# Patient Record
Sex: MALE | Race: ASIAN | NOT HISPANIC OR LATINO | URBAN - METROPOLITAN AREA
[De-identification: names, ages, dates, MRNs, and addresses within clinical notes are randomized per-mention and may not be internally consistent; named-entity substitution may affect disease eponyms.]

---

## 2017-01-10 PROBLEM — Z00.00 ENCOUNTER FOR PREVENTIVE HEALTH EXAMINATION: Status: ACTIVE | Noted: 2017-01-10

## 2017-01-30 ENCOUNTER — OUTPATIENT (OUTPATIENT)
Dept: OUTPATIENT SERVICES | Facility: HOSPITAL | Age: 57
LOS: 1 days | End: 2017-01-30
Payer: MEDICARE

## 2017-01-30 ENCOUNTER — APPOINTMENT (OUTPATIENT)
Dept: CV DIAGNOSITCS | Facility: HOSPITAL | Age: 57
End: 2017-01-30

## 2017-01-30 DIAGNOSIS — I25.10 ATHEROSCLEROTIC HEART DISEASE OF NATIVE CORONARY ARTERY WITHOUT ANGINA PECTORIS: ICD-10-CM

## 2017-01-30 PROCEDURE — 93306 TTE W/DOPPLER COMPLETE: CPT

## 2017-01-30 PROCEDURE — 93312 ECHO TRANSESOPHAGEAL: CPT

## 2017-01-30 PROCEDURE — 93306 TTE W/DOPPLER COMPLETE: CPT | Mod: 26

## 2017-01-30 PROCEDURE — 93312 ECHO TRANSESOPHAGEAL: CPT | Mod: 26

## 2019-12-22 ENCOUNTER — EMERGENCY (EMERGENCY)
Facility: HOSPITAL | Age: 59
LOS: 1 days | Discharge: ANOTHER HOSPITAL | End: 2019-12-22
Attending: EMERGENCY MEDICINE | Admitting: EMERGENCY MEDICINE
Payer: MEDICARE

## 2019-12-22 VITALS
OXYGEN SATURATION: 98 % | HEART RATE: 89 BPM | WEIGHT: 229.94 LBS | RESPIRATION RATE: 16 BRPM | HEIGHT: 73 IN | DIASTOLIC BLOOD PRESSURE: 106 MMHG | SYSTOLIC BLOOD PRESSURE: 171 MMHG | TEMPERATURE: 99 F

## 2019-12-22 LAB
ALBUMIN SERPL ELPH-MCNC: 3.6 G/DL — SIGNIFICANT CHANGE UP (ref 3.3–5)
ALP SERPL-CCNC: 78 U/L — SIGNIFICANT CHANGE UP (ref 40–120)
ALT FLD-CCNC: 23 U/L — SIGNIFICANT CHANGE UP (ref 12–78)
ANION GAP SERPL CALC-SCNC: 6 MMOL/L — SIGNIFICANT CHANGE UP (ref 5–17)
AST SERPL-CCNC: 19 U/L — SIGNIFICANT CHANGE UP (ref 15–37)
BASOPHILS # BLD AUTO: 0.07 K/UL — SIGNIFICANT CHANGE UP (ref 0–0.2)
BASOPHILS NFR BLD AUTO: 1.2 % — SIGNIFICANT CHANGE UP (ref 0–2)
BILIRUB SERPL-MCNC: 0.5 MG/DL — SIGNIFICANT CHANGE UP (ref 0.2–1.2)
BUN SERPL-MCNC: 11 MG/DL — SIGNIFICANT CHANGE UP (ref 7–23)
CALCIUM SERPL-MCNC: 8.7 MG/DL — SIGNIFICANT CHANGE UP (ref 8.5–10.1)
CHLORIDE SERPL-SCNC: 110 MMOL/L — HIGH (ref 96–108)
CO2 SERPL-SCNC: 27 MMOL/L — SIGNIFICANT CHANGE UP (ref 22–31)
CREAT SERPL-MCNC: 0.97 MG/DL — SIGNIFICANT CHANGE UP (ref 0.5–1.3)
EOSINOPHIL # BLD AUTO: 0.37 K/UL — SIGNIFICANT CHANGE UP (ref 0–0.5)
EOSINOPHIL NFR BLD AUTO: 6.6 % — HIGH (ref 0–6)
GLUCOSE SERPL-MCNC: 109 MG/DL — HIGH (ref 70–99)
HCT VFR BLD CALC: 43.3 % — SIGNIFICANT CHANGE UP (ref 39–50)
HGB BLD-MCNC: 14.5 G/DL — SIGNIFICANT CHANGE UP (ref 13–17)
IMM GRANULOCYTES NFR BLD AUTO: 0.2 % — SIGNIFICANT CHANGE UP (ref 0–1.5)
LYMPHOCYTES # BLD AUTO: 0.97 K/UL — LOW (ref 1–3.3)
LYMPHOCYTES # BLD AUTO: 17.3 % — SIGNIFICANT CHANGE UP (ref 13–44)
MCHC RBC-ENTMCNC: 29.1 PG — SIGNIFICANT CHANGE UP (ref 27–34)
MCHC RBC-ENTMCNC: 33.5 GM/DL — SIGNIFICANT CHANGE UP (ref 32–36)
MCV RBC AUTO: 86.8 FL — SIGNIFICANT CHANGE UP (ref 80–100)
MONOCYTES # BLD AUTO: 0.58 K/UL — SIGNIFICANT CHANGE UP (ref 0–0.9)
MONOCYTES NFR BLD AUTO: 10.3 % — SIGNIFICANT CHANGE UP (ref 2–14)
NEUTROPHILS # BLD AUTO: 3.62 K/UL — SIGNIFICANT CHANGE UP (ref 1.8–7.4)
NEUTROPHILS NFR BLD AUTO: 64.4 % — SIGNIFICANT CHANGE UP (ref 43–77)
NRBC # BLD: 0 /100 WBCS — SIGNIFICANT CHANGE UP (ref 0–0)
PLATELET # BLD AUTO: 203 K/UL — SIGNIFICANT CHANGE UP (ref 150–400)
POTASSIUM SERPL-MCNC: 3.7 MMOL/L — SIGNIFICANT CHANGE UP (ref 3.5–5.3)
POTASSIUM SERPL-SCNC: 3.7 MMOL/L — SIGNIFICANT CHANGE UP (ref 3.5–5.3)
PROT SERPL-MCNC: 6.7 G/DL — SIGNIFICANT CHANGE UP (ref 6–8.3)
RBC # BLD: 4.99 M/UL — SIGNIFICANT CHANGE UP (ref 4.2–5.8)
RBC # FLD: 12.4 % — SIGNIFICANT CHANGE UP (ref 10.3–14.5)
SODIUM SERPL-SCNC: 143 MMOL/L — SIGNIFICANT CHANGE UP (ref 135–145)
WBC # BLD: 5.62 K/UL — SIGNIFICANT CHANGE UP (ref 3.8–10.5)
WBC # FLD AUTO: 5.62 K/UL — SIGNIFICANT CHANGE UP (ref 3.8–10.5)

## 2019-12-22 PROCEDURE — 99285 EMERGENCY DEPT VISIT HI MDM: CPT

## 2019-12-22 PROCEDURE — 70450 CT HEAD/BRAIN W/O DYE: CPT | Mod: 26

## 2019-12-22 PROCEDURE — 93010 ELECTROCARDIOGRAM REPORT: CPT

## 2019-12-22 RX ORDER — ACETAMINOPHEN 500 MG
650 TABLET ORAL ONCE
Refills: 0 | Status: COMPLETED | OUTPATIENT
Start: 2019-12-22 | End: 2019-12-22

## 2019-12-22 RX ORDER — LABETALOL HCL 100 MG
10 TABLET ORAL ONCE
Refills: 0 | Status: COMPLETED | OUTPATIENT
Start: 2019-12-22 | End: 2019-12-22

## 2019-12-22 RX ADMIN — Medication 650 MILLIGRAM(S): at 20:33

## 2019-12-22 RX ADMIN — Medication 650 MILLIGRAM(S): at 22:20

## 2019-12-22 RX ADMIN — Medication 10 MILLIGRAM(S): at 22:21

## 2019-12-22 NOTE — ED PROVIDER NOTE - CLINICAL SUMMARY MEDICAL DECISION MAKING FREE TEXT BOX
pt with htn with headache. pt refuses bp control, advised he will only take tylenol. will do labs, ekg, ct head to r/o ich, monitor.

## 2019-12-22 NOTE — ED ADULT TRIAGE NOTE - TEMPERATURE IN CELSIUS (DEGREES C)
Notes small amount of urinary leakage throughout the day. Very small amt, wears pantyliner. Not associated with valsalva etc, and not consistent with urge. Some urethral hypermobility, but possibly has ISD. None demonstrated on exam today. Discussed urogynecology referral as not straightforward and could require further workup. Not interested at the moment, will consider in future.    37

## 2019-12-22 NOTE — ED ADULT NURSE NOTE - OBJECTIVE STATEMENT
Pt comes from triage. Pt reports right sided headache intermittent since Thanksgiving. Pt also reports fluid coming out of his left ear, sees ENT regularly. Pt reports he gets dizzy when he takes his blood pressure medication so he does not take it all the time. Pt reports he has been seeing a dentist every week since Thanksgiving for root canals and tooth extractions. Pt breathing easy, unlabored, no signs of distress.

## 2019-12-22 NOTE — ED PROVIDER NOTE - NSRISKOFTRANSFER_ED_A_ED
Death or Disability/Increased Pain/Deterioration of Condition En Route/Transportation Risk (There is always a risk of traffic delays resulting in deterioration of condition.)

## 2019-12-22 NOTE — ED PROVIDER NOTE - PHYSICAL EXAMINATION
Neuro- A&Ox3. Speech clear, without articulation or word-finding difficulties. Eyes- PERRL bilaterally. EOMs in tact. No nystagmus. CN II-XII in tact. No facial droop. No sensory or motor deficits throughout extremities bilaterally. Strength 5/5 throughout upper and lower extremities. No pronator drift.

## 2019-12-22 NOTE — ED PROVIDER NOTE - OBJECTIVE STATEMENT
pt is a 60yo male with pmhx of htn, bipolar d/o presents with headache  month. pt reports right sided sharp intermittent headache untreated. pt reports his bp has been high recently, follows up with dr avendano who put pt on norvasc and another unknown medication that causes pt to be dizzy so he does not take it. pt is a 60yo male with pmhx of htn, bipolar d/o presents with headache  month. pt reports right sided sharp intermittent headache untreated. pt reports his bp has been high recently, follows up with dr avendano who put pt on norvasc and another unknown medication that causes pt to be dizzy so he does not take it. pt reports he had episode of right sided body numbness days ago that since resolved. pt reports he also had difficulty finding words today while at [employer]. pt denies fever, vision changes, cp, sob, n/v.

## 2019-12-22 NOTE — ED PROVIDER NOTE - CHPI ED SYMPTOMS NEG
no vomiting/no loss of consciousness/no nausea/no confusion/no weakness/no blurred vision/no dizziness/no fever/no change in level of consciousness

## 2019-12-22 NOTE — ED PROVIDER NOTE - PROGRESS NOTE DETAILS
case signed out to dr monzon I had extensive discussin with pt regarding risks of stroke his dad  from stroke he agrees to the iv meds and will take his po meds

## 2019-12-23 ENCOUNTER — INPATIENT (INPATIENT)
Facility: HOSPITAL | Age: 59
LOS: 2 days | Discharge: OTHER INSTITUTION | DRG: 65 | End: 2019-12-26
Attending: NEUROLOGICAL SURGERY | Admitting: SURGERY
Payer: MEDICARE

## 2019-12-23 VITALS
OXYGEN SATURATION: 97 % | DIASTOLIC BLOOD PRESSURE: 93 MMHG | SYSTOLIC BLOOD PRESSURE: 150 MMHG | RESPIRATION RATE: 16 BRPM | TEMPERATURE: 98 F | HEART RATE: 76 BPM

## 2019-12-23 VITALS
WEIGHT: 229.94 LBS | HEART RATE: 80 BPM | RESPIRATION RATE: 18 BRPM | TEMPERATURE: 98 F | DIASTOLIC BLOOD PRESSURE: 89 MMHG | SYSTOLIC BLOOD PRESSURE: 151 MMHG | HEIGHT: 72 IN

## 2019-12-23 DIAGNOSIS — I61.9 NONTRAUMATIC INTRACEREBRAL HEMORRHAGE, UNSPECIFIED: ICD-10-CM

## 2019-12-23 LAB
ALBUMIN SERPL ELPH-MCNC: 4.4 G/DL — SIGNIFICANT CHANGE UP (ref 3.3–5.2)
ALP SERPL-CCNC: 79 U/L — SIGNIFICANT CHANGE UP (ref 40–120)
ALT FLD-CCNC: 17 U/L — SIGNIFICANT CHANGE UP
ANION GAP SERPL CALC-SCNC: 12 MMOL/L — SIGNIFICANT CHANGE UP (ref 5–17)
APTT BLD: 30.7 SEC — SIGNIFICANT CHANGE UP (ref 27.5–36.3)
AST SERPL-CCNC: 18 U/L — SIGNIFICANT CHANGE UP
BILIRUB SERPL-MCNC: 0.4 MG/DL — SIGNIFICANT CHANGE UP (ref 0.4–2)
BLD GP AB SCN SERPL QL: SIGNIFICANT CHANGE UP
BUN SERPL-MCNC: 10 MG/DL — SIGNIFICANT CHANGE UP (ref 8–20)
CALCIUM SERPL-MCNC: 9 MG/DL — SIGNIFICANT CHANGE UP (ref 8.6–10.2)
CHLORIDE SERPL-SCNC: 103 MMOL/L — SIGNIFICANT CHANGE UP (ref 98–107)
CO2 SERPL-SCNC: 24 MMOL/L — SIGNIFICANT CHANGE UP (ref 22–29)
CREAT SERPL-MCNC: 0.71 MG/DL — SIGNIFICANT CHANGE UP (ref 0.5–1.3)
GLUCOSE BLDC GLUCOMTR-MCNC: 113 MG/DL — HIGH (ref 70–99)
GLUCOSE SERPL-MCNC: 161 MG/DL — HIGH (ref 70–115)
HBA1C BLD-MCNC: 5.6 % — SIGNIFICANT CHANGE UP (ref 4–5.6)
HCT VFR BLD CALC: 45.9 % — SIGNIFICANT CHANGE UP (ref 39–50)
HGB BLD-MCNC: 15 G/DL — SIGNIFICANT CHANGE UP (ref 13–17)
INR BLD: 1.07 RATIO — SIGNIFICANT CHANGE UP (ref 0.88–1.16)
MAGNESIUM SERPL-MCNC: 2.3 MG/DL — SIGNIFICANT CHANGE UP (ref 1.6–2.6)
MCHC RBC-ENTMCNC: 29 PG — SIGNIFICANT CHANGE UP (ref 27–34)
MCHC RBC-ENTMCNC: 32.7 GM/DL — SIGNIFICANT CHANGE UP (ref 32–36)
MCV RBC AUTO: 88.6 FL — SIGNIFICANT CHANGE UP (ref 80–100)
PLATELET # BLD AUTO: 225 K/UL — SIGNIFICANT CHANGE UP (ref 150–400)
POTASSIUM SERPL-MCNC: 3.8 MMOL/L — SIGNIFICANT CHANGE UP (ref 3.5–5.3)
POTASSIUM SERPL-SCNC: 3.8 MMOL/L — SIGNIFICANT CHANGE UP (ref 3.5–5.3)
PROT SERPL-MCNC: 7.1 G/DL — SIGNIFICANT CHANGE UP (ref 6.6–8.7)
PROTHROM AB SERPL-ACNC: 12.3 SEC — SIGNIFICANT CHANGE UP (ref 10–12.9)
RBC # BLD: 5.18 M/UL — SIGNIFICANT CHANGE UP (ref 4.2–5.8)
RBC # FLD: 12.1 % — SIGNIFICANT CHANGE UP (ref 10.3–14.5)
SODIUM SERPL-SCNC: 139 MMOL/L — SIGNIFICANT CHANGE UP (ref 135–145)
TROPONIN T SERPL-MCNC: <0.01 NG/ML — SIGNIFICANT CHANGE UP (ref 0–0.06)
WBC # BLD: 10.06 K/UL — SIGNIFICANT CHANGE UP (ref 3.8–10.5)
WBC # FLD AUTO: 10.06 K/UL — SIGNIFICANT CHANGE UP (ref 3.8–10.5)

## 2019-12-23 PROCEDURE — 99285 EMERGENCY DEPT VISIT HI MDM: CPT | Mod: 25

## 2019-12-23 PROCEDURE — 80053 COMPREHEN METABOLIC PANEL: CPT

## 2019-12-23 PROCEDURE — 96375 TX/PRO/DX INJ NEW DRUG ADDON: CPT | Mod: XU

## 2019-12-23 PROCEDURE — 36415 COLL VENOUS BLD VENIPUNCTURE: CPT

## 2019-12-23 PROCEDURE — 99221 1ST HOSP IP/OBS SF/LOW 40: CPT

## 2019-12-23 PROCEDURE — 99291 CRITICAL CARE FIRST HOUR: CPT

## 2019-12-23 PROCEDURE — 96374 THER/PROPH/DIAG INJ IV PUSH: CPT | Mod: XU

## 2019-12-23 PROCEDURE — 70544 MR ANGIOGRAPHY HEAD W/O DYE: CPT | Mod: 26,59

## 2019-12-23 PROCEDURE — 70496 CT ANGIOGRAPHY HEAD: CPT

## 2019-12-23 PROCEDURE — 71260 CT THORAX DX C+: CPT | Mod: 26

## 2019-12-23 PROCEDURE — 70496 CT ANGIOGRAPHY HEAD: CPT | Mod: 26

## 2019-12-23 PROCEDURE — 70450 CT HEAD/BRAIN W/O DYE: CPT

## 2019-12-23 PROCEDURE — 74177 CT ABD & PELVIS W/CONTRAST: CPT | Mod: 26

## 2019-12-23 PROCEDURE — 82962 GLUCOSE BLOOD TEST: CPT

## 2019-12-23 PROCEDURE — 70553 MRI BRAIN STEM W/O & W/DYE: CPT | Mod: 26

## 2019-12-23 PROCEDURE — 93005 ELECTROCARDIOGRAM TRACING: CPT

## 2019-12-23 PROCEDURE — 85027 COMPLETE CBC AUTOMATED: CPT

## 2019-12-23 PROCEDURE — 99222 1ST HOSP IP/OBS MODERATE 55: CPT

## 2019-12-23 RX ORDER — MORPHINE SULFATE 50 MG/1
2 CAPSULE, EXTENDED RELEASE ORAL ONCE
Refills: 0 | Status: DISCONTINUED | OUTPATIENT
Start: 2019-12-23 | End: 2019-12-23

## 2019-12-23 RX ORDER — SODIUM CHLORIDE 9 MG/ML
1000 INJECTION INTRAMUSCULAR; INTRAVENOUS; SUBCUTANEOUS
Refills: 0 | Status: DISCONTINUED | OUTPATIENT
Start: 2019-12-23 | End: 2019-12-26

## 2019-12-23 RX ORDER — NICARDIPINE HYDROCHLORIDE 30 MG/1
5 CAPSULE, EXTENDED RELEASE ORAL
Qty: 40 | Refills: 0 | Status: DISCONTINUED | OUTPATIENT
Start: 2019-12-23 | End: 2019-12-23

## 2019-12-23 RX ORDER — ATENOLOL 25 MG/1
50 TABLET ORAL DAILY
Refills: 0 | Status: DISCONTINUED | OUTPATIENT
Start: 2019-12-23 | End: 2019-12-23

## 2019-12-23 RX ORDER — CHLORHEXIDINE GLUCONATE 213 G/1000ML
1 SOLUTION TOPICAL DAILY
Refills: 0 | Status: DISCONTINUED | OUTPATIENT
Start: 2019-12-23 | End: 2019-12-25

## 2019-12-23 RX ORDER — DEXAMETHASONE 0.5 MG/5ML
4 ELIXIR ORAL EVERY 6 HOURS
Refills: 0 | Status: DISCONTINUED | OUTPATIENT
Start: 2019-12-23 | End: 2019-12-26

## 2019-12-23 RX ORDER — HYDRALAZINE HCL 50 MG
10 TABLET ORAL ONCE
Refills: 0 | Status: COMPLETED | OUTPATIENT
Start: 2019-12-23 | End: 2019-12-23

## 2019-12-23 RX ORDER — LEVETIRACETAM 250 MG/1
500 TABLET, FILM COATED ORAL EVERY 12 HOURS
Refills: 0 | Status: DISCONTINUED | OUTPATIENT
Start: 2019-12-23 | End: 2019-12-26

## 2019-12-23 RX ORDER — ONDANSETRON 8 MG/1
4 TABLET, FILM COATED ORAL ONCE
Refills: 0 | Status: COMPLETED | OUTPATIENT
Start: 2019-12-23 | End: 2019-12-23

## 2019-12-23 RX ORDER — AMLODIPINE BESYLATE 2.5 MG/1
10 TABLET ORAL DAILY
Refills: 0 | Status: DISCONTINUED | OUTPATIENT
Start: 2019-12-23 | End: 2019-12-23

## 2019-12-23 RX ORDER — LEVETIRACETAM 250 MG/1
500 TABLET, FILM COATED ORAL
Refills: 0 | Status: DISCONTINUED | OUTPATIENT
Start: 2019-12-23 | End: 2019-12-23

## 2019-12-23 RX ORDER — ACETAMINOPHEN 500 MG
650 TABLET ORAL ONCE
Refills: 0 | Status: COMPLETED | OUTPATIENT
Start: 2019-12-23 | End: 2019-12-23

## 2019-12-23 RX ORDER — METOPROLOL TARTRATE 50 MG
5 TABLET ORAL EVERY 6 HOURS
Refills: 0 | Status: DISCONTINUED | OUTPATIENT
Start: 2019-12-23 | End: 2019-12-26

## 2019-12-23 RX ORDER — LABETALOL HCL 100 MG
10 TABLET ORAL ONCE
Refills: 0 | Status: COMPLETED | OUTPATIENT
Start: 2019-12-23 | End: 2019-12-23

## 2019-12-23 RX ORDER — INFLUENZA VIRUS VACCINE 15; 15; 15; 15 UG/.5ML; UG/.5ML; UG/.5ML; UG/.5ML
0.5 SUSPENSION INTRAMUSCULAR ONCE
Refills: 0 | Status: DISCONTINUED | OUTPATIENT
Start: 2019-12-23 | End: 2019-12-26

## 2019-12-23 RX ADMIN — Medication 5 MILLIGRAM(S): at 18:03

## 2019-12-23 RX ADMIN — Medication 10 MILLIGRAM(S): at 00:50

## 2019-12-23 RX ADMIN — SODIUM CHLORIDE 75 MILLILITER(S): 9 INJECTION INTRAMUSCULAR; INTRAVENOUS; SUBCUTANEOUS at 06:52

## 2019-12-23 RX ADMIN — NICARDIPINE HYDROCHLORIDE 25 MG/HR: 30 CAPSULE, EXTENDED RELEASE ORAL at 19:30

## 2019-12-23 RX ADMIN — Medication 5 MILLIGRAM(S): at 11:40

## 2019-12-23 RX ADMIN — LEVETIRACETAM 500 MILLIGRAM(S): 250 TABLET, FILM COATED ORAL at 07:07

## 2019-12-23 RX ADMIN — CHLORHEXIDINE GLUCONATE 1 APPLICATION(S): 213 SOLUTION TOPICAL at 09:47

## 2019-12-23 RX ADMIN — LEVETIRACETAM 420 MILLIGRAM(S): 250 TABLET, FILM COATED ORAL at 18:03

## 2019-12-23 RX ADMIN — NICARDIPINE HYDROCHLORIDE 25 MG/HR: 30 CAPSULE, EXTENDED RELEASE ORAL at 14:00

## 2019-12-23 RX ADMIN — ONDANSETRON 4 MILLIGRAM(S): 8 TABLET, FILM COATED ORAL at 02:15

## 2019-12-23 RX ADMIN — Medication 650 MILLIGRAM(S): at 04:30

## 2019-12-23 RX ADMIN — MORPHINE SULFATE 2 MILLIGRAM(S): 50 CAPSULE, EXTENDED RELEASE ORAL at 00:51

## 2019-12-23 RX ADMIN — Medication 4 MILLIGRAM(S): at 18:03

## 2019-12-23 RX ADMIN — MORPHINE SULFATE 2 MILLIGRAM(S): 50 CAPSULE, EXTENDED RELEASE ORAL at 01:24

## 2019-12-23 RX ADMIN — ONDANSETRON 4 MILLIGRAM(S): 8 TABLET, FILM COATED ORAL at 03:37

## 2019-12-23 NOTE — H&P ADULT - NSHPLABSRESULTS_GEN_ALL_CORE
LABS:                        15.0   10.06 )-----------( 225      ( 23 Dec 2019 03:33 )             45.9     12-23    139  |  103  |  10.0  ----------------------------<  161<H>  3.8   |  24.0  |  0.71    Ca    9.0      23 Dec 2019 03:33  Mg     2.3     12-23    TPro  7.1  /  Alb  4.4  /  TBili  0.4  /  DBili  x   /  AST  18  /  ALT  17  /  AlkPhos  79  12-23    PT/INR - ( 23 Dec 2019 03:33 )   PT: 12.3 sec;   INR: 1.07 ratio         PTT - ( 23 Dec 2019 03:33 )  PTT:30.7 sec    RADIOLOGY & ADDITIONAL STUDIES:

## 2019-12-23 NOTE — H&P ADULT - NSICDXPASTMEDICALHX_GEN_ALL_CORE_FT
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

## 2019-12-23 NOTE — H&P ADULT - HISTORY OF PRESENT ILLNESS
59M with PMH HTN, borderline DM, bipolar, transferred from [place] secondary to SAH. Patient has been having HA associated with nausea x1mon, has been followed by opthalmologist and w/u for glaucoma, he had an MRI/A of the brain as outpatient & has been told that has 2 small aneurysms & needed to see a neurosurgeon. He has seen Dr Greg Dykes in October & was told to follow up with him in 1yr and did not require immediate intervention. He was not offered cerebral angio at that time.   Today he started to c/o word finding difficulty in addition to HA

## 2019-12-23 NOTE — ED ADULT TRIAGE NOTE - CHIEF COMPLAINT QUOTE
patient transfer from [place] for hypertensive bleed - patient awake and alert. states that he had right side head pain for about one month - states that he also had dental work on that side, also complains of dizziness. patient with saline lock to left ac #20. patient on cardiac monitor, and oxygen 2 lpm.

## 2019-12-23 NOTE — CHART NOTE - NSCHARTNOTEFT_GEN_A_CORE
SICU TRANSFER NOTE  -----------------------------  ICU Admission Date: 12/23/19  Transfer Date: 12-23-19 @ 15:31    Admission Diagnosis: SAH    Active Problems/injuries: ?GBM, SDH    Procedures: MRI head    Consultants:  [ ] Cardiology  [ ] Endocrine  [ ] Infectious Disease  [ ] Medicine  [x] Neurosurgery  [ ] Ortho       [ ] Weight Bearing Status:  [ ] Palliative       [ ] Advanced Directives:    [ ] Physical Medicine and Rehab       [ ] Disposition :   [ ] Plastics  [ ] Pulmonary    Medications  chlorhexidine 2% Cloths 1 Application(s) Topical daily  dexAMETHasone  Injectable 4 milliGRAM(s) IV Push every 6 hours  levETIRAcetam  IVPB 500 milliGRAM(s) IV Intermittent every 12 hours  metoprolol tartrate Injectable 5 milliGRAM(s) IV Push every 6 hours  niCARdipine Infusion 5 mG/Hr IV Continuous <Continuous>  sodium chloride 0.9%. 1000 milliLiter(s) IV Continuous <Continuous>      [x] I attest I have reviewed and reconciled all medications prior to transfer    IV Fluids  sodium chloride 0.9%.: Solution, 1000 milliLiter(s) infuse at 75 mL/Hr  Provider's Contact #: [phone]    Indication: currently NPO    Antibiotics: None    The following items are to be followed up:  -starting patient on a diet  -SBP goal  -pre-op for OR friday SICU TRANSFER NOTE  -----------------------------  ICU Admission Date: 12/23/19  Transfer Date: 12-23-19 @ 15:31    Admission Diagnosis: SAH    Active Problems/injuries: ?GBM, SDH    Procedures: MRI head    Consultants:  [ ] Cardiology  [ ] Endocrine  [ ] Infectious Disease  [ ] Medicine  [x] Neurosurgery  [ ] Ortho       [ ] Weight Bearing Status:  [ ] Palliative       [ ] Advanced Directives:    [ ] Physical Medicine and Rehab       [ ] Disposition :   [ ] Plastics  [ ] Pulmonary    Medications  chlorhexidine 2% Cloths 1 Application(s) Topical daily  dexAMETHasone  Injectable 4 milliGRAM(s) IV Push every 6 hours  levETIRAcetam  IVPB 500 milliGRAM(s) IV Intermittent every 12 hours  metoprolol tartrate Injectable 5 milliGRAM(s) IV Push every 6 hours  niCARdipine Infusion 5 mG/Hr IV Continuous <Continuous>  sodium chloride 0.9%. 1000 milliLiter(s) IV Continuous <Continuous>      [x] I attest I have reviewed and reconciled all medications prior to transfer    IV Fluids  sodium chloride 0.9%.: Solution, 1000 milliLiter(s) infuse at 75 mL/Hr  Provider's Contact #: [phone]    Indication: currently NPO    Antibiotics: None    The following items are to be followed up:  -starting patient on a diet  -f/u metastatic workup  -SBP goal  -pre-op for OR friday

## 2019-12-23 NOTE — ED PROVIDER NOTE - OBJECTIVE STATEMENT
58 y/o M pt transferred from [place] secondary to acute subarachnoid hemorrhage due to AVM rupture. Pt states that he had a persistent HA after an argument with his sister. Pt went to [facility] and found hypertensive, with BP to 200s, prompting presentation. Pt states he currently feels groggy with significant HA. However, pt just wants tylenols, saying that Morphine made him nauseous, and is concerned about taking it again. After initial evaluation, neuro surgery PA contacted for consultation.

## 2019-12-23 NOTE — H&P ADULT - ASSESSMENT
58yo M with Left temporal hemorrhage, ? AVM/aneurysm    Admit to SICU  neuro checks q1hr  keppra 500bid  MRI/MRA brain   cerebral angio after MRI

## 2019-12-23 NOTE — PROGRESS NOTE ADULT - SUBJECTIVE AND OBJECTIVE BOX
HPI:    59M with PMH HTN, borderline DM, bipolar, transferred from [place] secondary to SAH. Patient has been having HA associated with nausea [id number], has been followed by ophthalmologist and w/u for glaucoma, he had an MRI/A of the brain as outpatient & has been told that he has 2 small aneurysms & needed to see a neurosurgeon. He has seen Dr Greg Dykes in October & was told to follow up with him in 1yr and did not require immediate intervention. He was not offered cerebral angio at that time. Today he started to c/o word finding difficulty in addition to HA (23 Dec 2019 03:57)      INTERVAL HPI/OVERNIGHT EVENTS:    59y Male s/p seen lying comfortably in bed. Pt was transferred from [place] after he was found to have a left temporal SAH/AVM. Tolerating diet. Passing gas/BM. Voiding. [place] in with __ clear urine. Denies headache, weakness, numbness, n/v/d, fevers, chills, chest pain, SOB.     Vital Signs Last 24 Hrs  T(C): 36.6 (23 Dec 2019 07:31), Max: 36.7 (23 Dec 2019 02:58)  T(F): 97.8 (23 Dec 2019 07:31), Max: 98 (23 Dec 2019 02:58)  HR: 84 (23 Dec 2019 10:00) (72 - 86)  BP: 149/76 (23 Dec 2019 10:00) (132/82 - 165/100)  BP(mean): 90 (23 Dec 2019 10:00) (90 - 113)  RR: 17 (23 Dec 2019 10:00) (14 - 20)  SpO2: 97% (23 Dec 2019 10:00) (94% - 98%)    PHYSICAL EXAM:  GENERAL: NAD, well-groomed, well-developed  HEAD:  Atraumatic, normocephalic  DRAINS:   WOUND: Dressing clean dry intact  JAM COMA SCORE: E- V- M- =       E: 4= opens eyes spontaneously 3= to voice 2= to noxious 1= no opening       V: 5= oriented 4= confused 3= inappropriate words 2= incomprehensible sounds 1= nonverbal 1T= intubated       M: 6= follows commands 5= localizes 4= withdraws 3= flexor posturing 2= extensor posturing 1= no movement  MENTAL STATUS: AAO x3; Awake/Comatose; Opens eyes spontaneously/to voice/to light touch/to noxious stimuli; Appropriately conversant without aphasia/Nonverbal; following simple commands/mimicking/not following commands  CRANIAL NERVES: Visual acuity normal for age, visual fields full to confrontation, PERRL. EOMI without nystagmus. Facial sensation intact V1-3 distribution b/l. Face symmetric w/ normal eye closure and smile, tongue midline. Hearing grossly intact. Speech clear. Head turning and shoulder shrug intact.   REFLEXES: Doll's eyes positive. Blinks to threat. Gag reflex intact. No pronator drift; DTRs 2+ intact and symmetric; negative Hernadez's b/l; negative clonus b/l  MOTOR: strength 5/5 b/l upper and lower extremities  Uppers     Delt     Bicep     Tricep     HG  R                5/5       5/5         5/5         5/5  L                5/5       5/5         5/5         5/5  Lowers      HF     KF      KE     PF     DF     EHL  R             5/5     5/5     5/5    5/5   5/5    5/5  L              5/5    5/5      5/5    5/5   5/5    5/5  SENSATION: grossly intact to light touch all extremities  COORDINATION: Gait intact; rapid alternating movements intact; heel to shin intact; no upper extremity dysmetria  CHEST/LUNG: Clear to auscultation bilaterally; no rales, rhonchi, wheezing, or rubs  HEART: +S1/+S2; Regular rate and rhythm; no murmurs, rubs, or gallops  ABDOMEN: Soft, nontender, nondistended; bowel sounds present all four quadrants  EXTREMITIES:  2+ peripheral pulses, no clubbing, cyanosis, or edema  SKIN: Warm, dry; no rashes or lesions    LABS:                        15.0   10.06 )-----------( 225      ( 23 Dec 2019 03:33 )             45.9     12-23    139  |  103  |  10.0  ----------------------------<  161<H>  3.8   |  24.0  |  0.71    Ca    9.0      23 Dec 2019 03:33  Mg     2.3     12-23    TPro  7.1  /  Alb  4.4  /  TBili  0.4  /  DBili  x   /  AST  18  /  ALT  17  /  AlkPhos  79  12-23    PT/INR - ( 23 Dec 2019 03:33 )   PT: 12.3 sec;   INR: 1.07 ratio         PTT - ( 23 Dec 2019 03:33 )  PTT:30.7 sec      12-22 @ 07:01  -  12-23 @ 07:00  --------------------------------------------------------  IN: 150 mL / OUT: 1500 mL / NET: -1350 mL    12-23 @ 07:01  -  12-23 @ 10:42  --------------------------------------------------------  IN: 225 mL / OUT: 0 mL / NET: 225 mL        RADIOLOGY & ADDITIONAL TESTS: HPI:    59M with PMH HTN, borderline DM, bipolar, transferred from [place] secondary to SAH. Patient has been having HA associated with nausea [id number], has been followed by ophthalmologist and w/u for glaucoma, he had an MRI/A of the brain as outpatient & has been told that he has 2 small aneurysms & needed to see a neurosurgeon. He has seen Dr Greg Dykes in October & was told to follow up with him in 1yr and did not require immediate intervention. He was not offered cerebral angio at that time. Today he started to c/o word finding difficulty in addition to HA (23 Dec 2019 03:57)      INTERVAL HPI/OVERNIGHT EVENTS:    59y Male s/p seen lying comfortably in bed. Pt was transferred from [place] after he was found to have a left temporal SAH. CTH suspicious for AVM. Pt examined at bedside today. MRI brain +/- contrast ordered, pending read. Will need cerebral angiogram.     Vital Signs Last 24 Hrs  T(C): 36.6 (23 Dec 2019 07:31), Max: 36.7 (23 Dec 2019 02:58)  T(F): 97.8 (23 Dec 2019 07:31), Max: 98 (23 Dec 2019 02:58)  HR: 84 (23 Dec 2019 10:00) (72 - 86)  BP: 149/76 (23 Dec 2019 10:00) (132/82 - 165/100)  BP(mean): 90 (23 Dec 2019 10:00) (90 - 113)  RR: 17 (23 Dec 2019 10:00) (14 - 20)  SpO2: 97% (23 Dec 2019 10:00) (94% - 98%)    PHYSICAL EXAM:   GENERAL: NAD, well-groomed, well-developed  HEAD:  Atraumatic, normocephalic   JAM COMA SCORE GCS 15   MENTAL STATUS: AAO x3, Awake, appropriately conversant without aphasia, following commands.   CRANIAL NERVES: PERRL. EOMI. Vision ____  Facial sensation intact V1-3 distribution b/l. Face symmetric w/ normal eye closure and smile, tongue midline. Hearing grossly intact. Speech clear. Head turning and shoulder shrug intact.   MOTOR: strength 5/5 b/l upper and lower extremities  COORDINATION: Gait intact; rapid alternating movements intact; heel to shin intact; no upper extremity dysmetria  EXTREMITIES:  2+ peripheral pulses, no clubbing, cyanosis, or edema  SKIN: Warm, dry; no rashes or lesions    LABS:                        15.0   10.06 )-----------( 225      ( 23 Dec 2019 03:33 )             45.9     12-23    139  |  103  |  10.0  ----------------------------<  161<H>  3.8   |  24.0  |  0.71    Ca    9.0      23 Dec 2019 03:33  Mg     2.3     12-23    TPro  7.1  /  Alb  4.4  /  TBili  0.4  /  DBili  x   /  AST  18  /  ALT  17  /  AlkPhos  79  12-23    PT/INR - ( 23 Dec 2019 03:33 )   PT: 12.3 sec;   INR: 1.07 ratio         PTT - ( 23 Dec 2019 03:33 )  PTT:30.7 sec      12-22 @ 07:01 - 12-23 @ 07:00  --------------------------------------------------------  IN: 150 mL / OUT: 1500 mL / NET: -1350 mL    12-23 @ 07:01  -  12-23 @ 10:42  --------------------------------------------------------  IN: 225 mL / OUT: 0 mL / NET: 225 mL        RADIOLOGY & ADDITIONAL TESTS: HPI:    59M with PMH HTN, borderline DM, bipolar, transferred from [place] secondary to SAH. Patient has been having HA associated with nausea [id number], has been followed by ophthalmologist and w/u for glaucoma, he had an MRI/A of the brain as outpatient & has been told that he has 2 small aneurysms & needed to see a neurosurgeon. He has seen Dr Greg Dykes in October & was told to follow up with him in 1yr and did not require immediate intervention. He was not offered cerebral angio at that time. Today he started to c/o word finding difficulty in addition to HA (23 Dec 2019 03:57)      INTERVAL HPI/OVERNIGHT EVENTS:    59y Male s/p seen lying comfortably in bed. Pt was transferred from [place] after he was found to have a left temporal SAH. CTH suspicious for AVM.  MRI brain +/- contrast preformed today which showed a large hemorrhagic left temporal lobe mass.     Vital Signs Last 24 Hrs  T(C): 36.6 (23 Dec 2019 07:31), Max: 36.7 (23 Dec 2019 02:58)  T(F): 97.8 (23 Dec 2019 07:31), Max: 98 (23 Dec 2019 02:58)  HR: 84 (23 Dec 2019 10:00) (72 - 86)  BP: 149/76 (23 Dec 2019 10:00) (132/82 - 165/100)  BP(mean): 90 (23 Dec 2019 10:00) (90 - 113)  RR: 17 (23 Dec 2019 10:00) (14 - 20)  SpO2: 97% (23 Dec 2019 10:00) (94% - 98%)    PHYSICAL EXAM:   GENERAL: NAD, well-groomed, well-developed  HEAD:  Atraumatic, normocephalic   JAM COMA SCORE GCS 15   MENTAL STATUS: AAO x3, Awake, appropriately conversant, following commands.   CRANIAL NERVES: PERRL. EOMI. Vision - right lower quadrant field loss,  Facial sensation intact V1-3 distribution b/l. Face symmetric w/ normal eye closure and smile, tongue midline. Hearing grossly intact. Speech clear. Head turning and shoulder shrug intact.   MOTOR: strength 5/5 b/l upper and lower extremities  SKIN: Warm, dry; no rashes or lesions    LABS:                        15.0   10.06 )-----------( 225      ( 23 Dec 2019 03:33 )             45.9     12-23    139  |  103  |  10.0  ----------------------------<  161<H>  3.8   |  24.0  |  0.71    Ca    9.0      23 Dec 2019 03:33  Mg     2.3     12-23    TPro  7.1  /  Alb  4.4  /  TBili  0.4  /  DBili  x   /  AST  18  /  ALT  17  /  AlkPhos  79  12-23    PT/INR - ( 23 Dec 2019 03:33 )   PT: 12.3 sec;   INR: 1.07 ratio         PTT - ( 23 Dec 2019 03:33 )  PTT:30.7 sec      12-22 @ 07:01 - 12-23 @ 07:00  --------------------------------------------------------  IN: 150 mL / OUT: 1500 mL / NET: -1350 mL    12-23 @ 07:01 - 12-23 @ 10:42  --------------------------------------------------------  IN: 225 mL / OUT: 0 mL / NET: 225 mL        RADIOLOGY & ADDITIONAL TESTS: HPI:    59M with PMH HTN, borderline DM, bipolar, transferred from [place] secondary to SAH. Patient has been having HA associated with nausea [id number], has been followed by ophthalmologist and w/u for glaucoma, he had an MRI/A of the brain as outpatient & has been told that he has 2 small aneurysms & needed to see a neurosurgeon. He has seen Dr Greg Dykes in October & was told to follow up with him in 1yr and did not require immediate intervention. He was not offered cerebral angio at that time. Today he started to c/o word finding difficulty in addition to HA (23 Dec 2019 03:57)      INTERVAL HPI/OVERNIGHT EVENTS:    59y Male s/p seen lying comfortably in bed. Pt was transferred from [place] after he was found to have a left temporal SAH. CTH suspicious for AVM.  MRI brain +/- contrast preformed today which showed a large hemorrhagic left temporal lobe mass.     Vital Signs Last 24 Hrs  T(C): 36.6 (23 Dec 2019 07:31), Max: 36.7 (23 Dec 2019 02:58)  T(F): 97.8 (23 Dec 2019 07:31), Max: 98 (23 Dec 2019 02:58)  HR: 84 (23 Dec 2019 10:00) (72 - 86)  BP: 149/76 (23 Dec 2019 10:00) (132/82 - 165/100)  BP(mean): 90 (23 Dec 2019 10:00) (90 - 113)  RR: 17 (23 Dec 2019 10:00) (14 - 20)  SpO2: 97% (23 Dec 2019 10:00) (94% - 98%)    PHYSICAL EXAM:   GENERAL: NAD, well-groomed, well-developed  HEAD:  Atraumatic, normocephalic   JAM COMA SCORE GCS 15   MENTAL STATUS: AAO x3, Awake, appropriately conversant, following commands.   CRANIAL NERVES: PERRL. EOMI. Vision - right lower quadrant field loss,  Facial sensation intact V1-3 distribution b/l. Face symmetric w/ normal eye closure and smile, tongue midline. Hearing grossly intact. Speech clear. Head turning and shoulder shrug intact. No pronator drift.   MOTOR: strength 5/5 b/l upper and lower extremities  Balance: No dysmetria   SKIN: Warm, dry; no rashes or lesions    LABS:                        15.0   10.06 )-----------( 225      ( 23 Dec 2019 03:33 )             45.9     12-23    139  |  103  |  10.0  ----------------------------<  161<H>  3.8   |  24.0  |  0.71    Ca    9.0      23 Dec 2019 03:33  Mg     2.3     12-23    TPro  7.1  /  Alb  4.4  /  TBili  0.4  /  DBili  x   /  AST  18  /  ALT  17  /  AlkPhos  79  12-23    PT/INR - ( 23 Dec 2019 03:33 )   PT: 12.3 sec;   INR: 1.07 ratio         PTT - ( 23 Dec 2019 03:33 )  PTT:30.7 sec      12-22 @ 07:01  -  12-23 @ 07:00  --------------------------------------------------------  IN: 150 mL / OUT: 1500 mL / NET: -1350 mL    12-23 @ 07:01  -  12-23 @ 10:42  --------------------------------------------------------  IN: 225 mL / OUT: 0 mL / NET: 225 mL        RADIOLOGY & ADDITIONAL TESTS:     MR Head w/wo IV Cont (12.23.19 @ 11:14) >    Brain MRI:   1.  Large heterogeneously enhancing, hemorrhagic left parietal temporal mass. Extensive abnormal enhancement in the left temporal white matter. These findings suggest malignancy. GBM is a diagnostic consideration. Differential diagnosis includes other primary and metastatic malignancies.  2.  Left cerebral convexity subdural hematoma measuring 3 mm.   3.  Left-to-right midline shift measuring 6 mm.     Brain MRA: No large vessel occlusion or aneurysm. No evidence of arteriovenous malformation

## 2019-12-23 NOTE — ED ADULT NURSE NOTE - OBJECTIVE STATEMENT
pt presents to the ed a&ox3 transferred from [facility] for possible head bleed. pt c/o right sided headache and dizziness, hx of brain aneurysm and bipolar disorder. placed on cardiac monitor, safety maintained. pt presents to the ed a&ox3 transferred from [facility] for possible head bleed. pt c/o right sided headache, dizziness, and nausea. hx of brain aneurysm and bipolar disorder. placed on cardiac monitor, safety maintained.

## 2019-12-23 NOTE — PROGRESS NOTE ADULT - SUBJECTIVE AND OBJECTIVE BOX
NSGY Attg:    Patient re-seen and clinical course re-discussed with the patient and his family (mother and 3 sisters, 2 by telephone).  Retrospectively, the patient endorses several years of difficulty with balance and word-finding difficulty for the past month.    I discussed the recent MRI (w and wo contrast) findings.    I have explained the plan for a metastatic work-up as well as administering Decadron for edema and seizure prophylaxis.  I discussed the likely diagnosis of a malignant primary brain tumor and have explained the risks, benefits, and alternatives of surgical intervention as below:    benefit: hopeful decompression, hopeful tissue diagnosis, hopeful improvement in symptoms, hopeful prevention of progression of deficit  alternative: no surgical intervention; continued surveillance  risks: bleeding, infection, CSF leak, failure of procedure, need for re-operation, seizure, stroke, coma, death, DVT, PE, MI, PNA, UTI, difficulty/failure to intubate or extubate, new or worsening numbness, tingling, weakness, paralysis, sensory changes, difficulty/inability to ambulate, difficulty with expressive or receptive speech, altered personality or judgment, sexual dysfunction, incontinence  The patient and his family verbalize their understanding of the above.  I have answered all their questions.  The wish to consider the aforementioned options and will await the results of additional diagnostic testing.

## 2019-12-23 NOTE — H&P ADULT - NSHPREVIEWOFSYSTEMS_GEN_ALL_CORE
REVIEW OF SYSTEMS  General:	WN/WD  Skin/Breast: male  Ophthalmologic: old right field cut  ENMT:	intact  Respiratory and Thorax: wnl  Cardiovascular: HTN,   Gastrointestinal:	WNL  Genitourinary:	male  Musculoskeletal:	WNL  Neurological: HA	  Psychiatric:	bipolar  Hematology/Lymphatics:	wnl  Endocrine: DM	  Allergic/Immunologic: wnl

## 2019-12-23 NOTE — H&P ADULT - NSHPPHYSICALEXAM_GEN_ALL_CORE
PHYSICAL EXAM:  Constitutional: WN/WD in NAD  Eyes: PERRL/EOMI, VF - Right quadrantonopia (baseline)  ENMT:   Neck: supple  Back: no tenderness to palpation  Respiratory: CTA  Cardiovascular: S1, S2, regular, no murmur  Gastrointestinal: Obese, +BS, soft, NT/ND  Genitourinary: male  Rectal:  Vascular: pulses +2  Neurological: A&O x3, speech is clear, face symmetric, PERRL/EOMI, MILLER 5/5, no drift/dysmetria  sensory grossly intact  Skin: intact

## 2019-12-23 NOTE — ED ADULT NURSE REASSESSMENT NOTE - NS ED NURSE REASSESS COMMENT FT1
Report given to transfer center and SELAM Arredondo from [place]. Ambulance arrives for pt, report given to EMS. All paperwork and belongings with EMS for transfer. Pt transferred to EMS stretcher without incident.

## 2019-12-23 NOTE — PROGRESS NOTE ADULT - ASSESSMENT
NOTE IS INCOMPLETE!!! A/P: 60 y/o M, presents as a transfer from [place] for hemorrhage possible AVM. Pt neurologically intact, except for right sided field cut which is his baseline. MRI/MRA pending read.     - continue q1h neuro checks.   - neuro checks q1hr  - continue  keppra 500 bid  - possible cerebral angiogram     NOTE IS INCOMPLETE!!! A/P: 58 y/o M, presents as a transfer from [place] for hemorrhage possible AVM. MRI/MRA shows hemorrhagic left parietal temporal mass. Pt neuro exam stable.  Discussed with Dr. Antoine  - Start dexadron 4q6   - continue  keppra 500 bid  - CT C/A/P +/- mets w/u   - Pt may be downgraded to stepdown  - q2h neuro checks   - Plan for OR on friday for biopsy 12/27

## 2019-12-23 NOTE — ED PROVIDER NOTE - CARE PLAN
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

## 2019-12-23 NOTE — ED ADULT NURSE NOTE - NSIMPLEMENTINTERV_GEN_ALL_ED
Implemented All Universal Safety Interventions:  [place] to call system. Call bell, personal items and telephone within reach. Instruct patient to call for assistance. Room bathroom lighting operational. Non-slip footwear when patient is off stretcher. Physically safe environment: no spills, clutter or unnecessary equipment. Stretcher in lowest position, wheels locked, appropriate side rails in place.

## 2019-12-23 NOTE — CONSULT NOTE ADULT - ATTENDING COMMENTS
Seen and examined.    Mild HA    NAD  AAOx3, non focal  RRR  non labored resp    Reviewed CT head.      L parietal ICH.  ? mass.  Neurosurgery following.  Hx of HTN.  Given risk for possible vascular malformation strict BP control, -140.  NPO for MRI then possible angio. Cardene infusion if needed for BP control.  Close neuro monitoring. Will start metoprolol IV to prevent rebound tachycardia off B blocker.

## 2019-12-23 NOTE — CONSULT NOTE ADULT - SUBJECTIVE AND OBJECTIVE BOX
HPI:  59M with PMH HTN, borderline DM, bipolar, transferred from [place] secondary to SAH. Patient has been having HA associated with nausea x1mon, has been followed by opthalmologist and w/u for glaucoma, he had an MRI/A of the brain as outpatient & has been told that has 2 small aneurysms & needed to see a neurosurgeon. He has seen Dr Greg Dykes in October & was told to follow up with him in 1yr and did not require immediate intervention. He was not offered cerebral angio at that time. He presented to [facility] with headache and partial expressive aphasia.      PAST MEDICAL & SURGICAL HISTORY:  Non-traumatic intracerebral hemorrhage due to AVM  Bipolar affective  Diabetes mellitus  Hypertension      Home Medications:  atenolol 50 mg oral tablet: 1 tab(s) orally once a day (23 Dec 2019 04:22)  Norvasc 10 mg oral tablet: 1 tab(s) orally once a day (23 Dec 2019 04:22)      Review of Systems: All negative except where noted in HPI    MEDICATIONS  (STANDING):  chlorhexidine 2% Cloths 1 Application(s) Topical daily  levETIRAcetam  IVPB 500 milliGRAM(s) IV Intermittent every 12 hours  metoprolol tartrate Injectable 5 milliGRAM(s) IV Push every 6 hours  niCARdipine Infusion 5 mG/Hr (25 mL/Hr) IV Continuous <Continuous>  sodium chloride 0.9%. 1000 milliLiter(s) (75 mL/Hr) IV Continuous <Continuous>    MEDICATIONS  (PRN):      SOCIAL HISTORY:      Vital Signs Last 24 Hrs  T(C): 36.6 (23 Dec 2019 07:31), Max: 36.7 (23 Dec 2019 02:58)  T(F): 97.8 (23 Dec 2019 07:31), Max: 98 (23 Dec 2019 02:58)  HR: 67 (23 Dec 2019 12:00) (67 - 86)  BP: 137/73 (23 Dec 2019 12:00) (132/82 - 165/100)  BP(mean): 99 (23 Dec 2019 12:00) (90 - 113)  RR: 15 (23 Dec 2019 12:00) (8 - 23)  SpO2: 94% (23 Dec 2019 12:00) (94% - 99%)    Physical Exam:    General: NAD  HEENT: PERRL, EOMI  Neck: No JVD, FROM without pain  Pulm: Respirations non labored, no accessory muscle use  CVS: S1S2  Abdomen: Soft, NT/ND, without rebound or guarding  Neuro: Alert and oriented x3, slow verbal responses      LABS:                        15.0   10.06 )-----------( 225      ( 23 Dec 2019 03:33 )             45.9     12-23    139  |  103  |  10.0  ----------------------------<  161<H>  3.8   |  24.0  |  0.71    Ca    9.0      23 Dec 2019 03:33  Mg     2.3     12-23    TPro  7.1  /  Alb  4.4  /  TBili  0.4  /  DBili  x   /  AST  18  /  ALT  17  /  AlkPhos  79  12-23    PT/INR - ( 23 Dec 2019 03:33 )   PT: 12.3 sec;   INR: 1.07 ratio         PTT - ( 23 Dec 2019 03:33 )  PTT:30.7 sec        Impression and Plan:  65M with SAH and h/o AVM, slow verbal responses otherwise neurologically intact  -MRI completed but awaiting interpretation  -Will follow up with interventionalist regarding possible angio vs operative intervention  -NPO for possible procedure  -Goal -140, will start oral regiment once intervention has been decided upon, cardene for now  -q1h neuro checks  -PT/OT/PM&R    Plan discussed with Dr. Flowers

## 2019-12-23 NOTE — ED ADULT NURSE REASSESSMENT NOTE - NS ED NURSE REASSESS COMMENT FT1
Pt came back from CT scan diaphoretic, MD Gutierres aware. Pt moved closer to nursing station, placed on monitor, fingerstick completed.

## 2019-12-23 NOTE — PROGRESS NOTE ADULT - SUBJECTIVE AND OBJECTIVE BOX
Chief complaint:   Patient is a 59y old  Male who presents with a chief complaint of AVM (23 Dec 2019 03:57)    HPI:  59M with PMH HTN, borderline DM, bipolar, transferred from [place] secondary to SAH. Patient has been having HA associated with nausea x1mon, has been followed by opthalmologist and w/u for glaucoma, he had an MRI/A of the brain as outpatient & has been told that has 2 small aneurysms & needed to see a neurosurgeon. He has seen Dr Greg Dykes in October & was told to follow up with him in 1yr and did not require immediate intervention. He was not offered cerebral angio at that time.   Today he started to c/o word finding difficulty in addition to HA (23 Dec 2019 03:57)      Stay Summary:      OVERNIGHT EVENTS:      ROS: [ ]  Unable to assess due to mental status   All other systems negative    -----------------------------------------------------------------------------------------------------------------------------------------------------------------------------------  ICU Vital Signs Last 24 Hrs  T(C): 36.6 (23 Dec 2019 07:31), Max: 36.7 (23 Dec 2019 02:58)  T(F): 97.8 (23 Dec 2019 07:31), Max: 98 (23 Dec 2019 02:58)  HR: 86 (23 Dec 2019 07:00) (72 - 86)  BP: 154/86 (23 Dec 2019 07:00) (134/84 - 165/100)  BP(mean): 110 (23 Dec 2019 07:00) (110 - 113)  ABP: --  ABP(mean): --  RR: 14 (23 Dec 2019 07:00) (14 - 20)  SpO2: 97% (23 Dec 2019 07:00) (94% - 98%)      I&O's Summary    22 Dec 2019 07:01  -  23 Dec 2019 07:00  --------------------------------------------------------  IN: 150 mL / OUT: 1500 mL / NET: -1350 mL        MEDICATIONS  (STANDING):  amLODIPine   Tablet 10 milliGRAM(s) Oral daily  ATENolol  Tablet 50 milliGRAM(s) Oral daily  chlorhexidine 2% Cloths 1 Application(s) Topical daily  levETIRAcetam 500 milliGRAM(s) Oral two times a day  sodium chloride 0.9%. 1000 milliLiter(s) (75 mL/Hr) IV Continuous <Continuous>      RESPIRATORY:        NEUROIMAGING:   Recent imaging studies were reviewed.    LAB RESULTS:                          15.0   10.06 )-----------( 225      ( 23 Dec 2019 03:33 )             45.9       PT/INR - ( 23 Dec 2019 03:33 )   PT: 12.3 sec;   INR: 1.07 ratio         PTT - ( 23 Dec 2019 03:33 )  PTT:30.7 sec    12-23    139  |  103  |  10.0  ----------------------------<  161<H>  3.8   |  24.0  |  0.71    Ca    9.0      23 Dec 2019 03:33  Mg     2.3     12-23    TPro  7.1  /  Alb  4.4  /  TBili  0.4  /  DBili  x   /  AST  18  /  ALT  17  /  AlkPhos  79  12-23        -----------------------------------------------------------------------------------------------------------------------------------------------------------------------------------  PHYSICAL EXAM:  General: Calm, laying in bed  HEENT: MMM  Neuro:  -Mental status- No acute distress, AOx3, conversational, following commands  -CN- PERRL 3mm, EOMI, tongue midline, face symmetric  -Motor- full strength in all ext  -Sensation- intact to LT   -Coordination- no dysmetria noted    CV: RRR  Pulm: Clear to auscultation  Abd: Soft, nontender, nondistended  Ext: No edema  Skin: warm, dry Chief complaint:   Patient is a 59y old  Male who presents with a chief complaint of AVM (23 Dec 2019 03:57)    HPI:  59M with PMH HTN, borderline DM, bipolar, transferred from [place] secondary to SAH. Patient has been having HA associated with nausea x1mon, has been followed by opthalmologist and w/u for glaucoma, he had an MRI/A of the brain as outpatient & has been told that has 2 small aneurysms & needed to see a neurosurgeon. He has seen Dr Greg Dykes in October & was told to follow up with him in 1yr and did not require immediate intervention. He was not offered cerebral angio at that time.   Today he started to c/o word finding difficulty in addition to HA (23 Dec 2019 03:57)    ROS: no complaints  All other systems negative    -----------------------------------------------------------------------------------------------------------------------------------------------------------------------------------  ICU Vital Signs Last 24 Hrs  T(C): 36.6 (23 Dec 2019 07:31), Max: 36.7 (23 Dec 2019 02:58)  T(F): 97.8 (23 Dec 2019 07:31), Max: 98 (23 Dec 2019 02:58)  HR: 86 (23 Dec 2019 07:00) (72 - 86)  BP: 154/86 (23 Dec 2019 07:00) (134/84 - 165/100)  BP(mean): 110 (23 Dec 2019 07:00) (110 - 113)  ABP: --  ABP(mean): --  RR: 14 (23 Dec 2019 07:00) (14 - 20)  SpO2: 97% (23 Dec 2019 07:00) (94% - 98%)      I&O's Summary    22 Dec 2019 07:01  -  23 Dec 2019 07:00  --------------------------------------------------------  IN: 150 mL / OUT: 1500 mL / NET: -1350 mL        MEDICATIONS  (STANDING):  amLODIPine   Tablet 10 milliGRAM(s) Oral daily  ATENolol  Tablet 50 milliGRAM(s) Oral daily  chlorhexidine 2% Cloths 1 Application(s) Topical daily  levETIRAcetam 500 milliGRAM(s) Oral two times a day  sodium chloride 0.9%. 1000 milliLiter(s) (75 mL/Hr) IV Continuous <Continuous>      NEUROIMAGING:   Recent imaging studies were reviewed.    LAB RESULTS:                          15.0   10.06 )-----------( 225      ( 23 Dec 2019 03:33 )             45.9       PT/INR - ( 23 Dec 2019 03:33 )   PT: 12.3 sec;   INR: 1.07 ratio         PTT - ( 23 Dec 2019 03:33 )  PTT:30.7 sec    12-23    139  |  103  |  10.0  ----------------------------<  161<H>  3.8   |  24.0  |  0.71    Ca    9.0      23 Dec 2019 03:33  Mg     2.3     12-23    TPro  7.1  /  Alb  4.4  /  TBili  0.4  /  DBili  x   /  AST  18  /  ALT  17  /  AlkPhos  79  12-23        -----------------------------------------------------------------------------------------------------------------------------------------------------------------------------------  PHYSICAL EXAM:  General: Calm, laying in bed  HEENT: MMM  Neuro:  -Mental status- No acute distress, AOx3, conversational, following commands  -CN- PERRL 3mm, EOMI, tongue midline, face symmetric  -Motor- full strength in all ext  -Sensation- intact to LT   -Coordination- no dysmetria noted    CV: RRR  Pulm: Clear to auscultation  Abd: Soft, nontender, nondistended  Ext: No edema  Skin: warm, dry

## 2019-12-23 NOTE — PROGRESS NOTE ADULT - ASSESSMENT
ASSESSMENT/PLAN: 59yoM h/o DM, HTN, and bipolar disorder presented with L temporal hemorrhage concerning for AVM vs anr    NEURO:   SBP<160  repeat CTH for stability  MRI/MRA brain pending  q1hr neurochecks  keppra 500mg bid 7 days  pain mgt: tylenol and oxy 5 prn  Activity: [x] mobilize as tolerated [] Bedrest [x] PT [x] OT [] PMNR    PULM: room air   SpO2>92%  incentive spirometry   OOB    CV:  SBP goal <160    RENAL: monitor I/O  replete lytes prn  voiding  Fluids: IVF while NPO    GI:  Diet: advance diet as tolerated  GI prophylaxis [x] not indicated   zofran prn for nausea  Bowel regimen [x] senna [] other:    ENDO:   Goal euglycemia (-180)    HEME/ONC:  VTE prophylaxis: [] SCDs [x] chemoprophylaxis held 24hrs post-bleed     ID: afebrile   no leukocytosis    MISC:    [] Patient is at high risk of neurologic deterioration/death due to: ICH    Time seen:  Time spent: __35_ [] critical care minutes ASSESSMENT/PLAN: 59yoM h/o DM, HTN, and bipolar disorder presented with L temporal hemorrhage concerning for AVM vs anr. Of note, pt has been following with [facility] and annual MRA since 2016 to monitor aneurysm    NEURO:   SBP<160  MRI/MRA brain pending, possible angio  q1hr neurochecks  keppra 500mg bid 7 days  pain mgt: tylenol and oxy 5 prn  Activity: [x] mobilize as tolerated [] Bedrest [x] PT [x] OT [] PMNR    PULM: room air   SpO2>92%  incentive spirometry   OOB    CV:  SBP goal <160  cont norvasc and atenolol    RENAL: monitor I/O  replete lytes prn  voiding  Fluids: IVF while NPO    GI:  Diet: NPO for possible angio  GI prophylaxis [x] not indicated   zofran prn for nausea  Bowel regimen [x] senna [] other:    ENDO:   Goal euglycemia (-180)    HEME/ONC:  VTE prophylaxis: [] SCDs [x] chemoprophylaxis held 24hrs post-bleed     ID: afebrile   no leukocytosis    MISC:    [] Patient is at high risk of neurologic deterioration/death due to: ICH    Time seen:  Time spent: __35_ [] critical care minutes

## 2019-12-23 NOTE — H&P ADULT - NSHPSOCIALHISTORY_GEN_ALL_CORE
Denies alcohol use  Denies any drug use  Denies smoking    Family history  Dad- Diabetes  Mom - healthy

## 2019-12-23 NOTE — ED ADULT NURSE NOTE - CHIEF COMPLAINT QUOTE
patient transfer from [place] for hypertensive bleed - patient awake and alert. states that he had right side head pain for about one month - states that he also had dental work on that side, also complains of dizziness. patient with saline lock to left ac #20. patient on cardiac monitor, and oxygen 2 lpm.

## 2019-12-24 LAB
ABO RH CONFIRMATION: SIGNIFICANT CHANGE UP
ALBUMIN SERPL ELPH-MCNC: 4 G/DL — SIGNIFICANT CHANGE UP (ref 3.3–5.2)
ALP SERPL-CCNC: 70 U/L — SIGNIFICANT CHANGE UP (ref 40–120)
ALT FLD-CCNC: 11 U/L — SIGNIFICANT CHANGE UP
ANION GAP SERPL CALC-SCNC: 12 MMOL/L — SIGNIFICANT CHANGE UP (ref 5–17)
AST SERPL-CCNC: 12 U/L — SIGNIFICANT CHANGE UP
BASOPHILS # BLD AUTO: 0 K/UL — SIGNIFICANT CHANGE UP (ref 0–0.2)
BASOPHILS NFR BLD AUTO: 0 % — SIGNIFICANT CHANGE UP (ref 0–2)
BILIRUB SERPL-MCNC: 0.7 MG/DL — SIGNIFICANT CHANGE UP (ref 0.4–2)
BUN SERPL-MCNC: 11 MG/DL — SIGNIFICANT CHANGE UP (ref 8–20)
CALCIUM SERPL-MCNC: 9.3 MG/DL — SIGNIFICANT CHANGE UP (ref 8.6–10.2)
CHLORIDE SERPL-SCNC: 107 MMOL/L — SIGNIFICANT CHANGE UP (ref 98–107)
CO2 SERPL-SCNC: 22 MMOL/L — SIGNIFICANT CHANGE UP (ref 22–29)
CREAT SERPL-MCNC: 0.61 MG/DL — SIGNIFICANT CHANGE UP (ref 0.5–1.3)
EOSINOPHIL # BLD AUTO: 0 K/UL — SIGNIFICANT CHANGE UP (ref 0–0.5)
EOSINOPHIL NFR BLD AUTO: 0 % — SIGNIFICANT CHANGE UP (ref 0–6)
GLUCOSE BLDC GLUCOMTR-MCNC: 113 MG/DL — HIGH (ref 70–99)
GLUCOSE BLDC GLUCOMTR-MCNC: 116 MG/DL — HIGH (ref 70–99)
GLUCOSE BLDC GLUCOMTR-MCNC: 124 MG/DL — HIGH (ref 70–99)
GLUCOSE BLDC GLUCOMTR-MCNC: 154 MG/DL — HIGH (ref 70–99)
GLUCOSE SERPL-MCNC: 139 MG/DL — HIGH (ref 70–115)
HBA1C BLD-MCNC: 5.8 % — HIGH (ref 4–5.6)
HCT VFR BLD CALC: 46.7 % — SIGNIFICANT CHANGE UP (ref 39–50)
HCV AB S/CO SERPL IA: 0.11 S/CO — SIGNIFICANT CHANGE UP (ref 0–0.99)
HCV AB SERPL-IMP: SIGNIFICANT CHANGE UP
HGB BLD-MCNC: 15.1 G/DL — SIGNIFICANT CHANGE UP (ref 13–17)
LYMPHOCYTES # BLD AUTO: 0.35 K/UL — LOW (ref 1–3.3)
LYMPHOCYTES # BLD AUTO: 4.4 % — LOW (ref 13–44)
MAGNESIUM SERPL-MCNC: 2.3 MG/DL — SIGNIFICANT CHANGE UP (ref 1.8–2.6)
MANUAL SMEAR VERIFICATION: SIGNIFICANT CHANGE UP
MCHC RBC-ENTMCNC: 28.7 PG — SIGNIFICANT CHANGE UP (ref 27–34)
MCHC RBC-ENTMCNC: 32.3 GM/DL — SIGNIFICANT CHANGE UP (ref 32–36)
MCV RBC AUTO: 88.6 FL — SIGNIFICANT CHANGE UP (ref 80–100)
MONOCYTES # BLD AUTO: 0.14 K/UL — SIGNIFICANT CHANGE UP (ref 0–0.9)
MONOCYTES NFR BLD AUTO: 1.7 % — LOW (ref 2–14)
NEUTROPHILS # BLD AUTO: 7.56 K/UL — HIGH (ref 1.8–7.4)
NEUTROPHILS NFR BLD AUTO: 85.1 % — HIGH (ref 43–77)
NEUTS BAND # BLD: 8.8 % — HIGH (ref 0–8)
PHOSPHATE SERPL-MCNC: 3.1 MG/DL — SIGNIFICANT CHANGE UP (ref 2.4–4.7)
PLAT MORPH BLD: NORMAL — SIGNIFICANT CHANGE UP
PLATELET # BLD AUTO: 218 K/UL — SIGNIFICANT CHANGE UP (ref 150–400)
POTASSIUM SERPL-MCNC: 4.1 MMOL/L — SIGNIFICANT CHANGE UP (ref 3.5–5.3)
POTASSIUM SERPL-SCNC: 4.1 MMOL/L — SIGNIFICANT CHANGE UP (ref 3.5–5.3)
PROT SERPL-MCNC: 6.6 G/DL — SIGNIFICANT CHANGE UP (ref 6.6–8.7)
RBC # BLD: 5.27 M/UL — SIGNIFICANT CHANGE UP (ref 4.2–5.8)
RBC # FLD: 12.1 % — SIGNIFICANT CHANGE UP (ref 10.3–14.5)
RBC BLD AUTO: NORMAL — SIGNIFICANT CHANGE UP
SODIUM SERPL-SCNC: 141 MMOL/L — SIGNIFICANT CHANGE UP (ref 135–145)
WBC # BLD: 8.05 K/UL — SIGNIFICANT CHANGE UP (ref 3.8–10.5)
WBC # FLD AUTO: 8.05 K/UL — SIGNIFICANT CHANGE UP (ref 3.8–10.5)

## 2019-12-24 PROCEDURE — 99232 SBSQ HOSP IP/OBS MODERATE 35: CPT

## 2019-12-24 RX ORDER — ACETAMINOPHEN 500 MG
650 TABLET ORAL ONCE
Refills: 0 | Status: COMPLETED | OUTPATIENT
Start: 2019-12-24 | End: 2019-12-24

## 2019-12-24 RX ADMIN — LEVETIRACETAM 420 MILLIGRAM(S): 250 TABLET, FILM COATED ORAL at 05:16

## 2019-12-24 RX ADMIN — Medication 4 MILLIGRAM(S): at 05:16

## 2019-12-24 RX ADMIN — Medication 5 MILLIGRAM(S): at 23:28

## 2019-12-24 RX ADMIN — LEVETIRACETAM 420 MILLIGRAM(S): 250 TABLET, FILM COATED ORAL at 18:09

## 2019-12-24 RX ADMIN — Medication 5 MILLIGRAM(S): at 00:18

## 2019-12-24 RX ADMIN — Medication 650 MILLIGRAM(S): at 18:13

## 2019-12-24 RX ADMIN — Medication 4 MILLIGRAM(S): at 18:09

## 2019-12-24 RX ADMIN — Medication 5 MILLIGRAM(S): at 05:16

## 2019-12-24 RX ADMIN — Medication 4 MILLIGRAM(S): at 00:18

## 2019-12-24 RX ADMIN — Medication 4 MILLIGRAM(S): at 23:27

## 2019-12-24 RX ADMIN — Medication 5 MILLIGRAM(S): at 18:08

## 2019-12-24 RX ADMIN — Medication 5 MILLIGRAM(S): at 11:21

## 2019-12-24 RX ADMIN — Medication 4 MILLIGRAM(S): at 11:20

## 2019-12-24 NOTE — PROGRESS NOTE ADULT - ASSESSMENT
ASSESSMENT AND PLAN: 59 year old male with PMHx of HTN, borderline DM, bipolar p/w headaches associated with nausea x1mon, and recent word finding difficulty. Pt has been followed by opthalmologist and w/u for glaucoma, MRI/A of the brain as outpatient & has been told that has 2 small aneurysms & needed to see a neurosurgeon. He has seen Dr Greg Dykes in October & was told to follow up with him in 1yr and did not require immediate intervention. He was not offered cerebral angio at that time. CT Head showed Lt parietal hemorrhagic mass with MLS.  Family at bedside and spoken to extensively by neurosurgery team about OR plans.     NEURO: Seizure ppx continue keppra     PULM: Encouraged incentive spirometer     CV: Continue metoprolol     ENDO: Continue decadron 4Q6    HEME/ONC:                      DVT ppx: Monitor off     RENAL: IVL     ID: Afebrile     DISCHARGE PLANNING:   D/w Neurosurgeon

## 2019-12-24 NOTE — PROGRESS NOTE ADULT - SUBJECTIVE AND OBJECTIVE BOX
SUBJECTIVE: Pt seen and examined. Pt denies complains of word finding difficulty.     Vital Signs Last 24 Hrs  T(C): 36.6 (24 Dec 2019 07:37), Max: 37 (23 Dec 2019 19:30)  T(F): 97.9 (24 Dec 2019 07:37), Max: 98.6 (23 Dec 2019 19:30)  HR: 94 (24 Dec 2019 11:28) (64 - 94)  BP: 133/79 (24 Dec 2019 11:28) (114/66 - 154/95)  BP(mean): 103 (24 Dec 2019 04:00) (85 - 114)  RR: 20 (24 Dec 2019 11:28) (11 - 24)  SpO2: 95% (24 Dec 2019 11:28) (91% - 98%)                        15.1   8.05  )-----------( 218      ( 24 Dec 2019 05:04 )             46.7    12-24    141  |  107  |  11.0  ----------------------------<  139<H>  4.1   |  22.0  |  0.61    Ca    9.3      24 Dec 2019 05:04  Phos  3.1     12-24  Mg     2.3     12-24  TPro  6.6  /  Alb  4.0  /  TBili  0.7  /  DBili  x   /  AST  12  /  ALT  11  /  AlkPhos  70  12-24  PT/INR - ( 23 Dec 2019 03:33 )   PT: 12.3 sec;   INR: 1.07 ratio    PTT - ( 23 Dec 2019 03:33 )  PTT:30.7 sec   Stroke Core Measures   Hemoglobin A1C, Whole Blood: 5.8 % (12-24 @ 05:04)  Hemoglobin A1C, Whole Blood: 5.6 % (12-23 @ 03:33)    NEUROIMAGING: < from: CT Head No Cont (12.22.19 @ 22:02) >  IMPRESSION:  Suspect AVM centered in the left parietal periatrial white matter given serpiginous high attenuation with surrounding gliosis and volume loss in the adjacent left parietal and temporal lobes. Cannot exclude superimposed acute hemorrhage as above. Possibility of an underlying hemorrhagic mass is also considered. Additional left temporal lobe findings suspicious for either hemorrhage and edema or hemorrhagic conversion of infarct. Findings would be best evaluated with a contrast-enhanced brain MRI as well as brain MRA. A CTA would only be beneficial for evaluating for AVM.    PHYSICAL EXAM:    General: No Acute Distress     Neurological: Awake, alert oriented to person, place and time, some word finding difficulty and paraphrasic errors, Following Commands, Face Symmetrical, Moving all extremities, Muscle Strength normal in all four extremities. Rt field cut at baseline.     MEDICATIONS:   Antibiotics:    Neuro:  levETIRAcetam  IVPB 500 milliGRAM(s) IV Intermittent every 12 hours    Anticoagulation:    Cardiology:  metoprolol tartrate Injectable 5 milliGRAM(s) IV Push every 6 hours    Endo:   dexAMETHasone  Injectable 4 milliGRAM(s) IV Push every 6 hours    Pulm:    GI/:    Other:  chlorhexidine 2% Cloths 1 Application(s) Topical daily  influenza   Vaccine 0.5 milliLiter(s) IntraMuscular once  sodium chloride 0.9%. 1000 milliLiter(s) IV Continuous <Continuous>

## 2019-12-25 LAB — GLUCOSE BLDC GLUCOMTR-MCNC: 128 MG/DL — HIGH (ref 70–99)

## 2019-12-25 RX ORDER — ACETAMINOPHEN 500 MG
650 TABLET ORAL ONCE
Refills: 0 | Status: COMPLETED | OUTPATIENT
Start: 2019-12-25 | End: 2019-12-25

## 2019-12-25 RX ORDER — ACETAMINOPHEN 500 MG
650 TABLET ORAL EVERY 6 HOURS
Refills: 0 | Status: DISCONTINUED | OUTPATIENT
Start: 2019-12-25 | End: 2019-12-26

## 2019-12-25 RX ADMIN — Medication 650 MILLIGRAM(S): at 01:40

## 2019-12-25 RX ADMIN — Medication 4 MILLIGRAM(S): at 17:36

## 2019-12-25 RX ADMIN — LEVETIRACETAM 420 MILLIGRAM(S): 250 TABLET, FILM COATED ORAL at 06:56

## 2019-12-25 RX ADMIN — Medication 5 MILLIGRAM(S): at 11:43

## 2019-12-25 RX ADMIN — Medication 5 MILLIGRAM(S): at 17:36

## 2019-12-25 RX ADMIN — Medication 4 MILLIGRAM(S): at 23:17

## 2019-12-25 RX ADMIN — Medication 650 MILLIGRAM(S): at 23:17

## 2019-12-25 RX ADMIN — Medication 4 MILLIGRAM(S): at 06:49

## 2019-12-25 RX ADMIN — Medication 650 MILLIGRAM(S): at 01:45

## 2019-12-25 RX ADMIN — SODIUM CHLORIDE 75 MILLILITER(S): 9 INJECTION INTRAMUSCULAR; INTRAVENOUS; SUBCUTANEOUS at 23:18

## 2019-12-25 RX ADMIN — Medication 5 MILLIGRAM(S): at 06:50

## 2019-12-25 RX ADMIN — SODIUM CHLORIDE 75 MILLILITER(S): 9 INJECTION INTRAMUSCULAR; INTRAVENOUS; SUBCUTANEOUS at 11:45

## 2019-12-25 RX ADMIN — LEVETIRACETAM 420 MILLIGRAM(S): 250 TABLET, FILM COATED ORAL at 17:38

## 2019-12-25 RX ADMIN — Medication 5 MILLIGRAM(S): at 23:17

## 2019-12-25 RX ADMIN — Medication 4 MILLIGRAM(S): at 11:43

## 2019-12-25 NOTE — PROGRESS NOTE ADULT - SUBJECTIVE AND OBJECTIVE BOX
SUBJECTIVE: Pt seen and examined. Pt denies word finding difficulty. Discussed with family and decided to move forward with surgery for Friday.    Vital Signs Last 24 Hrs  T(C): 36.4 (25 Dec 2019 16:01), Max: 36.7 (25 Dec 2019 02:00)  T(F): 97.6 (25 Dec 2019 16:01), Max: 98.1 (25 Dec 2019 02:00)  HR: 76 (25 Dec 2019 16:00) (67 - 77)  BP: 138/75 (25 Dec 2019 16:00) (114/88 - 177/94)  BP(mean): 94 (25 Dec 2019 00:00) (94 - 114)  RR: 15 (25 Dec 2019 16:00) (13 - 18)  SpO2: 95% (25 Dec 2019 16:00) (92% - 99%)  PHYSICAL EXAM:  General: No Acute Distress   Neurological: Awake, alert oriented to person, place and time, some word finding difficulty and paraphrasic errors, Following Commands, Face Symmetrical, Moving all extremities, Muscle Strength normal in all four extremities. Rt field cut at baseline.     MEDICATIONS:   levETIRAcetam  IVPB 500 milliGRAM(s) IV Intermittent every 12 hours  metoprolol tartrate Injectable 5 milliGRAM(s) IV Push every 6 hours  dexAMETHasone  Injectable 4 milliGRAM(s) IV Push every 6 hours    RADIOLOGY:  MR Angio Head No Cont (12.23.19 @ 11:14)  IMPRESSION:   Brain MRI:   1.  Large heterogeneously enhancing, hemorrhagic left parietal temporal mass. Extensive abnormal enhancement in the left temporal white matter. These findings suggest malignancy. GBM is a diagnostic consideration. Differential diagnosis includes other primary and metastatic malignancies.  2.  Left cerebral convexity subdural hematoma measuring 3 mm.   3.  Left-to-right midline shift measuring 6 mm.     Brain MRA: No large vessel occlusion or aneurysm. No evidence of arteriovenous malformation.     CT Chest w/ IV Cont (12.23.19 @ 16:11)  IMPRESSION:   No scan evidence of primary or secondary neoplasm in the chest abdomen and pelvis.  Additional findings as discussed

## 2019-12-25 NOTE — PROGRESS NOTE ADULT - ASSESSMENT
59 year old male with PMHx of HTN, borderline DM, bipolar p/w headaches associated with nausea x1mon, and recent word finding difficulty. Pt has been followed by opthalmologist and w/u for glaucoma, MRI/A of the brain as outpatient & has been told he has 2 small aneurysms. Seen by Dr Greg Dykes in October & was told to follow up with him in 1yr and did not require immediate intervention.   - CT Head showed L parietal hemorrhagic mass with MLS  - MRI brain with large enhancing hemorrhagic mass concerning for malignancy with GBM in the differential  - CTCAP unremarkable for primary source of malignancy  - discussed with family and they have agreed to neurosurgical biopsy    PLAN:  - OR 12/27 for biopsy, possible resection  - T&S ordered for 12/26  - will need stereotactic MRI prior to OR  - continue Keppra 500mg BID  - hold any APT/ACT  - SCDs for DVT ppx  - dexamethasone 4mg q6h

## 2019-12-26 VITALS
OXYGEN SATURATION: 99 % | RESPIRATION RATE: 18 BRPM | DIASTOLIC BLOOD PRESSURE: 95 MMHG | HEART RATE: 79 BPM | SYSTOLIC BLOOD PRESSURE: 154 MMHG

## 2019-12-26 LAB
BLD GP AB SCN SERPL QL: SIGNIFICANT CHANGE UP
GLUCOSE BLDC GLUCOMTR-MCNC: 110 MG/DL — HIGH (ref 70–99)
GLUCOSE BLDC GLUCOMTR-MCNC: 121 MG/DL — HIGH (ref 70–99)

## 2019-12-26 PROCEDURE — 86850 RBC ANTIBODY SCREEN: CPT

## 2019-12-26 PROCEDURE — 71260 CT THORAX DX C+: CPT

## 2019-12-26 PROCEDURE — 86803 HEPATITIS C AB TEST: CPT

## 2019-12-26 PROCEDURE — 70553 MRI BRAIN STEM W/O & W/DYE: CPT

## 2019-12-26 PROCEDURE — 83735 ASSAY OF MAGNESIUM: CPT

## 2019-12-26 PROCEDURE — 74177 CT ABD & PELVIS W/CONTRAST: CPT

## 2019-12-26 PROCEDURE — 84100 ASSAY OF PHOSPHORUS: CPT

## 2019-12-26 PROCEDURE — 86900 BLOOD TYPING SEROLOGIC ABO: CPT

## 2019-12-26 PROCEDURE — 96374 THER/PROPH/DIAG INJ IV PUSH: CPT

## 2019-12-26 PROCEDURE — 70544 MR ANGIOGRAPHY HEAD W/O DYE: CPT

## 2019-12-26 PROCEDURE — 99285 EMERGENCY DEPT VISIT HI MDM: CPT | Mod: 25

## 2019-12-26 PROCEDURE — 83036 HEMOGLOBIN GLYCOSYLATED A1C: CPT

## 2019-12-26 PROCEDURE — 84484 ASSAY OF TROPONIN QUANT: CPT

## 2019-12-26 PROCEDURE — 36415 COLL VENOUS BLD VENIPUNCTURE: CPT

## 2019-12-26 PROCEDURE — 82962 GLUCOSE BLOOD TEST: CPT

## 2019-12-26 PROCEDURE — 85027 COMPLETE CBC AUTOMATED: CPT

## 2019-12-26 PROCEDURE — 86901 BLOOD TYPING SEROLOGIC RH(D): CPT

## 2019-12-26 PROCEDURE — 85730 THROMBOPLASTIN TIME PARTIAL: CPT

## 2019-12-26 PROCEDURE — 85610 PROTHROMBIN TIME: CPT

## 2019-12-26 PROCEDURE — 80053 COMPREHEN METABOLIC PANEL: CPT

## 2019-12-26 RX ORDER — PANTOPRAZOLE SODIUM 20 MG/1
40 TABLET, DELAYED RELEASE ORAL
Refills: 0 | Status: DISCONTINUED | OUTPATIENT
Start: 2019-12-26 | End: 2019-12-26

## 2019-12-26 RX ORDER — DEXTROSE 50 % IN WATER 50 %
25 SYRINGE (ML) INTRAVENOUS ONCE
Refills: 0 | Status: DISCONTINUED | OUTPATIENT
Start: 2019-12-26 | End: 2019-12-26

## 2019-12-26 RX ORDER — ACETAMINOPHEN 500 MG
1000 TABLET ORAL EVERY 6 HOURS
Refills: 0 | Status: COMPLETED | OUTPATIENT
Start: 2019-12-26 | End: 2019-12-26

## 2019-12-26 RX ORDER — DEXTROSE 50 % IN WATER 50 %
15 SYRINGE (ML) INTRAVENOUS ONCE
Refills: 0 | Status: DISCONTINUED | OUTPATIENT
Start: 2019-12-26 | End: 2019-12-26

## 2019-12-26 RX ORDER — INSULIN LISPRO 100/ML
VIAL (ML) SUBCUTANEOUS
Refills: 0 | Status: DISCONTINUED | OUTPATIENT
Start: 2019-12-26 | End: 2019-12-26

## 2019-12-26 RX ORDER — SODIUM CHLORIDE 9 MG/ML
1000 INJECTION, SOLUTION INTRAVENOUS
Refills: 0 | Status: DISCONTINUED | OUTPATIENT
Start: 2019-12-26 | End: 2019-12-26

## 2019-12-26 RX ORDER — DEXTROSE 50 % IN WATER 50 %
12.5 SYRINGE (ML) INTRAVENOUS ONCE
Refills: 0 | Status: DISCONTINUED | OUTPATIENT
Start: 2019-12-26 | End: 2019-12-26

## 2019-12-26 RX ORDER — GLUCAGON INJECTION, SOLUTION 0.5 MG/.1ML
1 INJECTION, SOLUTION SUBCUTANEOUS ONCE
Refills: 0 | Status: DISCONTINUED | OUTPATIENT
Start: 2019-12-26 | End: 2019-12-26

## 2019-12-26 RX ADMIN — Medication 5 MILLIGRAM(S): at 11:58

## 2019-12-26 RX ADMIN — Medication 650 MILLIGRAM(S): at 06:00

## 2019-12-26 RX ADMIN — Medication 1000 MILLIGRAM(S): at 10:00

## 2019-12-26 RX ADMIN — Medication 400 MILLIGRAM(S): at 10:05

## 2019-12-26 RX ADMIN — Medication 4 MILLIGRAM(S): at 11:55

## 2019-12-26 RX ADMIN — LEVETIRACETAM 420 MILLIGRAM(S): 250 TABLET, FILM COATED ORAL at 05:12

## 2019-12-26 RX ADMIN — Medication 4 MILLIGRAM(S): at 05:12

## 2019-12-26 RX ADMIN — Medication 650 MILLIGRAM(S): at 05:12

## 2019-12-26 RX ADMIN — Medication 650 MILLIGRAM(S): at 00:00

## 2019-12-26 RX ADMIN — Medication 5 MILLIGRAM(S): at 05:12

## 2019-12-27 ENCOUNTER — APPOINTMENT (OUTPATIENT)
Dept: NEUROSURGERY | Facility: HOSPITAL | Age: 59
End: 2019-12-27

## 2020-12-16 ENCOUNTER — INPATIENT (INPATIENT)
Facility: HOSPITAL | Age: 60
LOS: 19 days | Discharge: NURSING, REHAB OR LONG-TERM CARE | End: 2021-01-05
Attending: INTERNAL MEDICINE | Admitting: INTERNAL MEDICINE
Payer: MEDICARE

## 2020-12-16 VITALS
HEART RATE: 150 BPM | DIASTOLIC BLOOD PRESSURE: 139 MMHG | OXYGEN SATURATION: 100 % | TEMPERATURE: 99 F | HEIGHT: 72 IN | RESPIRATION RATE: 30 BRPM | SYSTOLIC BLOOD PRESSURE: 158 MMHG

## 2020-12-16 DIAGNOSIS — A41.9 SEPSIS, UNSPECIFIED ORGANISM: ICD-10-CM

## 2020-12-16 DIAGNOSIS — T81.32XA DISRUPTION OF INTERNAL OPERATION (SURGICAL) WOUND, NOT ELSEWHERE CLASSIFIED, INITIAL ENCOUNTER: Chronic | ICD-10-CM

## 2020-12-16 PROBLEM — E11.9 TYPE 2 DIABETES MELLITUS WITHOUT COMPLICATIONS: Chronic | Status: ACTIVE | Noted: 2019-12-23

## 2020-12-16 PROBLEM — I10 ESSENTIAL (PRIMARY) HYPERTENSION: Chronic | Status: ACTIVE | Noted: 2019-12-23

## 2020-12-16 LAB
ALBUMIN SERPL ELPH-MCNC: 3.2 G/DL — LOW (ref 3.3–5)
ALP SERPL-CCNC: 47 U/L — SIGNIFICANT CHANGE UP (ref 40–120)
ALT FLD-CCNC: 18 U/L — SIGNIFICANT CHANGE UP (ref 4–41)
ANION GAP SERPL CALC-SCNC: 18 MMOL/L — HIGH (ref 7–14)
APPEARANCE UR: ABNORMAL
APTT BLD: 30.5 SEC — SIGNIFICANT CHANGE UP (ref 27–36.3)
AST SERPL-CCNC: 15 U/L — SIGNIFICANT CHANGE UP (ref 4–40)
BASOPHILS # BLD AUTO: 0 K/UL — SIGNIFICANT CHANGE UP (ref 0–0.2)
BASOPHILS NFR BLD AUTO: 0 % — SIGNIFICANT CHANGE UP (ref 0–2)
BILIRUB SERPL-MCNC: 0.4 MG/DL — SIGNIFICANT CHANGE UP (ref 0.2–1.2)
BILIRUB UR-MCNC: NEGATIVE — SIGNIFICANT CHANGE UP
BLOOD GAS ARTERIAL COMPREHENSIVE RESULT: SIGNIFICANT CHANGE UP
BLOOD GAS VENOUS COMPREHENSIVE RESULT: SIGNIFICANT CHANGE UP
BUN SERPL-MCNC: 14 MG/DL — SIGNIFICANT CHANGE UP (ref 7–23)
CALCIUM SERPL-MCNC: 9 MG/DL — SIGNIFICANT CHANGE UP (ref 8.4–10.5)
CHLORIDE SERPL-SCNC: 108 MMOL/L — HIGH (ref 98–107)
CO2 SERPL-SCNC: 17 MMOL/L — LOW (ref 22–31)
COLOR SPEC: YELLOW — SIGNIFICANT CHANGE UP
CREAT SERPL-MCNC: 1.45 MG/DL — HIGH (ref 0.5–1.3)
DIFF PNL FLD: NEGATIVE — SIGNIFICANT CHANGE UP
EOSINOPHIL # BLD AUTO: 0.21 K/UL — SIGNIFICANT CHANGE UP (ref 0–0.5)
EOSINOPHIL NFR BLD AUTO: 1.8 % — SIGNIFICANT CHANGE UP (ref 0–6)
GLUCOSE SERPL-MCNC: 86 MG/DL — SIGNIFICANT CHANGE UP (ref 70–99)
GLUCOSE UR QL: NEGATIVE — SIGNIFICANT CHANGE UP
HCT VFR BLD CALC: 41.2 % — SIGNIFICANT CHANGE UP (ref 39–50)
HGB BLD-MCNC: 13.3 G/DL — SIGNIFICANT CHANGE UP (ref 13–17)
IANC: 10.27 K/UL — HIGH (ref 1.5–8.5)
INR BLD: 1.17 RATIO — SIGNIFICANT CHANGE UP (ref 0.88–1.17)
KETONES UR-MCNC: NEGATIVE — SIGNIFICANT CHANGE UP
LEUKOCYTE ESTERASE UR-ACNC: NEGATIVE — SIGNIFICANT CHANGE UP
LYMPHOCYTES # BLD AUTO: 0.31 K/UL — LOW (ref 1–3.3)
LYMPHOCYTES # BLD AUTO: 2.7 % — LOW (ref 13–44)
MCHC RBC-ENTMCNC: 32.3 GM/DL — SIGNIFICANT CHANGE UP (ref 32–36)
MCHC RBC-ENTMCNC: 32.9 PG — SIGNIFICANT CHANGE UP (ref 27–34)
MCV RBC AUTO: 102 FL — HIGH (ref 80–100)
MONOCYTES # BLD AUTO: 0.42 K/UL — SIGNIFICANT CHANGE UP (ref 0–0.9)
MONOCYTES NFR BLD AUTO: 3.6 % — SIGNIFICANT CHANGE UP (ref 2–14)
NEUTROPHILS # BLD AUTO: 9.65 K/UL — HIGH (ref 1.8–7.4)
NEUTROPHILS NFR BLD AUTO: 55.5 % — SIGNIFICANT CHANGE UP (ref 43–77)
NITRITE UR-MCNC: NEGATIVE — SIGNIFICANT CHANGE UP
PH UR: 6 — SIGNIFICANT CHANGE UP (ref 5–8)
PLATELET # BLD AUTO: 262 K/UL — SIGNIFICANT CHANGE UP (ref 150–400)
POTASSIUM SERPL-MCNC: 3.2 MMOL/L — LOW (ref 3.5–5.3)
POTASSIUM SERPL-SCNC: 3.2 MMOL/L — LOW (ref 3.5–5.3)
PROT SERPL-MCNC: 5 G/DL — LOW (ref 6–8.3)
PROT UR-MCNC: ABNORMAL
PROTHROM AB SERPL-ACNC: 13.2 SEC — HIGH (ref 9.8–13.1)
RBC # BLD: 4.04 M/UL — LOW (ref 4.2–5.8)
RBC # FLD: 13.2 % — SIGNIFICANT CHANGE UP (ref 10.3–14.5)
SARS-COV-2 RNA SPEC QL NAA+PROBE: SIGNIFICANT CHANGE UP
SODIUM SERPL-SCNC: 143 MMOL/L — SIGNIFICANT CHANGE UP (ref 135–145)
SP GR SPEC: 1.02 — SIGNIFICANT CHANGE UP (ref 1.01–1.02)
TROPONIN T, HIGH SENSITIVITY RESULT: 58 NG/L — CRITICAL HIGH
UROBILINOGEN FLD QL: SIGNIFICANT CHANGE UP
WBC # BLD: 11.65 K/UL — HIGH (ref 3.8–10.5)
WBC # FLD AUTO: 11.65 K/UL — HIGH (ref 3.8–10.5)

## 2020-12-16 PROCEDURE — 99233 SBSQ HOSP IP/OBS HIGH 50: CPT

## 2020-12-16 PROCEDURE — 99291 CRITICAL CARE FIRST HOUR: CPT | Mod: 25

## 2020-12-16 PROCEDURE — 99291 CRITICAL CARE FIRST HOUR: CPT

## 2020-12-16 PROCEDURE — 71275 CT ANGIOGRAPHY CHEST: CPT | Mod: 26

## 2020-12-16 PROCEDURE — 31500 INSERT EMERGENCY AIRWAY: CPT

## 2020-12-16 PROCEDURE — 43753 TX GASTRO INTUB W/ASP: CPT | Mod: 59

## 2020-12-16 PROCEDURE — 71045 X-RAY EXAM CHEST 1 VIEW: CPT | Mod: 26

## 2020-12-16 PROCEDURE — 70450 CT HEAD/BRAIN W/O DYE: CPT | Mod: 26

## 2020-12-16 PROCEDURE — 36556 INSERT NON-TUNNEL CV CATH: CPT

## 2020-12-16 RX ORDER — DEXAMETHASONE 0.5 MG/5ML
1 ELIXIR ORAL DAILY
Refills: 0 | Status: DISCONTINUED | OUTPATIENT
Start: 2020-12-16 | End: 2020-12-17

## 2020-12-16 RX ORDER — ATENOLOL 25 MG/1
1 TABLET ORAL
Qty: 0 | Refills: 0 | DISCHARGE

## 2020-12-16 RX ORDER — SENNA PLUS 8.6 MG/1
2 TABLET ORAL AT BEDTIME
Refills: 0 | Status: DISCONTINUED | OUTPATIENT
Start: 2020-12-16 | End: 2021-01-05

## 2020-12-16 RX ORDER — INFLUENZA VIRUS VACCINE 15; 15; 15; 15 UG/.5ML; UG/.5ML; UG/.5ML; UG/.5ML
0.5 SUSPENSION INTRAMUSCULAR ONCE
Refills: 0 | Status: DISCONTINUED | OUTPATIENT
Start: 2020-12-16 | End: 2021-01-05

## 2020-12-16 RX ORDER — SODIUM CHLORIDE 9 MG/ML
500 INJECTION, SOLUTION INTRAVENOUS ONCE
Refills: 0 | Status: COMPLETED | OUTPATIENT
Start: 2020-12-16 | End: 2020-12-16

## 2020-12-16 RX ORDER — AMLODIPINE BESYLATE 2.5 MG/1
1 TABLET ORAL
Qty: 0 | Refills: 0 | DISCHARGE

## 2020-12-16 RX ORDER — SODIUM CHLORIDE 9 MG/ML
2200 INJECTION INTRAMUSCULAR; INTRAVENOUS; SUBCUTANEOUS ONCE
Refills: 0 | Status: COMPLETED | OUTPATIENT
Start: 2020-12-16 | End: 2020-12-16

## 2020-12-16 RX ORDER — POTASSIUM CHLORIDE 20 MEQ
20 PACKET (EA) ORAL
Refills: 0 | Status: DISCONTINUED | OUTPATIENT
Start: 2020-12-16 | End: 2020-12-16

## 2020-12-16 RX ORDER — PANTOPRAZOLE SODIUM 20 MG/1
40 TABLET, DELAYED RELEASE ORAL DAILY
Refills: 0 | Status: DISCONTINUED | OUTPATIENT
Start: 2020-12-16 | End: 2020-12-27

## 2020-12-16 RX ORDER — NOREPINEPHRINE BITARTRATE/D5W 8 MG/250ML
0.05 PLASTIC BAG, INJECTION (ML) INTRAVENOUS
Qty: 8 | Refills: 0 | Status: DISCONTINUED | OUTPATIENT
Start: 2020-12-16 | End: 2020-12-16

## 2020-12-16 RX ORDER — PHENYLEPHRINE HYDROCHLORIDE 10 MG/ML
100 INJECTION INTRAVENOUS ONCE
Refills: 0 | Status: COMPLETED | OUTPATIENT
Start: 2020-12-16 | End: 2020-12-16

## 2020-12-16 RX ORDER — HEPARIN SODIUM 5000 [USP'U]/ML
5000 INJECTION INTRAVENOUS; SUBCUTANEOUS EVERY 8 HOURS
Refills: 0 | Status: DISCONTINUED | OUTPATIENT
Start: 2020-12-16 | End: 2020-12-25

## 2020-12-16 RX ORDER — VANCOMYCIN HCL 1 G
1000 VIAL (EA) INTRAVENOUS ONCE
Refills: 0 | Status: COMPLETED | OUTPATIENT
Start: 2020-12-16 | End: 2020-12-16

## 2020-12-16 RX ORDER — POTASSIUM CHLORIDE 20 MEQ
10 PACKET (EA) ORAL
Refills: 0 | Status: COMPLETED | OUTPATIENT
Start: 2020-12-16 | End: 2020-12-16

## 2020-12-16 RX ORDER — NOREPINEPHRINE BITARTRATE/D5W 8 MG/250ML
0.05 PLASTIC BAG, INJECTION (ML) INTRAVENOUS
Qty: 16 | Refills: 0 | Status: DISCONTINUED | OUTPATIENT
Start: 2020-12-16 | End: 2020-12-19

## 2020-12-16 RX ORDER — DEXTROSE 50 % IN WATER 50 %
50 SYRINGE (ML) INTRAVENOUS ONCE
Refills: 0 | Status: COMPLETED | OUTPATIENT
Start: 2020-12-16 | End: 2020-12-16

## 2020-12-16 RX ORDER — CHLORHEXIDINE GLUCONATE 213 G/1000ML
1 SOLUTION TOPICAL
Refills: 0 | Status: DISCONTINUED | OUTPATIENT
Start: 2020-12-16 | End: 2020-12-23

## 2020-12-16 RX ORDER — VANCOMYCIN HCL 1 G
1000 VIAL (EA) INTRAVENOUS EVERY 12 HOURS
Refills: 0 | Status: DISCONTINUED | OUTPATIENT
Start: 2020-12-17 | End: 2020-12-21

## 2020-12-16 RX ORDER — CHLORHEXIDINE GLUCONATE 213 G/1000ML
15 SOLUTION TOPICAL EVERY 12 HOURS
Refills: 0 | Status: DISCONTINUED | OUTPATIENT
Start: 2020-12-16 | End: 2020-12-21

## 2020-12-16 RX ORDER — FENTANYL CITRATE 50 UG/ML
50 INJECTION INTRAVENOUS ONCE
Refills: 0 | Status: DISCONTINUED | OUTPATIENT
Start: 2020-12-16 | End: 2020-12-16

## 2020-12-16 RX ORDER — CEFEPIME 1 G/1
2000 INJECTION, POWDER, FOR SOLUTION INTRAMUSCULAR; INTRAVENOUS ONCE
Refills: 0 | Status: COMPLETED | OUTPATIENT
Start: 2020-12-16 | End: 2020-12-16

## 2020-12-16 RX ORDER — MEROPENEM 1 G/30ML
2000 INJECTION INTRAVENOUS EVERY 8 HOURS
Refills: 0 | Status: COMPLETED | OUTPATIENT
Start: 2020-12-16 | End: 2020-12-23

## 2020-12-16 RX ORDER — SUCCINYLCHOLINE CHLORIDE 100 MG/5ML
100 SYRINGE (ML) INTRAVENOUS ONCE
Refills: 0 | Status: COMPLETED | OUTPATIENT
Start: 2020-12-16 | End: 2020-12-16

## 2020-12-16 RX ORDER — ETOMIDATE 2 MG/ML
20 INJECTION INTRAVENOUS ONCE
Refills: 0 | Status: COMPLETED | OUTPATIENT
Start: 2020-12-16 | End: 2020-12-16

## 2020-12-16 RX ORDER — FENTANYL CITRATE 50 UG/ML
0.5 INJECTION INTRAVENOUS
Qty: 2500 | Refills: 0 | Status: DISCONTINUED | OUTPATIENT
Start: 2020-12-16 | End: 2020-12-22

## 2020-12-16 RX ORDER — PROPOFOL 10 MG/ML
30 INJECTION, EMULSION INTRAVENOUS
Qty: 1000 | Refills: 0 | Status: DISCONTINUED | OUTPATIENT
Start: 2020-12-16 | End: 2020-12-22

## 2020-12-16 RX ADMIN — Medication 100 MILLIEQUIVALENT(S): at 19:28

## 2020-12-16 RX ADMIN — SODIUM CHLORIDE 1000 MILLILITER(S): 9 INJECTION, SOLUTION INTRAVENOUS at 23:16

## 2020-12-16 RX ADMIN — PHENYLEPHRINE HYDROCHLORIDE 100 MICROGRAM(S): 10 INJECTION INTRAVENOUS at 14:45

## 2020-12-16 RX ADMIN — CEFEPIME 100 MILLIGRAM(S): 1 INJECTION, POWDER, FOR SOLUTION INTRAMUSCULAR; INTRAVENOUS at 16:17

## 2020-12-16 RX ADMIN — ETOMIDATE 20 MILLIGRAM(S): 2 INJECTION INTRAVENOUS at 15:19

## 2020-12-16 RX ADMIN — MEROPENEM 200 MILLIGRAM(S): 1 INJECTION INTRAVENOUS at 22:28

## 2020-12-16 RX ADMIN — Medication 7.03 MICROGRAM(S)/KG/MIN: at 16:17

## 2020-12-16 RX ADMIN — PHENYLEPHRINE HYDROCHLORIDE 100 MICROGRAM(S): 10 INJECTION INTRAVENOUS at 14:40

## 2020-12-16 RX ADMIN — Medication 1 MILLIGRAM(S): at 22:29

## 2020-12-16 RX ADMIN — FENTANYL CITRATE 3.75 MICROGRAM(S)/KG/HR: 50 INJECTION INTRAVENOUS at 17:45

## 2020-12-16 RX ADMIN — Medication 100 MILLIEQUIVALENT(S): at 19:50

## 2020-12-16 RX ADMIN — Medication 100 MILLIEQUIVALENT(S): at 17:21

## 2020-12-16 RX ADMIN — FENTANYL CITRATE 50 MICROGRAM(S): 50 INJECTION INTRAVENOUS at 17:21

## 2020-12-16 RX ADMIN — HEPARIN SODIUM 5000 UNIT(S): 5000 INJECTION INTRAVENOUS; SUBCUTANEOUS at 22:29

## 2020-12-16 RX ADMIN — SODIUM CHLORIDE 2200 MILLILITER(S): 9 INJECTION INTRAMUSCULAR; INTRAVENOUS; SUBCUTANEOUS at 14:50

## 2020-12-16 RX ADMIN — PHENYLEPHRINE HYDROCHLORIDE 100 MICROGRAM(S): 10 INJECTION INTRAVENOUS at 14:50

## 2020-12-16 RX ADMIN — Medication 50 MILLILITER(S): at 23:18

## 2020-12-16 RX ADMIN — Medication 100 MILLIGRAM(S): at 15:19

## 2020-12-16 RX ADMIN — PROPOFOL 13.5 MICROGRAM(S)/KG/MIN: 10 INJECTION, EMULSION INTRAVENOUS at 16:17

## 2020-12-16 RX ADMIN — Medication 250 MILLIGRAM(S): at 16:38

## 2020-12-16 NOTE — ED PROVIDER NOTE - CRITICAL CARE PROVIDED
I have personally provided the amount of critical care time documented below, excluding time spent on separate procedures. I read the EMS record or spoke with EMS. Full code./direct patient care (not related to procedure)/additional history taking/interpretation of diagnostic studies/documentation/consultation with other physicians/conducted a detailed discussion of DNR status

## 2020-12-16 NOTE — H&P ADULT - NSICDXFAMILYHX_GEN_ALL_CORE_FT
FAMILY HISTORY:  Father  Still living? No  Cerebrovascular accident, Age at diagnosis: Age Unknown

## 2020-12-16 NOTE — H&P ADULT - NSHPLABSRESULTS_GEN_ALL_CORE
143  |  108<H>  |  14  ----------------------------<  86  3.2<L>   |  17<L>  |  1.45<H>    Ca    9.0      16 Dec 2020 16:26    TPro  5.0<L>  /  Alb  3.2<L>  /  TBili  0.4  /  DBili  x   /  AST  15  /  ALT  18  /  AlkPhos  47      PT/INR - ( 16 Dec 2020 16:26 )   PT: 13.2 sec;   INR: 1.17 ratio      PTT - ( 16 Dec 2020 16:26 )  PTT:30.5 sec              Urinalysis Basic - ( 16 Dec 2020 16: )    Color: Yellow / Appearance: Slightly Turbid / S.021 / pH: x  Gluc: x / Ketone: Negative  / Bili: Negative / Urobili: <2 mg/dL   Blood: x / Protein: 100 mg/dL / Nitrite: Negative   Leuk Esterase: Negative / RBC: 3 /HPF / WBC 4 /HPF   Sq Epi: x / Non Sq Epi: 1 /HPF / Bacteria: Negative                      13.3   11.65 )-----------( 262      ( 16 Dec 2020 16:26 )             41.2     < from: Xray Chest 1 View- PORTABLE-Urgent (Xray Chest 1 View- PORTABLE-Urgent .) (20 @ 15:28) >  IMPRESSION:  Intubated with ETT tip below clavicular level at satisfactory distance from armando.  Enteric tube present extending below diaphragm into left upper quadrant with tip beyond imaged field-of-view and side-port at GE junction level.  External pacer pads overlie bilateral lower hemithoraces.   shunt tubing seen coursing along right neck and torso.  Peripheral lower right hemithorax excluded from view. Medial left base/retrocardiac region obscured by the superimposed external pacer pad.Grossly clear remaining visualized lungs. Sharp appearing left CP angle. No pneumothorax.  Heart size and mediastinal width inaccurately assessed on the projection. Scant aortic arch calcifications.  Unremarkable osseous structures.     143  |  108<H>  |  14  ----------------------------<  86  3.2<L>   |  17<L>  |  1.45<H>    Ca    9.0      16 Dec 2020 16:26    TPro  5.0<L>  /  Alb  3.2<L>  /  TBili  0.4  /  DBili  x   /  AST  15  /  ALT  18  /  AlkPhos  47      PT/INR - ( 16 Dec 2020 16:26 )   PT: 13.2 sec;   INR: 1.17 ratio      PTT - ( 16 Dec 2020 16:26 )  PTT:30.5 sec              Urinalysis Basic - ( 16 Dec 2020 16: )    Color: Yellow / Appearance: Slightly Turbid / S.021 / pH: x  Gluc: x / Ketone: Negative  / Bili: Negative / Urobili: <2 mg/dL   Blood: x / Protein: 100 mg/dL / Nitrite: Negative   Leuk Esterase: Negative / RBC: 3 /HPF / WBC 4 /HPF   Sq Epi: x / Non Sq Epi: 1 /HPF / Bacteria: Negative                      13.3   11.65 )-----------( 262      ( 16 Dec 2020 16:26 )             41.2     < from: Xray Chest 1 View- PORTABLE-Urgent (Xray Chest 1 View- PORTABLE-Urgent .) (20 @ 15:28) >  IMPRESSION:  Intubated with ETT tip below clavicular level at satisfactory distance from armando.  Enteric tube present extending below diaphragm into left upper quadrant with tip beyond imaged field-of-view and side-port at GE junction level.  External pacer pads overlie bilateral lower hemithoraces.   shunt tubing seen coursing along right neck and torso.  Peripheral lower right hemithorax excluded from view. Medial left base/retrocardiac region obscured by the superimposed external pacer pad.Grossly clear remaining visualized lungs. Sharp appearing left CP angle. No pneumothorax.  Heart size and mediastinal width inaccurately assessed on the projection. Scant aortic arch calcifications.  Unremarkable osseous structures.    < from: CT Head No Cont (20 @ 18:55) >    IMPRESSION:    Status post left parietal craniotomy with subjacent left parietal temporal encephalomalacia and gliosis compatible with post surgical changes.    Right frontal approach ventriculostomy with increased ventricular size as compared to prior study 2019.    No acute intracranial hemorrhage, mass effect or midline shift.    < end of copied text >    < from: CT Angio Chest w/ IV Cont (20 @ 18:51) >      IMPRESSION:    No pulmonary embolus.    Bibasilar atelectasis.    < end of copied text >

## 2020-12-16 NOTE — ED PROVIDER NOTE - CLINICAL SUMMARY MEDICAL DECISION MAKING FREE TEXT BOX
Halima: GBM, s/p craniotomy c/b pseudomonas infection. BIB EMS from [facility] w/ low BP and AMS. SBP ~50. During 20 minutes of preparation, he was only once able to wake up and tell us his name (when asked), then he returned to obtunded state. Intubated b/c of AMS. Given IVF, pressors, sedation (post-intubation). Giving ABx. Check CXR, labs. Admit.

## 2020-12-16 NOTE — ED PROVIDER NOTE - ATTENDING CONTRIBUTION TO CARE
I performed a face-to-face evaluation of the patient and performed a history and physical examination. I agree with the history and physical examination.    GBM, s/p craniotomy c/b pseudomonas infection. BIB EMS from [facility] w/ low BP and AMS. SBP ~50. During 20 minutes of preparation, he was only once able to wake up and tell us his name (when asked), then he returned to obtunded state. Intubated b/c of AMS. Given IVF, pressors, sedation (post-intubation). Giving ABx. Check CXR, labs. Admit.

## 2020-12-16 NOTE — ED PROCEDURE NOTE - CPROC ED INDICATIONS1
airway protection/mental status change
Emergency Access
emergency venous access/volume resuscitation

## 2020-12-16 NOTE — ED PROVIDER NOTE - PROGRESS NOTE DETAILS
Discussed case with ICU, conveyed current lab results and vitals, levophed requirements and vascular access, as well as pending results. Agreed to see patient. Approximately 2 hours prior, discussed course and plan with family members (and proxy) x2. Agreed and understood. Discussed case with ICU, requested NSG evaluation of patient and possible tap of  shunt for r/o sepsis. Discussed case with NSG, including complicated history of EBSL subsequent to craniotomy. Agreed to see patient and give further recommendations.

## 2020-12-16 NOTE — ED ADULT NURSE REASSESSMENT NOTE - NS ED NURSE REASSESS COMMENT FT1
Left femoral central line placed by Dr. ENZO Hilton as emergent procedure, labs drawn and sent, meds administered as ordered, will continue to monitor.

## 2020-12-16 NOTE — H&P ADULT - NSICDXPASTMEDICALHX_GEN_ALL_CORE_FT
PAST MEDICAL HISTORY:  Bipolar affective     Bipolar disorder     Borderline diabetes     Diabetes mellitus     Essential hypertension     Hypertension     Non-traumatic intracerebral hemorrhage due to AVM      PAST MEDICAL HISTORY:  Borderline diabetes     Diabetes mellitus     Essential hypertension     Hypertension

## 2020-12-16 NOTE — ED ADULT NURSE NOTE - CHIEF COMPLAINT QUOTE
Pt brought in from [facility] as a notification. Pt brought back directly to [facility].

## 2020-12-16 NOTE — H&P ADULT - HISTORY OF PRESENT ILLNESS
60y M PMHx HTN, bipolar/OCD, AVF s/p embolism, GBM s/p craniotomy w/resection and subsequent pseudomonal infection for which repeat craniotomy was performed (2/2020),  shunt, COVID 3/2020, p/w hypoxia, AMS. BIB EMS from [facility] w/ low BP (SBP 50s) and AMS, subsequently intubated. Given 2.2 L NS, 200 mcg phenylephrine, started on levophed gtt, sedated (post-intubation). Giving vanc (12/16- ), cefepime (12/16- ). Of note, pt has a chronic LLE wound for which he is on Bactrim.     Labs notable for WBC 11.65, VBG 7.18/45/56/15/77.7. lactate 10.8  CXR showed grossly clear lungs, view partly blocked by pacer pads.    60y M PMHx HTN, Anxiety (on Prozac), b/l DVT while on Coumadin (s/p IVF filter, now off coumadin), GBM s/p craniotomy w/ resection c/b ESBL meningitis for which repeat craniotomy (2/2020) performed and pt was given 8 weeks Meropenem, pt subsequently had repeat ESBL meningitis s/p craniectomy w/ washout w/ intrathecal Jin and 10 week course IV Jin, COVID infection in 3/2020 who was BIBEMS from [facility] for AMS, hypoxia and hypotension.      In the ED pt was intubated for airway protection.  He was given 2.2 L NS, 200 mcg phenylephrine, started on levophed gtt, started on vanc and cefepime.  Labs notable for WBC 11.65, VBG 7.18/45/56/15/77.7. lactate 10.8  CXR showed grossly clear lungs, view partly blocked by pacer pads, U/A negative, blood and urine cultures pending    Per sister (OB physician), pt was supposed to be on lifelong Bactrim for brain infection however Bactrim was discontinued at [facility].  Pt received neurosurgical care at [facility] w/ Dr. Patrice Cantu ([phone])

## 2020-12-16 NOTE — H&P ADULT - ATTENDING COMMENTS
61 yo man with h/o glioblastoma s/p resection followed by ESBL E coli meningitis s/p washout and intrathecal nancy, per his sister was to be on long term Bactrim which was d/c'd at [facility].  Baseline he is conversant with mild expressive aphasia.  Presents with AMS, hypoxia and hypotension; was intubated in ED for hypoxic resp failure and airway protection, now on vasopressors.   shunt; IVC filter; h/o bl DVT while on coumadin   All NS done at [facility]  POCUS suboptimal windows  Lungs A line predom anteriorly bilat; bilat posterior base atalectasis; no pleff, no consolidation.  Cor appears to have hyperdynamic LV, unable to visualize RV, no pericard eff, IVC appears small  Labs of note  lactate 10 -> 5  WBC 11  27% bands  CXR clear  CT head poss increased ventricular size compared with 12/2019 CT  CT chest: no PE, bilat atalectasis (RLL, LLL, lingula)  Acute hypoxic respiratory failure; bilat lobar atalectasis; no evidence of PNA  Septic shock unclear source; given history must consider CNS infection  POCUS suggest fluid responsiveness  - Abx: meropenem  - NS note appreciated; MRI ordered  - stress dose steroid  - IVF bolus  - continue lung protective vent settings  critically ill  d/w pt's sister at bedside  cc time 45 min excluding time spent on POCUS and procedures

## 2020-12-16 NOTE — ED PROVIDER NOTE - PMH
Bipolar affective    Bipolar disorder    Borderline diabetes    Diabetes mellitus    Essential hypertension    Hypertension    Non-traumatic intracerebral hemorrhage due to AVM

## 2020-12-16 NOTE — ED PROVIDER NOTE - OBJECTIVE STATEMENT
59yo M p/w hypoxia and altered mental status per EMS, who was called to [facility] for same just prior to arrival. Unknown baseline. Per records, patient has a h/o HTN, bipolar/OCD, AVF s/p embolism, GBM s/p craniotomy w/resection and subsequent pseudomonal infection for which repeat craniotomy was performed in February 2020. Unable to obtain further history from patient 2/2 mental status.

## 2020-12-16 NOTE — H&P ADULT - NSHPPHYSICALEXAM_GEN_ALL_CORE
CONSTITUTIONAL: Intubated, sedated, NAD.  HEAD: No evidence of trauma. Structures WNL.  EYES: +PERRL. +EOMI. No scleral icterus. No conjunctival injection.  ENT: Moist oral mucosa. No erythema. No pharyngeal exudates.   NECK: Unable to assess as intubated, sedated  RESPIRATORY: Intubated. Vented breath sounds b/l. CTAB. No wheezes, rales, or rhonchi. No accessory muscle use. No apparent respiratory distress.  CARDIOVASCULAR: +S1/S2. No audible S3/S4. Regular rate and rhythm. No murmurs, rubs, or gallops. 2+ radial pulses x b/l UE; 2+ DP pulses x b/l LE.   GASTROINTESTINAL: Soft, nontender, nondistended. +BS. No rebound or guarding.   BACK: Unable to assess as intubated, sedated  EXTREMITY: No LE swelling or edema. EXTs warm to touch.  MUSCULOSKELETAL: intubated, sedated  DERMATOLOGICAL: No abnormal rashes or lesions.  NEUROLOGICAL: A&O x0. intubated, sedated  PSYCHIATRIC: Unable to assess as intubated, sedated

## 2020-12-16 NOTE — ED ADULT TRIAGE NOTE - CHIEF COMPLAINT QUOTE
Pt brought in from [facility] as a notification. Pt brought back directly to [facility].

## 2020-12-16 NOTE — ED ADULT NURSE NOTE - OBJECTIVE STATEMENT
Pt arriving to Trauma B as notification from [facility]. PMHx Cancer- unknown type, receiving chemo. As per EMS, Pt was satting in the 80s. On arrival to ED, Pt appears obtunded, satting in the 80s on 15L nonrebreather. HR in the 150s. Extremities are cold, mottled. Pt nonresponsive to verbal and physical stimuli. IV established with 20G in RAC. NS bolus running at this time. Pt arriving to Trauma B as notification from [facility]. PMHx Cancer- unknown type, receiving chemo. As per EMS, Pt was satting in the 80s. On arrival to ED, Pt appears obtunded, satting in the 80s on 15L nonrebreather. HR in the 150s, BP 50/30. Extremities are cold, mottled. Pt nonresponsive to verbal and physical stimuli. IV established with 20G in RAC. NS bolus running at this time. MD at bedside, will continue to monitor. Pt arriving to Trauma B as notification from [facility]. PMHx Cancer- unknown type, receiving chemo. As per EMS, oxygen saturation in the 80s. On arrival to ED, Pt appears obtunded, oxygen satuation in the 80s on 15L nonrebreather. HR in the 150s, BP 50/30. Extremities are cold, mottled. Pt nonresponsive to verbal and painful stimuli. IV established with 20G in right wrist. NS bolus running at this time. MD at bedside, will continue to monitor.

## 2020-12-16 NOTE — ED PROCEDURE NOTE - CPROC ED INFUS LINE DETAIL1
The location was identified, and the area was draped and prepped./The catheter was placed using sterile technique./Ultrasound guidance was used during placement./The guidewire was recovered./All lumen(s) aspirated and flushed without difficulty.

## 2020-12-16 NOTE — ED PROCEDURE NOTE - PROCEDURE ADDITIONAL DETAILS
L probe
Central line was attemped in the R IJ 3 times but was unable to obtain adequate flow. Procedure in neck was aborted and then 1 successful attempt in L femoral vein

## 2020-12-16 NOTE — H&P ADULT - ASSESSMENT
60y M PMHx HTN, bipolar/OCD, AVF s/p embolism, GBM s/p craniotomy w/resection and subsequent pseudomonal infection for which repeat craniotomy was performed (2/2020) now with  shunt, COVID 3/2020, p/w hypoxia, AMS, now intubated.     #Neuro:  1. AMS: likely 2/2 sepsis (chronic LLE wound) given elevated WBC, lactate  - Intubated, c/w vent  - c/w propofol, fentanyl, wean as tolerated   - f/u CT head    2. GBM s/p craniotomy w/resection and subsequent pseudomonal infection for which repeat craniotomy was performed (2/2020) now with  shunt    #Cardiovascular:  - f/u CTA  - c/w levophed gtt, wean as tolerated    #Respiratory:   1. Hypoxia likely 2/2 sepsis vs progression of brain disease vs PE  - CXR shows grossly clear lungs, some obstruction from pacer pads  - now intubated, c/w vent  - f/u CTA    #GI:   - s/p PEG tube  - no active issues    #:  1. YOLIS: likely 2/2 shock (given low BUN/Cr) vs poor PO intake   - s/p 2.2 L NS in ED  - Continue to monitor SCr, UOP (goal 0.5 cc/kg/hr)    #Dermatologic:   1. LLE wound  - On Bactrim chronically  - possible source of infection   - f/u wound cx   - consult wound care    #ID:   1. Septic shock   - s/p 2.2 L NS  - c/w vanc (12/16- ), cefepime (12/16- )   - f/u wound cx, blood cx, urine cx  - f/u wound care recs    #Ethics:  FULL CODE  Pt has many physicians in the family who are actively following and will update their GOC as they go       60y M PMHx HTN, bipolar/OCD, AVF s/p embolism, GBM s/p craniotomy w/resection and subsequent pseudomonal infection for which repeat craniotomy was performed (2/2020) now with  shunt, COVID 3/2020, p/w hypoxia, AMS, now intubated.     #Neuro:  1. AMS: likely 2/2 sepsis given elevated WBC, lactate; infectious source may be brain vs chronic LLE wound.  Pt is on Keppra however this is for seizure prophylaxis; per sister pt has had EEGs in the past w/out seizure activity.    - Intubated, c/w vent  - c/w propofol, fentanyl, wean as tolerated   - CTH 12/16 w/out acute pathology    2. GBM s/p craniotomy w/resection and subsequent pseudomonal infection for which repeat craniotomy was performed (2/2020) now with  shunt  - c/w Bactrim for infection prophylaxis  - c/w 500 BID Keppra for seizure ppx    #Cardiovascular:  1. Vasoplegic shock 2/2 sepsis  - c/w levophed gtt and titrate to MAP > 65, wean as tolerated    #Respiratory:   1. Hypoxia likely 2/2 sepsis vs progression of brain disease vs PE  - CXR shows grossly clear lungs, some obstruction from pacer pads  - now intubated, c/w vent  - CTA 12/16 wout evidence of PNA or PE     #GI:   - s/p PEG tube  - no active issues    #:  1. YOLIS: likely 2/2 shock (given low BUN/Cr) vs poor PO intake   - s/p 2.2 L NS in ED  - Continue to monitor SCr, UOP (goal 0.5 cc/kg/hr)    #Dermatologic:   1. LLE wound  - possible source of infection   - f/u wound cx   - consult wound care    #ID:   1. Septic shock   - s/p 2.2 L NS  - trend troponin; elevated in the setting of hypotension and tachycardia as well as YOLIS  - trend lactate   - c/w vanc (12/16- ), cefepime (12/16- )   - f/u wound cx, blood cx, urine cx  - f/u wound care recs    #Ethics:  FULL CODE  Pt has many physicians in the family who are actively following and will update their GOC as they go       60y M PMHx HTN, bipolar/OCD, AVF s/p embolism, GBM s/p craniotomy w/resection and subsequent pseudomonal infection for which repeat craniotomy was performed (2/2020) now with  shunt, COVID 3/2020, p/w hypoxia, AMS, now intubated.     #Neuro:  1. AMS: likely 2/2 sepsis given elevated WBC, lactate; infectious source may be meningitis vs chronic LLE wound.  Pt is on Keppra however this is for seizure prophylaxis; per sister pt has had EEGs in the past w/out seizure activity.    - Intubated, c/w vent  - c/w propofol, fentanyl, wean as tolerated   - CTH 12/16 w/out acute pathology    2. GBM s/p craniotomy w/resection and subsequent pseudomonal infection for which repeat craniotomy was performed (2/2020) now with  shunt  - c/w Bactrim for infection prophylaxis  - c/w 500 BID Keppra for seizure ppx    #Cardiovascular:  1. Vasoplegic shock 2/2 sepsis  - c/w levophed gtt and titrate to MAP > 65, wean as tolerated    #Respiratory:   1. Hypoxia likely 2/2 sepsis vs progression of brain disease vs PE  - CXR shows grossly clear lungs, some obstruction from pacer pads  - now intubated, c/w vent  - CTA 12/16 wout evidence of PNA or PE     #GI:   - s/p PEG tube  - no active issues    #:  1. YOLIS: likely 2/2 shock (given low BUN/Cr) vs poor PO intake   - s/p 2.2 L NS in ED  - Continue to monitor SCr, UOP (goal 0.5 cc/kg/hr)    #Dermatologic:   1. LLE wound  - possible source of infection   - f/u wound cx   - consult wound care    #ID:   1. Septic shock   - s/p 2.2 L NS  - trend troponin; elevated in the setting of hypotension and tachycardia as well as YOLIS  - trend lactate   - c/w vanc (12/16- ), cefepime (12/16- )   - f/u wound cx, blood cx, urine cx  - f/u wound care recs    #Ethics:  FULL CODE  Pt has many physicians in the family who are actively following and will update their GOC as they go       60y M PMHx HTN, Anxiety (on Prozac), b/l DVT while on Coumadin (s/p IVF filter, now off coumadin), GBM s/p craniotomy w/ resection c/b ESBL meningitis for which repeat craniotomy (2/2020) performed and pt was given 8 weeks Meropenem, pt subsequently had repeat ESBL meningitis s/p craniectomy w/ washout w/ intrathecal Jin and 10 week course IV Jin, COVID infection in 3/2020 who was BIBEMS from [facility] for AMS, hypoxia and hypotension admitted for sepsis likely 2/2 intracranial infection in the setting of negative CTA and U/A.      #Neuro:  1. AMS: likely 2/2 sepsis given elevated WBC, lactate; infectious source may be meningitis vs chronic LLE wound.  Pt is on Keppra however this is for seizure prophylaxis; per sister pt has had EEGs in the past w/out seizure activity.    - Intubated, c/w vent  - c/w propofol, fentanyl, wean as tolerated   - CTH 12/16 w/out acute pathology    2. GBM s/p craniotomy w/resection and subsequent pseudomonal infection for which repeat craniotomy was performed (2/2020) now with  shunt  - c/w Bactrim for infection prophylaxis  - c/w 500 BID Keppra for seizure ppx    #Cardiovascular:  1. Vasoplegic shock 2/2 sepsis  - c/w levophed gtt and titrate to MAP > 65, wean as tolerated  - pt chronically on decadron; assess pressor requirements to see if patient will benefit from stress dose steroids    #Respiratory:   1. Hypoxia likely 2/2 sepsis vs progression of brain disease vs PE  - CXR shows grossly clear lungs, some obstruction from pacer pads  - now intubated, c/w vent  - CTA 12/16 wout evidence of PNA or PE     #GI:   - s/p PEG tube  - no active issues    #:  1. YOLIS: likely 2/2 shock (given low BUN/Cr) vs poor PO intake   - s/p 2.2 L NS in ED  - Continue to monitor SCr, UOP (goal 0.5 cc/kg/hr)    #Dermatologic:   1. LLE wound  - possible source of infection   - f/u wound cx   - consult wound care    #ID:   1. Septic shock   - s/p 2.2 L NS  - trend troponin; elevated in the setting of hypotension and tachycardia as well as YOLIS  - trend lactate   - c/w vanc (12/16- ), cefepime (12/16- )   - f/u wound cx, blood cx, urine cx  - f/u wound care recs    #Ethics:  FULL CODE  Pt has many physicians in the family who are actively following and will update their GOC as they go       60y M PMHx HTN, Anxiety (on Prozac), b/l DVT while on Coumadin (s/p IVF filter, now off coumadin), GBM s/p craniotomy w/ resection c/b ESBL meningitis for which repeat craniotomy (2/2020) performed and pt was given 8 weeks Meropenem, pt subsequently had repeat ESBL meningitis s/p craniectomy w/ washout w/ intrathecal Jin and 10 week course IV Jin, COVID infection in 3/2020 who was BIBEMS from [facility] for AMS, hypoxia and hypotension admitted for sepsis likely 2/2 intracranial infection in the setting of negative CTA and U/A.      #Neuro:  1. AMS: likely 2/2 sepsis given elevated WBC, lactate; infectious source may be meningitis vs chronic LLE wound.  Pt is on Keppra however this is for seizure prophylaxis; per sister pt has had EEGs in the past w/out seizure activity.    - Intubated, c/w vent  - c/w propofol, fentanyl, wean as tolerated   - CTH 12/16 w/out acute pathology    2. GBM s/p craniotomy w/resection and subsequent ESBL infection for which repeat craniotomy was performed (2/2020) now with  shunt  - c/w Bactrim for infection prophylaxis  - c/w 500 BID Keppra for seizure ppx    #Cardiovascular:  1. Vasoplegic shock 2/2 sepsis  - c/w levophed gtt and titrate to MAP > 65, wean as tolerated  - pt chronically on decadron; assess pressor requirements to see if patient will benefit from stress dose steroids    #Respiratory:   1. Hypoxia likely 2/2 sepsis vs progression of brain disease vs PE  - CXR shows grossly clear lungs, some obstruction from pacer pads  - now intubated, c/w vent  - CTA 12/16 wout evidence of PNA or PE     #GI:   - s/p PEG tube  - no active issues    #:  1. YOLIS: likely 2/2 shock (given low BUN/Cr) vs poor PO intake   - s/p 2.2 L NS in ED  - Continue to monitor SCr, UOP (goal 0.5 cc/kg/hr)    #Dermatologic:   1. LLE wound  - possible source of infection   - f/u wound cx   - consult wound care    #ID:   1. Septic shock   - s/p 2.2 L NS  - trend troponin; elevated in the setting of hypotension and tachycardia as well as YOLIS  - trend lactate   - c/w vanc (12/16- ), cefepime (12/16- )   - f/u wound cx, blood cx, urine cx  - f/u wound care recs    #Ethics:  FULL CODE  Pt has many physicians in the family who are actively following and will update their GOC as they go

## 2020-12-16 NOTE — ED ADULT NURSE REASSESSMENT NOTE - NS ED NURSE REASSESS COMMENT FT1
Pt intubated with 7.5 ETT. 20mg Etomidate administered at 14:41, 100mg succinylcholine administered at 2:43. 23 @ teeth. +end tidal CO2. Pt placed on ventilator at this time, satting 100%. Will continue to monitor. Pt intubated with 7.5 ETT. 20mg Etomidate administered at 14:41, 100mg succinylcholine administered at 2:43. 23 @ teeth. +end tidal CO2. Pt placed on ventilator at this time, satting 100%. Pt started on levophed drip as per MD orders. Facilitator RN at bedside. Will continue to monitor. Pt intubated by MD [place] with 7.5 ETT. 20mg Etomidate administered at 14:41, 100mg succinylcholine administered at 2:43. 23cm @ teeth. +end tidal CO2. Pt placed on ventilator at this time, oxygen [id number] Pt started on levophed drip as per MD orders. Facilitator RN at bedside. Will continue to monitor.

## 2020-12-17 LAB
ADD ON TEST-SPECIMEN IN LAB: SIGNIFICANT CHANGE UP
ALBUMIN SERPL ELPH-MCNC: 2.7 G/DL — LOW (ref 3.3–5)
ALP SERPL-CCNC: 54 U/L — SIGNIFICANT CHANGE UP (ref 40–120)
ALT FLD-CCNC: 17 U/L — SIGNIFICANT CHANGE UP (ref 4–41)
ANION GAP SERPL CALC-SCNC: 13 MMOL/L — SIGNIFICANT CHANGE UP (ref 7–14)
APTT BLD: 40.2 SEC — HIGH (ref 27–36.3)
AST SERPL-CCNC: 22 U/L — SIGNIFICANT CHANGE UP (ref 4–40)
BASOPHILS # BLD AUTO: 0.08 K/UL — SIGNIFICANT CHANGE UP (ref 0–0.2)
BASOPHILS NFR BLD AUTO: 0.4 % — SIGNIFICANT CHANGE UP (ref 0–2)
BILIRUB SERPL-MCNC: <0.2 MG/DL — SIGNIFICANT CHANGE UP (ref 0.2–1.2)
BLOOD GAS ARTERIAL COMPREHENSIVE RESULT: SIGNIFICANT CHANGE UP
BUN SERPL-MCNC: 15 MG/DL — SIGNIFICANT CHANGE UP (ref 7–23)
CALCIUM SERPL-MCNC: 8.3 MG/DL — LOW (ref 8.4–10.5)
CHLORIDE SERPL-SCNC: 107 MMOL/L — SIGNIFICANT CHANGE UP (ref 98–107)
CO2 SERPL-SCNC: 17 MMOL/L — LOW (ref 22–31)
CREAT SERPL-MCNC: 1 MG/DL — SIGNIFICANT CHANGE UP (ref 0.5–1.3)
CULTURE RESULTS: NO GROWTH — SIGNIFICANT CHANGE UP
EOSINOPHIL # BLD AUTO: 0.03 K/UL — SIGNIFICANT CHANGE UP (ref 0–0.5)
EOSINOPHIL NFR BLD AUTO: 0.1 % — SIGNIFICANT CHANGE UP (ref 0–6)
GLUCOSE SERPL-MCNC: 147 MG/DL — HIGH (ref 70–99)
HCT VFR BLD CALC: 42.1 % — SIGNIFICANT CHANGE UP (ref 39–50)
HGB BLD-MCNC: 13.5 G/DL — SIGNIFICANT CHANGE UP (ref 13–17)
IANC: 18.31 K/UL — HIGH (ref 1.5–8.5)
IMM GRANULOCYTES NFR BLD AUTO: 2.7 % — HIGH (ref 0–1.5)
INR BLD: 1.33 RATIO — HIGH (ref 0.88–1.17)
LACTATE SERPL-SCNC: 5.2 MMOL/L — CRITICAL HIGH (ref 0.5–2)
LYMPHOCYTES # BLD AUTO: 0.41 K/UL — LOW (ref 1–3.3)
LYMPHOCYTES # BLD AUTO: 2 % — LOW (ref 13–44)
MAGNESIUM SERPL-MCNC: 1.3 MG/DL — LOW (ref 1.6–2.6)
MAGNESIUM SERPL-MCNC: 1.9 MG/DL — SIGNIFICANT CHANGE UP (ref 1.6–2.6)
MCHC RBC-ENTMCNC: 32.1 GM/DL — SIGNIFICANT CHANGE UP (ref 32–36)
MCHC RBC-ENTMCNC: 32.5 PG — SIGNIFICANT CHANGE UP (ref 27–34)
MCV RBC AUTO: 101.2 FL — HIGH (ref 80–100)
MONOCYTES # BLD AUTO: 1.15 K/UL — HIGH (ref 0–0.9)
MONOCYTES NFR BLD AUTO: 5.6 % — SIGNIFICANT CHANGE UP (ref 2–14)
NEUTROPHILS # BLD AUTO: 18.31 K/UL — HIGH (ref 1.8–7.4)
NEUTROPHILS NFR BLD AUTO: 89.2 % — HIGH (ref 43–77)
NRBC # BLD: 0 /100 WBCS — SIGNIFICANT CHANGE UP
NRBC # FLD: 0 K/UL — SIGNIFICANT CHANGE UP
PHOSPHATE SERPL-MCNC: 3.7 MG/DL — SIGNIFICANT CHANGE UP (ref 2.5–4.5)
PHOSPHATE SERPL-MCNC: 3.7 MG/DL — SIGNIFICANT CHANGE UP (ref 2.5–4.5)
PLATELET # BLD AUTO: 240 K/UL — SIGNIFICANT CHANGE UP (ref 150–400)
POTASSIUM SERPL-MCNC: 4.2 MMOL/L — SIGNIFICANT CHANGE UP (ref 3.5–5.3)
POTASSIUM SERPL-SCNC: 4.2 MMOL/L — SIGNIFICANT CHANGE UP (ref 3.5–5.3)
PROT SERPL-MCNC: 4.4 G/DL — LOW (ref 6–8.3)
PROTHROM AB SERPL-ACNC: 15 SEC — HIGH (ref 9.8–13.1)
RBC # BLD: 4.16 M/UL — LOW (ref 4.2–5.8)
RBC # FLD: 13.6 % — SIGNIFICANT CHANGE UP (ref 10.3–14.5)
SODIUM SERPL-SCNC: 137 MMOL/L — SIGNIFICANT CHANGE UP (ref 135–145)
SPECIMEN SOURCE: SIGNIFICANT CHANGE UP
TROPONIN T, HIGH SENSITIVITY RESULT: 32 NG/L — SIGNIFICANT CHANGE UP
WBC # BLD: 20.54 K/UL — HIGH (ref 3.8–10.5)
WBC # FLD AUTO: 20.54 K/UL — HIGH (ref 3.8–10.5)

## 2020-12-17 PROCEDURE — 76937 US GUIDE VASCULAR ACCESS: CPT | Mod: 26

## 2020-12-17 PROCEDURE — 36620 INSERTION CATHETER ARTERY: CPT

## 2020-12-17 PROCEDURE — 71045 X-RAY EXAM CHEST 1 VIEW: CPT | Mod: 26

## 2020-12-17 PROCEDURE — 36680 INSERT NEEDLE BONE CAVITY: CPT

## 2020-12-17 PROCEDURE — 36010 PLACE CATHETER IN VEIN: CPT

## 2020-12-17 RX ORDER — SODIUM CHLORIDE 9 MG/ML
10 INJECTION INTRAMUSCULAR; INTRAVENOUS; SUBCUTANEOUS
Refills: 0 | Status: DISCONTINUED | OUTPATIENT
Start: 2020-12-17 | End: 2021-01-05

## 2020-12-17 RX ORDER — LEVETIRACETAM 250 MG/1
500 TABLET, FILM COATED ORAL EVERY 12 HOURS
Refills: 0 | Status: DISCONTINUED | OUTPATIENT
Start: 2020-12-17 | End: 2020-12-24

## 2020-12-17 RX ORDER — SODIUM CHLORIDE 9 MG/ML
500 INJECTION, SOLUTION INTRAVENOUS ONCE
Refills: 0 | Status: COMPLETED | OUTPATIENT
Start: 2020-12-17 | End: 2020-12-17

## 2020-12-17 RX ORDER — MAGNESIUM SULFATE 500 MG/ML
2 VIAL (ML) INJECTION ONCE
Refills: 0 | Status: COMPLETED | OUTPATIENT
Start: 2020-12-17 | End: 2020-12-17

## 2020-12-17 RX ORDER — LEVETIRACETAM 250 MG/1
500 TABLET, FILM COATED ORAL
Refills: 0 | Status: DISCONTINUED | OUTPATIENT
Start: 2020-12-17 | End: 2020-12-17

## 2020-12-17 RX ORDER — SODIUM CHLORIDE 9 MG/ML
1000 INJECTION, SOLUTION INTRAVENOUS
Refills: 0 | Status: DISCONTINUED | OUTPATIENT
Start: 2020-12-17 | End: 2020-12-23

## 2020-12-17 RX ORDER — HYDROCORTISONE 20 MG
100 TABLET ORAL EVERY 8 HOURS
Refills: 0 | Status: DISCONTINUED | OUTPATIENT
Start: 2020-12-17 | End: 2020-12-19

## 2020-12-17 RX ORDER — VASOPRESSIN 20 [USP'U]/ML
0.04 INJECTION INTRAVENOUS
Qty: 50 | Refills: 0 | Status: DISCONTINUED | OUTPATIENT
Start: 2020-12-17 | End: 2020-12-19

## 2020-12-17 RX ADMIN — PROPOFOL 13.5 MICROGRAM(S)/KG/MIN: 10 INJECTION, EMULSION INTRAVENOUS at 19:25

## 2020-12-17 RX ADMIN — Medication 50 GRAM(S): at 04:52

## 2020-12-17 RX ADMIN — SENNA PLUS 2 TABLET(S): 8.6 TABLET ORAL at 00:19

## 2020-12-17 RX ADMIN — SODIUM CHLORIDE 1000 MILLILITER(S): 9 INJECTION, SOLUTION INTRAVENOUS at 03:33

## 2020-12-17 RX ADMIN — LEVETIRACETAM 400 MILLIGRAM(S): 250 TABLET, FILM COATED ORAL at 06:54

## 2020-12-17 RX ADMIN — Medication 250 MILLIGRAM(S): at 17:33

## 2020-12-17 RX ADMIN — SENNA PLUS 2 TABLET(S): 8.6 TABLET ORAL at 22:21

## 2020-12-17 RX ADMIN — LEVETIRACETAM 400 MILLIGRAM(S): 250 TABLET, FILM COATED ORAL at 17:33

## 2020-12-17 RX ADMIN — SODIUM CHLORIDE 50 MILLILITER(S): 9 INJECTION, SOLUTION INTRAVENOUS at 19:26

## 2020-12-17 RX ADMIN — CHLORHEXIDINE GLUCONATE 15 MILLILITER(S): 213 SOLUTION TOPICAL at 05:10

## 2020-12-17 RX ADMIN — PANTOPRAZOLE SODIUM 40 MILLIGRAM(S): 20 TABLET, DELAYED RELEASE ORAL at 12:18

## 2020-12-17 RX ADMIN — CHLORHEXIDINE GLUCONATE 1 APPLICATION(S): 213 SOLUTION TOPICAL at 08:02

## 2020-12-17 RX ADMIN — Medication 100 MILLIGRAM(S): at 02:46

## 2020-12-17 RX ADMIN — Medication 100 MILLIGRAM(S): at 12:18

## 2020-12-17 RX ADMIN — SODIUM CHLORIDE 50 MILLILITER(S): 9 INJECTION, SOLUTION INTRAVENOUS at 02:46

## 2020-12-17 RX ADMIN — Medication 250 MILLIGRAM(S): at 05:53

## 2020-12-17 RX ADMIN — PROPOFOL 13.5 MICROGRAM(S)/KG/MIN: 10 INJECTION, EMULSION INTRAVENOUS at 08:02

## 2020-12-17 RX ADMIN — VASOPRESSIN 2.4 UNIT(S)/MIN: 20 INJECTION INTRAVENOUS at 19:25

## 2020-12-17 RX ADMIN — HEPARIN SODIUM 5000 UNIT(S): 5000 INJECTION INTRAVENOUS; SUBCUTANEOUS at 05:10

## 2020-12-17 RX ADMIN — Medication 50 GRAM(S): at 08:34

## 2020-12-17 RX ADMIN — CHLORHEXIDINE GLUCONATE 15 MILLILITER(S): 213 SOLUTION TOPICAL at 17:33

## 2020-12-17 RX ADMIN — MEROPENEM 200 MILLIGRAM(S): 1 INJECTION INTRAVENOUS at 14:54

## 2020-12-17 RX ADMIN — Medication 3.63 MICROGRAM(S)/KG/MIN: at 19:25

## 2020-12-17 RX ADMIN — PANTOPRAZOLE SODIUM 40 MILLIGRAM(S): 20 TABLET, DELAYED RELEASE ORAL at 00:19

## 2020-12-17 RX ADMIN — PROPOFOL 13.5 MICROGRAM(S)/KG/MIN: 10 INJECTION, EMULSION INTRAVENOUS at 17:33

## 2020-12-17 RX ADMIN — HEPARIN SODIUM 5000 UNIT(S): 5000 INJECTION INTRAVENOUS; SUBCUTANEOUS at 14:54

## 2020-12-17 RX ADMIN — MEROPENEM 200 MILLIGRAM(S): 1 INJECTION INTRAVENOUS at 22:32

## 2020-12-17 RX ADMIN — HEPARIN SODIUM 5000 UNIT(S): 5000 INJECTION INTRAVENOUS; SUBCUTANEOUS at 22:21

## 2020-12-17 RX ADMIN — MEROPENEM 200 MILLIGRAM(S): 1 INJECTION INTRAVENOUS at 06:54

## 2020-12-17 RX ADMIN — Medication 3.63 MICROGRAM(S)/KG/MIN: at 00:51

## 2020-12-17 RX ADMIN — Medication 100 MILLIGRAM(S): at 17:34

## 2020-12-17 RX ADMIN — FENTANYL CITRATE 3.75 MICROGRAM(S)/KG/HR: 50 INJECTION INTRAVENOUS at 19:25

## 2020-12-17 NOTE — PROCEDURE NOTE - NSPROCDETAILS_GEN_ALL_CORE
location identified, draped/prepped, sterile technique used
guidewire recovered/lumen(s) aspirated and flushed/sterile dressing applied/sterile technique, catheter placed/ultrasound guidance with use of sterile gel and probe cove
location identified, draped/prepped, sterile technique used, needle inserted/introduced/positive blood return obtained via catheter/connected to a pressurized flush line/sutured in place/hemostasis with direct pressure, dressing applied/Seldinger technique/all materials/supplies accounted for at end of procedure

## 2020-12-17 NOTE — PROGRESS NOTE ADULT - SUBJECTIVE AND OBJECTIVE BOX
INTERVAL HPI/OVERNIGHT EVENTS:    SUBJECTIVE: O/n R radial arterial brooke and RIJ central line inserted for pressors. Patient seen and examined at bedside.     CONSTITUTIONAL: Intubated, sedated  EYES/ENT: Intubated, sedated  NECK: Intubated, sedated  RESPIRATORY: Intubated, sedated  CARDIOVASCULAR: Intubated, sedated  GASTROINTESTINAL: Intubated, sedated. No nausea, vomiting, or hematemesis; No diarrhea or constipation. No melena or hematochezia.  GENITOURINARY: No hematuria  NEUROLOGICAL: Intubated, sedated  SKIN: No itching, rashes    OBJECTIVE:    VITAL SIGNS:  ICU Vital Signs Last 24 Hrs  T(C): 35.8 (17 Dec 2020 08:00), Max: 37.4 (16 Dec 2020 16:30)  T(F): 96.5 (17 Dec 2020 08:00), Max: 99.4 (16 Dec 2020 16:30)  HR: 99 (17 Dec 2020 10:00) (89 - 150)  BP: 127/82 (17 Dec 2020 10:00) (43/20 - 158/139)  BP(mean): 95 (17 Dec 2020 10:00) (43 - 110)  ABP: 106/77 (17 Dec 2020 10:00) (48/41 - 145/97)  ABP(mean): 86 (17 Dec 2020 10:00) (43 - 110)  RR: 22 (17 Dec 2020 10:00) (17 - 30)  SpO2: 97% (17 Dec 2020 10:00) (90% - 100%)    Mode: AC/ CMV (Assist Control/ Continuous Mandatory Ventilation), RR (machine): 22, TV (machine): 450, FiO2: 50, PEEP: 5, MAP: 9, PIP: 20     @ 07:  -   @ 07:00  --------------------------------------------------------  IN: 1709.6 mL / OUT: 760 mL / NET: 949.6 mL     @ 07: @ 11:49  --------------------------------------------------------  IN: 444.8 mL / OUT: 110 mL / NET: 334.8 mL    CAPILLARY BLOOD GLUCOSE  POCT Blood Glucose.: 85 mg/dL (17 Dec 2020 04:58)    PHYSICAL EXAM:  GENERAL: Intubated, sedated. NAD  HEAD: Large L craniectomy site, no bleeding/discharge/edema   EYES: EOMI, PERRLA, conjunctiva and sclera clear  NECK: Supple, No JVD  CHEST/LUNG: Vented breath sounds. Clear to auscultation bilaterally; No wheeze  HEART: Tachycardic; No murmurs, rubs, or gallops  ABDOMEN: Soft, Nontender, Nondistended; Bowel sounds present  EXTREMITIES: 2+ Peripheral Pulses, No clubbing, cyanosis, or edema  PSYCH: AAOx0  NEUROLOGY: Intubated, sedated  SKIN: No rashes or lesions    MEDICATIONS:  MEDICATIONS  (STANDING):  chlorhexidine 0.12% Liquid 15 milliLiter(s) Oral Mucosa every 12 hours  chlorhexidine 4% Liquid 1 Application(s) Topical <User Schedule>  dextrose 5% + lactated ringers. 1000 milliLiter(s) (50 mL/Hr) IV Continuous <Continuous>  fentaNYL   Infusion. 0.5 MICROgram(s)/kG/Hr (3.75 mL/Hr) IV Continuous <Continuous>  heparin   Injectable 5000 Unit(s) SubCutaneous every 8 hours  hydrocortisone sodium succinate Injectable 100 milliGRAM(s) IV Push every 8 hours  influenza   Vaccine 0.5 milliLiter(s) IntraMuscular once  levETIRAcetam  IVPB 500 milliGRAM(s) IV Intermittent every 12 hours  meropenem  IVPB 2000 milliGRAM(s) IV Intermittent every 8 hours  norepinephrine Infusion 0.05 MICROgram(s)/kG/Min (3.63 mL/Hr) IV Continuous <Continuous>  pantoprazole   Suspension 40 milliGRAM(s) Oral daily  propofol Infusion 30 MICROgram(s)/kG/Min (13.5 mL/Hr) IV Continuous <Continuous>  senna 2 Tablet(s) Oral at bedtime  vancomycin  IVPB 1000 milliGRAM(s) IV Intermittent every 12 hours  vasopressin Infusion 0.04 Unit(s)/Min (2.4 mL/Hr) IV Continuous <Continuous>    MEDICATIONS  (PRN):  sodium chloride 0.9% lock flush 10 milliLiter(s) IV Push every 1 hour PRN Pre/post blood products, medications, blood draw, and to maintain line patency    ALLERGIES:  Allergies    aspirin (Unknown)  shellfish (Hives)  shellfish (Unknown)  Tegretol (Unknown)    Intolerances    ACE inhibitors (Other)  angiotensin II inhibitors (Other)    LABS:                        13.5   20.54 )-----------( 240      ( 17 Dec 2020 03:36 )             42.1         137  |  107  |  15  ----------------------------<  147<H>  4.2   |  17<L>  |  1.00    Ca    8.3<L>      17 Dec 2020 08:33  Phos  3.7     12  Mg     1.9         TPro  4.4<L>  /  Alb  2.7<L>  /  TBili  <0.2  /  DBili  x   /  AST  22  /  ALT  17  /  AlkPhos  54  1217    PT/INR - ( 17 Dec 2020 03:36 )   PT: 15.0 sec;   INR: 1.33 ratio      PTT - ( 17 Dec 2020 03:36 )  PTT:40.2 sec  Urinalysis Basic - ( 16 Dec 2020 16:26 )    Color: Yellow / Appearance: Slightly Turbid / S.021 / pH: x  Gluc: x / Ketone: Negative  / Bili: Negative / Urobili: <2 mg/dL   Blood: x / Protein: 100 mg/dL / Nitrite: Negative   Leuk Esterase: Negative / RBC: 3 /HPF / WBC 4 /HPF   Sq Epi: x / Non Sq Epi: 1 /HPF / Bacteria: Negative    RADIOLOGY & ADDITIONAL TESTS: Reviewed.

## 2020-12-17 NOTE — PROGRESS NOTE ADULT - ATTENDING COMMENTS
Pt is a 59 yo M with h/o HTN, b/l DVT while on Coumadin (s/p IVF filter, off Coumadin), GBM s/p craniotomy with resection complicated by ESBL meningitis for which repeat craniotomy (2/2020) performed and pt was given 8 weeks Meropenem. Pt subsequently had repeat ESBL meningitis s/p craniectomy with washout + intrathecal Meropenem and 10 week course IV Meropenem, COVID infection in 3/2020 and anxiety d/o was [facility] to AMS, hypoxia and hypotension. ICU admitting dx: 1) Septic shock 2 to likely intracranial infection 2) Resp failure requiring intubation 3) s/p YOLIS 2 to ATN    Resp: May do SBT but based on MS no extubation  ID: Bld+Urine Cx: NTD/ Cont Meropenem + Vanco  CVS: Pressors to maintain MAP > 65  FEN: Start enteral feeds/ May cont IVF for now but avoid hyponatremia  Endo: Cont Hydrocortisone/ Follow Glu  Neuro: MRI head today/ Cont Keppra/ Decrease sedation to assess MS

## 2020-12-17 NOTE — PROGRESS NOTE ADULT - ASSESSMENT
60y M PMHx HTN, Anxiety (on Prozac), b/l DVT while on Coumadin (s/p IVF filter, now off coumadin), GBM s/p craniotomy w/ resection c/b ESBL meningitis for which repeat craniotomy (2/2020) performed and pt was given 8 weeks Meropenem, pt subsequently had repeat ESBL meningitis s/p craniectomy w/ washout w/ intrathecal Jin and 10 week course IV Jin, COVID infection in 3/2020 who was BIBEMS from [facility] for AMS, hypoxia and hypotension admitted for sepsis likely 2/2 intracranial infection in the setting of negative CTA and U/A.      #Neuro:  1. AMS: likely 2/2 sepsis given elevated WBC, lactate; infectious source may be meningitis.  Pt is on Keppra however this is for seizure prophylaxis; per sister pt has had EEGs in the past w/out seizure activity.    - Intubated, c/w vent  - c/w propofol, fentanyl, wean as tolerated   - CTH 12/16 w/out acute pathology    2. GBM s/p craniotomy w/resection and subsequent ESBL infection for which repeat craniotomy was performed (2/2020) now with  shunt  - c/w Bactrim for infection prophylaxis  - c/w 500 BID Keppra for seizure ppx    #Cardiovascular:  1. Vasoplegic shock 2/2 sepsis  - c/w levophed, vaso and titrate to MAP > 65, wean as tolerated  - pt chronically on decadron; assess pressor requirements to see if patient will benefit from stress dose steroids    #Respiratory:   1. Hypoxia likely 2/2 sepsis vs progression of brain disease vs PE  - CXR shows grossly clear lungs, some obstruction from pacer pads  - now intubated, c/w vent  - CTA 12/16 wout evidence of PNA or PE     #GI:   - s/p PEG tube  - no active issues    #:  1. YOLIS: likely 2/2 shock (given low BUN/Cr) vs poor PO intake   - s/p 2.2 L NS in ED  - Continue to monitor SCr, UOP (goal 0.5 cc/kg/hr)    #Dermatologic:   1. LLE wound  - possible source of infection   - f/u wound cx   - consult wound care    #ID:   1. Septic shock   - s/p 2.2 L NS  - trend troponin; elevated in the setting of hypotension and tachycardia as well as YOLIS  - trend lactate   - c/w vanc (12/16- ), cefepime (12/16- )   - f/u wound cx, blood cx, urine cx  - f/u wound care recs    #Ethics:  FULL CODE  Pt has many physicians in the family who are actively following and will update their GOC as they go       60y M PMHx HTN, Anxiety (on Prozac), b/l DVT while on Coumadin (s/p IVF filter, now off coumadin), GBM s/p craniotomy w/ resection c/b ESBL meningitis for which repeat craniotomy (2/2020) performed and pt was given 8 weeks Meropenem, pt subsequently had repeat ESBL meningitis s/p craniectomy w/ washout w/ intrathecal Jin and 10 week course IV Jin, COVID infection in 3/2020 who was BIBEMS from [facility] for AMS, hypoxia and hypotension admitted for sepsis likely 2/2 intracranial infection in the setting of negative CTA and U/A.      #Neuro:  1. AMS: likely 2/2 sepsis given elevated WBC, lactate; infectious source may be meningitis.  Pt is on Keppra however this is for seizure prophylaxis; per sister pt has had EEGs in the past w/out seizure activity.    - Intubated, c/w vent  - c/w propofol, fentanyl, wean as tolerated   - CTH 12/16 w/out acute pathology    2. GBM s/p craniotomy w/resection and subsequent ESBL infection for which repeat craniotomy was performed (2/2020) now with  shunt  - c/w 500 BID Keppra for seizure ppx    #Cardiovascular:  1. Vasoplegic shock 2/2 sepsis  - c/w levophed, vaso and titrate to MAP > 65, wean as tolerated  - c/w hydrocortisone 100 mg q8h  - pt chronically on decadron    #Respiratory:   1. Hypoxia likely 2/2 sepsis vs progression of brain disease vs PE  - CXR shows grossly clear lungs, some obstruction from pacer pads  - now intubated, c/w vent  - CTA 12/16 wout evidence of PNA or PE     #GI:   - s/p PEG tube  - no active issues    #:  1. YOLIS: likely 2/2 shock (given low BUN/Cr) vs poor PO intake   - s/p 2.2 L NS in ED  - Continue to monitor SCr, UOP (goal 0.5 cc/kg/hr)    #Dermatologic:   - no acute issues    #ID:   1. Septic shock   - s/p 2.2 L NS  - trend troponin; elevated in the setting of hypotension and tachycardia as well as YOLIS  - trend lactate   - c/w vanc (12/16- ), cefepime (12/16- )   - f/u blood cx, urine cx    #Ethics:  FULL CODE  Pt has many physicians in the family who are actively following and will update their GOC as they go       60y M PMHx HTN, Anxiety (on Prozac), b/l DVT while on Coumadin (s/p IVF filter, now off coumadin), GBM s/p craniotomy w/ resection c/b ESBL meningitis for which repeat craniotomy (2/2020) performed and pt was given 8 weeks Meropenem, pt subsequently had repeat ESBL meningitis s/p craniectomy w/ washout w/ intrathecal Jin and 10 week course IV Jin, COVID infection in 3/2020 who was BIBEMS from [facility] for AMS, hypoxia and hypotension admitted for sepsis likely 2/2 intracranial infection in the setting of negative CTA and U/A.      #Neuro:  1. AMS: likely 2/2 sepsis given elevated WBC, lactate; infectious source may be meningitis.  Pt is on Keppra however this is for seizure prophylaxis; per sister pt has had EEGs in the past w/out seizure activity.    - Intubated, c/w vent  - c/w propofol, fentanyl, wean as tolerated   - CTH 12/16 w/out acute pathology    2. GBM s/p craniotomy w/resection and subsequent ESBL infection for which repeat craniotomy was performed (2/2020) now with  shunt  - c/w 500 BID Keppra for seizure ppx    #Cardiovascular:  1. Vasoplegic shock 2/2 sepsis  - c/w levophed, vaso and titrate to MAP > 65, wean as tolerated  - c/w hydrocortisone 100 mg q8h  - pt chronically on decadron    #Respiratory:   1. Hypoxia likely 2/2 sepsis vs progression of brain disease vs PE  - CXR shows grossly clear lungs, some obstruction from pacer pads  - now intubated, c/w vent  - CTA 12/16 wout evidence of PNA or PE     #GI:   - s/p OG tube  - no active issues    #:  1. YOLIS: likely 2/2 shock (given low BUN/Cr) vs poor PO intake   - s/p 2.2 L NS in ED  - Continue to monitor SCr, UOP (goal 0.5 cc/kg/hr)    #Dermatologic:   - no acute issues    #ID:   1. Septic shock   - s/p 2.2 L NS  - trend troponin; elevated in the setting of hypotension and tachycardia as well as YOLIS  - trend lactate   - c/w vanc (12/16- ), cefepime (12/16- )   - f/u blood cx, urine cx    #Ethics:  FULL CODE  Pt has many physicians in the family who are actively following and will update their GOC as they go

## 2020-12-17 NOTE — PROCEDURE NOTE - ADDITIONAL PROCEDURE DETAILS
Femoral triple lumen no longer had blood return- BP dropped to systolic 40s with good Fernanda waveform.  Emergently placed L tibia IO in no complications

## 2020-12-18 LAB
ALBUMIN SERPL ELPH-MCNC: 2.3 G/DL — LOW (ref 3.3–5)
ALP SERPL-CCNC: 75 U/L — SIGNIFICANT CHANGE UP (ref 40–120)
ALT FLD-CCNC: 17 U/L — SIGNIFICANT CHANGE UP (ref 4–41)
ANION GAP SERPL CALC-SCNC: 12 MMOL/L — SIGNIFICANT CHANGE UP (ref 7–14)
APTT BLD: 51.1 SEC — HIGH (ref 27–36.3)
AST SERPL-CCNC: 14 U/L — SIGNIFICANT CHANGE UP (ref 4–40)
BASOPHILS # BLD AUTO: 0.04 K/UL — SIGNIFICANT CHANGE UP (ref 0–0.2)
BASOPHILS NFR BLD AUTO: 0.2 % — SIGNIFICANT CHANGE UP (ref 0–2)
BILIRUB SERPL-MCNC: 0.3 MG/DL — SIGNIFICANT CHANGE UP (ref 0.2–1.2)
BUN SERPL-MCNC: 15 MG/DL — SIGNIFICANT CHANGE UP (ref 7–23)
CALCIUM SERPL-MCNC: 8.4 MG/DL — SIGNIFICANT CHANGE UP (ref 8.4–10.5)
CHLORIDE SERPL-SCNC: 105 MMOL/L — SIGNIFICANT CHANGE UP (ref 98–107)
CO2 SERPL-SCNC: 19 MMOL/L — LOW (ref 22–31)
CREAT SERPL-MCNC: 0.68 MG/DL — SIGNIFICANT CHANGE UP (ref 0.5–1.3)
EOSINOPHIL # BLD AUTO: 0.01 K/UL — SIGNIFICANT CHANGE UP (ref 0–0.5)
EOSINOPHIL NFR BLD AUTO: 0.1 % — SIGNIFICANT CHANGE UP (ref 0–6)
GLUCOSE SERPL-MCNC: 146 MG/DL — HIGH (ref 70–99)
HCT VFR BLD CALC: 37.1 % — LOW (ref 39–50)
HGB BLD-MCNC: 11.7 G/DL — LOW (ref 13–17)
IANC: 16.26 K/UL — HIGH (ref 1.5–8.5)
IMM GRANULOCYTES NFR BLD AUTO: 5.2 % — HIGH (ref 0–1.5)
INR BLD: 1.26 RATIO — HIGH (ref 0.88–1.17)
LACTATE SERPL-SCNC: 2.7 MMOL/L — HIGH (ref 0.5–2)
LYMPHOCYTES # BLD AUTO: 0.13 K/UL — LOW (ref 1–3.3)
LYMPHOCYTES # BLD AUTO: 0.7 % — LOW (ref 13–44)
MAGNESIUM SERPL-MCNC: 2.2 MG/DL — SIGNIFICANT CHANGE UP (ref 1.6–2.6)
MCHC RBC-ENTMCNC: 31.5 GM/DL — LOW (ref 32–36)
MCHC RBC-ENTMCNC: 32.1 PG — SIGNIFICANT CHANGE UP (ref 27–34)
MCV RBC AUTO: 101.9 FL — HIGH (ref 80–100)
MONOCYTES # BLD AUTO: 0.92 K/UL — HIGH (ref 0–0.9)
MONOCYTES NFR BLD AUTO: 5 % — SIGNIFICANT CHANGE UP (ref 2–14)
NEUTROPHILS # BLD AUTO: 16.26 K/UL — HIGH (ref 1.8–7.4)
NEUTROPHILS NFR BLD AUTO: 88.8 % — HIGH (ref 43–77)
NRBC # BLD: 0 /100 WBCS — SIGNIFICANT CHANGE UP
NRBC # FLD: 0 K/UL — SIGNIFICANT CHANGE UP
PHOSPHATE SERPL-MCNC: 3.3 MG/DL — SIGNIFICANT CHANGE UP (ref 2.5–4.5)
PLATELET # BLD AUTO: 185 K/UL — SIGNIFICANT CHANGE UP (ref 150–400)
POTASSIUM SERPL-MCNC: 4 MMOL/L — SIGNIFICANT CHANGE UP (ref 3.5–5.3)
POTASSIUM SERPL-SCNC: 4 MMOL/L — SIGNIFICANT CHANGE UP (ref 3.5–5.3)
PROT SERPL-MCNC: 4.5 G/DL — LOW (ref 6–8.3)
PROTHROM AB SERPL-ACNC: 14.3 SEC — HIGH (ref 9.8–13.1)
RBC # BLD: 3.64 M/UL — LOW (ref 4.2–5.8)
RBC # FLD: 13.6 % — SIGNIFICANT CHANGE UP (ref 10.3–14.5)
SODIUM SERPL-SCNC: 136 MMOL/L — SIGNIFICANT CHANGE UP (ref 135–145)
TROPONIN T, HIGH SENSITIVITY RESULT: 16 NG/L — SIGNIFICANT CHANGE UP
WBC # BLD: 18.31 K/UL — HIGH (ref 3.8–10.5)
WBC # FLD AUTO: 18.31 K/UL — HIGH (ref 3.8–10.5)

## 2020-12-18 PROCEDURE — 99291 CRITICAL CARE FIRST HOUR: CPT

## 2020-12-18 PROCEDURE — 70553 MRI BRAIN STEM W/O & W/DYE: CPT | Mod: 26

## 2020-12-18 RX ADMIN — CHLORHEXIDINE GLUCONATE 15 MILLILITER(S): 213 SOLUTION TOPICAL at 18:08

## 2020-12-18 RX ADMIN — PROPOFOL 13.5 MICROGRAM(S)/KG/MIN: 10 INJECTION, EMULSION INTRAVENOUS at 06:07

## 2020-12-18 RX ADMIN — HEPARIN SODIUM 5000 UNIT(S): 5000 INJECTION INTRAVENOUS; SUBCUTANEOUS at 22:42

## 2020-12-18 RX ADMIN — Medication 100 MILLIGRAM(S): at 11:57

## 2020-12-18 RX ADMIN — FENTANYL CITRATE 3.75 MICROGRAM(S)/KG/HR: 50 INJECTION INTRAVENOUS at 06:08

## 2020-12-18 RX ADMIN — Medication 250 MILLIGRAM(S): at 05:04

## 2020-12-18 RX ADMIN — HEPARIN SODIUM 5000 UNIT(S): 5000 INJECTION INTRAVENOUS; SUBCUTANEOUS at 06:05

## 2020-12-18 RX ADMIN — FENTANYL CITRATE 3.75 MICROGRAM(S)/KG/HR: 50 INJECTION INTRAVENOUS at 20:23

## 2020-12-18 RX ADMIN — Medication 250 MILLIGRAM(S): at 18:09

## 2020-12-18 RX ADMIN — Medication 3.63 MICROGRAM(S)/KG/MIN: at 06:06

## 2020-12-18 RX ADMIN — MEROPENEM 200 MILLIGRAM(S): 1 INJECTION INTRAVENOUS at 14:08

## 2020-12-18 RX ADMIN — SODIUM CHLORIDE 50 MILLILITER(S): 9 INJECTION, SOLUTION INTRAVENOUS at 20:23

## 2020-12-18 RX ADMIN — CHLORHEXIDINE GLUCONATE 15 MILLILITER(S): 213 SOLUTION TOPICAL at 06:10

## 2020-12-18 RX ADMIN — LEVETIRACETAM 400 MILLIGRAM(S): 250 TABLET, FILM COATED ORAL at 06:06

## 2020-12-18 RX ADMIN — Medication 100 MILLIGRAM(S): at 18:09

## 2020-12-18 RX ADMIN — SENNA PLUS 2 TABLET(S): 8.6 TABLET ORAL at 22:42

## 2020-12-18 RX ADMIN — HEPARIN SODIUM 5000 UNIT(S): 5000 INJECTION INTRAVENOUS; SUBCUTANEOUS at 14:08

## 2020-12-18 RX ADMIN — SODIUM CHLORIDE 50 MILLILITER(S): 9 INJECTION, SOLUTION INTRAVENOUS at 06:06

## 2020-12-18 RX ADMIN — VASOPRESSIN 2.4 UNIT(S)/MIN: 20 INJECTION INTRAVENOUS at 06:06

## 2020-12-18 RX ADMIN — VASOPRESSIN 2.4 UNIT(S)/MIN: 20 INJECTION INTRAVENOUS at 20:23

## 2020-12-18 RX ADMIN — Medication 100 MILLIGRAM(S): at 03:00

## 2020-12-18 RX ADMIN — PROPOFOL 13.5 MICROGRAM(S)/KG/MIN: 10 INJECTION, EMULSION INTRAVENOUS at 20:23

## 2020-12-18 RX ADMIN — MEROPENEM 200 MILLIGRAM(S): 1 INJECTION INTRAVENOUS at 06:07

## 2020-12-18 RX ADMIN — PANTOPRAZOLE SODIUM 40 MILLIGRAM(S): 20 TABLET, DELAYED RELEASE ORAL at 11:57

## 2020-12-18 RX ADMIN — MEROPENEM 200 MILLIGRAM(S): 1 INJECTION INTRAVENOUS at 22:42

## 2020-12-18 RX ADMIN — CHLORHEXIDINE GLUCONATE 1 APPLICATION(S): 213 SOLUTION TOPICAL at 06:51

## 2020-12-18 RX ADMIN — LEVETIRACETAM 400 MILLIGRAM(S): 250 TABLET, FILM COATED ORAL at 18:09

## 2020-12-18 NOTE — PROGRESS NOTE ADULT - ASSESSMENT
60y M PMHx HTN, Anxiety (on Prozac), b/l DVT while on Coumadin (s/p IVF filter, now off coumadin), GBM s/p craniotomy w/ resection c/b ESBL meningitis for which repeat craniotomy (2/2020) performed and pt was given 8 weeks Meropenem, pt subsequently had repeat ESBL meningitis s/p craniectomy w/ washout w/ intrathecal Jin and 10 week course IV Jin, COVID infection in 3/2020 who was BIBEMS from [facility] for AMS, hypoxia and hypotension admitted for sepsis likely 2/2 intracranial infection in the setting of negative CTA and U/A.      #Neuro:  1. AMS: likely 2/2 sepsis given elevated WBC, lactate; infectious source may be meningitis. Pt is on Keppra however this is for seizure prophylaxis; per sister pt has had EEGs in the past w/out seizure activity.    - Intubated, c/w vent  - c/w propofol, fentanyl, wean as tolerated   - CTH 12/16 w/out acute pathology    2. GBM s/p craniotomy w/resection and subsequent ESBL infection for which repeat craniotomy was performed (2/2020) now with  shunt  - c/w 500 BID Keppra for seizure ppx    #Cardiovascular:  1. Vasoplegic shock 2/2 sepsis  - c/w levophed, vaso and titrate to MAP > 65, wean as tolerated  - c/w hydrocortisone 100 mg q8h  - pt chronically on decadron    #Respiratory:   1. Hypoxia likely 2/2 sepsis vs progression of brain disease vs PE  - CXR shows grossly clear lungs, some obstruction from pacer pads  - now intubated, c/w vent  - CTA 12/16 w/out evidence of PNA or PE     #GI:   - s/p OG tube  - no active issues    #:  1. YOLIS: likely 2/2 shock (given low BUN/Cr) vs poor PO intake   - s/p 2.2 L NS in ED  - Continue to monitor SCr, UOP (goal 0.5 cc/kg/hr)    #Dermatologic:   - no acute issues    #ID:   1. Septic shock   - s/p 2.2 L NS  - troponins downtrending; elevated in the setting of hypotension and tachycardia as well as YOLIS  - trend lactate   - c/w vanc (12/16- ), meropenem (12/16- )   - f/u blood cx, urine cx    #Ethics:  FULL CODE  Pt has many physicians in the family who are actively following and will update their GOC as they go

## 2020-12-18 NOTE — PROGRESS NOTE ADULT - SUBJECTIVE AND OBJECTIVE BOX
INTERVAL HPI/OVERNIGHT EVENTS:    SUBJECTIVE: NAEON. Patient seen and examined at bedside.     CONSTITUTIONAL: Unable to assess as intubated, sedated  EYES/ENT: No visual changes;  No vertigo or throat pain   NECK: No pain or stiffness  RESPIRATORY: Intubated sedated. No cough, wheezing, hemoptysis; No shortness of breath  CARDIOVASCULAR: Unable to assess as intubated, sedated. No chest pain or palpitations  GASTROINTESTINAL: No nausea, vomiting, or hematemesis; No diarrhea or constipation. No melena or hematochezia.  GENITOURINARY: No hematuria  NEUROLOGICAL: Unable to assess as intubated, sedated  SKIN: No itching, rashes    OBJECTIVE:    VITAL SIGNS:  ICU Vital Signs Last 24 Hrs  T(C): 36.5 (18 Dec 2020 08:00), Max: 36.7 (18 Dec 2020 04:00)  T(F): 97.7 (18 Dec 2020 08:00), Max: 98.1 (18 Dec 2020 04:00)  HR: 87 (18 Dec 2020 10:29) (55 - 117)  BP: 116/91 (18 Dec 2020 05:00) (116/91 - 145/78)  BP(mean): 97 (18 Dec 2020 05:00) (87 - 105)  ABP: 102/73 (18 Dec 2020 10:15) (96/67 - 146/86)  ABP(mean): 85 (18 Dec 2020 10:15) (76 - 109)  RR: 22 (18 Dec 2020 10:15) (19 - 22)  SpO2: 100% (18 Dec 2020 10:29) (92% - 100%)    Mode: AC/ CMV (Assist Control/ Continuous Mandatory Ventilation), RR (machine): 22, TV (machine): 450, FiO2: 40, PEEP: 5, ITime: 0.72, MAP: 11, PIP: 24     @ 07:  -   @ 07:00  --------------------------------------------------------  IN: 2608.4 mL / OUT: 1205 mL / NET: 1403.4 mL     @ 07:01  -   @ 10:41  --------------------------------------------------------  IN: 282 mL / OUT: 120 mL / NET: 162 mL    CAPILLARY BLOOD GLUCOSE  POCT Blood Glucose.: 144 mg/dL (18 Dec 2020 05:57)    PHYSICAL EXAM:  GENERAL: NAD, intubated, sedated  HEAD: Atraumatic, Normocephalic  EYES: EOMI, PERRLA, conjunctiva and sclera clear  NECK: Supple, No JVD  CHEST/LUNG: intubated, sedated. Vented breath sounds. Clear to auscultation bilaterally; No wheeze  HEART: Regular rate and rhythm; No murmurs, rubs, or gallops  ABDOMEN: Soft, Nontender, Nondistended; Bowel sounds present  EXTREMITIES: 2+ Peripheral Pulses, No clubbing, cyanosis, or edema  PSYCH: AAOx0.  intubated, sedated  NEUROLOGY: intubated, sedated  SKIN: No rashes or lesions    MEDICATIONS:  MEDICATIONS  (STANDING):  chlorhexidine 0.12% Liquid 15 milliLiter(s) Oral Mucosa every 12 hours  chlorhexidine 4% Liquid 1 Application(s) Topical <User Schedule>  dextrose 5% + lactated ringers. 1000 milliLiter(s) (50 mL/Hr) IV Continuous <Continuous>  fentaNYL   Infusion. 0.5 MICROgram(s)/kG/Hr (3.75 mL/Hr) IV Continuous <Continuous>  heparin   Injectable 5000 Unit(s) SubCutaneous every 8 hours  hydrocortisone sodium succinate Injectable 100 milliGRAM(s) IV Push every 8 hours  influenza   Vaccine 0.5 milliLiter(s) IntraMuscular once  levETIRAcetam  IVPB 500 milliGRAM(s) IV Intermittent every 12 hours  meropenem  IVPB 2000 milliGRAM(s) IV Intermittent every 8 hours  norepinephrine Infusion 0.05 MICROgram(s)/kG/Min (3.63 mL/Hr) IV Continuous <Continuous>  pantoprazole   Suspension 40 milliGRAM(s) Oral daily  propofol Infusion 30 MICROgram(s)/kG/Min (13.5 mL/Hr) IV Continuous <Continuous>  senna 2 Tablet(s) Oral at bedtime  vancomycin  IVPB 1000 milliGRAM(s) IV Intermittent every 12 hours  vasopressin Infusion 0.04 Unit(s)/Min (2.4 mL/Hr) IV Continuous <Continuous>    MEDICATIONS  (PRN):  sodium chloride 0.9% lock flush 10 milliLiter(s) IV Push every 1 hour PRN Pre/post blood products, medications, blood draw, and to maintain line patency    ALLERGIES:  Allergies    aspirin (Unknown)  shellfish (Hives)  shellfish (Unknown)  Tegretol (Unknown)    Intolerances    ACE inhibitors (Other)  angiotensin II inhibitors (Other)    LABS:                        11.7   18.31 )-----------( 185      ( 18 Dec 2020 03:58 )             37.1     12-18    136  |  105  |  15  ----------------------------<  146<H>  4.0   |  19<L>  |  0.68    Ca    8.4      18 Dec 2020 03:58  Phos  3.3     12-18  Mg     2.2     12-18    TPro  4.5<L>  /  Alb  2.3<L>  /  TBili  0.3  /  DBili  x   /  AST  14  /  ALT  17  /  AlkPhos  75  12-18    PT/INR - ( 18 Dec 2020 03:58 )   PT: 14.3 sec;   INR: 1.26 ratio       PTT - ( 18 Dec 2020 03:58 )  PTT:51.1 sec  Urinalysis Basic - ( 16 Dec 2020 16:26 )    Color: Yellow / Appearance: Slightly Turbid / S.021 / pH: x  Gluc: x / Ketone: Negative  / Bili: Negative / Urobili: <2 mg/dL   Blood: x / Protein: 100 mg/dL / Nitrite: Negative   Leuk Esterase: Negative / RBC: 3 /HPF / WBC 4 /HPF   Sq Epi: x / Non Sq Epi: 1 /HPF / Bacteria: Negative    RADIOLOGY & ADDITIONAL TESTS: Reviewed.

## 2020-12-19 ENCOUNTER — TRANSCRIPTION ENCOUNTER (OUTPATIENT)
Age: 60
End: 2020-12-19

## 2020-12-19 LAB
ALBUMIN SERPL ELPH-MCNC: 2.2 G/DL — LOW (ref 3.3–5)
ALBUMIN SERPL ELPH-MCNC: 2.3 G/DL — LOW (ref 3.3–5)
ALP SERPL-CCNC: 62 U/L — SIGNIFICANT CHANGE UP (ref 40–120)
ALP SERPL-CCNC: 62 U/L — SIGNIFICANT CHANGE UP (ref 40–120)
ALT FLD-CCNC: 14 U/L — SIGNIFICANT CHANGE UP (ref 4–41)
ALT FLD-CCNC: 29 U/L — SIGNIFICANT CHANGE UP (ref 4–41)
ANION GAP SERPL CALC-SCNC: 10 MMOL/L — SIGNIFICANT CHANGE UP (ref 7–14)
ANION GAP SERPL CALC-SCNC: 9 MMOL/L — SIGNIFICANT CHANGE UP (ref 7–14)
APPEARANCE CSF: CLEAR — SIGNIFICANT CHANGE UP
APTT BLD: 39.9 SEC — HIGH (ref 27–36.3)
AST SERPL-CCNC: 10 U/L — SIGNIFICANT CHANGE UP (ref 4–40)
AST SERPL-CCNC: 28 U/L — SIGNIFICANT CHANGE UP (ref 4–40)
BASOPHILS # BLD AUTO: 0 K/UL — SIGNIFICANT CHANGE UP (ref 0–0.2)
BASOPHILS NFR BLD AUTO: 0 % — SIGNIFICANT CHANGE UP (ref 0–2)
BILIRUB SERPL-MCNC: 0.3 MG/DL — SIGNIFICANT CHANGE UP (ref 0.2–1.2)
BILIRUB SERPL-MCNC: 0.3 MG/DL — SIGNIFICANT CHANGE UP (ref 0.2–1.2)
BUN SERPL-MCNC: 13 MG/DL — SIGNIFICANT CHANGE UP (ref 7–23)
BUN SERPL-MCNC: 15 MG/DL — SIGNIFICANT CHANGE UP (ref 7–23)
CALCIUM SERPL-MCNC: 8.3 MG/DL — LOW (ref 8.4–10.5)
CALCIUM SERPL-MCNC: 8.6 MG/DL — SIGNIFICANT CHANGE UP (ref 8.4–10.5)
CHLORIDE SERPL-SCNC: 113 MMOL/L — HIGH (ref 98–107)
CHLORIDE SERPL-SCNC: 116 MMOL/L — HIGH (ref 98–107)
CO2 SERPL-SCNC: 20 MMOL/L — LOW (ref 22–31)
CO2 SERPL-SCNC: 21 MMOL/L — LOW (ref 22–31)
COLOR CSF: COLORLESS — SIGNIFICANT CHANGE UP
CREAT SERPL-MCNC: 0.7 MG/DL — SIGNIFICANT CHANGE UP (ref 0.5–1.3)
CREAT SERPL-MCNC: 0.71 MG/DL — SIGNIFICANT CHANGE UP (ref 0.5–1.3)
EOSINOPHIL # BLD AUTO: 0 K/UL — SIGNIFICANT CHANGE UP (ref 0–0.5)
EOSINOPHIL NFR BLD AUTO: 0 % — SIGNIFICANT CHANGE UP (ref 0–6)
GLUCOSE CSF-MCNC: 48 MG/DL — SIGNIFICANT CHANGE UP (ref 40–70)
GLUCOSE SERPL-MCNC: 63 MG/DL — LOW (ref 70–99)
GLUCOSE SERPL-MCNC: 90 MG/DL — SIGNIFICANT CHANGE UP (ref 70–99)
GRAM STN FLD: SIGNIFICANT CHANGE UP
HCT VFR BLD CALC: 29.5 % — LOW (ref 39–50)
HGB BLD-MCNC: 10 G/DL — LOW (ref 13–17)
IANC: 9.63 K/UL — HIGH (ref 1.5–8.5)
INR BLD: 1.15 RATIO — SIGNIFICANT CHANGE UP (ref 0.88–1.17)
LACTATE SERPL-SCNC: 2.9 MMOL/L — HIGH (ref 0.5–2)
LYMPHOCYTES # BLD AUTO: 0 % — LOW (ref 13–44)
LYMPHOCYTES # BLD AUTO: 0 K/UL — LOW (ref 1–3.3)
LYMPHOCYTES # CSF: 40 % — SIGNIFICANT CHANGE UP
MCHC RBC-ENTMCNC: 32.9 PG — SIGNIFICANT CHANGE UP (ref 27–34)
MCHC RBC-ENTMCNC: 33.9 GM/DL — SIGNIFICANT CHANGE UP (ref 32–36)
MCV RBC AUTO: 97 FL — SIGNIFICANT CHANGE UP (ref 80–100)
MONOCYTES # BLD AUTO: 0.09 K/UL — SIGNIFICANT CHANGE UP (ref 0–0.9)
MONOCYTES NFR BLD AUTO: 0.9 % — LOW (ref 2–14)
MONOS+MACROS NFR CSF: 60 % — SIGNIFICANT CHANGE UP
NEUTROPHILS # BLD AUTO: 10.23 K/UL — HIGH (ref 1.8–7.4)
NEUTROPHILS # CSF: 0 % — SIGNIFICANT CHANGE UP
NEUTROPHILS NFR BLD AUTO: 95.5 % — HIGH (ref 43–77)
NRBC NFR CSF: 1 CELLS/UL — SIGNIFICANT CHANGE UP (ref 0–5)
PHOSPHATE SERPL-MCNC: 1.3 MG/DL — LOW (ref 2.5–4.5)
PLATELET # BLD AUTO: 119 K/UL — LOW (ref 150–400)
POTASSIUM SERPL-MCNC: 3.1 MMOL/L — LOW (ref 3.5–5.3)
POTASSIUM SERPL-MCNC: 3.3 MMOL/L — LOW (ref 3.5–5.3)
POTASSIUM SERPL-SCNC: 3.1 MMOL/L — LOW (ref 3.5–5.3)
POTASSIUM SERPL-SCNC: 3.3 MMOL/L — LOW (ref 3.5–5.3)
PROT CSF-MCNC: 40 MG/DL — SIGNIFICANT CHANGE UP (ref 15–45)
PROT SERPL-MCNC: 3.9 G/DL — LOW (ref 6–8.3)
PROT SERPL-MCNC: 4 G/DL — LOW (ref 6–8.3)
PROTHROM AB SERPL-ACNC: 13 SEC — SIGNIFICANT CHANGE UP (ref 9.8–13.1)
RBC # BLD: 3.04 M/UL — LOW (ref 4.2–5.8)
RBC # CSF: 2 CELLS/UL — HIGH (ref 0–0)
RBC # FLD: 13.7 % — SIGNIFICANT CHANGE UP (ref 10.3–14.5)
SODIUM SERPL-SCNC: 143 MMOL/L — SIGNIFICANT CHANGE UP (ref 135–145)
SODIUM SERPL-SCNC: 146 MMOL/L — HIGH (ref 135–145)
SPECIMEN SOURCE: SIGNIFICANT CHANGE UP
TOTAL CELLS COUNTED, SPINAL FLUID: 10 CELLS — SIGNIFICANT CHANGE UP
TROPONIN T, HIGH SENSITIVITY RESULT: 17 NG/L — SIGNIFICANT CHANGE UP
TUBE TYPE: SIGNIFICANT CHANGE UP
VANCOMYCIN TROUGH SERPL-MCNC: 19.8 UG/ML — SIGNIFICANT CHANGE UP (ref 10–20)
WBC # BLD: 10.32 K/UL — SIGNIFICANT CHANGE UP (ref 3.8–10.5)
WBC # FLD AUTO: 10.32 K/UL — SIGNIFICANT CHANGE UP (ref 3.8–10.5)

## 2020-12-19 PROCEDURE — 99291 CRITICAL CARE FIRST HOUR: CPT

## 2020-12-19 PROCEDURE — 61070 BRAIN CANAL SHUNT PROCEDURE: CPT

## 2020-12-19 RX ORDER — DEXTROSE 50 % IN WATER 50 %
25 SYRINGE (ML) INTRAVENOUS ONCE
Refills: 0 | Status: COMPLETED | OUTPATIENT
Start: 2020-12-19 | End: 2020-12-19

## 2020-12-19 RX ORDER — SODIUM,POTASSIUM PHOSPHATES 278-250MG
1 POWDER IN PACKET (EA) ORAL
Qty: 0 | Refills: 0 | DISCHARGE
Start: 2020-12-19

## 2020-12-19 RX ORDER — POTASSIUM PHOSPHATE, MONOBASIC POTASSIUM PHOSPHATE, DIBASIC 236; 224 MG/ML; MG/ML
30 INJECTION, SOLUTION INTRAVENOUS ONCE
Refills: 0 | Status: COMPLETED | OUTPATIENT
Start: 2020-12-19 | End: 2020-12-19

## 2020-12-19 RX ORDER — POTASSIUM CHLORIDE 20 MEQ
10 PACKET (EA) ORAL ONCE
Refills: 0 | Status: COMPLETED | OUTPATIENT
Start: 2020-12-19 | End: 2020-12-19

## 2020-12-19 RX ORDER — HYDROCORTISONE 20 MG
100 TABLET ORAL
Qty: 0 | Refills: 0 | DISCHARGE
Start: 2020-12-19

## 2020-12-19 RX ORDER — SODIUM,POTASSIUM PHOSPHATES 278-250MG
1 POWDER IN PACKET (EA) ORAL
Refills: 0 | Status: COMPLETED | OUTPATIENT
Start: 2020-12-19 | End: 2020-12-21

## 2020-12-19 RX ORDER — NOREPINEPHRINE BITARTRATE/D5W 8 MG/250ML
0.05 PLASTIC BAG, INJECTION (ML) INTRAVENOUS
Qty: 16 | Refills: 0 | Status: DISCONTINUED | OUTPATIENT
Start: 2020-12-19 | End: 2020-12-20

## 2020-12-19 RX ORDER — HEPARIN SODIUM 5000 [USP'U]/ML
0 INJECTION INTRAVENOUS; SUBCUTANEOUS
Qty: 0 | Refills: 0 | DISCHARGE
Start: 2020-12-19

## 2020-12-19 RX ORDER — DEXMEDETOMIDINE HYDROCHLORIDE IN 0.9% SODIUM CHLORIDE 4 UG/ML
0.4 INJECTION INTRAVENOUS
Qty: 400 | Refills: 0 | Status: DISCONTINUED | OUTPATIENT
Start: 2020-12-19 | End: 2020-12-20

## 2020-12-19 RX ORDER — POTASSIUM CHLORIDE 20 MEQ
20 PACKET (EA) ORAL
Refills: 0 | Status: COMPLETED | OUTPATIENT
Start: 2020-12-19 | End: 2020-12-19

## 2020-12-19 RX ORDER — HYDROCORTISONE 20 MG
100 TABLET ORAL EVERY 12 HOURS
Refills: 0 | Status: DISCONTINUED | OUTPATIENT
Start: 2020-12-19 | End: 2020-12-20

## 2020-12-19 RX ORDER — POTASSIUM CHLORIDE 20 MEQ
20 PACKET (EA) ORAL ONCE
Refills: 0 | Status: DISCONTINUED | OUTPATIENT
Start: 2020-12-19 | End: 2020-12-19

## 2020-12-19 RX ORDER — POLYETHYLENE GLYCOL 3350 17 G/17G
17 POWDER, FOR SOLUTION ORAL ONCE
Refills: 0 | Status: COMPLETED | OUTPATIENT
Start: 2020-12-19 | End: 2020-12-19

## 2020-12-19 RX ORDER — MEROPENEM 1 G/30ML
2000 INJECTION INTRAVENOUS
Qty: 0 | Refills: 0 | DISCHARGE
Start: 2020-12-19

## 2020-12-19 RX ORDER — POTASSIUM CHLORIDE 20 MEQ
40 PACKET (EA) ORAL ONCE
Refills: 0 | Status: COMPLETED | OUTPATIENT
Start: 2020-12-19 | End: 2020-12-19

## 2020-12-19 RX ORDER — POTASSIUM CHLORIDE 20 MEQ
10 PACKET (EA) ORAL
Refills: 0 | Status: COMPLETED | OUTPATIENT
Start: 2020-12-19 | End: 2020-12-19

## 2020-12-19 RX ORDER — VANCOMYCIN HCL 1 G
1 VIAL (EA) INTRAVENOUS
Qty: 0 | Refills: 0 | DISCHARGE
Start: 2020-12-19

## 2020-12-19 RX ADMIN — HEPARIN SODIUM 5000 UNIT(S): 5000 INJECTION INTRAVENOUS; SUBCUTANEOUS at 06:08

## 2020-12-19 RX ADMIN — LEVETIRACETAM 400 MILLIGRAM(S): 250 TABLET, FILM COATED ORAL at 06:22

## 2020-12-19 RX ADMIN — Medication 100 MILLIGRAM(S): at 17:32

## 2020-12-19 RX ADMIN — Medication 1 PACKET(S): at 08:19

## 2020-12-19 RX ADMIN — MEROPENEM 200 MILLIGRAM(S): 1 INJECTION INTRAVENOUS at 22:30

## 2020-12-19 RX ADMIN — Medication 40 MILLIEQUIVALENT(S): at 18:57

## 2020-12-19 RX ADMIN — POTASSIUM PHOSPHATE, MONOBASIC POTASSIUM PHOSPHATE, DIBASIC 83.33 MILLIMOLE(S): 236; 224 INJECTION, SOLUTION INTRAVENOUS at 06:30

## 2020-12-19 RX ADMIN — Medication 100 MILLIEQUIVALENT(S): at 06:23

## 2020-12-19 RX ADMIN — Medication 100 MILLIGRAM(S): at 02:21

## 2020-12-19 RX ADMIN — Medication 250 MILLIGRAM(S): at 21:17

## 2020-12-19 RX ADMIN — CHLORHEXIDINE GLUCONATE 15 MILLILITER(S): 213 SOLUTION TOPICAL at 17:32

## 2020-12-19 RX ADMIN — Medication 100 MILLIEQUIVALENT(S): at 19:29

## 2020-12-19 RX ADMIN — Medication 1 PACKET(S): at 17:32

## 2020-12-19 RX ADMIN — FENTANYL CITRATE 3.75 MICROGRAM(S)/KG/HR: 50 INJECTION INTRAVENOUS at 08:20

## 2020-12-19 RX ADMIN — POLYETHYLENE GLYCOL 3350 17 GRAM(S): 17 POWDER, FOR SOLUTION ORAL at 14:08

## 2020-12-19 RX ADMIN — Medication 250 MILLIGRAM(S): at 09:24

## 2020-12-19 RX ADMIN — Medication 25 GRAM(S): at 17:56

## 2020-12-19 RX ADMIN — PANTOPRAZOLE SODIUM 40 MILLIGRAM(S): 20 TABLET, DELAYED RELEASE ORAL at 11:45

## 2020-12-19 RX ADMIN — MEROPENEM 200 MILLIGRAM(S): 1 INJECTION INTRAVENOUS at 15:44

## 2020-12-19 RX ADMIN — PROPOFOL 13.5 MICROGRAM(S)/KG/MIN: 10 INJECTION, EMULSION INTRAVENOUS at 08:21

## 2020-12-19 RX ADMIN — MEROPENEM 200 MILLIGRAM(S): 1 INJECTION INTRAVENOUS at 06:08

## 2020-12-19 RX ADMIN — DEXMEDETOMIDINE HYDROCHLORIDE IN 0.9% SODIUM CHLORIDE 7.74 MICROGRAM(S)/KG/HR: 4 INJECTION INTRAVENOUS at 11:45

## 2020-12-19 RX ADMIN — Medication 100 MILLIEQUIVALENT(S): at 18:42

## 2020-12-19 RX ADMIN — SENNA PLUS 2 TABLET(S): 8.6 TABLET ORAL at 22:30

## 2020-12-19 RX ADMIN — CHLORHEXIDINE GLUCONATE 1 APPLICATION(S): 213 SOLUTION TOPICAL at 08:19

## 2020-12-19 RX ADMIN — Medication 100 MILLIEQUIVALENT(S): at 05:23

## 2020-12-19 RX ADMIN — HEPARIN SODIUM 5000 UNIT(S): 5000 INJECTION INTRAVENOUS; SUBCUTANEOUS at 22:30

## 2020-12-19 RX ADMIN — CHLORHEXIDINE GLUCONATE 15 MILLILITER(S): 213 SOLUTION TOPICAL at 06:08

## 2020-12-19 RX ADMIN — LEVETIRACETAM 400 MILLIGRAM(S): 250 TABLET, FILM COATED ORAL at 17:32

## 2020-12-19 NOTE — DISCHARGE NOTE PROVIDER - NSDCCPCAREPLAN_GEN_ALL_CORE_FT
PRINCIPAL DISCHARGE DIAGNOSIS  Diagnosis: Septic shock  Assessment and Plan of Treatment: You were admitted with septic shock, where an infection was causing your blood pressure to drop. The course of the infection was not clearly identified but you were given medications through an IV to raise your blood pressure. Your pressures improved and these medications were stopped. The neurosurgery team tapped your  shunt to determine if this could be a source of infection. The results showed______.       PRINCIPAL DISCHARGE DIAGNOSIS  Diagnosis: Septic shock  Assessment and Plan of Treatment: You were admitted with septic shock, where an infection was causing your blood pressure to drop. The course of the infection was not clearly identified but you were given medications through an IV to raise your blood pressure. Your pressures improved and these medications were stopped. The neurosurgery team tapped your  shunt to determine if this could be a source of infection. The results showed were not revealing for acute infection.      SECONDARY DISCHARGE DIAGNOSES  Diagnosis: Hypoglycemia  Assessment and Plan of Treatment: You were placed on a dextrose drip due to lowe blood glucose levels. We recommended increased oral intake.     PRINCIPAL DISCHARGE DIAGNOSIS  Diagnosis: Septic shock  Assessment and Plan of Treatment: You were admitted with septic shock, where an infection was causing your blood pressure to drop. The course of the infection was not clearly identified but you were given medications through an IV to raise your blood pressure. Your pressures improved and these medications were stopped. The neurosurgery team tapped your  shunt to determine if this could be a source of infection. The results showed were not revealing for acute infection. You have remained without fevers and your blood pressures are back to baseline. Please return to the ED if your symptoms recur.      SECONDARY DISCHARGE DIAGNOSES  Diagnosis: Hypoglycemia  Assessment and Plan of Treatment: You were placed on a dextrose drip due to lowe blood glucose levels. We recommended increased oral intake. Your steroid dose was increased as well, which might help increase your low glucose levels. If you experience symptoms of dizzyness, lightheadedness or weakness please return to the ED.    Diagnosis: GBM (glioblastoma multiforme)  Assessment and Plan of Treatment: Your home medications for GBM were not given during hospitalization. Please follow up with your PCP and/or oncologist regarding restarting these medications.     PRINCIPAL DISCHARGE DIAGNOSIS  Diagnosis: Septic shock  Assessment and Plan of Treatment: You were admitted with septic shock, where an infection was causing your blood pressure to drop. The course of the infection was not clearly identified but you were given medications through an IV to raise your blood pressure. Your pressures improved and these medications were stopped. The neurosurgery team tapped your  shunt to determine if this could be a source of infection. The results showed were not revealing for acute infection. You have remained without fevers and your blood pressures are back to baseline. Please return to the ED if your symptoms recur.      SECONDARY DISCHARGE DIAGNOSES  Diagnosis: Hypoglycemia  Assessment and Plan of Treatment: You were placed on a dextrose drip due to lowe blood glucose levels. We recommended increased oral intake. Your steroid dose was increased as well, which might help increase your low glucose levels. If you experience symptoms of dizzyness, lightheadedness or weakness please return to the ED.

## 2020-12-19 NOTE — PROGRESS NOTE ADULT - SUBJECTIVE AND OBJECTIVE BOX
INTERVAL HPI/OVERNIGHT EVENTS:    SUBJECTIVE: NAEON. Patient seen and examined at bedside.     CONSTITUTIONAL: Unable to assess as intubated, sedated  EYES/ENT: No visual changes;  No vertigo or throat pain   NECK: No pain or stiffness  RESPIRATORY: Intubated sedated. No cough, wheezing, hemoptysis; No shortness of breath  CARDIOVASCULAR: Unable to assess as intubated, sedated. No chest pain or palpitations  GASTROINTESTINAL: No nausea, vomiting, or hematemesis; No diarrhea or constipation. No melena or hematochezia.  GENITOURINARY: No hematuria  NEUROLOGICAL: Unable to assess as intubated, sedated  SKIN: No itching, rashes    OBJECTIVE:    VITAL SIGNS:  ICU Vital Signs Last 24 Hrs  T(C): 36.3 (19 Dec 2020 08:00), Max: 37.3 (18 Dec 2020 20:00)  T(F): 97.3 (19 Dec 2020 08:00), Max: 99.1 (18 Dec 2020 20:00)  HR: 65 (19 Dec 2020 10:00) (53 - 92)  BP: 90/57 (18 Dec 2020 22:00) (86/56 - 90/57)  BP(mean): 74 (18 Dec 2020 22:00) (67 - 74)  ABP: 100/62 (19 Dec 2020 10:00) (90/59 - 126/85)  ABP(mean): 75 (19 Dec 2020 10:00) (68 - 98)  RR: 22 (19 Dec 2020 10:00) (22 - 22)  SpO2: 100% (19 Dec 2020 10:00) (96% - 100%)    Mode: AC/ CMV (Assist Control/ Continuous Mandatory Ventilation), RR (machine): 22, TV (machine): 450, FiO2: 40, PEEP: 5, ITime: 0.72, MAP: 9, PIP: 18    12-18 @ 07:01  -  12-19 @ 07:00  --------------------------------------------------------  IN: 2328.5 mL / OUT: 1654 mL / NET: 674.5 mL    12-19 @ 07:01  -  12-19 @ 10:07  --------------------------------------------------------  IN: 63 mL / OUT: 275 mL / NET: -212 mL    CAPILLARY BLOOD GLUCOSE  POCT Blood Glucose.: 72 mg/dL (19 Dec 2020 07:03)    PHYSICAL EXAM:  GENERAL: NAD, intubated, sedated  HEAD: Atraumatic, Normocephalic  EYES: EOMI, PERRLA, conjunctiva and sclera clear  NECK: Supple, No JVD  CHEST/LUNG: intubated, sedated. Vented breath sounds. Clear to auscultation bilaterally; No wheeze  HEART: Regular rate and rhythm; No murmurs, rubs, or gallops  ABDOMEN: Soft, Nontender, Nondistended; Bowel sounds present  EXTREMITIES: 2+ Peripheral Pulses, No clubbing, cyanosis, or edema  PSYCH: AAOx0.  intubated, sedated  NEUROLOGY: intubated, sedated  SKIN: No rashes or lesions    MEDICATIONS:  MEDICATIONS  (STANDING):  chlorhexidine 0.12% Liquid 15 milliLiter(s) Oral Mucosa every 12 hours  chlorhexidine 4% Liquid 1 Application(s) Topical <User Schedule>  dexMEDEtomidine Infusion 0.4 MICROgram(s)/kG/Hr (7.74 mL/Hr) IV Continuous <Continuous>  dextrose 5% + lactated ringers. 1000 milliLiter(s) (50 mL/Hr) IV Continuous <Continuous>  fentaNYL   Infusion. 0.5 MICROgram(s)/kG/Hr (3.75 mL/Hr) IV Continuous <Continuous>  heparin   Injectable 5000 Unit(s) SubCutaneous every 8 hours  hydrocortisone sodium succinate Injectable 100 milliGRAM(s) IV Push every 12 hours  influenza   Vaccine 0.5 milliLiter(s) IntraMuscular once  levETIRAcetam  IVPB 500 milliGRAM(s) IV Intermittent every 12 hours  meropenem  IVPB 2000 milliGRAM(s) IV Intermittent every 8 hours  norepinephrine Infusion 0.05 MICROgram(s)/kG/Min (3.63 mL/Hr) IV Continuous <Continuous>  pantoprazole   Suspension 40 milliGRAM(s) Oral daily  potassium phosphate / sodium phosphate Powder (PHOS-NaK) 1 Packet(s) Oral two times a day  propofol Infusion 30 MICROgram(s)/kG/Min (13.5 mL/Hr) IV Continuous <Continuous>  senna 2 Tablet(s) Oral at bedtime  vancomycin  IVPB 1000 milliGRAM(s) IV Intermittent every 12 hours  vasopressin Infusion 0.04 Unit(s)/Min (2.4 mL/Hr) IV Continuous <Continuous>    MEDICATIONS  (PRN):  sodium chloride 0.9% lock flush 10 milliLiter(s) IV Push every 1 hour PRN Pre/post blood products, medications, blood draw, and to maintain line patency    ALLERGIES:  Allergies    aspirin (Unknown)  shellfish (Hives)  shellfish (Unknown)  Tegretol (Unknown)    Intolerances    ACE inhibitors (Other)  angiotensin II inhibitors (Other)    LABS:                        10.0   10.32 )-----------( 119      ( 19 Dec 2020 03:07 )             29.5     12-19    143  |  113<H>  |  15  ----------------------------<  90  3.3<L>   |  20<L>  |  0.71    Ca    8.6      19 Dec 2020 03:07  Phos  1.3     12-19  Mg     2.2     12-19    TPro  4.0<L>  /  Alb  2.2<L>  /  TBili  0.3  /  DBili  x   /  AST  10  /  ALT  14  /  AlkPhos  62  12-19    PT/INR - ( 19 Dec 2020 03:07 )   PT: 13.0 sec;   INR: 1.15 ratio      PTT - ( 19 Dec 2020 03:07 )  PTT:39.9 sec    RADIOLOGY & ADDITIONAL TESTS: Reviewed.

## 2020-12-19 NOTE — DISCHARGE NOTE PROVIDER - NSDCMRMEDTOKEN_GEN_ALL_CORE_FT
Bactrim  mg-160 mg oral tablet: 1 tab(s) orally every 12 hours  Decadron: 1 milligram(s) orally once a day  Keppra 500 mg oral tablet: 1 tab(s) orally 2 times a day  Lovenox 40 mg/0.4 mL injectable solution: 0.4 milliliter(s) injectable once a day  methylphenidate 5 mg oral tablet: 1 tab(s) orally once a day  MiraLax oral powder for reconstitution: 1 cap(s) orally once a day  nystatin 100,000 units/mL oral suspension: 10 milliliter(s) orally 4 times a day  Protonix 40 mg oral delayed release tablet: 1 tab(s) orally once a day  Senna 8.6 mg oral tablet: 1 tab(s) orally once a day (at bedtime)  Temodar 100 mg oral capsule:   Zofran 4 mg oral tablet: 1 tab(s) orally every 8 hours, As Needed   Bactrim  mg-160 mg oral tablet: 1 tab(s) orally every 12 hours  Decadron: 1 milligram(s) orally once a day  heparin:   hydrocortisone: 100 milligram(s) intravenous 2 times a day  Keppra 500 mg oral tablet: 1 tab(s) orally 2 times a day  Lovenox 40 mg/0.4 mL injectable solution: 0.4 milliliter(s) injectable once a day  meropenem 1000 mg intravenous injection: 2000 milligram(s) intravenous every 8 hours  methylphenidate 5 mg oral tablet: 1 tab(s) orally once a day  MiraLax oral powder for reconstitution: 1 cap(s) orally once a day  nystatin 100,000 units/mL oral suspension: 10 milliliter(s) orally 4 times a day  potassium phosphate-sodium phosphate 250 mg-280 mg-160 mg oral powder for reconstitution: 1 packet(s) orally 2 times a day  Protonix 40 mg oral delayed release tablet: 1 tab(s) orally once a day  Senna 8.6 mg oral tablet: 1 tab(s) orally once a day (at bedtime)  Temodar 100 mg oral capsule:   vancomycin 1 g intravenous injection: 1 gram(s) intravenous every 12 hours  Zofran 4 mg oral tablet: 1 tab(s) orally every 8 hours, As Needed   Acidophilus oral tablet: 1 tab(s) orally once a day  Bactrim  mg-160 mg oral tablet: 1 tab(s) orally every 12 hours  Decadron: 1 milligram(s) orally once a day  hydrALAZINE 25 mg oral tablet: 1 tab(s) orally 3 times a day  Keppra 500 mg oral tablet: 1 tab(s) orally 2 times a day  Lovenox 40 mg/0.4 mL injectable solution: 0.4 milliliter(s) injectable once a day  methylphenidate 5 mg oral tablet: 1 tab(s) orally once a day  Metoprolol Tartrate 50 mg oral tablet: 1 tab(s) orally 2 times a day  MiraLax oral powder for reconstitution: 1 cap(s) orally once a day  nystatin 100,000 units/mL oral suspension: 10 milliliter(s) orally 4 times a day  Protonix 40 mg oral delayed release tablet: 1 tab(s) orally once a day  PROzac 20 mg oral capsule: 1 cap(s) orally once a day  Senna 8.6 mg oral tablet: 1 tab(s) orally once a day (at bedtime)  Temodar 100 mg oral capsule:   Zofran 4 mg oral tablet: 1 tab(s) orally every 8 hours, As Needed   Bactrim  mg-160 mg oral tablet: 1 tab(s) orally every 12 hours  dexamethasone 2 mg oral tablet: 1 tab(s) orally once a day  FLUoxetine 20 mg oral capsule: 1 cap(s) orally once a day  hydrALAZINE 25 mg oral tablet: 1 tab(s) orally 3 times a day  Keppra 500 mg oral tablet: 1 tab(s) orally 2 times a day  Lovenox 40 mg/0.4 mL injectable solution: 0.4 milliliter(s) injectable once a day  MiraLax oral powder for reconstitution: 1 cap(s) orally once a day  Protonix 40 mg oral delayed release tablet: 1 tab(s) orally once a day  Senna 8.6 mg oral tablet: 1 tab(s) orally once a day (at bedtime)  Temodar 100 mg oral capsule:    Bactrim  mg-160 mg oral tablet: 1 tab(s) orally every 12 hours  dexamethasone 2 mg oral tablet: 1 tab(s) orally once a day  FLUoxetine 20 mg oral capsule: 1 cap(s) orally once a day  Keppra 500 mg oral tablet: 1 tab(s) orally 2 times a day  MiraLax oral powder for reconstitution: 1 cap(s) orally once a day  Protonix 40 mg oral delayed release tablet: 1 tab(s) orally once a day  Senna 8.6 mg oral tablet: 1 tab(s) orally once a day (at bedtime)   Bactrim  mg-160 mg oral tablet: 1 tab(s) orally every 12 hours  dexamethasone 2 mg oral tablet: 1 tab(s) orally once a day  FLUoxetine 20 mg oral capsule: 1 cap(s) orally once a day  hydrALAZINE 25 mg oral tablet: 1 tab(s) orally 3 times a day  Keppra 500 mg oral tablet: 1 tab(s) orally 2 times a day  MiraLax oral powder for reconstitution: 1 cap(s) orally once a day  Multiple Vitamins oral tablet: 1 tab(s) orally once a day  Protonix 40 mg oral delayed release tablet: 1 tab(s) orally once a day  Senna 8.6 mg oral tablet: 1 tab(s) orally once a day (at bedtime)

## 2020-12-19 NOTE — PROGRESS NOTE ADULT - ATTENDING COMMENTS
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 Presumed to be septic from meninges (blood, urine cultures negative). Given empiric antibiotics with good response. MRI Brain without significant findings. Attempted to wean off sedation today but precedex caused bradycardia and subsequent hypotension. Will cont to titrate down on sedation. Neurosurgery to sample CSF via  shunt today, and will touch base with [facility] in regards to transfer to their facility.

## 2020-12-19 NOTE — DISCHARGE NOTE PROVIDER - HOSPITAL COURSE
60y M PMHx HTN, Anxiety (on Prozac), b/l DVT while on Coumadin (s/p IVF filter, now off coumadin), GBM s/p craniotomy w/ resection c/b ESBL meningitis for which repeat craniotomy (2/2020) performed and pt was given 8 weeks Meropenem, pt subsequently had repeat ESBL meningitis s/p craniectomy w/ washout w/ intrathecal Jin and 10 week course IV Jin, COVID infection in 3/2020 who was BIBEMS from [facility] for AMS, hypoxia and hypotension.      In the ED pt was intubated for airway protection.  He was given 2.2 L NS, 200 mcg phenylephrine, started on levophed gtt, started on vanc and cefepime.  Labs notable for WBC 11.65, VBG 7.18/45/56/15/77.7. 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  Pt received neurosurgical care at [facility] w/ Dr. Patrice Cantu ([phone])   60y M PMHx HTN, Anxiety (on Prozac), b/l DVT while on Coumadin (s/p IVF filter, now off coumadin), GBM s/p craniotomy w/ resection c/b ESBL meningitis for which repeat craniotomy (2/2020) performed and pt was given 8 weeks Meropenem, pt subsequently had repeat ESBL meningitis s/p craniectomy w/ washout w/ intrathecal Jin and 10 week course IV Jin, COVID infection in 3/2020 who was BIBEMS from [facility] for AMS, hypoxia and hypotension. Pt started on levophed 12/16.    In the ED pt was intubated for airway protection (12/16- ). He was given 2.2 L NS, 200 mcg phenylephrine, started on levophed gtt, started on vanc and cefepime.  Labs notable for WBC 11.65, VBG 7.18/45/56/15/77.7. 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 Pt received neurosurgical care at [facility] w/ Dr. Patrice Cantu ([phone]).     In the MICU, pt started on vanc (12/16- ), meropenem (12/16- ). Pt's UCx (12/16) -negative, blood cx (12/16)- no growth to date, COVID PCR negative, CXR- clear lungs. CTA Chest 12/16- negative for PE, PNA. CT Head 12/16- post-surgical changes with no acute mass, hemorrhage, or midline shift. House neurosurgery evaluated the pt and recommended an MRI brain w/wo contrast. This was performed 12/18 and showed post surgical changes, no evidence of residual mass or nodular enhancement in the surgical cavity. Also showed dural thickening/enhancement- likely reactive. Pt was weaned off of levophed 12/18. Neurosurgery tapped the  shunt 12/19- glucose 48, protein 40, ______    Pt to be transferred to [facility] for further workup, management, and treatment given his history of multiple ESBL cranial infections. Pt to be transferred to care of Dr. Patrice Dean.      HPI:  60y M PMHx HTN, Anxiety (on Prozac), b/l DVT while on Coumadin (s/p IVF filter, now off coumadin), GBM s/p craniotomy w/ resection c/b ESBL meningitis for which repeat craniotomy (2/2020) performed and pt was given 8 weeks Meropenem, pt subsequently had repeat ESBL meningitis s/p craniectomy w/ washout w/ intrathecal Jin and 10 week course IV Jin, COVID infection in 3/2020 who was BIBEMS from [facility] for AMS, hypoxia and hypotension. 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 Pt received neurosurgical care at [facility] w/ Dr. Patrice Cantu ([phone]).     MICU and Hospital Course:  In the ED pt was intubated for airway protection (12/16- ). He was given 2.2 L NS, 200 mcg phenylephrine, started on levophed gtt, started on vanc and cefepime.  Labs notable for WBC 11.65, VBG 7.18/45/56/15/77.7. lactate 10.8. CXR showed grossly clear lungs, view partly blocked by pacer pads, U/A negative, blood cs no growth.    Patient admitted to MICU and  started on levophed 12/16. In the MICU, pt started on vanc (12/16- ), meropenem (12/16- ). Pt's UCx (12/16) -negative, blood cx (12/16)- no growth to date, COVID PCR negative, CXR- clear lungs. CTA Chest 12/16- negative for PE, PNA. CT Head 12/16- post-surgical changes with no acute mass, hemorrhage, or midline shift. House neurosurgery evaluated the pt and recommended an MRI brain w/wo contrast. This was performed 12/18 and showed post surgical changes, no evidence of residual mass or nodular enhancement in the surgical cavity. Also showed dural thickening/enhancement- likely reactive. Pt was weaned off of levophed 12/18. Neurosurgery tapped the  shunt 12/19- glucose 48, protein 40. Not revealing for viral or bacterial infection. Patient was originally planned to be transferred to [facility] for further workup, management, and treatment given his history of multiple ESBL cranial infections, under care of patient neurosurgeon, Dr. Dean. However, this was deferred after no clear source of sepsis was identified. Patient was transferred to medical floors on 12/24. patient was found to be hypoglycemic and placed on dextrose drip....     HPI:  60y M PMHx HTN, Anxiety (on Prozac), b/l DVT while on Coumadin (s/p IVF filter, now off coumadin), GBM s/p craniotomy w/ resection c/b ESBL meningitis for which repeat craniotomy (2/2020) performed and pt was given 8 weeks Meropenem, pt subsequently had repeat ESBL meningitis s/p craniectomy w/ washout w/ intrathecal Jin and 10 week course IV Jin, COVID infection in 3/2020 who was BIBEMS from [facility] for AMS, hypoxia and hypotension. 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 Pt received neurosurgical care at [facility] w/ Dr. Patrice Cantu ([phone]).     MICU and Hospital Course:  In the ED pt was intubated for airway protection (12/16- ). He was given 2.2 L NS, 200 mcg phenylephrine, started on levophed gtt, started on vanc and cefepime.  Labs notable for WBC 11.65, VBG 7.18/45/56/15/77.7. lactate 10.8. CXR showed grossly clear lungs, view partly blocked by pacer pads, U/A negative, blood cs no growth.    Patient admitted to MICU and started on levophed 12/16. In the MICU, pt started on vanc (12/16- ), meropenem (12/16- ). Pt's UCx (12/16) -negative, blood cx (12/16)- no growth to date, COVID PCR negative, CXR- clear lungs. CTA Chest 12/16- negative for PE, PNA. CT Head 12/16- post-surgical changes with no acute mass, hemorrhage, or midline shift. House neurosurgery evaluated the pt and recommended an MRI brain w/wo contrast. This was performed 12/18 and showed post surgical changes, no evidence of residual mass or nodular enhancement in the surgical cavity. Also showed dural thickening/enhancement- likely reactive. Pt was weaned off of levophed 12/18. Neurosurgery tapped the  shunt 12/19- glucose 48, protein 40. Not revealing for viral or bacterial infection. Patient was originally planned to be transferred to [facility] for further workup, management, and treatment given his history of multiple ESBL cranial infections, under care of patient neurosurgeon, Dr. Dean. However, this was deferred after no clear source of sepsis was identified. Patient was transferred to medical floors on 12/24. patient was found to be hypoglycemic and placed on dextrose drip. Endocrine consulted on 12/30 and recommended workup of hypoglycemia if symptomatic and FA<55. Hypoglycemia likely from very minimal PO intake, which patient endorsed and nursing confirmed. Dextrose drip discontinued on 12/19. Patient had hypoglycemic  FS but remained asymptomatic. AAOx2-3. FS responded to fruit juice. On 12/30 decadron increased from 1mg to 2mg daily for hypoglycemia.     At the time of discharge, the patient was hemodynamically stable, was tolerating PO diet, was voiding urine and passing stool, was ambulating, and was comfortable with adequate pain control. The patient and family felt comfortable with discharge. The patient was discharged to rehab.      HPI:  60y M PMHx HTN, Anxiety (on Prozac), b/l DVT while on Coumadin (s/p IVF filter, now off coumadin), GBM s/p craniotomy w/ resection c/b ESBL meningitis for which repeat craniotomy (2/2020) performed and pt was given 8 weeks Meropenem, pt subsequently had repeat ESBL meningitis s/p craniectomy w/ washout w/ intrathecal Jin and 10 week course IV Jin, COVID infection in 3/2020 who was BIBEMS from [facility] for AMS, hypoxia and hypotension. 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 Pt received neurosurgical care at [facility] w/ Dr. Patrice Cantu ([phone]).     MICU and Hospital Course:  In the ED pt was intubated for airway protection (12/16- ). He was given 2.2 L NS, 200 mcg phenylephrine, started on levophed gtt, started on vanc and cefepime.  Labs notable for WBC 11.65, VBG 7.18/45/56/15/77.7. lactate 10.8. CXR showed grossly clear lungs, view partly blocked by pacer pads, U/A negative, blood cs no growth.    Patient admitted to MICU and started on levophed 12/16. In the MICU, pt started on vanc (12/16- ), meropenem (12/16- ). Pt's UCx (12/16) -negative, blood cx (12/16)- no growth to date, COVID PCR negative, CXR- clear lungs. CTA Chest 12/16- negative for PE, PNA. CT Head 12/16- post-surgical changes with no acute mass, hemorrhage, or midline shift. House neurosurgery evaluated the pt and recommended an MRI brain w/wo contrast. This was performed 12/18 and showed post surgical changes, no evidence of residual mass or nodular enhancement in the surgical cavity. Also showed dural thickening/enhancement- likely reactive. Pt was weaned off of levophed 12/18. Neurosurgery tapped the  shunt 12/19- glucose 48, protein 40. Not revealing for viral or bacterial infection. Patient was originally planned to be transferred to [facility] for further workup, management, and treatment given his history of multiple ESBL cranial infections, under care of patient neurosurgeon, Dr. Dean. However, this was deferred after no clear source of sepsis was identified. Patient was transferred to medical floors on 12/24. patient was found to be hypoglycemic and placed on dextrose drip. Endocrine consulted on 12/30 and recommended workup of hypoglycemia if symptomatic and FA<55. Hypoglycemia likely from very minimal PO intake, which patient endorsed and nursing confirmed. Dextrose drip discontinued on 12/29. Patient had hypoglycemic  FS but remained asymptomatic. AAOx2-3. FS responded to fruit juice. On 12/30 decadron increased from 1mg to 2mg daily for hypoglycemia.     At the time of discharge, the patient was hemodynamically stable, was tolerating PO diet, was voiding urine and passing stool and was comfortable with adequate pain control. The patient and family felt comfortable with discharge. The patient was discharged to rehab with plans for hospice evaluation.     HPI:  59yo M PMHx HTN, Anxiety (on Prozac), b/l DVT while on Coumadin (s/p IVF filter, now off coumadin), GBM s/p craniotomy w/ resection c/b ESBL meningitis for which repeat craniotomy (2/2020) performed and pt was given 8 weeks Meropenem, pt subsequently had repeat ESBL meningitis s/p craniectomy w/ washout w/ intrathecal Jin and 10 week course IV Jin, COVID infection in 3/2020 who was BIBEMS from [facility] for AMS, hypoxia and hypotension. 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 Pt received neurosurgical care at [facility] w/ Dr. Patrice Cantu ([phone]).     MICU and Hospital Course:  In the ED pt was intubated for airway protection (12/16- ). He was given 2.2 L NS, 200 mcg phenylephrine, started on levophed gtt, started on vanc and cefepime.  Labs notable for WBC 11.65, VBG 7.18/45/56/15/77.7. lactate 10.8. CXR showed grossly clear lungs, view partly blocked by pacer pads, U/A negative, blood cs no growth.    Patient admitted to MICU and started on levophed 12/16. In the MICU, pt started on vanc (12/16- ), meropenem (12/16- ). Pt's UCx (12/16) -negative, blood cx (12/16)- no growth to date, COVID PCR negative, CXR- clear lungs. CTA Chest 12/16- negative for PE, PNA. CT Head 12/16- post-surgical changes with no acute mass, hemorrhage, or midline shift. House neurosurgery evaluated the pt and recommended an MRI brain w/wo contrast. This was performed 12/18 and showed post surgical changes, no evidence of residual mass or nodular enhancement in the surgical cavity. Also showed dural thickening/enhancement- likely reactive. Pt was weaned off of levophed 12/18. Neurosurgery tapped the  shunt 12/19- glucose 48, protein 40. Not revealing for viral or bacterial infection. Patient was originally planned to be transferred to [facility] for further workup, management, and treatment given his history of multiple ESBL cranial infections, under care of patient neurosurgeon, Dr. Dean. However, this was deferred after no clear source of sepsis was identified. Patient was transferred to medical floors on 12/24. patient was found to be hypoglycemic and placed on dextrose drip. Endocrine consulted on 12/30 and recommended workup of hypoglycemia if symptomatic and FA<55. Hypoglycemia likely from very minimal PO intake, which patient endorsed and nursing confirmed. Dextrose drip discontinued on 12/29. Patient had hypoglycemic  FS but remained asymptomatic. AAOx2-3. FS responded to fruit juice. On 12/30 decadron increased from 1mg to 2mg daily for hypoglycemia.     At the time of discharge, the patient was hemodynamically stable, was tolerating PO diet, was voiding urine and passing stool and was comfortable with adequate pain control. The patient and family felt comfortable with discharge. The patient was discharged to rehab with plans for hospice evaluation.

## 2020-12-19 NOTE — PROGRESS NOTE ADULT - ASSESSMENT
60y M PMHx HTN, Anxiety (on Prozac), b/l DVT while on Coumadin (s/p IVF filter, now off coumadin), GBM s/p craniotomy w/ resection c/b ESBL meningitis for which repeat craniotomy (2/2020) performed and pt was given 8 weeks Meropenem, pt subsequently had repeat ESBL meningitis s/p craniectomy w/ washout w/ intrathecal Jin and 10 week course IV Jin, COVID infection in 3/2020 who was BIBEMS from [facility] for AMS, hypoxia and hypotension admitted for sepsis likely 2/2 intracranial infection in the setting of negative CTA and U/A.      #Neuro:  1. AMS: likely 2/2 sepsis given elevated WBC, lactate; infectious source may be meningitis. Pt is on Keppra however this is for seizure prophylaxis; per sister pt has had EEGs in the past w/out seizure activity. DDx also includes progression of GBM   - Intubated, c/w vent  - c/w propofol, fentanyl, wean as tolerated   - CTH 12/16 w/out acute pathology  - MRI 12/18 shows post-surgical changes, no residual mass/nodular enhancement  - f/u Neurosurgery recs for LP vs tap  shunt vs transfer to [facility] neurosurgeon Dr. Patrice Dean ([id number])    2. GBM s/p craniotomy w/resection and subsequent ESBL infection for which repeat craniotomy was performed (2/2020) now with  shunt  - c/w 500 BID Keppra for seizure ppx    #Cardiovascular:  1. Vasoplegic shock 2/2 sepsis  - s/p levophed (12/16-12/18)  - wean hydrocortisone 100 mg q8h to q12h  - pt chronically on decadron 1mg qd, will start after hydrocortisone has been weaned off    #Respiratory:   1. Hypoxia likely 2/2 sepsis vs progression of brain disease   - CXR shows grossly clear lungs, some obstruction from pacer pads  - now intubated, c/w vent  - CTA 12/16 w/out evidence of PNA or PE     #GI:   - no active issues  - s/p OG tube  - start feeds with OGT  - will d/c D5 LR when BG normalizes on tube feeds    #:  - s/p 2.2 L NS in ED  - Continue to monitor SCr, UOP (goal 0.5 cc/kg/hr)    #Dermatologic:   - no acute issues    #ID:   1. Septic shock   - s/p 2.2 L NS  - troponins downtrending; elevated in the setting of hypotension and tachycardia as well as YOLIS  - trend lactate   - c/w vanc (12/16- ), meropenem (12/16- )   - blood cx 12/16- ngtd   - UCx 12/16- negative    #Ethics:  FULL CODE  Pt has many physicians in the family who are actively following and will update their GOC as they go

## 2020-12-19 NOTE — DISCHARGE NOTE PROVIDER - PROVIDER TOKENS
PROVIDER:[TOKEN:[[id number]]],FREE:[LAST:[Jermaine],FIRST:[Patrice],PHONE:[phone]],FAX:[(   )    -],ADDRESS:[[address] [place] [address], [place] [address]]] PROVIDER:[TOKEN:[[id number]]],FREE:[LAST:[Jermaine],FIRST:[Patrice],PHONE:[phone]],FAX:[(   )    -],ADDRESS:[[address] [place] [address]]],FREE:[LAST:[Ronni],FIRST:[Ella],PHONE:[phone]],FAX:[(   )    -],ADDRESS:[[address], [place] [address]],FOLLOWUP:[2 weeks]]

## 2020-12-19 NOTE — DISCHARGE NOTE PROVIDER - CARE PROVIDER_API CALL
RAFIQ ROQUE  Internal Medicine  [address] [address] [place] [address]  Phone: [phone]  Fax: [phone]  Follow Up Time:     Patrice Dean  [address] [place] [address], [address] [address]  Phone: [phone]  Fax: (   )    -  Follow Up Time:    RAFIQ ROQUE  Internal Medicine  [address] [place] [address]  Phone: [phone]  Fax: [phone]  Follow Up Time:     Patrice Dean  [address], [address] [address]  Phone: [phone]  Fax: (   )    -  Follow Up Time:     Ella Rudolph  [address] [address]  Phone: [phone]  Fax: (   )    -  Follow Up Time: 2 weeks

## 2020-12-20 LAB
ALBUMIN SERPL ELPH-MCNC: 2.1 G/DL — LOW (ref 3.3–5)
ALP SERPL-CCNC: 63 U/L — SIGNIFICANT CHANGE UP (ref 40–120)
ALT FLD-CCNC: 29 U/L — SIGNIFICANT CHANGE UP (ref 4–41)
ANION GAP SERPL CALC-SCNC: 7 MMOL/L — SIGNIFICANT CHANGE UP (ref 7–14)
APTT BLD: 40.7 SEC — HIGH (ref 27–36.3)
AST SERPL-CCNC: 22 U/L — SIGNIFICANT CHANGE UP (ref 4–40)
BASOPHILS # BLD AUTO: 0 K/UL — SIGNIFICANT CHANGE UP (ref 0–0.2)
BASOPHILS NFR BLD AUTO: 0 % — SIGNIFICANT CHANGE UP (ref 0–2)
BILIRUB SERPL-MCNC: 0.2 MG/DL — SIGNIFICANT CHANGE UP (ref 0.2–1.2)
BUN SERPL-MCNC: 13 MG/DL — SIGNIFICANT CHANGE UP (ref 7–23)
CALCIUM SERPL-MCNC: 8.1 MG/DL — LOW (ref 8.4–10.5)
CHLORIDE SERPL-SCNC: 118 MMOL/L — HIGH (ref 98–107)
CO2 SERPL-SCNC: 20 MMOL/L — LOW (ref 22–31)
CREAT SERPL-MCNC: 0.6 MG/DL — SIGNIFICANT CHANGE UP (ref 0.5–1.3)
EOSINOPHIL # BLD AUTO: 0.01 K/UL — SIGNIFICANT CHANGE UP (ref 0–0.5)
EOSINOPHIL NFR BLD AUTO: 0.2 % — SIGNIFICANT CHANGE UP (ref 0–6)
GLUCOSE SERPL-MCNC: 118 MG/DL — HIGH (ref 70–99)
HCT VFR BLD CALC: 30.1 % — LOW (ref 39–50)
HGB BLD-MCNC: 9.6 G/DL — LOW (ref 13–17)
IANC: 4.92 K/UL — SIGNIFICANT CHANGE UP (ref 1.5–8.5)
IMM GRANULOCYTES NFR BLD AUTO: 0.7 % — SIGNIFICANT CHANGE UP (ref 0–1.5)
INR BLD: 1.02 RATIO — SIGNIFICANT CHANGE UP (ref 0.88–1.17)
LACTATE SERPL-SCNC: 1.9 MMOL/L — SIGNIFICANT CHANGE UP (ref 0.5–2)
LYMPHOCYTES # BLD AUTO: 0.15 K/UL — LOW (ref 1–3.3)
LYMPHOCYTES # BLD AUTO: 2.7 % — LOW (ref 13–44)
MAGNESIUM SERPL-MCNC: 1.9 MG/DL — SIGNIFICANT CHANGE UP (ref 1.6–2.6)
MCHC RBC-ENTMCNC: 31.7 PG — SIGNIFICANT CHANGE UP (ref 27–34)
MCHC RBC-ENTMCNC: 31.9 GM/DL — LOW (ref 32–36)
MCV RBC AUTO: 99.3 FL — SIGNIFICANT CHANGE UP (ref 80–100)
MONOCYTES # BLD AUTO: 0.36 K/UL — SIGNIFICANT CHANGE UP (ref 0–0.9)
MONOCYTES NFR BLD AUTO: 6.6 % — SIGNIFICANT CHANGE UP (ref 2–14)
NEUTROPHILS # BLD AUTO: 4.92 K/UL — SIGNIFICANT CHANGE UP (ref 1.8–7.4)
NEUTROPHILS NFR BLD AUTO: 89.8 % — HIGH (ref 43–77)
NRBC # BLD: 0 /100 WBCS — SIGNIFICANT CHANGE UP
NRBC # FLD: 0 K/UL — SIGNIFICANT CHANGE UP
PHOSPHATE SERPL-MCNC: 2.1 MG/DL — LOW (ref 2.5–4.5)
PLATELET # BLD AUTO: 107 K/UL — LOW (ref 150–400)
POTASSIUM SERPL-MCNC: 4.1 MMOL/L — SIGNIFICANT CHANGE UP (ref 3.5–5.3)
POTASSIUM SERPL-SCNC: 4.1 MMOL/L — SIGNIFICANT CHANGE UP (ref 3.5–5.3)
PROT SERPL-MCNC: 3.9 G/DL — LOW (ref 6–8.3)
PROTHROM AB SERPL-ACNC: 11.7 SEC — SIGNIFICANT CHANGE UP (ref 9.8–13.1)
RBC # BLD: 3.03 M/UL — LOW (ref 4.2–5.8)
RBC # FLD: 13.9 % — SIGNIFICANT CHANGE UP (ref 10.3–14.5)
SODIUM SERPL-SCNC: 145 MMOL/L — SIGNIFICANT CHANGE UP (ref 135–145)
WBC # BLD: 5.48 K/UL — SIGNIFICANT CHANGE UP (ref 3.8–10.5)
WBC # FLD AUTO: 5.48 K/UL — SIGNIFICANT CHANGE UP (ref 3.8–10.5)

## 2020-12-20 PROCEDURE — 99291 CRITICAL CARE FIRST HOUR: CPT

## 2020-12-20 RX ORDER — HYDROCORTISONE 20 MG
100 TABLET ORAL EVERY 12 HOURS
Refills: 0 | Status: COMPLETED | OUTPATIENT
Start: 2020-12-20 | End: 2020-12-20

## 2020-12-20 RX ORDER — HYDROCORTISONE 20 MG
50 TABLET ORAL EVERY 12 HOURS
Refills: 0 | Status: DISCONTINUED | OUTPATIENT
Start: 2020-12-21 | End: 2020-12-21

## 2020-12-20 RX ADMIN — SENNA PLUS 2 TABLET(S): 8.6 TABLET ORAL at 22:08

## 2020-12-20 RX ADMIN — SODIUM CHLORIDE 50 MILLILITER(S): 9 INJECTION, SOLUTION INTRAVENOUS at 07:52

## 2020-12-20 RX ADMIN — PROPOFOL 13.5 MICROGRAM(S)/KG/MIN: 10 INJECTION, EMULSION INTRAVENOUS at 20:00

## 2020-12-20 RX ADMIN — Medication 100 MILLIGRAM(S): at 17:37

## 2020-12-20 RX ADMIN — PROPOFOL 13.5 MICROGRAM(S)/KG/MIN: 10 INJECTION, EMULSION INTRAVENOUS at 07:53

## 2020-12-20 RX ADMIN — FENTANYL CITRATE 3.75 MICROGRAM(S)/KG/HR: 50 INJECTION INTRAVENOUS at 20:00

## 2020-12-20 RX ADMIN — CHLORHEXIDINE GLUCONATE 15 MILLILITER(S): 213 SOLUTION TOPICAL at 05:03

## 2020-12-20 RX ADMIN — Medication 250 MILLIGRAM(S): at 10:40

## 2020-12-20 RX ADMIN — MEROPENEM 200 MILLIGRAM(S): 1 INJECTION INTRAVENOUS at 22:08

## 2020-12-20 RX ADMIN — MEROPENEM 200 MILLIGRAM(S): 1 INJECTION INTRAVENOUS at 13:52

## 2020-12-20 RX ADMIN — MEROPENEM 200 MILLIGRAM(S): 1 INJECTION INTRAVENOUS at 05:26

## 2020-12-20 RX ADMIN — Medication 100 MILLIGRAM(S): at 05:04

## 2020-12-20 RX ADMIN — LEVETIRACETAM 400 MILLIGRAM(S): 250 TABLET, FILM COATED ORAL at 05:03

## 2020-12-20 RX ADMIN — Medication 1 PACKET(S): at 17:36

## 2020-12-20 RX ADMIN — CHLORHEXIDINE GLUCONATE 15 MILLILITER(S): 213 SOLUTION TOPICAL at 17:36

## 2020-12-20 RX ADMIN — CHLORHEXIDINE GLUCONATE 1 APPLICATION(S): 213 SOLUTION TOPICAL at 07:51

## 2020-12-20 RX ADMIN — HEPARIN SODIUM 5000 UNIT(S): 5000 INJECTION INTRAVENOUS; SUBCUTANEOUS at 05:04

## 2020-12-20 RX ADMIN — HEPARIN SODIUM 5000 UNIT(S): 5000 INJECTION INTRAVENOUS; SUBCUTANEOUS at 13:52

## 2020-12-20 RX ADMIN — SODIUM CHLORIDE 50 MILLILITER(S): 9 INJECTION, SOLUTION INTRAVENOUS at 20:00

## 2020-12-20 RX ADMIN — Medication 63.75 MILLIMOLE(S): at 06:01

## 2020-12-20 RX ADMIN — PANTOPRAZOLE SODIUM 40 MILLIGRAM(S): 20 TABLET, DELAYED RELEASE ORAL at 13:52

## 2020-12-20 RX ADMIN — Medication 1 PACKET(S): at 05:04

## 2020-12-20 RX ADMIN — LEVETIRACETAM 400 MILLIGRAM(S): 250 TABLET, FILM COATED ORAL at 17:36

## 2020-12-20 RX ADMIN — HEPARIN SODIUM 5000 UNIT(S): 5000 INJECTION INTRAVENOUS; SUBCUTANEOUS at 22:08

## 2020-12-20 RX ADMIN — FENTANYL CITRATE 3.75 MICROGRAM(S)/KG/HR: 50 INJECTION INTRAVENOUS at 07:52

## 2020-12-20 NOTE — PROGRESS NOTE ADULT - SUBJECTIVE AND OBJECTIVE BOX
INTERVAL HPI/OVERNIGHT EVENTS:    SUBJECTIVE: Patient seen and examined at bedside. Intubated, sedated, ROS cannot be obtained.       VITAL SIGNS:  ICU Vital Signs Last 24 Hrs  T(C): 36.2 (20 Dec 2020 04:00), Max: 36.9 (20 Dec 2020 00:00)  T(F): 97.1 (20 Dec 2020 04:00), Max: 98.5 (20 Dec 2020 00:00)  HR: 70 (20 Dec 2020 06:28) (51 - 86)  BP: --  BP(mean): --  ABP: 122/78 (20 Dec 2020 06:00) (71/46 - 146/94)  ABP(mean): 94 (20 Dec 2020 06:00) (54 - 112)  RR: 22 (20 Dec 2020 06:00) (22 - 22)  SpO2: 98% (20 Dec 2020 06:28) (96% - 100%)    Mode: AC/ CMV (Assist Control/ Continuous Mandatory Ventilation), RR (machine): 22, TV (machine): 450, FiO2: 21, PEEP: 5, ITime: 0.72, MAP: 9, PIP: 17  Plateau pressure:   P/F ratio:     12-18 @ 07:01  -  12-19 @ 07:00  --------------------------------------------------------  IN: 2328.5 mL / OUT: 1654 mL / NET: 674.5 mL    12-19 @ 07:01  -  12-20 @ 06:40  --------------------------------------------------------  IN: 2917.5 mL / OUT: 1600 mL / NET: 1317.5 mL      CAPILLARY BLOOD GLUCOSE      POCT Blood Glucose.: 98 mg/dL (20 Dec 2020 05:13)    ECG:    PHYSICAL EXAM:    General:   HEENT:   Neck:   Respiratory:   Cardiovascular:   Abdomen:   Extremities:  Neurological:    MEDICATIONS:  MEDICATIONS  (STANDING):  chlorhexidine 0.12% Liquid 15 milliLiter(s) Oral Mucosa every 12 hours  chlorhexidine 4% Liquid 1 Application(s) Topical <User Schedule>  dextrose 5% + lactated ringers. 1000 milliLiter(s) (50 mL/Hr) IV Continuous <Continuous>  fentaNYL   Infusion. 0.5 MICROgram(s)/kG/Hr (3.75 mL/Hr) IV Continuous <Continuous>  heparin   Injectable 5000 Unit(s) SubCutaneous every 8 hours  hydrocortisone sodium succinate Injectable 100 milliGRAM(s) IV Push every 12 hours  influenza   Vaccine 0.5 milliLiter(s) IntraMuscular once  levETIRAcetam  IVPB 500 milliGRAM(s) IV Intermittent every 12 hours  meropenem  IVPB 2000 milliGRAM(s) IV Intermittent every 8 hours  pantoprazole   Suspension 40 milliGRAM(s) Oral daily  potassium phosphate / sodium phosphate Powder (PHOS-NaK) 1 Packet(s) Oral two times a day  propofol Infusion 30 MICROgram(s)/kG/Min (13.5 mL/Hr) IV Continuous <Continuous>  senna 2 Tablet(s) Oral at bedtime  vancomycin  IVPB 1000 milliGRAM(s) IV Intermittent every 12 hours    MEDICATIONS  (PRN):  sodium chloride 0.9% lock flush 10 milliLiter(s) IV Push every 1 hour PRN Pre/post blood products, medications, blood draw, and to maintain line patency      ALLERGIES:  Allergies    aspirin (Unknown)  shellfish (Hives)  shellfish (Unknown)  Tegretol (Unknown)    Intolerances    ACE inhibitors (Other)  angiotensin II inhibitors (Other)      LABS:                        9.6    5.48  )-----------( 107      ( 20 Dec 2020 03:09 )             30.1     12-20    145  |  118<H>  |  13  ----------------------------<  118<H>  4.1   |  20<L>  |  0.60    Ca    8.1<L>      20 Dec 2020 03:09  Phos  2.1     12-20  Mg     1.9     12-20    TPro  3.9<L>  /  Alb  2.1<L>  /  TBili  0.2  /  DBili  x   /  AST  22  /  ALT  29  /  AlkPhos  63  12-20    PT/INR - ( 20 Dec 2020 03:09 )   PT: 11.7 sec;   INR: 1.02 ratio         PTT - ( 20 Dec 2020 03:09 )  PTT:40.7 sec      RADIOLOGY & ADDITIONAL TESTS: Reviewed.   INTERVAL HPI/OVERNIGHT EVENTS:    SUBJECTIVE: Patient seen and examined at bedside. Intubated, sedated, ROS cannot be obtained. Per nursing no acute events overnight. Patient afebrile, hemodynamically stable with no need for pressor support.       VITAL SIGNS:  ICU Vital Signs Last 24 Hrs  T(C): 36.2 (20 Dec 2020 04:00), Max: 36.9 (20 Dec 2020 00:00)  T(F): 97.1 (20 Dec 2020 04:00), Max: 98.5 (20 Dec 2020 00:00)  HR: 70 (20 Dec 2020 06:28) (51 - 86)  BP: --  BP(mean): --  ABP: 122/78 (20 Dec 2020 06:00) (71/46 - 146/94)  ABP(mean): 94 (20 Dec 2020 06:00) (54 - 112)  RR: 22 (20 Dec 2020 06:00) (22 - 22)  SpO2: 98% (20 Dec 2020 06:28) (96% - 100%)    Mode: AC/ CMV (Assist Control/ Continuous Mandatory Ventilation), RR (machine): 22, TV (machine): 450, FiO2: 21, PEEP: 5, ITime: 0.72, MAP: 9, PIP: 17  Plateau pressure:   P/F ratio:     12-18 @ 07:01  -  12-19 @ 07:00  --------------------------------------------------------  IN: 2328.5 mL / OUT: 1654 mL / NET: 674.5 mL    12-19 @ 07:01  -  12-20 @ 06:40  --------------------------------------------------------  IN: 2917.5 mL / OUT: 1600 mL / NET: 1317.5 mL      CAPILLARY BLOOD GLUCOSE      POCT Blood Glucose.: 98 mg/dL (20 Dec 2020 05:13)    ECG:    PHYSICAL EXAM:    GENERAL: NAD, intubated, sedated  HEAD: Atraumatic, Normocephalic  EYES: EOMI, PERRLA, conjunctiva and sclera clear  NECK: Supple, No JVD  CHEST/LUNG: intubated, sedated. Vented breath sounds. Clear to auscultation bilaterally; No wheeze  HEART: Regular rate and rhythm; No murmurs, rubs, or gallops  ABDOMEN: Soft, Nontender, Nondistended; Bowel sounds present  EXTREMITIES: 2+ Peripheral Pulses, No clubbing, cyanosis, or edema  PSYCH: AAOx0.  intubated, sedated  NEUROLOGY: intubated, sedated  SKIN: No rashes or lesions    MEDICATIONS:  MEDICATIONS  (STANDING):  chlorhexidine 0.12% Liquid 15 milliLiter(s) Oral Mucosa every 12 hours  chlorhexidine 4% Liquid 1 Application(s) Topical <User Schedule>  dextrose 5% + lactated ringers. 1000 milliLiter(s) (50 mL/Hr) IV Continuous <Continuous>  fentaNYL   Infusion. 0.5 MICROgram(s)/kG/Hr (3.75 mL/Hr) IV Continuous <Continuous>  heparin   Injectable 5000 Unit(s) SubCutaneous every 8 hours  hydrocortisone sodium succinate Injectable 100 milliGRAM(s) IV Push every 12 hours  influenza   Vaccine 0.5 milliLiter(s) IntraMuscular once  levETIRAcetam  IVPB 500 milliGRAM(s) IV Intermittent every 12 hours  meropenem  IVPB 2000 milliGRAM(s) IV Intermittent every 8 hours  pantoprazole   Suspension 40 milliGRAM(s) Oral daily  potassium phosphate / sodium phosphate Powder (PHOS-NaK) 1 Packet(s) Oral two times a day  propofol Infusion 30 MICROgram(s)/kG/Min (13.5 mL/Hr) IV Continuous <Continuous>  senna 2 Tablet(s) Oral at bedtime  vancomycin  IVPB 1000 milliGRAM(s) IV Intermittent every 12 hours    MEDICATIONS  (PRN):  sodium chloride 0.9% lock flush 10 milliLiter(s) IV Push every 1 hour PRN Pre/post blood products, medications, blood draw, and to maintain line patency      ALLERGIES:  Allergies    aspirin (Unknown)  shellfish (Hives)  shellfish (Unknown)  Tegretol (Unknown)    Intolerances    ACE inhibitors (Other)  angiotensin II inhibitors (Other)      LABS:                        9.6    5.48  )-----------( 107      ( 20 Dec 2020 03:09 )             30.1     12-20    145  |  118<H>  |  13  ----------------------------<  118<H>  4.1   |  20<L>  |  0.60    Ca    8.1<L>      20 Dec 2020 03:09  Phos  2.1     12-20  Mg     1.9     12-20    TPro  3.9<L>  /  Alb  2.1<L>  /  TBili  0.2  /  DBili  x   /  AST  22  /  ALT  29  /  AlkPhos  63  12-20    PT/INR - ( 20 Dec 2020 03:09 )   PT: 11.7 sec;   INR: 1.02 ratio         PTT - ( 20 Dec 2020 03:09 )  PTT:40.7 sec      RADIOLOGY & ADDITIONAL TESTS: Reviewed.

## 2020-12-20 NOTE — PROGRESS NOTE ADULT - ATTENDING COMMENTS
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 Presumed to be septic from meninges (blood, urine cultures negative). Given empiric antibiotics with good response. MRI Brain without significant findings. Did not respond well to precedex, which caused bradycardia and subsequent hypotension.  shunt tapped yesterday - no growth, 1 nucleated cell. Neurosurg still to touch base with [facility] in regards to transfer to their facility. In the meantime, will titrate down sedation. Cont vanc for 5 days for empiric coverage. Meropenem for possible meningitis, will require longer treatment duration. PROVIDER:[TOKEN:[[id number]]]

## 2020-12-20 NOTE — PROGRESS NOTE ADULT - ASSESSMENT
60y M PMHx HTN, Anxiety (on Prozac), b/l DVT while on Coumadin (s/p IVF filter, now off coumadin), GBM s/p craniotomy w/ resection c/b ESBL meningitis for which repeat craniotomy (2/2020) performed and pt was given 8 weeks Meropenem, pt subsequently had repeat ESBL meningitis s/p craniectomy w/ washout w/ intrathecal Jin and 10 week course IV Jin, COVID infection in 3/2020 who was BIBEMS from [facility] for AMS, hypoxia and hypotension admitted for sepsis likely 2/2 intracranial infection in the setting of negative CTA and U/A.      #Neuro:  1. AMS: likely 2/2 sepsis given elevated WBC, lactate; infectious source may be meningitis. Pt is on Keppra however this is for seizure prophylaxis; per sister pt has had EEGs in the past w/out seizure activity. DDx also includes progression of GBM   - Intubated, c/w vent  - c/w propofol, fentanyl, wean as tolerated   - CTH 12/16 w/out acute pathology  - MRI 12/18 shows post-surgical changes, no residual mass/nodular enhancement  - f/u Neurosurgery recs for LP vs tap  shunt vs transfer to [facility] neurosurgeon Dr. Patrice Dean ([id number])    2. GBM s/p craniotomy w/resection and subsequent ESBL infection for which repeat craniotomy was performed (2/2020) now with  shunt  - c/w 500 BID Keppra for seizure ppx    #Cardiovascular:  1. Vasoplegic shock 2/2 sepsis  - s/p levophed (12/16-12/18)  - wean hydrocortisone 100 mg q8h to q12h  - pt chronically on decadron 1mg qd, will start after hydrocortisone has been weaned off    #Respiratory:   1. Hypoxia likely 2/2 sepsis vs progression of brain disease   - CXR shows grossly clear lungs, some obstruction from pacer pads  - now intubated, c/w vent  - CTA 12/16 w/out evidence of PNA or PE     #GI:   - no active issues  - s/p OG tube  - start feeds with OGT  - will d/c D5 LR when BG normalizes on tube feeds    #:  - s/p 2.2 L NS in ED  - Continue to monitor SCr, UOP (goal 0.5 cc/kg/hr)    #Dermatologic:   - no acute issues    #ID:   1. Septic shock   - s/p 2.2 L NS  - troponins downtrending; elevated in the setting of hypotension and tachycardia as well as YOLIS  - trend lactate   - c/w vanc (12/16- ), meropenem (12/16- )   - blood cx 12/16- ngtd   - UCx 12/16- negative    #Ethics:  FULL CODE  Pt has many physicians in the family who are actively following and will update their GOC as they go       60y M PMHx HTN, Anxiety (on Prozac), b/l DVT while on Coumadin (s/p IVF filter, now off coumadin), GBM s/p craniotomy w/ resection c/b ESBL meningitis for which repeat craniotomy (2/2020) performed and pt was given 8 weeks Meropenem, pt subsequently had repeat ESBL meningitis s/p craniectomy w/ washout w/ intrathecal Jin and 10 week course IV Jin, COVID infection in 3/2020 who was BIBEMS from [facility] for AMS, hypoxia and hypotension admitted for sepsis likely 2/2 intracranial infection in the setting of negative CTA and U/A.      #Neuro:  1. AMS: likely 2/2 sepsis given elevated WBC, lactate; infectious source may be meningitis. Pt is on Keppra however this is for seizure prophylaxis; per sister pt has had EEGs in the past w/out seizure activity. DDx also includes progression of GBM   - Intubated, c/w vent  - c/w propofol, fentanyl, wean as tolerated   - CTH 12/16 w/out acute pathology  - MRI 12/18 shows post-surgical changes, no residual mass/nodular enhancement  - f/u Neurosurgery recs for LP vs tap  shunt vs transfer to [facility] neurosurgeon Dr. Patrice Dean ([id number])    2. GBM s/p craniotomy w/resection and subsequent ESBL infection for which repeat craniotomy was performed (2/2020) now with  shunt  - c/w 500 BID Keppra for seizure ppx    #Cardiovascular:  1. Vasoplegic shock 2/2 sepsis  - s/p levophed (12/16-12/18)  - wean hydrocortisone 100 mg q12h to 50 q12.   - pt chronically on decadron 1mg qd, will start after hydrocortisone has been weaned off    #Respiratory:   1. Hypoxia likely 2/2 sepsis vs progression of brain disease   - CXR shows grossly clear lungs, some obstruction from pacer pads  - now intubated, c/w vent  - CTA 12/16 w/out evidence of PNA or PE     #GI:   - no active issues  - s/p OG tube  - c/w jevity feeds with OGT  - will c/w D5 LR and trend BGs.     #:  - s/p 2.2 L NS in ED  - Continue to monitor SCr, UOP (goal 0.5 cc/kg/hr)    #Dermatologic:   - no acute issues    #ID:   1. Septic shock   - s/p 2.2 L NS  - troponins downtrending; elevated in the setting of hypotension and tachycardia as well as YOLIS  - trend lactate   - c/w vanc (12/16- ), meropenem (12/16- ). Should d/c the vancomycin tmrw 12/21 after five days of vanco and continue with the meropenem. Consider ID consult for jin managment on 12/21.   - blood cx 12/16- ngtd   - UCx 12/16- negative    #Ethics:  FULL CODE  Pt has many physicians in the family who are actively following and will update their GOC as they go

## 2020-12-21 LAB
ALBUMIN SERPL ELPH-MCNC: 2 G/DL — LOW (ref 3.3–5)
ALP SERPL-CCNC: 60 U/L — SIGNIFICANT CHANGE UP (ref 40–120)
ALT FLD-CCNC: 22 U/L — SIGNIFICANT CHANGE UP (ref 4–41)
ANION GAP SERPL CALC-SCNC: 8 MMOL/L — SIGNIFICANT CHANGE UP (ref 7–14)
APTT BLD: 39.1 SEC — HIGH (ref 27–36.3)
AST SERPL-CCNC: 10 U/L — SIGNIFICANT CHANGE UP (ref 4–40)
BASOPHILS # BLD AUTO: 0.01 K/UL — SIGNIFICANT CHANGE UP (ref 0–0.2)
BASOPHILS NFR BLD AUTO: 0.3 % — SIGNIFICANT CHANGE UP (ref 0–2)
BILIRUB SERPL-MCNC: 0.2 MG/DL — SIGNIFICANT CHANGE UP (ref 0.2–1.2)
BUN SERPL-MCNC: 11 MG/DL — SIGNIFICANT CHANGE UP (ref 7–23)
CALCIUM SERPL-MCNC: 7.9 MG/DL — LOW (ref 8.4–10.5)
CHLORIDE SERPL-SCNC: 116 MMOL/L — HIGH (ref 98–107)
CO2 SERPL-SCNC: 19 MMOL/L — LOW (ref 22–31)
CREAT SERPL-MCNC: 0.54 MG/DL — SIGNIFICANT CHANGE UP (ref 0.5–1.3)
EOSINOPHIL # BLD AUTO: 0.01 K/UL — SIGNIFICANT CHANGE UP (ref 0–0.5)
EOSINOPHIL NFR BLD AUTO: 0.3 % — SIGNIFICANT CHANGE UP (ref 0–6)
GLUCOSE SERPL-MCNC: 129 MG/DL — HIGH (ref 70–99)
HCT VFR BLD CALC: 28 % — LOW (ref 39–50)
HGB BLD-MCNC: 8.9 G/DL — LOW (ref 13–17)
IANC: 2.62 K/UL — SIGNIFICANT CHANGE UP (ref 1.5–8.5)
IMM GRANULOCYTES NFR BLD AUTO: 6.2 % — HIGH (ref 0–1.5)
INR BLD: 1.02 RATIO — SIGNIFICANT CHANGE UP (ref 0.88–1.17)
LACTATE SERPL-SCNC: 2.1 MMOL/L — HIGH (ref 0.5–2)
LYMPHOCYTES # BLD AUTO: 0.18 K/UL — LOW (ref 1–3.3)
LYMPHOCYTES # BLD AUTO: 5.3 % — LOW (ref 13–44)
MAGNESIUM SERPL-MCNC: 1.8 MG/DL — SIGNIFICANT CHANGE UP (ref 1.6–2.6)
MCHC RBC-ENTMCNC: 31.8 GM/DL — LOW (ref 32–36)
MCHC RBC-ENTMCNC: 32 PG — SIGNIFICANT CHANGE UP (ref 27–34)
MCV RBC AUTO: 100.7 FL — HIGH (ref 80–100)
MONOCYTES # BLD AUTO: 0.37 K/UL — SIGNIFICANT CHANGE UP (ref 0–0.9)
MONOCYTES NFR BLD AUTO: 10.9 % — SIGNIFICANT CHANGE UP (ref 2–14)
NEUTROPHILS # BLD AUTO: 2.62 K/UL — SIGNIFICANT CHANGE UP (ref 1.8–7.4)
NEUTROPHILS NFR BLD AUTO: 77 % — SIGNIFICANT CHANGE UP (ref 43–77)
NRBC # BLD: 0 /100 WBCS — SIGNIFICANT CHANGE UP
NRBC # FLD: 0.03 K/UL — HIGH
PHOSPHATE SERPL-MCNC: 2.1 MG/DL — LOW (ref 2.5–4.5)
PLATELET # BLD AUTO: 76 K/UL — LOW (ref 150–400)
POTASSIUM SERPL-MCNC: 3.4 MMOL/L — LOW (ref 3.5–5.3)
POTASSIUM SERPL-SCNC: 3.4 MMOL/L — LOW (ref 3.5–5.3)
PROT SERPL-MCNC: 3.7 G/DL — LOW (ref 6–8.3)
PROTHROM AB SERPL-ACNC: 11.7 SEC — SIGNIFICANT CHANGE UP (ref 9.8–13.1)
RBC # BLD: 2.78 M/UL — LOW (ref 4.2–5.8)
RBC # FLD: 13.4 % — SIGNIFICANT CHANGE UP (ref 10.3–14.5)
SODIUM SERPL-SCNC: 143 MMOL/L — SIGNIFICANT CHANGE UP (ref 135–145)
WBC # BLD: 3.4 K/UL — LOW (ref 3.8–10.5)
WBC # FLD AUTO: 3.4 K/UL — LOW (ref 3.8–10.5)

## 2020-12-21 PROCEDURE — 99291 CRITICAL CARE FIRST HOUR: CPT | Mod: 25

## 2020-12-21 PROCEDURE — 99223 1ST HOSP IP/OBS HIGH 75: CPT

## 2020-12-21 RX ORDER — DEXMEDETOMIDINE HYDROCHLORIDE IN 0.9% SODIUM CHLORIDE 4 UG/ML
0.05 INJECTION INTRAVENOUS
Qty: 400 | Refills: 0 | Status: DISCONTINUED | OUTPATIENT
Start: 2020-12-21 | End: 2020-12-22

## 2020-12-21 RX ORDER — CHLORHEXIDINE GLUCONATE 213 G/1000ML
15 SOLUTION TOPICAL EVERY 12 HOURS
Refills: 0 | Status: DISCONTINUED | OUTPATIENT
Start: 2020-12-21 | End: 2020-12-22

## 2020-12-21 RX ORDER — HYDROCORTISONE 20 MG
25 TABLET ORAL EVERY 12 HOURS
Refills: 0 | Status: COMPLETED | OUTPATIENT
Start: 2020-12-22 | End: 2020-12-22

## 2020-12-21 RX ORDER — HYDROCORTISONE 20 MG
50 TABLET ORAL ONCE
Refills: 0 | Status: COMPLETED | OUTPATIENT
Start: 2020-12-21 | End: 2020-12-21

## 2020-12-21 RX ORDER — POTASSIUM PHOSPHATE, MONOBASIC POTASSIUM PHOSPHATE, DIBASIC 236; 224 MG/ML; MG/ML
30 INJECTION, SOLUTION INTRAVENOUS ONCE
Refills: 0 | Status: COMPLETED | OUTPATIENT
Start: 2020-12-21 | End: 2020-12-21

## 2020-12-21 RX ORDER — POTASSIUM CHLORIDE 20 MEQ
20 PACKET (EA) ORAL ONCE
Refills: 0 | Status: COMPLETED | OUTPATIENT
Start: 2020-12-21 | End: 2020-12-21

## 2020-12-21 RX ADMIN — MEROPENEM 200 MILLIGRAM(S): 1 INJECTION INTRAVENOUS at 05:00

## 2020-12-21 RX ADMIN — HEPARIN SODIUM 5000 UNIT(S): 5000 INJECTION INTRAVENOUS; SUBCUTANEOUS at 05:18

## 2020-12-21 RX ADMIN — CHLORHEXIDINE GLUCONATE 15 MILLILITER(S): 213 SOLUTION TOPICAL at 17:05

## 2020-12-21 RX ADMIN — LEVETIRACETAM 400 MILLIGRAM(S): 250 TABLET, FILM COATED ORAL at 17:05

## 2020-12-21 RX ADMIN — Medication 50 MILLIGRAM(S): at 17:05

## 2020-12-21 RX ADMIN — CHLORHEXIDINE GLUCONATE 15 MILLILITER(S): 213 SOLUTION TOPICAL at 05:18

## 2020-12-21 RX ADMIN — MEROPENEM 200 MILLIGRAM(S): 1 INJECTION INTRAVENOUS at 13:00

## 2020-12-21 RX ADMIN — FENTANYL CITRATE 3.75 MICROGRAM(S)/KG/HR: 50 INJECTION INTRAVENOUS at 07:12

## 2020-12-21 RX ADMIN — SODIUM CHLORIDE 50 MILLILITER(S): 9 INJECTION, SOLUTION INTRAVENOUS at 22:35

## 2020-12-21 RX ADMIN — PROPOFOL 13.5 MICROGRAM(S)/KG/MIN: 10 INJECTION, EMULSION INTRAVENOUS at 07:13

## 2020-12-21 RX ADMIN — MEROPENEM 200 MILLIGRAM(S): 1 INJECTION INTRAVENOUS at 22:34

## 2020-12-21 RX ADMIN — Medication 50 MILLIGRAM(S): at 05:19

## 2020-12-21 RX ADMIN — HEPARIN SODIUM 5000 UNIT(S): 5000 INJECTION INTRAVENOUS; SUBCUTANEOUS at 22:34

## 2020-12-21 RX ADMIN — HEPARIN SODIUM 5000 UNIT(S): 5000 INJECTION INTRAVENOUS; SUBCUTANEOUS at 13:00

## 2020-12-21 RX ADMIN — POTASSIUM PHOSPHATE, MONOBASIC POTASSIUM PHOSPHATE, DIBASIC 83.33 MILLIMOLE(S): 236; 224 INJECTION, SOLUTION INTRAVENOUS at 06:13

## 2020-12-21 RX ADMIN — SENNA PLUS 2 TABLET(S): 8.6 TABLET ORAL at 22:34

## 2020-12-21 RX ADMIN — Medication 1 PACKET(S): at 05:19

## 2020-12-21 RX ADMIN — DEXMEDETOMIDINE HYDROCHLORIDE IN 0.9% SODIUM CHLORIDE 0.97 MICROGRAM(S)/KG/HR: 4 INJECTION INTRAVENOUS at 12:26

## 2020-12-21 RX ADMIN — SODIUM CHLORIDE 50 MILLILITER(S): 9 INJECTION, SOLUTION INTRAVENOUS at 07:12

## 2020-12-21 RX ADMIN — Medication 100 MILLIEQUIVALENT(S): at 04:00

## 2020-12-21 RX ADMIN — LEVETIRACETAM 400 MILLIGRAM(S): 250 TABLET, FILM COATED ORAL at 05:19

## 2020-12-21 RX ADMIN — CHLORHEXIDINE GLUCONATE 1 APPLICATION(S): 213 SOLUTION TOPICAL at 06:13

## 2020-12-21 RX ADMIN — Medication 250 MILLIGRAM(S): at 01:44

## 2020-12-21 RX ADMIN — DEXMEDETOMIDINE HYDROCHLORIDE IN 0.9% SODIUM CHLORIDE 0.97 MICROGRAM(S)/KG/HR: 4 INJECTION INTRAVENOUS at 22:34

## 2020-12-21 NOTE — PROGRESS NOTE ADULT - ATTENDING COMMENTS
60 M with GBM s/p resection c/b ESBL meningitis and  shunt here with AMS, acute hypoxemic respiratory failure due to presumed meningitis versus other source of sepsis.  c/w abx, f/u cultures.  Wean sedation as tolerated.

## 2020-12-21 NOTE — PROGRESS NOTE ADULT - ASSESSMENT
60y M PMHx HTN, Anxiety (on Prozac), b/l DVT while on Coumadin (s/p IVF filter, now off coumadin), GBM s/p craniotomy w/ resection c/b ESBL meningitis for which repeat craniotomy (2/2020) performed and pt was given 8 weeks Meropenem, pt subsequently had repeat ESBL meningitis s/p craniectomy w/ washout w/ intrathecal Jin and 10 week course IV Jin, COVID infection in 3/2020 who was BIBEMS from [facility] for AMS, hypoxia and hypotension admitted for sepsis likely 2/2 intracranial infection in the setting of negative CTA and U/A.      #Neuro:  1. AMS: likely 2/2 sepsis given elevated WBC, lactate; infectious source may be meningitis. Pt is on Keppra however this is for seizure prophylaxis; per sister pt has had EEGs in the past w/out seizure activity. DDx also includes progression of GBM   - Intubated, c/w vent  - c/w propofol, fentanyl, wean as tolerated   - CTH 12/16 w/out acute pathology  - MRI 12/18 shows post-surgical changes, no residual mass/nodular enhancement  - f/u Neurosurgery recs/status of transfer to [facility] neurosurgeon Dr. Patrice Dean ([id number])    2. GBM s/p craniotomy w/resection and subsequent ESBL infection for which repeat craniotomy was performed (2/2020) now with  shunt  - c/w 500 BID Keppra for seizure ppx    #Cardiovascular:  1. Vasoplegic shock 2/2 sepsis  - s/p levophed (12/16-12/18)  - wean hydrocortisone 100 mg q12h to 50 q12 12/21, 25 q12 12/22, 25 qd 12/23, etc  - pt chronically on decadron 1mg qd, will start after hydrocortisone has been weaned off    #Respiratory:   1. Hypoxia likely 2/2 sepsis vs progression of brain disease   - CXR shows grossly clear lungs, some obstruction from pacer pads  - now intubated, c/w vent  - CTA 12/16 w/out evidence of PNA or PE     #GI:   - no active issues  - s/p OG tube  - c/w jevity feeds with OGT- hold before extubation  - will c/w D5 LR and trend BGs.     #:  - s/p 2.2 L NS in ED  - Continue to monitor SCr, UOP (goal 0.5 cc/kg/hr)    #Dermatologic:   - no acute issues    #ID:   1. Septic shock   - s/p 2.2 L NS  - troponins downtrending; elevated in the setting of hypotension and tachycardia as well as YOLIS  - trend lactate   - s/p vanc (12/16-12/21)  - c/w meropenem (12/16- )  - Consult ID for jin managment  - blood cx 12/16- ngtd   - UCx 12/16- negative    #Ethics:  FULL CODE  Pt has many physicians in the family who are actively following and will update their GOC as they go

## 2020-12-21 NOTE — CONSULT NOTE ADULT - SUBJECTIVE AND OBJECTIVE BOX
HPI:  60y M PMHx   ·	HTN,  ·	Anxiety (on Prozac)  ·	b/l DVT while on Coumadin (s/p IVF filter, now off coumadin)  ·	GBM s/p craniotomy w/ resection   ·	c/b ESBL meningitis for which repeat craniotomy (2/2020) -8 weeks Meropenem,  ·	Repeat ESBL meningitis s/p craniectomy w/ washout w/ intrathecal Jin and 10 week course IV Jin   ·	 Shunt in place  ·	COVID infection in 3/2020       Patient presented from skilled nursing facility with AMS, hypoxia and hypotension.      In the ED pt was intubated for airway protection.  Elevated lactate to 10.8 noted.     CXR showed grossly clear lungs, view partly blocked by pacer pads.    On meropenem since 12/16.     Afebrile since presentation.     WBC peaked at 20k, now at 3.5    UA not suggestive of infection.   Blood culture without growth.   CT angiogram without evidence of pneumonia.     Off pressors.           PAST MEDICAL & SURGICAL HISTORY:  Diabetes mellitus    Hypertension    Borderline diabetes    Essential hypertension    Disruption of closure of skull or craniotomy        Allergies    aspirin (Unknown)  shellfish (Hives)  shellfish (Unknown)  Tegretol (Unknown)    Intolerances    ACE inhibitors (Other)  angiotensin II inhibitors (Other)      ANTIMICROBIALS:  meropenem  IVPB 2000 every 8 hours      OTHER MEDS:  chlorhexidine 0.12% Liquid 15 milliLiter(s) Oral Mucosa every 12 hours  chlorhexidine 4% Liquid 1 Application(s) Topical <User Schedule>  dexMEDEtomidine Infusion 0.05 MICROgram(s)/kG/Hr IV Continuous <Continuous>  dextrose 5% + lactated ringers. 1000 milliLiter(s) IV Continuous <Continuous>  fentaNYL   Infusion. 0.5 MICROgram(s)/kG/Hr IV Continuous <Continuous>  heparin   Injectable 5000 Unit(s) SubCutaneous every 8 hours  hydrocortisone sodium succinate Injectable 50 milliGRAM(s) IV Push once  influenza   Vaccine 0.5 milliLiter(s) IntraMuscular once  levETIRAcetam  IVPB 500 milliGRAM(s) IV Intermittent every 12 hours  pantoprazole   Suspension 40 milliGRAM(s) Oral daily  propofol Infusion 30 MICROgram(s)/kG/Min IV Continuous <Continuous>  senna 2 Tablet(s) Oral at bedtime  sodium chloride 0.9% lock flush 10 milliLiter(s) IV Push every 1 hour PRN      SOCIAL HISTORY:  tx from [facility]  no recent travel    FAMILY HISTORY:  Cerebrovascular accident (Father)        REVIEW OF SYSTEMS  [ x ] ROS unobtainable because:  pt intubated  [  ] All other systems negative except as noted below:	    Constitutional:  [ ] fever [ ] chills  [ ] weight loss  [ ] weakness  Skin:  [ ] rash [ ] phlebitis	  Eyes: [ ] icterus [ ] pain  [ ] discharge	  ENMT: [ ] sore throat  [ ] thrush [ ] ulcers [ ] exudates  Respiratory: [ ] dyspnea [ ] hemoptysis [ ] cough [ ] sputum	  Cardiovascular:  [ ] chest pain [ ] palpitations [ ] edema	  Gastrointestinal:  [ ] nausea [ ] vomiting [ ] diarrhea [ ] constipation [ ] pain	  Genitourinary:  [ ] dysuria [ ] frequency [ ] hematuria [ ] discharge [ ] flank pain  [ ] incontinence  Musculoskeletal:  [ ] myalgias [ ] arthralgias [ ] arthritis  [ ] back pain  Neurological:  [ ] headache [ ] seizures  [ ] confusion/altered mental status  Psychiatric:  [ ] anxiety [ ] depression	  Hematology/Lymphatics:  [ ] lymphadenopathy  Endocrine:  [ ] adrenal [ ] thyroid  Allergic/Immunologic:	 [ ] transplant [ ] seasonal    PHYSICAL EXAM:  General: [x ] intubated  HEAD/EYES: [ ] PERRL [ ] white sclera [ ] icterus  ENT:  [ ] normal [ ] supple [ ] thrush [ ] pharyngeal exudate  Cardiovascular:   [ ] murmur [ ] normal [ ] PPM/AICD  Respiratory:  [ ] clear to ausculation bilaterally  GI:  [ ] soft, non-tender, normal bowel sounds  :  [ ] francois [ ] no CVA tenderness   Musculoskeletal:  [ ] no synovitis  Neurologic:  [ ] non-focal exam   Skin:  [ ] no rash  Lymph: [ ] no lymphadenopathy  Psychiatric:  [ ] appropriate affect [ ] alert & oriented  Lines:  [ ] no phlebitis [ ] central line          Drug Dosing Weight  Height (cm): 188 (16 Dec 2020 20:00)  Weight (kg): 77.4 (16 Dec 2020 20:00)  BMI (kg/m2): 21.9 (16 Dec 2020 20:00)  BSA (m2): 2.03 (16 Dec 2020 20:00)    Vital Signs Last 24 Hrs  T(F): 97.1 (12-21-20 @ 12:00), Max: 99.4 (12-16-20 @ 16:30)    Vital Signs Last 24 Hrs  HR: 52 (12-21-20 @ 16:00) (48 - 81)  BP: --  RR: 22 (12-21-20 @ 16:00)  SpO2: 100% (12-21-20 @ 16:00) (90% - 100%)  Wt(kg): --                          8.9    3.40  )-----------( 76       ( 21 Dec 2020 02:06 )             28.0       12-21    143  |  116<H>  |  11  ----------------------------<  129<H>  3.4<L>   |  19<L>  |  0.54    Ca    7.9<L>      21 Dec 2020 02:06  Phos  2.1     12-21  Mg     1.8     12-21    TPro  3.7<L>  /  Alb  2.0<L>  /  TBili  0.2  /  DBili  x   /  AST  10  /  ALT  22  /  AlkPhos  60  12-21          MICROBIOLOGY:    RADIOLOGY:      < from: CT Angio Chest w/ IV Cont (12.16.20 @ 18:51) >  EXAM:  CT ANGIO CHEST (W)AW         PROCEDURE DATE:  Dec 16 2020         INTERPRETATION:  INDICATION, CLINICAL INFORMATION: Hypoxia, intubated    TECHNIQUE: CT pulmonary angiogram of the chest was performed. Coronal and sagittal images were reconstructed. Maximum intensity projection images were generated. Images were obtained after the uneventful administration of 90 cc nonionic intravenous Omnipaque 350. 10 cc of Omnipaque 350 was discarded.      COMPARISON: CT chest 12 10/3/2019.    FINDINGS:    PULMONARY VESSELS: No pulmonary embolus. Main pulmonary artery normal in diameter.    HEART AND VASCULATURE: Cardiomegaly. Dilated right ventricle. Extreme levocardia. Pericardial effusion. No aortic aneurysm or dissection.    LUNGS, AIRWAYS, PLEURA: Endotracheal tube tip above the armando. Patent central airways. Near complete atelectasis of left lower lobe. Atelectasis of the lingula. Atelectasis of basilar right lower lobe. No pleural effusions or pneumothorax.    MEDIASTINUM: No mass or lymphadenopathy.    UPPER ABDOMEN: Enteric tube within the stomach.    BONES AND SOFT TISSUES: No aggressive osseous lesion.    LOWER NECK: Within normal limits.    IMPRESSION:    No pulmonary embolus.    Bibasilar atelectasis.    < end of copied text >    < from: MR Head w/wo IV Cont (12.18.20 @ 17:36) >  EXAM:  MR BRAIN WAW IC        PROCEDURE DATE:  Dec 18 2020         INTERPRETATION:  CLINICAL INDICATION: History of glioblastoma status post resection complicated by meningitis, possible CSF infection.    TECHNIQUE: Multi-planar multi-sequential MRimaging of the brain was performed before and after the intravenous administration of 7.5 ml of Gadavist.    COMPARISON: CT head 12/16/2020. MRI brain 12/23/2019.    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 There is no evidence of residual mass or nodular enhancement in the surgical cavity.    There is a right frontal charlene hole with frontal approach ventricular drainage catheter and distal tip terminating in the right lateral ventricle abutting the septum pellucidum. There is susceptibility blooming artifact related to shunt reservoir limiting detailed assessment in this region. There is no obstructive hydrocephalus.    There is thin diffuse dural thickening/enhancement, likely reactive in nature.    There are scattered T2/FLAIR hyperintense foci in the subcortical and periventricular white matter, nonspecific finding, likely reflecting mild chronic microvascular ischemic disease and/or treatment related changes. There is cortical sulcal prominence related to brain parenchymal volume loss.    No acute infarction or acute intracranial hemorrhage. There are no extra-axial fluid collections. The skull base flow voids are patent.    The visualized intraorbital contents are normal. There are scattered mucosal inflammatory changes of the paranasal sinuses and dependently layering fluid in the nasopharynx. There are mastoid air cell effusions bilaterally. The visualized soft tissues and osseous structures appear otherwise unremarkable.    IMPRESSION:    Status post left frontoparietal craniectomy for tumor resection with cystic encephalomalacia and gliosis and chronic blood products in the subjacent left parietal left temporal lobes, as above. No evidence of residual mass or nodular enhancement in the surgical cavity.    Right frontal charlene hole with ventricular drainage catheter terminating in the right lateral ventricle.    Thin diffuse dural thickening/enhancement, likely reactive in nature.    < end of copied text >       HPI:  60y M PMHx   ·	HTN,  ·	Anxiety (on Prozac)  ·	b/l DVT while on Coumadin (s/p IVF filter, now off coumadin)  ·	GBM s/p craniotomy w/ resection   ·	c/b ESBL meningitis for which repeat craniotomy (2/2020) -8 weeks Meropenem,  ·	Repeat ESBL meningitis s/p craniectomy w/ washout w/ intrathecal Jin and 10 week course IV Jin   ·	 Shunt in place  ·	COVID infection in 3/2020       Patient presented from skilled nursing facility with AMS, hypoxia and hypotension.      In the ED pt was intubated for airway protection.  Elevated lactate to 10.8 noted.     CXR showed grossly clear lungs, view partly blocked by pacer pads.    On meropenem since 12/16.     Afebrile since presentation.     WBC peaked at 20k, now at 3.5    UA not suggestive of infection.   Blood culture without growth.   CT angiogram without evidence of pneumonia.     Off pressors.     Awake . Follows simple commands          PAST MEDICAL & SURGICAL HISTORY:  Diabetes mellitus    Hypertension    Borderline diabetes    Essential hypertension    Disruption of closure of skull or craniotomy        Allergies    aspirin (Unknown)  shellfish (Hives)  shellfish (Unknown)  Tegretol (Unknown)    Intolerances    ACE inhibitors (Other)  angiotensin II inhibitors (Other)      ANTIMICROBIALS:  meropenem  IVPB 2000 every 8 hours      OTHER MEDS:  chlorhexidine 0.12% Liquid 15 milliLiter(s) Oral Mucosa every 12 hours  chlorhexidine 4% Liquid 1 Application(s) Topical <User Schedule>  dexMEDEtomidine Infusion 0.05 MICROgram(s)/kG/Hr IV Continuous <Continuous>  dextrose 5% + lactated ringers. 1000 milliLiter(s) IV Continuous <Continuous>  fentaNYL   Infusion. 0.5 MICROgram(s)/kG/Hr IV Continuous <Continuous>  heparin   Injectable 5000 Unit(s) SubCutaneous every 8 hours  hydrocortisone sodium succinate Injectable 50 milliGRAM(s) IV Push once  influenza   Vaccine 0.5 milliLiter(s) IntraMuscular once  levETIRAcetam  IVPB 500 milliGRAM(s) IV Intermittent every 12 hours  pantoprazole   Suspension 40 milliGRAM(s) Oral daily  propofol Infusion 30 MICROgram(s)/kG/Min IV Continuous <Continuous>  senna 2 Tablet(s) Oral at bedtime  sodium chloride 0.9% lock flush 10 milliLiter(s) IV Push every 1 hour PRN      SOCIAL HISTORY:  tx from [facility]  no recent travel    FAMILY HISTORY:  Cerebrovascular accident (Father)        REVIEW OF SYSTEMS  [ x ] ROS unobtainable because:  pt intubated  [  ] All other systems negative except as noted below:	    Constitutional:  [ ] fever [ ] chills  [ ] weight loss  [ ] weakness  Skin:  [ ] rash [ ] phlebitis	  Eyes: [ ] icterus [ ] pain  [ ] discharge	  ENMT: [ ] sore throat  [ ] thrush [ ] ulcers [ ] exudates  Respiratory: [ ] dyspnea [ ] hemoptysis [ ] cough [ ] sputum	  Cardiovascular:  [ ] chest pain [ ] palpitations [ ] edema	  Gastrointestinal:  [ ] nausea [ ] vomiting [ ] diarrhea [ ] constipation [ ] pain	  Genitourinary:  [ ] dysuria [ ] frequency [ ] hematuria [ ] discharge [ ] flank pain  [ ] incontinence  Musculoskeletal:  [ ] myalgias [ ] arthralgias [ ] arthritis  [ ] back pain  Neurological:  [ ] headache [ ] seizures  [ ] confusion/altered mental status  Psychiatric:  [ ] anxiety [ ] depression	  Hematology/Lymphatics:  [ ] lymphadenopathy  Endocrine:  [ ] adrenal [ ] thyroid  Allergic/Immunologic:	 [ ] transplant [ ] seasonal    PHYSICAL EXAM:  General: [x ] intubated  HEAD/EYES: [ ] PERRL [x ] white sclera [ ] icterus  ENT:  [ ] normal [ ] supple [ ] thrush [ ] pharyngeal exudate  Cardiovascular:   [ ] murmur [ x] normal [ ] PPM/AICD  Respiratory:  [x ] clear to ausculation bilaterally  GI:  [x ] soft, non-tender, normal bowel sounds  :  [ x] francois [ x] no CVA tenderness   Musculoskeletal:  [x ] no synovitis  Neurologic:  [ ] non-focal exam   Skin:  [ ] no rash  Lymph: [ x] no lymphadenopathy  Psychiatric:  [ ] appropriate affect [ ] alert & oriented  Lines:  [ x] no phlebitis [ ] central line          Drug Dosing Weight  Height (cm): 188 (16 Dec 2020 20:00)  Weight (kg): 77.4 (16 Dec 2020 20:00)  BMI (kg/m2): 21.9 (16 Dec 2020 20:00)  BSA (m2): 2.03 (16 Dec 2020 20:00)    Vital Signs Last 24 Hrs  T(F): 97.1 (12-21-20 @ 12:00), Max: 99.4 (12-16-20 @ 16:30)    Vital Signs Last 24 Hrs  HR: 52 (12-21-20 @ 16:00) (48 - 81)  BP: --  RR: 22 (12-21-20 @ 16:00)  SpO2: 100% (12-21-20 @ 16:00) (90% - 100%)  Wt(kg): --                          8.9    3.40  )-----------( 76       ( 21 Dec 2020 02:06 )             28.0       12-21    143  |  116<H>  |  11  ----------------------------<  129<H>  3.4<L>   |  19<L>  |  0.54    Ca    7.9<L>      21 Dec 2020 02:06  Phos  2.1     12-21  Mg     1.8     12-21    TPro  3.7<L>  /  Alb  2.0<L>  /  TBili  0.2  /  DBili  x   /  AST  10  /  ALT  22  /  AlkPhos  60  12-21          MICROBIOLOGY:    RADIOLOGY:      < from: CT Angio Chest w/ IV Cont (12.16.20 @ 18:51) >  EXAM:  CT ANGIO CHEST (W)AW IC        PROCEDURE DATE:  Dec 16 2020         INTERPRETATION:  INDICATION, CLINICAL INFORMATION: Hypoxia, intubated    TECHNIQUE: CT pulmonary angiogram of the chest was performed. Coronal and sagittal images were reconstructed. Maximum intensity projection images were generated. Images were obtained after the uneventful administration of 90 cc nonionic intravenous Omnipaque 350. 10 cc of Omnipaque 350 was discarded.      COMPARISON: CT chest 12 10/3/2019.    FINDINGS:    PULMONARY VESSELS: No pulmonary embolus. Main pulmonary artery normal in diameter.    HEART AND VASCULATURE: Cardiomegaly. Dilated right ventricle. Extreme levocardia. Pericardial effusion. No aortic aneurysm or dissection.    LUNGS, AIRWAYS, PLEURA: Endotracheal tube tip above the armando. Patent central airways. Near complete atelectasis of left lower lobe. Atelectasis of the lingula. Atelectasis of basilar right lower lobe. No pleural effusions or pneumothorax.    MEDIASTINUM: No mass or lymphadenopathy.    UPPER ABDOMEN: Enteric tube within the stomach.    BONES AND SOFT TISSUES: No aggressive osseous lesion.    LOWER NECK: Within normal limits.    IMPRESSION:    No pulmonary embolus.    Bibasilar atelectasis.    < end of copied text >    < from: MR Head w/wo IV Cont (12.18.20 @ 17:36) >  EXAM:  MR BRAIN WAW IC        PROCEDURE DATE:  Dec 18 2020         INTERPRETATION:  CLINICAL INDICATION: History of glioblastoma status post resection complicated by meningitis, possible CSF infection.    TECHNIQUE: Multi-planar multi-sequential MRimaging of the brain was performed before and after the intravenous administration of 7.5 ml of Gadavist.    COMPARISON: CT head 12/16/2020. MRI brain 12/23/2019.    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 There is no evidence of residual mass or nodular enhancement in the surgical cavity.    There is a right frontal charlene hole with frontal approach ventricular drainage catheter and distal tip terminating in the right lateral ventricle abutting the septum pellucidum. There is susceptibility blooming artifact related to shunt reservoir limiting detailed assessment in this region. There is no obstructive hydrocephalus.    There is thin diffuse dural thickening/enhancement, likely reactive in nature.    There are scattered T2/FLAIR hyperintense foci in the subcortical and periventricular white matter, nonspecific finding, likely reflecting mild chronic microvascular ischemic disease and/or treatment related changes. There is cortical sulcal prominence related to brain parenchymal volume loss.    No acute infarction or acute intracranial hemorrhage. There are no extra-axial fluid collections. The skull base flow voids are patent.    The visualized intraorbital contents are normal. There are scattered mucosal inflammatory changes of the paranasal sinuses and dependently layering fluid in the nasopharynx. There are mastoid air cell effusions bilaterally. The visualized soft tissues and osseous structures appear otherwise unremarkable.    IMPRESSION:    Status post left frontoparietal craniectomy for tumor resection with cystic encephalomalacia and gliosis and chronic blood products in the subjacent left parietal left temporal lobes, as above. No evidence of residual mass or nodular enhancement in the surgical cavity.    Right frontal charlene hole with ventricular drainage catheter terminating in the right lateral ventricle.    Thin diffuse dural thickening/enhancement, likely reactive in nature.    < end of copied text >

## 2020-12-21 NOTE — CONSULT NOTE ADULT - ASSESSMENT
1) H/o complicated CNS infection   Prior ESBL meningitis/ CNS infection  s/p surgery  Three courses of antibiotics  Was advised to take Bactrim for prophylaxis    ? try to get cultures from prior episodes    I would resume Bactrim pending more data    S/p LP.  The LP was done three days after starting antimicrobials.  But only one WBC. I doubt CNS infection at this time.     2) Change MS    CNS imaging without clear evidence of recurrence  LP is not suggestive of infection    3) Elevated Lactate/ Hypotension  No clear focus of infection on current imaging or culture studies.     Consider CT a/p                  1) H/o complicated CNS infection   Prior ESBL meningitis/ CNS infection  s/p surgery  Three courses of antibiotics  Was advised to take Bactrim for prophylaxis    ? try to get cultures from prior episodes    I would resume Bactrim pending more data    S/p LP.  The LP was done three days after starting antimicrobials.  But only one WBC. I doubt CNS infection at this time.     2) Change MS    CNS imaging without clear evidence of recurrence  LP is not suggestive of infection    3) Elevated Lactate/ Hypotension  No clear focus of infection on current imaging or culture studies.     Consider CT a/p ( shunt drains into abd/ rule out collection    If no focus in abdomen, consider stopping meropenem at day 7    If MS worsens, consider repeat sampling from CSF off antibiotics (? sample from shunt)

## 2020-12-21 NOTE — PROGRESS NOTE ADULT - SUBJECTIVE AND OBJECTIVE BOX
INTERVAL HPI/OVERNIGHT EVENTS:    SUBJECTIVE: NAEON. Patient seen and examined at bedside.     CONSTITUTIONAL: Unable to assess as Intubated, sedated  EYES/ENT: Unable to assess as Intubated, sedated  NECK: Unable to assess as Intubated, sedated  RESPIRATORY: No cough, wheezing, hemoptysis; No shortness of breath  CARDIOVASCULAR: Unable to assess as Intubated, sedated  GASTROINTESTINAL: Unable to assess as Intubated, sedated. No nausea, vomiting, or hematemesis; No diarrhea or constipation. No melena or hematochezia.  GENITOURINARY: No dysuria, frequency or hematuria  NEUROLOGICAL: Unable to assess as Intubated, sedated  SKIN: No itching, rashes    OBJECTIVE:    VITAL SIGNS:  ICU Vital Signs Last 24 Hrs  T(C): 36.2 (21 Dec 2020 12:00), Max: 36.6 (21 Dec 2020 00:00)  T(F): 97.1 (21 Dec 2020 12:00), Max: 97.9 (21 Dec 2020 00:00)  HR: 60 (21 Dec 2020 12:00) (53 - 81)  BP: --  BP(mean): --  ABP: 145/87 (21 Dec 2020 12:00) (105/67 - 158/95)  ABP(mean): 108 (21 Dec 2020 12:00) (77 - 116)  RR: 22 (21 Dec 2020 12:00) (22 - 22)  SpO2: 100% (21 Dec 2020 12:00) (98% - 100%)    Mode: AC/ CMV (Assist Control/ Continuous Mandatory Ventilation), RR (machine): 22, TV (machine): 450, FiO2: 21, PEEP: 5, ITime: 0.65, MAP: 11, PIP: 20    12-20 @ 07:01  -  12-21 @ 07:00  --------------------------------------------------------  IN: 2563.5 mL / OUT: 1235 mL / NET: 1328.5 mL    12-21 @ 07:01 - 12-21 @ 12:37  --------------------------------------------------------  IN: 319.3 mL / OUT: 225 mL / NET: 94.3 mL    CAPILLARY BLOOD GLUCOSE  POCT Blood Glucose.: 133 mg/dL (21 Dec 2020 11:30)    PHYSICAL EXAM:  GENERAL: NAD, Intubated, sedated but more awake than before  HEAD: Atraumatic, Normocephalic  EYES: EOMI, PERRLA, conjunctiva and sclera clear  NECK: Supple, No JVD  CHEST/LUNG: Vented breath sounds. Clear to auscultation bilaterally; No wheeze  HEART: Regular rate and rhythm; Unable to auscultate given vented breath sounds  ABDOMEN: Soft, Nontender, Nondistended; Unable to auscultate given vented breath sounds  EXTREMITIES: 2+ Peripheral Pulses, No clubbing, cyanosis, or edema  PSYCH: AAOx0, Intubated, sedated  NEUROLOGY: Intubated, sedated. More awake than before- opens eyes, tracks  SKIN: No rashes or lesions    MEDICATIONS:  MEDICATIONS  (STANDING):  chlorhexidine 0.12% Liquid 15 milliLiter(s) Oral Mucosa every 12 hours  chlorhexidine 4% Liquid 1 Application(s) Topical <User Schedule>  dexMEDEtomidine Infusion 0.05 MICROgram(s)/kG/Hr (0.97 mL/Hr) IV Continuous <Continuous>  dextrose 5% + lactated ringers. 1000 milliLiter(s) (50 mL/Hr) IV Continuous <Continuous>  fentaNYL   Infusion. 0.5 MICROgram(s)/kG/Hr (3.75 mL/Hr) IV Continuous <Continuous>  heparin   Injectable 5000 Unit(s) SubCutaneous every 8 hours  hydrocortisone sodium succinate Injectable 50 milliGRAM(s) IV Push once  influenza   Vaccine 0.5 milliLiter(s) IntraMuscular once  levETIRAcetam  IVPB 500 milliGRAM(s) IV Intermittent every 12 hours  meropenem  IVPB 2000 milliGRAM(s) IV Intermittent every 8 hours  pantoprazole   Suspension 40 milliGRAM(s) Oral daily  propofol Infusion 30 MICROgram(s)/kG/Min (13.5 mL/Hr) IV Continuous <Continuous>  senna 2 Tablet(s) Oral at bedtime    MEDICATIONS  (PRN):  sodium chloride 0.9% lock flush 10 milliLiter(s) IV Push every 1 hour PRN Pre/post blood products, medications, blood draw, and to maintain line patency    ALLERGIES:  Allergies    aspirin (Unknown)  shellfish (Hives)  shellfish (Unknown)  Tegretol (Unknown)    Intolerances    ACE inhibitors (Other)  angiotensin II inhibitors (Other)    LABS:                        8.9    3.40  )-----------( 76       ( 21 Dec 2020 02:06 )             28.0     12-21    143  |  116<H>  |  11  ----------------------------<  129<H>  3.4<L>   |  19<L>  |  0.54    Ca    7.9<L>      21 Dec 2020 02:06  Phos  2.1     12-21  Mg     1.8     12-21    TPro  3.7<L>  /  Alb  2.0<L>  /  TBili  0.2  /  DBili  x   /  AST  10  /  ALT  22  /  AlkPhos  60  12-21    PT/INR - ( 21 Dec 2020 02:06 )   PT: 11.7 sec;   INR: 1.02 ratio      PTT - ( 21 Dec 2020 02:06 )  PTT:39.1 sec    RADIOLOGY & ADDITIONAL TESTS: Reviewed.

## 2020-12-22 LAB
ALBUMIN SERPL ELPH-MCNC: 2.2 G/DL — LOW (ref 3.3–5)
ALP SERPL-CCNC: 65 U/L — SIGNIFICANT CHANGE UP (ref 40–120)
ALT FLD-CCNC: 18 U/L — SIGNIFICANT CHANGE UP (ref 4–41)
ANION GAP SERPL CALC-SCNC: 9 MMOL/L — SIGNIFICANT CHANGE UP (ref 7–14)
AST SERPL-CCNC: 8 U/L — SIGNIFICANT CHANGE UP (ref 4–40)
BASOPHILS # BLD AUTO: 0.01 K/UL — SIGNIFICANT CHANGE UP (ref 0–0.2)
BASOPHILS NFR BLD AUTO: 0.3 % — SIGNIFICANT CHANGE UP (ref 0–2)
BILIRUB SERPL-MCNC: 0.2 MG/DL — SIGNIFICANT CHANGE UP (ref 0.2–1.2)
BUN SERPL-MCNC: 11 MG/DL — SIGNIFICANT CHANGE UP (ref 7–23)
CALCIUM SERPL-MCNC: 8.3 MG/DL — LOW (ref 8.4–10.5)
CHLORIDE SERPL-SCNC: 118 MMOL/L — HIGH (ref 98–107)
CO2 SERPL-SCNC: 19 MMOL/L — LOW (ref 22–31)
CREAT SERPL-MCNC: 0.51 MG/DL — SIGNIFICANT CHANGE UP (ref 0.5–1.3)
CULTURE RESULTS: NO GROWTH — SIGNIFICANT CHANGE UP
CULTURE RESULTS: SIGNIFICANT CHANGE UP
EOSINOPHIL # BLD AUTO: 0.05 K/UL — SIGNIFICANT CHANGE UP (ref 0–0.5)
EOSINOPHIL NFR BLD AUTO: 1.6 % — SIGNIFICANT CHANGE UP (ref 0–6)
GLUCOSE SERPL-MCNC: 109 MG/DL — HIGH (ref 70–99)
HCT VFR BLD CALC: 30.7 % — LOW (ref 39–50)
HGB BLD-MCNC: 10 G/DL — LOW (ref 13–17)
IANC: 1.74 K/UL — SIGNIFICANT CHANGE UP (ref 1.5–8.5)
IMM GRANULOCYTES NFR BLD AUTO: 12.9 % — HIGH (ref 0–1.5)
LACTATE SERPL-SCNC: 1.7 MMOL/L — SIGNIFICANT CHANGE UP (ref 0.5–2)
LYMPHOCYTES # BLD AUTO: 0.44 K/UL — LOW (ref 1–3.3)
LYMPHOCYTES # BLD AUTO: 14.1 % — SIGNIFICANT CHANGE UP (ref 13–44)
MAGNESIUM SERPL-MCNC: 1.8 MG/DL — SIGNIFICANT CHANGE UP (ref 1.6–2.6)
MCHC RBC-ENTMCNC: 32.4 PG — SIGNIFICANT CHANGE UP (ref 27–34)
MCHC RBC-ENTMCNC: 32.6 GM/DL — SIGNIFICANT CHANGE UP (ref 32–36)
MCV RBC AUTO: 99.4 FL — SIGNIFICANT CHANGE UP (ref 80–100)
MONOCYTES # BLD AUTO: 0.47 K/UL — SIGNIFICANT CHANGE UP (ref 0–0.9)
MONOCYTES NFR BLD AUTO: 15.1 % — HIGH (ref 2–14)
NEUTROPHILS # BLD AUTO: 1.74 K/UL — LOW (ref 1.8–7.4)
NEUTROPHILS NFR BLD AUTO: 56 % — SIGNIFICANT CHANGE UP (ref 43–77)
NRBC # BLD: 2 /100 WBCS — SIGNIFICANT CHANGE UP
NRBC # FLD: 0.06 K/UL — HIGH
PHOSPHATE SERPL-MCNC: 3.7 MG/DL — SIGNIFICANT CHANGE UP (ref 2.5–4.5)
PLATELET # BLD AUTO: 78 K/UL — LOW (ref 150–400)
POTASSIUM SERPL-MCNC: 3.3 MMOL/L — LOW (ref 3.5–5.3)
POTASSIUM SERPL-SCNC: 3.3 MMOL/L — LOW (ref 3.5–5.3)
PROT SERPL-MCNC: 3.9 G/DL — LOW (ref 6–8.3)
RBC # BLD: 3.09 M/UL — LOW (ref 4.2–5.8)
RBC # FLD: 13.3 % — SIGNIFICANT CHANGE UP (ref 10.3–14.5)
SODIUM SERPL-SCNC: 146 MMOL/L — HIGH (ref 135–145)
SPECIMEN SOURCE: SIGNIFICANT CHANGE UP
SPECIMEN SOURCE: SIGNIFICANT CHANGE UP
WBC # BLD: 3.11 K/UL — LOW (ref 3.8–10.5)
WBC # FLD AUTO: 3.11 K/UL — LOW (ref 3.8–10.5)

## 2020-12-22 PROCEDURE — 99233 SBSQ HOSP IP/OBS HIGH 50: CPT

## 2020-12-22 PROCEDURE — 99291 CRITICAL CARE FIRST HOUR: CPT | Mod: 25

## 2020-12-22 PROCEDURE — 74177 CT ABD & PELVIS W/CONTRAST: CPT | Mod: 26

## 2020-12-22 RX ORDER — POTASSIUM CHLORIDE 20 MEQ
40 PACKET (EA) ORAL ONCE
Refills: 0 | Status: DISCONTINUED | OUTPATIENT
Start: 2020-12-22 | End: 2020-12-22

## 2020-12-22 RX ORDER — DEXTROSE 50 % IN WATER 50 %
12.5 SYRINGE (ML) INTRAVENOUS ONCE
Refills: 0 | Status: COMPLETED | OUTPATIENT
Start: 2020-12-22 | End: 2020-12-22

## 2020-12-22 RX ORDER — POTASSIUM CHLORIDE 20 MEQ
40 PACKET (EA) ORAL ONCE
Refills: 0 | Status: COMPLETED | OUTPATIENT
Start: 2020-12-22 | End: 2020-12-22

## 2020-12-22 RX ADMIN — LEVETIRACETAM 400 MILLIGRAM(S): 250 TABLET, FILM COATED ORAL at 05:25

## 2020-12-22 RX ADMIN — Medication 25 MILLIGRAM(S): at 05:25

## 2020-12-22 RX ADMIN — CHLORHEXIDINE GLUCONATE 15 MILLILITER(S): 213 SOLUTION TOPICAL at 05:25

## 2020-12-22 RX ADMIN — HEPARIN SODIUM 5000 UNIT(S): 5000 INJECTION INTRAVENOUS; SUBCUTANEOUS at 05:25

## 2020-12-22 RX ADMIN — LEVETIRACETAM 400 MILLIGRAM(S): 250 TABLET, FILM COATED ORAL at 18:50

## 2020-12-22 RX ADMIN — MEROPENEM 200 MILLIGRAM(S): 1 INJECTION INTRAVENOUS at 05:49

## 2020-12-22 RX ADMIN — HEPARIN SODIUM 5000 UNIT(S): 5000 INJECTION INTRAVENOUS; SUBCUTANEOUS at 13:21

## 2020-12-22 RX ADMIN — Medication 25 MILLIGRAM(S): at 18:52

## 2020-12-22 RX ADMIN — DEXMEDETOMIDINE HYDROCHLORIDE IN 0.9% SODIUM CHLORIDE 0.97 MICROGRAM(S)/KG/HR: 4 INJECTION INTRAVENOUS at 09:26

## 2020-12-22 RX ADMIN — CHLORHEXIDINE GLUCONATE 1 APPLICATION(S): 213 SOLUTION TOPICAL at 09:26

## 2020-12-22 RX ADMIN — MEROPENEM 200 MILLIGRAM(S): 1 INJECTION INTRAVENOUS at 22:10

## 2020-12-22 RX ADMIN — Medication 40 MILLIEQUIVALENT(S): at 05:49

## 2020-12-22 RX ADMIN — Medication 12.5 MILLILITER(S): at 19:20

## 2020-12-22 RX ADMIN — Medication 1 TABLET(S): at 18:52

## 2020-12-22 RX ADMIN — MEROPENEM 200 MILLIGRAM(S): 1 INJECTION INTRAVENOUS at 13:21

## 2020-12-22 RX ADMIN — SODIUM CHLORIDE 50 MILLILITER(S): 9 INJECTION, SOLUTION INTRAVENOUS at 09:27

## 2020-12-22 RX ADMIN — HEPARIN SODIUM 5000 UNIT(S): 5000 INJECTION INTRAVENOUS; SUBCUTANEOUS at 22:10

## 2020-12-22 NOTE — PROGRESS NOTE ADULT - ASSESSMENT
60 year old with GBM with prior craniotomy complicated by ESBL CNS infection/ meningitis.  Has  sdhunt in place.     Presented with change in MS/ sepsis.         1) H/o complicated CNS infection   Prior ESBL meningitis/ CNS infection  s/p surgery  Three courses of antibiotics  Was advised to take Bactrim for prophylaxis  I would resume Bactrim pending more data    S/p LP.  The LP was done three days after starting antimicrobials.  But only one WBC. I doubt CNS infection at this time.     2) Change MS    CNS imaging without clear evidence of recurrence  LP is not suggestive of infection    3) Elevated Lactate/ Hypotension  No clear focus of infection on current imaging or culture studies.     Consider CT a/p ( shunt drains into abd/ rule out collection    If no focus in abdomen, consider stopping meropenem at day 7    If MS worsens, consider repeat sampling from CSF off antibiotics (? sample from shunt)

## 2020-12-22 NOTE — PROGRESS NOTE ADULT - ASSESSMENT
60y M PMHx HTN, Anxiety (on Prozac), b/l DVT while on Coumadin (s/p IVF filter, now off coumadin), GBM s/p craniotomy w/ resection c/b ESBL meningitis for which repeat craniotomy (2/2020) performed and pt was given 8 weeks Meropenem, pt subsequently had repeat ESBL meningitis s/p craniectomy w/ washout w/ intrathecal Jin and 10 week course IV Jin, COVID infection in 3/2020 who was BIBEMS from [facility] for AMS, hypoxia and hypotension admitted for sepsis likely 2/2 intracranial infection in the setting of negative CTA and U/A.      #Neuro:  1. AMS: likely 2/2 sepsis given elevated WBC, lactate; infectious source may be meningitis. Pt is on Keppra however this is for seizure prophylaxis; per sister pt has had EEGs in the past w/out seizure activity. DDx also includes progression of GBM   - Intubated, c/w vent  - c/w propofol, fentanyl, wean as tolerated   - CTH 12/16 w/out acute pathology  - MRI 12/18 shows post-surgical changes, no residual mass/nodular enhancement  - f/u Neurosurgery recs/status of transfer to [facility] neurosurgeon Dr. Patrice Dean ([phone])    2. GBM s/p craniotomy w/resection and subsequent ESBL infection for which repeat craniotomy was performed (2/2020) now with  shunt  - c/w 500 BID Keppra for seizure ppx    #Cardiovascular:  1. Vasoplegic shock 2/2 sepsis  - s/p levophed (12/16-12/18)  - wean hydrocortisone 100 mg q12h to 50 q12 12/21, 25 q12 12/22, 25 qd 12/23, etc  - pt chronically on decadron 1mg qd, will start after hydrocortisone has been weaned off    #Respiratory:   1. Hypoxia likely 2/2 sepsis vs progression of brain disease   - CXR shows grossly clear lungs, some obstruction from pacer pads  - now intubated, c/w vent  - CTA 12/16 w/out evidence of PNA or PE     #GI:   - no active issues  - s/p OG tube  - c/w jevity feeds with OGT- hold before extubation  - will c/w D5 LR and trend BGs.     #:  - s/p 2.2 L NS in ED  - Continue to monitor SCr, UOP (goal 0.5 cc/kg/hr)    #Dermatologic:   - no acute issues    #ID:   1. Septic shock   - s/p 2.2 L NS  - troponins downtrending; elevated in the setting of hypotension and tachycardia as well as YOLIS  - trend lactate   - s/p vanc (12/16-12/21)  - c/w meropenem (12/16- )  - Consult ID for jin managment  - blood cx 12/16- ngtd   - UCx 12/16- negative    #Ethics:  FULL CODE  Pt has many physicians in the family who are actively following and will update their GOC as they go       60y M PMHx HTN, Anxiety (on Prozac), b/l DVT while on Coumadin (s/p IVF filter, now off coumadin), GBM s/p craniotomy w/ resection c/b ESBL meningitis for which repeat craniotomy (2/2020) performed and pt was given 8 weeks Meropenem, pt subsequently had repeat ESBL meningitis s/p craniectomy w/ washout w/ intrathecal Jin and 10 week course IV Jin, COVID infection in 3/2020 who was BIBEMS from [facility] for AMS, hypoxia and hypotension admitted for sepsis likely 2/2 intracranial infection in the setting of negative CTA and U/A.      #Neuro:  1. AMS: likely 2/2 sepsis given elevated WBC, lactate; infectious source may be meningitis. Pt is on Keppra however this is for seizure prophylaxis; per sister pt has had EEGs in the past w/out seizure activity. DDx also includes progression of GBM   - Extubated 12/22. Intubated 12/16 for AMS, hypoxia  - CTH 12/16 w/out acute pathology  - MRI 12/18 shows post-surgical changes, no residual mass/nodular enhancement  - Will call [facility] transfer center to transfer to [facility] neurosurgeon Dr. Patrice Dean ([phone])    2. GBM s/p craniotomy w/resection and subsequent ESBL infection for which repeat craniotomy was performed (2/2020) now with  shunt  - c/w 500 BID Keppra for seizure ppx    #Cardiovascular:  1. Vasoplegic shock 2/2 sepsis  - s/p levophed (12/16-12/18)  - wean hydrocortisone 100 mg q12h to 50 q12 12/21, 25 q12 12/22, 25 qd 12/23, etc  - pt chronically on decadron 1mg qd, will start after hydrocortisone has been weaned off    #Respiratory:   1. Hypoxia likely 2/2 sepsis vs progression of brain disease   - CXR shows grossly clear lungs, some obstruction from pacer pads  - Extubated 12/22. Intubated 12/16 for AMS, hypoxia  - CTA 12/16 w/out evidence of PNA or PE     #GI:   - no active issues  - will c/w D5 LR and trend BGs.     #:  - s/p 2.2 L NS in ED  - Continue to monitor SCr, UOP (goal 0.5 cc/kg/hr)    #Dermatologic:   - no acute issues    #ID:   1. Septic shock   - s/p 2.2 L NS  - troponins downtrending; elevated in the setting of hypotension and tachycardia as well as YOLIS  - trend lactate   - s/p vanc (12/16-12/21)  - c/w meropenem (12/16- )  - f/u CTAP to determine if there is a collection around  shunt in abdomen. if not, d/c jin 7d per ID  - blood cx 12/16- ngtd   - UCx 12/16- negative    #Ethics:  FULL CODE  Pt has many physicians in the family who are actively following and will update their GOC as they go

## 2020-12-22 NOTE — PROGRESS NOTE ADULT - SUBJECTIVE AND OBJECTIVE BOX
Follow Up:      Inverval History/ROS:Patient is a 60y old  Male who presents with a chief complaint of hypoxic, minimally responsive requiring intubation (21 Dec 2020 16:29)    Intubated  Weaning trial      Allergies    aspirin (Unknown)  shellfish (Hives)  shellfish (Unknown)  Tegretol (Unknown)    Intolerances    ACE inhibitors (Other)  angiotensin II inhibitors (Other)      ANTIMICROBIALS:  meropenem  IVPB 2000 every 8 hours      OTHER MEDS:  chlorhexidine 0.12% Liquid 15 milliLiter(s) Oral Mucosa every 12 hours  chlorhexidine 4% Liquid 1 Application(s) Topical <User Schedule>  dexMEDEtomidine Infusion 0.05 MICROgram(s)/kG/Hr IV Continuous <Continuous>  dextrose 5% + lactated ringers. 1000 milliLiter(s) IV Continuous <Continuous>  fentaNYL   Infusion. 0.5 MICROgram(s)/kG/Hr IV Continuous <Continuous>  heparin   Injectable 5000 Unit(s) SubCutaneous every 8 hours  hydrocortisone sodium succinate Injectable 25 milliGRAM(s) IV Push every 12 hours  influenza   Vaccine 0.5 milliLiter(s) IntraMuscular once  levETIRAcetam  IVPB 500 milliGRAM(s) IV Intermittent every 12 hours  pantoprazole   Suspension 40 milliGRAM(s) Oral daily  propofol Infusion 30 MICROgram(s)/kG/Min IV Continuous <Continuous>  senna 2 Tablet(s) Oral at bedtime  sodium chloride 0.9% lock flush 10 milliLiter(s) IV Push every 1 hour PRN      Vital Signs Last 24 Hrs  T(C): 35.8 (22 Dec 2020 08:00), Max: 36.5 (21 Dec 2020 16:00)  T(F): 96.4 (22 Dec 2020 08:00), Max: 97.7 (21 Dec 2020 16:00)  HR: 46 (22 Dec 2020 11:00) (44 - 61)  BP: --  BP(mean): --  RR: 14 (22 Dec 2020 11:00) (0 - 22)  SpO2: 98% (22 Dec 2020 11:00) (90% - 100%)    PHYSICAL EXAM:  General: [x ] intubated  HEAD/EYES: [ ] PERRL [x ] white sclera [ ] icterus  ENT:  [ ] normal [x ] supple [ ] thrush [ ] pharyngeal exudate  Cardiovascular:   [ ] murmur [x ] normal [ ] PPM/AICD  Respiratory:  [ x] clear to ausculation bilaterally  GI:  [ x] soft, non-tender, normal bowel sounds  :  [ ] francois [x ] no CVA tenderness   Musculoskeletal:  [ ] no synovitis  Neurologic:  [ ] non-focal exam   Skin:  [x ] no rash  Lymph: [x ] no lymphadenopathy  Psychiatric:  [ ] appropriate affect [ ] alert & oriented  Lines:  [x ] no phlebitis [ ] central line                                10.0   3.11  )-----------( 78       ( 22 Dec 2020 04:08 )             30.7       12-22    146<H>  |  118<H>  |  11  ----------------------------<  109<H>  3.3<L>   |  19<L>  |  0.51    Ca    8.3<L>      22 Dec 2020 04:08  Phos  3.7     12-22  Mg     1.8     12-22    TPro  3.9<L>  /  Alb  2.2<L>  /  TBili  0.2  /  DBili  x   /  AST  8   /  ALT  18  /  AlkPhos  65  12-22          MICROBIOLOGY:Culture Results:   No growth (12-19-20 @ 15:11)  Culture Results:   No growth (12-16-20 @ 16:00)  Culture Results:   No Growth Final (12-16-20 @ 16:00)      RADIOLOGY:

## 2020-12-22 NOTE — PROGRESS NOTE ADULT - SUBJECTIVE AND OBJECTIVE BOX
INTERVAL HPI/OVERNIGHT EVENTS:    SUBJECTIVE: NAEON. Patient seen and examined at bedside.     CONSTITUTIONAL: No weakness, fevers or chills  EYES/ENT: No visual changes;  No vertigo or throat pain   NECK: No pain or stiffness  RESPIRATORY: No cough, wheezing, hemoptysis; No shortness of breath  CARDIOVASCULAR: No chest pain or palpitations  GASTROINTESTINAL: No abdominal or epigastric pain. No nausea, vomiting, or hematemesis; No diarrhea or constipation. No melena or hematochezia.  GENITOURINARY: No dysuria, frequency or hematuria  NEUROLOGICAL: No numbness or weakness  SKIN: No itching, rashes    OBJECTIVE:    VITAL SIGNS:  ICU Vital Signs Last 24 Hrs  T(C): 35.8 (22 Dec 2020 08:00), Max: 36.5 (21 Dec 2020 16:00)  T(F): 96.4 (22 Dec 2020 08:00), Max: 97.7 (21 Dec 2020 16:00)  HR: 46 (22 Dec 2020 11:00) (44 - 61)  BP: --  BP(mean): --  ABP: 153/86 (22 Dec 2020 11:00) (128/76 - 163/93)  ABP(mean): 110 (22 Dec 2020 11:00) (94 - 118)  RR: 14 (22 Dec 2020 11:00) (0 - 22)  SpO2: 98% (22 Dec 2020 11:00) (90% - 100%)    Mode: CPAP with PS, FiO2: 21, PEEP: 5, PS: 10, MAP: 7    12-21 @ 07:01  -  12-22 @ 07:00  --------------------------------------------------------  IN: 1651.1 mL / OUT: 935 mL / NET: 716.1 mL    12-22 @ 07:01  -  12-22 @ 12:26  --------------------------------------------------------  IN: 259.5 mL / OUT: 240 mL / NET: 19.5 mL    CAPILLARY BLOOD GLUCOSE  POCT Blood Glucose.: 95 mg/dL (22 Dec 2020 05:46)    PHYSICAL EXAM:  GENERAL: NAD  HEAD:  Atraumatic, Normocephalic  EYES: EOMI, PERRLA, conjunctiva and sclera clear  NECK: Supple, No JVD  CHEST/LUNG: Extubated. Clear to auscultation bilaterally; No wheeze  HEART: Regular rate and rhythm; No murmurs, rubs, or gallops  ABDOMEN: Soft, Nontender, Nondistended; Bowel sounds present  EXTREMITIES:  2+ Peripheral Pulses, No clubbing, cyanosis, or edema  PSYCH: AAOx3  NEUROLOGY: R sided weakness at b/l  SKIN: No rashes or lesions    MEDICATIONS:  MEDICATIONS  (STANDING):  chlorhexidine 0.12% Liquid 15 milliLiter(s) Oral Mucosa every 12 hours  chlorhexidine 4% Liquid 1 Application(s) Topical <User Schedule>  dexMEDEtomidine Infusion 0.05 MICROgram(s)/kG/Hr (0.97 mL/Hr) IV Continuous <Continuous>  dextrose 5% + lactated ringers. 1000 milliLiter(s) (50 mL/Hr) IV Continuous <Continuous>  fentaNYL   Infusion. 0.5 MICROgram(s)/kG/Hr (3.75 mL/Hr) IV Continuous <Continuous>  heparin   Injectable 5000 Unit(s) SubCutaneous every 8 hours  hydrocortisone sodium succinate Injectable 25 milliGRAM(s) IV Push every 12 hours  influenza   Vaccine 0.5 milliLiter(s) IntraMuscular once  levETIRAcetam  IVPB 500 milliGRAM(s) IV Intermittent every 12 hours  meropenem  IVPB 2000 milliGRAM(s) IV Intermittent every 8 hours  pantoprazole   Suspension 40 milliGRAM(s) Oral daily  propofol Infusion 30 MICROgram(s)/kG/Min (13.5 mL/Hr) IV Continuous <Continuous>  senna 2 Tablet(s) Oral at bedtime  trimethoprim  160 mG/sulfamethoxazole 800 mG 1 Tablet(s) Oral two times a day    MEDICATIONS  (PRN):  sodium chloride 0.9% lock flush 10 milliLiter(s) IV Push every 1 hour PRN Pre/post blood products, medications, blood draw, and to maintain line patency    ALLERGIES:  Allergies    aspirin (Unknown)  shellfish (Hives)  shellfish (Unknown)  Tegretol (Unknown)    Intolerances    ACE inhibitors (Other)  angiotensin II inhibitors (Other)    LABS:                        10.0   3.11  )-----------( 78       ( 22 Dec 2020 04:08 )             30.7     12-22    146<H>  |  118<H>  |  11  ----------------------------<  109<H>  3.3<L>   |  19<L>  |  0.51    Ca    8.3<L>      22 Dec 2020 04:08  Phos  3.7     12-22  Mg     1.8     12-22    TPro  3.9<L>  /  Alb  2.2<L>  /  TBili  0.2  /  DBili  x   /  AST  8   /  ALT  18  /  AlkPhos  65  12-22    PT/INR - ( 21 Dec 2020 02:06 )   PT: 11.7 sec;   INR: 1.02 ratio      PTT - ( 21 Dec 2020 02:06 )  PTT:39.1 sec    RADIOLOGY & ADDITIONAL TESTS: Reviewed.

## 2020-12-22 NOTE — PROGRESS NOTE ADULT - ATTENDING COMMENTS
60 M with GBM s/p resection c/b ESBL meningitis and  shunt here with AMS, acute hypoxemic respiratory failure due to presumed meningitis versus other source of sepsis.  Case d/w patient's [facility] neurologist and ID here; also do not suspect meningitis, but he is clinically improving.  Able to extubate him to face tent today.  c/w abx for now, likely shorter course.  Unclear why he was on bactrim at high frequency, will c/w ppx bactrim for now (for PCP ppx).    d/w neurosurgery at [facility], will hold off on transfer for now per them

## 2020-12-23 LAB
ALBUMIN SERPL ELPH-MCNC: 2.5 G/DL — LOW (ref 3.3–5)
ALP SERPL-CCNC: 77 U/L — SIGNIFICANT CHANGE UP (ref 40–120)
ALT FLD-CCNC: 21 U/L — SIGNIFICANT CHANGE UP (ref 4–41)
ANION GAP SERPL CALC-SCNC: 8 MMOL/L — SIGNIFICANT CHANGE UP (ref 7–14)
APTT BLD: 37.7 SEC — HIGH (ref 27–36.3)
AST SERPL-CCNC: 16 U/L — SIGNIFICANT CHANGE UP (ref 4–40)
BASOPHILS # BLD AUTO: 0.04 K/UL — SIGNIFICANT CHANGE UP (ref 0–0.2)
BASOPHILS NFR BLD AUTO: 0.9 % — SIGNIFICANT CHANGE UP (ref 0–2)
BILIRUB SERPL-MCNC: 0.3 MG/DL — SIGNIFICANT CHANGE UP (ref 0.2–1.2)
BUN SERPL-MCNC: 6 MG/DL — LOW (ref 7–23)
CALCIUM SERPL-MCNC: 8.4 MG/DL — SIGNIFICANT CHANGE UP (ref 8.4–10.5)
CHLORIDE SERPL-SCNC: 113 MMOL/L — HIGH (ref 98–107)
CO2 SERPL-SCNC: 22 MMOL/L — SIGNIFICANT CHANGE UP (ref 22–31)
CREAT SERPL-MCNC: 0.54 MG/DL — SIGNIFICANT CHANGE UP (ref 0.5–1.3)
EOSINOPHIL # BLD AUTO: 0.04 K/UL — SIGNIFICANT CHANGE UP (ref 0–0.5)
EOSINOPHIL NFR BLD AUTO: 0.9 % — SIGNIFICANT CHANGE UP (ref 0–6)
GLUCOSE SERPL-MCNC: 68 MG/DL — LOW (ref 70–99)
HCT VFR BLD CALC: 35.8 % — LOW (ref 39–50)
HGB BLD-MCNC: 11.3 G/DL — LOW (ref 13–17)
IANC: 2.75 K/UL — SIGNIFICANT CHANGE UP (ref 1.5–8.5)
INR BLD: 1.08 RATIO — SIGNIFICANT CHANGE UP (ref 0.88–1.17)
LYMPHOCYTES # BLD AUTO: 0.15 K/UL — LOW (ref 1–3.3)
LYMPHOCYTES # BLD AUTO: 3.6 % — LOW (ref 13–44)
MCHC RBC-ENTMCNC: 31.6 GM/DL — LOW (ref 32–36)
MCHC RBC-ENTMCNC: 32.2 PG — SIGNIFICANT CHANGE UP (ref 27–34)
MCV RBC AUTO: 102 FL — HIGH (ref 80–100)
MONOCYTES # BLD AUTO: 0.36 K/UL — SIGNIFICANT CHANGE UP (ref 0–0.9)
MONOCYTES NFR BLD AUTO: 8.9 % — SIGNIFICANT CHANGE UP (ref 2–14)
NEUTROPHILS # BLD AUTO: 3.07 K/UL — SIGNIFICANT CHANGE UP (ref 1.8–7.4)
NEUTROPHILS NFR BLD AUTO: 70.5 % — SIGNIFICANT CHANGE UP (ref 43–77)
PLATELET # BLD AUTO: 114 K/UL — LOW (ref 150–400)
POTASSIUM SERPL-MCNC: 3.1 MMOL/L — LOW (ref 3.5–5.3)
POTASSIUM SERPL-SCNC: 3.1 MMOL/L — LOW (ref 3.5–5.3)
PROT SERPL-MCNC: 4.1 G/DL — LOW (ref 6–8.3)
PROTHROM AB SERPL-ACNC: 12.4 SEC — SIGNIFICANT CHANGE UP (ref 9.8–13.1)
RBC # BLD: 3.51 M/UL — LOW (ref 4.2–5.8)
RBC # FLD: 13.4 % — SIGNIFICANT CHANGE UP (ref 10.3–14.5)
SODIUM SERPL-SCNC: 143 MMOL/L — SIGNIFICANT CHANGE UP (ref 135–145)
WBC # BLD: 4.05 K/UL — SIGNIFICANT CHANGE UP (ref 3.8–10.5)
WBC # FLD AUTO: 4.05 K/UL — SIGNIFICANT CHANGE UP (ref 3.8–10.5)

## 2020-12-23 PROCEDURE — 99232 SBSQ HOSP IP/OBS MODERATE 35: CPT

## 2020-12-23 PROCEDURE — 99291 CRITICAL CARE FIRST HOUR: CPT | Mod: 25

## 2020-12-23 RX ORDER — HYDRALAZINE HCL 50 MG
10 TABLET ORAL ONCE
Refills: 0 | Status: COMPLETED | OUTPATIENT
Start: 2020-12-23 | End: 2020-12-23

## 2020-12-23 RX ORDER — DEXAMETHASONE 0.5 MG/5ML
1 ELIXIR ORAL DAILY
Refills: 0 | Status: DISCONTINUED | OUTPATIENT
Start: 2020-12-23 | End: 2020-12-30

## 2020-12-23 RX ORDER — POTASSIUM CHLORIDE 20 MEQ
40 PACKET (EA) ORAL EVERY 4 HOURS
Refills: 0 | Status: DISCONTINUED | OUTPATIENT
Start: 2020-12-23 | End: 2020-12-23

## 2020-12-23 RX ORDER — POTASSIUM CHLORIDE 20 MEQ
20 PACKET (EA) ORAL
Refills: 0 | Status: COMPLETED | OUTPATIENT
Start: 2020-12-23 | End: 2020-12-23

## 2020-12-23 RX ORDER — DEXTROSE 50 % IN WATER 50 %
25 SYRINGE (ML) INTRAVENOUS ONCE
Refills: 0 | Status: COMPLETED | OUTPATIENT
Start: 2020-12-23 | End: 2020-12-23

## 2020-12-23 RX ORDER — SODIUM CHLORIDE 9 MG/ML
1000 INJECTION, SOLUTION INTRAVENOUS
Refills: 0 | Status: DISCONTINUED | OUTPATIENT
Start: 2020-12-23 | End: 2020-12-23

## 2020-12-23 RX ADMIN — Medication 10 MILLIGRAM(S): at 06:54

## 2020-12-23 RX ADMIN — MEROPENEM 200 MILLIGRAM(S): 1 INJECTION INTRAVENOUS at 06:54

## 2020-12-23 RX ADMIN — MEROPENEM 200 MILLIGRAM(S): 1 INJECTION INTRAVENOUS at 14:27

## 2020-12-23 RX ADMIN — HEPARIN SODIUM 5000 UNIT(S): 5000 INJECTION INTRAVENOUS; SUBCUTANEOUS at 23:08

## 2020-12-23 RX ADMIN — MEROPENEM 200 MILLIGRAM(S): 1 INJECTION INTRAVENOUS at 23:08

## 2020-12-23 RX ADMIN — CHLORHEXIDINE GLUCONATE 1 APPLICATION(S): 213 SOLUTION TOPICAL at 06:20

## 2020-12-23 RX ADMIN — Medication 20 MILLIEQUIVALENT(S): at 10:47

## 2020-12-23 RX ADMIN — HEPARIN SODIUM 5000 UNIT(S): 5000 INJECTION INTRAVENOUS; SUBCUTANEOUS at 14:27

## 2020-12-23 RX ADMIN — SENNA PLUS 2 TABLET(S): 8.6 TABLET ORAL at 23:08

## 2020-12-23 RX ADMIN — Medication 25 MILLILITER(S): at 06:39

## 2020-12-23 RX ADMIN — Medication 1 MILLIGRAM(S): at 16:50

## 2020-12-23 RX ADMIN — HEPARIN SODIUM 5000 UNIT(S): 5000 INJECTION INTRAVENOUS; SUBCUTANEOUS at 06:19

## 2020-12-23 RX ADMIN — LEVETIRACETAM 400 MILLIGRAM(S): 250 TABLET, FILM COATED ORAL at 06:17

## 2020-12-23 RX ADMIN — Medication 20 MILLIEQUIVALENT(S): at 08:31

## 2020-12-23 RX ADMIN — LEVETIRACETAM 400 MILLIGRAM(S): 250 TABLET, FILM COATED ORAL at 17:12

## 2020-12-23 RX ADMIN — Medication 20 MILLIEQUIVALENT(S): at 11:57

## 2020-12-23 RX ADMIN — Medication 1 TABLET(S): at 06:19

## 2020-12-23 NOTE — PROGRESS NOTE ADULT - ASSESSMENT
60 year old with GBM with prior craniotomy complicated by ESBL CNS infection/ meningitis.  Has  sdhunt in place.     Presented with change in MS/ sepsis.   Now extubated.     1) H/o complicated CNS infection   Prior ESBL meningitis/ CNS infection  s/p surgery  Three courses of antibiotics  Was advised to take Bactrim for prophylaxis  I would resume Bactrim pending more data    S/p LP.  The LP was done three days after starting antimicrobials.  But only one WBC. I doubt CNS infection at this time.     2) Change MS    CNS imaging without clear evidence of recurrence  LP is not suggestive of infection    3) Elevated Lactate/ Hypotension  No clear focus of infection on current imaging or culture studies.     CT a/p without focus  Consider stopping meropenem at day 7    Call ID service with questions

## 2020-12-23 NOTE — PROGRESS NOTE ADULT - ATTENDING COMMENTS
60 M with GBM s/p resection c/b ESBL meningitis and  shunt here with AMS, acute hypoxemic respiratory failure due to presumed meningitis versus other source of sepsis. Hypoglycemic today, encourage po intake, may need to increase dextrose drip. c/w abx and ppx as per ID. 60 M with GBM s/p resection c/b ESBL meningitis and  shunt here with AMS, acute hypoxemic respiratory failure due to presumed meningitis versus other source of sepsis and septic shock. Hypoglycemic today, encourage po intake, may need to increase dextrose drip. c/w abx and ppx as per ID.  Tolerating being off ventilator.

## 2020-12-23 NOTE — PROGRESS NOTE ADULT - SUBJECTIVE AND OBJECTIVE BOX
Follow Up:      Inverval History/ROS:Patient is a 60y old  Male who presents with a chief complaint of hypoxic, minimally responsive requiring intubation (23 Dec 2020 11:44)    No fever.   Extubated  Alert- feels okay.     Allergies    aspirin (Unknown)  shellfish (Hives)  shellfish (Unknown)  Tegretol (Unknown)    Intolerances    ACE inhibitors (Other)  angiotensin II inhibitors (Other)      ANTIMICROBIALS:  meropenem  IVPB 2000 every 8 hours  trimethoprim  160 mG/sulfamethoxazole 800 mG 1 daily      OTHER MEDS:  chlorhexidine 4% Liquid 1 Application(s) Topical <User Schedule>  dexAMETHasone     Tablet 1 milliGRAM(s) Oral daily  dextrose 5% + lactated ringers. 1000 milliLiter(s) IV Continuous <Continuous>  heparin   Injectable 5000 Unit(s) SubCutaneous every 8 hours  influenza   Vaccine 0.5 milliLiter(s) IntraMuscular once  levETIRAcetam  IVPB 500 milliGRAM(s) IV Intermittent every 12 hours  pantoprazole   Suspension 40 milliGRAM(s) Oral daily  senna 2 Tablet(s) Oral at bedtime  sodium chloride 0.9% lock flush 10 milliLiter(s) IV Push every 1 hour PRN      Vital Signs Last 24 Hrs  T(C): 36.6 (23 Dec 2020 12:00), Max: 37.2 (23 Dec 2020 00:00)  T(F): 97.8 (23 Dec 2020 12:00), Max: 99 (23 Dec 2020 00:00)  HR: 96 (23 Dec 2020 12:00) (52 - 101)  BP: 121/94 (23 Dec 2020 12:00) (120/85 - 136/91)  BP(mean): 100 (23 Dec 2020 12:00) (93 - 101)  RR: 24 (23 Dec 2020 12:00) (18 - 24)  SpO2: 100% (23 Dec 2020 12:00) (96% - 100%)    PHYSICAL EXAM:  General: [ x] non-toxic  HEAD/EYES: [ ] PERRL x[ ] white sclera [ ] icterus  ENT:  [ ] normal x[ ] supple [ ] thrush [ ] pharyngeal exudate  Cardiovascular:   [ ] murmur [ ]x normal [ ] PPM/AICD  Respiratory:  [ x] clear to ausculation bilaterally  GI:  [x ] soft, non-tender, normal bowel sounds  :  [ ] francois [ x] no CVA tenderness   Musculoskeletal:  [ ] no synovitis  Neurologic:  [ ] non-focal exam   Skin:  [ x] no rash  Lymph: [ ] no lymphadenopathy  Psychiatric:  [ ] appropriate affect [x ] alert & oriented  Lines:  [x ] no phlebitis [ ] central line                                11.3   4.05  )-----------( 114      ( 23 Dec 2020 06:19 )             35.8       12-23    143  |  113<H>  |  6<L>  ----------------------------<  68<L>  3.1<L>   |  22  |  0.54    Ca    8.4      23 Dec 2020 06:19  Phos  3.7     12-22  Mg     1.8     12-22    TPro  4.1<L>  /  Alb  2.5<L>  /  TBili  0.3  /  DBili  x   /  AST  16  /  ALT  21  /  AlkPhos  77  12-23          MICROBIOLOGY:Culture Results:   No growth (12-19-20 @ 15:11)  Culture Results:   No growth (12-16-20 @ 16:00)  Culture Results:   No Growth Final (12-16-20 @ 16:00)      RADIOLOGY:

## 2020-12-23 NOTE — CHART NOTE - NSCHARTNOTEFT_GEN_A_CORE
MICU Transfer Note    Transfer from: MICU    Transfer to: (  ) Medicine    (  ) Telemetry     (   ) RCU        (    ) Palliative         (   ) Stroke Unit          (   ) __________________    Accepting Physician:  Signout given to:     MICU COURSE:    60y M PMHx HTN, Anxiety (on Prozac), b/l DVT while on Coumadin (s/p IVF filter, now off coumadin), GBM s/p craniotomy w/ resection c/b ESBL meningitis for which repeat craniotomy (2/2020) performed and pt was given 8 weeks Meropenem, pt subsequently had repeat ESBL meningitis s/p craniectomy w/ washout w/ intrathecal Jin and 10 week course IV Jin, COVID infection in 3/2020 who was BIBEMS from [facility] for AMS, hypoxia and hypotension. Pt started on levophed 12/16.    In the ED pt was intubated for airway protection (12/16- ). He was given 2.2 L NS, 200 mcg phenylephrine, started on levophed gtt, started on vanc and cefepime.  Labs notable for WBC 11.65, VBG 7.18/45/56/15/77.7. 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 Pt received neurosurgical care at [facility] w/ Dr. Patrice Cantu ([phone]).     In the MICU, pt started on vanc (12/16- ), meropenem (12/16- ). Pt's UCx (12/16) -negative, blood cx (12/16)- no growth to date, COVID PCR negative, CXR- clear lungs. CTA Chest 12/16- negative for PE, PNA. CT Head 12/16- post-surgical changes with no acute mass, hemorrhage, or midline shift. [facility] neurosurgery evaluated the pt and recommended an MRI brain w/wo contrast. This was performed 12/18 and showed post surgical changes, no evidence of residual mass or nodular enhancement in the surgical cavity. Also showed dural thickening/enhancement- likely reactive. Pt was weaned off of levophed 12/18. Neurosurgery tapped the  shunt 12/19- clear, colorless, glucose 48, protein 40, total nucleated cell 1. Pt continued to improve, weaned off sedation, more awake and alert- successfully extubated to face tent 12/22. Hydrocortisone tapered off and home decadron 1 mg qd restarted 12/23.     [facility] neurosurgeon Dr. Patrice Dean called 12/22- noted no need for transfer to [facility] given no longer febrile/septic, extubated, and improved mental status.     ASSESSMENT & PLAN:     60y M PMHx HTN, Anxiety (on Prozac), b/l DVT while on Coumadin (s/p IVF filter, now off coumadin), GBM s/p craniotomy w/ resection c/b ESBL meningitis for which repeat craniotomy (2/2020) performed and pt was given 8 weeks Meropenem, pt subsequently had repeat ESBL meningitis s/p craniectomy w/ washout w/ intrathecal Jin and 10 week course IV Jin, COVID infection in 3/2020 who was BIBEMS from [facility] for AMS, hypoxia and hypotension admitted for sepsis likely 2/2 intracranial infection in the setting of negative CTA and U/A.      #Neuro:  1. AMS: likely 2/2 sepsis given elevated WBC, lactate; infectious source may be meningitis. Pt is on Keppra however this is for seizure prophylaxis; per sister pt has had EEGs in the past w/out seizure activity. DDx also includes progression of GBM   - Extubated 12/22. Intubated 12/16 for AMS, hypoxia  - CTH 12/16 w/out acute pathology  - MRI 12/18 shows post-surgical changes, no residual mass/nodular enhancement  - Per Dr. De Anda's talk with [facility] neurosurgeon Dr. Patrice Dean ([phone]), likely no transfer given pt is stabilized, extubated, no longer in septic shock, and  shunt tap did not show and significant findings.  - c/w Bactrim DS qd. (Per pt's sister Maribel pt is to be on BID but provider called [facility] Dr. Dominguez (infectious disease) office- they had no records of Dr. Dominguez prescribing Bactrim for this pt. However they saw a Dr. Simeon (rad onc) who did but were not able to find any reasoning in notes as to why BID. Per house ID, no reason to have BID dosing)    2. GBM s/p craniotomy w/resection and subsequent ESBL infection for which repeat craniotomy was performed (2/2020) now with  shunt  - c/w 500 BID Keppra for seizure ppx    #Cardiovascular:  1. Vasoplegic shock 2/2 sepsis  - s/p levophed (12/16-12/18)  - start home decadron 1 mg PO qd as hydrocortisone has been weaned    #Respiratory:   1. Hypoxia likely 2/2 sepsis vs progression of brain disease   - CXR 12/16 shows grossly clear lungs, some obstruction from pacer pads  - Extubated 12/2 (Intubated 12/16 for AMS, hypoxia)  - CTA 12/16 w/out evidence of PNA or PE     #GI:   - no active issues  - pt tolerating PO diet  - will c/w D5 LR and trend BGs.     #:  - s/p 2.2 L NS in ED  - Continue to monitor SCr, UOP (goal 0.5 cc/kg/hr)    #Dermatologic:   - no acute issues    #ID:   1. Septic shock- resolved  - s/p 2.2 L NS  - troponins downtrending; elevated in the setting of hypotension and tachycardia as well as YOLIS  - trend lactate   - s/p vanc (12/16-12/21)  - CTAP 12/22 shows no infectious source in abd/pelvis  - d/c meropenem (12/16-12/23) per ID given CTAP findings  - blood cx 12/16- ngtd   - UCx 12/16- negative    #Ethics:  FULL CODE  Pt has many physicians in the family who are actively following and will update their GOC as they go    FOR FOLLOW UP:    - f/u BG- if pt tolerating PO diet and BG normalized, can wean off D5 LR (BG low 100s on D5 LR and tube feeds earlier in hospital course) MICU Transfer Note    Transfer from: MICU    Transfer to: ( X ) Medicine    (  ) Telemetry     (   ) RCU        (    ) Palliative         (   ) Stroke Unit          (   ) __________________    Accepting Physician: Dr. Fang  Signout given to:     MICU COURSE:    60y M PMHx HTN, Anxiety (on Prozac), b/l DVT while on Coumadin (s/p IVF filter, now off coumadin), GBM s/p craniotomy w/ resection c/b ESBL meningitis for which repeat craniotomy (2/2020) performed and pt was given 8 weeks Meropenem, pt subsequently had repeat ESBL meningitis s/p craniectomy w/ washout w/ intrathecal Jin and 10 week course IV Jin, COVID infection in 3/2020 who was BIBEMS from [facility] for AMS, hypoxia and hypotension. Pt started on levophed 12/16.    In the ED pt was intubated for airway protection (12/16- ). He was given 2.2 L NS, 200 mcg phenylephrine, started on levophed gtt, started on vanc and cefepime.  Labs notable for WBC 11.65, VBG 7.18/45/56/15/77.7. 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 Pt received neurosurgical care at [facility] w/ Dr. Patrice Cantu ([phone]).     In the MICU, pt started on vanc (12/16- ), meropenem (12/16- ). Pt's UCx (12/16) -negative, blood cx (12/16)- no growth to date, COVID PCR negative, CXR- clear lungs. CTA Chest 12/16- negative for PE, PNA. CT Head 12/16- post-surgical changes with no acute mass, hemorrhage, or midline shift. [facility] neurosurgery evaluated the pt and recommended an MRI brain w/wo contrast. This was performed 12/18 and showed post surgical changes, no evidence of residual mass or nodular enhancement in the surgical cavity. Also showed dural thickening/enhancement- likely reactive. Pt was weaned off of levophed 12/18. Neurosurgery tapped the  shunt 12/19- clear, colorless, glucose 48, protein 40, total nucleated cell 1. Pt continued to improve, weaned off sedation, more awake and alert- successfully extubated to face tent 12/22. Hydrocortisone tapered off and home decadron 1 mg qd restarted 12/23.     [facility] neurosurgeon Dr. Patrice Dean called 12/22- noted no need for transfer to [facility] given no longer febrile/septic, extubated, and improved mental status.     ASSESSMENT & PLAN:     60y M PMHx HTN, Anxiety (on Prozac), b/l DVT while on Coumadin (s/p IVF filter, now off coumadin), GBM s/p craniotomy w/ resection c/b ESBL meningitis for which repeat craniotomy (2/2020) performed and pt was given 8 weeks Meropenem, pt subsequently had repeat ESBL meningitis s/p craniectomy w/ washout w/ intrathecal Jin and 10 week course IV Jin, COVID infection in 3/2020 who was BIBEMS from [facility] for AMS, hypoxia and hypotension admitted for sepsis likely 2/2 intracranial infection in the setting of negative CTA and U/A.      #Neuro:  1. AMS: likely 2/2 sepsis given elevated WBC, lactate; infectious source may be meningitis. Pt is on Keppra however this is for seizure prophylaxis; per sister pt has had EEGs in the past w/out seizure activity. DDx also includes progression of GBM   - Extubated 12/22. Intubated 12/16 for AMS, hypoxia  - CTH 12/16 w/out acute pathology  - MRI 12/18 shows post-surgical changes, no residual mass/nodular enhancement  - Per Dr. De Anda's talk with [facility] neurosurgeon Dr. Patrice Dean ([phone]), likely no transfer given pt is stabilized, extubated, no longer in septic shock, and  shunt tap did not show and significant findings.  - c/w Bactrim DS qd. (Per pt's sister Maribel pt is to be on BID but provider called [facility] Dr. Dominguez (infectious disease) office- they had no records of Dr. Dominguez prescribing Bactrim for this pt. However they saw a Dr. Simeon (rad onc) who did but were not able to find any reasoning in notes as to why BID. Per house ID, no reason to have BID dosing)    2. GBM s/p craniotomy w/resection and subsequent ESBL infection for which repeat craniotomy was performed (2/2020) now with  shunt  - c/w 500 BID Keppra for seizure ppx    #Cardiovascular:  1. Vasoplegic shock 2/2 sepsis  - s/p levophed (12/16-12/18)  - start home decadron 1 mg PO qd as hydrocortisone has been weaned    #Respiratory:   1. Hypoxia likely 2/2 sepsis vs progression of brain disease   - CXR 12/16 shows grossly clear lungs, some obstruction from pacer pads  - Extubated 12/2 (Intubated 12/16 for AMS, hypoxia)  - CTA 12/16 w/out evidence of PNA or PE     #GI:   - no active issues  - pt tolerating PO diet  - will c/w D5 LR and trend BGs.     #:  - s/p 2.2 L NS in ED  - Continue to monitor SCr, UOP (goal 0.5 cc/kg/hr)    #Dermatologic:   - no acute issues    #ID:   1. Septic shock- resolved  - s/p 2.2 L NS  - troponins downtrending; elevated in the setting of hypotension and tachycardia as well as YOLIS  - trend lactate   - s/p vanc (12/16-12/21)  - CTAP 12/22 shows no infectious source in abd/pelvis  - d/c meropenem (12/16-12/23) per ID given CTAP findings  - blood cx 12/16- ngtd   - UCx 12/16- negative    #Ethics:  FULL CODE  Pt has many physicians in the family who are actively following and will update their GOC as they go    FOR FOLLOW UP:    - f/u BG- if pt tolerating PO diet and BG normalized, can wean off D5 LR (BG low 100s on D5 LR and tube feeds earlier in hospital course)

## 2020-12-23 NOTE — CHART NOTE - NSCHARTNOTEFT_GEN_A_CORE
MICU Transfer Note    Transfer from: MICU    Transfer to: ( X ) Medicine    (  ) Telemetry     (   ) RCU        (    ) Palliative         (   ) Stroke Unit          (   ) __________________    Accepting Physician: Dr. Fang  Signout given to:     MICU COURSE:    60y M PMHx HTN, Anxiety (on Prozac), b/l DVT while on Coumadin (s/p IVF filter, now off coumadin), GBM s/p craniotomy w/ resection c/b ESBL meningitis for which repeat craniotomy (2/2020) performed and pt was given 8 weeks Meropenem, pt subsequently had repeat ESBL meningitis s/p craniectomy w/ washout w/ intrathecal Jin and 10 week course IV Jin, COVID infection in 3/2020 who was BIBEMS from [facility] for AMS, hypoxia and hypotension. Pt started on levophed 12/16.    In the ED pt was intubated for airway protection (12/16- ). He was given 2.2 L NS, 200 mcg phenylephrine, started on levophed gtt, started on vanc and cefepime.  Labs notable for WBC 11.65, VBG 7.18/45/56/15/77.7. 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 Pt received neurosurgical care at [facility] w/ Dr. Patrice Cantu ([phone]).     In the MICU, pt started on vanc (12/16- ), meropenem (12/16- ). Pt's UCx (12/16) -negative, blood cx (12/16)- no growth to date, COVID PCR negative, CXR- clear lungs. CTA Chest 12/16- negative for PE, PNA. CT Head 12/16- post-surgical changes with no acute mass, hemorrhage, or midline shift. [facility] neurosurgery evaluated the pt and recommended an MRI brain w/wo contrast. This was performed 12/18 and showed post surgical changes, no evidence of residual mass or nodular enhancement in the surgical cavity. Also showed dural thickening/enhancement- likely reactive. Pt was weaned off of levophed 12/18. Neurosurgery tapped the  shunt 12/19- clear, colorless, glucose 48, protein 40, total nucleated cell 1. Pt continued to improve, weaned off sedation, more awake and alert- successfully extubated to face tent 12/22. Hydrocortisone tapered off and home decadron 1 mg qd restarted 12/23.     [facility] neurosurgeon Dr. Patrice Dean called 12/22- noted no need for transfer to [facility] given no longer febrile/septic, extubated, and improved mental status.     ASSESSMENT & PLAN:     60y M PMHx HTN, Anxiety (on Prozac), b/l DVT while on Coumadin (s/p IVF filter, now off coumadin), GBM s/p craniotomy w/ resection c/b ESBL meningitis for which repeat craniotomy (2/2020) performed and pt was given 8 weeks Meropenem, pt subsequently had repeat ESBL meningitis s/p craniectomy w/ washout w/ intrathecal Jin and 10 week course IV Jin, COVID infection in 3/2020 who was BIBEMS from [facility] for AMS, hypoxia and hypotension admitted for sepsis likely 2/2 intracranial infection in the setting of negative CTA and U/A.      #Neuro:  1. AMS: likely 2/2 sepsis given elevated WBC, lactate; infectious source may be meningitis. Pt is on Keppra however this is for seizure prophylaxis; per sister pt has had EEGs in the past w/out seizure activity. DDx also includes progression of GBM   - Extubated 12/22. Intubated 12/16 for AMS, hypoxia  - CTH 12/16 w/out acute pathology  - MRI 12/18 shows post-surgical changes, no residual mass/nodular enhancement  - Per Dr. De Anda's talk with [facility] neurosurgeon Dr. Patrice Dean ([phone]), likely no transfer given pt is stabilized, extubated, no longer in septic shock, and  shunt tap did not show and significant findings.  - c/w Bactrim DS qd. (Per pt's sister Maribel pt is to be on BID but provider called [facility] Dr. Dominguez (infectious disease) office- they had no records of Dr. Dominguez prescribing Bactrim for this pt. However they saw a Dr. Simeon (rad onc) who did but were not able to find any reasoning in notes as to why BID. Per house ID, no reason to have BID dosing)    2. GBM s/p craniotomy w/resection and subsequent ESBL infection for which repeat craniotomy was performed (2/2020) now with  shunt  - c/w 500 BID Keppra for seizure ppx    #Cardiovascular:  1. Vasoplegic shock 2/2 sepsis  - s/p levophed (12/16-12/18)  - start home decadron 1 mg PO qd as hydrocortisone has been weaned    #Respiratory:   1. Hypoxia likely 2/2 sepsis vs progression of brain disease   - CXR 12/16 shows grossly clear lungs, some obstruction from pacer pads  - Extubated 12/2 (Intubated 12/16 for AMS, hypoxia)  - CTA 12/16 w/out evidence of PNA or PE     #GI:   - no active issues  - pt tolerating PO diet  - will c/w D5 LR and trend BGs.     #:  - s/p 2.2 L NS in ED  - Continue to monitor SCr, UOP (goal 0.5 cc/kg/hr)    #Dermatologic:   - no acute issues    #ID:   1. Septic shock- resolved  - s/p 2.2 L NS  - troponins downtrending; elevated in the setting of hypotension and tachycardia as well as YOLIS  - trend lactate   - s/p vanc (12/16-12/21)  - CTAP 12/22 shows no infectious source in abd/pelvis  - d/c meropenem (12/16-12/23) per ID given CTAP findings  - blood cx 12/16- ngtd   - UCx 12/16- negative    #Ethics:  FULL CODE  Pt has many physicians in the family who are actively following and will update their GOC as they go    FOR FOLLOW UP:    - f/u BG- if pt tolerating PO diet and BG normalized, can wean off D5 LR (BG low 100s on D5 LR and tube feeds earlier in hospital course).

## 2020-12-23 NOTE — PROGRESS NOTE ADULT - ASSESSMENT
60y M PMHx HTN, Anxiety (on Prozac), b/l DVT while on Coumadin (s/p IVF filter, now off coumadin), GBM s/p craniotomy w/ resection c/b ESBL meningitis for which repeat craniotomy (2/2020) performed and pt was given 8 weeks Meropenem, pt subsequently had repeat ESBL meningitis s/p craniectomy w/ washout w/ intrathecal Jin and 10 week course IV Jin, COVID infection in 3/2020 who was BIBEMS from [facility] for AMS, hypoxia and hypotension admitted for sepsis likely 2/2 intracranial infection in the setting of negative CTA and U/A.      #Neuro:  1. AMS: likely 2/2 sepsis given elevated WBC, lactate; infectious source may be meningitis. Pt is on Keppra however this is for seizure prophylaxis; per sister pt has had EEGs in the past w/out seizure activity. DDx also includes progression of GBM   - Extubated 12/22. Intubated 12/16 for AMS, hypoxia  - CTH 12/16 w/out acute pathology  - MRI 12/18 shows post-surgical changes, no residual mass/nodular enhancement  - Per Dr. De Anda's talk with [facility] neurosurgeon Dr. Patrice Dean ([phone]), likely no transfer given pt is stabilized, extubated, no longer in septic shock, and  shunt tap did not show and significant findings.  - c/w Bactrim DS qd. (Per pt's     2. GBM s/p craniotomy w/resection and subsequent ESBL infection for which repeat craniotomy was performed (2/2020) now with  shunt  - c/w 500 BID Keppra for seizure ppx    #Cardiovascular:  1. Vasoplegic shock 2/2 sepsis  - s/p levophed (12/16-12/18)  - start home decadron 1 mg PO qd as hydrocortisone has been weaned    #Respiratory:   1. Hypoxia likely 2/2 sepsis vs progression of brain disease   - CXR 12/16 shows grossly clear lungs, some obstruction from pacer pads  - Extubated 12/2 (Intubated 12/16 for AMS, hypoxia)  - CTA 12/16 w/out evidence of PNA or PE     #GI:   - no active issues  - pt tolerating PO diet  - will c/w D5 LR and trend BGs.     #:  - s/p 2.2 L NS in ED  - Continue to monitor SCr, UOP (goal 0.5 cc/kg/hr)    #Dermatologic:   - no acute issues    #ID:   1. Septic shock- resolved  - s/p 2.2 L NS  - troponins downtrending; elevated in the setting of hypotension and tachycardia as well as YOLIS  - trend lactate   - s/p vanc (12/16-12/21)  - CTAP 12/22 shows no infectious source in abd/pelvis  - d/c meropenem (12/16-12/23) per ID given CTAP findings  - blood cx 12/16- ngtd   - UCx 12/16- negative    #Ethics:  FULL CODE  Pt has many physicians in the family who are actively following and will update their GOC as they go 60y M PMHx HTN, Anxiety (on Prozac), b/l DVT while on Coumadin (s/p IVF filter, now off coumadin), GBM s/p craniotomy w/ resection c/b ESBL meningitis for which repeat craniotomy (2/2020) performed and pt was given 8 weeks Meropenem, pt subsequently had repeat ESBL meningitis s/p craniectomy w/ washout w/ intrathecal Jin and 10 week course IV Jin, COVID infection in 3/2020 who was BIBEMS from [facility] for AMS, hypoxia and hypotension admitted for sepsis likely 2/2 intracranial infection in the setting of negative CTA and U/A.      #Neuro:  1. AMS: likely 2/2 sepsis given elevated WBC, lactate; infectious source may be meningitis. Pt is on Keppra however this is for seizure prophylaxis; per sister pt has had EEGs in the past w/out seizure activity. DDx also includes progression of GBM   - Extubated 12/22. Intubated 12/16 for AMS, hypoxia  - CTH 12/16 w/out acute pathology  - MRI 12/18 shows post-surgical changes, no residual mass/nodular enhancement  - Per Dr. De Anda's talk with [facility] neurosurgeon Dr. Patrice Dean ([phone]), likely no transfer given pt is stabilized, extubated, no longer in septic shock, and  shunt tap did not show and significant findings.  - c/w Bactrim DS qd. (Per pt's sister Maribel pt is to be on BID but provider called [facility] Dr. Dominguez (infectious disease) office- they had no records of Dr. Dominguez prescribing Bactrim for this pt. However they saw a Dr. Simeon (rad onc) who did but were not able to find any reasoning in notes as to why BID. Per house ID, no reason to have BID dosing)    2. GBM s/p craniotomy w/resection and subsequent ESBL infection for which repeat craniotomy was performed (2/2020) now with  shunt  - c/w 500 BID Keppra for seizure ppx    #Cardiovascular:  1. Vasoplegic shock 2/2 sepsis  - s/p levophed (12/16-12/18)  - start home decadron 1 mg PO qd as hydrocortisone has been weaned    #Respiratory:   1. Hypoxia likely 2/2 sepsis vs progression of brain disease   - CXR 12/16 shows grossly clear lungs, some obstruction from pacer pads  - Extubated 12/2 (Intubated 12/16 for AMS, hypoxia)  - CTA 12/16 w/out evidence of PNA or PE     #GI:   - no active issues  - pt tolerating PO diet  - will c/w D5 LR and trend BGs.     #:  - s/p 2.2 L NS in ED  - Continue to monitor SCr, UOP (goal 0.5 cc/kg/hr)    #Dermatologic:   - no acute issues    #ID:   1. Septic shock- resolved  - s/p 2.2 L NS  - troponins downtrending; elevated in the setting of hypotension and tachycardia as well as YOLIS  - trend lactate   - s/p vanc (12/16-12/21)  - CTAP 12/22 shows no infectious source in abd/pelvis  - d/c meropenem (12/16-12/23) per ID given CTAP findings  - blood cx 12/16- ngtd   - UCx 12/16- negative    #Ethics:  FULL CODE  Pt has many physicians in the family who are actively following and will update their GOC as they go

## 2020-12-23 NOTE — PROGRESS NOTE ADULT - SUBJECTIVE AND OBJECTIVE BOX
INTERVAL HPI/OVERNIGHT EVENTS:    SUBJECTIVE: NAEON. Patient seen and examined at bedside.     CONSTITUTIONAL: No fevers or chills  EYES/ENT: No visual changes;  No vertigo or throat pain   NECK: No pain or stiffness  RESPIRATORY: No cough, wheezing, hemoptysis; No shortness of breath  CARDIOVASCULAR: No chest pain or palpitations  GASTROINTESTINAL: No abdominal or epigastric pain. No nausea, vomiting, or hematemesis; No diarrhea or constipation. No melena or hematochezia.  GENITOURINARY: No dysuria, frequency or hematuria  NEUROLOGICAL: No numbness or weakness  SKIN: No itching, rashes    OBJECTIVE:    VITAL SIGNS:  ICU Vital Signs Last 24 Hrs  T(C): 37.1 (23 Dec 2020 08:00), Max: 37.2 (23 Dec 2020 00:00)  T(F): 98.8 (23 Dec 2020 08:00), Max: 99 (23 Dec 2020 00:00)  HR: 97 (23 Dec 2020 10:00) (52 - 101)  BP: 120/85 (23 Dec 2020 10:00) (120/85 - 120/85)  BP(mean): 93 (23 Dec 2020 10:00) (93 - 93)  ABP: 125/87 (23 Dec 2020 09:00) (125/87 - 168/91)  ABP(mean): 95 (23 Dec 2020 09:00) (95 - 146)  RR: 19 (23 Dec 2020 10:00) (16 - 22)  SpO2: 98% (23 Dec 2020 10:00) (96% - 100%)    12-22 @ 07:01  -  12-23 @ 07:00  --------------------------------------------------------  IN: 1759.5 mL / OUT: 3595 mL / NET: -1835.5 mL    12-23 @ 07:01  -  12-23 @ 11:44  --------------------------------------------------------  IN: 300 mL / OUT: 700 mL / NET: -400 mL    CAPILLARY BLOOD GLUCOSE  POCT Blood Glucose.: 72 mg/dL (23 Dec 2020 11:15)    PHYSICAL EXAM:  GENERAL: NAD  HEAD: +large L temporal surgical changes, no edema, discharge noted. NonTTP  EYES: EOMI, PERRLA, conjunctiva and sclera clear  NECK: Supple, No JVD  CHEST/LUNG: Clear to auscultation bilaterally; No wheeze  HEART: Tachycardic; No murmurs, rubs, or gallops  ABDOMEN: Soft, Nontender, Nondistended; Bowel sounds present  EXTREMITIES: 2+ Peripheral Pulses, No clubbing, cyanosis, or edema  PSYCH: AAOx2  NEUROLOGY: Conversational, coherent speech, able to lift head off bed and able to move all extremities  SKIN: No rashes or lesions    MEDICATIONS:  MEDICATIONS  (STANDING):  chlorhexidine 4% Liquid 1 Application(s) Topical <User Schedule>  dexAMETHasone     Tablet 1 milliGRAM(s) Oral daily  dextrose 5% + lactated ringers. 1000 milliLiter(s) (75 mL/Hr) IV Continuous <Continuous>  heparin   Injectable 5000 Unit(s) SubCutaneous every 8 hours  influenza   Vaccine 0.5 milliLiter(s) IntraMuscular once  levETIRAcetam  IVPB 500 milliGRAM(s) IV Intermittent every 12 hours  meropenem  IVPB 2000 milliGRAM(s) IV Intermittent every 8 hours  pantoprazole   Suspension 40 milliGRAM(s) Oral daily  potassium chloride    Tablet ER 20 milliEquivalent(s) Oral every 2 hours  senna 2 Tablet(s) Oral at bedtime  trimethoprim  160 mG/sulfamethoxazole 800 mG 1 Tablet(s) Oral daily    MEDICATIONS  (PRN):  sodium chloride 0.9% lock flush 10 milliLiter(s) IV Push every 1 hour PRN Pre/post blood products, medications, blood draw, and to maintain line patency    ALLERGIES:  Allergies    aspirin (Unknown)  shellfish (Hives)  shellfish (Unknown)  Tegretol (Unknown)    Intolerances    ACE inhibitors (Other)  angiotensin II inhibitors (Other)    LABS:                        11.3   4.05  )-----------( 114      ( 23 Dec 2020 06:19 )             35.8     12-23    143  |  113<H>  |  6<L>  ----------------------------<  68<L>  3.1<L>   |  22  |  0.54    Ca    8.4      23 Dec 2020 06:19  Phos  3.7     12-22  Mg     1.8     12-22    TPro  4.1<L>  /  Alb  2.5<L>  /  TBili  0.3  /  DBili  x   /  AST  16  /  ALT  21  /  AlkPhos  77  12-23    PT/INR - ( 23 Dec 2020 06:19 )   PT: 12.4 sec;   INR: 1.08 ratio      PTT - ( 23 Dec 2020 06:19 )  PTT:37.7 sec    RADIOLOGY & ADDITIONAL TESTS: Reviewed.

## 2020-12-24 DIAGNOSIS — E16.2 HYPOGLYCEMIA, UNSPECIFIED: ICD-10-CM

## 2020-12-24 DIAGNOSIS — Z02.9 ENCOUNTER FOR ADMINISTRATIVE EXAMINATIONS, UNSPECIFIED: ICD-10-CM

## 2020-12-24 DIAGNOSIS — Z29.9 ENCOUNTER FOR PROPHYLACTIC MEASURES, UNSPECIFIED: ICD-10-CM

## 2020-12-24 DIAGNOSIS — C71.9 MALIGNANT NEOPLASM OF BRAIN, UNSPECIFIED: ICD-10-CM

## 2020-12-24 DIAGNOSIS — R41.82 ALTERED MENTAL STATUS, UNSPECIFIED: ICD-10-CM

## 2020-12-24 LAB
ALBUMIN SERPL ELPH-MCNC: 2.5 G/DL — LOW (ref 3.3–5)
ALP SERPL-CCNC: 90 U/L — SIGNIFICANT CHANGE UP (ref 40–120)
ALT FLD-CCNC: 30 U/L — SIGNIFICANT CHANGE UP (ref 4–41)
ANION GAP SERPL CALC-SCNC: 11 MMOL/L — SIGNIFICANT CHANGE UP (ref 7–14)
AST SERPL-CCNC: 23 U/L — SIGNIFICANT CHANGE UP (ref 4–40)
BASOPHILS # BLD AUTO: 0.02 K/UL — SIGNIFICANT CHANGE UP (ref 0–0.2)
BASOPHILS NFR BLD AUTO: 0.3 % — SIGNIFICANT CHANGE UP (ref 0–2)
BILIRUB SERPL-MCNC: 0.4 MG/DL — SIGNIFICANT CHANGE UP (ref 0.2–1.2)
BUN SERPL-MCNC: 4 MG/DL — LOW (ref 7–23)
CALCIUM SERPL-MCNC: 8.8 MG/DL — SIGNIFICANT CHANGE UP (ref 8.4–10.5)
CHLORIDE SERPL-SCNC: 110 MMOL/L — HIGH (ref 98–107)
CO2 SERPL-SCNC: 23 MMOL/L — SIGNIFICANT CHANGE UP (ref 22–31)
CREAT SERPL-MCNC: 0.51 MG/DL — SIGNIFICANT CHANGE UP (ref 0.5–1.3)
EOSINOPHIL # BLD AUTO: 0.16 K/UL — SIGNIFICANT CHANGE UP (ref 0–0.5)
EOSINOPHIL NFR BLD AUTO: 2.5 % — SIGNIFICANT CHANGE UP (ref 0–6)
GLUCOSE SERPL-MCNC: 54 MG/DL — CRITICAL LOW (ref 70–99)
HCT VFR BLD CALC: 39.9 % — SIGNIFICANT CHANGE UP (ref 39–50)
HGB BLD-MCNC: 12.6 G/DL — LOW (ref 13–17)
IANC: 4.52 K/UL — SIGNIFICANT CHANGE UP (ref 1.5–8.5)
IMM GRANULOCYTES NFR BLD AUTO: 3.8 % — HIGH (ref 0–1.5)
LYMPHOCYTES # BLD AUTO: 0.61 K/UL — LOW (ref 1–3.3)
LYMPHOCYTES # BLD AUTO: 9.6 % — LOW (ref 13–44)
MAGNESIUM SERPL-MCNC: 2 MG/DL — SIGNIFICANT CHANGE UP (ref 1.6–2.6)
MCHC RBC-ENTMCNC: 31.6 GM/DL — LOW (ref 32–36)
MCHC RBC-ENTMCNC: 31.7 PG — SIGNIFICANT CHANGE UP (ref 27–34)
MCV RBC AUTO: 100.5 FL — HIGH (ref 80–100)
MONOCYTES # BLD AUTO: 0.79 K/UL — SIGNIFICANT CHANGE UP (ref 0–0.9)
MONOCYTES NFR BLD AUTO: 12.5 % — SIGNIFICANT CHANGE UP (ref 2–14)
NEUTROPHILS # BLD AUTO: 4.52 K/UL — SIGNIFICANT CHANGE UP (ref 1.8–7.4)
NEUTROPHILS NFR BLD AUTO: 71.3 % — SIGNIFICANT CHANGE UP (ref 43–77)
NRBC # BLD: 0 /100 WBCS — SIGNIFICANT CHANGE UP
NRBC # FLD: 0.03 K/UL — HIGH
PHOSPHATE SERPL-MCNC: 2.2 MG/DL — LOW (ref 2.5–4.5)
PLATELET # BLD AUTO: 131 K/UL — LOW (ref 150–400)
POTASSIUM SERPL-MCNC: 3.1 MMOL/L — LOW (ref 3.5–5.3)
POTASSIUM SERPL-SCNC: 3.1 MMOL/L — LOW (ref 3.5–5.3)
PROT SERPL-MCNC: 4.7 G/DL — LOW (ref 6–8.3)
RBC # BLD: 3.97 M/UL — LOW (ref 4.2–5.8)
RBC # FLD: 13.7 % — SIGNIFICANT CHANGE UP (ref 10.3–14.5)
SODIUM SERPL-SCNC: 144 MMOL/L — SIGNIFICANT CHANGE UP (ref 135–145)
WBC # BLD: 6.34 K/UL — SIGNIFICANT CHANGE UP (ref 3.8–10.5)
WBC # FLD AUTO: 6.34 K/UL — SIGNIFICANT CHANGE UP (ref 3.8–10.5)

## 2020-12-24 PROCEDURE — 99232 SBSQ HOSP IP/OBS MODERATE 35: CPT

## 2020-12-24 PROCEDURE — 99233 SBSQ HOSP IP/OBS HIGH 50: CPT | Mod: GC

## 2020-12-24 RX ORDER — DEXTROSE 50 % IN WATER 50 %
25 SYRINGE (ML) INTRAVENOUS ONCE
Refills: 0 | Status: DISCONTINUED | OUTPATIENT
Start: 2020-12-24 | End: 2021-01-05

## 2020-12-24 RX ORDER — SODIUM CHLORIDE 9 MG/ML
1000 INJECTION, SOLUTION INTRAVENOUS
Refills: 0 | Status: DISCONTINUED | OUTPATIENT
Start: 2020-12-24 | End: 2021-01-05

## 2020-12-24 RX ORDER — LEVETIRACETAM 250 MG/1
500 TABLET, FILM COATED ORAL
Refills: 0 | Status: DISCONTINUED | OUTPATIENT
Start: 2020-12-24 | End: 2021-01-05

## 2020-12-24 RX ORDER — SODIUM,POTASSIUM PHOSPHATES 278-250MG
1 POWDER IN PACKET (EA) ORAL ONCE
Refills: 0 | Status: COMPLETED | OUTPATIENT
Start: 2020-12-24 | End: 2020-12-24

## 2020-12-24 RX ORDER — DEXTROSE 50 % IN WATER 50 %
12.5 SYRINGE (ML) INTRAVENOUS ONCE
Refills: 0 | Status: DISCONTINUED | OUTPATIENT
Start: 2020-12-24 | End: 2021-01-05

## 2020-12-24 RX ORDER — SODIUM CHLORIDE 9 MG/ML
1000 INJECTION, SOLUTION INTRAVENOUS
Refills: 0 | Status: DISCONTINUED | OUTPATIENT
Start: 2020-12-24 | End: 2020-12-25

## 2020-12-24 RX ORDER — GLUCAGON INJECTION, SOLUTION 0.5 MG/.1ML
1 INJECTION, SOLUTION SUBCUTANEOUS ONCE
Refills: 0 | Status: DISCONTINUED | OUTPATIENT
Start: 2020-12-24 | End: 2021-01-05

## 2020-12-24 RX ORDER — POTASSIUM CHLORIDE 20 MEQ
40 PACKET (EA) ORAL ONCE
Refills: 0 | Status: COMPLETED | OUTPATIENT
Start: 2020-12-24 | End: 2020-12-24

## 2020-12-24 RX ORDER — DEXTROSE 50 % IN WATER 50 %
12.5 SYRINGE (ML) INTRAVENOUS ONCE
Refills: 0 | Status: COMPLETED | OUTPATIENT
Start: 2020-12-24 | End: 2020-12-24

## 2020-12-24 RX ORDER — FLUOXETINE HCL 10 MG
20 CAPSULE ORAL DAILY
Refills: 0 | Status: DISCONTINUED | OUTPATIENT
Start: 2020-12-24 | End: 2021-01-05

## 2020-12-24 RX ORDER — DEXTROSE 50 % IN WATER 50 %
15 SYRINGE (ML) INTRAVENOUS ONCE
Refills: 0 | Status: DISCONTINUED | OUTPATIENT
Start: 2020-12-24 | End: 2021-01-05

## 2020-12-24 RX ORDER — SODIUM CHLORIDE 9 MG/ML
1000 INJECTION, SOLUTION INTRAVENOUS
Refills: 0 | Status: DISCONTINUED | OUTPATIENT
Start: 2020-12-24 | End: 2020-12-24

## 2020-12-24 RX ORDER — DEXTROSE 50 % IN WATER 50 %
15 SYRINGE (ML) INTRAVENOUS ONCE
Refills: 0 | Status: COMPLETED | OUTPATIENT
Start: 2020-12-24 | End: 2020-12-24

## 2020-12-24 RX ADMIN — Medication 12.5 GRAM(S): at 18:59

## 2020-12-24 RX ADMIN — Medication 1 TABLET(S): at 12:17

## 2020-12-24 RX ADMIN — SENNA PLUS 2 TABLET(S): 8.6 TABLET ORAL at 21:22

## 2020-12-24 RX ADMIN — Medication 1 PACKET(S): at 12:17

## 2020-12-24 RX ADMIN — LEVETIRACETAM 500 MILLIGRAM(S): 250 TABLET, FILM COATED ORAL at 17:22

## 2020-12-24 RX ADMIN — Medication 40 MILLIEQUIVALENT(S): at 12:18

## 2020-12-24 RX ADMIN — Medication 15 GRAM(S): at 18:38

## 2020-12-24 RX ADMIN — LEVETIRACETAM 400 MILLIGRAM(S): 250 TABLET, FILM COATED ORAL at 05:56

## 2020-12-24 RX ADMIN — SODIUM CHLORIDE 100 MILLILITER(S): 9 INJECTION, SOLUTION INTRAVENOUS at 17:22

## 2020-12-24 RX ADMIN — Medication 1 MILLIGRAM(S): at 05:58

## 2020-12-24 RX ADMIN — Medication 12.5 GRAM(S): at 05:56

## 2020-12-24 RX ADMIN — HEPARIN SODIUM 5000 UNIT(S): 5000 INJECTION INTRAVENOUS; SUBCUTANEOUS at 12:18

## 2020-12-24 RX ADMIN — HEPARIN SODIUM 5000 UNIT(S): 5000 INJECTION INTRAVENOUS; SUBCUTANEOUS at 05:56

## 2020-12-24 RX ADMIN — SODIUM CHLORIDE 100 MILLILITER(S): 9 INJECTION, SOLUTION INTRAVENOUS at 08:31

## 2020-12-24 RX ADMIN — HEPARIN SODIUM 5000 UNIT(S): 5000 INJECTION INTRAVENOUS; SUBCUTANEOUS at 21:22

## 2020-12-24 RX ADMIN — PANTOPRAZOLE SODIUM 40 MILLIGRAM(S): 20 TABLET, DELAYED RELEASE ORAL at 12:17

## 2020-12-24 NOTE — PROGRESS NOTE ADULT - SUBJECTIVE AND OBJECTIVE BOX
Follow Up:      Inverval History/ROS:Patient is a 60y old  Male who presents with a chief complaint of hypoxic, minimally responsive requiring intubation (24 Dec 2020 07:39)    Tx from ICU to the floor.    Alert . No events    Allergies    aspirin (Unknown)  shellfish (Hives)  shellfish (Unknown)  Tegretol (Unknown)    Intolerances    ACE inhibitors (Other)  angiotensin II inhibitors (Other)      ANTIMICROBIALS:  trimethoprim  160 mG/sulfamethoxazole 800 mG 1 daily      OTHER MEDS:  dexAMETHasone     Tablet 1 milliGRAM(s) Oral daily  dextrose 40% Gel 15 Gram(s) Oral once  dextrose 5% + lactated ringers. 1000 milliLiter(s) IV Continuous <Continuous>  dextrose 5%. 1000 milliLiter(s) IV Continuous <Continuous>  dextrose 5%. 1000 milliLiter(s) IV Continuous <Continuous>  dextrose 50% Injectable 25 Gram(s) IV Push once  dextrose 50% Injectable 12.5 Gram(s) IV Push once  dextrose 50% Injectable 25 Gram(s) IV Push once  glucagon  Injectable 1 milliGRAM(s) IntraMuscular once  heparin   Injectable 5000 Unit(s) SubCutaneous every 8 hours  influenza   Vaccine 0.5 milliLiter(s) IntraMuscular once  levETIRAcetam  IVPB 500 milliGRAM(s) IV Intermittent every 12 hours  pantoprazole   Suspension 40 milliGRAM(s) Oral daily  senna 2 Tablet(s) Oral at bedtime  sodium chloride 0.9% lock flush 10 milliLiter(s) IV Push every 1 hour PRN      Vital Signs Last 24 Hrs  T(C): 37.4 (24 Dec 2020 00:00), Max: 37.4 (24 Dec 2020 00:00)  T(F): 99.3 (24 Dec 2020 00:00), Max: 99.3 (24 Dec 2020 00:00)  HR: 94 (24 Dec 2020 05:44) (81 - 110)  BP: 123/87 (24 Dec 2020 05:44) (114/82 - 148/99)  BP(mean): 93 (24 Dec 2020 03:00) (78 - 110)  RR: 20 (24 Dec 2020 05:44) (14 - 24)  SpO2: 95% (24 Dec 2020 05:44) (95% - 100%)    PHYSICAL EXAM:  General: [x ] non-toxic  HEAD/EYES: [ ] PERRL [x ] white sclera [ ] icterus  ENT:  [ ] normal [x ] supple [ ] thrush [ ] pharyngeal exudate  Cardiovascular:   [ ] murmur [x ] normal [ ] PPM/AICD  Respiratory:  x[ ] clear to ausculation bilaterally  GI:  [x ] soft, non-tender, normal bowel sounds  :  [ ] francois [ x] no CVA tenderness   Musculoskeletal:  [ ] no synovitis  Neurologic:  [ ] non-focal exam   Skin:  [x ] no rash  Lymph: [ ] no lymphadenopathy  Psychiatric:  [x ] appropriate affect [x] alert & oriented  Lines:  [x ] no phlebitis [ ] central line                                12.6   6.34  )-----------( 131      ( 24 Dec 2020 03:59 )             39.9       12-24    144  |  110<H>  |  4<L>  ----------------------------<  54<LL>  3.1<L>   |  23  |  0.51    Ca    8.8      24 Dec 2020 03:59  Phos  2.2     12-24  Mg     2.0     12-24    TPro  4.7<L>  /  Alb  2.5<L>  /  TBili  0.4  /  DBili  x   /  AST  23  /  ALT  30  /  AlkPhos  90  12-24          MICROBIOLOGY:Culture Results:   No growth (12-19-20 @ 15:11)      RADIOLOGY:

## 2020-12-24 NOTE — PROVIDER CONTACT NOTE (CRITICAL VALUE NOTIFICATION) - ACTION/TREATMENT ORDERED:
Will check fingerstick for patient. MD aware and notified. Will continue to monitor patient.
1 amp D50

## 2020-12-24 NOTE — PROGRESS NOTE ADULT - ASSESSMENT
60y M PMHx HTN, Anxiety (on Prozac), b/l DVT while on Coumadin (s/p IVF filter, now off coumadin), GBM s/p craniotomy w/ resection c/b ESBL meningitis for which repeat craniotomy (2/2020) performed and pt was given 8 weeks Meropenem, pt subsequently had repeat ESBL meningitis s/p craniectomy w/ washout w/ intrathecal Jin and 10 week course IV Jin, COVID infection in 3/2020 who was BIBEMS from [facility] for AMS, hypoxia and hypotension. s/p MICU stay for sepsis, w/o clear source identified. Now with hypogylcemia on D5LR.

## 2020-12-24 NOTE — PROGRESS NOTE ADULT - PROBLEM SELECTOR PLAN 4
Resolved. s/p pressors in the MICU. CTAP w/o intrabdominal pathology. Low cocnern for CNS infection per LP.   - s/p extubation in MICU on 12/2 (originally intubated fro airway protection)  - s/p meropenem course for 7 days

## 2020-12-24 NOTE — PROGRESS NOTE ADULT - ASSESSMENT
60 year old with GBM with prior craniotomy complicated by ESBL CNS infection/ meningitis.  Has  shunt in place.     Presented with change in MS/ sepsis.   Required intubation/ Now extubated.     1) H/o complicated CNS infection   Prior ESBL meningitis/ CNS infection  s/p surgery    Bactrim prophylaxis as per prior providers    2) Change MS    CNS imaging without clear evidence of recurrence  LP is not suggestive of infection  Improved    3) Elevated Lactate/ Hypotension  No clear focus of infection on current imaging or culture studies.     CT a/p without focus  S/p empiric meropenem course for 7 days .    Call ID service with questions

## 2020-12-24 NOTE — PROGRESS NOTE ADULT - ATTENDING COMMENTS
Patient seen and examined on the am of 12/24/2020    -Agree w/ D5 LR for now. Encourage PO. Monitor FS   -Transition IV keppra and PPI to PO  -continue to monitor MS    Dc planning back to rehab

## 2020-12-24 NOTE — PROGRESS NOTE ADULT - PROBLEM SELECTOR PLAN 1
s/p craniotomy w/resection and subsequent ESBL infection for which repeat craniotomy was performed (2/2020) now with  shunt  - c/w 500 BID Keppra for seizure ppx  -c/w Bactrim DS qd. (Per pt's sister Maribel pt is to be on BID but provider called [facility] Dr. Dominguez (infectious disease) office- they had no records of Dr. Dominguez prescribing Bactrim for this pt. However they saw a Dr. Simeon (rad onc) who did but were not able to find any reasoning in notes as to why BID. Per house ID, no reason to have BID dosing) s/p craniotomy w/resection and subsequent ESBL infection for which repeat craniotomy was performed (2/2020) now with  shunt  - c/w 500 BID Keppra for seizure ppx  -c/w Bactrim DS qd. (Per pt's sister Maribel pt is to be on BID but provider called [facility] Dr. Dominguez (infectious disease) office- they had no records of Dr. Dominugez prescribing Bactrim for this pt. However they saw a Dr. Simeon (rad onc) who did but were not able to find any reasoning in notes as to why BID. Per house ID, no reason to have BID dosing)  - s/p LP with low c/f CNS infection s/p craniotomy w/resection and subsequent ESBL infection for which repeat craniotomy was performed (2/2020) now with  shunt.  - c/w 500 BID Keppra PO for seizure ppx (hx of EEGs in the past without seizure activity)  - c/w home decadron 1mg PO qd. Weaned off hydrocortisone in MICU.   -c/w Bactrim DS qd. (Per pt's sister Maribel pt is to be on BID but provider called [facility] Dr. Dominguez (infectious disease) office- they had no records of Dr. Dominguez prescribing Bactrim for this pt. However they saw a Dr. Simeon (rad onc) who did but were not able to find any reasoning in notes as to why BID. Per house ID, no reason to have BID dosing)  - s/p LP with low c/f CNS infection  - Appreciate ID recs

## 2020-12-24 NOTE — PROGRESS NOTE ADULT - SUBJECTIVE AND OBJECTIVE BOX
PROGRESS NOTE:   Authored by Carla Zhou MD  Pager: [facility] [phone]; [facility] [phone]    Patient is a 60y old  Male who presents with a chief complaint of hypoxic, minimally responsive requiring intubation (23 Dec 2020 12:51)      SUBJECTIVE / OVERNIGHT EVENTS:  This AM,     MEDICATIONS  (STANDING):  dexAMETHasone     Tablet 1 milliGRAM(s) Oral daily  dextrose 40% Gel 15 Gram(s) Oral once  dextrose 5% + lactated ringers. 1000 milliLiter(s) (100 mL/Hr) IV Continuous <Continuous>  dextrose 5%. 1000 milliLiter(s) (50 mL/Hr) IV Continuous <Continuous>  dextrose 5%. 1000 milliLiter(s) (100 mL/Hr) IV Continuous <Continuous>  dextrose 50% Injectable 25 Gram(s) IV Push once  dextrose 50% Injectable 12.5 Gram(s) IV Push once  dextrose 50% Injectable 25 Gram(s) IV Push once  glucagon  Injectable 1 milliGRAM(s) IntraMuscular once  heparin   Injectable 5000 Unit(s) SubCutaneous every 8 hours  influenza   Vaccine 0.5 milliLiter(s) IntraMuscular once  levETIRAcetam  IVPB 500 milliGRAM(s) IV Intermittent every 12 hours  pantoprazole   Suspension 40 milliGRAM(s) Oral daily  senna 2 Tablet(s) Oral at bedtime  trimethoprim  160 mG/sulfamethoxazole 800 mG 1 Tablet(s) Oral daily    MEDICATIONS  (PRN):  sodium chloride 0.9% lock flush 10 milliLiter(s) IV Push every 1 hour PRN Pre/post blood products, medications, blood draw, and to maintain line patency      CAPILLARY BLOOD GLUCOSE      POCT Blood Glucose.: 89 mg/dL (24 Dec 2020 06:32)  POCT Blood Glucose.: 94 mg/dL (24 Dec 2020 06:31)  POCT Blood Glucose.: 123 mg/dL (24 Dec 2020 06:14)  POCT Blood Glucose.: 53 mg/dL (24 Dec 2020 05:40)  POCT Blood Glucose.: 68 mg/dL (23 Dec 2020 17:03)  POCT Blood Glucose.: 59 mg/dL (23 Dec 2020 17:01)  POCT Blood Glucose.: 72 mg/dL (23 Dec 2020 11:15)    I&O's Summary    23 Dec 2020 07:01  -  24 Dec 2020 07:00  --------------------------------------------------------  IN: 1050 mL / OUT: 2480 mL / NET: -1430 mL        PHYSICAL EXAM:  Vital Signs Last 24 Hrs  T(C): 37.4 (24 Dec 2020 00:00), Max: 37.4 (24 Dec 2020 00:00)  T(F): 99.3 (24 Dec 2020 00:00), Max: 99.3 (24 Dec 2020 00:00)  HR: 94 (24 Dec 2020 05:44) (81 - 110)  BP: 123/87 (24 Dec 2020 05:44) (114/82 - 148/99)  BP(mean): 93 (24 Dec 2020 03:00) (78 - 110)  RR: 20 (24 Dec 2020 05:44) (14 - 24)  SpO2: 95% (24 Dec 2020 05:44) (95% - 100%)    GENERAL: No acute distress, well-developed  HEAD:  Atraumatic, Normocephalic  EYES: EOMI, PERRLA, conjunctiva and sclera clear  NECK: Supple, no lymphadenopathy, no JVD  CHEST/LUNG: CTAB; No wheezes, rales, or rhonchi  HEART: Regular rate and rhythm; No murmurs, rubs, or gallops  ABDOMEN: Soft, non-tender, non-distended; normal bowel sounds, no organomegaly  EXTREMITIES:  2+ peripheral pulses b/l, No clubbing, cyanosis, or edema  NEUROLOGY: A&O x 3, no focal deficits  SKIN: No rashes or lesions    LABS:                        12.6   6.34  )-----------( 131      ( 24 Dec 2020 03:59 )             39.9     12-24    144  |  110<H>  |  4<L>  ----------------------------<  54<LL>  3.1<L>   |  23  |  0.51    Ca    8.8      24 Dec 2020 03:59  Phos  2.2     12-24  Mg     2.0     12-24    TPro  4.7<L>  /  Alb  2.5<L>  /  TBili  0.4  /  DBili  x   /  AST  23  /  ALT  30  /  AlkPhos  90  12-24    PT/INR - ( 23 Dec 2020 06:19 )   PT: 12.4 sec;   INR: 1.08 ratio         PTT - ( 23 Dec 2020 06:19 )  PTT:37.7 sec            RADIOLOGY & ADDITIONAL TESTS:  Results Reviewed:   Imaging Personally Reviewed:  Electrocardiogram Personally Reviewed:    COORDINATION OF CARE:  Care Discussed with Consultants/Other Providers [Y/N]:  Prior or Outpatient Records Reviewed [Y/N]:   PROGRESS NOTE:   Authored by Carla Zhou MD  Pager: [facility] [phone]; [facility] [phone]    Patient is a 60y old  Male who presents with a chief complaint of hypoxic, minimally responsive requiring intubation (23 Dec 2020 12:51)      SUBJECTIVE / OVERNIGHT EVENTS:  This AM, when asked how he is doing patient states he feels "strange". Patient unable to clarify what is making him feel strange. He is oriented to self and to time. When asked where he is, he gives me his address. Patients HCP is sisterMaribel, [phone]    MEDICATIONS  (STANDING):  dexAMETHasone     Tablet 1 milliGRAM(s) Oral daily  dextrose 40% Gel 15 Gram(s) Oral once  dextrose 5% + lactated ringers. 1000 milliLiter(s) (100 mL/Hr) IV Continuous <Continuous>  dextrose 5%. 1000 milliLiter(s) (50 mL/Hr) IV Continuous <Continuous>  dextrose 5%. 1000 milliLiter(s) (100 mL/Hr) IV Continuous <Continuous>  dextrose 50% Injectable 25 Gram(s) IV Push once  dextrose 50% Injectable 12.5 Gram(s) IV Push once  dextrose 50% Injectable 25 Gram(s) IV Push once  glucagon  Injectable 1 milliGRAM(s) IntraMuscular once  heparin   Injectable 5000 Unit(s) SubCutaneous every 8 hours  influenza   Vaccine 0.5 milliLiter(s) IntraMuscular once  levETIRAcetam  IVPB 500 milliGRAM(s) IV Intermittent every 12 hours  pantoprazole   Suspension 40 milliGRAM(s) Oral daily  senna 2 Tablet(s) Oral at bedtime  trimethoprim  160 mG/sulfamethoxazole 800 mG 1 Tablet(s) Oral daily    MEDICATIONS  (PRN):  sodium chloride 0.9% lock flush 10 milliLiter(s) IV Push every 1 hour PRN Pre/post blood products, medications, blood draw, and to maintain line patency      CAPILLARY BLOOD GLUCOSE      POCT Blood Glucose.: 89 mg/dL (24 Dec 2020 06:32)  POCT Blood Glucose.: 94 mg/dL (24 Dec 2020 06:31)  POCT Blood Glucose.: 123 mg/dL (24 Dec 2020 06:14)  POCT Blood Glucose.: 53 mg/dL (24 Dec 2020 05:40)  POCT Blood Glucose.: 68 mg/dL (23 Dec 2020 17:03)  POCT Blood Glucose.: 59 mg/dL (23 Dec 2020 17:01)  POCT Blood Glucose.: 72 mg/dL (23 Dec 2020 11:15)    I&O's Summary    23 Dec 2020 07:01  -  24 Dec 2020 07:00  --------------------------------------------------------  IN: 1050 mL / OUT: 2480 mL / NET: -1430 mL        PHYSICAL EXAM:  Vital Signs Last 24 Hrs  T(C): 37.4 (24 Dec 2020 00:00), Max: 37.4 (24 Dec 2020 00:00)  T(F): 99.3 (24 Dec 2020 00:00), Max: 99.3 (24 Dec 2020 00:00)  HR: 94 (24 Dec 2020 05:44) (81 - 110)  BP: 123/87 (24 Dec 2020 05:44) (114/82 - 148/99)  BP(mean): 93 (24 Dec 2020 03:00) (78 - 110)  RR: 20 (24 Dec 2020 05:44) (14 - 24)  SpO2: 95% (24 Dec 2020 05:44) (95% - 100%)    GENERAL: No acute distress, well-developed  HEAD:surgical changes  EYES:  PERRLA, conjunctiva and sclera clear  NECK: Supple, no lymphadenopathy, no JVD  CHEST/LUNG: CTAB; No wheezes, rales, or rhonchi  HEART: Regular rate and rhythm; No murmurs, rubs, or gallops  ABDOMEN: Soft, non-tender, obesem normal bowel sounds, no organomegaly  EXTREMITIES:  2+ peripheral pulses b/l, No clubbing, cyanosis, or edema  NEUROLOGY: A&O x 2, no focal deficits      LABS:                        12.6   6.34  )-----------( 131      ( 24 Dec 2020 03:59 )             39.9     12-24    144  |  110<H>  |  4<L>  ----------------------------<  54<LL>  3.1<L>   |  23  |  0.51    Ca    8.8      24 Dec 2020 03:59  Phos  2.2     12-24  Mg     2.0     12-24    TPro  4.7<L>  /  Alb  2.5<L>  /  TBili  0.4  /  DBili  x   /  AST  23  /  ALT  30  /  AlkPhos  90  12-24    PT/INR - ( 23 Dec 2020 06:19 )   PT: 12.4 sec;   INR: 1.08 ratio         PTT - ( 23 Dec 2020 06:19 )  PTT:37.7 sec            RADIOLOGY & ADDITIONAL TESTS:  Results Reviewed:   Imaging Personally Reviewed:  Electrocardiogram Personally Reviewed:    COORDINATION OF CARE:  Care Discussed with Consultants/Other Providers [Y/N]:  Prior or Outpatient Records Reviewed [Y/N]:

## 2020-12-25 LAB
A1C WITH ESTIMATED AVERAGE GLUCOSE RESULT: 4.8 % — SIGNIFICANT CHANGE UP (ref 4–5.6)
ALBUMIN SERPL ELPH-MCNC: 2.2 G/DL — LOW (ref 3.3–5)
ALP SERPL-CCNC: 79 U/L — SIGNIFICANT CHANGE UP (ref 40–120)
ALT FLD-CCNC: 28 U/L — SIGNIFICANT CHANGE UP (ref 4–41)
ANION GAP SERPL CALC-SCNC: 9 MMOL/L — SIGNIFICANT CHANGE UP (ref 7–14)
AST SERPL-CCNC: 20 U/L — SIGNIFICANT CHANGE UP (ref 4–40)
BILIRUB SERPL-MCNC: 0.3 MG/DL — SIGNIFICANT CHANGE UP (ref 0.2–1.2)
BUN SERPL-MCNC: <2 MG/DL — LOW (ref 7–23)
CALCIUM SERPL-MCNC: 8.2 MG/DL — LOW (ref 8.4–10.5)
CHLORIDE SERPL-SCNC: 108 MMOL/L — HIGH (ref 98–107)
CO2 SERPL-SCNC: 25 MMOL/L — SIGNIFICANT CHANGE UP (ref 22–31)
CREAT SERPL-MCNC: 0.5 MG/DL — SIGNIFICANT CHANGE UP (ref 0.5–1.3)
ESTIMATED AVERAGE GLUCOSE: 91 MG/DL — SIGNIFICANT CHANGE UP (ref 68–114)
GLUCOSE SERPL-MCNC: 75 MG/DL — SIGNIFICANT CHANGE UP (ref 70–99)
HCT VFR BLD CALC: 32.4 % — LOW (ref 39–50)
HGB BLD-MCNC: 10.7 G/DL — LOW (ref 13–17)
MAGNESIUM SERPL-MCNC: 2 MG/DL — SIGNIFICANT CHANGE UP (ref 1.6–2.6)
MCHC RBC-ENTMCNC: 32.2 PG — SIGNIFICANT CHANGE UP (ref 27–34)
MCHC RBC-ENTMCNC: 33 GM/DL — SIGNIFICANT CHANGE UP (ref 32–36)
MCV RBC AUTO: 97.6 FL — SIGNIFICANT CHANGE UP (ref 80–100)
NRBC # BLD: 0 /100 WBCS — SIGNIFICANT CHANGE UP
NRBC # FLD: 0 K/UL — SIGNIFICANT CHANGE UP
PHOSPHATE SERPL-MCNC: 2 MG/DL — LOW (ref 2.5–4.5)
PLATELET # BLD AUTO: 127 K/UL — LOW (ref 150–400)
POTASSIUM SERPL-MCNC: 3.2 MMOL/L — LOW (ref 3.5–5.3)
POTASSIUM SERPL-SCNC: 3.2 MMOL/L — LOW (ref 3.5–5.3)
PROT SERPL-MCNC: 4 G/DL — LOW (ref 6–8.3)
RBC # BLD: 3.32 M/UL — LOW (ref 4.2–5.8)
RBC # FLD: 13.5 % — SIGNIFICANT CHANGE UP (ref 10.3–14.5)
SODIUM SERPL-SCNC: 142 MMOL/L — SIGNIFICANT CHANGE UP (ref 135–145)
WBC # BLD: 6.4 K/UL — SIGNIFICANT CHANGE UP (ref 3.8–10.5)
WBC # FLD AUTO: 6.4 K/UL — SIGNIFICANT CHANGE UP (ref 3.8–10.5)

## 2020-12-25 PROCEDURE — 99233 SBSQ HOSP IP/OBS HIGH 50: CPT | Mod: GC

## 2020-12-25 RX ORDER — ACETAMINOPHEN 500 MG
1000 TABLET ORAL ONCE
Refills: 0 | Status: COMPLETED | OUTPATIENT
Start: 2020-12-25 | End: 2020-12-25

## 2020-12-25 RX ORDER — ENOXAPARIN SODIUM 100 MG/ML
40 INJECTION SUBCUTANEOUS DAILY
Refills: 0 | Status: DISCONTINUED | OUTPATIENT
Start: 2020-12-25 | End: 2021-01-05

## 2020-12-25 RX ORDER — POTASSIUM CHLORIDE 20 MEQ
10 PACKET (EA) ORAL
Refills: 0 | Status: COMPLETED | OUTPATIENT
Start: 2020-12-25 | End: 2020-12-25

## 2020-12-25 RX ORDER — SODIUM CHLORIDE 9 MG/ML
1000 INJECTION, SOLUTION INTRAVENOUS
Refills: 0 | Status: DISCONTINUED | OUTPATIENT
Start: 2020-12-25 | End: 2020-12-29

## 2020-12-25 RX ORDER — SODIUM,POTASSIUM PHOSPHATES 278-250MG
1 POWDER IN PACKET (EA) ORAL ONCE
Refills: 0 | Status: COMPLETED | OUTPATIENT
Start: 2020-12-25 | End: 2020-12-25

## 2020-12-25 RX ADMIN — Medication 1 MILLIGRAM(S): at 05:14

## 2020-12-25 RX ADMIN — Medication 100 MILLIEQUIVALENT(S): at 10:57

## 2020-12-25 RX ADMIN — Medication 1 PACKET(S): at 10:57

## 2020-12-25 RX ADMIN — LEVETIRACETAM 500 MILLIGRAM(S): 250 TABLET, FILM COATED ORAL at 17:00

## 2020-12-25 RX ADMIN — Medication 100 MILLIEQUIVALENT(S): at 12:10

## 2020-12-25 RX ADMIN — LEVETIRACETAM 500 MILLIGRAM(S): 250 TABLET, FILM COATED ORAL at 05:14

## 2020-12-25 RX ADMIN — SENNA PLUS 2 TABLET(S): 8.6 TABLET ORAL at 21:23

## 2020-12-25 RX ADMIN — Medication 20 MILLIGRAM(S): at 12:10

## 2020-12-25 RX ADMIN — SODIUM CHLORIDE 75 MILLILITER(S): 9 INJECTION, SOLUTION INTRAVENOUS at 14:31

## 2020-12-25 RX ADMIN — ENOXAPARIN SODIUM 40 MILLIGRAM(S): 100 INJECTION SUBCUTANEOUS at 12:10

## 2020-12-25 RX ADMIN — HEPARIN SODIUM 5000 UNIT(S): 5000 INJECTION INTRAVENOUS; SUBCUTANEOUS at 05:14

## 2020-12-25 RX ADMIN — Medication 1 TABLET(S): at 13:03

## 2020-12-25 RX ADMIN — PANTOPRAZOLE SODIUM 40 MILLIGRAM(S): 20 TABLET, DELAYED RELEASE ORAL at 12:10

## 2020-12-25 RX ADMIN — Medication 100 MILLIEQUIVALENT(S): at 13:03

## 2020-12-25 NOTE — PROGRESS NOTE ADULT - SUBJECTIVE AND OBJECTIVE BOX
PROGRESS NOTE:   Authored by Carla Zhou MD  Pager: [facility] [phone]; [facility] [phone]    Patient is a 60y old  Male who presents with a chief complaint of hypoxic, minimally responsive requiring intubation (24 Dec 2020 08:41)      SUBJECTIVE / OVERNIGHT EVENTS:  NAEON. Glucose    MEDICATIONS  (STANDING):  dexAMETHasone     Tablet 1 milliGRAM(s) Oral daily  dextrose 40% Gel 15 Gram(s) Oral once  dextrose 5% + lactated ringers. 1000 milliLiter(s) (100 mL/Hr) IV Continuous <Continuous>  dextrose 5%. 1000 milliLiter(s) (100 mL/Hr) IV Continuous <Continuous>  dextrose 5%. 1000 milliLiter(s) (50 mL/Hr) IV Continuous <Continuous>  dextrose 50% Injectable 25 Gram(s) IV Push once  dextrose 50% Injectable 12.5 Gram(s) IV Push once  dextrose 50% Injectable 25 Gram(s) IV Push once  FLUoxetine 20 milliGRAM(s) Oral daily  glucagon  Injectable 1 milliGRAM(s) IntraMuscular once  heparin   Injectable 5000 Unit(s) SubCutaneous every 8 hours  influenza   Vaccine 0.5 milliLiter(s) IntraMuscular once  levETIRAcetam 500 milliGRAM(s) Oral two times a day  pantoprazole   Suspension 40 milliGRAM(s) Oral daily  senna 2 Tablet(s) Oral at bedtime  trimethoprim  160 mG/sulfamethoxazole 800 mG 1 Tablet(s) Oral daily    MEDICATIONS  (PRN):  sodium chloride 0.9% lock flush 10 milliLiter(s) IV Push every 1 hour PRN Pre/post blood products, medications, blood draw, and to maintain line patency      CAPILLARY BLOOD GLUCOSE      POCT Blood Glucose.: 93 mg/dL (24 Dec 2020 21:33)  POCT Blood Glucose.: 88 mg/dL (24 Dec 2020 19:26)  POCT Blood Glucose.: 97 mg/dL (24 Dec 2020 19:24)  POCT Blood Glucose.: 72 mg/dL (24 Dec 2020 18:48)  POCT Blood Glucose.: 61 mg/dL (24 Dec 2020 18:13)  POCT Blood Glucose.: 68 mg/dL (24 Dec 2020 17:52)  POCT Blood Glucose.: 69 mg/dL (24 Dec 2020 17:50)  POCT Blood Glucose.: 94 mg/dL (24 Dec 2020 12:01)  POCT Blood Glucose.: 106 mg/dL (24 Dec 2020 10:09)  POCT Blood Glucose.: 103 mg/dL (24 Dec 2020 10:08)  POCT Blood Glucose.: 90 mg/dL (24 Dec 2020 09:42)  POCT Blood Glucose.: 101 mg/dL (24 Dec 2020 09:24)  POCT Blood Glucose.: 89 mg/dL (24 Dec 2020 09:03)  POCT Blood Glucose.: 84 mg/dL (24 Dec 2020 08:47)  POCT Blood Glucose.: 56 mg/dL (24 Dec 2020 08:27)  POCT Blood Glucose.: 59 mg/dL (24 Dec 2020 08:26)    I&O's Summary    23 Dec 2020 07:01  -  24 Dec 2020 07:00  --------------------------------------------------------  IN: 1050 mL / OUT: 2480 mL / NET: -1430 mL    24 Dec 2020 07:01  -  25 Dec 2020 06:45  --------------------------------------------------------  IN: 0 mL / OUT: 800 mL / NET: -800 mL        PHYSICAL EXAM:  Vital Signs Last 24 Hrs  T(C): 37.2 (25 Dec 2020 05:24), Max: 37.2 (25 Dec 2020 05:24)  T(F): 98.9 (25 Dec 2020 05:24), Max: 98.9 (25 Dec 2020 05:24)  HR: 89 (25 Dec 2020 05:24) (84 - 89)  BP: 142/95 (25 Dec 2020 05:24) (115/74 - 142/95)  BP(mean): --  RR: 18 (25 Dec 2020 05:24) (17 - 18)  SpO2: 98% (25 Dec 2020 05:24) (98% - 99%)    GENERAL: No acute distress, well-developed  HEAD:surgical changes  EYES:  PERRLA, conjunctiva and sclera clear  NECK: Supple, no lymphadenopathy, no JVD  CHEST/LUNG: CTAB; No wheezes, rales, or rhonchi  HEART: Regular rate and rhythm; No murmurs, rubs, or gallops  ABDOMEN: Soft, non-tender, obese, normal bowel sounds, no organomegaly  EXTREMITIES:  2+ peripheral pulses b/l, No clubbing, cyanosis, or edema  NEUROLOGY: A&O x 2, no focal deficits    LABS:                        12.6   6.34  )-----------( 131      ( 24 Dec 2020 03:59 )             39.9     12-24    144  |  110<H>  |  4<L>  ----------------------------<  54<LL>  3.1<L>   |  23  |  0.51    Ca    8.8      24 Dec 2020 03:59  Phos  2.2     12-24  Mg     2.0     12-24    TPro  4.7<L>  /  Alb  2.5<L>  /  TBili  0.4  /  DBili  x   /  AST  23  /  ALT  30  /  AlkPhos  90  12-24                RADIOLOGY & ADDITIONAL TESTS:  Results Reviewed:   Imaging Personally Reviewed:  Electrocardiogram Personally Reviewed:    COORDINATION OF CARE:  Care Discussed with Consultants/Other Providers [Y/N]:  Prior or Outpatient Records Reviewed [Y/N]:   PROGRESS NOTE:   Authored by Carla Zhou MD  Pager: [facility] [phone]; [facility] [phone]    Patient is a 60y old  Male who presents with a chief complaint of hypoxic, minimally responsive requiring intubation (24 Dec 2020 08:41)      SUBJECTIVE / OVERNIGHT EVENTS:  NAEON. This AM, alert and oriented to person and time. Denies CP, SOB, subjective f/c, n/v.     MEDICATIONS  (STANDING):  dexAMETHasone     Tablet 1 milliGRAM(s) Oral daily  dextrose 40% Gel 15 Gram(s) Oral once  dextrose 5% + lactated ringers. 1000 milliLiter(s) (100 mL/Hr) IV Continuous <Continuous>  dextrose 5%. 1000 milliLiter(s) (100 mL/Hr) IV Continuous <Continuous>  dextrose 5%. 1000 milliLiter(s) (50 mL/Hr) IV Continuous <Continuous>  dextrose 50% Injectable 25 Gram(s) IV Push once  dextrose 50% Injectable 12.5 Gram(s) IV Push once  dextrose 50% Injectable 25 Gram(s) IV Push once  FLUoxetine 20 milliGRAM(s) Oral daily  glucagon  Injectable 1 milliGRAM(s) IntraMuscular once  heparin   Injectable 5000 Unit(s) SubCutaneous every 8 hours  influenza   Vaccine 0.5 milliLiter(s) IntraMuscular once  levETIRAcetam 500 milliGRAM(s) Oral two times a day  pantoprazole   Suspension 40 milliGRAM(s) Oral daily  senna 2 Tablet(s) Oral at bedtime  trimethoprim  160 mG/sulfamethoxazole 800 mG 1 Tablet(s) Oral daily    MEDICATIONS  (PRN):  sodium chloride 0.9% lock flush 10 milliLiter(s) IV Push every 1 hour PRN Pre/post blood products, medications, blood draw, and to maintain line patency      CAPILLARY BLOOD GLUCOSE      POCT Blood Glucose.: 93 mg/dL (24 Dec 2020 21:33)  POCT Blood Glucose.: 88 mg/dL (24 Dec 2020 19:26)  POCT Blood Glucose.: 97 mg/dL (24 Dec 2020 19:24)  POCT Blood Glucose.: 72 mg/dL (24 Dec 2020 18:48)  POCT Blood Glucose.: 61 mg/dL (24 Dec 2020 18:13)  POCT Blood Glucose.: 68 mg/dL (24 Dec 2020 17:52)  POCT Blood Glucose.: 69 mg/dL (24 Dec 2020 17:50)  POCT Blood Glucose.: 94 mg/dL (24 Dec 2020 12:01)  POCT Blood Glucose.: 106 mg/dL (24 Dec 2020 10:09)  POCT Blood Glucose.: 103 mg/dL (24 Dec 2020 10:08)  POCT Blood Glucose.: 90 mg/dL (24 Dec 2020 09:42)  POCT Blood Glucose.: 101 mg/dL (24 Dec 2020 09:24)  POCT Blood Glucose.: 89 mg/dL (24 Dec 2020 09:03)  POCT Blood Glucose.: 84 mg/dL (24 Dec 2020 08:47)  POCT Blood Glucose.: 56 mg/dL (24 Dec 2020 08:27)  POCT Blood Glucose.: 59 mg/dL (24 Dec 2020 08:26)    I&O's Summary    23 Dec 2020 07:01  -  24 Dec 2020 07:00  --------------------------------------------------------  IN: 1050 mL / OUT: 2480 mL / NET: -1430 mL    24 Dec 2020 07:01  -  25 Dec 2020 06:45  --------------------------------------------------------  IN: 0 mL / OUT: 800 mL / NET: -800 mL        PHYSICAL EXAM:  Vital Signs Last 24 Hrs  T(C): 37.2 (25 Dec 2020 05:24), Max: 37.2 (25 Dec 2020 05:24)  T(F): 98.9 (25 Dec 2020 05:24), Max: 98.9 (25 Dec 2020 05:24)  HR: 89 (25 Dec 2020 05:24) (84 - 89)  BP: 142/95 (25 Dec 2020 05:24) (115/74 - 142/95)  BP(mean): --  RR: 18 (25 Dec 2020 05:24) (17 - 18)  SpO2: 98% (25 Dec 2020 05:24) (98% - 99%)    GENERAL: No acute distress, well-developed  HEAD:surgical changes  EYES:  PERRLA, conjunctiva and sclera clear  NECK: Supple, no lymphadenopathy, no JVD  CHEST/LUNG: CTAB; No wheezes, rales, or rhonchi  HEART: Regular rate and rhythm; No murmurs, rubs, or gallops  ABDOMEN: Soft, non-tender, obese, normal bowel sounds, no organomegaly  EXTREMITIES:  2+ peripheral pulses b/l, No clubbing, cyanosis, or edema  NEUROLOGY: A&O x 2, no focal deficits    LABS:                        12.6   6.34  )-----------( 131      ( 24 Dec 2020 03:59 )             39.9     12-24    144  |  110<H>  |  4<L>  ----------------------------<  54<LL>  3.1<L>   |  23  |  0.51    Ca    8.8      24 Dec 2020 03:59  Phos  2.2     12-24  Mg     2.0     12-24    TPro  4.7<L>  /  Alb  2.5<L>  /  TBili  0.4  /  DBili  x   /  AST  23  /  ALT  30  /  AlkPhos  90  12-24                RADIOLOGY & ADDITIONAL TESTS:  Results Reviewed:   Imaging Personally Reviewed:  Electrocardiogram Personally Reviewed:    COORDINATION OF CARE:  Care Discussed with Consultants/Other Providers [Y/N]:  Prior or Outpatient Records Reviewed [Y/N]:

## 2020-12-25 NOTE — PROGRESS NOTE ADULT - PROBLEM SELECTOR PLAN 1
s/p craniotomy w/resection and subsequent ESBL infection for which repeat craniotomy was performed (2/2020) now with  shunt.  - c/w 500 BID Keppra PO for seizure ppx (hx of EEGs in the past without seizure activity)  - c/w home decadron 1mg PO qd. Weaned off hydrocortisone in MICU.   -c/w Bactrim DS qd. (Per pt's sister Maribel pt is to be on BID but provider called [facility] Dr. Dominguez (infectious disease) office- they had no records of Dr. Dominguez prescribing Bactrim for this pt. However they saw a Dr. Simeon (rad onc) who did but were not able to find any reasoning in notes as to why BID. Per house ID, no reason to have BID dosing)  - s/p LP with low c/f CNS infection  - Appreciate ID recs

## 2020-12-25 NOTE — PROGRESS NOTE ADULT - PROBLEM SELECTOR PLAN 2
- Patient AxO to self and time. Per patient's sister and HCP, Maribel, patient is AxO x3, but can be aphasic, with blunted responses at time at baseline.

## 2020-12-25 NOTE — PROGRESS NOTE ADULT - PROBLEM SELECTOR PLAN 4
Resolved. s/p pressors in the MICU. CTAP w/o intrabdominal pathology. Low cocnern for CNS infection per LP.   - s/p extubation in MICU on 12/2 (originally intubated for airway protection)  - s/p meropenem course for 7 days in MICU

## 2020-12-26 LAB
ANION GAP SERPL CALC-SCNC: 8 MMOL/L — SIGNIFICANT CHANGE UP (ref 7–14)
BUN SERPL-MCNC: <2 MG/DL — LOW (ref 7–23)
CALCIUM SERPL-MCNC: 8.4 MG/DL — SIGNIFICANT CHANGE UP (ref 8.4–10.5)
CHLORIDE SERPL-SCNC: 110 MMOL/L — HIGH (ref 98–107)
CO2 SERPL-SCNC: 24 MMOL/L — SIGNIFICANT CHANGE UP (ref 22–31)
CREAT SERPL-MCNC: 0.54 MG/DL — SIGNIFICANT CHANGE UP (ref 0.5–1.3)
GLUCOSE SERPL-MCNC: 83 MG/DL — SIGNIFICANT CHANGE UP (ref 70–99)
HCT VFR BLD CALC: 34.5 % — LOW (ref 39–50)
HGB BLD-MCNC: 11.2 G/DL — LOW (ref 13–17)
MAGNESIUM SERPL-MCNC: 1.9 MG/DL — SIGNIFICANT CHANGE UP (ref 1.6–2.6)
MCHC RBC-ENTMCNC: 32.5 GM/DL — SIGNIFICANT CHANGE UP (ref 32–36)
MCHC RBC-ENTMCNC: 32.6 PG — SIGNIFICANT CHANGE UP (ref 27–34)
MCV RBC AUTO: 100.3 FL — HIGH (ref 80–100)
NRBC # BLD: 0 /100 WBCS — SIGNIFICANT CHANGE UP
NRBC # FLD: 0 K/UL — SIGNIFICANT CHANGE UP
PHOSPHATE SERPL-MCNC: 2.7 MG/DL — SIGNIFICANT CHANGE UP (ref 2.5–4.5)
PLATELET # BLD AUTO: 132 K/UL — LOW (ref 150–400)
POTASSIUM SERPL-MCNC: 3.3 MMOL/L — LOW (ref 3.5–5.3)
POTASSIUM SERPL-SCNC: 3.3 MMOL/L — LOW (ref 3.5–5.3)
RBC # BLD: 3.44 M/UL — LOW (ref 4.2–5.8)
RBC # FLD: 13.5 % — SIGNIFICANT CHANGE UP (ref 10.3–14.5)
SODIUM SERPL-SCNC: 142 MMOL/L — SIGNIFICANT CHANGE UP (ref 135–145)
WBC # BLD: 5.75 K/UL — SIGNIFICANT CHANGE UP (ref 3.8–10.5)
WBC # FLD AUTO: 5.75 K/UL — SIGNIFICANT CHANGE UP (ref 3.8–10.5)

## 2020-12-26 PROCEDURE — 99233 SBSQ HOSP IP/OBS HIGH 50: CPT

## 2020-12-26 RX ORDER — POTASSIUM CHLORIDE 20 MEQ
40 PACKET (EA) ORAL ONCE
Refills: 0 | Status: COMPLETED | OUTPATIENT
Start: 2020-12-26 | End: 2020-12-26

## 2020-12-26 RX ADMIN — Medication 40 MILLIEQUIVALENT(S): at 11:51

## 2020-12-26 RX ADMIN — LEVETIRACETAM 500 MILLIGRAM(S): 250 TABLET, FILM COATED ORAL at 17:41

## 2020-12-26 RX ADMIN — PANTOPRAZOLE SODIUM 40 MILLIGRAM(S): 20 TABLET, DELAYED RELEASE ORAL at 11:51

## 2020-12-26 RX ADMIN — ENOXAPARIN SODIUM 40 MILLIGRAM(S): 100 INJECTION SUBCUTANEOUS at 11:51

## 2020-12-26 RX ADMIN — Medication 20 MILLIGRAM(S): at 11:51

## 2020-12-26 RX ADMIN — LEVETIRACETAM 500 MILLIGRAM(S): 250 TABLET, FILM COATED ORAL at 05:03

## 2020-12-26 RX ADMIN — SODIUM CHLORIDE 75 MILLILITER(S): 9 INJECTION, SOLUTION INTRAVENOUS at 11:51

## 2020-12-26 RX ADMIN — Medication 1 MILLIGRAM(S): at 05:03

## 2020-12-26 RX ADMIN — SENNA PLUS 2 TABLET(S): 8.6 TABLET ORAL at 21:30

## 2020-12-26 RX ADMIN — Medication 1 TABLET(S): at 11:51

## 2020-12-26 NOTE — PROGRESS NOTE ADULT - SUBJECTIVE AND OBJECTIVE BOX
PROGRESS NOTE:   Authored by Carla Zhou MD  Pager: [facility] [phone]; [facility] [phone]    Patient is a 60y old  Male who presents with a chief complaint of hypoxic, minimally responsive requiring intubation (25 Dec 2020 06:44)      SUBJECTIVE / OVERNIGHT EVENTS:    ADDITIONAL REVIEW OF SYSTEMS:    MEDICATIONS  (STANDING):  dexAMETHasone     Tablet 1 milliGRAM(s) Oral daily  dextrose 10% + sodium chloride 0.45%. 1000 milliLiter(s) (75 mL/Hr) IV Continuous <Continuous>  dextrose 40% Gel 15 Gram(s) Oral once  dextrose 5%. 1000 milliLiter(s) (100 mL/Hr) IV Continuous <Continuous>  dextrose 5%. 1000 milliLiter(s) (50 mL/Hr) IV Continuous <Continuous>  dextrose 50% Injectable 25 Gram(s) IV Push once  dextrose 50% Injectable 12.5 Gram(s) IV Push once  dextrose 50% Injectable 25 Gram(s) IV Push once  enoxaparin Injectable 40 milliGRAM(s) SubCutaneous daily  FLUoxetine 20 milliGRAM(s) Oral daily  glucagon  Injectable 1 milliGRAM(s) IntraMuscular once  influenza   Vaccine 0.5 milliLiter(s) IntraMuscular once  levETIRAcetam 500 milliGRAM(s) Oral two times a day  pantoprazole   Suspension 40 milliGRAM(s) Oral daily  senna 2 Tablet(s) Oral at bedtime  trimethoprim  160 mG/sulfamethoxazole 800 mG 1 Tablet(s) Oral daily    MEDICATIONS  (PRN):  sodium chloride 0.9% lock flush 10 milliLiter(s) IV Push every 1 hour PRN Pre/post blood products, medications, blood draw, and to maintain line patency      CAPILLARY BLOOD GLUCOSE      POCT Blood Glucose.: 74 mg/dL (25 Dec 2020 21:32)  POCT Blood Glucose.: 84 mg/dL (25 Dec 2020 16:54)  POCT Blood Glucose.: 76 mg/dL (25 Dec 2020 11:57)  POCT Blood Glucose.: 76 mg/dL (25 Dec 2020 08:17)    I&O's Summary    24 Dec 2020 07:01  -  25 Dec 2020 07:00  --------------------------------------------------------  IN: 0 mL / OUT: 800 mL / NET: -800 mL    25 Dec 2020 07:01  -  26 Dec 2020 06:55  --------------------------------------------------------  IN: 0 mL / OUT: 4200 mL / NET: -4200 mL        PHYSICAL EXAM:  Vital Signs Last 24 Hrs  T(C): 36.6 (26 Dec 2020 05:22), Max: 37.2 (25 Dec 2020 14:16)  T(F): 97.9 (26 Dec 2020 05:22), Max: 98.9 (25 Dec 2020 14:16)  HR: 70 (26 Dec 2020 05:22) (70 - 84)  BP: 142/94 (26 Dec 2020 05:22) (137/97 - 142/94)  BP(mean): --  RR: 18 (26 Dec 2020 05:22) (17 - 18)  SpO2: 98% (26 Dec 2020 05:22) (98% - 99%)    GENERAL: No acute distress, well-developed  HEAD:  Atraumatic, Normocephalic  EYES: EOMI, PERRLA, conjunctiva and sclera clear  NECK: Supple, no lymphadenopathy, no JVD  CHEST/LUNG: CTAB; No wheezes, rales, or rhonchi  HEART: Regular rate and rhythm; No murmurs, rubs, or gallops  ABDOMEN: Soft, non-tender, non-distended; normal bowel sounds, no organomegaly  EXTREMITIES:  2+ peripheral pulses b/l, No clubbing, cyanosis, or edema  NEUROLOGY: A&O x 3, no focal deficits  SKIN: No rashes or lesions    LABS:                        10.7   6.40  )-----------( 127      ( 25 Dec 2020 07:48 )             32.4     12-25    142  |  108<H>  |  <2<L>  ----------------------------<  75  3.2<L>   |  25  |  0.50    Ca    8.2<L>      25 Dec 2020 07:48  Phos  2.0     12-25  Mg     2.0     12-25    TPro  4.0<L>  /  Alb  2.2<L>  /  TBili  0.3  /  DBili  x   /  AST  20  /  ALT  28  /  AlkPhos  79  12-25                RADIOLOGY & ADDITIONAL TESTS:  Results Reviewed:   Imaging Personally Reviewed:  Electrocardiogram Personally Reviewed:    COORDINATION OF CARE:  Care Discussed with Consultants/Other Providers [Y/N]:  Prior or Outpatient Records Reviewed [Y/N]:   PROGRESS NOTE:   Authored by Carla Zhou MD  Pager: [facility] [phone]; [facility] [phone]    Patient is a 60y old  Male who presents with a chief complaint of hypoxic, minimally responsive requiring intubation (25 Dec 2020 06:44)      SUBJECTIVE / OVERNIGHT EVENTS:  NAEON.  Alerted and oriented to person and time. States he does not have an appetite. Denies CP, SOB, subjective f/c, n/v.     MEDICATIONS  (STANDING):  dexAMETHasone     Tablet 1 milliGRAM(s) Oral daily  dextrose 10% + sodium chloride 0.45%. 1000 milliLiter(s) (75 mL/Hr) IV Continuous <Continuous>  dextrose 40% Gel 15 Gram(s) Oral once  dextrose 5%. 1000 milliLiter(s) (100 mL/Hr) IV Continuous <Continuous>  dextrose 5%. 1000 milliLiter(s) (50 mL/Hr) IV Continuous <Continuous>  dextrose 50% Injectable 25 Gram(s) IV Push once  dextrose 50% Injectable 12.5 Gram(s) IV Push once  dextrose 50% Injectable 25 Gram(s) IV Push once  enoxaparin Injectable 40 milliGRAM(s) SubCutaneous daily  FLUoxetine 20 milliGRAM(s) Oral daily  glucagon  Injectable 1 milliGRAM(s) IntraMuscular once  influenza   Vaccine 0.5 milliLiter(s) IntraMuscular once  levETIRAcetam 500 milliGRAM(s) Oral two times a day  pantoprazole   Suspension 40 milliGRAM(s) Oral daily  senna 2 Tablet(s) Oral at bedtime  trimethoprim  160 mG/sulfamethoxazole 800 mG 1 Tablet(s) Oral daily    MEDICATIONS  (PRN):  sodium chloride 0.9% lock flush 10 milliLiter(s) IV Push every 1 hour PRN Pre/post blood products, medications, blood draw, and to maintain line patency      CAPILLARY BLOOD GLUCOSE      POCT Blood Glucose.: 74 mg/dL (25 Dec 2020 21:32)  POCT Blood Glucose.: 84 mg/dL (25 Dec 2020 16:54)  POCT Blood Glucose.: 76 mg/dL (25 Dec 2020 11:57)  POCT Blood Glucose.: 76 mg/dL (25 Dec 2020 08:17)    I&O's Summary    24 Dec 2020 07:01  -  25 Dec 2020 07:00  --------------------------------------------------------  IN: 0 mL / OUT: 800 mL / NET: -800 mL    25 Dec 2020 07:01  -  26 Dec 2020 06:55  --------------------------------------------------------  IN: 0 mL / OUT: 4200 mL / NET: -4200 mL        PHYSICAL EXAM:  Vital Signs Last 24 Hrs  T(C): 36.6 (26 Dec 2020 05:22), Max: 37.2 (25 Dec 2020 14:16)  T(F): 97.9 (26 Dec 2020 05:22), Max: 98.9 (25 Dec 2020 14:16)  HR: 70 (26 Dec 2020 05:22) (70 - 84)  BP: 142/94 (26 Dec 2020 05:22) (137/97 - 142/94)  BP(mean): --  RR: 18 (26 Dec 2020 05:22) (17 - 18)  SpO2: 98% (26 Dec 2020 05:22) (98% - 99%)    GENERAL: No acute distress, well-developed  HEAD:  Atraumatic, Normocephalic  EYES: EOMI, PERRLA, conjunctiva and sclera clear  NECK: Supple, no lymphadenopathy, no JVD  CHEST/LUNG: CTAB; No wheezes, rales, or rhonchi  HEART: Regular rate and rhythm; No murmurs, rubs, or gallops  ABDOMEN: Soft, non-tender, non-distended; normal bowel sounds, no organomegaly  EXTREMITIES:  2+ peripheral pulses b/l, No clubbing, cyanosis, or edema  NEUROLOGY: A&O x 3, no focal deficits  SKIN: No rashes or lesions    LABS:                        10.7   6.40  )-----------( 127      ( 25 Dec 2020 07:48 )             32.4     12-25    142  |  108<H>  |  <2<L>  ----------------------------<  75  3.2<L>   |  25  |  0.50    Ca    8.2<L>      25 Dec 2020 07:48  Phos  2.0     12-25  Mg     2.0     12-25    TPro  4.0<L>  /  Alb  2.2<L>  /  TBili  0.3  /  DBili  x   /  AST  20  /  ALT  28  /  AlkPhos  79  12-25                RADIOLOGY & ADDITIONAL TESTS:  Results Reviewed:   Imaging Personally Reviewed:  Electrocardiogram Personally Reviewed:    COORDINATION OF CARE:  Care Discussed with Consultants/Other Providers [Y/N]:  Prior or Outpatient Records Reviewed [Y/N]:

## 2020-12-26 NOTE — PROGRESS NOTE ADULT - ASSESSMENT
60y M PMHx HTN, Anxiety (on Prozac), b/l DVT while on Coumadin (s/p IVF filter, now off coumadin), GBM s/p craniotomy w/ resection c/b ESBL meningitis for which repeat craniotomy (2/2020) performed and pt was given 8 weeks Meropenem, pt subsequently had repeat ESBL meningitis s/p craniectomy w/ washout w/ intrathecal Jin and 10 week course IV Jin, COVID infection in 3/2020 who was BIBEMS from [facility] for AMS, hypoxia and hypotension. s/p MICU stay for sepsis, w/o clear source identified. Now with hypogylcemia on Dextrose drip.

## 2020-12-26 NOTE — PROGRESS NOTE ADULT - PROBLEM SELECTOR PLAN 6
Transition of Care Status:  1. Name of PCP:  2. PCP contacted on admission: [  ] Y     [  ] N  3. PCP contacted at discharge: [  ] Y     [  ] N  4. Post-discharge appointment date and location:  5. Summary of handoff given to PCP:

## 2020-12-26 NOTE — PROGRESS NOTE ADULT - PROBLEM SELECTOR PLAN 2
s/p craniotomy w/resection and subsequent ESBL infection for which repeat craniotomy was performed (2/2020) now with  shunt.  - c/w 500 BID Keppra PO for seizure ppx (hx of EEGs in the past without seizure activity)  - c/w home decadron 1mg PO qd. Weaned off hydrocortisone in MICU.   -c/w Bactrim DS qd. (Per pt's sister Maribel pt is to be on BID but provider called [facility] Dr. Dominguez (infectious disease) office- they had no records of Dr. Dominguez prescribing Bactrim for this pt. However they saw a Dr. Simeon (rad onc) who did but were not able to find any reasoning in notes as to why BID. Per house ID, no reason to have BID dosing)  - s/p LP with low c/f CNS infection  - Appreciate ID recs

## 2020-12-26 NOTE — PROGRESS NOTE ADULT - PROBLEM SELECTOR PLAN 1
- No history of diabetes. AM 12/23 BG to 50s, while on D5LR, increase to 96 with hypoglycemia protocol. Rule out adrenal insufficiency; patient with recent sepsis and history of steroid use.   - Encourage PO intake.   - D5/LR switched to D10/0.45NS.   - f/u AM cortisol

## 2020-12-27 LAB
ANION GAP SERPL CALC-SCNC: 8 MMOL/L — SIGNIFICANT CHANGE UP (ref 7–14)
BUN SERPL-MCNC: <2 MG/DL — LOW (ref 7–23)
CALCIUM SERPL-MCNC: 8.6 MG/DL — SIGNIFICANT CHANGE UP (ref 8.4–10.5)
CHLORIDE SERPL-SCNC: 109 MMOL/L — HIGH (ref 98–107)
CO2 SERPL-SCNC: 26 MMOL/L — SIGNIFICANT CHANGE UP (ref 22–31)
CREAT SERPL-MCNC: 0.6 MG/DL — SIGNIFICANT CHANGE UP (ref 0.5–1.3)
GLUCOSE SERPL-MCNC: 65 MG/DL — LOW (ref 70–99)
HCT VFR BLD CALC: 35.1 % — LOW (ref 39–50)
HGB BLD-MCNC: 11.4 G/DL — LOW (ref 13–17)
MAGNESIUM SERPL-MCNC: 1.9 MG/DL — SIGNIFICANT CHANGE UP (ref 1.6–2.6)
MCHC RBC-ENTMCNC: 32.2 PG — SIGNIFICANT CHANGE UP (ref 27–34)
MCHC RBC-ENTMCNC: 32.5 GM/DL — SIGNIFICANT CHANGE UP (ref 32–36)
MCV RBC AUTO: 99.2 FL — SIGNIFICANT CHANGE UP (ref 80–100)
NRBC # BLD: 0 /100 WBCS — SIGNIFICANT CHANGE UP
NRBC # FLD: 0 K/UL — SIGNIFICANT CHANGE UP
PHOSPHATE SERPL-MCNC: 2.7 MG/DL — SIGNIFICANT CHANGE UP (ref 2.5–4.5)
PLATELET # BLD AUTO: 146 K/UL — LOW (ref 150–400)
POTASSIUM SERPL-MCNC: 3.4 MMOL/L — LOW (ref 3.5–5.3)
POTASSIUM SERPL-SCNC: 3.4 MMOL/L — LOW (ref 3.5–5.3)
RBC # BLD: 3.54 M/UL — LOW (ref 4.2–5.8)
RBC # FLD: 13.5 % — SIGNIFICANT CHANGE UP (ref 10.3–14.5)
SODIUM SERPL-SCNC: 143 MMOL/L — SIGNIFICANT CHANGE UP (ref 135–145)
WBC # BLD: 4.69 K/UL — SIGNIFICANT CHANGE UP (ref 3.8–10.5)
WBC # FLD AUTO: 4.69 K/UL — SIGNIFICANT CHANGE UP (ref 3.8–10.5)

## 2020-12-27 PROCEDURE — 99233 SBSQ HOSP IP/OBS HIGH 50: CPT

## 2020-12-27 RX ORDER — PANTOPRAZOLE SODIUM 20 MG/1
40 TABLET, DELAYED RELEASE ORAL DAILY
Refills: 0 | Status: DISCONTINUED | OUTPATIENT
Start: 2020-12-27 | End: 2021-01-05

## 2020-12-27 RX ADMIN — Medication 20 MILLIGRAM(S): at 11:55

## 2020-12-27 RX ADMIN — SENNA PLUS 2 TABLET(S): 8.6 TABLET ORAL at 22:13

## 2020-12-27 RX ADMIN — LEVETIRACETAM 500 MILLIGRAM(S): 250 TABLET, FILM COATED ORAL at 07:02

## 2020-12-27 RX ADMIN — Medication 1 TABLET(S): at 11:55

## 2020-12-27 RX ADMIN — LEVETIRACETAM 500 MILLIGRAM(S): 250 TABLET, FILM COATED ORAL at 17:17

## 2020-12-27 RX ADMIN — ENOXAPARIN SODIUM 40 MILLIGRAM(S): 100 INJECTION SUBCUTANEOUS at 11:55

## 2020-12-27 RX ADMIN — SODIUM CHLORIDE 75 MILLILITER(S): 9 INJECTION, SOLUTION INTRAVENOUS at 07:01

## 2020-12-27 RX ADMIN — Medication 1 MILLIGRAM(S): at 07:02

## 2020-12-27 RX ADMIN — SODIUM CHLORIDE 75 MILLILITER(S): 9 INJECTION, SOLUTION INTRAVENOUS at 20:15

## 2020-12-27 RX ADMIN — PANTOPRAZOLE SODIUM 40 MILLIGRAM(S): 20 TABLET, DELAYED RELEASE ORAL at 11:55

## 2020-12-27 NOTE — PROGRESS NOTE ADULT - ASSESSMENT
· Assessment	  60y M PMHx HTN, Anxiety (on Prozac), b/l DVT while on Coumadin (s/p IVF filter, now off coumadin), GBM s/p craniotomy w/ resection c/b ESBL meningitis for which repeat craniotomy (2/2020) performed and pt was given 8 weeks Meropenem, pt subsequently had repeat ESBL meningitis s/p craniectomy w/ washout w/ intrathecal Jin and 10 week course IV Jin, COVID infection in 3/2020 who was BIBEMS from [facility] for AMS, hypoxia and hypotension. s/p MICU stay for sepsis, w/o clear source identified. Now with hypogylcemia on Dextrose drip.        Problem/Plan - 1:  ·  Problem: Hypoglycemia.  Plan: - No history of diabetes. AM 12/23 BG to 50s, while on D5LR, increase to 96 with hypoglycemia protocol. Rule out adrenal insufficiency; patient with recent sepsis and history of steroid use.   - Encourage PO intake.   - D5/LR switched to D10/0.45NS.   - f/u AM cortisol.   -endo consult on 12/28     Problem/Plan - 2:  ·  Problem: Glioblastoma multiforme.  Plan: s/p craniotomy w/resection and subsequent ESBL infection for which repeat craniotomy was performed (2/2020) now with  shunt.  - c/w 500 BID Keppra PO for seizure ppx (hx of EEGs in the past without seizure activity)  - c/w home decadron 1mg PO qd. Weaned off hydrocortisone in MICU.   -c/w Bactrim DS qd. (Per pt's sister Maribel pt is to be on BID but provider called [facility] Dr. Dominguez (infectious disease) office- they had no records of Dr. Dominguez prescribing Bactrim for this pt. However they saw a Dr. Simeon (rad onc) who did but were not able to find any reasoning in notes as to why BID. Per house ID, no reason to have BID dosing)  - s/p LP with low c/f CNS infection  - Appreciate ID recs.      Problem/Plan - 3:  ·  Problem: Altered mental status.  Plan: - Patient AxO to self and time. Per patient's sister and HCP, Maribel, patient is AxO x3, but can be aphasic, with blunted responses at time at baseline.      Problem/Plan - 4:  ·  Problem: Septic shock.  Plan: Resolved. s/p pressors in the MICU. CTAP w/o intrabdominal pathology. Low cocnern for CNS infection per LP.   - s/p extubation in MICU on 12/2 (originally intubated for airway protection)  - s/p meropenem course for 7 days in MICU.      Problem/Plan - 5:  ·  Problem: Prophylactic measure.  Plan: DVT ppx: lovenox  Diet: Softs.      Problem/Plan - 6:  Problem: Discharge planning issues. Plan: Transition of Care Status:  1. Name of PCP:  2. PCP contacted on admission: [  ] Y     [  ] N  3. PCP contacted at discharge: [  ] Y     [  ] N  4. Post-discharge appointment date and location:  5. Summary of handoff given to PCP:

## 2020-12-27 NOTE — PROGRESS NOTE ADULT - ATTENDING COMMENTS
FS in the  range despite d10 - consider endo eval in am.  family interested in speaking to palliative.

## 2020-12-27 NOTE — PROGRESS NOTE ADULT - SUBJECTIVE AND OBJECTIVE BOX
PROGRESS NOTE:   Authored by Lynsey Reed MD     Patient is a 60y old  Male who presents with a chief complaint of hypoxic, minimally responsive requiring intubation (26 Dec 2020 06:55)      SUBJECTIVE / OVERNIGHT EVENTS:    ADDITIONAL REVIEW OF SYSTEMS:    MEDICATIONS  (STANDING):  dexAMETHasone     Tablet 1 milliGRAM(s) Oral daily  dextrose 10% + sodium chloride 0.45%. 1000 milliLiter(s) (75 mL/Hr) IV Continuous <Continuous>  dextrose 40% Gel 15 Gram(s) Oral once  dextrose 5%. 1000 milliLiter(s) (50 mL/Hr) IV Continuous <Continuous>  dextrose 5%. 1000 milliLiter(s) (100 mL/Hr) IV Continuous <Continuous>  dextrose 50% Injectable 25 Gram(s) IV Push once  dextrose 50% Injectable 12.5 Gram(s) IV Push once  dextrose 50% Injectable 25 Gram(s) IV Push once  enoxaparin Injectable 40 milliGRAM(s) SubCutaneous daily  FLUoxetine 20 milliGRAM(s) Oral daily  glucagon  Injectable 1 milliGRAM(s) IntraMuscular once  influenza   Vaccine 0.5 milliLiter(s) IntraMuscular once  levETIRAcetam 500 milliGRAM(s) Oral two times a day  pantoprazole   Suspension 40 milliGRAM(s) Oral daily  senna 2 Tablet(s) Oral at bedtime  trimethoprim  160 mG/sulfamethoxazole 800 mG 1 Tablet(s) Oral daily    MEDICATIONS  (PRN):  sodium chloride 0.9% lock flush 10 milliLiter(s) IV Push every 1 hour PRN Pre/post blood products, medications, blood draw, and to maintain line patency      CAPILLARY BLOOD GLUCOSE      POCT Blood Glucose.: 87 mg/dL (27 Dec 2020 08:50)  POCT Blood Glucose.: 83 mg/dL (26 Dec 2020 21:40)  POCT Blood Glucose.: 109 mg/dL (26 Dec 2020 17:26)  POCT Blood Glucose.: 104 mg/dL (26 Dec 2020 12:49)    I&O's Summary    26 Dec 2020 07:01  -  27 Dec 2020 07:00  --------------------------------------------------------  IN: 3020 mL / OUT: 500 mL / NET: 2520 mL    27 Dec 2020 07:01  -  27 Dec 2020 11:03  --------------------------------------------------------  IN: 240 mL / OUT: 1000 mL / NET: -760 mL        PHYSICAL EXAM:  Vital Signs Last 24 Hrs  T(C): 36.7 (26 Dec 2020 21:46), Max: 36.7 (26 Dec 2020 21:46)  T(F): 98 (26 Dec 2020 21:46), Max: 98 (26 Dec 2020 21:46)  HR: 73 (26 Dec 2020 21:46) (73 - 74)  BP: 139/99 (26 Dec 2020 21:46) (139/99 - 146/95)  BP(mean): --  RR: 19 (26 Dec 2020 21:46) (18 - 19)  SpO2: 97% (26 Dec 2020 21:46) (97% - 100%)    GENERAL: No acute distress, well-developed  HEAD:  Atraumatic, Normocephalic  EYES: EOMI, PERRLA, conjunctiva and sclera clear  NECK: Supple, no lymphadenopathy, no JVD  CHEST/LUNG: CTAB; No wheezes, rales, or rhonchi  HEART: Regular rate and rhythm; No murmurs, rubs, or gallops  ABDOMEN: Soft, non-tender, non-distended; normal bowel sounds, no organomegaly  EXTREMITIES:  2+ peripheral pulses b/l, No clubbing, cyanosis, or edema  NEUROLOGY: A&O x 3, no focal deficits  SKIN: No rashes or lesions    LABS:                        11.4   4.69  )-----------( 146      ( 27 Dec 2020 08:27 )             35.1     12-27    143  |  109<H>  |  <2<L>  ----------------------------<  65<L>  3.4<L>   |  26  |  0.60    Ca    8.6      27 Dec 2020 08:27  Phos  2.7     12-27  Mg     1.9     12-27                  RADIOLOGY & ADDITIONAL TESTS:  Results Reviewed:   Imaging Personally Reviewed:  Electrocardiogram Personally Reviewed:    COORDINATION OF CARE:  Care Discussed with Consultants/Other Providers [Y/N]:  Prior or Outpatient Records Reviewed [Y/N]:   PROGRESS NOTE:   Authored by Lynsey Reed MD     Patient is a 60y old  Male who presents with a chief complaint of hypoxic, minimally responsive requiring intubation (26 Dec 2020 06:55)      SUBJECTIVE / OVERNIGHT EVENTS:  No acute event overnight. Patient did not have any complaints.     ADDITIONAL REVIEW OF SYSTEMS:    MEDICATIONS  (STANDING):  dexAMETHasone     Tablet 1 milliGRAM(s) Oral daily  dextrose 10% + sodium chloride 0.45%. 1000 milliLiter(s) (75 mL/Hr) IV Continuous <Continuous>  dextrose 40% Gel 15 Gram(s) Oral once  dextrose 5%. 1000 milliLiter(s) (50 mL/Hr) IV Continuous <Continuous>  dextrose 5%. 1000 milliLiter(s) (100 mL/Hr) IV Continuous <Continuous>  dextrose 50% Injectable 25 Gram(s) IV Push once  dextrose 50% Injectable 12.5 Gram(s) IV Push once  dextrose 50% Injectable 25 Gram(s) IV Push once  enoxaparin Injectable 40 milliGRAM(s) SubCutaneous daily  FLUoxetine 20 milliGRAM(s) Oral daily  glucagon  Injectable 1 milliGRAM(s) IntraMuscular once  influenza   Vaccine 0.5 milliLiter(s) IntraMuscular once  levETIRAcetam 500 milliGRAM(s) Oral two times a day  pantoprazole   Suspension 40 milliGRAM(s) Oral daily  senna 2 Tablet(s) Oral at bedtime  trimethoprim  160 mG/sulfamethoxazole 800 mG 1 Tablet(s) Oral daily    MEDICATIONS  (PRN):  sodium chloride 0.9% lock flush 10 milliLiter(s) IV Push every 1 hour PRN Pre/post blood products, medications, blood draw, and to maintain line patency      CAPILLARY BLOOD GLUCOSE      POCT Blood Glucose.: 87 mg/dL (27 Dec 2020 08:50)  POCT Blood Glucose.: 83 mg/dL (26 Dec 2020 21:40)  POCT Blood Glucose.: 109 mg/dL (26 Dec 2020 17:26)  POCT Blood Glucose.: 104 mg/dL (26 Dec 2020 12:49)    I&O's Summary    26 Dec 2020 07:01  -  27 Dec 2020 07:00  --------------------------------------------------------  IN: 3020 mL / OUT: 500 mL / NET: 2520 mL    27 Dec 2020 07:01  -  27 Dec 2020 11:03  --------------------------------------------------------  IN: 240 mL / OUT: 1000 mL / NET: -760 mL        PHYSICAL EXAM:  Vital Signs Last 24 Hrs  T(C): 36.7 (26 Dec 2020 21:46), Max: 36.7 (26 Dec 2020 21:46)  T(F): 98 (26 Dec 2020 21:46), Max: 98 (26 Dec 2020 21:46)  HR: 73 (26 Dec 2020 21:46) (73 - 74)  BP: 139/99 (26 Dec 2020 21:46) (139/99 - 146/95)  BP(mean): --  RR: 19 (26 Dec 2020 21:46) (18 - 19)  SpO2: 97% (26 Dec 2020 21:46) (97% - 100%)    GENERAL: No acute distress, well-developed  HEAD:  Atraumatic, Normocephalic  EYES: EOMI, PERRLA, conjunctiva and sclera clear  NECK: Supple, no lymphadenopathy, no JVD  CHEST/LUNG: CTAB; No wheezes, rales, or rhonchi  HEART: Regular rate and rhythm; No murmurs, rubs, or gallops  ABDOMEN: Soft, non-tender, non-distended; normal bowel sounds, no organomegaly  EXTREMITIES:  2+ peripheral pulses b/l, No clubbing, cyanosis, or edema  NEUROLOGY: A&O x 3, no focal deficits  SKIN: No rashes or lesions    LABS:                        11.4   4.69  )-----------( 146      ( 27 Dec 2020 08:27 )             35.1     12-27    143  |  109<H>  |  <2<L>  ----------------------------<  65<L>  3.4<L>   |  26  |  0.60    Ca    8.6      27 Dec 2020 08:27  Phos  2.7     12-27  Mg     1.9     12-27                  RADIOLOGY & ADDITIONAL TESTS:  Results Reviewed:   Imaging Personally Reviewed:  Electrocardiogram Personally Reviewed:    COORDINATION OF CARE:  Care Discussed with Consultants/Other Providers [Y/N]:  Prior or Outpatient Records Reviewed [Y/N]:

## 2020-12-28 DIAGNOSIS — R63.0 ANOREXIA: ICD-10-CM

## 2020-12-28 DIAGNOSIS — Z51.5 ENCOUNTER FOR PALLIATIVE CARE: ICD-10-CM

## 2020-12-28 DIAGNOSIS — F41.9 ANXIETY DISORDER, UNSPECIFIED: ICD-10-CM

## 2020-12-28 DIAGNOSIS — Z71.89 OTHER SPECIFIED COUNSELING: ICD-10-CM

## 2020-12-28 DIAGNOSIS — C71.9 MALIGNANT NEOPLASM OF BRAIN, UNSPECIFIED: ICD-10-CM

## 2020-12-28 LAB
ANION GAP SERPL CALC-SCNC: 8 MMOL/L — SIGNIFICANT CHANGE UP (ref 7–14)
ANION GAP SERPL CALC-SCNC: 8 MMOL/L — SIGNIFICANT CHANGE UP (ref 7–14)
BUN SERPL-MCNC: <2 MG/DL — LOW (ref 7–23)
BUN SERPL-MCNC: <2 MG/DL — LOW (ref 7–23)
CALCIUM SERPL-MCNC: 8.7 MG/DL — SIGNIFICANT CHANGE UP (ref 8.4–10.5)
CALCIUM SERPL-MCNC: 8.8 MG/DL — SIGNIFICANT CHANGE UP (ref 8.4–10.5)
CHLORIDE SERPL-SCNC: 106 MMOL/L — SIGNIFICANT CHANGE UP (ref 98–107)
CHLORIDE SERPL-SCNC: 109 MMOL/L — HIGH (ref 98–107)
CO2 SERPL-SCNC: 26 MMOL/L — SIGNIFICANT CHANGE UP (ref 22–31)
CO2 SERPL-SCNC: 28 MMOL/L — SIGNIFICANT CHANGE UP (ref 22–31)
CREAT SERPL-MCNC: 0.56 MG/DL — SIGNIFICANT CHANGE UP (ref 0.5–1.3)
CREAT SERPL-MCNC: 0.63 MG/DL — SIGNIFICANT CHANGE UP (ref 0.5–1.3)
GLUCOSE BLDC GLUCOMTR-MCNC: 104 MG/DL — HIGH (ref 70–99)
GLUCOSE BLDC GLUCOMTR-MCNC: 109 MG/DL — HIGH (ref 70–99)
GLUCOSE BLDC GLUCOMTR-MCNC: 92 MG/DL — SIGNIFICANT CHANGE UP (ref 70–99)
GLUCOSE SERPL-MCNC: 67 MG/DL — LOW (ref 70–99)
GLUCOSE SERPL-MCNC: 80 MG/DL — SIGNIFICANT CHANGE UP (ref 70–99)
HCT VFR BLD CALC: 39.3 % — SIGNIFICANT CHANGE UP (ref 39–50)
HGB BLD-MCNC: 12.8 G/DL — LOW (ref 13–17)
MAGNESIUM SERPL-MCNC: 2 MG/DL — SIGNIFICANT CHANGE UP (ref 1.6–2.6)
MAGNESIUM SERPL-MCNC: 2 MG/DL — SIGNIFICANT CHANGE UP (ref 1.6–2.6)
MCHC RBC-ENTMCNC: 32.4 PG — SIGNIFICANT CHANGE UP (ref 27–34)
MCHC RBC-ENTMCNC: 32.6 GM/DL — SIGNIFICANT CHANGE UP (ref 32–36)
MCV RBC AUTO: 99.5 FL — SIGNIFICANT CHANGE UP (ref 80–100)
NRBC # BLD: 0 /100 WBCS — SIGNIFICANT CHANGE UP
NRBC # FLD: 0 K/UL — SIGNIFICANT CHANGE UP
PHOSPHATE SERPL-MCNC: 3.2 MG/DL — SIGNIFICANT CHANGE UP (ref 2.5–4.5)
PHOSPHATE SERPL-MCNC: 3.7 MG/DL — SIGNIFICANT CHANGE UP (ref 2.5–4.5)
PLATELET # BLD AUTO: 168 K/UL — SIGNIFICANT CHANGE UP (ref 150–400)
POTASSIUM SERPL-MCNC: 3 MMOL/L — LOW (ref 3.5–5.3)
POTASSIUM SERPL-MCNC: 3.9 MMOL/L — SIGNIFICANT CHANGE UP (ref 3.5–5.3)
POTASSIUM SERPL-SCNC: 3 MMOL/L — LOW (ref 3.5–5.3)
POTASSIUM SERPL-SCNC: 3.9 MMOL/L — SIGNIFICANT CHANGE UP (ref 3.5–5.3)
RBC # BLD: 3.95 M/UL — LOW (ref 4.2–5.8)
RBC # FLD: 13.3 % — SIGNIFICANT CHANGE UP (ref 10.3–14.5)
SODIUM SERPL-SCNC: 142 MMOL/L — SIGNIFICANT CHANGE UP (ref 135–145)
SODIUM SERPL-SCNC: 143 MMOL/L — SIGNIFICANT CHANGE UP (ref 135–145)
WBC # BLD: 5.45 K/UL — SIGNIFICANT CHANGE UP (ref 3.8–10.5)
WBC # FLD AUTO: 5.45 K/UL — SIGNIFICANT CHANGE UP (ref 3.8–10.5)

## 2020-12-28 PROCEDURE — 99233 SBSQ HOSP IP/OBS HIGH 50: CPT | Mod: GC

## 2020-12-28 PROCEDURE — 99223 1ST HOSP IP/OBS HIGH 75: CPT

## 2020-12-28 PROCEDURE — 99222 1ST HOSP IP/OBS MODERATE 55: CPT

## 2020-12-28 RX ORDER — POTASSIUM CHLORIDE 20 MEQ
40 PACKET (EA) ORAL EVERY 4 HOURS
Refills: 0 | Status: COMPLETED | OUTPATIENT
Start: 2020-12-28 | End: 2020-12-28

## 2020-12-28 RX ORDER — POTASSIUM CHLORIDE 20 MEQ
10 PACKET (EA) ORAL
Refills: 0 | Status: DISCONTINUED | OUTPATIENT
Start: 2020-12-28 | End: 2020-12-28

## 2020-12-28 RX ADMIN — ENOXAPARIN SODIUM 40 MILLIGRAM(S): 100 INJECTION SUBCUTANEOUS at 17:14

## 2020-12-28 RX ADMIN — SODIUM CHLORIDE 75 MILLILITER(S): 9 INJECTION, SOLUTION INTRAVENOUS at 19:00

## 2020-12-28 RX ADMIN — SENNA PLUS 2 TABLET(S): 8.6 TABLET ORAL at 21:39

## 2020-12-28 RX ADMIN — Medication 40 MILLIEQUIVALENT(S): at 17:14

## 2020-12-28 RX ADMIN — PANTOPRAZOLE SODIUM 40 MILLIGRAM(S): 20 TABLET, DELAYED RELEASE ORAL at 17:14

## 2020-12-28 RX ADMIN — Medication 20 MILLIGRAM(S): at 17:15

## 2020-12-28 RX ADMIN — LEVETIRACETAM 500 MILLIGRAM(S): 250 TABLET, FILM COATED ORAL at 17:14

## 2020-12-28 RX ADMIN — LEVETIRACETAM 500 MILLIGRAM(S): 250 TABLET, FILM COATED ORAL at 06:16

## 2020-12-28 RX ADMIN — Medication 40 MILLIEQUIVALENT(S): at 12:23

## 2020-12-28 RX ADMIN — Medication 1 TABLET(S): at 17:14

## 2020-12-28 RX ADMIN — Medication 1 MILLIGRAM(S): at 06:16

## 2020-12-28 NOTE — CONSULT NOTE ADULT - SUBJECTIVE AND OBJECTIVE BOX
HPI:  60y M PMHx HTN, Anxiety (on Prozac), b/l DVT while on Coumadin (s/p IVF filter, now off coumadin), GBM s/p craniotomy w/ resection c/b ESBL meningitis for which repeat craniotomy (2/2020) performed and pt was given 8 weeks Meropenem, pt subsequently had repeat ESBL meningitis s/p craniectomy w/ washout w/ intrathecal Jin and 10 week course IV Jin, COVID infection in 3/2020 who was BIBEMS from [facility] for AMS, hypoxia and hypotension.      In the ED pt was intubated for airway protection.  He was given 2.2 L NS, 200 mcg phenylephrine, started on levophed gtt, started on vanc and cefepime.  Labs notable for WBC 11.65, VBG 7.18/45/56/15/77.7. lactate 10.8  CXR showed grossly clear lungs, view partly blocked by pacer pads, U/A negative, blood and urine cultures pending    Per sister (OB physician), pt was supposed to be on lifelong Bactrim for brain infection however Bactrim was discontinued at [place].  Pt received neurosurgical care at [facility] w/ Dr. Patrice Cantu ([phone])   (16 Dec 2020 18:16)      PAST MEDICAL & SURGICAL HISTORY:  Diabetes mellitus    Hypertension    Borderline diabetes    Essential hypertension    Disruption of closure of skull or craniotomy        FAMILY HISTORY:  Cerebrovascular accident (Father)        Social History:    Outpatient Medications:      MEDICATIONS  (STANDING):  dexAMETHasone     Tablet 1 milliGRAM(s) Oral daily  dextrose 10% + sodium chloride 0.45%. 1000 milliLiter(s) (75 mL/Hr) IV Continuous <Continuous>  dextrose 40% Gel 15 Gram(s) Oral once  dextrose 5%. 1000 milliLiter(s) (100 mL/Hr) IV Continuous <Continuous>  dextrose 5%. 1000 milliLiter(s) (50 mL/Hr) IV Continuous <Continuous>  dextrose 50% Injectable 25 Gram(s) IV Push once  dextrose 50% Injectable 12.5 Gram(s) IV Push once  dextrose 50% Injectable 25 Gram(s) IV Push once  enoxaparin Injectable 40 milliGRAM(s) SubCutaneous daily  FLUoxetine 20 milliGRAM(s) Oral daily  glucagon  Injectable 1 milliGRAM(s) IntraMuscular once  influenza   Vaccine 0.5 milliLiter(s) IntraMuscular once  levETIRAcetam 500 milliGRAM(s) Oral two times a day  pantoprazole   Suspension 40 milliGRAM(s) Oral daily  potassium chloride    Tablet ER 40 milliEquivalent(s) Oral every 4 hours  senna 2 Tablet(s) Oral at bedtime  trimethoprim  160 mG/sulfamethoxazole 800 mG 1 Tablet(s) Oral daily    MEDICATIONS  (PRN):  sodium chloride 0.9% lock flush 10 milliLiter(s) IV Push every 1 hour PRN Pre/post blood products, medications, blood draw, and to maintain line patency      Allergies    aspirin (Unknown)  shellfish (Hives)  shellfish (Unknown)  Tegretol (Unknown)    Intolerances    ACE inhibitors (Other)  angiotensin II inhibitors (Other)    Review of Systems:  Constitutional: No fever  Eyes: No blurry vision  Neuro: No tremors  HEENT: No pain  Cardiovascular: No chest pain, palpitations  Respiratory: No SOB, no cough  GI: No nausea, vomiting, abdominal pain  : No dysuria  Skin: no rash  Psych: no depression  Endocrine: no polyuria, polydipsia  Hem/lymph: no swelling  Osteoporosis: no fractures    ALL OTHER SYSTEMS REVIEWED AND NEGATIVE    UNABLE TO OBTAIN    PHYSICAL EXAM:  -----------------------------  VITALS: T(C): 36.9 (12-28-20 @ 06:00)  T(F): 98.5 (12-28-20 @ 06:00), Max: 98.5 (12-28-20 @ 06:00)  HR: 89 (12-28-20 @ 06:00) (87 - 89)  BP: 145/91 (12-28-20 @ 06:00) (135/92 - 145/91)  RR:  (17 - 18)  SpO2:  (99% - 100%)  Wt(kg): --  GENERAL: NAD, well-groomed, well-developed  EYES: No proptosis, no lid lag, anicteric  HEENT:  Atraumatic, Normocephalic, moist mucous membranes  THYROID: Normal size, no palpable nodules  RESPIRATORY: Clear to auscultation bilaterally; No rales, rhonchi, wheezing, or rubs  CARDIOVASCULAR: Regular rate and rhythm; No murmurs; no peripheral edema  GI: Soft, nontender, non distended, normal bowel sounds  SKIN: Dry, intact, No rashes or lesions  MUSCULOSKELETAL: Full range of motion, normal strength  NEURO: sensation intact, extraocular movements intact, no tremor, normal reflexes  PSYCH: Alert and oriented x 3, normal affect, normal mood  CUSHING'S SIGNS: no striae    POCT Blood Glucose.: 109 mg/dL (12-28-20 @ 12:01)  POCT Blood Glucose.: 80 mg/dL (12-28-20 @ 08:16)  POCT Blood Glucose.: 88 mg/dL (12-27-20 @ 22:33)  POCT Blood Glucose.: 84 mg/dL (12-27-20 @ 17:06)  POCT Blood Glucose.: 100 mg/dL (12-27-20 @ 11:54)  POCT Blood Glucose.: 87 mg/dL (12-27-20 @ 08:50)  POCT Blood Glucose.: 83 mg/dL (12-26-20 @ 21:40)  POCT Blood Glucose.: 109 mg/dL (12-26-20 @ 17:26)  POCT Blood Glucose.: 104 mg/dL (12-26-20 @ 12:49)  POCT Blood Glucose.: 94 mg/dL (12-26-20 @ 08:44)  POCT Blood Glucose.: 74 mg/dL (12-25-20 @ 21:32)  POCT Blood Glucose.: 84 mg/dL (12-25-20 @ 16:54)                            12.8   5.45  )-----------( 168      ( 28 Dec 2020 08:39 )             39.3       12-28    142  |  106  |  <2<L>  ----------------------------<  67<L>  3.0<L>   |  28  |  0.63    EGFR if : 123  EGFR if non : 106    Ca    8.8      12-28  Mg     2.0     12-28  Phos  3.2     12-28        Thyroid Function Tests:      A1C with Estimated Average Glucose Result: 4.8 % (12-25-20 @ 07:48)          Radiology:   ------------------------    < from: CT Abdomen and Pelvis w/ IV Cont (12.22.20 @ 17:02) >    EXAM:  CT ABDOMEN AND PELVIS IC        PROCEDURE DATE:  Dec 22 2020         INTERPRETATION:  CLINICAL INFORMATION: Sepsis, uncertain source, evaluate ventriculoperitoneal shunt catheter    COMPARISON: CT chest, abdomen and pelvis 12/23/2019    PROCEDURE:  CT of the Abdomen and Pelvis was performed with intravenous contrast.  Intravenous contrast: 90 ml Omnipaque 350. 10 ml discarded.  Oral contrast: None.  Sagittal and coronal reformats were performed.    FINDINGS:  LOWER CHEST: Trace pleural effusions with adjacent atelectasis.    LIVER: Within normal limits.  BILE DUCTS: Normal caliber.  GALLBLADDER: Within normal limits.  SPLEEN: Within normal limits.  PANCREAS: Within normal limits.  ADRENALS: Within normal limits.  KIDNEYS/URETERS: Right upper pole subcentimeter hypodense focus, too small to characterize.    BLADDER: Minimally distended with Sandoval catheter in place. Small amount of air related to instrumentation.  REPRODUCTIVE ORGANS: Prostate within normal limits.    BOWEL: No bowel obstruction. Appendix is normal.  PERITONEUM: Small ascites. Unremarkable ventriculoperitoneal shunt catheter terminating in the right mid abdomen. No drainable fluid collection.  VESSELS: IVC filter. Atherosclerotic changes.  RETROPERITONEUM/LYMPHNODES: No lymphadenopathy.  ABDOMINAL WALL: Anasarca.  BONES: Muscular atrophy and demineralization, new compared to prior exam.    IMPRESSION:  No infectious source identified in the abdomen or pelvis.    < end of copied text >  ***  < from: MR Head w/wo IV Cont (12.18.20 @ 17:36) >  EXAM:  MR BRAIN WAW IC        PROCEDURE DATE:  Dec 18 2020         INTERPRETATION:  CLINICAL INDICATION: History of glioblastoma status post resection complicated by meningitis, possible CSF infection.    TECHNIQUE: Multi-planar multi-sequential MRimaging of the brain was performed before and after the intravenous administration of 7.5 ml of Gadavist.    COMPARISON: CT head 12/16/2020. MRI brain 12/23/2019.    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 There is no evidence of residual mass or nodular enhancement in the surgical cavity.    There is a right frontal charlene hole with frontal approach ventricular drainage catheter and distal tip terminating in the right lateral ventricle abutting the septum pellucidum. There is susceptibility blooming artifact related to shunt reservoir limiting detailed assessment in this region. There is no obstructive hydrocephalus.    There is thin diffuse dural thickening/enhancement, likely reactive in nature.    There are scattered T2/FLAIR hyperintense foci in the subcortical and periventricular white matter, nonspecific finding, likely reflecting mild chronic microvascular ischemic disease and/or treatment related changes. There is cortical sulcal prominence related to brain parenchymal volume loss.    No acute infarction or acute intracranial hemorrhage. There are no extra-axial fluid collections. The skull base flow voids are patent.    The visualized intraorbital contents are normal. There are scattered mucosal inflammatory changes of the paranasal sinuses and dependently layering fluid in the nasopharynx. There are mastoid air cell effusions bilaterally. The visualized soft tissues and osseous structures appear otherwise unremarkable.    IMPRESSION:    Status post left frontoparietal craniectomy for tumor resection with cystic encephalomalacia and gliosis and chronic blood products in the subjacent left parietal left temporal lobes, as above. No evidence of residual mass or nodular enhancement in the surgical cavity.    Right frontal charlene hole with ventricular drainage catheter terminating in the right lateral ventricle.    Thin diffuse dural thickening/enhancement, likely reactive in nature.    < end of copied text >           HPI:  60y M PMHx HTN, Anxiety (on Prozac), b/l DVT while on Coumadin (s/p IVF filter, now off coumadin), GBM s/p craniotomy w/ resection c/b ESBL meningitis for which repeat craniotomy (2/2020) performed and pt was given 8 weeks Meropenem, pt subsequently had repeat ESBL meningitis s/p craniectomy w/ washout w/ intrathecal Jin and 10 week course IV Jin, COVID infection in 3/2020 who was BIBEMS from [facility] for AMS, hypoxia and hypotension.      In the ED pt was intubated for airway protection.  He was given 2.2 L NS, 200 mcg phenylephrine, started on levophed gtt, started on vanc and cefepime.  Labs notable for WBC 11.65, VBG 7.18/45/56/15/77.7. lactate 10.8  CXR showed grossly clear lungs, view partly blocked by pacer pads, U/A negative, blood and urine cultures pending    Per sister (OB physician), pt was supposed to be on lifelong Bactrim for brain infection however Bactrim was discontinued at [place].  Pt received neurosurgical care at [facility] w/ Dr. Patrice Cantu ([phone])   (16 Dec 2020 18:16)    Regarding hypoglycemia, patient states that he had no prior history of Type 2 DM.  He had a history of prediabetes.  Patient denies any hypoglycemia symptoms and denies any improvement of how he feels after treatment for hypoglycemia.    Overall, he reports a very poor appetite since admission.  For instance,  at the time of the interview, his entire lunch tray was left untouched.  He had some apple sauce  He stated that, however, last night he had ate well and had adequate amount of potatoes vegetables and steak.  Patient denies on any diabetes medications.   Patient states that he had been on chemo therapy for his GMB once every 3 months.  He does not know the name of the chemotherapy agent.   There is no history of kidney insufficiency.          PAST MEDICAL & SURGICAL HISTORY:  Diabetes mellitus    Hypertension    Borderline diabetes    Essential hypertension    Disruption of closure of skull or craniotomy    FAMILY HISTORY:  Cerebrovascular accident (Father)    Social History:  No smoking  No alcohol   No Drugs.     Home Medications:  Acidophilus oral tablet: 1 tab(s) orally once a day (24 Dec 2020 15:26)  Bactrim  mg-160 mg oral tablet: 1 tab(s) orally every 12 hours (24 Dec 2020 15:26)  Decadron: 1 milligram(s) orally once a day (24 Dec 2020 15:26)  hydrALAZINE 25 mg oral tablet: 1 tab(s) orally 3 times a day (24 Dec 2020 15:26)  Keppra 500 mg oral tablet: 1 tab(s) orally 2 times a day (24 Dec 2020 15:26)  Lovenox 40 mg/0.4 mL injectable solution: 0.4 milliliter(s) injectable once a day (24 Dec 2020 15:26)  methylphenidate 5 mg oral tablet: 1 tab(s) orally once a day (24 Dec 2020 15:26)  Metoprolol Tartrate 50 mg oral tablet: 1 tab(s) orally 2 times a day (24 Dec 2020 15:26)  MiraLax oral powder for reconstitution: 1 cap(s) orally once a day (24 Dec 2020 15:26)  nystatin 100,000 units/mL oral suspension: 10 milliliter(s) orally 4 times a day (24 Dec 2020 15:26)  Protonix 40 mg oral delayed release tablet: 1 tab(s) orally once a day (24 Dec 2020 15:26)  PROzac 20 mg oral capsule: 1 cap(s) orally once a day (24 Dec 2020 15:26)  Senna 8.6 mg oral tablet: 1 tab(s) orally once a day (at bedtime) (24 Dec 2020 15:26)  Temodar 100 mg oral capsule:  (24 Dec 2020 15:26)  Zofran 4 mg oral tablet: 1 tab(s) orally every 8 hours, As Needed (24 Dec 2020 15:26)      MEDICATIONS  (STANDING):  dexAMETHasone     Tablet 1 milliGRAM(s) Oral daily  dextrose 10% + sodium chloride 0.45%. 1000 milliLiter(s) (75 mL/Hr) IV Continuous <Continuous>  dextrose 40% Gel 15 Gram(s) Oral once  dextrose 5%. 1000 milliLiter(s) (100 mL/Hr) IV Continuous <Continuous>  dextrose 5%. 1000 milliLiter(s) (50 mL/Hr) IV Continuous <Continuous>  dextrose 50% Injectable 25 Gram(s) IV Push once  dextrose 50% Injectable 12.5 Gram(s) IV Push once  dextrose 50% Injectable 25 Gram(s) IV Push once  enoxaparin Injectable 40 milliGRAM(s) SubCutaneous daily  FLUoxetine 20 milliGRAM(s) Oral daily  glucagon  Injectable 1 milliGRAM(s) IntraMuscular once  influenza   Vaccine 0.5 milliLiter(s) IntraMuscular once  levETIRAcetam 500 milliGRAM(s) Oral two times a day  pantoprazole   Suspension 40 milliGRAM(s) Oral daily  potassium chloride    Tablet ER 40 milliEquivalent(s) Oral every 4 hours  senna 2 Tablet(s) Oral at bedtime  trimethoprim  160 mG/sulfamethoxazole 800 mG 1 Tablet(s) Oral daily    MEDICATIONS  (PRN):  sodium chloride 0.9% lock flush 10 milliLiter(s) IV Push every 1 hour PRN Pre/post blood products, medications, blood draw, and to maintain line patency      Allergies    aspirin (Unknown)  shellfish (Hives)  shellfish (Unknown)  Tegretol (Unknown)    Intolerances    ACE inhibitors (Other)  angiotensin II inhibitors (Other)    Review of Systems:  Constitutional: No fever  Eyes: No blurry vision  Neuro: No tremors  HEENT: No pain  Cardiovascular: No chest pain, palpitations  Respiratory: No SOB, no cough  GI: No nausea, vomiting, abdominal pain  : No dysuria  Skin: no rash  Psych: no depression  Endocrine: no polyuria, polydipsia  Hem/lymph: no swelling  Osteoporosis: no fractures    ALL OTHER SYSTEMS REVIEWED AND NEGATIVE    PHYSICAL EXAM:  -----------------------------  VITALS: T(C): 36.9 (12-28-20 @ 06:00)  T(F): 98.5 (12-28-20 @ 06:00), Max: 98.5 (12-28-20 @ 06:00)  HR: 89 (12-28-20 @ 06:00) (87 - 89)  BP: 145/91 (12-28-20 @ 06:00) (135/92 - 145/91)  RR:  (17 - 18)  SpO2:  (99% - 100%)  Wt(kg): --  GENERAL: NAD, well-groomed, well-developed  EYES: No proptosis, no lid lag, anicteric  HEENT:  Atraumatic, Normocephalic, moist mucous membranes, craniotomy scars.    THYROID: Normal size, no palpable nodules  RESPIRATORY: Clear to auscultation bilaterally; No rales, rhonchi, wheezing, or rubs  CARDIOVASCULAR: Regular rate and rhythm; No murmurs; no peripheral edema  GI: Soft, nontender, non distended, normal bowel sounds  SKIN: Dry, intact, No rashes or lesions  MUSCULOSKELETAL: Full range of motion, normal strength  NEURO: sensation intact, extraocular movements intact, no tremor, normal reflexes  PSYCH: Alert and oriented x 3, normal affect, normal mood  CUSHING'S SIGNS: no striae    POCT Blood Glucose.: 109 mg/dL (12-28-20 @ 12:01)  POCT Blood Glucose.: 80 mg/dL (12-28-20 @ 08:16)  POCT Blood Glucose.: 88 mg/dL (12-27-20 @ 22:33)  POCT Blood Glucose.: 84 mg/dL (12-27-20 @ 17:06)  POCT Blood Glucose.: 100 mg/dL (12-27-20 @ 11:54)  POCT Blood Glucose.: 87 mg/dL (12-27-20 @ 08:50)  POCT Blood Glucose.: 83 mg/dL (12-26-20 @ 21:40)  POCT Blood Glucose.: 109 mg/dL (12-26-20 @ 17:26)  POCT Blood Glucose.: 104 mg/dL (12-26-20 @ 12:49)  POCT Blood Glucose.: 94 mg/dL (12-26-20 @ 08:44)  POCT Blood Glucose.: 74 mg/dL (12-25-20 @ 21:32)  POCT Blood Glucose.: 84 mg/dL (12-25-20 @ 16:54)                            12.8   5.45  )-----------( 168      ( 28 Dec 2020 08:39 )             39.3       12-28    142  |  106  |  <2<L>  ----------------------------<  67<L>  3.0<L>   |  28  |  0.63    EGFR if : 123  EGFR if non : 106    Ca    8.8      12-28  Mg     2.0     12-28  Phos  3.2     12-28    Thyroid Function Tests:      A1C with Estimated Average Glucose Result: 4.8 % (12-25-20 @ 07:48)    Radiology:   ------------------------    < from: CT Abdomen and Pelvis w/ IV Cont (12.22.20 @ 17:02) >    EXAM:  CT ABDOMEN AND PELVIS IC        PROCEDURE DATE:  Dec 22 2020         INTERPRETATION:  CLINICAL INFORMATION: Sepsis, uncertain source, evaluate ventriculoperitoneal shunt catheter    COMPARISON: CT chest, abdomen and pelvis 12/23/2019    PROCEDURE:  CT of the Abdomen and Pelvis was performed with intravenous contrast.  Intravenous contrast: 90 ml Omnipaque 350. 10 ml discarded.  Oral contrast: None.  Sagittal and coronal reformats were performed.    FINDINGS:  LOWER CHEST: Trace pleural effusions with adjacent atelectasis.    LIVER: Within normal limits.  BILE DUCTS: Normal caliber.  GALLBLADDER: Within normal limits.  SPLEEN: Within normal limits.  PANCREAS: Within normal limits.  ADRENALS: Within normal limits.  KIDNEYS/URETERS: Right upper pole subcentimeter hypodense focus, too small to characterize.    BLADDER: Minimally distended with Sandoval catheter in place. Small amount of air related to instrumentation.  REPRODUCTIVE ORGANS: Prostate within normal limits.    BOWEL: No bowel obstruction. Appendix is normal.  PERITONEUM: Small ascites. Unremarkable ventriculoperitoneal shunt catheter terminating in the right mid abdomen. No drainable fluid collection.  VESSELS: IVC filter. Atherosclerotic changes.  RETROPERITONEUM/LYMPHNODES: No lymphadenopathy.  ABDOMINAL WALL: Anasarca.  BONES: Muscular atrophy and demineralization, new compared to prior exam.    IMPRESSION:  No infectious source identified in the abdomen or pelvis.    < end of copied text >  ***  < from: MR Head w/wo IV Cont (12.18.20 @ 17:36) >  EXAM:  MR BRAIN WAW IC        PROCEDURE DATE:  Dec 18 2020         INTERPRETATION:  CLINICAL INDICATION: History of glioblastoma status post resection complicated by meningitis, possible CSF infection.    TECHNIQUE: Multi-planar multi-sequential MRimaging of the brain was performed before and after the intravenous administration of 7.5 ml of Gadavist.    COMPARISON: CT head 12/16/2020. MRI brain 12/23/2019.    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 There is no evidence of residual mass or nodular enhancement in the surgical cavity.    There is a right frontal charlene hole with frontal approach ventricular drainage catheter and distal tip terminating in the right lateral ventricle abutting the septum pellucidum. There is susceptibility blooming artifact related to shunt reservoir limiting detailed assessment in this region. There is no obstructive hydrocephalus.    There is thin diffuse dural thickening/enhancement, likely reactive in nature.    There are scattered T2/FLAIR hyperintense foci in the subcortical and periventricular white matter, nonspecific finding, likely reflecting mild chronic microvascular ischemic disease and/or treatment related changes. There is cortical sulcal prominence related to brain parenchymal volume loss.    No acute infarction or acute intracranial hemorrhage. There are no extra-axial fluid collections. The skull base flow voids are patent.    The visualized intraorbital contents are normal. There are scattered mucosal inflammatory changes of the paranasal sinuses and dependently layering fluid in the nasopharynx. There are mastoid air cell effusions bilaterally. The visualized soft tissues and osseous structures appear otherwise unremarkable.    IMPRESSION:    Status post left frontoparietal craniectomy for tumor resection with cystic encephalomalacia and gliosis and chronic blood products in the subjacent left parietal left temporal lobes, as above. No evidence of residual mass or nodular enhancement in the surgical cavity.    Right frontal charlene hole with ventricular drainage catheter terminating in the right lateral ventricle.    Thin diffuse dural thickening/enhancement, likely reactive in nature.    < end of copied text >

## 2020-12-28 NOTE — CONSULT NOTE ADULT - PROBLEM SELECTOR RECOMMENDATION 9
- c/w sepsis w/u, blood cltr/ urine cltr  - please obtain mri brain w/wo.  - family to reach out to neurosurgeon at [facility] for possible transfer to [facility]     d/w attending
.  Complicated by recurrent hypoglycemia  -limited options to enhance appetite through medications  -could potentially increase Decadron  -already on SSRI so Mirtazapine could have increased adverse effects  -given recent encephalopathy, would use caution with Marinol but it's an option  -would avoid Megace given risk of thrombosis and previous DVT    Will see if patient/family is interested in Medical Marijuana evaluation at discharge

## 2020-12-28 NOTE — CONSULT NOTE ADULT - ASSESSMENT
60-year-old male with history of hypertension, anxiety, GBM status post craniotomy complicated by ESBL meningitis, status post multiple antibiotic courses, currently residing at [facility] for rehab, presenting with altered mental status, hypoxia, hypotension, requiring intubation, treated for sepsis.  Endocrinology consulted for hypoglycemia.      1.  Hypoglycemia  -Differential diagnosis most commonly includes insulinoma, IGF mediated versus adrenal insufficiency versus malnourishment vs medicatino induced (Bactrim)  It would not be helpful to check a.m. cortisol level while patient is currently on dexamethasone 1 mg once daily.  Able result in a low a.m. cortisol which is expected in a patient who is receiving dexamethasone.  Therefore I do not advise on checking an a.m. cortisol level for this patient.  -Noniselt cell tumor hypoglycemia (NICTH) is in in the differential diagnosis of this patient given the history of glioblastoma.  The most common cause of this hypoglycemia is the production of IGF2, leading to hypoglycemia, I do not know if this test is available but can consider sending out.  Treatment would be treating the underlying tumor, glucocoritocids (which patient is already on) and in some severe cases glucagon gtt.   -To further work up the etiology of hypoglycemia, please send the following, ONLY if patient's glucose is less than 55mg/dl. Check pro-insulin, insulin, c-peptide level, beta-hydroxybutyrate, insulin antibody and preserved glucose level.

## 2020-12-28 NOTE — PROGRESS NOTE ADULT - ATTENDING COMMENTS
60 M h/o HTN, DVT s/p IVCF, GBM s/p craniotomy, admitted with altered mental status, hypoxia, and hypotension. Required ICU stay for sepsis of unclear etiology. Now off pressors, extubated, and stable. Course c/b hypoglycemia. Endocrine consulted.

## 2020-12-28 NOTE — CONSULT NOTE ADULT - PROBLEM SELECTOR RECOMMENDATION 5
.  Complex medical decision making / symptom management in the setting of GBM.    Will continue to follow for ongoing GOC discussion.   Emotional support provided, questions answered.  Active Psychosocial Referrals: CASSANDRA MONTERO    For new or uncontrolled symptoms, please page the Palliative Medicine team ([facility] #[phone]).   The service is available 24/7 (including nights & weekends) to provide symptom management recommendations over the phone as appropriate

## 2020-12-28 NOTE — CONSULT NOTE ADULT - PROBLEM SELECTOR RECOMMENDATION 3
.  Aggressive disease, s/p resection/chemo/RT  -pending MRI to evaluate for surgical intervention  -no plan for RT currently  -goals are to continue with DMT if offered

## 2020-12-28 NOTE — CONSULT NOTE ADULT - CONSULT REASON
HYPOGLYCEMIA.
Pain/Symptom Management and Complex Medical Decision Making/GOC in the setting of GBM
h/o GBM, VPS
change MS

## 2020-12-28 NOTE — PROGRESS NOTE ADULT - SUBJECTIVE AND OBJECTIVE BOX
PROGRESS NOTE:   Authored by Sola Julien MD  Pager [phone]  Pager [phone]    Patient is a 60y old  Male who presents with a chief complaint of hypoxic, minimally responsive requiring intubation (27 Dec 2020 11:03)      SUBJECTIVE / OVERNIGHT EVENTS:    MEDICATIONS  (STANDING):  dexAMETHasone     Tablet 1 milliGRAM(s) Oral daily  dextrose 10% + sodium chloride 0.45%. 1000 milliLiter(s) (75 mL/Hr) IV Continuous <Continuous>  dextrose 40% Gel 15 Gram(s) Oral once  dextrose 5%. 1000 milliLiter(s) (100 mL/Hr) IV Continuous <Continuous>  dextrose 5%. 1000 milliLiter(s) (50 mL/Hr) IV Continuous <Continuous>  dextrose 50% Injectable 25 Gram(s) IV Push once  dextrose 50% Injectable 12.5 Gram(s) IV Push once  dextrose 50% Injectable 25 Gram(s) IV Push once  enoxaparin Injectable 40 milliGRAM(s) SubCutaneous daily  FLUoxetine 20 milliGRAM(s) Oral daily  glucagon  Injectable 1 milliGRAM(s) IntraMuscular once  influenza   Vaccine 0.5 milliLiter(s) IntraMuscular once  levETIRAcetam 500 milliGRAM(s) Oral two times a day  pantoprazole   Suspension 40 milliGRAM(s) Oral daily  senna 2 Tablet(s) Oral at bedtime  trimethoprim  160 mG/sulfamethoxazole 800 mG 1 Tablet(s) Oral daily    MEDICATIONS  (PRN):  sodium chloride 0.9% lock flush 10 milliLiter(s) IV Push every 1 hour PRN Pre/post blood products, medications, blood draw, and to maintain line patency      CAPILLARY BLOOD GLUCOSE      POCT Blood Glucose.: 88 mg/dL (27 Dec 2020 22:33)  POCT Blood Glucose.: 84 mg/dL (27 Dec 2020 17:06)  POCT Blood Glucose.: 100 mg/dL (27 Dec 2020 11:54)  POCT Blood Glucose.: 87 mg/dL (27 Dec 2020 08:50)    I&O's Summary    27 Dec 2020 07:01  -  28 Dec 2020 07:00  --------------------------------------------------------  IN: 1570 mL / OUT: 3325 mL / NET: -1755 mL        PHYSICAL EXAM:  Vital Signs Last 24 Hrs  T(C): 36.9 (28 Dec 2020 06:00), Max: 36.9 (28 Dec 2020 06:00)  T(F): 98.5 (28 Dec 2020 06:00), Max: 98.5 (28 Dec 2020 06:00)  HR: 89 (28 Dec 2020 06:00) (87 - 89)  BP: 145/91 (28 Dec 2020 06:00) (135/92 - 145/91)  BP(mean): --  RR: 18 (28 Dec 2020 06:00) (17 - 18)  SpO2: 99% (28 Dec 2020 06:00) (99% - 100%)    GENERAL: No acute distress, well-developed  HEAD:  Atraumatic, Normocephalic  EYES: EOMI, PERRLA, conjunctiva and sclera clear  NECK: Supple, no lymphadenopathy, no JVD  CHEST/LUNG: CTAB; No wheezes, rales, or rhonchi  HEART: Regular rate and rhythm; No murmurs, rubs, or gallops  ABDOMEN: Soft, non-tender, non-distended; normal bowel sounds, no organomegaly  EXTREMITIES:  2+ peripheral pulses b/l, No clubbing, cyanosis, or edema  NEUROLOGY: A&O x 3, no focal deficits  SKIN: No rashes or lesions    LABS:                        11.4   4.69  )-----------( 146      ( 27 Dec 2020 08:27 )             35.1     12-27    143  |  109<H>  |  <2<L>  ----------------------------<  65<L>  3.4<L>   |  26  |  0.60    Ca    8.6      27 Dec 2020 08:27  Phos  2.7     12-27  Mg     1.9     12-27                  RADIOLOGY & ADDITIONAL TESTS:  Results Reviewed:   Imaging Personally Reviewed:  Electrocardiogram Personally Reviewed:    COORDINATION OF CARE:  Care Discussed with Consultants/Other Providers [Y/N]:  Prior or Outpatient Records Reviewed [Y/N]:   PROGRESS NOTE:   Authored by Sola Julien MD  Pager [phone]  Pager [facility] [phone]    Patient is a 60y old  Male who presents with a chief complaint of hypoxic, minimally responsive requiring intubation (27 Dec 2020 11:03)      SUBJECTIVE / OVERNIGHT EVENTS: AAOx2. Patient states that he is sleepy. Denies headaches, F/C, dizziness, syncope, CP/SOB, N/V, abdominal pain, dysuria, changes in BM, peripheral swelling, skin changes, new joint aches. Tolerating PO.    MEDICATIONS  (STANDING):  dexAMETHasone     Tablet 1 milliGRAM(s) Oral daily  dextrose 10% + sodium chloride 0.45%. 1000 milliLiter(s) (75 mL/Hr) IV Continuous <Continuous>  dextrose 40% Gel 15 Gram(s) Oral once  dextrose 5%. 1000 milliLiter(s) (100 mL/Hr) IV Continuous <Continuous>  dextrose 5%. 1000 milliLiter(s) (50 mL/Hr) IV Continuous <Continuous>  dextrose 50% Injectable 25 Gram(s) IV Push once  dextrose 50% Injectable 12.5 Gram(s) IV Push once  dextrose 50% Injectable 25 Gram(s) IV Push once  enoxaparin Injectable 40 milliGRAM(s) SubCutaneous daily  FLUoxetine 20 milliGRAM(s) Oral daily  glucagon  Injectable 1 milliGRAM(s) IntraMuscular once  influenza   Vaccine 0.5 milliLiter(s) IntraMuscular once  levETIRAcetam 500 milliGRAM(s) Oral two times a day  pantoprazole   Suspension 40 milliGRAM(s) Oral daily  senna 2 Tablet(s) Oral at bedtime  trimethoprim  160 mG/sulfamethoxazole 800 mG 1 Tablet(s) Oral daily    MEDICATIONS  (PRN):  sodium chloride 0.9% lock flush 10 milliLiter(s) IV Push every 1 hour PRN Pre/post blood products, medications, blood draw, and to maintain line patency      CAPILLARY BLOOD GLUCOSE      POCT Blood Glucose.: 88 mg/dL (27 Dec 2020 22:33)  POCT Blood Glucose.: 84 mg/dL (27 Dec 2020 17:06)  POCT Blood Glucose.: 100 mg/dL (27 Dec 2020 11:54)  POCT Blood Glucose.: 87 mg/dL (27 Dec 2020 08:50)    I&O's Summary    27 Dec 2020 07:01  -  28 Dec 2020 07:00  --------------------------------------------------------  IN: 1570 mL / OUT: 3325 mL / NET: -1755 mL        PHYSICAL EXAM:  Vital Signs Last 24 Hrs  T(C): 36.9 (28 Dec 2020 06:00), Max: 36.9 (28 Dec 2020 06:00)  T(F): 98.5 (28 Dec 2020 06:00), Max: 98.5 (28 Dec 2020 06:00)  HR: 89 (28 Dec 2020 06:00) (87 - 89)  BP: 145/91 (28 Dec 2020 06:00) (135/92 - 145/91)  BP(mean): --  RR: 18 (28 Dec 2020 06:00) (17 - 18)  SpO2: 99% (28 Dec 2020 06:00) (99% - 100%)    GENERAL: No acute distress, well-developed  HEAD:  Atraumatic, Normocephalic  EYES: EOMI, PERRLA, conjunctiva and sclera clear  NECK: Supple, no lymphadenopathy, no JVD  CHEST/LUNG: CTAB; No wheezes, rales, or rhonchi  HEART: Regular rate and rhythm; No murmurs, rubs, or gallops  ABDOMEN: Soft, non-tender, non-distended; normal bowel sounds, no organomegaly  EXTREMITIES:  2+ peripheral pulses b/l, No clubbing, cyanosis, or edema  NEUROLOGY: A&O x 3, no focal deficits  SKIN: No rashes or lesions    LABS:                        11.4   4.69  )-----------( 146      ( 27 Dec 2020 08:27 )             35.1     12-27    143  |  109<H>  |  <2<L>  ----------------------------<  65<L>  3.4<L>   |  26  |  0.60    Ca    8.6      27 Dec 2020 08:27  Phos  2.7     12-27  Mg     1.9     12-27                  RADIOLOGY & ADDITIONAL TESTS:  Results Reviewed:   Imaging Personally Reviewed:  Electrocardiogram Personally Reviewed:    COORDINATION OF CARE:  Care Discussed with Consultants/Other Providers [Y/N]:  Prior or Outpatient Records Reviewed [Y/N]:

## 2020-12-28 NOTE — CONSULT NOTE ADULT - SUBJECTIVE AND OBJECTIVE BOX
[facility] Geriatrics and Palliative Care  Adiel Rodriguez, Palliative Care Attending  Contact Info: Page [phone] (including Nights/Weekend), message on Microsoft Teams (Adiel Rodriguez), or leave VM at Palliative Office [phone] (Non-Urgent)    HPI:  60y M PMHx HTN, Anxiety (on Prozac), b/l DVT while on Coumadin (s/p IVF filter, now off coumadin), GBM s/p craniotomy w/ resection c/b ESBL meningitis for which repeat craniotomy (2/2020) performed and pt was given 8 weeks Meropenem, pt subsequently had repeat ESBL meningitis s/p craniectomy w/ washout w/ intrathecal Jin and 10 week course IV Jin, COVID infection in 3/2020 who was BIBEMS from [facility] for AMS, hypoxia and hypotension.  In the ED pt was intubated for airway protection.  He was given 2.2 L NS, 200 mcg phenylephrine, started on levophed gtt, started on vanc and cefepime. Labs notable for WBC 11.65, VBG 7.18/45/56/15/77.7. lactate 10.8 CXR showed grossly clear lungs, view partly blocked by pacer pads, U/A negative, blood and urine cultures pending. Per sister (OB physician), pt was supposed to be on lifelong Bactrim for brain infection however Bactrim was discontinued at [place].  Pt received neurosurgical care at [facility] w/ Dr. 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  Bipolar affective  Diabetes mellitus  Hypertension  Borderline diabetes  Essential hypertension  Bipolar disorder  Disruption of closure of skull or craniotomy    FAMILY HISTORY:  Cerebrovascular accident (Father)    ITEMS NOT CHECKED ARE NOT PRESENT    SOCIAL HISTORY:   Significant other/partner:  []  Children:  []  Islam/Spirituality:  Substance hx:  []   Tobacco hx:  []   Alcohol hx: []   Home Opioid hx:  [] I-Stop Reference No:  Living Situation: []Home  [x]Long term care  []Rehab []Other    ADVANCE DIRECTIVES:    DNR  []MOLST  []Living Will  DECISION MAKER(s):  [] Health Care Proxy(s)  [] Surrogate(s)  [] Guardian           Name(s):              Phone Number(s):    BASELINE (I)ADL(s) (prior to admission):  [place]: []Total  [] Moderate []Dependent    ALLERGIES:  aspirin (Unknown)  shellfish (Hives)  shellfish (Unknown)  Tegretol (Unknown)    Intolerances  ACE inhibitors (Other)  angiotensin II inhibitors (Other)    MEDICATIONS  (STANDING):  dexAMETHasone     Tablet 1 milliGRAM(s) Oral daily  dextrose 10% + sodium chloride 0.45%. 1000 milliLiter(s) (75 mL/Hr) IV Continuous <Continuous>  dextrose 40% Gel 15 Gram(s) Oral once  dextrose 5%. 1000 milliLiter(s) (100 mL/Hr) IV Continuous <Continuous>  dextrose 5%. 1000 milliLiter(s) (50 mL/Hr) IV Continuous <Continuous>  dextrose 50% Injectable 25 Gram(s) IV Push once  dextrose 50% Injectable 12.5 Gram(s) IV Push once  dextrose 50% Injectable 25 Gram(s) IV Push once  enoxaparin Injectable 40 milliGRAM(s) SubCutaneous daily  FLUoxetine 20 milliGRAM(s) Oral daily  glucagon  Injectable 1 milliGRAM(s) IntraMuscular once  influenza   Vaccine 0.5 milliLiter(s) IntraMuscular once  levETIRAcetam 500 milliGRAM(s) Oral two times a day  pantoprazole   Suspension 40 milliGRAM(s) Oral daily  potassium chloride    Tablet ER 40 milliEquivalent(s) Oral every 4 hours  senna 2 Tablet(s) Oral at bedtime  trimethoprim  160 mG/sulfamethoxazole 800 mG 1 Tablet(s) Oral daily    MEDICATIONS  (PRN):  sodium chloride 0.9% lock flush 10 milliLiter(s) IV Push every 1 hour PRN Pre/post blood products, medications, blood draw, and to maintain line patency    PRESENT SYMPTOMS: []Unable to obtain due to poor mentation/encephalopathy  Source if other than patient:  []Family   []Team     Pain: [ ] yes [x] no  QOL impact -   Location -                    Aggravating factors -  Quality -  Radiation -  Timing -  Severity (0-10 scale):  Minimal acceptable level (0-10 scale):    PAIN AD Score:  http://geriatrictoolkit.[place].[email or web address]/cog/painad.pdf (press ctrl +  left click to view)    Dyspnea:                           []Mild  []Moderate []Severe  Anxiety:                             []Mild []Moderate []Severe  Fatigue:                             []Mild []Moderate []Severe  Nausea:                             []Mild []Moderate []Severe  Loss of appetite:              []Mild []Moderate []Severe  Constipation:                    []Mild []Moderate []Severe    Other Symptoms:  [x]All other review of systems negative     Palliative Performance Status Version 2:  40%    [email or web address]_[email or web address]    PHYSICAL EXAM:  Vital Signs Last 24 Hrs  T(C): 36.3 (28 Dec 2020 13:15), Max: 36.9 (28 Dec 2020 06:00)  T(F): 97.4 (28 Dec 2020 13:15), Max: 98.5 (28 Dec 2020 06:00)  HR: 75 (28 Dec 2020 13:15) (75 - 89)  BP: 144/92 (28 Dec 2020 13:15) (135/92 - 145/91)  BP(mean): --  RR: 18 (28 Dec 2020 13:15) (18 - 18)  SpO2: 100% (28 Dec 2020 13:15) (99% - 100%) I&O's Summary    27 Dec 2020 07:01  -  28 Dec 2020 07:00  --------------------------------------------------------  IN: 1570 mL / OUT: 3325 mL / NET: -1755 mL    28 Dec 2020 07:01  -  28 Dec 2020 16:38  --------------------------------------------------------  IN: 0 mL / OUT: 1400 mL / NET: -1400 mL    GENERAL:  []Alert  []Oriented x   []Lethargic  []Cachexia  []Unarousable  []Verbal  []Non-Verbal  Behavioral:   [] Anxiety  [] Delirium [] Agitation [] Other  HEENT:  []Normal   []Dry mouth   []ET Tube/Trach  []Oral lesions  PULMONARY:   []Clear []Tachypnea  []Audible excessive secretions   []Rhonchi        []Right []Left []Bilateral  []Crackles        []Right []Left []Bilateral  []Wheezing     []Right []Left []Bilateral  CARDIOVASCULAR:    []Regular []Irregular []Tachy  []Amor []Murmur []Other  GASTROINTESTINAL:  []Soft  []Distended   []+BS  []Non tender []Tender  []PEG []OGT/ NGT  Last BM:   12-21-20 @ 07:01  -  12-22-20 @ 07:00  --------------------------------------------------------  OUT: 50 mL  GENITOURINARY:  []Normal [] Incontinent   []Oliguria/Anuria   []Sandoval  MUSCULOSKELETAL:   []Normal   []Weakness  []Bed/Wheelchair bound []Edema  NEUROLOGIC:   []No focal deficits  [] Cognitive impairment  [] Dysphagia []Dysarthria [] Paresis []Other   SKIN:   []Normal   []Pressure ulcer(s)  []Rash    CRITICAL CARE:  [ ] Shock Present  [ ]Septic [ ]Cardiogenic [ ]Neurologic [ ]Hypovolemic  [ ]  Vasopressors [ ]  Inotropes   [ ] Respiratory failure present [ ] Mechanical Ventilation [ ] Non-invasive ventilatory support [ ] High-Flow  [ ] Acute  [ ] Chronic [ ] Hypoxic  [ ] Hypercarbic [ ] Other  [ ] Other organ failure    LABS:                        12.8   5.45  )-----------( 168      ( 28 Dec 2020 08:39 )             39.3   12-28    142  |  106  |  <2<L>  ----------------------------<  67<L>  3.0<L>   |  28  |  0.63    Ca    8.8      28 Dec 2020 08:39  Phos  3.2     12-28  Mg     2.0     12-28    RADIOLOGY & ADDITIONAL STUDIES:  < from: MR Head w/wo IV Cont (12.18.20 @ 17:36) >  Status post left frontoparietal craniectomy for tumor resection with cystic encephalomalacia and gliosis and chronic blood products in the subjacent left parietal left temporal lobes, as above. No evidence of residual mass or nodular enhancement in the surgical cavity.  Right frontal charlene hole with ventricular drainage catheter terminating in the right lateral ventricle.  Thin diffuse dural thickening/enhancement, likely reactive in nature.    PROTEIN CALORIE MALNUTRITION PRESENT: [ ]mild [ ]moderate [ ]severe [ ]underweight [ ]morbid obesity  []PPSV2 < or = to 30% []significant weight loss  []poor nutritional intake []catabolic state []anasarca     Albumin, Serum: 2.2 g/dL (12-25-20 @ 07:48)  Artificial Nutrition []     REFERRALS:   []Chaplaincy  []Hospice  []Child Life  [x]Social Work  []Case management []Holistic Therapy     Goals of Care Document:   Care Coordination Assessment 201 [C. Provider] (12-24-20 @ 10:13)    [facility] Geriatrics and Palliative Care  Adiel Rodriguez, Palliative Care Attending  Contact Info: Page [phone] (including Nights/Weekend), message on Microsoft Teams (Adiel Rodriguez), or leave VM at Palliative Office [phone] (Non-Urgent)    HPI:  60y M PMHx HTN, Anxiety (on Prozac), b/l DVT while on Coumadin (s/p IVF filter, now off coumadin), GBM s/p craniotomy w/ resection c/b ESBL meningitis for which repeat craniotomy (2/2020) performed and pt was given 8 weeks Meropenem, pt subsequently had repeat ESBL meningitis s/p craniectomy w/ washout w/ intrathecal Jin and 10 week course IV Jin, COVID infection in 3/2020 who was BIBEMS from [facility] for AMS, hypoxia and hypotension.  In the ED pt was intubated for airway protection.  He was given 2.2 L NS, 200 mcg phenylephrine, started on levophed gtt, started on vanc and cefepime. Labs notable for WBC 11.65, VBG 7.18/45/56/15/77.7. lactate 10.8 CXR showed grossly clear lungs, view partly blocked by pacer pads, U/A negative, blood and urine cultures pending. Per sister (OB physician), pt was supposed to be on lifelong Bactrim for brain infection however Bactrim was discontinued at [place].  Pt received neurosurgical care at [facility] w/ Dr. Patrice Cantu ([phone])  (16 Dec 2020 18:16)    Patient seen at bedside; tired and did not want to participate much. Denies any pain, nausea, or dyspnea. Collateral obtained from chart and will follow-up with patient's sister.    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  Bipolar affective  Diabetes mellitus  Hypertension  Borderline diabetes  Essential hypertension  Bipolar disorder  Disruption of closure of skull or craniotomy    FAMILY HISTORY:  Cerebrovascular accident (Father)    ITEMS NOT CHECKED ARE NOT PRESENT    SOCIAL HISTORY:   Significant other/partner:  []  Children:  []  Mosque/Spirituality:  Substance hx:  []   Tobacco hx:  []   Alcohol hx: []   Home Opioid hx:  [] I-Stop Reference No: [id number]  -no active Rx's  Living Situation: []Home  [x]Long term care  []Rehab []Other    ADVANCE DIRECTIVES:    DNR  []MOLST  []Living Will  DECISION MAKER(s):  [] Health Care Proxy(s)  [] Surrogate(s)  [] Guardian           Name(s):              Phone Number(s):    BASELINE (I)ADL(s) (prior to admission):  [place]: []Total  [] Moderate []Dependent    ALLERGIES:  aspirin (Unknown)  shellfish (Hives)  shellfish (Unknown)  Tegretol (Unknown)    Intolerances  ACE inhibitors (Other)  angiotensin II inhibitors (Other)    MEDICATIONS  (STANDING):  dexAMETHasone     Tablet 1 milliGRAM(s) Oral daily  dextrose 10% + sodium chloride 0.45%. 1000 milliLiter(s) (75 mL/Hr) IV Continuous <Continuous>  dextrose 40% Gel 15 Gram(s) Oral once  dextrose 5%. 1000 milliLiter(s) (100 mL/Hr) IV Continuous <Continuous>  dextrose 5%. 1000 milliLiter(s) (50 mL/Hr) IV Continuous <Continuous>  dextrose 50% Injectable 25 Gram(s) IV Push once  dextrose 50% Injectable 12.5 Gram(s) IV Push once  dextrose 50% Injectable 25 Gram(s) IV Push once  enoxaparin Injectable 40 milliGRAM(s) SubCutaneous daily  FLUoxetine 20 milliGRAM(s) Oral daily  glucagon  Injectable 1 milliGRAM(s) IntraMuscular once  influenza   Vaccine 0.5 milliLiter(s) IntraMuscular once  levETIRAcetam 500 milliGRAM(s) Oral two times a day  pantoprazole   Suspension 40 milliGRAM(s) Oral daily  potassium chloride    Tablet ER 40 milliEquivalent(s) Oral every 4 hours  senna 2 Tablet(s) Oral at bedtime  trimethoprim  160 mG/sulfamethoxazole 800 mG 1 Tablet(s) Oral daily    MEDICATIONS  (PRN):  sodium chloride 0.9% lock flush 10 milliLiter(s) IV Push every 1 hour PRN Pre/post blood products, medications, blood draw, and to maintain line patency    PRESENT SYMPTOMS: []Unable to obtain due to poor mentation/encephalopathy  Source if other than patient:  []Family   []Team     Pain: [ ] yes [x] no  QOL impact -   Location -                    Aggravating factors -  Quality -  Radiation -  Timing -  Severity (0-10 scale):  Minimal acceptable level (0-10 scale):    PAIN AD Score:  http://geriatrictoolkit.[facility]/cog/painad.pdf (press ctrl +  left click to view)    Dyspnea:                           []Mild  []Moderate []Severe  Anxiety:                             []Mild []Moderate []Severe  Fatigue:                             []Mild []Moderate []Severe  Nausea:                             []Mild []Moderate []Severe  Loss of appetite:              []Mild []Moderate []Severe  Constipation:                    []Mild []Moderate []Severe    Other Symptoms:  [x]All other review of systems negative     Palliative Performance Status Version 2:  40%    [email or web address]    PHYSICAL EXAM:  Vital Signs Last 24 Hrs  T(C): 36.3 (28 Dec 2020 13:15), Max: 36.9 (28 Dec 2020 06:00)  T(F): 97.4 (28 Dec 2020 13:15), Max: 98.5 (28 Dec 2020 06:00)  HR: 75 (28 Dec 2020 13:15) (75 - 89)  BP: 144/92 (28 Dec 2020 13:15) (135/92 - 145/91)  BP(mean): --  RR: 18 (28 Dec 2020 13:15) (18 - 18)  SpO2: 100% (28 Dec 2020 13:15) (99% - 100%) I&O's Summary    27 Dec 2020 07:01  -  28 Dec 2020 07:00  --------------------------------------------------------  IN: 1570 mL / OUT: 3325 mL / NET: -1755 mL    28 Dec 2020 07:01  -  28 Dec 2020 16:38  --------------------------------------------------------  IN: 0 mL / OUT: 1400 mL / NET: -1400 mL    GENERAL:  []Alert  []Oriented x   [x]Lethargic  []Cachexia  []Unarousable  [x]Verbal  []Non-Verbal  Behavioral:   [] Anxiety  [] Delirium [] Agitation [] Other  HEENT:  [x]Normal   []Dry mouth   []ET Tube/Trach  []Oral lesions  PULMONARY:   [x]Clear []Tachypnea  []Audible excessive secretions   []Rhonchi        []Right []Left []Bilateral  []Crackles        []Right []Left []Bilateral  []Wheezing     []Right []Left []Bilateral  CARDIOVASCULAR:    [x]Regular []Irregular []Tachy  []Amor []Murmur []Other  GASTROINTESTINAL:  [x]Soft  []Distended   [x]+BS  [x]Non tender []Tender  []PEG []OGT/ NGT  Last BM:   12-21-20 @ 07:01  -  12-22-20 @ 07:00  --------------------------------------------------------  OUT: 50 mL  GENITOURINARY:  [x]Normal [] Incontinent   []Oliguria/Anuria   []Sandoval  MUSCULOSKELETAL:   []Normal   [x]Weakness  []Bed/Wheelchair bound []Edema  NEUROLOGIC:   []No focal deficits  [x] Cognitive impairment  [] Dysphagia []Dysarthria [] Paresis []Other   SKIN:   [x]Normal   []Pressure ulcer(s)  []Rash    CRITICAL CARE:  [ ] Shock Present  [ ]Septic [ ]Cardiogenic [ ]Neurologic [ ]Hypovolemic  [ ]  Vasopressors [ ]  Inotropes   [ ] Respiratory failure present [ ] Mechanical Ventilation [ ] Non-invasive ventilatory support [ ] High-Flow  [ ] Acute  [ ] Chronic [ ] Hypoxic  [ ] Hypercarbic [ ] Other  [ ] Other organ failure    LABS:                      12.8   5.45  )-----------( 168      ( 28 Dec 2020 08:39 )             39.3   12-28    142  |  106  |  <2<L>  ----------------------------<  67<L>  3.0<L>   |  28  |  0.63    Ca    8.8      28 Dec 2020 08:39  Phos  3.2     12-28  Mg     2.0     12-28    RADIOLOGY & ADDITIONAL STUDIES:  < from: MR Head w/wo IV Cont (12.18.20 @ 17:36) >  Status post left frontoparietal craniectomy for tumor resection with cystic encephalomalacia and gliosis and chronic blood products in the subjacent left parietal left temporal lobes, as above. No evidence of residual mass or nodular enhancement in the surgical cavity.  Right frontal charlene hole with ventricular drainage catheter terminating in the right lateral ventricle.  Thin diffuse dural thickening/enhancement, likely reactive in nature.    PROTEIN CALORIE MALNUTRITION PRESENT: [ ]mild [ ]moderate [ ]severe [ ]underweight [ ]morbid obesity  []PPSV2 < or = to 30% []significant weight loss  []poor nutritional intake []catabolic state []anasarca     Albumin, Serum: 2.2 g/dL (12-25-20 @ 07:48)  Artificial Nutrition []     REFERRALS:   []Chaplaincy  []Hospice  []Child Life  [x]Social Work  []Case management []Holistic Therapy     Goals of Care Document:   Care Coordination Assessment 201 [C. Provider] (12-24-20 @ 10:13)

## 2020-12-28 NOTE — CONSULT NOTE ADULT - ASSESSMENT
Full Note to Follow.    ·	no acute symptoms  ·	left VM with patient's sister/HCP to begin GOC discussion, awaiting callback 59yo M with PMH of GBM (s/p resection, c/b ESBL Meningitis), Anxiety, HTN, and h/o DVT (s/p IVC Filter) p/w AMS due to sepsis requiring intubation. Palliative consulted for complex medical decision making in the setting of life-limiting disease.    ·	no acute symptoms  ·	left VM with patient's sister/HCP to begin GOC discussion, awaiting callback

## 2020-12-28 NOTE — CONSULT NOTE ADULT - REASON FOR ADMISSION
hypoxic, minimally responsive requiring intubation

## 2020-12-28 NOTE — PROGRESS NOTE ADULT - ASSESSMENT
· Assessment	  60y M PMHx HTN, Anxiety (on Prozac), b/l DVT while on Coumadin (s/p IVF filter, now off coumadin), GBM s/p craniotomy w/ resection c/b ESBL meningitis for which repeat craniotomy (2/2020) performed and pt was given 8 weeks Meropenem, pt subsequently had repeat ESBL meningitis s/p craniectomy w/ washout w/ intrathecal Jin and 10 week course IV Jin, COVID infection in 3/2020 who was BIBEMS from [facility] for AMS, hypoxia and hypotension. s/p MICU stay for sepsis, w/o clear source identified. Now with hypogylcemia on Dextrose drip.        Problem/Plan - 1:  ·  Problem: Hypoglycemia.  Plan: - No history of diabetes. AM 12/23 BG to 50s, while on D5LR, increase to 96 with hypoglycemia protocol. Rule out adrenal insufficiency; patient with recent sepsis and history of steroid use.   - Encourage PO intake.   - D5/LR switched to D10/0.45NS.   - f/u AM cortisol.   -endo consult on 12/28     Problem/Plan - 2:  ·  Problem: Glioblastoma multiforme.  Plan: s/p craniotomy w/resection and subsequent ESBL infection for which repeat craniotomy was performed (2/2020) now with  shunt.  - c/w 500 BID Keppra PO for seizure ppx (hx of EEGs in the past without seizure activity)  - c/w home decadron 1mg PO qd. Weaned off hydrocortisone in MICU.   -c/w Bactrim DS qd. (Per pt's sister Maribel pt is to be on BID but provider called [facility] Dr. Dominguez (infectious disease) office- they had no records of Dr. Dominguez prescribing Bactrim for this pt. However they saw a Dr. Simeon (rad onc) who did but were not able to find any reasoning in notes as to why BID. Per house ID, no reason to have BID dosing)  - s/p LP with low c/f CNS infection  - Appreciate ID recs.      Problem/Plan - 3:  ·  Problem: Altered mental status.  Plan: - Patient AxO to self and time. Per patient's sister and HCP, Maribel, patient is AxO x3, but can be aphasic, with blunted responses at time at baseline.      Problem/Plan - 4:  ·  Problem: Septic shock.  Plan: Resolved. s/p pressors in the MICU. CTAP w/o intrabdominal pathology. Low cocnern for CNS infection per LP.   - s/p extubation in MICU on 12/2 (originally intubated for airway protection)  - s/p meropenem course for 7 days in MICU.      Problem/Plan - 5:  ·  Problem: Prophylactic measure.  Plan: DVT ppx: lovenox  Diet: Softs.      Problem/Plan - 6:  Problem: Discharge planning issues. Plan: Transition of Care Status:  1. Name of PCP:  2. PCP contacted on admission: [  ] Y     [  ] N  3. PCP contacted at discharge: [  ] Y     [  ] N  4. Post-discharge appointment date and location:  5. Summary of handoff given to PCP:   · Assessment	  60y M PMHx HTN, Anxiety (on Prozac), b/l DVT while on Coumadin (s/p IVF filter, now off coumadin), GBM s/p craniotomy w/ resection c/b ESBL meningitis for which repeat craniotomy (2/2020) performed and pt was given 8 weeks Meropenem, pt subsequently had repeat ESBL meningitis s/p craniectomy w/ washout w/ intrathecal Jin and 10 week course IV Jin, COVID infection in 3/2020 who was BIBEMS from [facility] for AMS, hypoxia and hypotension. s/p MICU stay for sepsis, w/o clear source identified. Now with hypogylcemia on Dextrose drip.        Problem/Plan - 1:  ·  Problem: Hypoglycemia.  InsulinomaPlan: - No history of diabetes. AM 12/23 BG to 50s, while on D5LR, increase to 96 with hypoglycemia protocol. Rule out adrenal insufficiency; patient with recent sepsis and history of steroid use.   - Encourage PO intake.   - D5/LR switched to D10/0.45NS.   - f/u AM cortisol.   - endo consulted, appreciate recs     Problem/Plan - 2:  ·  Problem: Glioblastoma multiforme.  Plan: s/p craniotomy w/resection and subsequent ESBL infection for which repeat craniotomy was performed (2/2020) now with  shunt.  - c/w 500 BID Keppra PO for seizure ppx (hx of EEGs in the past without seizure activity)  - c/w home decadron 1mg PO qd. Weaned off hydrocortisone in MICU.   -c/w ppx Bactrim DS qd. (Per pt's sister Maribel pt is to be on BID but provider called [facility] Dr. Dominguez (infectious disease) office- they had no records of Dr. Dominguez prescribing Bactrim for this pt. However they saw a Dr. Simeon (rad onc) who did but were not able to find any reasoning in notes as to why BID. Per house ID, no reason to have BID dosing)  - s/p LP with low c/f CNS infection  - Appreciate ID recs.   - palliative care consulted, recs appreciated     Problem/Plan - 3:  ·  Problem: Altered mental status.  Plan: - Patient AxO to self and time, knows he is in hospital. Per patient's sister and HCP, Maribel, patient is AxO x3, but can be aphasic, with blunted responses at time at baseline.      Problem/Plan - 4:  ·  Problem: Septic shock.  Plan: Resolved. s/p pressors in the MICU. CTAP w/o intrabdominal pathology. Low cocnern for CNS infection per LP.   - s/p extubation in MICU on 12/2 (originally intubated for airway protection)  - s/p meropenem course for 7 days in MICU.      Problem/Plan - 5:  ·  Problem: Prophylactic measure.  Plan: DVT ppx: lovenox  Diet: Softs.      Problem/Plan - 6:  Problem: Discharge planning issues. Plan: Transition of Care Status:  1. Name of PCP:  2. PCP contacted on admission: [  ] Y     [  ] N  3. PCP contacted at discharge: [  ] Y     [  ] N  4. Post-discharge appointment date and location:  5. Summary of handoff given to PCP:

## 2020-12-28 NOTE — CONSULT NOTE ADULT - PROBLEM SELECTOR RECOMMENDATION 2
.  -c/w Prozac    Will see if patient/family is interested in Medical Marijuana evaluation at discharge

## 2020-12-29 DIAGNOSIS — E46 UNSPECIFIED PROTEIN-CALORIE MALNUTRITION: ICD-10-CM

## 2020-12-29 LAB
ANION GAP SERPL CALC-SCNC: 8 MMOL/L — SIGNIFICANT CHANGE UP (ref 7–14)
BUN SERPL-MCNC: <2 MG/DL — LOW (ref 7–23)
CALCIUM SERPL-MCNC: 8.7 MG/DL — SIGNIFICANT CHANGE UP (ref 8.4–10.5)
CHLORIDE SERPL-SCNC: 108 MMOL/L — HIGH (ref 98–107)
CO2 SERPL-SCNC: 25 MMOL/L — SIGNIFICANT CHANGE UP (ref 22–31)
CREAT SERPL-MCNC: 0.59 MG/DL — SIGNIFICANT CHANGE UP (ref 0.5–1.3)
GLUCOSE BLDC GLUCOMTR-MCNC: 58 MG/DL — LOW (ref 70–99)
GLUCOSE BLDC GLUCOMTR-MCNC: 71 MG/DL — SIGNIFICANT CHANGE UP (ref 70–99)
GLUCOSE BLDC GLUCOMTR-MCNC: 79 MG/DL — SIGNIFICANT CHANGE UP (ref 70–99)
GLUCOSE BLDC GLUCOMTR-MCNC: 85 MG/DL — SIGNIFICANT CHANGE UP (ref 70–99)
GLUCOSE BLDC GLUCOMTR-MCNC: 94 MG/DL — SIGNIFICANT CHANGE UP (ref 70–99)
GLUCOSE SERPL-MCNC: 90 MG/DL — SIGNIFICANT CHANGE UP (ref 70–99)
HCT VFR BLD CALC: 35.1 % — LOW (ref 39–50)
HGB BLD-MCNC: 11.5 G/DL — LOW (ref 13–17)
MAGNESIUM SERPL-MCNC: 2 MG/DL — SIGNIFICANT CHANGE UP (ref 1.6–2.6)
MCHC RBC-ENTMCNC: 32.2 PG — SIGNIFICANT CHANGE UP (ref 27–34)
MCHC RBC-ENTMCNC: 32.8 GM/DL — SIGNIFICANT CHANGE UP (ref 32–36)
MCV RBC AUTO: 98.3 FL — SIGNIFICANT CHANGE UP (ref 80–100)
NRBC # BLD: 0 /100 WBCS — SIGNIFICANT CHANGE UP
NRBC # FLD: 0 K/UL — SIGNIFICANT CHANGE UP
PHOSPHATE SERPL-MCNC: 3.2 MG/DL — SIGNIFICANT CHANGE UP (ref 2.5–4.5)
PLATELET # BLD AUTO: 172 K/UL — SIGNIFICANT CHANGE UP (ref 150–400)
POTASSIUM SERPL-MCNC: 3.7 MMOL/L — SIGNIFICANT CHANGE UP (ref 3.5–5.3)
POTASSIUM SERPL-SCNC: 3.7 MMOL/L — SIGNIFICANT CHANGE UP (ref 3.5–5.3)
RBC # BLD: 3.57 M/UL — LOW (ref 4.2–5.8)
RBC # FLD: 13.3 % — SIGNIFICANT CHANGE UP (ref 10.3–14.5)
SARS-COV-2 RNA SPEC QL NAA+PROBE: SIGNIFICANT CHANGE UP
SODIUM SERPL-SCNC: 141 MMOL/L — SIGNIFICANT CHANGE UP (ref 135–145)
WBC # BLD: 4.07 K/UL — SIGNIFICANT CHANGE UP (ref 3.8–10.5)
WBC # FLD AUTO: 4.07 K/UL — SIGNIFICANT CHANGE UP (ref 3.8–10.5)

## 2020-12-29 PROCEDURE — 99233 SBSQ HOSP IP/OBS HIGH 50: CPT | Mod: GC

## 2020-12-29 PROCEDURE — 99233 SBSQ HOSP IP/OBS HIGH 50: CPT

## 2020-12-29 PROCEDURE — 99232 SBSQ HOSP IP/OBS MODERATE 35: CPT

## 2020-12-29 RX ADMIN — LEVETIRACETAM 500 MILLIGRAM(S): 250 TABLET, FILM COATED ORAL at 05:06

## 2020-12-29 RX ADMIN — Medication 1 MILLIGRAM(S): at 05:06

## 2020-12-29 RX ADMIN — LEVETIRACETAM 500 MILLIGRAM(S): 250 TABLET, FILM COATED ORAL at 17:48

## 2020-12-29 RX ADMIN — PANTOPRAZOLE SODIUM 40 MILLIGRAM(S): 20 TABLET, DELAYED RELEASE ORAL at 12:11

## 2020-12-29 RX ADMIN — ENOXAPARIN SODIUM 40 MILLIGRAM(S): 100 INJECTION SUBCUTANEOUS at 12:11

## 2020-12-29 RX ADMIN — SODIUM CHLORIDE 75 MILLILITER(S): 9 INJECTION, SOLUTION INTRAVENOUS at 05:07

## 2020-12-29 RX ADMIN — Medication 1 TABLET(S): at 12:11

## 2020-12-29 RX ADMIN — Medication 20 MILLIGRAM(S): at 12:11

## 2020-12-29 NOTE — PROGRESS NOTE ADULT - PROBLEM SELECTOR PLAN 1
.  Complicated by recurrent hypoglycemia  -could increase Decadron to 2mg PO if no contraindications  -would avoid other appetite stimulants for various reasons stated in consult note  -discussed outpatient certification for Medical Marijuana but patient is hesitant to pursue

## 2020-12-29 NOTE — PROGRESS NOTE ADULT - SUBJECTIVE AND OBJECTIVE BOX
PROGRESS NOTE:   Authored by Sola Julien MD  Pager [phone]  Pager [facility] [phone]    Patient is a 60y old  Male who presents with a chief complaint of hypoxic, minimally responsive requiring intubation (28 Dec 2020 12:45)      SUBJECTIVE / OVERNIGHT EVENTS:    MEDICATIONS  (STANDING):  dexAMETHasone     Tablet 1 milliGRAM(s) Oral daily  dextrose 10% + sodium chloride 0.45%. 1000 milliLiter(s) (75 mL/Hr) IV Continuous <Continuous>  dextrose 40% Gel 15 Gram(s) Oral once  dextrose 5%. 1000 milliLiter(s) (50 mL/Hr) IV Continuous <Continuous>  dextrose 5%. 1000 milliLiter(s) (100 mL/Hr) IV Continuous <Continuous>  dextrose 50% Injectable 25 Gram(s) IV Push once  dextrose 50% Injectable 12.5 Gram(s) IV Push once  dextrose 50% Injectable 25 Gram(s) IV Push once  enoxaparin Injectable 40 milliGRAM(s) SubCutaneous daily  FLUoxetine 20 milliGRAM(s) Oral daily  glucagon  Injectable 1 milliGRAM(s) IntraMuscular once  influenza   Vaccine 0.5 milliLiter(s) IntraMuscular once  levETIRAcetam 500 milliGRAM(s) Oral two times a day  pantoprazole   Suspension 40 milliGRAM(s) Oral daily  senna 2 Tablet(s) Oral at bedtime  trimethoprim  160 mG/sulfamethoxazole 800 mG 1 Tablet(s) Oral daily    MEDICATIONS  (PRN):  sodium chloride 0.9% lock flush 10 milliLiter(s) IV Push every 1 hour PRN Pre/post blood products, medications, blood draw, and to maintain line patency      CAPILLARY BLOOD GLUCOSE      POCT Blood Glucose.: 104 mg/dL (28 Dec 2020 21:41)  POCT Blood Glucose.: 92 mg/dL (28 Dec 2020 17:33)  POCT Blood Glucose.: 109 mg/dL (28 Dec 2020 12:01)  POCT Blood Glucose.: 80 mg/dL (28 Dec 2020 08:16)    I&O's Summary    28 Dec 2020 07:01  -  29 Dec 2020 07:00  --------------------------------------------------------  IN: 1475 mL / OUT: 4250 mL / NET: -2775 mL        PHYSICAL EXAM:  Vital Signs Last 24 Hrs  T(C): 36.3 (29 Dec 2020 05:04), Max: 36.9 (28 Dec 2020 21:00)  T(F): 97.4 (29 Dec 2020 05:04), Max: 98.4 (28 Dec 2020 21:00)  HR: 67 (29 Dec 2020 05:04) (66 - 75)  BP: 158/95 (29 Dec 2020 05:04) (143/87 - 158/95)  BP(mean): --  RR: 18 (29 Dec 2020 05:04) (18 - 18)  SpO2: 100% (29 Dec 2020 05:04) (100% - 100%)    GENERAL: No acute distress, well-developed  HEAD:  Atraumatic, Normocephalic  EYES: EOMI, PERRLA, conjunctiva and sclera clear  NECK: Supple, no lymphadenopathy, no JVD  CHEST/LUNG: CTAB; No wheezes, rales, or rhonchi  HEART: Regular rate and rhythm; No murmurs, rubs, or gallops  ABDOMEN: Soft, non-tender, non-distended; normal bowel sounds, no organomegaly  EXTREMITIES:  2+ peripheral pulses b/l, No clubbing, cyanosis, or edema  NEUROLOGY: A&O x 3, no focal deficits  SKIN: No rashes or lesions    LABS:                        12.8   5.45  )-----------( 168      ( 28 Dec 2020 08:39 )             39.3     12-28    143  |  109<H>  |  <2<L>  ----------------------------<  80  3.9   |  26  |  0.56    Ca    8.7      28 Dec 2020 17:21  Phos  3.7     12-28  Mg     2.0     12-28                  RADIOLOGY & ADDITIONAL TESTS:  Results Reviewed:   Imaging Personally Reviewed:  Electrocardiogram Personally Reviewed:    COORDINATION OF CARE:  Care Discussed with Consultants/Other Providers [Y/N]:  Prior or Outpatient Records Reviewed [Y/N]:   PROGRESS NOTE:   Authored by Sola Julien MD  Pager [phone]  Pager [facility] [phone]    Patient is a 60y old  Male who presents with a chief complaint of hypoxic, minimally responsive requiring intubation (28 Dec 2020 12:45)      SUBJECTIVE / OVERNIGHT EVENTS: NAEO. Patient AAOx2 this AM. Denies any pain, N,V. Denies appetite.     MEDICATIONS  (STANDING):  dexAMETHasone     Tablet 1 milliGRAM(s) Oral daily  dextrose 10% + sodium chloride 0.45%. 1000 milliLiter(s) (75 mL/Hr) IV Continuous <Continuous>  dextrose 40% Gel 15 Gram(s) Oral once  dextrose 5%. 1000 milliLiter(s) (50 mL/Hr) IV Continuous <Continuous>  dextrose 5%. 1000 milliLiter(s) (100 mL/Hr) IV Continuous <Continuous>  dextrose 50% Injectable 25 Gram(s) IV Push once  dextrose 50% Injectable 12.5 Gram(s) IV Push once  dextrose 50% Injectable 25 Gram(s) IV Push once  enoxaparin Injectable 40 milliGRAM(s) SubCutaneous daily  FLUoxetine 20 milliGRAM(s) Oral daily  glucagon  Injectable 1 milliGRAM(s) IntraMuscular once  influenza   Vaccine 0.5 milliLiter(s) IntraMuscular once  levETIRAcetam 500 milliGRAM(s) Oral two times a day  pantoprazole   Suspension 40 milliGRAM(s) Oral daily  senna 2 Tablet(s) Oral at bedtime  trimethoprim  160 mG/sulfamethoxazole 800 mG 1 Tablet(s) Oral daily    MEDICATIONS  (PRN):  sodium chloride 0.9% lock flush 10 milliLiter(s) IV Push every 1 hour PRN Pre/post blood products, medications, blood draw, and to maintain line patency      CAPILLARY BLOOD GLUCOSE      POCT Blood Glucose.: 104 mg/dL (28 Dec 2020 21:41)  POCT Blood Glucose.: 92 mg/dL (28 Dec 2020 17:33)  POCT Blood Glucose.: 109 mg/dL (28 Dec 2020 12:01)  POCT Blood Glucose.: 80 mg/dL (28 Dec 2020 08:16)    I&O's Summary    28 Dec 2020 07:01  -  29 Dec 2020 07:00  --------------------------------------------------------  IN: 1475 mL / OUT: 4250 mL / NET: -2775 mL        PHYSICAL EXAM:  Vital Signs Last 24 Hrs  T(C): 36.3 (29 Dec 2020 05:04), Max: 36.9 (28 Dec 2020 21:00)  T(F): 97.4 (29 Dec 2020 05:04), Max: 98.4 (28 Dec 2020 21:00)  HR: 67 (29 Dec 2020 05:04) (66 - 75)  BP: 158/95 (29 Dec 2020 05:04) (143/87 - 158/95)  BP(mean): --  RR: 18 (29 Dec 2020 05:04) (18 - 18)  SpO2: 100% (29 Dec 2020 05:04) (100% - 100%)    GENERAL: No acute distress, well-developed  HEAD:  Atraumatic, Normocephalic  EYES: EOMI, PERRLA, conjunctiva and sclera clear  NECK: Supple, no lymphadenopathy, no JVD  CHEST/LUNG: CTAB; No wheezes, rales, or rhonchi  HEART: Regular rate and rhythm; No murmurs, rubs, or gallops  ABDOMEN: Soft, non-tender, non-distended; normal bowel sounds, no organomegaly  EXTREMITIES:  2+ peripheral pulses b/l, No clubbing, cyanosis, or edema  NEUROLOGY: A&O x 3, no focal deficits  SKIN: No rashes or lesions    LABS:                        12.8   5.45  )-----------( 168      ( 28 Dec 2020 08:39 )             39.3     12-28    143  |  109<H>  |  <2<L>  ----------------------------<  80  3.9   |  26  |  0.56    Ca    8.7      28 Dec 2020 17:21  Phos  3.7     12-28  Mg     2.0     12-28                  RADIOLOGY & ADDITIONAL TESTS:  Results Reviewed:   Imaging Personally Reviewed:  Electrocardiogram Personally Reviewed:    COORDINATION OF CARE:  Care Discussed with Consultants/Other Providers [Y/N]:  Prior or Outpatient Records Reviewed [Y/N]:   PROGRESS NOTE:   Authored by Sola Julien MD  Pager -[phone]  Pager [facility] [phone]    Patient is a 60y old  Male who presents with a chief complaint of hypoxic, minimally responsive requiring intubation (28 Dec 2020 12:45)      SUBJECTIVE / OVERNIGHT EVENTS: NAEO. Patient AAOx2 this AM. Denies any pain, N,V. Denies appetite.     MEDICATIONS  (STANDING):  dexAMETHasone     Tablet 1 milliGRAM(s) Oral daily  dextrose 10% + sodium chloride 0.45%. 1000 milliLiter(s) (75 mL/Hr) IV Continuous <Continuous>  dextrose 40% Gel 15 Gram(s) Oral once  dextrose 5%. 1000 milliLiter(s) (50 mL/Hr) IV Continuous <Continuous>  dextrose 5%. 1000 milliLiter(s) (100 mL/Hr) IV Continuous <Continuous>  dextrose 50% Injectable 25 Gram(s) IV Push once  dextrose 50% Injectable 12.5 Gram(s) IV Push once  dextrose 50% Injectable 25 Gram(s) IV Push once  enoxaparin Injectable 40 milliGRAM(s) SubCutaneous daily  FLUoxetine 20 milliGRAM(s) Oral daily  glucagon  Injectable 1 milliGRAM(s) IntraMuscular once  influenza   Vaccine 0.5 milliLiter(s) IntraMuscular once  levETIRAcetam 500 milliGRAM(s) Oral two times a day  pantoprazole   Suspension 40 milliGRAM(s) Oral daily  senna 2 Tablet(s) Oral at bedtime  trimethoprim  160 mG/sulfamethoxazole 800 mG 1 Tablet(s) Oral daily    MEDICATIONS  (PRN):  sodium chloride 0.9% lock flush 10 milliLiter(s) IV Push every 1 hour PRN Pre/post blood products, medications, blood draw, and to maintain line patency      CAPILLARY BLOOD GLUCOSE      POCT Blood Glucose.: 104 mg/dL (28 Dec 2020 21:41)  POCT Blood Glucose.: 92 mg/dL (28 Dec 2020 17:33)  POCT Blood Glucose.: 109 mg/dL (28 Dec 2020 12:01)  POCT Blood Glucose.: 80 mg/dL (28 Dec 2020 08:16)    I&O's Summary    28 Dec 2020 07:01  -  29 Dec 2020 07:00  --------------------------------------------------------  IN: 1475 mL / OUT: 4250 mL / NET: -2775 mL        PHYSICAL EXAM:  Vital Signs Last 24 Hrs  T(C): 36.3 (29 Dec 2020 05:04), Max: 36.9 (28 Dec 2020 21:00)  T(F): 97.4 (29 Dec 2020 05:04), Max: 98.4 (28 Dec 2020 21:00)  HR: 67 (29 Dec 2020 05:04) (66 - 75)  BP: 158/95 (29 Dec 2020 05:04) (143/87 - 158/95)  BP(mean): --  RR: 18 (29 Dec 2020 05:04) (18 - 18)  SpO2: 100% (29 Dec 2020 05:04) (100% - 100%)    GENERAL: No acute distress, well-developed  HEAD:  Atraumatic, Normocephalic  EYES: EOMI, PERRLA, conjunctiva and sclera clear  NECK: Supple, no lymphadenopathy, no JVD  CHEST/LUNG: CTAB; No wheezes, rales, or rhonchi  HEART: Regular rate and rhythm; No murmurs, rubs, or gallops  ABDOMEN: Soft, non-tender, non-distended; normal bowel sounds, no organomegaly  EXTREMITIES:  2+ peripheral pulses b/l, No clubbing, cyanosis, or edema  NEUROLOGY: A&O x 2, no focal deficits  SKIN: No rashes or lesions    LABS:                        12.8   5.45  )-----------( 168      ( 28 Dec 2020 08:39 )             39.3     12-28    143  |  109<H>  |  <2<L>  ----------------------------<  80  3.9   |  26  |  0.56    Ca    8.7      28 Dec 2020 17:21  Phos  3.7     12-28  Mg     2.0     12-28                  RADIOLOGY & ADDITIONAL TESTS:  Results Reviewed:   Imaging Personally Reviewed:  Electrocardiogram Personally Reviewed:    COORDINATION OF CARE:  Care Discussed with Consultants/Other Providers [Y/N]:  Prior or Outpatient Records Reviewed [Y/N]:

## 2020-12-29 NOTE — PROGRESS NOTE ADULT - ASSESSMENT
60-year-old male with history of hypertension, anxiety, GBM status post craniotomy complicated by ESBL meningitis, status post multiple antibiotic courses, currently residing at [facility] for rehab, presenting with altered mental status, hypoxia, hypotension, requiring intubation, treated for sepsis.  Endocrinology consulted for hypoglycemia.      Hypoglycemia  -Differential diagnosis most commonly includes insulinoma, IGF mediated versus adrenal insufficiency versus malnourishment vs medication induced (Bactrim). Malnourishment at the top of the differential, however will evaluate for other causes  - Can not rule out Adrenal insuffiencey as patient is on decadron, AM cortisol will be low as patient is on steroids.    -Nonislet cell tumor hypoglycemia (NICTH) is in in the differential diagnosis of this patient given the history of glioblastoma.  The most common cause of this hypoglycemia is the production of IGF2, leading to hypoglycemia, IGF2, pending collection.  Treatment would be treating the underlying tumor, glucocorticoids (which patient is already on) and in some severe cases glucagon gtt.   -To further work up the etiology of hypoglycemia, please send the following, ONLY if patient's glucose is less than 55mg/dl. Check pro-insulin, insulin, c-peptide level, beta-hydroxybutyrate, insulin antibody and preserved glucose level.         Discussed with Dr Rachid Sampson MD  Endocrine Fellow   Pager

## 2020-12-29 NOTE — PROGRESS NOTE ADULT - ASSESSMENT
59yo M with PMH of GBM (s/p resection, c/b ESBL Meningitis), Anxiety, HTN, and h/o DVT (s/p IVC Filter) p/w AMS due to sepsis requiring intubation. Palliative consulted for complex medical decision making in the setting of life-limiting disease.    ·	ongoing rapport building with the patient, he has not thought about his death but states that up until he wanted everything done  ·	still waiting to hear back sister/HCP for further discussion  ·	discussed with patient's other sister (Katrin, Colorectal Surgeon), she states she and the patient's mother felt that the patient should pursue hospice and DNR/DNI but this has not been the sentiment from other family members 61yo M with PMH of GBM (s/p resection, c/b ESBL Meningitis), Anxiety, HTN, and h/o DVT (s/p IVC Filter) p/w AMS due to sepsis requiring intubation. Palliative consulted for complex medical decision making in the setting of life-limiting disease.    ·	ongoing rapport building with the patient, he has not thought about his death but states that up until he wanted everything done  ·	discussed with patient's sister/HCP (Maribel), recommended Hospice evaluation upon return to [place] (separate Hospice agency), she would just like to confirm with her family before accepting ---> will follow-up tomorrow at 12:30PM  ·	discussed with patient's other sister (Katrin, Colorectal Surgeon), she states she and the patient's mother felt that the patient should pursue hospice and DNR/DNI but this has not been the sentiment from other family members

## 2020-12-29 NOTE — PROGRESS NOTE ADULT - PROBLEM SELECTOR PLAN 4
.  Patient is FULL CODE  -awaiting callback from sister/HCP (Maribel) to assist with further GOC discussion  -discussed with sister (Katrin) she is concerned whether patient has the capacity to make a decision re: code status but she states that up until now he has reaffirmed his status as FULL CODE ---> patient's mother and this sister would favor DNR/DNI and Hospice but they are not the true decision-makers .  Patient is FULL CODE for now  -sister/HCP (Maribel) is leaning towards accepting Hospice upon return to [place] but she would just like to confirm with her family before making this decision, will f/u tomorrow at 12:30PM  -patient can remain FULL CODE and enroll in hospice but sister/HCP understands that at some point the decision for DNR/DNI may full to her and she feels ready to make that decision at that time  -discussed with sister (Katrni) she is concerned whether patient has the capacity to make a decision re: code status but she states that up until now he has reaffirmed his status as FULL CODE ---> patient's mother and this sister would favor DNR/DNI and Hospice but they are not the true decision-makers

## 2020-12-29 NOTE — PROGRESS NOTE ADULT - ASSESSMENT
· Assessment	  60y M PMHx HTN, Anxiety (on Prozac), b/l DVT while on Coumadin (s/p IVF filter, now off coumadin), GBM s/p craniotomy w/ resection c/b ESBL meningitis for which repeat craniotomy (2/2020) performed and pt was given 8 weeks Meropenem, pt subsequently had repeat ESBL meningitis s/p craniectomy w/ washout w/ intrathecal Jin and 10 week course IV Jin, COVID infection in 3/2020 who was BIBEMS from [facility] for AMS, hypoxia and hypotension. s/p MICU stay for sepsis, w/o clear source identified. Now with hypogylcemia on Dextrose drip.        Problem/Plan - 1:  ·  Problem: Hypoglycemia.  InsulinomaPlan: - No history of diabetes. AM 12/23 BG to 50s, while on D5LR, increase to 96 with hypoglycemia protocol. Rule out adrenal insufficiency; patient with recent sepsis and history of steroid use.   - Encourage PO intake.   - D5/LR switched to D10/0.45NS.   - f/u AM cortisol.   - endo consulted, appreciate recs     Problem/Plan - 2:  ·  Problem: Glioblastoma multiforme.  Plan: s/p craniotomy w/resection and subsequent ESBL infection for which repeat craniotomy was performed (2/2020) now with  shunt.  - c/w 500 BID Keppra PO for seizure ppx (hx of EEGs in the past without seizure activity)  - c/w home decadron 1mg PO qd. Weaned off hydrocortisone in MICU.   -c/w ppx Bactrim DS qd. (Per pt's sister Maribel pt is to be on BID but provider called [facility] Dr. Dominguez (infectious disease) office- they had no records of Dr. Dominguez prescribing Bactrim for this pt. However they saw a Dr. Simeon (rad onc) who did but were not able to find any reasoning in notes as to why BID. Per house ID, no reason to have BID dosing)  - s/p LP with low c/f CNS infection  - Appreciate ID recs.   - palliative care consulted, recs appreciated     Problem/Plan - 3:  ·  Problem: Altered mental status.  Plan: - Patient AxO to self and time, knows he is in hospital. Per patient's sister and HCP, Maribel, patient is AxO x3, but can be aphasic, with blunted responses at time at baseline.      Problem/Plan - 4:  ·  Problem: Septic shock.  Plan: Resolved. s/p pressors in the MICU. CTAP w/o intrabdominal pathology. Low cocnern for CNS infection per LP.   - s/p extubation in MICU on 12/2 (originally intubated for airway protection)  - s/p meropenem course for 7 days in MICU.      Problem/Plan - 5:  ·  Problem: Prophylactic measure.  Plan: DVT ppx: lovenox  Diet: Softs.      Problem/Plan - 6:  Problem: Discharge planning issues. Plan: Transition of Care Status:  1. Name of PCP:  2. PCP contacted on admission: [  ] Y     [  ] N  3. PCP contacted at discharge: [  ] Y     [  ] N  4. Post-discharge appointment date and location:  5. Summary of handoff given to PCP:   · Assessment	  60y M PMHx HTN, Anxiety (on Prozac), b/l DVT while on Coumadin (s/p IVF filter, now off coumadin), GBM s/p craniotomy w/ resection c/b ESBL meningitis for which repeat craniotomy (2/2020) performed and pt was given 8 weeks Meropenem, pt subsequently had repeat ESBL meningitis s/p craniectomy w/ washout w/ intrathecal Jin and 10 week course IV Jin, COVID infection in 3/2020 who was BIBEMS from [facility] for AMS, hypoxia and hypotension. s/p MICU stay for sepsis, w/o clear source identified. Now with hypogylcemia on Dextrose drip.        Problem/Plan - 1:  ·  Problem: Hypoglycemia.  InsulinomaPlan: - No history of diabetes. AM 12/23 BG to 50s, while on D5LR, increase to 96 with hypoglycemia protocol. Rule out adrenal insufficiency; patient with recent sepsis and history of steroid use.   - Encourage PO intake.   - will d/c D10/0.45NS and monitor FS  - f/u AM cortisol (will be affected by long term use of steroids)  - endo consulted - To further work up the etiology of hypoglycemia, send the following, ONLY if patient's glucose is less than 55mg/dl. Check pro-insulin, insulin, c-peptide level, beta-hydroxybutyrate, insulin antibody and preserved glucose level.          Problem/Plan - 2:  ·  Problem: Glioblastoma multiforme.  Plan: s/p craniotomy w/resection and subsequent ESBL infection for which repeat craniotomy was performed (2/2020) now with  shunt.  - c/w 500 BID Keppra PO for seizure ppx (hx of EEGs in the past without seizure activity)  - c/w home decadron 1mg PO qd. Weaned off hydrocortisone in MICU.   -c/w ppx Bactrim DS qd. (Per pt's sister Maribel pt is to be on BID but provider called [facility] Dr. Dominguez (infectious disease) office- they had no records of Dr. Dominguez prescribing Bactrim for this pt. However they saw a Dr. Simeon (rad onc) who did but were not able to find any reasoning in notes as to why BID. Per house ID, no reason to have BID dosing)  - s/p LP with low c/f CNS infection  - Appreciate ID recs.   - palliative care consulted, recs appreciated     Problem/Plan - 3:  ·  Problem: Altered mental status.  Plan: - Patient AxO to self and time, knows he is in hospital. Per patient's sister and HCP, Maribel, patient is AxO x3, but can be aphasic, with blunted responses at time at baseline.      Problem/Plan - 4:  ·  Problem: Septic shock.  Plan: Resolved. s/p pressors in the MICU. CTAP w/o intrabdominal pathology. Low cocnern for CNS infection per LP.   - s/p extubation in MICU on 12/2 (originally intubated for airway protection)  - s/p meropenem course for 7 days in MICU.      Problem/Plan - 5:  ·  Problem: Prophylactic measure.  Plan: DVT ppx: lovenox  Diet: Softs.      Problem/Plan - 6:  Problem: Discharge planning issues. Plan: Transition of Care Status:  1. Name of PCP:  2. PCP contacted on admission: [  ] Y     [  ] N  3. PCP contacted at discharge: [  ] Y     [  ] N  4. Post-discharge appointment date and location:  5. Summary of handoff given to PCP:

## 2020-12-29 NOTE — PROGRESS NOTE ADULT - SUBJECTIVE AND OBJECTIVE BOX
Chief Complaint: hypoglycemia     History: Patient seen and examined at bedside. NAD  Discussed with nursing staff. D10 discontinued this morning. Patient did not eat much breakfast, however ate his entire lunch.     MEDICATIONS  (STANDING):  dexAMETHasone     Tablet 1 milliGRAM(s) Oral daily  dextrose 40% Gel 15 Gram(s) Oral once  dextrose 5%. 1000 milliLiter(s) (100 mL/Hr) IV Continuous <Continuous>  dextrose 5%. 1000 milliLiter(s) (50 mL/Hr) IV Continuous <Continuous>  dextrose 50% Injectable 25 Gram(s) IV Push once  dextrose 50% Injectable 12.5 Gram(s) IV Push once  dextrose 50% Injectable 25 Gram(s) IV Push once  enoxaparin Injectable 40 milliGRAM(s) SubCutaneous daily  FLUoxetine 20 milliGRAM(s) Oral daily  glucagon  Injectable 1 milliGRAM(s) IntraMuscular once  influenza   Vaccine 0.5 milliLiter(s) IntraMuscular once  levETIRAcetam 500 milliGRAM(s) Oral two times a day  pantoprazole   Suspension 40 milliGRAM(s) Oral daily  senna 2 Tablet(s) Oral at bedtime  trimethoprim  160 mG/sulfamethoxazole 800 mG 1 Tablet(s) Oral daily    MEDICATIONS  (PRN):  sodium chloride 0.9% lock flush 10 milliLiter(s) IV Push every 1 hour PRN Pre/post blood products, medications, blood draw, and to maintain line patency    PHYSICAL EXAM:  VITALS: T(C): 36.9 (12-29-20 @ 12:15)  T(F): 98.5 (12-29-20 @ 12:15), Max: 98.5 (12-29-20 @ 12:15)  HR: 100 (12-29-20 @ 12:15) (66 - 100)  BP: 142/99 (12-29-20 @ 12:15) (142/99 - 158/95)  RR:  (17 - 18)  SpO2:  (99% - 100%)  Wt(kg): 88kg   GENERAL: NAD, well-groomed, well-developed  RESPIRATORY: Clear to auscultation bilaterally; No rales, rhonchi, wheezing, or rubs  CARDIOVASCULAR: Regular rate and rhythm; No murmurs; no peripheral edema  GI: Soft, nontender, non distended, normal bowel sounds  SKIN: Dry, intact, No rashes or lesions  MUSCULOSKELETAL: Full range of motion, normal strength  NEURO: sensation intact, extraocular movements intact, no tremor, normal reflexes  PSYCH: Awake and alert , reactive affect     POCT Blood Glucose.: 85 mg/dL (12-29-20 @ 17:11)  POCT Blood Glucose.: 79 mg/dL (12-29-20 @ 11:54)  POCT Blood Glucose.: 94 mg/dL (12-29-20 @ 08:20)  POCT Blood Glucose.: 104 mg/dL (12-28-20 @ 21:41)  POCT Blood Glucose.: 92 mg/dL (12-28-20 @ 17:33)  POCT Blood Glucose.: 109 mg/dL (12-28-20 @ 12:01)  POCT Blood Glucose.: 80 mg/dL (12-28-20 @ 08:16)  POCT Blood Glucose.: 88 mg/dL (12-27-20 @ 22:33)  POCT Blood Glucose.: 84 mg/dL (12-27-20 @ 17:06)  POCT Blood Glucose.: 100 mg/dL (12-27-20 @ 11:54)  POCT Blood Glucose.: 87 mg/dL (12-27-20 @ 08:50)  POCT Blood Glucose.: 83 mg/dL (12-26-20 @ 21:40)      12-29    141  |  108<H>  |  <2<L>  ----------------------------<  90  3.7   |  25  |  0.59    EGFR if : 127  EGFR if non : 109    Ca    8.7      12-29  Mg     2.0     12-29  Phos  3.2     12-29

## 2020-12-29 NOTE — PROGRESS NOTE ADULT - ATTENDING COMMENTS
Hypoglycemia workup as outlined.  Stopped IV dextrose monitor trend.  Concern for protein calorie malnutrition but will assess for other etiologies of hypoglycemia.  Will follow.    Israel Araujo MD  Division of Endocrinology  Pager: [phone]    If after 6PM or before 9AM, or on weekends/holidays, please call endocrine answering service for assistance ([phone]).  For nonurgent matters email [email or web address] for assistance.

## 2020-12-29 NOTE — PROGRESS NOTE ADULT - PROBLEM SELECTOR PLAN 2
.  -c/w Prozac  -sister inquired if Psych input would be helpful but no overt acute pathologic mental process

## 2020-12-29 NOTE — PROGRESS NOTE ADULT - ATTENDING COMMENTS
60 M h/o HTN, DVT s/p IVCF, GBM s/p craniotomy, admitted with altered mental status, hypoxia, and hypotension. Required ICU stay for sepsis of unclear etiology. Now off pressors, extubated, and stable. Course c/b hypoglycemia, but asymptomatic and no insulin use. Endocrine consulted. Observing off IV dextrose.

## 2020-12-29 NOTE — PROGRESS NOTE ADULT - SUBJECTIVE AND OBJECTIVE BOX
[facility] Pall[facility]  Adiel Rodriguez, Palliative Care Attending  Contact Info: Page [phone] (including Nights/Weekend), message on Microsoft Teams (Adiel Rodriguez), or leave VM at Palliative Office [phone] (Non-Urgent)    SUBJECTIVE AND OBJECTIVE:  INTERVAL HPI/OVERNIGHT EVENTS: No acute events overnight. Patient awake and conversant, denies any significant symptoms. Reports that his HA and nausea from a few days ago have resolved. Patient required no additional PRNs in past 24hrs. Had a conversation with one sister (Katrin) but still waiting to hear from other sister/HCP (Maribel).    Allergies  aspirin (Unknown)  shellfish (Hives)  shellfish (Unknown)  Tegretol (Unknown)    Intolerances  ACE inhibitors (Other)  angiotensin II inhibitors (Other)    MEDICATIONS  (STANDING):  dexAMETHasone     Tablet 1 milliGRAM(s) Oral daily  dextrose 40% Gel 15 Gram(s) Oral once  dextrose 5%. 1000 milliLiter(s) (100 mL/Hr) IV Continuous <Continuous>  dextrose 5%. 1000 milliLiter(s) (50 mL/Hr) IV Continuous <Continuous>  dextrose 50% Injectable 25 Gram(s) IV Push once  dextrose 50% Injectable 12.5 Gram(s) IV Push once  dextrose 50% Injectable 25 Gram(s) IV Push once  enoxaparin Injectable 40 milliGRAM(s) SubCutaneous daily  FLUoxetine 20 milliGRAM(s) Oral daily  glucagon  Injectable 1 milliGRAM(s) IntraMuscular once  influenza   Vaccine 0.5 milliLiter(s) IntraMuscular once  levETIRAcetam 500 milliGRAM(s) Oral two times a day  pantoprazole   Suspension 40 milliGRAM(s) Oral daily  senna 2 Tablet(s) Oral at bedtime  trimethoprim  160 mG/sulfamethoxazole 800 mG 1 Tablet(s) Oral daily    MEDICATIONS  (PRN):  sodium chloride 0.9% lock flush 10 milliLiter(s) IV Push every 1 hour PRN Pre/post blood products, medications, blood draw, and to maintain line patency      ITEMS UNCHECKED ARE NOT PRESENT    PRESENT SYMPTOMS: []Unable to obtain due to poor mentation   Source if other than patient:  []Family   []Team     Pain: [ ] yes [x] no  QOL impact -   Location -                    Aggravating factors -  Quality -  Radiation -  Timing -  Severity (0-10 scale):  Minimal acceptable level (0-10 scale):    PAIN AD Score:  [email or web address] (press ctrl +  left click to view)    Dyspnea:                           []Mild  []Moderate []Severe  Anxiety:                             []Mild []Moderate []Severe  Fatigue:                             []Mild []Moderate []Severe  Nausea:                             []Mild []Moderate []Severe  Loss of appetite:              []Mild []Moderate []Severe  Constipation:                    []Mild []Moderate []Severe    Other Symptoms:  [x]All other review of systems negative     Palliative Performance Status Version 2:  40%  [email or web address]_performance_[email or web address]_[email or web address]    PHYSICAL EXAM:  Vital Signs Last 24 Hrs  T(C): 36.9 (29 Dec 2020 12:15), Max: 36.9 (28 Dec 2020 21:00)  T(F): 98.5 (29 Dec 2020 12:15), Max: 98.5 (29 Dec 2020 12:15)  HR: 100 (29 Dec 2020 12:15) (66 - 100)  BP: 142/99 (29 Dec 2020 12:15) (142/99 - 158/95)  BP(mean): --  RR: 17 (29 Dec 2020 12:15) (17 - 18)  SpO2: 99% (29 Dec 2020 12:15) (99% - 100%) I&O's Summary    28 Dec 2020 07:01  -  29 Dec 2020 07:00  --------------------------------------------------------  IN: 1475 mL / OUT: 4250 mL / NET: -2775 mL    29 Dec 2020 07:01  -  29 Dec 2020 13:38  --------------------------------------------------------  IN: 0 mL / OUT: 600 mL / NET: -600 mL      GENERAL:  [x]Alert  [x]Oriented x3   []Lethargic  []Cachexia  []Unarousable  [x]Verbal  []Non-Verbal  Behavioral:   [] Anxiety  [] Delirium [] Agitation [] Other  HEENT:  [x]Normal   []Dry mouth   []ET Tube/Trach  []Oral lesions  PULMONARY:   [x]Clear []Tachypnea  []Audible excessive secretions   []Rhonchi        []Right []Left []Bilateral  []Crackles        []Right []Left []Bilateral  []Wheezing     []Right []Left []Bilateral  CARDIOVASCULAR:    [x]Regular []Irregular []Tachy  []Amor []Murmur []Other  GASTROINTESTINAL:  [x]Soft  []Distended   [x]+BS  [x]Non tender []Tender  []PEG []OGT/ NGT  Last BM:   12-21-20 @ 07:01  -  12-22-20 @ 07:00  --------------------------------------------------------  OUT: 50 mL  GENITOURINARY:  [x]Normal [] Incontinent   []Oliguria/Anuria   []Sandoval  MUSCULOSKELETAL:   []Normal   [x]Weakness  []Bed/Wheelchair bound []Edema  NEUROLOGIC:   []No focal deficits  [x] Cognitive impairment  [] Dysphagia []Dysarthria [] Paresis []Other   SKIN:   [x]Normal   []Pressure ulcer(s)  []Rash      LABS:                         11.5   4.07  )-----------( 172      ( 29 Dec 2020 07:46 )             35.1   12-29    141  |  108<H>  |  <2<L>  ----------------------------<  90  3.7   |  25  |  0.59    Ca    8.7      29 Dec 2020 07:46  Phos  3.2     12-29  Mg     2.0     12-29      RADIOLOGY & ADDITIONAL STUDIES: None new    PROTEIN CALORIE MALNUTRITION PRESENT: [ ]mild [ ]moderate [ ]severe [ ]underweight [ ]morbid obesity  [] PPSV2 < or = 30% [] significant weight loss [] poor nutritional intake [] anasarca [] catabolic state   Albumin, Serum: 2.2 g/dL (12-25-20 @ 07:48)   Artificial Nutrition []     REFERRALS:   []Chaplaincy  []Hospice  []Child Life  [x]Social Work  []Case management []Holistic Therapy []Physical Therapy []Dietary

## 2020-12-30 ENCOUNTER — TRANSCRIPTION ENCOUNTER (OUTPATIENT)
Age: 60
End: 2020-12-30

## 2020-12-30 LAB
ANION GAP SERPL CALC-SCNC: 9 MMOL/L — SIGNIFICANT CHANGE UP (ref 7–14)
BUN SERPL-MCNC: 2 MG/DL — LOW (ref 7–23)
CALCIUM SERPL-MCNC: 9.2 MG/DL — SIGNIFICANT CHANGE UP (ref 8.4–10.5)
CHLORIDE SERPL-SCNC: 107 MMOL/L — SIGNIFICANT CHANGE UP (ref 98–107)
CO2 SERPL-SCNC: 27 MMOL/L — SIGNIFICANT CHANGE UP (ref 22–31)
CREAT SERPL-MCNC: 0.69 MG/DL — SIGNIFICANT CHANGE UP (ref 0.5–1.3)
GLUCOSE BLDC GLUCOMTR-MCNC: 79 MG/DL — SIGNIFICANT CHANGE UP (ref 70–99)
GLUCOSE BLDC GLUCOMTR-MCNC: 85 MG/DL — SIGNIFICANT CHANGE UP (ref 70–99)
GLUCOSE BLDC GLUCOMTR-MCNC: 88 MG/DL — SIGNIFICANT CHANGE UP (ref 70–99)
GLUCOSE BLDC GLUCOMTR-MCNC: 94 MG/DL — SIGNIFICANT CHANGE UP (ref 70–99)
GLUCOSE SERPL-MCNC: 69 MG/DL — LOW (ref 70–99)
HCT VFR BLD CALC: 40.2 % — SIGNIFICANT CHANGE UP (ref 39–50)
HGB BLD-MCNC: 12.6 G/DL — LOW (ref 13–17)
MAGNESIUM SERPL-MCNC: 2.2 MG/DL — SIGNIFICANT CHANGE UP (ref 1.6–2.6)
MCHC RBC-ENTMCNC: 31.3 GM/DL — LOW (ref 32–36)
MCHC RBC-ENTMCNC: 31.9 PG — SIGNIFICANT CHANGE UP (ref 27–34)
MCV RBC AUTO: 101.8 FL — HIGH (ref 80–100)
NRBC # BLD: 0 /100 WBCS — SIGNIFICANT CHANGE UP
NRBC # FLD: 0 K/UL — SIGNIFICANT CHANGE UP
PHOSPHATE SERPL-MCNC: 3.3 MG/DL — SIGNIFICANT CHANGE UP (ref 2.5–4.5)
PLATELET # BLD AUTO: 217 K/UL — SIGNIFICANT CHANGE UP (ref 150–400)
POTASSIUM SERPL-MCNC: 4 MMOL/L — SIGNIFICANT CHANGE UP (ref 3.5–5.3)
POTASSIUM SERPL-SCNC: 4 MMOL/L — SIGNIFICANT CHANGE UP (ref 3.5–5.3)
RBC # BLD: 3.95 M/UL — LOW (ref 4.2–5.8)
RBC # FLD: 13.4 % — SIGNIFICANT CHANGE UP (ref 10.3–14.5)
SODIUM SERPL-SCNC: 143 MMOL/L — SIGNIFICANT CHANGE UP (ref 135–145)
WBC # BLD: 4.22 K/UL — SIGNIFICANT CHANGE UP (ref 3.8–10.5)
WBC # FLD AUTO: 4.22 K/UL — SIGNIFICANT CHANGE UP (ref 3.8–10.5)

## 2020-12-30 PROCEDURE — 99232 SBSQ HOSP IP/OBS MODERATE 35: CPT | Mod: GC

## 2020-12-30 PROCEDURE — 99233 SBSQ HOSP IP/OBS HIGH 50: CPT

## 2020-12-30 PROCEDURE — 99233 SBSQ HOSP IP/OBS HIGH 50: CPT | Mod: GC

## 2020-12-30 RX ORDER — DEXAMETHASONE 0.5 MG/5ML
1 ELIXIR ORAL
Qty: 0 | Refills: 0 | DISCHARGE
Start: 2020-12-30

## 2020-12-30 RX ORDER — LACTOBACILLUS ACIDOPHILUS 100MM CELL
1 CAPSULE ORAL
Qty: 0 | Refills: 0 | DISCHARGE

## 2020-12-30 RX ORDER — DEXAMETHASONE 0.5 MG/5ML
2 ELIXIR ORAL DAILY
Refills: 0 | Status: DISCONTINUED | OUTPATIENT
Start: 2020-12-30 | End: 2021-01-05

## 2020-12-30 RX ORDER — TEMOZOLOMIDE 140 MG/1
0 CAPSULE ORAL
Qty: 0 | Refills: 0 | DISCHARGE

## 2020-12-30 RX ORDER — FLUOXETINE HCL 10 MG
1 CAPSULE ORAL
Qty: 0 | Refills: 0 | DISCHARGE
Start: 2020-12-30

## 2020-12-30 RX ORDER — FLUOXETINE HCL 10 MG
1 CAPSULE ORAL
Qty: 0 | Refills: 0 | DISCHARGE

## 2020-12-30 RX ORDER — NYSTATIN 500MM UNIT
10 POWDER (EA) MISCELLANEOUS
Qty: 0 | Refills: 0 | DISCHARGE

## 2020-12-30 RX ORDER — METHYLPHENIDATE HCL 5 MG
1 TABLET ORAL
Qty: 0 | Refills: 0 | DISCHARGE

## 2020-12-30 RX ORDER — ONDANSETRON 8 MG/1
1 TABLET, FILM COATED ORAL
Qty: 0 | Refills: 0 | DISCHARGE

## 2020-12-30 RX ORDER — DEXAMETHASONE 0.5 MG/5ML
1 ELIXIR ORAL
Qty: 0 | Refills: 0 | DISCHARGE

## 2020-12-30 RX ORDER — ENOXAPARIN SODIUM 100 MG/ML
0.4 INJECTION SUBCUTANEOUS
Qty: 0 | Refills: 0 | DISCHARGE

## 2020-12-30 RX ORDER — METOPROLOL TARTRATE 50 MG
1 TABLET ORAL
Qty: 0 | Refills: 0 | DISCHARGE

## 2020-12-30 RX ORDER — HYDRALAZINE HCL 50 MG
1 TABLET ORAL
Qty: 0 | Refills: 0 | DISCHARGE

## 2020-12-30 RX ADMIN — LEVETIRACETAM 500 MILLIGRAM(S): 250 TABLET, FILM COATED ORAL at 17:26

## 2020-12-30 RX ADMIN — ENOXAPARIN SODIUM 40 MILLIGRAM(S): 100 INJECTION SUBCUTANEOUS at 11:59

## 2020-12-30 RX ADMIN — Medication 1 MILLIGRAM(S): at 05:15

## 2020-12-30 RX ADMIN — Medication 1 TABLET(S): at 12:00

## 2020-12-30 RX ADMIN — Medication 20 MILLIGRAM(S): at 12:00

## 2020-12-30 RX ADMIN — PANTOPRAZOLE SODIUM 40 MILLIGRAM(S): 20 TABLET, DELAYED RELEASE ORAL at 11:59

## 2020-12-30 RX ADMIN — LEVETIRACETAM 500 MILLIGRAM(S): 250 TABLET, FILM COATED ORAL at 05:15

## 2020-12-30 NOTE — PROGRESS NOTE ADULT - ASSESSMENT
60-year-old male with history of hypertension, anxiety, GBM status post craniotomy complicated by ESBL meningitis, status post multiple antibiotic courses, currently residing at [facility] for rehab, presenting with altered mental status, hypoxia, hypotension, requiring intubation, treated for sepsis.  Endocrinology consulted for hypoglycemia.      Hypoglycemia  -Differential diagnosis most commonly includes insulinoma, IGF mediated versus adrenal insufficiency versus malnourishment vs medication induced (Bactrim). Malnourishment at the top of the differential, however will evaluate for other causes  - Can not rule out Adrenal insuffiencey as patient is on decadron, AM cortisol will be low as patient is on steroids.    -Nonislet cell tumor hypoglycemia (NICTH) is in in the differential diagnosis of this patient given the history of glioblastoma.  The most common cause of this hypoglycemia is the production of IGF2, leading to hypoglycemia, IGF2, pending collection.  Treatment would be treating the underlying tumor, glucocorticoids (which patient is already on) and in some severe cases glucagon gtt.   -To further work up the etiology of hypoglycemia, please send the following, ONLY if patient's glucose is less than 55mg/dl. Check pro-insulin, insulin, c-peptide level, beta-hydroxybutyrate, insulin antibody and preserved glucose level.   - Requires persistent encouragement with oral feeding. Has been drinking ensure today. Has not required IVF.         Discussed with Dr Rachid Sampson MD  Endocrine Fellow   Pager

## 2020-12-30 NOTE — PROGRESS NOTE ADULT - ASSESSMENT
Full Note to Follow.    ·	recommend increasing Decadron for appetite if no contraindication (other options for appetite stimulation are limited)  ·	would have [facility] evaluate the patient upon return to long-term care, family was in agreement 59yo M with PMH of GBM (s/p resection, c/b ESBL Meningitis), Anxiety, HTN, and h/o DVT (s/p IVC Filter) p/w AMS due to sepsis requiring intubation. Palliative consulted for complex medical decision making in the setting of life-limiting disease.    ·	recommend increasing Decadron for appetite if no contraindication (other options for appetite stimulation are limited)  ·	would have [facility] evaluate the patient upon return to long-term care, family was in agreement 59yo M with PMH of GBM (s/p resection, c/b ESBL Meningitis), Anxiety, HTN, and h/o DVT (s/p IVC Filter) p/w AMS due to sepsis requiring intubation. Palliative consulted for complex medical decision making in the setting of life-limiting disease.    ·	recommend increasing Decadron for appetite if no contraindication (other options for appetite stimulation are limited)  ·	would have [facility] evaluate the patient upon return to long-term care, family was in agreement    Discussed with sister (Katrin), unable to contact sister/HCP (Maribel).

## 2020-12-30 NOTE — DISCHARGE NOTE NURSING/CASE MANAGEMENT/SOCIAL WORK - PATIENT PORTAL LINK FT
You can access the FollowMyHealth Patient Portal offered by [facility] by registering at the following website: [email or web address]. By joining [employer]’s FollowMyHealth portal, you will also be able to view your health information using other applications (apps) compatible with our system.

## 2020-12-30 NOTE — PROGRESS NOTE ADULT - PROBLEM SELECTOR PLAN 1
.  Complicated by recurrent hypoglycemia  -recommend increasing Decadron to 2mg PO  -would avoid other appetite stimulants for various reasons stated in consult note  -discussed outpatient certification for Medical Marijuana but patient is hesitant to pursue

## 2020-12-30 NOTE — PROGRESS NOTE ADULT - ASSESSMENT
· Assessment	  60y M PMHx HTN, Anxiety (on Prozac), b/l DVT while on Coumadin (s/p IVF filter, now off coumadin), GBM s/p craniotomy w/ resection c/b ESBL meningitis for which repeat craniotomy (2/2020) performed and pt was given 8 weeks Meropenem, pt subsequently had repeat ESBL meningitis s/p craniectomy w/ washout w/ intrathecal Jin and 10 week course IV Jin, COVID infection in 3/2020 who was BIBEMS from [facility] for AMS, hypoxia and hypotension. s/p MICU stay for sepsis, w/o clear source identified. Now with hypogylcemia on Dextrose drip.        Problem/Plan - 1:  ·  Problem: Hypoglycemia.  InsulinomaPlan: - No history of diabetes. AM 12/23 BG to 50s, while on D5LR, increase to 96 with hypoglycemia protocol. Rule out adrenal insufficiency; patient with recent sepsis and history of steroid use.   - Encourage PO intake.   - will d/c D10/0.45NS and monitor FS  - f/u AM cortisol (will be affected by long term use of steroids)  - endo consulted - To further work up the etiology of hypoglycemia, send the following, ONLY if patient's glucose is less than 55mg/dl. Check pro-insulin, insulin, c-peptide level, beta-hydroxybutyrate, insulin antibody and preserved glucose level.          Problem/Plan - 2:  ·  Problem: Glioblastoma multiforme.  Plan: s/p craniotomy w/resection and subsequent ESBL infection for which repeat craniotomy was performed (2/2020) now with  shunt.  - c/w 500 BID Keppra PO for seizure ppx (hx of EEGs in the past without seizure activity)  - c/w home decadron 1mg PO qd. Weaned off hydrocortisone in MICU.   -c/w ppx Bactrim DS qd. (Per pt's sister Maribel pt is to be on BID but provider called [facility] Dr. Dominguez (infectious disease) office- they had no records of Dr. Dominguez prescribing Bactrim for this pt. However they saw a Dr. Simeon (rad onc) who did but were not able to find any reasoning in notes as to why BID. Per house ID, no reason to have BID dosing)  - s/p LP with low c/f CNS infection  - Appreciate ID recs.   - palliative care consulted, recs appreciated     Problem/Plan - 3:  ·  Problem: Altered mental status.  Plan: - Patient AxO to self and time, knows he is in hospital. Per patient's sister and HCP, Maribel, patient is AxO x3, but can be aphasic, with blunted responses at time at baseline.      Problem/Plan - 4:  ·  Problem: Septic shock.  Plan: Resolved. s/p pressors in the MICU. CTAP w/o intrabdominal pathology. Low cocnern for CNS infection per LP.   - s/p extubation in MICU on 12/2 (originally intubated for airway protection)  - s/p meropenem course for 7 days in MICU.      Problem/Plan - 5:  ·  Problem: Prophylactic measure.  Plan: DVT ppx: lovenox  Diet: Softs.      Problem/Plan - 6:  Problem: Discharge planning issues. Plan: Transition of Care Status:  1. Name of PCP:  2. PCP contacted on admission: [  ] Y     [  ] N  3. PCP contacted at discharge: [  ] Y     [  ] N  4. Post-discharge appointment date and location:  5. Summary of handoff given to PCP:   · Assessment	  60y M PMHx HTN, Anxiety (on Prozac), b/l DVT while on Coumadin (s/p IVF filter, now off coumadin), GBM s/p craniotomy w/ resection c/b ESBL meningitis for which repeat craniotomy (2/2020) performed and pt was given 8 weeks Meropenem, pt subsequently had repeat ESBL meningitis s/p craniectomy w/ washout w/ intrathecal Jin and 10 week course IV Jin, COVID infection in 3/2020 who was BIBEMS from [facility] for AMS, hypoxia and hypotension. s/p MICU stay for sepsis, w/o clear source identified. Now with hypogylcemia on Dextrose drip.        Problem/Plan - 1:  ·  Problem: Hypoglycemia.  InsulinomaPlan: - No history of diabetes. AM 12/23 BG to 50s, while on D5LR, increase to 96 with hypoglycemia protocol. Rule out adrenal insufficiency; patient with recent sepsis and history of steroid use.   - Encourage PO intake.   - will d/c D10/0.45NS and monitor FS  - f/u AM cortisol (will be affected by long term use of steroids)  - endo consulted - To further work up the etiology of hypoglycemia, send the following, ONLY if patient's glucose is less than 55mg/dl. Check pro-insulin, insulin, c-peptide level, beta-hydroxybutyrate, insulin antibody and preserved glucose level.   - increase decadron 2mg and encourage PO intake         Problem/Plan - 2:  ·  Problem: Glioblastoma multiforme.  Plan: s/p craniotomy w/resection and subsequent ESBL infection for which repeat craniotomy was performed (2/2020) now with  shunt.  - c/w 500 BID Keppra PO for seizure ppx (hx of EEGs in the past without seizure activity)  - c/w home decadron 1mg PO qd. Weaned off hydrocortisone in MICU.   -c/w ppx Bactrim DS qd. (Per pt's sister Maribel pt is to be on BID but provider called [facility] Dr. Dominguez (infectious disease) office- they had no records of Dr. Dominguez prescribing Bactrim for this pt. However they saw a Dr. Simeon (rad onc) who did but were not able to find any reasoning in notes as to why BID. Per house ID, no reason to have BID dosing)  - s/p LP with low c/f CNS infection  - palliative care consulted, recs appreciated     Problem/Plan - 3:  ·  Problem: Altered mental status.  Plan: - Patient AxO to self and time, knows he is in hospital. Per patient's sister and HCP, Maribel, patient is AxO x3, but can be aphasic, with blunted responses at time at baseline.      Problem/Plan - 4:  ·  Problem: Septic shock.  Plan: Resolved. s/p pressors in the MICU. CTAP w/o intrabdominal pathology. Low cocnern for CNS infection per LP.   - s/p extubation in MICU on 12/2 (originally intubated for airway protection)  - s/p meropenem course for 7 days in MICU.      Problem/Plan - 5:  ·  Problem: Prophylactic measure.  Plan: DVT ppx: lovenox  Diet: Softs.      Problem/Plan - 6:  Problem: Discharge planning issues. Plan: Transition of Care Status:  1. Name of PCP:  2. PCP contacted on admission: [  ] Y     [  ] N  3. PCP contacted at discharge: [  ] Y     [  ] N  4. Post-discharge appointment date and location:  5. Summary of handoff given to PCP:

## 2020-12-30 NOTE — PROGRESS NOTE ADULT - SUBJECTIVE AND OBJECTIVE BOX
PROGRESS NOTE:   Authored by Sola Julien MD  Pager [phone]  Pager [facility] [phone]    Patient is a 60y old  Male who presents with a chief complaint of hypoxic, minimally responsive requiring intubation (29 Dec 2020 17:40)      SUBJECTIVE / OVERNIGHT EVENTS:    MEDICATIONS  (STANDING):  dexAMETHasone     Tablet 1 milliGRAM(s) Oral daily  dextrose 40% Gel 15 Gram(s) Oral once  dextrose 5%. 1000 milliLiter(s) (50 mL/Hr) IV Continuous <Continuous>  dextrose 5%. 1000 milliLiter(s) (100 mL/Hr) IV Continuous <Continuous>  dextrose 50% Injectable 25 Gram(s) IV Push once  dextrose 50% Injectable 12.5 Gram(s) IV Push once  dextrose 50% Injectable 25 Gram(s) IV Push once  enoxaparin Injectable 40 milliGRAM(s) SubCutaneous daily  FLUoxetine 20 milliGRAM(s) Oral daily  glucagon  Injectable 1 milliGRAM(s) IntraMuscular once  influenza   Vaccine 0.5 milliLiter(s) IntraMuscular once  levETIRAcetam 500 milliGRAM(s) Oral two times a day  pantoprazole   Suspension 40 milliGRAM(s) Oral daily  senna 2 Tablet(s) Oral at bedtime  trimethoprim  160 mG/sulfamethoxazole 800 mG 1 Tablet(s) Oral daily    MEDICATIONS  (PRN):  sodium chloride 0.9% lock flush 10 milliLiter(s) IV Push every 1 hour PRN Pre/post blood products, medications, blood draw, and to maintain line patency      CAPILLARY BLOOD GLUCOSE      POCT Blood Glucose.: 71 mg/dL (29 Dec 2020 22:04)  POCT Blood Glucose.: 58 mg/dL (29 Dec 2020 22:02)  POCT Blood Glucose.: 85 mg/dL (29 Dec 2020 17:11)  POCT Blood Glucose.: 79 mg/dL (29 Dec 2020 11:54)  POCT Blood Glucose.: 94 mg/dL (29 Dec 2020 08:20)    I&O's Summary    29 Dec 2020 07:01  -  30 Dec 2020 07:00  --------------------------------------------------------  IN: 0 mL / OUT: 1250 mL / NET: -1250 mL        PHYSICAL EXAM:  Vital Signs Last 24 Hrs  T(C): 36.1 (30 Dec 2020 05:11), Max: 36.9 (29 Dec 2020 12:15)  T(F): 97 (30 Dec 2020 05:11), Max: 98.5 (29 Dec 2020 12:15)  HR: 92 (30 Dec 2020 05:11) (92 - 100)  BP: 154/99 (30 Dec 2020 05:11) (142/99 - 159/102)  BP(mean): --  RR: 17 (30 Dec 2020 05:11) (17 - 17)  SpO2: 98% (30 Dec 2020 05:11) (98% - 100%)    GENERAL: No acute distress, well-developed  HEAD:  Atraumatic, Normocephalic  EYES: EOMI, PERRLA, conjunctiva and sclera clear  NECK: Supple, no lymphadenopathy, no JVD  CHEST/LUNG: CTAB; No wheezes, rales, or rhonchi  HEART: Regular rate and rhythm; No murmurs, rubs, or gallops  ABDOMEN: Soft, non-tender, non-distended; normal bowel sounds, no organomegaly  EXTREMITIES:  2+ peripheral pulses b/l, No clubbing, cyanosis, or edema  NEUROLOGY: A&O x 3, no focal deficits  SKIN: No rashes or lesions    LABS:                        11.5   4.07  )-----------( 172      ( 29 Dec 2020 07:46 )             35.1     12-29    141  |  108<H>  |  <2<L>  ----------------------------<  90  3.7   |  25  |  0.59    Ca    8.7      29 Dec 2020 07:46  Phos  3.2     12-29  Mg     2.0     12-29                  RADIOLOGY & ADDITIONAL TESTS:  Results Reviewed:   Imaging Personally Reviewed:  Electrocardiogram Personally Reviewed:    COORDINATION OF CARE:  Care Discussed with Consultants/Other Providers [Y/N]:  Prior or Outpatient Records Reviewed [Y/N]:   PROGRESS NOTE:   Authored by Sola Julien MD  Pager [phone]  Pager [facility] [phone]    Patient is a 60y old  Male who presents with a chief complaint of hypoxic, minimally responsive requiring intubation (29 Dec 2020 17:40)      SUBJECTIVE / OVERNIGHT EVENTS: Hypoglycemic overnight, responded to apple juice. This AM patient AAOx2. Conversant and pleasant. States that he doesn't feel entirely at baseline. AM FS 79 after 2.5 containers of juice.     MEDICATIONS  (STANDING):  dexAMETHasone     Tablet 1 milliGRAM(s) Oral daily  dextrose 40% Gel 15 Gram(s) Oral once  dextrose 5%. 1000 milliLiter(s) (50 mL/Hr) IV Continuous <Continuous>  dextrose 5%. 1000 milliLiter(s) (100 mL/Hr) IV Continuous <Continuous>  dextrose 50% Injectable 25 Gram(s) IV Push once  dextrose 50% Injectable 12.5 Gram(s) IV Push once  dextrose 50% Injectable 25 Gram(s) IV Push once  enoxaparin Injectable 40 milliGRAM(s) SubCutaneous daily  FLUoxetine 20 milliGRAM(s) Oral daily  glucagon  Injectable 1 milliGRAM(s) IntraMuscular once  influenza   Vaccine 0.5 milliLiter(s) IntraMuscular once  levETIRAcetam 500 milliGRAM(s) Oral two times a day  pantoprazole   Suspension 40 milliGRAM(s) Oral daily  senna 2 Tablet(s) Oral at bedtime  trimethoprim  160 mG/sulfamethoxazole 800 mG 1 Tablet(s) Oral daily    MEDICATIONS  (PRN):  sodium chloride 0.9% lock flush 10 milliLiter(s) IV Push every 1 hour PRN Pre/post blood products, medications, blood draw, and to maintain line patency      CAPILLARY BLOOD GLUCOSE      POCT Blood Glucose.: 71 mg/dL (29 Dec 2020 22:04)  POCT Blood Glucose.: 58 mg/dL (29 Dec 2020 22:02)  POCT Blood Glucose.: 85 mg/dL (29 Dec 2020 17:11)  POCT Blood Glucose.: 79 mg/dL (29 Dec 2020 11:54)  POCT Blood Glucose.: 94 mg/dL (29 Dec 2020 08:20)    I&O's Summary    29 Dec 2020 07:01  -  30 Dec 2020 07:00  --------------------------------------------------------  IN: 0 mL / OUT: 1250 mL / NET: -1250 mL        PHYSICAL EXAM:  Vital Signs Last 24 Hrs  T(C): 36.1 (30 Dec 2020 05:11), Max: 36.9 (29 Dec 2020 12:15)  T(F): 97 (30 Dec 2020 05:11), Max: 98.5 (29 Dec 2020 12:15)  HR: 92 (30 Dec 2020 05:11) (92 - 100)  BP: 154/99 (30 Dec 2020 05:11) (142/99 - 159/102)  BP(mean): --  RR: 17 (30 Dec 2020 05:11) (17 - 17)  SpO2: 98% (30 Dec 2020 05:11) (98% - 100%)    GENERAL: No acute distress, well-developed  HEAD:  Atraumatic, Normocephalic  EYES: EOMI, PERRLA, conjunctiva and sclera clear  NECK: Supple, no lymphadenopathy, no JVD  CHEST/LUNG: CTAB; No wheezes, rales, or rhonchi  HEART: Regular rate and rhythm; No murmurs, rubs, or gallops  ABDOMEN: Soft, non-tender, non-distended; normal bowel sounds, no organomegaly  EXTREMITIES:  2+ peripheral pulses b/l, No clubbing, cyanosis, or edema  NEUROLOGY: A&O x 2, no focal deficits  SKIN: No rashes or lesions    LABS:                        11.5   4.07  )-----------( 172      ( 29 Dec 2020 07:46 )             35.1     12-29    141  |  108<H>  |  <2<L>  ----------------------------<  90  3.7   |  25  |  0.59    Ca    8.7      29 Dec 2020 07:46  Phos  3.2     12-29  Mg     2.0     12-29                  RADIOLOGY & ADDITIONAL TESTS:  Results Reviewed:   Imaging Personally Reviewed:  Electrocardiogram Personally Reviewed:    COORDINATION OF CARE:  Care Discussed with Consultants/Other Providers [Y/N]:  Prior or Outpatient Records Reviewed [Y/N]:   PROGRESS NOTE:   Authored by Sola Julien MD  Pager [phone]  Pager [facility] [phone]    Patient is a 60y old  Male who presents with a chief complaint of hypoxic, minimally responsive requiring intubation (29 Dec 2020 17:40)      SUBJECTIVE / OVERNIGHT EVENTS: Hypoglycemic overnight, responded to apple juice. This AM patient AAOx2. Conversant and pleasant. States that he feels close to baseline. AM FS 79 after 2.5 containers of juice.     MEDICATIONS  (STANDING):  dexAMETHasone     Tablet 1 milliGRAM(s) Oral daily  dextrose 40% Gel 15 Gram(s) Oral once  dextrose 5%. 1000 milliLiter(s) (50 mL/Hr) IV Continuous <Continuous>  dextrose 5%. 1000 milliLiter(s) (100 mL/Hr) IV Continuous <Continuous>  dextrose 50% Injectable 25 Gram(s) IV Push once  dextrose 50% Injectable 12.5 Gram(s) IV Push once  dextrose 50% Injectable 25 Gram(s) IV Push once  enoxaparin Injectable 40 milliGRAM(s) SubCutaneous daily  FLUoxetine 20 milliGRAM(s) Oral daily  glucagon  Injectable 1 milliGRAM(s) IntraMuscular once  influenza   Vaccine 0.5 milliLiter(s) IntraMuscular once  levETIRAcetam 500 milliGRAM(s) Oral two times a day  pantoprazole   Suspension 40 milliGRAM(s) Oral daily  senna 2 Tablet(s) Oral at bedtime  trimethoprim  160 mG/sulfamethoxazole 800 mG 1 Tablet(s) Oral daily    MEDICATIONS  (PRN):  sodium chloride 0.9% lock flush 10 milliLiter(s) IV Push every 1 hour PRN Pre/post blood products, medications, blood draw, and to maintain line patency      CAPILLARY BLOOD GLUCOSE      POCT Blood Glucose.: 71 mg/dL (29 Dec 2020 22:04)  POCT Blood Glucose.: 58 mg/dL (29 Dec 2020 22:02)  POCT Blood Glucose.: 85 mg/dL (29 Dec 2020 17:11)  POCT Blood Glucose.: 79 mg/dL (29 Dec 2020 11:54)  POCT Blood Glucose.: 94 mg/dL (29 Dec 2020 08:20)    I&O's Summary    29 Dec 2020 07:01  -  30 Dec 2020 07:00  --------------------------------------------------------  IN: 0 mL / OUT: 1250 mL / NET: -1250 mL        PHYSICAL EXAM:  Vital Signs Last 24 Hrs  T(C): 36.1 (30 Dec 2020 05:11), Max: 36.9 (29 Dec 2020 12:15)  T(F): 97 (30 Dec 2020 05:11), Max: 98.5 (29 Dec 2020 12:15)  HR: 92 (30 Dec 2020 05:11) (92 - 100)  BP: 154/99 (30 Dec 2020 05:11) (142/99 - 159/102)  BP(mean): --  RR: 17 (30 Dec 2020 05:11) (17 - 17)  SpO2: 98% (30 Dec 2020 05:11) (98% - 100%)    GENERAL: No acute distress, well-developed  HEAD:  Atraumatic, Normocephalic  EYES: EOMI, PERRLA, conjunctiva and sclera clear  NECK: Supple, no lymphadenopathy, no JVD  CHEST/LUNG: CTAB; No wheezes, rales, or rhonchi  HEART: Regular rate and rhythm; No murmurs, rubs, or gallops  ABDOMEN: Soft, non-tender, non-distended; normal bowel sounds, no organomegaly  EXTREMITIES:  2+ peripheral pulses b/l, No clubbing, cyanosis, or edema  NEUROLOGY: A&O x 2, no focal deficits  SKIN: No rashes or lesions    LABS:                        11.5   4.07  )-----------( 172      ( 29 Dec 2020 07:46 )             35.1     12-29    141  |  108<H>  |  <2<L>  ----------------------------<  90  3.7   |  25  |  0.59    Ca    8.7      29 Dec 2020 07:46  Phos  3.2     12-29  Mg     2.0     12-29                  RADIOLOGY & ADDITIONAL TESTS:  Results Reviewed:   Imaging Personally Reviewed:  Electrocardiogram Personally Reviewed:    COORDINATION OF CARE:  Care Discussed with Consultants/Other Providers [Y/N]:  Prior or Outpatient Records Reviewed [Y/N]:   PROGRESS NOTE:   Authored by Sola Julien MD  Pager [phone]  Pager [facility] [phone]    Patient is a 60y old  Male who presents with a chief complaint of hypoxic, minimally responsive requiring intubation (29 Dec 2020 17:40)      SUBJECTIVE / OVERNIGHT EVENTS: Hypoglycemia overnight without sx, glucose jonathan with apple juice. This AM patient AAOx2. Conversant and pleasant. States that he feels close to baseline. AM FS 79 after 2.5 containers of juice.     MEDICATIONS  (STANDING):  dexAMETHasone     Tablet 1 milliGRAM(s) Oral daily  dextrose 40% Gel 15 Gram(s) Oral once  dextrose 5%. 1000 milliLiter(s) (50 mL/Hr) IV Continuous <Continuous>  dextrose 5%. 1000 milliLiter(s) (100 mL/Hr) IV Continuous <Continuous>  dextrose 50% Injectable 25 Gram(s) IV Push once  dextrose 50% Injectable 12.5 Gram(s) IV Push once  dextrose 50% Injectable 25 Gram(s) IV Push once  enoxaparin Injectable 40 milliGRAM(s) SubCutaneous daily  FLUoxetine 20 milliGRAM(s) Oral daily  glucagon  Injectable 1 milliGRAM(s) IntraMuscular once  influenza   Vaccine 0.5 milliLiter(s) IntraMuscular once  levETIRAcetam 500 milliGRAM(s) Oral two times a day  pantoprazole   Suspension 40 milliGRAM(s) Oral daily  senna 2 Tablet(s) Oral at bedtime  trimethoprim  160 mG/sulfamethoxazole 800 mG 1 Tablet(s) Oral daily    MEDICATIONS  (PRN):  sodium chloride 0.9% lock flush 10 milliLiter(s) IV Push every 1 hour PRN Pre/post blood products, medications, blood draw, and to maintain line patency      CAPILLARY BLOOD GLUCOSE      POCT Blood Glucose.: 71 mg/dL (29 Dec 2020 22:04)  POCT Blood Glucose.: 58 mg/dL (29 Dec 2020 22:02)  POCT Blood Glucose.: 85 mg/dL (29 Dec 2020 17:11)  POCT Blood Glucose.: 79 mg/dL (29 Dec 2020 11:54)  POCT Blood Glucose.: 94 mg/dL (29 Dec 2020 08:20)    I&O's Summary    29 Dec 2020 07:01  -  30 Dec 2020 07:00  --------------------------------------------------------  IN: 0 mL / OUT: 1250 mL / NET: -1250 mL        PHYSICAL EXAM:  Vital Signs Last 24 Hrs  T(C): 36.1 (30 Dec 2020 05:11), Max: 36.9 (29 Dec 2020 12:15)  T(F): 97 (30 Dec 2020 05:11), Max: 98.5 (29 Dec 2020 12:15)  HR: 92 (30 Dec 2020 05:11) (92 - 100)  BP: 154/99 (30 Dec 2020 05:11) (142/99 - 159/102)  BP(mean): --  RR: 17 (30 Dec 2020 05:11) (17 - 17)  SpO2: 98% (30 Dec 2020 05:11) (98% - 100%)    GENERAL: No acute distress, well-developed  HEAD:  Atraumatic, Normocephalic  EYES: EOMI, PERRLA, conjunctiva and sclera clear  NECK: Supple, no lymphadenopathy, no JVD  CHEST/LUNG: CTAB; No wheezes, rales, or rhonchi  HEART: Regular rate and rhythm; No murmurs, rubs, or gallops  ABDOMEN: Soft, non-tender, non-distended; normal bowel sounds, no organomegaly  EXTREMITIES:  2+ peripheral pulses b/l, No clubbing, cyanosis, or edema  NEUROLOGY: A&O x 2, no focal deficits  SKIN: No rashes or lesions    LABS:                        11.5   4.07  )-----------( 172      ( 29 Dec 2020 07:46 )             35.1     12-29    141  |  108<H>  |  <2<L>  ----------------------------<  90  3.7   |  25  |  0.59    Ca    8.7      29 Dec 2020 07:46  Phos  3.2     12-29  Mg     2.0     12-29                  RADIOLOGY & ADDITIONAL TESTS:  Results Reviewed:   Imaging Personally Reviewed:  Electrocardiogram Personally Reviewed:    COORDINATION OF CARE:  Care Discussed with Consultants/Other Providers [Y/N]:  Prior or Outpatient Records Reviewed [Y/N]:

## 2020-12-30 NOTE — PROGRESS NOTE ADULT - SUBJECTIVE AND OBJECTIVE BOX
[facility]  Adiel Rodriguez, Palliative Care Attending  Contact Info: Page [phone] (including Nights/Weekend), message on Microsoft Teams (Adiel Rodriguez), or leave VM at Palliative Office [phone] (Non-Urgent)    SUBJECTIVE AND OBJECTIVE:  INTERVAL HPI/OVERNIGHT EVENTS: No acute events overnight. Patient states his HA and Nausea remain resolved. Patient required no additional PRNs in past 24hrs. Discussed with sister (Katrin), unable to contact sister/HCP (Maribel)    Allergies  aspirin (Unknown)  shellfish (Hives)  shellfish (Unknown)  Tegretol (Unknown)    Intolerances  ACE inhibitors (Other)  angiotensin II inhibitors (Other)    MEDICATIONS  (STANDING):  dexAMETHasone     Tablet 2 milliGRAM(s) Oral daily  dextrose 40% Gel 15 Gram(s) Oral once  dextrose 5%. 1000 milliLiter(s) (100 mL/Hr) IV Continuous <Continuous>  dextrose 5%. 1000 milliLiter(s) (50 mL/Hr) IV Continuous <Continuous>  dextrose 50% Injectable 25 Gram(s) IV Push once  dextrose 50% Injectable 12.5 Gram(s) IV Push once  dextrose 50% Injectable 25 Gram(s) IV Push once  enoxaparin Injectable 40 milliGRAM(s) SubCutaneous daily  FLUoxetine 20 milliGRAM(s) Oral daily  glucagon  Injectable 1 milliGRAM(s) IntraMuscular once  influenza   Vaccine 0.5 milliLiter(s) IntraMuscular once  levETIRAcetam 500 milliGRAM(s) Oral two times a day  pantoprazole   Suspension 40 milliGRAM(s) Oral daily  senna 2 Tablet(s) Oral at bedtime  trimethoprim  160 mG/sulfamethoxazole 800 mG 1 Tablet(s) Oral daily    MEDICATIONS  (PRN):  sodium chloride 0.9% lock flush 10 milliLiter(s) IV Push every 1 hour PRN Pre/post blood products, medications, blood draw, and to maintain line patency      ITEMS UNCHECKED ARE NOT PRESENT    PRESENT SYMPTOMS: []Unable to obtain due to poor mentation   Source if other than patient:  []Family   []Team      %  [email or web address]    PHYSICAL EXAM:  Vital Signs Last 24 Hrs  T(C): 36.9 (30 Dec 2020 13:22), Max: 36.9 (30 Dec 2020 13:22)  T(F): 98.5 (30 Dec 2020 13:22), Max: 98.5 (30 Dec 2020 13:22)  HR: 100 (30 Dec 2020 13:22) (92 - 100)  BP: 153/91 (30 Dec 2020 13:22) (153/91 - 159/102)  BP(mean): --  RR: 18 (30 Dec 2020 13:22) (17 - 18)  SpO2: 100% (30 Dec 2020 13:22) (98% - 100%) I&O's Summary    29 Dec 2020 07:01  -  30 Dec 2020 07:00  --------------------------------------------------------  IN: 0 mL / OUT: 1250 mL / NET: -1250 mL            LABS:                         12.6   4.22  )-----------( 217      ( 30 Dec 2020 07:50 )             40.2   12-30    143  |  107  |  2<L>  ----------------------------<  69<L>  4.0   |  27  |  0.69    Ca    9.2      30 Dec 2020 07:50  Phos  3.3     12-30  Mg     2.2     12-30            RADIOLOGY & ADDITIONAL STUDIES: None new    PROTEIN CALORIE MALNUTRITION PRESENT: [ ]mild [ ]moderate [ ]severe [ ]underweight [ ]morbid obesity  [] PPSV2 < or = 30% [] significant weight loss [] poor nutritional intake [] anasarca [] catabolic state   Albumin, Serum: 2.2 g/dL (12-25-20 @ 07:48)   Artificial Nutrition []     REFERRALS:   []Chaplaincy  []Hospice  []Child Life  [x]Social Work  []Case management []Holistic Therapy []Physical Therapy []Dietary     Goals of Care Document:   Care Coordination Document: Progress Notes - Care Coordination [C. Provider] (12-30-20 @ 15:48)                                       Progress Notes    PROGRESS NOTE  Date & Time of Note   2020-12-30 03:42    Notes    Notes: WINSTON role ongoing. WINSTON received a call from patient's sister Maribel  [phone], informing WINSTON she is not allowing the patient to be discharged  back to [facility]. Sister stated no one informed her the patient was  medically cleared to leave the hospital, and they have no update on his medical  care. At this time, the family is uncertain if they want him to return to  [facility]. WINSTON informed sister patient is cleared and a new facility would  need to given as soon as possible. WINSTON email [email or web address] a [place] rehab list  for the sister to review. WINSTON informed medical team 1 of the above information,  WINSTON informed them to contact sister. WINSTON to remain available throughout admission.       Electronically signed by:  Krystle Lee  Electronically signed on:  2020-12-30  15:48       [facility]  Adiel Rodriguez, Palliative Care Attending  Contact Info: Page [phone] (including Nights/Weekend), message on Microsoft Teams (Adiel Rodriguez), or leave VM at Palliative Office [phone] (Non-Urgent)    SUBJECTIVE AND OBJECTIVE:  INTERVAL HPI/OVERNIGHT EVENTS: No acute events overnight. Patient states his HA and Nausea remain resolved. Patient required no additional PRNs in past 24hrs. Discussed with sister (Katrin), unable to contact sister/HCP (Maribel)    Allergies  aspirin (Unknown)  shellfish (Hives)  shellfish (Unknown)  Tegretol (Unknown)    Intolerances  ACE inhibitors (Other)  angiotensin II inhibitors (Other)    MEDICATIONS  (STANDING):  dexAMETHasone     Tablet 2 milliGRAM(s) Oral daily  dextrose 40% Gel 15 Gram(s) Oral once  dextrose 5%. 1000 milliLiter(s) (100 mL/Hr) IV Continuous <Continuous>  dextrose 5%. 1000 milliLiter(s) (50 mL/Hr) IV Continuous <Continuous>  dextrose 50% Injectable 25 Gram(s) IV Push once  dextrose 50% Injectable 12.5 Gram(s) IV Push once  dextrose 50% Injectable 25 Gram(s) IV Push once  enoxaparin Injectable 40 milliGRAM(s) SubCutaneous daily  FLUoxetine 20 milliGRAM(s) Oral daily  glucagon  Injectable 1 milliGRAM(s) IntraMuscular once  influenza   Vaccine 0.5 milliLiter(s) IntraMuscular once  levETIRAcetam 500 milliGRAM(s) Oral two times a day  pantoprazole   Suspension 40 milliGRAM(s) Oral daily  senna 2 Tablet(s) Oral at bedtime  trimethoprim  160 mG/sulfamethoxazole 800 mG 1 Tablet(s) Oral daily    MEDICATIONS  (PRN):  sodium chloride 0.9% lock flush 10 milliLiter(s) IV Push every 1 hour PRN Pre/post blood products, medications, blood draw, and to maintain line patency      ITEMS UNCHECKED ARE NOT PRESENT    PRESENT SYMPTOMS: []Unable to obtain due to poor mentation   Source if other than patient:  []Family   []Team     Pain: [ ] yes [x] no  QOL impact -   Location -                    Aggravating factors -  Quality -  Radiation -  Timing -  Severity (0-10 scale):  Minimal acceptable level (0-10 scale):    PAIN AD Score:  [email or web address] (press ctrl +  left click to view)    Dyspnea:                           []Mild  []Moderate []Severe  Anxiety:                             []Mild []Moderate []Severe  Fatigue:                             []Mild []Moderate []Severe  Nausea:                             []Mild []Moderate []Severe  Loss of appetite:              []Mild []Moderate []Severe  Constipation:                    []Mild []Moderate []Severe    Other Symptoms:  [x]All other review of systems negative     Palliative Performance Status Version 2:  40%  [email or web address]    PHYSICAL EXAM:  Vital Signs Last 24 Hrs  T(C): 36.9 (30 Dec 2020 13:22), Max: 36.9 (30 Dec 2020 13:22)  T(F): 98.5 (30 Dec 2020 13:22), Max: 98.5 (30 Dec 2020 13:22)  HR: 100 (30 Dec 2020 13:22) (92 - 100)  BP: 153/91 (30 Dec 2020 13:22) (153/91 - 159/102)  BP(mean): --  RR: 18 (30 Dec 2020 13:22) (17 - 18)  SpO2: 100% (30 Dec 2020 13:22) (98% - 100%) I&O's Summary    29 Dec 2020 07:01  -  30 Dec 2020 07:00  --------------------------------------------------------  IN: 0 mL / OUT: 1250 mL / NET: -1250 mL      GENERAL:  [x]Alert  [x]Oriented x3   []Lethargic  []Cachexia  []Unarousable  [x]Verbal  []Non-Verbal  Behavioral:   [] Anxiety  [] Delirium [] Agitation [x] Forgetful  HEENT:  [x]Normal   []Dry mouth   []ET Tube/Trach  []Oral lesions  PULMONARY:   [x]Clear []Tachypnea  []Audible excessive secretions   []Rhonchi        []Right []Left []Bilateral  []Crackles        []Right []Left []Bilateral  []Wheezing     []Right []Left []Bilateral  CARDIOVASCULAR:    [x]Regular []Irregular []Tachy  []Amor []Murmur []Other  GASTROINTESTINAL:  [x]Soft  []Distended   [x]+BS  [x]Non tender []Tender  []PEG []OGT/ NGT  Last BM:   GENITOURINARY:  [x]Normal [] Incontinent   []Oliguria/Anuria   []Sandoval  MUSCULOSKELETAL:   []Normal   [x]Weakness  []Bed/Wheelchair bound []Edema  NEUROLOGIC:   []No focal deficits  [x] Cognitive impairment  [] Dysphagia []Dysarthria [] Paresis []Other   SKIN:   [x]Normal   []Pressure ulcer(s)  []Rash      LABS:                         12.6   4.22  )-----------( 217      ( 30 Dec 2020 07:50 )             40.2   12-30    143  |  107  |  2<L>  ----------------------------<  69<L>  4.0   |  27  |  0.69    Ca    9.2      30 Dec 2020 07:50  Phos  3.3     12-30  Mg     2.2     12-30      RADIOLOGY & ADDITIONAL STUDIES: None new    PROTEIN CALORIE MALNUTRITION PRESENT: [ ]mild [ ]moderate [ ]severe [ ]underweight [ ]morbid obesity  [] PPSV2 < or = 30% [] significant weight loss [] poor nutritional intake [] anasarca [] catabolic state   Albumin, Serum: 2.2 g/dL (12-25-20 @ 07:48)   Artificial Nutrition []     REFERRALS:   []Chaplaincy  []Hospice  []Child Life  [x]Social Work  []Case management []Holistic Therapy []Physical Therapy []Dietary     Goals of Care Document:   Care Coordination Document: Progress Notes - Care Coordination [C. Provider] (12-30-20 @ 15:48)                                       Progress Notes    PROGRESS NOTE  Date & Time of Note   2020-12-30 03:42    Notes    Notes: WINSTON role ongoing. WINSTON received a call from patient's sister Maribel  [phone], informing WINSTON she is not allowing the patient to be discharged  back to [facility]. Sister stated no one informed her the patient was  medically cleared to leave the hospital, and they have no update on his medical  care. At this time, the family is uncertain if they want him to return to  [facility]. SW informed sister patient is cleared and a new facility would  need to given as soon as possible. WINSTON email [email or web address] a [place] rehab list  for the sister to review. WINSTON informed medical team 1 of the above information,  WINSTON informed them to contact sister. WINSTON to remain available throughout admission.       Electronically signed by:  Krystle Lee  Electronically signed on:  2020-12-30  15:48

## 2020-12-30 NOTE — PROGRESS NOTE ADULT - ATTENDING COMMENTS
Hypoglycemia workup as outlined.  Stopped IV dextrose monitor trend.  Concern for protein calorie malnutrition but will assess for other etiologies of hypoglycemia.  Will follow.    Israel Araujo MD  Division of Endocrinology  Pager: [phone]    If after 6PM or before 9AM, or on weekends/holidays, please call endocrine answering service for assistance ([phone]).  For nonurgent matters email [email or web address] for assistance.

## 2020-12-30 NOTE — PROGRESS NOTE ADULT - SUBJECTIVE AND OBJECTIVE BOX
Chief Complaint: hypoglycemia     History: Patient seen and evaluated at bedside. NAD.   Discussed with nursing, patient is not eating meal, however with encouragement he will drink ensure.     MEDICATIONS  (STANDING):  dexAMETHasone     Tablet 2 milliGRAM(s) Oral daily  dextrose 40% Gel 15 Gram(s) Oral once  dextrose 5%. 1000 milliLiter(s) (100 mL/Hr) IV Continuous <Continuous>  dextrose 5%. 1000 milliLiter(s) (50 mL/Hr) IV Continuous <Continuous>  dextrose 50% Injectable 25 Gram(s) IV Push once  dextrose 50% Injectable 12.5 Gram(s) IV Push once  dextrose 50% Injectable 25 Gram(s) IV Push once  enoxaparin Injectable 40 milliGRAM(s) SubCutaneous daily  FLUoxetine 20 milliGRAM(s) Oral daily  glucagon  Injectable 1 milliGRAM(s) IntraMuscular once  influenza   Vaccine 0.5 milliLiter(s) IntraMuscular once  levETIRAcetam 500 milliGRAM(s) Oral two times a day  pantoprazole   Suspension 40 milliGRAM(s) Oral daily  senna 2 Tablet(s) Oral at bedtime  trimethoprim  160 mG/sulfamethoxazole 800 mG 1 Tablet(s) Oral daily    MEDICATIONS  (PRN):  sodium chloride 0.9% lock flush 10 milliLiter(s) IV Push every 1 hour PRN Pre/post blood products, medications, blood draw, and to maintain line patency    PHYSICAL EXAM:  VITALS: T(C): 36.9 (12-30-20 @ 13:22)  T(F): 98.5 (12-30-20 @ 13:22), Max: 98.5 (12-30-20 @ 13:22)  HR: 100 (12-30-20 @ 13:22) (92 - 100)  BP: 153/91 (12-30-20 @ 13:22) (153/91 - 159/102)  RR:  (17 - 18)  SpO2:  (98% - 100%)  Wt(kg): --  GENERAL: NAD, well-groomed, well-developed  RESPIRATORY: Clear to auscultation bilaterally; No rales, rhonchi, wheezing, or rubs  CARDIOVASCULAR: Regular rate and rhythm; No murmurs; no peripheral edema  GI: Soft, nontender, non distended, normal bowel sounds  SKIN: Dry, intact, No rashes or lesions  MUSCULOSKELETAL: Full range of motion, normal strength  PSYCH: Awake and alert x2     POCT Blood Glucose.: 88 mg/dL (12-30-20 @ 17:29)  POCT Blood Glucose.: 94 mg/dL (12-30-20 @ 12:09)  POCT Blood Glucose.: 79 mg/dL (12-30-20 @ 08:36)  POCT Blood Glucose.: 71 mg/dL (12-29-20 @ 22:04)  POCT Blood Glucose.: 58 mg/dL (12-29-20 @ 22:02)  POCT Blood Glucose.: 85 mg/dL (12-29-20 @ 17:11)  POCT Blood Glucose.: 79 mg/dL (12-29-20 @ 11:54)  POCT Blood Glucose.: 94 mg/dL (12-29-20 @ 08:20)  POCT Blood Glucose.: 104 mg/dL (12-28-20 @ 21:41)  POCT Blood Glucose.: 92 mg/dL (12-28-20 @ 17:33)  POCT Blood Glucose.: 109 mg/dL (12-28-20 @ 12:01)  POCT Blood Glucose.: 80 mg/dL (12-28-20 @ 08:16)  POCT Blood Glucose.: 88 mg/dL (12-27-20 @ 22:33)      12-30    143  |  107  |  2<L>  ----------------------------<  69<L>  4.0   |  27  |  0.69    EGFR if : 119  EGFR if non : 102    Ca    9.2      12-30  Mg     2.2     12-30  Phos  3.3     12-30

## 2020-12-30 NOTE — PROGRESS NOTE ADULT - ATTENDING COMMENTS
60 M h/o HTN, DVT s/p IVCF, GBM s/p craniotomy, admitted with altered mental status, hypoxia, and hypotension. Required ICU stay for sepsis of unclear etiology. Now off pressors, extubated, and stable. Course c/b hypoglycemia, but asymptomatic and no insulin use. Endocrine consulted. No indication for further evaluation. Likely hypoglycemic from lack of eating.    Case d/w Dr Rodriguez fo palliative. Will increase dexamethasone for appetite stimulation.    Patient is stable for dc w/outpt referral to hospice.    I spent 35 minutes counseling this patient and coordinating their discharge.

## 2020-12-30 NOTE — PROGRESS NOTE ADULT - PROBLEM SELECTOR PLAN 4
.  Patient is FULL CODE for now  -sister/HCP (Maribel) is open to exploring Hospice at [place]  -patient can remain FULL CODE and enroll in hospice but sister/HCP understands that at some point the decision for DNR/DNI may full to her and she feels ready to make that decision at that time  -discussed with sister (Katrin) she is concerned whether patient has the capacity to make a decision re: code status but she states that up until now he has reaffirmed his status as FULL CODE ---> patient's mother and this sister would favor DNR/DNI and Hospice but they are not the true decision-makers .  Patient is FULL CODE for now  -after conversation on 12/29: sister/HCP (Maribel) is open to exploring Hospice. Patient can remain FULL CODE and enroll in hospice but sister/HCP understands that at some point the decision for DNR/DNI may full to her and she feels ready to make that decision at that time  -updated sister (Katrin) that Hospice eval can take place at [place]    Good discussion with patient re: his prognosis and the recommendation for Hospice. Discussed prognosis and expected disease trajectory. In the moment, patient was understanding of the information I was giving him about his cancer diagnosis.

## 2020-12-31 LAB
GLUCOSE BLDC GLUCOMTR-MCNC: 107 MG/DL — HIGH (ref 70–99)
GLUCOSE BLDC GLUCOMTR-MCNC: 121 MG/DL — HIGH (ref 70–99)
GLUCOSE BLDC GLUCOMTR-MCNC: 79 MG/DL — SIGNIFICANT CHANGE UP (ref 70–99)
GLUCOSE BLDC GLUCOMTR-MCNC: 89 MG/DL — SIGNIFICANT CHANGE UP (ref 70–99)

## 2020-12-31 PROCEDURE — 99233 SBSQ HOSP IP/OBS HIGH 50: CPT | Mod: GC

## 2020-12-31 PROCEDURE — 99232 SBSQ HOSP IP/OBS MODERATE 35: CPT | Mod: GC

## 2020-12-31 PROCEDURE — 99233 SBSQ HOSP IP/OBS HIGH 50: CPT

## 2020-12-31 RX ORDER — HYDRALAZINE HCL 50 MG
25 TABLET ORAL THREE TIMES A DAY
Refills: 0 | Status: DISCONTINUED | OUTPATIENT
Start: 2020-12-31 | End: 2021-01-05

## 2020-12-31 RX ADMIN — ENOXAPARIN SODIUM 40 MILLIGRAM(S): 100 INJECTION SUBCUTANEOUS at 11:33

## 2020-12-31 RX ADMIN — Medication 1 TABLET(S): at 11:33

## 2020-12-31 RX ADMIN — LEVETIRACETAM 500 MILLIGRAM(S): 250 TABLET, FILM COATED ORAL at 05:00

## 2020-12-31 RX ADMIN — Medication 2 MILLIGRAM(S): at 05:00

## 2020-12-31 RX ADMIN — Medication 20 MILLIGRAM(S): at 11:33

## 2020-12-31 RX ADMIN — Medication 25 MILLIGRAM(S): at 21:17

## 2020-12-31 RX ADMIN — PANTOPRAZOLE SODIUM 40 MILLIGRAM(S): 20 TABLET, DELAYED RELEASE ORAL at 11:33

## 2020-12-31 RX ADMIN — LEVETIRACETAM 500 MILLIGRAM(S): 250 TABLET, FILM COATED ORAL at 17:50

## 2020-12-31 RX ADMIN — Medication 25 MILLIGRAM(S): at 12:27

## 2020-12-31 NOTE — PROGRESS NOTE ADULT - SUBJECTIVE AND OBJECTIVE BOX
Chief Complaint: hypoglycemia     History: Patient seen and evaluated at bedside. NAD.   Per nursing patient does not eat much, however will drink ensure with persistent encouragement     MEDICATIONS  (STANDING):  dexAMETHasone     Tablet 2 milliGRAM(s) Oral daily  dextrose 40% Gel 15 Gram(s) Oral once  dextrose 5%. 1000 milliLiter(s) (50 mL/Hr) IV Continuous <Continuous>  dextrose 5%. 1000 milliLiter(s) (100 mL/Hr) IV Continuous <Continuous>  dextrose 50% Injectable 25 Gram(s) IV Push once  dextrose 50% Injectable 12.5 Gram(s) IV Push once  dextrose 50% Injectable 25 Gram(s) IV Push once  enoxaparin Injectable 40 milliGRAM(s) SubCutaneous daily  FLUoxetine 20 milliGRAM(s) Oral daily  glucagon  Injectable 1 milliGRAM(s) IntraMuscular once  hydrALAZINE 25 milliGRAM(s) Oral three times a day  influenza   Vaccine 0.5 milliLiter(s) IntraMuscular once  levETIRAcetam 500 milliGRAM(s) Oral two times a day  pantoprazole   Suspension 40 milliGRAM(s) Oral daily  senna 2 Tablet(s) Oral at bedtime  trimethoprim  160 mG/sulfamethoxazole 800 mG 1 Tablet(s) Oral daily    MEDICATIONS  (PRN):  sodium chloride 0.9% lock flush 10 milliLiter(s) IV Push every 1 hour PRN Pre/post blood products, medications, blood draw, and to maintain line patency    PHYSICAL EXAM:  VITALS: T(C): 36.3 (12-31-20 @ 12:12)  T(F): 97.3 (12-31-20 @ 12:12), Max: 97.6 (12-31-20 @ 05:00)  HR: 100 (12-31-20 @ 12:12) (87 - 100)  BP: 135/92 (12-31-20 @ 12:12) (133/93 - 169/107)  RR:  (17 - 18)  SpO2:  (98% - 99%)  Wt(kg): 88kg  GENERAL: NAD, well-groomed, well-developed  RESPIRATORY: Clear to auscultation bilaterally; No rales, rhonchi, wheezing, or rubs  CARDIOVASCULAR: Regular rate and rhythm; No murmurs; no peripheral edema  GI: Soft, nontender, non distended, normal bowel sounds  SKIN: Dry, intact, No rashes or lesions  MUSCULOSKELETAL: Full range of motion, normal strength  NEURO: sensation intact, extraocular movements intact, no tremor, normal reflexes  PSYCH: Awake and alert however     POCT Blood Glucose.: 121 mg/dL (12-31-20 @ 12:07)  POCT Blood Glucose.: 89 mg/dL (12-31-20 @ 08:33)  POCT Blood Glucose.: 85 mg/dL (12-30-20 @ 21:20)  POCT Blood Glucose.: 88 mg/dL (12-30-20 @ 17:29)  POCT Blood Glucose.: 94 mg/dL (12-30-20 @ 12:09)  POCT Blood Glucose.: 79 mg/dL (12-30-20 @ 08:36)  POCT Blood Glucose.: 71 mg/dL (12-29-20 @ 22:04)  POCT Blood Glucose.: 58 mg/dL (12-29-20 @ 22:02)  POCT Blood Glucose.: 85 mg/dL (12-29-20 @ 17:11)  POCT Blood Glucose.: 79 mg/dL (12-29-20 @ 11:54)  POCT Blood Glucose.: 94 mg/dL (12-29-20 @ 08:20)  POCT Blood Glucose.: 104 mg/dL (12-28-20 @ 21:41)  POCT Blood Glucose.: 92 mg/dL (12-28-20 @ 17:33)      12-30    143  |  107  |  2<L>  ----------------------------<  69<L>  4.0   |  27  |  0.69    EGFR if : 119  EGFR if non : 102    Ca    9.2      12-30  Mg     2.2     12-30  Phos  3.3     12-30

## 2020-12-31 NOTE — PROGRESS NOTE ADULT - ATTENDING COMMENTS
Hypoglycemia workup as outlined.  Glucose has been stable with encouraged po intake (drinking ensure)  Suspecting etiology of low glucose was protein calorie malnutrition but will assess for other etiologies of hypoglycemia.  Will follow.    Israel Araujo MD  Division of Endocrinology  Pager: [phone]    If after 6PM or before 9AM, or on weekends/holidays, please call endocrine answering service for assistance ([phone]).  For nonurgent matters email [email or web address] for assistance.

## 2020-12-31 NOTE — PROGRESS NOTE ADULT - ASSESSMENT
Full Note to Follow.    ·	further conversation with patient re: his prognosis, he was distraught after our conversation yesterday  ·	discussed with patient's sister/HCP (Katrin), she will accept hospice evaluation at whatever LTC facility the patient goes to  ·	sister is hoping for better communication 61yo M with PMH of GBM (s/p resection, c/b ESBL Meningitis), Anxiety, HTN, and h/o DVT (s/p IVC Filter) p/w AMS due to sepsis requiring intubation. Palliative consulted for complex medical decision making in the setting of life-limiting disease.    ·	further conversation with patient re: his prognosis, he was distraught after our conversation yesterday  ·	discussed with patient's sister/HCP (Katrin), she will likely accept hospice evaluation at whatever LTC facility the patient goes to

## 2020-12-31 NOTE — PROGRESS NOTE ADULT - SUBJECTIVE AND OBJECTIVE BOX
PROGRESS NOTE:   Authored by Sola Julien MD  Pager [phone]  Pager [phone]    Patient is a 60y old  Male who presents with a chief complaint of hypoxic, minimally responsive requiring intubation (30 Dec 2020 18:11)      SUBJECTIVE / OVERNIGHT EVENTS:    MEDICATIONS  (STANDING):  dexAMETHasone     Tablet 2 milliGRAM(s) Oral daily  dextrose 40% Gel 15 Gram(s) Oral once  dextrose 5%. 1000 milliLiter(s) (100 mL/Hr) IV Continuous <Continuous>  dextrose 5%. 1000 milliLiter(s) (50 mL/Hr) IV Continuous <Continuous>  dextrose 50% Injectable 25 Gram(s) IV Push once  dextrose 50% Injectable 12.5 Gram(s) IV Push once  dextrose 50% Injectable 25 Gram(s) IV Push once  enoxaparin Injectable 40 milliGRAM(s) SubCutaneous daily  FLUoxetine 20 milliGRAM(s) Oral daily  glucagon  Injectable 1 milliGRAM(s) IntraMuscular once  influenza   Vaccine 0.5 milliLiter(s) IntraMuscular once  levETIRAcetam 500 milliGRAM(s) Oral two times a day  pantoprazole   Suspension 40 milliGRAM(s) Oral daily  senna 2 Tablet(s) Oral at bedtime  trimethoprim  160 mG/sulfamethoxazole 800 mG 1 Tablet(s) Oral daily    MEDICATIONS  (PRN):  sodium chloride 0.9% lock flush 10 milliLiter(s) IV Push every 1 hour PRN Pre/post blood products, medications, blood draw, and to maintain line patency      CAPILLARY BLOOD GLUCOSE      POCT Blood Glucose.: 85 mg/dL (30 Dec 2020 21:20)  POCT Blood Glucose.: 88 mg/dL (30 Dec 2020 17:29)  POCT Blood Glucose.: 94 mg/dL (30 Dec 2020 12:09)  POCT Blood Glucose.: 79 mg/dL (30 Dec 2020 08:36)    I&O's Summary    30 Dec 2020 07:01  -  31 Dec 2020 07:00  --------------------------------------------------------  IN: 50 mL / OUT: 0 mL / NET: 50 mL        PHYSICAL EXAM:  Vital Signs Last 24 Hrs  T(C): 36.4 (31 Dec 2020 05:00), Max: 36.9 (30 Dec 2020 13:22)  T(F): 97.6 (31 Dec 2020 05:00), Max: 98.5 (30 Dec 2020 13:22)  HR: 94 (31 Dec 2020 05:00) (87 - 100)  BP: 169/107 (31 Dec 2020 05:00) (133/93 - 169/107)  BP(mean): --  RR: 17 (31 Dec 2020 05:00) (17 - 18)  SpO2: 98% (31 Dec 2020 05:00) (98% - 100%)    GENERAL: No acute distress, well-developed  HEAD:  Atraumatic, Normocephalic  EYES: EOMI, PERRLA, conjunctiva and sclera clear  NECK: Supple, no lymphadenopathy, no JVD  CHEST/LUNG: CTAB; No wheezes, rales, or rhonchi  HEART: Regular rate and rhythm; No murmurs, rubs, or gallops  ABDOMEN: Soft, non-tender, non-distended; normal bowel sounds, no organomegaly  EXTREMITIES:  2+ peripheral pulses b/l, No clubbing, cyanosis, or edema  NEUROLOGY: A&O x 3, no focal deficits  SKIN: No rashes or lesions    LABS:                        12.6   4.22  )-----------( 217      ( 30 Dec 2020 07:50 )             40.2     12-30    143  |  107  |  2<L>  ----------------------------<  69<L>  4.0   |  27  |  0.69    Ca    9.2      30 Dec 2020 07:50  Phos  3.3     12-30  Mg     2.2     12-30                  RADIOLOGY & ADDITIONAL TESTS:  Results Reviewed:   Imaging Personally Reviewed:  Electrocardiogram Personally Reviewed:    COORDINATION OF CARE:  Care Discussed with Consultants/Other Providers [Y/N]:  Prior or Outpatient Records Reviewed [Y/N]:   PROGRESS NOTE:   Authored by Sola Julien MD  Pager [phone]  Pager [phone]    Patient is a 60y old  Male who presents with a chief complaint of hypoxic, minimally responsive requiring intubation (30 Dec 2020 18:11)      SUBJECTIVE / OVERNIGHT EVENTS: NAEO. Patient pleasant and conversant this AM. AAOx2 to self and time. Denies headaches, F/C, dizziness, syncope, CP/SOB, N/V, abdominal pain, dysuria, changes in BM, peripheral swelling, skin changes, new joint aches. No pain in arms or legs.    MEDICATIONS  (STANDING):  dexAMETHasone     Tablet 2 milliGRAM(s) Oral daily  dextrose 40% Gel 15 Gram(s) Oral once  dextrose 5%. 1000 milliLiter(s) (100 mL/Hr) IV Continuous <Continuous>  dextrose 5%. 1000 milliLiter(s) (50 mL/Hr) IV Continuous <Continuous>  dextrose 50% Injectable 25 Gram(s) IV Push once  dextrose 50% Injectable 12.5 Gram(s) IV Push once  dextrose 50% Injectable 25 Gram(s) IV Push once  enoxaparin Injectable 40 milliGRAM(s) SubCutaneous daily  FLUoxetine 20 milliGRAM(s) Oral daily  glucagon  Injectable 1 milliGRAM(s) IntraMuscular once  influenza   Vaccine 0.5 milliLiter(s) IntraMuscular once  levETIRAcetam 500 milliGRAM(s) Oral two times a day  pantoprazole   Suspension 40 milliGRAM(s) Oral daily  senna 2 Tablet(s) Oral at bedtime  trimethoprim  160 mG/sulfamethoxazole 800 mG 1 Tablet(s) Oral daily    MEDICATIONS  (PRN):  sodium chloride 0.9% lock flush 10 milliLiter(s) IV Push every 1 hour PRN Pre/post blood products, medications, blood draw, and to maintain line patency      CAPILLARY BLOOD GLUCOSE      POCT Blood Glucose.: 85 mg/dL (30 Dec 2020 21:20)  POCT Blood Glucose.: 88 mg/dL (30 Dec 2020 17:29)  POCT Blood Glucose.: 94 mg/dL (30 Dec 2020 12:09)  POCT Blood Glucose.: 79 mg/dL (30 Dec 2020 08:36)    I&O's Summary    30 Dec 2020 07:01  -  31 Dec 2020 07:00  --------------------------------------------------------  IN: 50 mL / OUT: 0 mL / NET: 50 mL        PHYSICAL EXAM:  Vital Signs Last 24 Hrs  T(C): 36.4 (31 Dec 2020 05:00), Max: 36.9 (30 Dec 2020 13:22)  T(F): 97.6 (31 Dec 2020 05:00), Max: 98.5 (30 Dec 2020 13:22)  HR: 94 (31 Dec 2020 05:00) (87 - 100)  BP: 169/107 (31 Dec 2020 05:00) (133/93 - 169/107)  BP(mean): --  RR: 17 (31 Dec 2020 05:00) (17 - 18)  SpO2: 98% (31 Dec 2020 05:00) (98% - 100%)    GENERAL: No acute distress, well-developed  HEAD:  Atraumatic, Normocephalic  EYES: EOMI, PERRLA, conjunctiva and sclera clear  NECK: Supple, no lymphadenopathy, no JVD  CHEST/LUNG: CTAB; No wheezes, rales, or rhonchi  HEART: Regular rate and rhythm; No murmurs, rubs, or gallops  ABDOMEN: Soft, non-tender, non-distended; normal bowel sounds, no organomegaly  EXTREMITIES:  2+ peripheral pulses b/l, No clubbing, cyanosis, or edema, no tenderness or erythema throughout upper and lower extremities  NEUROLOGY: A&O x 2, no focal deficits  SKIN: No rashes or lesions    LABS:                        12.6   4.22  )-----------( 217      ( 30 Dec 2020 07:50 )             40.2     12-30    143  |  107  |  2<L>  ----------------------------<  69<L>  4.0   |  27  |  0.69    Ca    9.2      30 Dec 2020 07:50  Phos  3.3     12-30  Mg     2.2     12-30                  RADIOLOGY & ADDITIONAL TESTS:  Results Reviewed:   Imaging Personally Reviewed:  Electrocardiogram Personally Reviewed:    COORDINATION OF CARE:  Care Discussed with Consultants/Other Providers [Y/N]:  Prior or Outpatient Records Reviewed [Y/N]:   PROGRESS NOTE:   Authored by Sola Julien MD  Pager -[phone]  Pager [phone]    Patient is a 60y old  Male who presents with a chief complaint of hypoxic, minimally responsive requiring intubation (30 Dec 2020 18:11)      SUBJECTIVE / OVERNIGHT EVENTS: NAEO. Patient pleasant and conversant this AM. AAOx2 to self and time. Appetite improved, now eating cookies and Ensure. Denies headaches, F/C, dizziness, syncope, CP/SOB, N/V, abdominal pain, dysuria, changes in BM, peripheral swelling, skin changes, new joint aches. No pain in arms or legs.    MEDICATIONS  (STANDING):  dexAMETHasone     Tablet 2 milliGRAM(s) Oral daily  dextrose 40% Gel 15 Gram(s) Oral once  dextrose 5%. 1000 milliLiter(s) (100 mL/Hr) IV Continuous <Continuous>  dextrose 5%. 1000 milliLiter(s) (50 mL/Hr) IV Continuous <Continuous>  dextrose 50% Injectable 25 Gram(s) IV Push once  dextrose 50% Injectable 12.5 Gram(s) IV Push once  dextrose 50% Injectable 25 Gram(s) IV Push once  enoxaparin Injectable 40 milliGRAM(s) SubCutaneous daily  FLUoxetine 20 milliGRAM(s) Oral daily  glucagon  Injectable 1 milliGRAM(s) IntraMuscular once  influenza   Vaccine 0.5 milliLiter(s) IntraMuscular once  levETIRAcetam 500 milliGRAM(s) Oral two times a day  pantoprazole   Suspension 40 milliGRAM(s) Oral daily  senna 2 Tablet(s) Oral at bedtime  trimethoprim  160 mG/sulfamethoxazole 800 mG 1 Tablet(s) Oral daily    MEDICATIONS  (PRN):  sodium chloride 0.9% lock flush 10 milliLiter(s) IV Push every 1 hour PRN Pre/post blood products, medications, blood draw, and to maintain line patency      CAPILLARY BLOOD GLUCOSE      POCT Blood Glucose.: 85 mg/dL (30 Dec 2020 21:20)  POCT Blood Glucose.: 88 mg/dL (30 Dec 2020 17:29)  POCT Blood Glucose.: 94 mg/dL (30 Dec 2020 12:09)  POCT Blood Glucose.: 79 mg/dL (30 Dec 2020 08:36)    I&O's Summary    30 Dec 2020 07:01  -  31 Dec 2020 07:00  --------------------------------------------------------  IN: 50 mL / OUT: 0 mL / NET: 50 mL        PHYSICAL EXAM:  Vital Signs Last 24 Hrs  T(C): 36.4 (31 Dec 2020 05:00), Max: 36.9 (30 Dec 2020 13:22)  T(F): 97.6 (31 Dec 2020 05:00), Max: 98.5 (30 Dec 2020 13:22)  HR: 94 (31 Dec 2020 05:00) (87 - 100)  BP: 169/107 (31 Dec 2020 05:00) (133/93 - 169/107)  BP(mean): --  RR: 17 (31 Dec 2020 05:00) (17 - 18)  SpO2: 98% (31 Dec 2020 05:00) (98% - 100%)    GENERAL: No acute distress, well-developed  HEAD:  Atraumatic, Normocephalic  EYES: EOMI, PERRLA, conjunctiva and sclera clear  NECK: Supple, no lymphadenopathy, no JVD  CHEST/LUNG: CTAB; No wheezes, rales, or rhonchi  HEART: Regular rate and rhythm; No murmurs, rubs, or gallops  ABDOMEN: Soft, non-tender, non-distended; normal bowel sounds, no organomegaly  EXTREMITIES:  2+ peripheral pulses b/l, No clubbing, cyanosis, or edema, no tenderness or erythema throughout upper and lower extremities  NEUROLOGY: A&O x 2, no focal deficits  SKIN: No rashes or lesions    LABS:                        12.6   4.22  )-----------( 217      ( 30 Dec 2020 07:50 )             40.2     12-30    143  |  107  |  2<L>  ----------------------------<  69<L>  4.0   |  27  |  0.69    Ca    9.2      30 Dec 2020 07:50  Phos  3.3     12-30  Mg     2.2     12-30                  RADIOLOGY & ADDITIONAL TESTS:  Results Reviewed:   Imaging Personally Reviewed:  Electrocardiogram Personally Reviewed:    COORDINATION OF CARE:  Care Discussed with Consultants/Other Providers [Y/N]:  Prior or Outpatient Records Reviewed [Y/N]:

## 2020-12-31 NOTE — PROGRESS NOTE ADULT - ATTENDING COMMENTS
60 M h/o intellectual delay, DVT s/p IVCF, GBM s/p craniotomy, admitted with altered mental status, hypoxia, and hypotension. Required ICU stay for sepsis of unclear etiology. Now off pressors, extubated, and stable. Course c/b hypoglycemia, but asymptomatic and no insulin use. Endocrine consulted. No indication for further evaluation. Likely hypoglycemic from lack of eating. Pending placement in [facility]; daughter, Maribel, is HCP (and Ob/GYN) and is making a decision about [facility].

## 2020-12-31 NOTE — PROGRESS NOTE ADULT - PROBLEM SELECTOR PLAN 4
.  Patient is FULL CODE for now  -after conversation on 12/29: sister/HCP (Maribel) is open to exploring Hospice. Patient can remain FULL CODE and enroll in hospice but sister/HCP understands that at some point the decision for DNR/DNI may full to her and she feels ready to make that decision at that time  -updated sister (Katrin) that Hospice eval can take place at [place]    Another discussion with patient re: his prognosis and the recommendation for Hospice. Discussed prognosis and expected disease trajectory. I do not believe the patient has capacity to make decisions about his cancer treatment.

## 2020-12-31 NOTE — PROGRESS NOTE ADULT - PROBLEM SELECTOR PLAN 1
.  Complicated by recurrent hypoglycemia  -c/w Decadron to 2mg PO  -would avoid other appetite stimulants for various reasons stated in consult note  -discussed outpatient certification for Medical Marijuana but patient is hesitant to pursue

## 2020-12-31 NOTE — PROGRESS NOTE ADULT - SUBJECTIVE AND OBJECTIVE BOX
[facility] and Palliative [facility]  Adiel Rodriguez, Palliative Care Attending  Contact Info: Page [phone] (including Nights/Weekend), message on Microsoft Teams (Adiel Rodriguez), or leave VM at Palliative Office [phone] (Non-Urgent)    SUBJECTIVE AND OBJECTIVE:  INTERVAL HPI/OVERNIGHT EVENTS: Interval events noted. Patient required no additional PRNs in past 24hrs. Was distraught from our conversation yesterday but also thought I was a different Dr. Rodriguez from yesterday. Reinforced his diagnosis in a gentler manner. Denies pain, HA, nausea, or dyspnea.    Allergies  aspirin (Unknown)  shellfish (Hives)  shellfish (Unknown)  Tegretol (Unknown)    Intolerances  ACE inhibitors (Other)  angiotensin II inhibitors (Other)    MEDICATIONS  (STANDING):  dexAMETHasone     Tablet 2 milliGRAM(s) Oral daily  dextrose 40% Gel 15 Gram(s) Oral once  dextrose 5%. 1000 milliLiter(s) (50 mL/Hr) IV Continuous <Continuous>  dextrose 5%. 1000 milliLiter(s) (100 mL/Hr) IV Continuous <Continuous>  dextrose 50% Injectable 25 Gram(s) IV Push once  dextrose 50% Injectable 12.5 Gram(s) IV Push once  dextrose 50% Injectable 25 Gram(s) IV Push once  enoxaparin Injectable 40 milliGRAM(s) SubCutaneous daily  FLUoxetine 20 milliGRAM(s) Oral daily  glucagon  Injectable 1 milliGRAM(s) IntraMuscular once  hydrALAZINE 25 milliGRAM(s) Oral three times a day  influenza   Vaccine 0.5 milliLiter(s) IntraMuscular once  levETIRAcetam 500 milliGRAM(s) Oral two times a day  pantoprazole   Suspension 40 milliGRAM(s) Oral daily  senna 2 Tablet(s) Oral at bedtime  trimethoprim  160 mG/sulfamethoxazole 800 mG 1 Tablet(s) Oral daily    MEDICATIONS  (PRN):  sodium chloride 0.9% lock flush 10 milliLiter(s) IV Push every 1 hour PRN Pre/post blood products, medications, blood draw, and to maintain line patency      ITEMS UNCHECKED ARE NOT PRESENT    PRESENT SYMPTOMS: []Unable to obtain due to poor mentation   Source if other than patient:  []Family   []Team     Pain: [ ] yes [x] no  QOL impact -   Location -                    Aggravating factors -  Quality -  Radiation -  Timing -  Severity (0-10 scale):  Minimal acceptable level (0-10 scale):    PAIN AD Score:  [email or web address]/painad.pdf (press ctrl +  left click to view)    Dyspnea:                           []Mild  []Moderate []Severe  Anxiety:                             []Mild []Moderate []Severe  Fatigue:                             []Mild []Moderate []Severe  Nausea:                             []Mild []Moderate []Severe  Loss of appetite:              []Mild []Moderate []Severe  Constipation:                    []Mild []Moderate []Severe    Other Symptoms:  [x]All other review of systems negative     Palliative Performance Status Version 2:  40%  [email or web address]/[email or web address]_performance_scale_[email or web address]    PHYSICAL EXAM:  Vital Signs Last 24 Hrs  T(C): 36.3 (31 Dec 2020 12:12), Max: 36.4 (30 Dec 2020 21:43)  T(F): 97.3 (31 Dec 2020 12:12), Max: 97.6 (31 Dec 2020 05:00)  HR: 100 (31 Dec 2020 12:12) (87 - 100)  BP: 135/92 (31 Dec 2020 12:12) (133/93 - 169/107)  BP(mean): --  RR: 18 (31 Dec 2020 12:12) (17 - 18)  SpO2: 99% (31 Dec 2020 12:12) (98% - 99%) I&O's Summary    30 Dec 2020 07:01  -  31 Dec 2020 07:00  --------------------------------------------------------  IN: 50 mL / OUT: 0 mL / NET: 50 mL    31 Dec 2020 07:01  -  31 Dec 2020 15:44  --------------------------------------------------------  IN: 0 mL / OUT: 400 mL / NET: -400 mL      GENERAL:  [x]Alert  [x]Oriented x3   []Lethargic  []Cachexia  []Unarousable  [x]Verbal  []Non-Verbal  Behavioral:   [] Anxiety  [] Delirium [] Agitation [x] Forgetful  HEENT:  [x]Normal   []Dry mouth   []ET Tube/Trach  []Oral lesions  PULMONARY:   [x]Clear []Tachypnea  []Audible excessive secretions   []Rhonchi        []Right []Left []Bilateral  []Crackles        []Right []Left []Bilateral  []Wheezing     []Right []Left []Bilateral  CARDIOVASCULAR:    [x]Regular []Irregular []Tachy  []Amor []Murmur []Other  GASTROINTESTINAL:  [x]Soft  []Distended   [x]+BS  [x]Non tender []Tender  []PEG []OGT/ NGT  Last BM:   GENITOURINARY:  [x]Normal [] Incontinent   []Oliguria/Anuria   []Sandoval  MUSCULOSKELETAL:   []Normal   [x]Weakness  []Bed/Wheelchair bound []Edema  NEUROLOGIC:   []No focal deficits  [x] Cognitive impairment  [] Dysphagia []Dysarthria [] Paresis [x]Expressive Aphasia  SKIN:   [x]Normal   []Pressure ulcer(s)  []Rash      LABS:                         12.6   4.22  )-----------( 217      ( 30 Dec 2020 07:50 )             40.2   12-30    143  |  107  |  2<L>  ----------------------------<  69<L>  4.0   |  27  |  0.69    Ca    9.2      30 Dec 2020 07:50  Phos  3.3     12-30  Mg     2.2     12-30            RADIOLOGY & ADDITIONAL STUDIES: None new    PROTEIN CALORIE MALNUTRITION PRESENT: [ ]mild [ ]moderate [ ]severe [ ]underweight [ ]morbid obesity  [] PPSV2 < or = 30% [] significant weight loss [] poor nutritional intake [] anasarca [] catabolic state   Albumin, Serum: 2.2 g/dL (12-25-20 @ 07:48)   Artificial Nutrition []     REFERRALS:   []Chaplaincy  []Hospice  []Child Life  [x]Social Work  []Case management []Holistic Therapy []Physical Therapy []Dietary     Goals of Care Document:   Care Coordination Document: Progress Notes - Care Coordination [C. Provider] (12-31-20 @ 14:50)                                       Progress Notes    PROGRESS NOTE  Date & Time of Note   2020-12-31 02:37    Notes    Notes: Sw role ongoing. WINSTON received a call from patient sister providing SW  with one choice of [facility]. WINSTON contacted Kimberly Pennington, PT  Admissions Specialist [facility] [phone], to  confirm [facility] is still on a freeze and are not currently accepting patients at  this time. Kimberly also informed WINSTON the patient does not meet requirements for  long term placement, so he would not be accepted regardless. WINSTON called sister  back and informed her of the above information. Sister is to get back to SW  with additional choices at a later time. SW to continue to remain available.       Electronically signed by:  Krystle Lee  Electronically signed on:  2020-12-31  14:50

## 2020-12-31 NOTE — PROGRESS NOTE ADULT - ASSESSMENT
60-year-old male with history of hypertension, anxiety, GBM status post craniotomy complicated by ESBL meningitis, status post multiple antibiotic courses, currently residing at [facility] for rehab, presenting with altered mental status, hypoxia, hypotension, requiring intubation, treated for sepsis.  Endocrinology consulted for hypoglycemia.      Hypoglycemia  -Differential diagnosis most commonly includes insulinoma, IGF mediated versus adrenal insufficiency versus malnourishment vs medication induced (Bactrim). Malnourishment at the top of the differential, however will evaluate for other causes  - Can not rule out Adrenal insuffiencey as patient is on decadron, AM cortisol will be low as patient is on steroids.    -Nonislet cell tumor hypoglycemia (NICTH) is in in the differential diagnosis of this patient given the history of glioblastoma.  The most common cause of this hypoglycemia is the production of IGF2, leading to hypoglycemia, IGF2, pending collection.  Treatment would be treating the underlying tumor, glucocorticoids (which patient is already on) and in some severe cases glucagon gtt.   -To further work up the etiology of hypoglycemia, please send the following, ONLY if patient's glucose is less than 55mg/dl. Check pro-insulin, insulin, c-peptide level, beta-hydroxybutyrate, insulin antibody and preserved glucose level.   - Requires persistent encouragement with oral feeding. Has been drinking ensure today. Has not required IVF.         Discussed with Dr Rachid Sampson MD  Endocrine Fellow   Pager  60-year-old male with history of hypertension, anxiety, GBM status post craniotomy complicated by ESBL meningitis, status post multiple antibiotic courses, currently residing at [facility] for rehab, presenting with altered mental status, hypoxia, hypotension, requiring intubation, treated for sepsis.  Endocrinology consulted for hypoglycemia.      Hypoglycemia  -Differential diagnosis most commonly includes insulinoma, IGF mediated versus adrenal insufficiency versus malnourishment vs medication induced (Bactrim). Malnourishment at the top of the differential, however will evaluate for other causes  - Can not rule out Adrenal insuffiencey as patient is on decadron, AM cortisol will be low as patient is on steroids.    -Nonislet cell tumor hypoglycemia (NICTH) is in in the differential diagnosis of this patient given the history of glioblastoma.  The most common cause of this hypoglycemia is the production of IGF2, leading to hypoglycemia, IGF2, pending collection.  Treatment would be treating the underlying tumor, glucocorticoids (which patient is already on) and in some severe cases glucagon gtt.   - Patient does not need to stay of IGF2 results  -To further work up the etiology of hypoglycemia, please send the following, ONLY if patient's glucose is less than 55mg/dl. Check pro-insulin, insulin, c-peptide level, beta-hydroxybutyrate, insulin antibody and preserved glucose level.   - Recommend optimize nutrition. Requires persistent encouragement with oral feeding. Has been drinking ensure today. Has not required IVF.         Discussed with Dr Rachid Sampson MD  Endocrine Fellow   Pager  60-year-old male with history of hypertension, anxiety, GBM status post craniotomy complicated by ESBL meningitis, status post multiple antibiotic courses, currently residing at [facility] for rehab, presenting with altered mental status, hypoxia, hypotension, requiring intubation, treated for sepsis.  Endocrinology consulted for hypoglycemia.      Hypoglycemia  -Differential diagnosis most commonly includes insulinoma, IGF mediated versus adrenal insufficiency versus malnourishment vs medication induced (Bactrim). Malnourishment at the top of the differential, however will evaluate for other causes  - Can not rule out Adrenal insufficiency as patient is on decadron, AM cortisol will be low as patient is on steroids.    -Nonislet cell tumor hypoglycemia (NICTH) is in in the differential diagnosis of this patient given the history of glioblastoma.  The most common cause of this hypoglycemia is the production of IGF2, leading to hypoglycemia, IGF2, pending collection.  Treatment would be treating the underlying tumor, glucocorticoids (which patient is already on).  - Patient does not need to stay inpatient to wait for IGF2 results  -To further work up the etiology of hypoglycemia, please send the following, ONLY if patient's glucose is less than 55mg/dl. Check pro-insulin, insulin, c-peptide level, beta-hydroxybutyrate, insulin antibody and preserved glucose level.   - Recommend optimize nutrition. Requires persistent encouragement with oral feeding. Has been drinking ensure today. Has not required IVF.         Discussed with Dr Rachid Sampson MD  Endocrine Fellow   Pager

## 2020-12-31 NOTE — PROGRESS NOTE ADULT - ASSESSMENT
· Assessment	  60y M PMHx HTN, Anxiety (on Prozac), b/l DVT while on Coumadin (s/p IVF filter, now off coumadin), GBM s/p craniotomy w/ resection c/b ESBL meningitis for which repeat craniotomy (2/2020) performed and pt was given 8 weeks Meropenem, pt subsequently had repeat ESBL meningitis s/p craniectomy w/ washout w/ intrathecal Jin and 10 week course IV Jin, COVID infection in 3/2020 who was BIBEMS from [facility] for AMS, hypoxia and hypotension. s/p MICU stay for sepsis, w/o clear source identified. Now with hypogylcemia on Dextrose drip.        Problem/Plan - 1:  ·  Problem: Hypoglycemia.  InsulinomaPlan: - No history of diabetes. AM 12/23 BG to 50s, while on D5LR, increase to 96 with hypoglycemia protocol. Rule out adrenal insufficiency; patient with recent sepsis and history of steroid use.   - Encourage PO intake.   - will d/c D10/0.45NS and monitor FS  - f/u AM cortisol (will be affected by long term use of steroids)  - endo consulted - To further work up the etiology of hypoglycemia, send the following, ONLY if patient's glucose is less than 55mg/dl. Check pro-insulin, insulin, c-peptide level, beta-hydroxybutyrate, insulin antibody and preserved glucose level.   - increase decadron 2mg and encourage PO intake         Problem/Plan - 2:  ·  Problem: Glioblastoma multiforme.  Plan: s/p craniotomy w/resection and subsequent ESBL infection for which repeat craniotomy was performed (2/2020) now with  shunt.  - c/w 500 BID Keppra PO for seizure ppx (hx of EEGs in the past without seizure activity)  - c/w home decadron 1mg PO qd. Weaned off hydrocortisone in MICU.   -c/w ppx Bactrim DS qd. (Per pt's sister Maribel pt is to be on BID but provider called [facility] Dr. Dominguez (infectious disease) office- they had no records of Dr. Dominguez prescribing Bactrim for this pt. However they saw a Dr. Simeon (rad onc) who did but were not able to find any reasoning in notes as to why BID. Per house ID, no reason to have BID dosing)  - s/p LP with low c/f CNS infection  - palliative care consulted, recs appreciated     Problem/Plan - 3:  ·  Problem: Altered mental status.  Plan: - Patient AxO to self and time, knows he is in hospital. Per patient's sister and HCP, Maribel, patient is AxO x3, but can be aphasic, with blunted responses at time at baseline.      Problem/Plan - 4:  ·  Problem: Septic shock.  Plan: Resolved. s/p pressors in the MICU. CTAP w/o intrabdominal pathology. Low cocnern for CNS infection per LP.   - s/p extubation in MICU on 12/2 (originally intubated for airway protection)  - s/p meropenem course for 7 days in MICU.      Problem/Plan - 5:  ·  Problem: Prophylactic measure.  Plan: DVT ppx: lovenox  Diet: Softs.      Problem/Plan - 6:  Problem: Discharge planning issues. Plan: Transition of Care Status:  1. Name of PCP:  2. PCP contacted on admission: [  ] Y     [  ] N  3. PCP contacted at discharge: [  ] Y     [  ] N  4. Post-discharge appointment date and location:  5. Summary of handoff given to PCP:   · Assessment	  60y M PMHx HTN, Anxiety (on Prozac), b/l DVT while on Coumadin (s/p IVF filter, now off coumadin), GBM s/p craniotomy w/ resection c/b ESBL meningitis for which repeat craniotomy (2/2020) performed and pt was given 8 weeks Meropenem, pt subsequently had repeat ESBL meningitis s/p craniectomy w/ washout w/ intrathecal Jin and 10 week course IV Jin, COVID infection in 3/2020 who was BIBEMS from [facility] for AMS, hypoxia and hypotension. s/p MICU stay for sepsis, w/o clear source identified. Hypoglycemia improved on decadron 2mg. Patient BPs now elevated.       Problem/Plan - 1:  ·  Problem: Hypoglycemia.  Insulinoma Plan: - No history of diabetes. AM 12/23 BG to 50s, while on D5LR, increase to 96 with hypoglycemia protocol. Rule out adrenal insufficiency; patient with recent sepsis and history of steroid use.   - f/u AM cortisol (will be affected by long term use of steroids)  - endo consulted - To further work up the etiology of hypoglycemia, send the following, ONLY if patient's glucose is less than 55mg/dl. Check pro-insulin, insulin, c-peptide level, beta-hydroxybutyrate, insulin antibody and preserved glucose level.   - increase decadron 2mg and encourage PO intake  - FS improved      Problem/Plan - 2:  ·  Problem: HTN  Plan:  - held on admission due to sepsis, BPs increasing   - restart home hydralazine 25 mg TID  - monitor vitals upon restarting home meds     Problem/Plan - 3:  ·  Problem: Glioblastoma multiforme.  Plan: s/p craniotomy w/resection and subsequent ESBL infection for which repeat craniotomy was performed (2/2020) now with  shunt.  - c/w 500 BID Keppra PO for seizure ppx (hx of EEGs in the past without seizure activity)  - c/w decadron 2mg PO qd.   -c/w ppx Bactrim DS qd. (Per pt's sister Maribel pt is to be on BID but provider called [facility] Dr. Dominguez (infectious disease) office- they had no records of Dr. Dominguez prescribing Bactrim for this pt. However they saw a Dr. Simeon (rad onc) who did but were not able to find any reasoning in notes as to why BID. Per house ID, no reason to have BID dosing)  - s/p LP with low c/f CNS infection  - palliative care consulted, recs appreciated     Problem/Plan - 4:  ·  Problem: Altered mental status.  Plan: - Patient AxO to self and time, knows he is in hospital. Per patient's sister and HCP, Maribel, patient is AxO x3, but can be aphasic, with blunted responses at time at baseline.      Problem/Plan - 5:  ·  Problem: Septic shock.  Plan: Resolved. s/p pressors in the MICU. CTAP w/o intrabdominal pathology. Low cocnern for CNS infection per LP.   - s/p extubation in MICU on 12/2 (originally intubated for airway protection)  - s/p meropenem course for 7 days in MICU.      Problem/Plan - 6:  ·  Problem: Prophylactic measure.  Plan: DVT ppx: lovenox  Diet: Softs.      Problem/Plan - 7:  Problem: Discharge planning issues. Plan: Transition of Care Status:  1. Name of PCP:  2. PCP contacted on admission: [  ] Y     [  ] N  3. PCP contacted at discharge: [  ] Y     [  ] N  4. Post-discharge appointment date and location:  5. Summary of handoff given to PCP:   · Assessment	  60y M PMHx HTN, Anxiety (on Prozac), b/l DVT while on Coumadin (s/p IVF filter, now off coumadin), GBM s/p craniotomy w/ resection c/b ESBL meningitis for which repeat craniotomy (2/2020) performed and pt was given 8 weeks Meropenem, pt subsequently had repeat ESBL meningitis s/p craniectomy w/ washout w/ intrathecal Jin and 10 week course IV Jin, COVID infection in 3/2020 who was BIBEMS from [facility] for AMS, hypoxia and hypotension. s/p MICU stay for sepsis, w/o clear source identified. Hypoglycemia improved on decadron 2mg. Patient BPs now elevated.     Problem/Plan - 1:  ·  Problem: Hypoglycemia.  Insulinoma Plan: - No history of diabetes. AM 12/23 BG to 50s, while on D5LR, increase to 96 with hypoglycemia protocol. Rule out adrenal insufficiency; patient with recent sepsis and history of steroid use.   - f/u AM cortisol (will be affected by long term use of steroids)  - endo consulted - To further work up the etiology of hypoglycemia, send the following, ONLY if patient's glucose is less than 55mg/dl. Check pro-insulin, insulin, c-peptide level, beta-hydroxybutyrate, insulin antibody and preserved glucose level.   - increase decadron 2mg and encourage PO intake  - FS improved      Problem/Plan - 2:  ·  Problem: HTN  Plan:  - held on admission due to sepsis, BPs increasing   - restart home hydralazine 25 mg TID  - monitor vitals upon restarting home meds     Problem/Plan - 3:  ·  Problem: Glioblastoma multiforme.  Plan: s/p craniotomy w/resection and subsequent ESBL infection for which repeat craniotomy was performed (2/2020) now with  shunt.  - c/w 500 BID Keppra PO for seizure ppx (hx of EEGs in the past without seizure activity)  - c/w decadron 2mg PO qd.   -c/w ppx Bactrim DS qd. (Per pt's sister Maribel pt is to be on BID but provider called [facility] Dr. Dominguez (infectious disease) office- they had no records of Dr. Dominguez prescribing Bactrim for this pt. However they saw a Dr. Simeon (rad onc) who did but were not able to find any reasoning in notes as to why BID. Per house ID, no reason to have BID dosing)  - s/p LP with low c/f CNS infection  - palliative care consulted, recs appreciated     Problem/Plan - 4:  ·  Problem: Altered mental status.  Plan: - Patient AxO to self and time, knows he is in hospital. Per patient's sister and HCP, Maribel, patient is AxO x3, but can be aphasic, with blunted responses at time at baseline.      Problem/Plan - 5:  ·  Problem: Septic shock.  Plan: Resolved. s/p pressors in the MICU. CTAP w/o intrabdominal pathology. Low cocnern for CNS infection per LP.   - s/p extubation in MICU on 12/2 (originally intubated for airway protection)  - s/p meropenem course for 7 days in MICU.      Problem/Plan - 6:  ·  Problem: Prophylactic measure.  Plan: DVT ppx: lovenox  Diet: Softs.      Problem/Plan - 7:  Problem: Discharge planning issues. Plan: Transition of Care Status:  1. Name of PCP:  2. PCP contacted on admission: [  ] Y     [  ] N  3. PCP contacted at discharge: [  ] Y     [  ] N  4. Post-discharge appointment date and location:  5. Summary of handoff given to PCP:

## 2021-01-01 LAB
GLUCOSE BLDC GLUCOMTR-MCNC: 125 MG/DL — HIGH (ref 70–99)
GLUCOSE BLDC GLUCOMTR-MCNC: 81 MG/DL — SIGNIFICANT CHANGE UP (ref 70–99)
GLUCOSE BLDC GLUCOMTR-MCNC: 83 MG/DL — SIGNIFICANT CHANGE UP (ref 70–99)
GLUCOSE BLDC GLUCOMTR-MCNC: 87 MG/DL — SIGNIFICANT CHANGE UP (ref 70–99)

## 2021-01-01 PROCEDURE — 99233 SBSQ HOSP IP/OBS HIGH 50: CPT | Mod: GC

## 2021-01-01 RX ORDER — ACETAMINOPHEN 500 MG
650 TABLET ORAL ONCE
Refills: 0 | Status: COMPLETED | OUTPATIENT
Start: 2021-01-01 | End: 2021-01-01

## 2021-01-01 RX ADMIN — LEVETIRACETAM 500 MILLIGRAM(S): 250 TABLET, FILM COATED ORAL at 17:25

## 2021-01-01 RX ADMIN — Medication 20 MILLIGRAM(S): at 15:03

## 2021-01-01 RX ADMIN — Medication 25 MILLIGRAM(S): at 05:12

## 2021-01-01 RX ADMIN — ENOXAPARIN SODIUM 40 MILLIGRAM(S): 100 INJECTION SUBCUTANEOUS at 15:03

## 2021-01-01 RX ADMIN — SENNA PLUS 2 TABLET(S): 8.6 TABLET ORAL at 21:27

## 2021-01-01 RX ADMIN — PANTOPRAZOLE SODIUM 40 MILLIGRAM(S): 20 TABLET, DELAYED RELEASE ORAL at 15:03

## 2021-01-01 RX ADMIN — Medication 1 TABLET(S): at 15:03

## 2021-01-01 RX ADMIN — LEVETIRACETAM 500 MILLIGRAM(S): 250 TABLET, FILM COATED ORAL at 05:12

## 2021-01-01 RX ADMIN — Medication 2 MILLIGRAM(S): at 05:12

## 2021-01-01 RX ADMIN — Medication 25 MILLIGRAM(S): at 21:27

## 2021-01-01 RX ADMIN — Medication 650 MILLIGRAM(S): at 23:57

## 2021-01-01 RX ADMIN — Medication 25 MILLIGRAM(S): at 15:03

## 2021-01-01 NOTE — PROGRESS NOTE ADULT - SUBJECTIVE AND OBJECTIVE BOX
PROGRESS NOTE:   Authored by Sola Julein MD  Pager -[phone]  Pager [phone] [phone]    Patient is a 60y old  Male who presents with a chief complaint of hypoxic, minimally responsive requiring intubation (31 Dec 2020 15:02)      SUBJECTIVE / OVERNIGHT EVENTS:    MEDICATIONS  (STANDING):  dexAMETHasone     Tablet 2 milliGRAM(s) Oral daily  dextrose 40% Gel 15 Gram(s) Oral once  dextrose 5%. 1000 milliLiter(s) (50 mL/Hr) IV Continuous <Continuous>  dextrose 5%. 1000 milliLiter(s) (100 mL/Hr) IV Continuous <Continuous>  dextrose 50% Injectable 25 Gram(s) IV Push once  dextrose 50% Injectable 12.5 Gram(s) IV Push once  dextrose 50% Injectable 25 Gram(s) IV Push once  enoxaparin Injectable 40 milliGRAM(s) SubCutaneous daily  FLUoxetine 20 milliGRAM(s) Oral daily  glucagon  Injectable 1 milliGRAM(s) IntraMuscular once  hydrALAZINE 25 milliGRAM(s) Oral three times a day  influenza   Vaccine 0.5 milliLiter(s) IntraMuscular once  levETIRAcetam 500 milliGRAM(s) Oral two times a day  pantoprazole   Suspension 40 milliGRAM(s) Oral daily  senna 2 Tablet(s) Oral at bedtime  trimethoprim  160 mG/sulfamethoxazole 800 mG 1 Tablet(s) Oral daily    MEDICATIONS  (PRN):  sodium chloride 0.9% lock flush 10 milliLiter(s) IV Push every 1 hour PRN Pre/post blood products, medications, blood draw, and to maintain line patency      CAPILLARY BLOOD GLUCOSE      POCT Blood Glucose.: 79 mg/dL (31 Dec 2020 21:16)  POCT Blood Glucose.: 107 mg/dL (31 Dec 2020 17:45)  POCT Blood Glucose.: 121 mg/dL (31 Dec 2020 12:07)  POCT Blood Glucose.: 89 mg/dL (31 Dec 2020 08:33)    I&O's Summary    31 Dec 2020 07:01  -  01 Jan 2021 07:00  --------------------------------------------------------  IN: 0 mL / OUT: 1100 mL / NET: -1100 mL        PHYSICAL EXAM:  Vital Signs Last 24 Hrs  T(C): 36.9 (01 Jan 2021 05:10), Max: 37 (31 Dec 2020 21:14)  T(F): 98.4 (01 Jan 2021 05:10), Max: 98.6 (31 Dec 2020 21:14)  HR: 98 (01 Jan 2021 05:10) (86 - 100)  BP: 135/92 (01 Jan 2021 05:10) (135/92 - 146/99)  BP(mean): --  RR: 17 (01 Jan 2021 05:10) (17 - 18)  SpO2: 96% (01 Jan 2021 05:10) (96% - 100%)    GENERAL: No acute distress, well-developed  HEAD:  Atraumatic, Normocephalic  EYES: EOMI, PERRLA, conjunctiva and sclera clear  NECK: Supple, no lymphadenopathy, no JVD  CHEST/LUNG: CTAB; No wheezes, rales, or rhonchi  HEART: Regular rate and rhythm; No murmurs, rubs, or gallops  ABDOMEN: Soft, non-tender, non-distended; normal bowel sounds, no organomegaly  EXTREMITIES:  2+ peripheral pulses b/l, No clubbing, cyanosis, or edema  NEUROLOGY: A&O x 3, no focal deficits  SKIN: No rashes or lesions    LABS:                        12.6   4.22  )-----------( 217      ( 30 Dec 2020 07:50 )             40.2     12-30    143  |  107  |  2<L>  ----------------------------<  69<L>  4.0   |  27  |  0.69    Ca    9.2      30 Dec 2020 07:50  Phos  3.3     12-30  Mg     2.2     12-30                  RADIOLOGY & ADDITIONAL TESTS:  Results Reviewed:   Imaging Personally Reviewed:  Electrocardiogram Personally Reviewed:    COORDINATION OF CARE:  Care Discussed with Consultants/Other Providers [Y/N]:  Prior or Outpatient Records Reviewed [Y/N]:   PROGRESS NOTE:   Authored by Sola Julien MD  Pager -[phone]  Pager [facility] [phone]    Patient is a 60y old  Male who presents with a chief complaint of hypoxic, minimally responsive requiring intubation (31 Dec 2020 15:02)      SUBJECTIVE / OVERNIGHT EVENTS: NAEO. Patient feeling well this AM. Slept well. AAOx2.  Denies headaches, F/C, dizziness, syncope, CP/SOB, N/V, abdominal pain, dysuria, changes in BM, peripheral swelling, skin changes, new joint aches. Tolerating cookies.     MEDICATIONS  (STANDING):  dexAMETHasone     Tablet 2 milliGRAM(s) Oral daily  dextrose 40% Gel 15 Gram(s) Oral once  dextrose 5%. 1000 milliLiter(s) (50 mL/Hr) IV Continuous <Continuous>  dextrose 5%. 1000 milliLiter(s) (100 mL/Hr) IV Continuous <Continuous>  dextrose 50% Injectable 25 Gram(s) IV Push once  dextrose 50% Injectable 12.5 Gram(s) IV Push once  dextrose 50% Injectable 25 Gram(s) IV Push once  enoxaparin Injectable 40 milliGRAM(s) SubCutaneous daily  FLUoxetine 20 milliGRAM(s) Oral daily  glucagon  Injectable 1 milliGRAM(s) IntraMuscular once  hydrALAZINE 25 milliGRAM(s) Oral three times a day  influenza   Vaccine 0.5 milliLiter(s) IntraMuscular once  levETIRAcetam 500 milliGRAM(s) Oral two times a day  pantoprazole   Suspension 40 milliGRAM(s) Oral daily  senna 2 Tablet(s) Oral at bedtime  trimethoprim  160 mG/sulfamethoxazole 800 mG 1 Tablet(s) Oral daily    MEDICATIONS  (PRN):  sodium chloride 0.9% lock flush 10 milliLiter(s) IV Push every 1 hour PRN Pre/post blood products, medications, blood draw, and to maintain line patency      CAPILLARY BLOOD GLUCOSE      POCT Blood Glucose.: 79 mg/dL (31 Dec 2020 21:16)  POCT Blood Glucose.: 107 mg/dL (31 Dec 2020 17:45)  POCT Blood Glucose.: 121 mg/dL (31 Dec 2020 12:07)  POCT Blood Glucose.: 89 mg/dL (31 Dec 2020 08:33)    I&O's Summary    31 Dec 2020 07:01  -  01 Jan 2021 07:00  --------------------------------------------------------  IN: 0 mL / OUT: 1100 mL / NET: -1100 mL        PHYSICAL EXAM:  Vital Signs Last 24 Hrs  T(C): 36.9 (01 Jan 2021 05:10), Max: 37 (31 Dec 2020 21:14)  T(F): 98.4 (01 Jan 2021 05:10), Max: 98.6 (31 Dec 2020 21:14)  HR: 98 (01 Jan 2021 05:10) (86 - 100)  BP: 135/92 (01 Jan 2021 05:10) (135/92 - 146/99)  BP(mean): --  RR: 17 (01 Jan 2021 05:10) (17 - 18)  SpO2: 96% (01 Jan 2021 05:10) (96% - 100%)    GENERAL: No acute distress, well-developed  HEAD:  Atraumatic, Normocephalic  EYES: EOMI, PERRLA, conjunctiva and sclera clear  NECK: Supple, no lymphadenopathy, no JVD  CHEST/LUNG: CTAB; No wheezes, rales, or rhonchi  HEART: Regular rate and rhythm; No murmurs, rubs, or gallops  ABDOMEN: Soft, non-tender, non-distended; normal bowel sounds, no organomegaly  EXTREMITIES:  2+ peripheral pulses b/l, No clubbing, cyanosis, or edema, no tenderness or erythema throughout upper and lower extremities  NEUROLOGY: A&O x 2, no focal deficits  SKIN: No rashes or lesions    LABS:                        12.6   4.22  )-----------( 217      ( 30 Dec 2020 07:50 )             40.2     12-30    143  |  107  |  2<L>  ----------------------------<  69<L>  4.0   |  27  |  0.69    Ca    9.2      30 Dec 2020 07:50  Phos  3.3     12-30  Mg     2.2     12-30                  RADIOLOGY & ADDITIONAL TESTS:  Results Reviewed:   Imaging Personally Reviewed:  Electrocardiogram Personally Reviewed:    COORDINATION OF CARE:  Care Discussed with Consultants/Other Providers [Y/N]:  Prior or Outpatient Records Reviewed [Y/N]:

## 2021-01-01 NOTE — PROGRESS NOTE ADULT - NSHPATTENDINGPLANDISCUSS_GEN_ALL_CORE
resident/fellow/nurse
resident/fellow/nurse
fellow, residents, RN
fellow, RN, residents
resident/fellow/nurse
Sola Julien MD and Reno Weeks MD
Team 1

## 2021-01-01 NOTE — PROGRESS NOTE ADULT - ASSESSMENT
· Assessment	  60y M PMHx HTN, Anxiety (on Prozac), b/l DVT while on Coumadin (s/p IVF filter, now off coumadin), GBM s/p craniotomy w/ resection c/b ESBL meningitis for which repeat craniotomy (2/2020) performed and pt was given 8 weeks Meropenem, pt subsequently had repeat ESBL meningitis s/p craniectomy w/ washout w/ intrathecal Jin and 10 week course IV Jin, COVID infection in 3/2020 who was BIBEMS from [facility] for AMS, hypoxia and hypotension. s/p MICU stay for sepsis, w/o clear source identified. Hypoglycemia improved on decadron 2mg. Patient BPs now elevated.     Problem/Plan - 1:  ·  Problem: Hypoglycemia.  Insulinoma Plan: - No history of diabetes. AM 12/23 BG to 50s, while on D5LR, increase to 96 with hypoglycemia protocol. Rule out adrenal insufficiency; patient with recent sepsis and history of steroid use.   - f/u AM cortisol (will be affected by long term use of steroids)  - endo consulted - To further work up the etiology of hypoglycemia, send the following, ONLY if patient's glucose is less than 55mg/dl. Check pro-insulin, insulin, c-peptide level, beta-hydroxybutyrate, insulin antibody and preserved glucose level.   - increase decadron 2mg and encourage PO intake  - FS improved      Problem/Plan - 2:  ·  Problem: HTN  Plan:  - held on admission due to sepsis, BPs increasing   - restart home hydralazine 25 mg TID  - monitor vitals upon restarting home meds     Problem/Plan - 3:  ·  Problem: Glioblastoma multiforme.  Plan: s/p craniotomy w/resection and subsequent ESBL infection for which repeat craniotomy was performed (2/2020) now with  shunt.  - c/w 500 BID Keppra PO for seizure ppx (hx of EEGs in the past without seizure activity)  - c/w decadron 2mg PO qd.   -c/w ppx Bactrim DS qd. (Per pt's sister Maribel pt is to be on BID but provider called [facility] Dr. Dominguez (infectious disease) office- they had no records of Dr. Dominguez prescribing Bactrim for this pt. However they saw a Dr. Simeon (rad onc) who did but were not able to find any reasoning in notes as to why BID. Per house ID, no reason to have BID dosing)  - s/p LP with low c/f CNS infection  - palliative care consulted, recs appreciated     Problem/Plan - 4:  ·  Problem: Altered mental status.  Plan: - Patient AxO to self and time, knows he is in hospital. Per patient's sister and HCP, Maribel, patient is AxO x3, but can be aphasic, with blunted responses at time at baseline.      Problem/Plan - 5:  ·  Problem: Septic shock.  Plan: Resolved. s/p pressors in the MICU. CTAP w/o intrabdominal pathology. Low cocnern for CNS infection per LP.   - s/p extubation in MICU on 12/2 (originally intubated for airway protection)  - s/p meropenem course for 7 days in MICU.      Problem/Plan - 6:  ·  Problem: Prophylactic measure.  Plan: DVT ppx: lovenox  Diet: Softs.      Problem/Plan - 7:  Problem: Discharge planning issues. Plan: Transition of Care Status:  1. Name of PCP:  2. PCP contacted on admission: [  ] Y     [  ] N  3. PCP contacted at discharge: [  ] Y     [  ] N  4. Post-discharge appointment date and location:  5. Summary of handoff given to PCP:   · Assessment	  60y M PMHx HTN, Anxiety (on Prozac), b/l DVT while on Coumadin (s/p IVF filter, now off coumadin), GBM s/p craniotomy w/ resection c/b ESBL meningitis for which repeat craniotomy (2/2020) performed and pt was given 8 weeks Meropenem, pt subsequently had repeat ESBL meningitis s/p craniectomy w/ washout w/ intrathecal Jin and 10 week course IV Jin, COVID infection in 3/2020 who was BIBEMS from [facility] for AMS, hypoxia and hypotension. s/p MICU stay for sepsis, w/o clear source identified. Hypoglycemia improved on decadron 2mg. Patient BPs now elevated.     Problem/Plan - 1:  ·  Problem: Hypoglycemia.  Insulinoma Plan: - No history of diabetes. AM 12/23 BG to 50s, while on D5LR, increase to 96 with hypoglycemia protocol. Rule out adrenal insufficiency; patient with recent sepsis and history of steroid use.   - f/u AM cortisol (will be affected by long term use of steroids)  - endo consulted - To further work up the etiology of hypoglycemia, send the following, ONLY if patient's glucose is less than 55mg/dl. Check pro-insulin, insulin, c-peptide level, beta-hydroxybutyrate, insulin antibody and preserved glucose level.   - increase decadron 2mg and encourage PO intake  - FS improved      Problem/Plan - 2:  ·  Problem: HTN  Plan:  - held on admission due to sepsis, BPs stable  - c/w hydralazine 25 mg TID  - monitor vitals     Problem/Plan - 3:  ·  Problem: Glioblastoma multiforme.  Plan: s/p craniotomy w/resection and subsequent ESBL infection for which repeat craniotomy was performed (2/2020) now with  shunt.  - c/w 500 BID Keppra PO for seizure ppx (hx of EEGs in the past without seizure activity)  - c/w decadron 2mg PO qd.   -c/w ppx Bactrim DS qd. (Per pt's sister Maribel pt is to be on BID but provider called [facility] Dr. Dominguez (infectious disease) office- they had no records of Dr. Dominguez prescribing Bactrim for this pt. However they saw a Dr. Simeon (rad onc) who did but were not able to find any reasoning in notes as to why BID. Per house ID, no reason to have BID dosing)  - s/p LP with low c/f CNS infection  - palliative care consulted, recs appreciated     Problem/Plan - 4:  ·  Problem: Altered mental status.  Plan: - Patient AxO to self and time, knows he is in hospital. Per patient's sister and HCP, Maribel, patient is AxO x3, but can be aphasic, with blunted responses at time at baseline.      Problem/Plan - 5:  ·  Problem: Septic shock.  Plan: Resolved. s/p pressors in the MICU. CTAP w/o intrabdominal pathology. Low cocnern for CNS infection per LP.   - s/p extubation in MICU on 12/2 (originally intubated for airway protection)  - s/p meropenem course for 7 days in MICU.      Problem/Plan - 6:  ·  Problem: Prophylactic measure.  Plan: DVT ppx: lovenox  Diet: Softs.      Problem/Plan - 7:  Problem: Discharge planning issues. Plan: Transition of Care Status:  1. Name of PCP:  2. PCP contacted on admission: [  ] Y     [  ] N  3. PCP contacted at discharge: [  ] Y     [  ] N  4. Post-discharge appointment date and location:  5. Summary of handoff given to PCP:

## 2021-01-02 LAB
GLUCOSE BLDC GLUCOMTR-MCNC: 102 MG/DL — HIGH (ref 70–99)
GLUCOSE BLDC GLUCOMTR-MCNC: 74 MG/DL — SIGNIFICANT CHANGE UP (ref 70–99)
GLUCOSE BLDC GLUCOMTR-MCNC: 86 MG/DL — SIGNIFICANT CHANGE UP (ref 70–99)
GLUCOSE BLDC GLUCOMTR-MCNC: 88 MG/DL — SIGNIFICANT CHANGE UP (ref 70–99)

## 2021-01-02 PROCEDURE — 99233 SBSQ HOSP IP/OBS HIGH 50: CPT

## 2021-01-02 RX ADMIN — ENOXAPARIN SODIUM 40 MILLIGRAM(S): 100 INJECTION SUBCUTANEOUS at 14:53

## 2021-01-02 RX ADMIN — Medication 20 MILLIGRAM(S): at 14:53

## 2021-01-02 RX ADMIN — Medication 1 TABLET(S): at 14:53

## 2021-01-02 RX ADMIN — SENNA PLUS 2 TABLET(S): 8.6 TABLET ORAL at 22:13

## 2021-01-02 RX ADMIN — LEVETIRACETAM 500 MILLIGRAM(S): 250 TABLET, FILM COATED ORAL at 05:09

## 2021-01-02 RX ADMIN — Medication 25 MILLIGRAM(S): at 14:53

## 2021-01-02 RX ADMIN — LEVETIRACETAM 500 MILLIGRAM(S): 250 TABLET, FILM COATED ORAL at 17:34

## 2021-01-02 RX ADMIN — Medication 2 MILLIGRAM(S): at 05:09

## 2021-01-02 RX ADMIN — Medication 25 MILLIGRAM(S): at 05:08

## 2021-01-02 RX ADMIN — PANTOPRAZOLE SODIUM 40 MILLIGRAM(S): 20 TABLET, DELAYED RELEASE ORAL at 14:53

## 2021-01-02 RX ADMIN — Medication 650 MILLIGRAM(S): at 00:38

## 2021-01-02 NOTE — PROVIDER CONTACT NOTE (OTHER) - RECOMMENDATIONS
Followed the hospital protocol. Patient already on dextrose fluid.
Notify MD
will continue to monitor and follow hypoglycemia protocol.
Notify MD
continue hypoglycemia protocol. recheck after another 4oz was 101 and 90. final recheck after 4oz was 103 and 106.
Give dextrose IV push.
Notify MD
Dextrose 50% IVP
Notify MD

## 2021-01-02 NOTE — PROVIDER CONTACT NOTE (OTHER) - SITUATION
Patient fingerstick still under 100.
Patient with fingerstick 51, repeat fingerstick 58
Patient fingerstick 123 at 06:14; Patient fingerstick 94 at 06:31; Patient fingerstick 89 at 06:32
; /56
/107
BG 59, recheck 56.
bg 68 and 69
/102

## 2021-01-02 NOTE — PROVIDER CONTACT NOTE (OTHER) - ASSESSMENT
Patient asymptomatic, resting comfortably
bg 59, recheck 56. 4oz of apple juice administered; recheck was 84. patient also on d5lr at 100. another 4oz of apple juice; recheck in 15 was 89.
Patient asymptomatic, resting comfortably
Patient fingerstick still under 100.
Patient asymptomatic, resting comfortably
Asymptomatic
Patient asymptomatic, resting comfortably
Patient fingerstick 123 at 06:14; Patient fingerstick 94 at 06:31; Patient fingerstick 89 at 06:32.
bg 68 and 69

## 2021-01-02 NOTE — PROVIDER CONTACT NOTE (OTHER) - NAME OF MD/NP/PA/DO NOTIFIED:
Evelin Gillespie MD
md peng
Dr. Gutierrez
Darrick Merlos
Darrick Merlos
Dr. Eubanks
md peng
Darrick Merlos
GLORIA Vance

## 2021-01-02 NOTE — PROGRESS NOTE ADULT - SUBJECTIVE AND OBJECTIVE BOX
PROGRESS NOTE:   Authored by Sola Julien MD  Pager [phone]  Pager [phone]    Patient is a 60y old  Male who presents with a chief complaint of hypoxic, minimally responsive requiring intubation (01 Jan 2021 07:02)      SUBJECTIVE / OVERNIGHT EVENTS:    MEDICATIONS  (STANDING):  dexAMETHasone     Tablet 2 milliGRAM(s) Oral daily  dextrose 40% Gel 15 Gram(s) Oral once  dextrose 5%. 1000 milliLiter(s) (100 mL/Hr) IV Continuous <Continuous>  dextrose 5%. 1000 milliLiter(s) (50 mL/Hr) IV Continuous <Continuous>  dextrose 50% Injectable 25 Gram(s) IV Push once  dextrose 50% Injectable 12.5 Gram(s) IV Push once  dextrose 50% Injectable 25 Gram(s) IV Push once  enoxaparin Injectable 40 milliGRAM(s) SubCutaneous daily  FLUoxetine 20 milliGRAM(s) Oral daily  glucagon  Injectable 1 milliGRAM(s) IntraMuscular once  hydrALAZINE 25 milliGRAM(s) Oral three times a day  influenza   Vaccine 0.5 milliLiter(s) IntraMuscular once  levETIRAcetam 500 milliGRAM(s) Oral two times a day  pantoprazole   Suspension 40 milliGRAM(s) Oral daily  senna 2 Tablet(s) Oral at bedtime  trimethoprim  160 mG/sulfamethoxazole 800 mG 1 Tablet(s) Oral daily    MEDICATIONS  (PRN):  sodium chloride 0.9% lock flush 10 milliLiter(s) IV Push every 1 hour PRN Pre/post blood products, medications, blood draw, and to maintain line patency      CAPILLARY BLOOD GLUCOSE      POCT Blood Glucose.: 83 mg/dL (01 Jan 2021 21:41)  POCT Blood Glucose.: 81 mg/dL (01 Jan 2021 17:36)  POCT Blood Glucose.: 125 mg/dL (01 Jan 2021 12:21)  POCT Blood Glucose.: 87 mg/dL (01 Jan 2021 08:34)    I&O's Summary    01 Jan 2021 07:01  -  02 Jan 2021 07:00  --------------------------------------------------------  IN: 500 mL / OUT: 400 mL / NET: 100 mL        PHYSICAL EXAM:  Vital Signs Last 24 Hrs  T(C): 36.6 (02 Jan 2021 05:05), Max: 36.9 (01 Jan 2021 15:02)  T(F): 97.8 (02 Jan 2021 05:05), Max: 98.4 (01 Jan 2021 15:02)  HR: 98 (02 Jan 2021 05:05) (79 - 101)  BP: 137/89 (02 Jan 2021 05:05) (130/93 - 137/89)  BP(mean): --  RR: 18 (02 Jan 2021 05:05) (17 - 18)  SpO2: 98% (02 Jan 2021 05:05) (96% - 98%)    GENERAL: No acute distress, well-developed  HEAD:  Atraumatic, Normocephalic  EYES: EOMI, PERRLA, conjunctiva and sclera clear  NECK: Supple, no lymphadenopathy, no JVD  CHEST/LUNG: CTAB; No wheezes, rales, or rhonchi  HEART: Regular rate and rhythm; No murmurs, rubs, or gallops  ABDOMEN: Soft, non-tender, non-distended; normal bowel sounds, no organomegaly  EXTREMITIES:  2+ peripheral pulses b/l, No clubbing, cyanosis, or edema  NEUROLOGY: A&O x 3, no focal deficits  SKIN: No rashes or lesions    LABS:                      RADIOLOGY & ADDITIONAL TESTS:  Results Reviewed:   Imaging Personally Reviewed:  Electrocardiogram Personally Reviewed:    COORDINATION OF CARE:  Care Discussed with Consultants/Other Providers [Y/N]:  Prior or Outpatient Records Reviewed [Y/N]:   PROGRESS NOTE:   Authored by Sola Julien MD  Pager [phone]  Pager [phone]    Patient is a 60y old  Male who presents with a chief complaint of hypoxic, minimally responsive requiring intubation (01 Jan 2021 07:02)      SUBJECTIVE / OVERNIGHT EVENTS: NAEO. Patient AAOx2 today. Conversant, pleasant. Eating cookies. No complaints. Negative 12 point ROS.     MEDICATIONS  (STANDING):  dexAMETHasone     Tablet 2 milliGRAM(s) Oral daily  dextrose 40% Gel 15 Gram(s) Oral once  dextrose 5%. 1000 milliLiter(s) (100 mL/Hr) IV Continuous <Continuous>  dextrose 5%. 1000 milliLiter(s) (50 mL/Hr) IV Continuous <Continuous>  dextrose 50% Injectable 25 Gram(s) IV Push once  dextrose 50% Injectable 12.5 Gram(s) IV Push once  dextrose 50% Injectable 25 Gram(s) IV Push once  enoxaparin Injectable 40 milliGRAM(s) SubCutaneous daily  FLUoxetine 20 milliGRAM(s) Oral daily  glucagon  Injectable 1 milliGRAM(s) IntraMuscular once  hydrALAZINE 25 milliGRAM(s) Oral three times a day  influenza   Vaccine 0.5 milliLiter(s) IntraMuscular once  levETIRAcetam 500 milliGRAM(s) Oral two times a day  pantoprazole   Suspension 40 milliGRAM(s) Oral daily  senna 2 Tablet(s) Oral at bedtime  trimethoprim  160 mG/sulfamethoxazole 800 mG 1 Tablet(s) Oral daily    MEDICATIONS  (PRN):  sodium chloride 0.9% lock flush 10 milliLiter(s) IV Push every 1 hour PRN Pre/post blood products, medications, blood draw, and to maintain line patency      CAPILLARY BLOOD GLUCOSE      POCT Blood Glucose.: 83 mg/dL (01 Jan 2021 21:41)  POCT Blood Glucose.: 81 mg/dL (01 Jan 2021 17:36)  POCT Blood Glucose.: 125 mg/dL (01 Jan 2021 12:21)  POCT Blood Glucose.: 87 mg/dL (01 Jan 2021 08:34)    I&O's Summary    01 Jan 2021 07:01  -  02 Jan 2021 07:00  --------------------------------------------------------  IN: 500 mL / OUT: 400 mL / NET: 100 mL        PHYSICAL EXAM:  Vital Signs Last 24 Hrs  T(C): 36.6 (02 Jan 2021 05:05), Max: 36.9 (01 Jan 2021 15:02)  T(F): 97.8 (02 Jan 2021 05:05), Max: 98.4 (01 Jan 2021 15:02)  HR: 98 (02 Jan 2021 05:05) (79 - 101)  BP: 137/89 (02 Jan 2021 05:05) (130/93 - 137/89)  BP(mean): --  RR: 18 (02 Jan 2021 05:05) (17 - 18)  SpO2: 98% (02 Jan 2021 05:05) (96% - 98%)    GENERAL: No acute distress, well-developed  HEAD:  Atraumatic, Normocephalic  EYES: EOMI, PERRLA, conjunctiva and sclera clear  NECK: Supple, no lymphadenopathy, no JVD  CHEST/LUNG: CTAB; No wheezes, rales, or rhonchi  HEART: Regular rate and rhythm; No murmurs, rubs, or gallops  ABDOMEN: Soft, non-tender, non-distended; normal bowel sounds, no organomegaly  EXTREMITIES:  2+ peripheral pulses b/l, No clubbing, cyanosis, or edema  NEUROLOGY: A&O x 3, no focal deficits  SKIN: No rashes or lesions    LABS:                      RADIOLOGY & ADDITIONAL TESTS:  Results Reviewed:   Imaging Personally Reviewed:  Electrocardiogram Personally Reviewed:    COORDINATION OF CARE:  Care Discussed with Consultants/Other Providers [Y/N]:  Prior or Outpatient Records Reviewed [Y/N]:

## 2021-01-02 NOTE — PROVIDER CONTACT NOTE (OTHER) - ACTION/TREATMENT ORDERED:
Dextrose IV push given for patient. Dr. Gutierrez notified and aware. No need to recheck fiingerstick for patient. Will continue monitor patient.
Half amp D50 IVP. Recheck fingerstick 15 minutes.
MD aware. No interventions at this time. Continue to monitor.
continue hypoglycemia protocol. will continue to monitor.
MD aware. No interventions at this time. Continue to monitor.
MD aware. No interventions at this time. Continue to monitor
MD aware. No interventions at this time. Continue to monitor.
No need to continue with checking fingerstick every 15 minutes. Patient mediating well. Patient already on fluids. Will continue to monitor patient fingerstick at bedtime.
md made aware. hypoglycemia protocol followed. 4oz of apple juice administered. will recheck in 15 minutes until above 100 twice.

## 2021-01-02 NOTE — PROGRESS NOTE ADULT - ASSESSMENT
· Assessment	  60y M PMHx HTN, Anxiety (on Prozac), b/l DVT while on Coumadin (s/p IVF filter, now off coumadin), GBM s/p craniotomy w/ resection c/b ESBL meningitis for which repeat craniotomy (2/2020) performed and pt was given 8 weeks Meropenem, pt subsequently had repeat ESBL meningitis s/p craniectomy w/ washout w/ intrathecal Jin and 10 week course IV Jin, COVID infection in 3/2020 who was BIBEMS from [facility] for AMS, hypoxia and hypotension. s/p MICU stay for sepsis, w/o clear source identified. Hypoglycemia improved on decadron 2mg. Patient BPs now elevated.     Problem/Plan - 1:  ·  Problem: Hypoglycemia.  Insulinoma Plan: - No history of diabetes. AM 12/23 BG to 50s, while on D5LR, increase to 96 with hypoglycemia protocol. Rule out adrenal insufficiency; patient with recent sepsis and history of steroid use.   - f/u AM cortisol (will be affected by long term use of steroids)  - endo consulted - To further work up the etiology of hypoglycemia, send the following, ONLY if patient's glucose is less than 55mg/dl. Check pro-insulin, insulin, c-peptide level, beta-hydroxybutyrate, insulin antibody and preserved glucose level.   - increase decadron 2mg and encourage PO intake  - FS improved      Problem/Plan - 2:  ·  Problem: HTN  Plan:  - held on admission due to sepsis, BPs stable  - c/w hydralazine 25 mg TID  - monitor vitals     Problem/Plan - 3:  ·  Problem: Glioblastoma multiforme.  Plan: s/p craniotomy w/resection and subsequent ESBL infection for which repeat craniotomy was performed (2/2020) now with  shunt.  - c/w 500 BID Keppra PO for seizure ppx (hx of EEGs in the past without seizure activity)  - c/w decadron 2mg PO qd.   -c/w ppx Bactrim DS qd. (Per pt's sister Maribel pt is to be on BID but provider called [facility] Dr. Dominguez (infectious disease) office- they had no records of Dr. Dominguez prescribing Bactrim for this pt. However they saw a Dr. Simeon (rad onc) who did but were not able to find any reasoning in notes as to why BID. Per house ID, no reason to have BID dosing)  - s/p LP with low c/f CNS infection  - palliative care consulted, recs appreciated     Problem/Plan - 4:  ·  Problem: Altered mental status.  Plan: - Patient AxO to self and time, knows he is in hospital. Per patient's sister and HCP, Maribel, patient is AxO x3, but can be aphasic, with blunted responses at time at baseline.      Problem/Plan - 5:  ·  Problem: Septic shock.  Plan: Resolved. s/p pressors in the MICU. CTAP w/o intrabdominal pathology. Low cocnern for CNS infection per LP.   - s/p extubation in MICU on 12/2 (originally intubated for airway protection)  - s/p meropenem course for 7 days in MICU.      Problem/Plan - 6:  ·  Problem: Prophylactic measure.  Plan: DVT ppx: lovenox  Diet: Softs.      Problem/Plan - 7:  Problem: Discharge planning issues. Plan: Transition of Care Status:  1. Name of PCP:  2. PCP contacted on admission: [  ] Y     [  ] N  3. PCP contacted at discharge: [  ] Y     [  ] N  4. Post-discharge appointment date and location:  5. Summary of handoff given to PCP:   · Assessment	  60y M PMHx HTN, Anxiety (on Prozac), b/l DVT while on Coumadin (s/p IVF filter, now off coumadin), GBM s/p craniotomy w/ resection c/b ESBL meningitis for which repeat craniotomy (2/2020) performed and pt was given 8 weeks Meropenem, pt subsequently had repeat ESBL meningitis s/p craniectomy w/ washout w/ intrathecal Jin and 10 week course IV Jin, COVID infection in 3/2020 who was BIBEMS from [facility] for AMS, hypoxia and hypotension. s/p MICU stay for sepsis, w/o clear source identified. Hypoglycemia improved on decadron 2mg. Patient BPs now elevated.     Problem/Plan - 1:  ·  Problem: Hypoglycemia.  Insulinoma Plan: - No history of diabetes. AM 12/23 BG to 50s, while on D5LR, increase to 96 with hypoglycemia protocol. Rule out adrenal insufficiency; patient with recent sepsis and history of steroid use.   - endo consulted - To further work up the etiology of hypoglycemia, send the following, ONLY if patient's glucose is less than 55mg/dl. Check pro-insulin, insulin, c-peptide level, beta-hydroxybutyrate, insulin antibody and preserved glucose level.   - decadron 2mg and encourage PO intake     Problem/Plan - 2:  ·  Problem: HTN  Plan:  - held on admission due to sepsis, BPs stable  - c/w hydralazine 25 mg TID  - monitor vitals     Problem/Plan - 3:  ·  Problem: Glioblastoma multiforme.  Plan: s/p craniotomy w/resection and subsequent ESBL infection for which repeat craniotomy was performed (2/2020) now with  shunt.  - c/w 500 BID Keppra PO for seizure ppx (hx of EEGs in the past without seizure activity)  - c/w decadron 2mg PO qd.   -c/w ppx Bactrim DS qd. (Per pt's sister Maribel pt is to be on BID but provider called [facility] Dr. Dominguez (infectious disease) office- they had no records of Dr. Dominguez prescribing Bactrim for this pt. However they saw a Dr. Simeon (rad onc) who did but were not able to find any reasoning in notes as to why BID. Per house ID, no reason to have BID dosing)  - s/p LP with low c/f CNS infection  - palliative care consulted, recs appreciated     Problem/Plan - 4:  ·  Problem: Altered mental status.  Plan: - Patient AxO to self and time, knows he is in hospital. Per patient's sister and HCP, Maribel, patient is AxO x3, but can be aphasic, with blunted responses at time at baseline.      Problem/Plan - 5:  ·  Problem: Septic shock.  Plan: Resolved. s/p pressors in the MICU. CTAP w/o intrabdominal pathology. Low cocnern for CNS infection per LP.   - s/p extubation in MICU on 12/2 (originally intubated for airway protection)  - s/p meropenem course for 7 days in MICU.      Problem/Plan - 6:  ·  Problem: Prophylactic measure.  Plan: DVT ppx: lovenox  Diet: Softs.

## 2021-01-02 NOTE — PROVIDER CONTACT NOTE (OTHER) - BACKGROUND
Patient admitted for sepsis
Patient admitted for sepsis. PMH of DM, HTN, and GBM.
Repeated episodes of hypoglycemia throughout hospital stay. Currently on D5 LR at 75 mL/hr. Off feeds s/p extubation.
patient admitted for sepsis. pmh includes htn, dm
Patient admitted for sepsis. PMH of HTN and DM.
Patient admitted for sepsis
Patient admitted for sepsis. PMH of DM, HTN, and GBM.
Patient admitted for sepsis. PMH of DM, HTN, and GBM.
patient admitted for sepsis. pmh includes htn, dm

## 2021-01-02 NOTE — PROVIDER CONTACT NOTE (OTHER) - REASON
/107
; /56
Hypoglycemia
bg 68 and 69
BG 59, recheck 56.
/102
Patient fingerstick 123 at 06:14; Patient fingerstick 94 at 06:31; Patient fingerstick 89 at 06:32
Patient fingerstick still under 100.

## 2021-01-02 NOTE — PROVIDER CONTACT NOTE (OTHER) - DATE AND TIME:
24-Dec-2020 09:00
29-Dec-2020 21:45
31-Dec-2020 05:10
01-Jan-2021 22:00
24-Dec-2020 19:42
24-Dec-2020 18:00
22-Dec-2020 19:15
24-Dec-2020 06:58
02-Jan-2021 22:32
2017

## 2021-01-03 LAB
GLUCOSE BLDC GLUCOMTR-MCNC: 134 MG/DL — HIGH (ref 70–99)
GLUCOSE BLDC GLUCOMTR-MCNC: 81 MG/DL — SIGNIFICANT CHANGE UP (ref 70–99)
GLUCOSE BLDC GLUCOMTR-MCNC: 82 MG/DL — SIGNIFICANT CHANGE UP (ref 70–99)
GLUCOSE BLDC GLUCOMTR-MCNC: 99 MG/DL — SIGNIFICANT CHANGE UP (ref 70–99)

## 2021-01-03 PROCEDURE — 99233 SBSQ HOSP IP/OBS HIGH 50: CPT

## 2021-01-03 RX ADMIN — ENOXAPARIN SODIUM 40 MILLIGRAM(S): 100 INJECTION SUBCUTANEOUS at 14:05

## 2021-01-03 RX ADMIN — Medication 1 TABLET(S): at 14:05

## 2021-01-03 RX ADMIN — SENNA PLUS 2 TABLET(S): 8.6 TABLET ORAL at 21:40

## 2021-01-03 RX ADMIN — LEVETIRACETAM 500 MILLIGRAM(S): 250 TABLET, FILM COATED ORAL at 05:26

## 2021-01-03 RX ADMIN — Medication 20 MILLIGRAM(S): at 14:05

## 2021-01-03 RX ADMIN — Medication 1 TABLET(S): at 17:50

## 2021-01-03 RX ADMIN — Medication 2 MILLIGRAM(S): at 05:26

## 2021-01-03 RX ADMIN — LEVETIRACETAM 500 MILLIGRAM(S): 250 TABLET, FILM COATED ORAL at 17:50

## 2021-01-03 RX ADMIN — Medication 25 MILLIGRAM(S): at 21:40

## 2021-01-03 RX ADMIN — Medication 25 MILLIGRAM(S): at 14:05

## 2021-01-03 RX ADMIN — Medication 25 MILLIGRAM(S): at 05:26

## 2021-01-03 RX ADMIN — PANTOPRAZOLE SODIUM 40 MILLIGRAM(S): 20 TABLET, DELAYED RELEASE ORAL at 14:06

## 2021-01-03 NOTE — PROGRESS NOTE ADULT - ASSESSMENT
· Assessment	  60y M PMHx HTN, Anxiety (on Prozac), b/l DVT while on Coumadin (s/p IVF filter, now off coumadin), GBM s/p craniotomy w/ resection c/b ESBL meningitis for which repeat craniotomy (2/2020) performed and pt was given 8 weeks Meropenem, pt subsequently had repeat ESBL meningitis s/p craniectomy w/ washout w/ intrathecal Jin and 10 week course IV Jin, COVID infection in 3/2020 who was BIBEMS from [facility] for AMS, hypoxia and hypotension. s/p MICU stay for sepsis, w/o clear source identified. Hypoglycemia improved on decadron 2mg. Patient BPs now elevated.     Problem/Plan - 1:  ·  Problem: Hypoglycemia.  Insulinoma Plan: - No history of diabetes. AM 12/23 BG to 50s, while on D5LR, increase to 96 with hypoglycemia protocol. Rule out adrenal insufficiency; patient with recent sepsis and history of steroid use.   - endo consulted - To further work up the etiology of hypoglycemia, send the following, ONLY if patient's glucose is less than 55mg/dl. Check pro-insulin, insulin, c-peptide level, beta-hydroxybutyrate, insulin antibody and preserved glucose level.   - decadron 2mg and encourage PO intake     Problem/Plan - 2:  ·  Problem: HTN  Plan:  - held on admission due to sepsis, BPs stable  - c/w hydralazine 25 mg TID  - monitor vitals     Problem/Plan - 3:  ·  Problem: Glioblastoma multiforme.  Plan: s/p craniotomy w/resection and subsequent ESBL infection for which repeat craniotomy was performed (2/2020) now with  shunt.  - c/w 500 BID Keppra PO for seizure ppx (hx of EEGs in the past without seizure activity)  - c/w decadron 2mg PO qd.   -c/w ppx Bactrim DS qd. (Per pt's sister Maribel pt is to be on BID but provider called [facility] Dr. Dominguez (infectious disease) office- they had no records of Dr. Dominguez prescribing Bactrim for this pt. However they saw a Dr. Simeon (rad onc) who did but were not able to find any reasoning in notes as to why BID. Per house ID, no reason to have BID dosing)  - s/p LP with low c/f CNS infection  - palliative care consulted, recs appreciated     Problem/Plan - 4:  ·  Problem: Altered mental status.  Plan: - Patient AxO to self and time, knows he is in hospital. Per patient's sister and HCP, Maribel, patient is AxO x3, but can be aphasic, with blunted responses at time at baseline.      Problem/Plan - 5:  ·  Problem: Septic shock.  Plan: Resolved. s/p pressors in the MICU. CTAP w/o intrabdominal pathology. Low cocnern for CNS infection per LP.   - s/p extubation in MICU on 12/2 (originally intubated for airway protection)  - s/p meropenem course for 7 days in MICU.      Problem/Plan - 6:  ·  Problem: Prophylactic measure.  Plan: DVT ppx: lovenox  Diet: Softs.    · Assessment	  60y M PMHx HTN, Anxiety (on Prozac), b/l DVT while on Coumadin (s/p IVF filter, now off coumadin), GBM s/p craniotomy w/ resection c/b ESBL meningitis for which repeat craniotomy (2/2020) performed and pt was given 8 weeks Meropenem, pt subsequently had repeat ESBL meningitis s/p craniectomy w/ washout w/ intrathecal Jin and 10 week course IV Jin, COVID infection in 3/2020 who was BIBEMS from [facility] for AMS, hypoxia and hypotension. s/p MICU stay for sepsis, w/o clear source identified. Hypoglycemia improved on decadron 2mg. Patient BPs now elevated.     Problem/Plan - 1:  ·  Problem: Glioblastoma multiforme.  Plan: s/p craniotomy w/resection and subsequent ESBL infection for which repeat craniotomy was performed (2/2020) now with  shunt.  - c/w 500 BID Keppra PO for seizure ppx (hx of EEGs in the past without seizure activity)  - c/w decadron 2mg PO qd.   -c/w ppx Bactrim DS qd. (Per pt's sister Maribel pt is to be on BID but provider called [facility] Dr. Dominguez (infectious disease) office- they had no records of Dr. Dominguez prescribing Bactrim for this pt. However they saw a Dr. Simeon (rad onc) who did but were not able to find any reasoning in notes as to why BID. Per house ID, no reason to have BID dosing)  - s/p LP with low c/f CNS infection  - palliative care consulted, recs appreciated     Problem/Plan - 2:  ·  Problem: HTN  Plan:  - held on admission due to sepsis, BPs stable  - c/w hydralazine 25 mg TID  - monitor vitals     Problem/Plan - 3:  ·  Problem: Altered mental status.  Plan: - Patient AxO to self and time, knows he is in hospital. Per patient's sister and HCP, Maribel, patient is AxO x3, but can be aphasic, with blunted responses at time at baseline.      Problem/Plan - 4:  ·   Problem: Hypoglycemia.  Insulinoma Plan: - No history of diabetes. AM 12/23 BG to 50s, while on D5LR, increase to 96 with hypoglycemia protocol. Rule out adrenal insufficiency; patient with recent sepsis and history of steroid use.   - resolving  - endo consulted - To further work up the etiology of hypoglycemia, send the following, ONLY if patient's glucose is less than 55mg/dl. Check pro-insulin, insulin, c-peptide level, beta-hydroxybutyrate, insulin antibody and preserved glucose level.   - decadron 2mg and encourage PO intake      Problem/Plan - 5:  ·  Problem: Septic shock.  Plan: Resolved. s/p pressors in the MICU. CTAP w/o intrabdominal pathology. Low cocnern for CNS infection per LP.   - s/p extubation in MICU on 12/2 (originally intubated for airway protection)  - s/p meropenem course for 7 days in MICU.      Problem/Plan - 6:  ·  Problem: Prophylactic measure.  Plan: DVT ppx: lovenox  Diet: Softs.

## 2021-01-03 NOTE — PROGRESS NOTE ADULT - SUBJECTIVE AND OBJECTIVE BOX
Patient is a 60y old  Male who presents with a chief complaint of hypoxic, minimally responsive requiring intubation (02 Jan 2021 07:41)      SUBJECTIVE / OVERNIGHT EVENTS:    MEDICATIONS  (STANDING):  dexAMETHasone     Tablet 2 milliGRAM(s) Oral daily  dextrose 40% Gel 15 Gram(s) Oral once  dextrose 5%. 1000 milliLiter(s) (50 mL/Hr) IV Continuous <Continuous>  dextrose 5%. 1000 milliLiter(s) (100 mL/Hr) IV Continuous <Continuous>  dextrose 50% Injectable 25 Gram(s) IV Push once  dextrose 50% Injectable 12.5 Gram(s) IV Push once  dextrose 50% Injectable 25 Gram(s) IV Push once  enoxaparin Injectable 40 milliGRAM(s) SubCutaneous daily  FLUoxetine 20 milliGRAM(s) Oral daily  glucagon  Injectable 1 milliGRAM(s) IntraMuscular once  hydrALAZINE 25 milliGRAM(s) Oral three times a day  influenza   Vaccine 0.5 milliLiter(s) IntraMuscular once  levETIRAcetam 500 milliGRAM(s) Oral two times a day  pantoprazole   Suspension 40 milliGRAM(s) Oral daily  senna 2 Tablet(s) Oral at bedtime  trimethoprim  160 mG/sulfamethoxazole 800 mG 1 Tablet(s) Oral daily    MEDICATIONS  (PRN):  sodium chloride 0.9% lock flush 10 milliLiter(s) IV Push every 1 hour PRN Pre/post blood products, medications, blood draw, and to maintain line patency      Vital Signs Last 24 Hrs  T(C): 36.6 (03 Jan 2021 05:19), Max: 36.7 (02 Jan 2021 13:15)  T(F): 97.9 (03 Jan 2021 05:19), Max: 98 (02 Jan 2021 13:15)  HR: 100 (03 Jan 2021 05:19) (98 - 114)  BP: 117/81 (03 Jan 2021 05:19) (106/56 - 118/81)  BP(mean): --  RR: 18 (03 Jan 2021 05:19) (17 - 18)  SpO2: 98% (03 Jan 2021 05:19) (98% - 99%)  CAPILLARY BLOOD GLUCOSE      POCT Blood Glucose.: 102 mg/dL (02 Jan 2021 22:12)  POCT Blood Glucose.: 74 mg/dL (02 Jan 2021 17:25)  POCT Blood Glucose.: 88 mg/dL (02 Jan 2021 12:05)  POCT Blood Glucose.: 86 mg/dL (02 Jan 2021 08:21)    I&O's Summary    02 Jan 2021 07:01  -  03 Jan 2021 07:00  --------------------------------------------------------  IN: 240 mL / OUT: 600 mL / NET: -360 mL    PHYSICAL EXAM:  GENERAL: No acute distress, well-developed  HEAD:  Atraumatic, Normocephalic  EYES: EOMI, PERRLA, conjunctiva and sclera clear  NECK: Supple, no lymphadenopathy, no JVD  CHEST/LUNG: CTAB; No wheezes, rales, or rhonchi  HEART: Regular rate and rhythm; No murmurs, rubs, or gallops  ABDOMEN: Soft, non-tender, non-distended; normal bowel sounds, no organomegaly  EXTREMITIES:  2+ peripheral pulses b/l, No clubbing, cyanosis, or edema  NEUROLOGY: A&O x 3, no focal deficits  SKIN: No rashes or lesions    LABS:                    RADIOLOGY & ADDITIONAL TESTS:    Imaging Personally Reviewed:    Consultant(s) Notes Reviewed:      Care Discussed with Consultants/Other Providers:   Patient is a 60y old  Male who presents with a chief complaint of hypoxic, minimally responsive requiring intubation (02 Jan 2021 07:41)      SUBJECTIVE / OVERNIGHT EVENTS:    Pt mental status appears to e at baseline AxO x2-3. Reported no acute events last night. Denies fever, chills, chest p/p, sob, n/v/d.     MEDICATIONS  (STANDING):  dexAMETHasone     Tablet 2 milliGRAM(s) Oral daily  dextrose 40% Gel 15 Gram(s) Oral once  dextrose 5%. 1000 milliLiter(s) (50 mL/Hr) IV Continuous <Continuous>  dextrose 5%. 1000 milliLiter(s) (100 mL/Hr) IV Continuous <Continuous>  dextrose 50% Injectable 25 Gram(s) IV Push once  dextrose 50% Injectable 12.5 Gram(s) IV Push once  dextrose 50% Injectable 25 Gram(s) IV Push once  enoxaparin Injectable 40 milliGRAM(s) SubCutaneous daily  FLUoxetine 20 milliGRAM(s) Oral daily  glucagon  Injectable 1 milliGRAM(s) IntraMuscular once  hydrALAZINE 25 milliGRAM(s) Oral three times a day  influenza   Vaccine 0.5 milliLiter(s) IntraMuscular once  levETIRAcetam 500 milliGRAM(s) Oral two times a day  pantoprazole   Suspension 40 milliGRAM(s) Oral daily  senna 2 Tablet(s) Oral at bedtime  trimethoprim  160 mG/sulfamethoxazole 800 mG 1 Tablet(s) Oral daily    MEDICATIONS  (PRN):  sodium chloride 0.9% lock flush 10 milliLiter(s) IV Push every 1 hour PRN Pre/post blood products, medications, blood draw, and to maintain line patency      Vital Signs Last 24 Hrs  T(C): 36.6 (03 Jan 2021 05:19), Max: 36.7 (02 Jan 2021 13:15)  T(F): 97.9 (03 Jan 2021 05:19), Max: 98 (02 Jan 2021 13:15)  HR: 100 (03 Jan 2021 05:19) (98 - 114)  BP: 117/81 (03 Jan 2021 05:19) (106/56 - 118/81)  BP(mean): --  RR: 18 (03 Jan 2021 05:19) (17 - 18)  SpO2: 98% (03 Jan 2021 05:19) (98% - 99%)  CAPILLARY BLOOD GLUCOSE      POCT Blood Glucose.: 102 mg/dL (02 Jan 2021 22:12)  POCT Blood Glucose.: 74 mg/dL (02 Jan 2021 17:25)  POCT Blood Glucose.: 88 mg/dL (02 Jan 2021 12:05)  POCT Blood Glucose.: 86 mg/dL (02 Jan 2021 08:21)    I&O's Summary    02 Jan 2021 07:01  -  03 Jan 2021 07:00  --------------------------------------------------------  IN: 240 mL / OUT: 600 mL / NET: -360 mL    PHYSICAL EXAM:  GENERAL: No acute distress, well-developed  HEAD:  Atraumatic, Normocephalic  EYES: EOMI, PERRLA, conjunctiva and sclera clear  NECK: Supple, no lymphadenopathy, no JVD  CHEST/LUNG: CTAB; No wheezes, rales, or rhonchi  HEART: Regular rate and rhythm; No murmurs, rubs, or gallops  ABDOMEN: Soft, non-tender, non-distended; normal bowel sounds, no organomegaly  EXTREMITIES:  2+ peripheral pulses b/l, No clubbing, cyanosis, or edema  NEUROLOGY: A&O x 3, no focal deficits  SKIN: No rashes or lesions    LABS:                    RADIOLOGY & ADDITIONAL TESTS:    Imaging Personally Reviewed:    Consultant(s) Notes Reviewed:      Care Discussed with Consultants/Other Providers:

## 2021-01-04 LAB
CORTIS SERPL-MCNC: 2.9 UG/DL — LOW
GLUCOSE BLDC GLUCOMTR-MCNC: 123 MG/DL — HIGH (ref 70–99)
GLUCOSE BLDC GLUCOMTR-MCNC: 89 MG/DL — SIGNIFICANT CHANGE UP (ref 70–99)
GLUCOSE BLDC GLUCOMTR-MCNC: 91 MG/DL — SIGNIFICANT CHANGE UP (ref 70–99)
SARS-COV-2 RNA SPEC QL NAA+PROBE: SIGNIFICANT CHANGE UP

## 2021-01-04 PROCEDURE — 99232 SBSQ HOSP IP/OBS MODERATE 35: CPT

## 2021-01-04 PROCEDURE — 99233 SBSQ HOSP IP/OBS HIGH 50: CPT | Mod: GC

## 2021-01-04 RX ADMIN — Medication 2 MILLIGRAM(S): at 05:06

## 2021-01-04 RX ADMIN — SENNA PLUS 2 TABLET(S): 8.6 TABLET ORAL at 21:30

## 2021-01-04 RX ADMIN — LEVETIRACETAM 500 MILLIGRAM(S): 250 TABLET, FILM COATED ORAL at 05:06

## 2021-01-04 RX ADMIN — Medication 25 MILLIGRAM(S): at 13:52

## 2021-01-04 RX ADMIN — LEVETIRACETAM 500 MILLIGRAM(S): 250 TABLET, FILM COATED ORAL at 17:13

## 2021-01-04 RX ADMIN — Medication 1 TABLET(S): at 12:58

## 2021-01-04 RX ADMIN — ENOXAPARIN SODIUM 40 MILLIGRAM(S): 100 INJECTION SUBCUTANEOUS at 12:53

## 2021-01-04 RX ADMIN — PANTOPRAZOLE SODIUM 40 MILLIGRAM(S): 20 TABLET, DELAYED RELEASE ORAL at 12:59

## 2021-01-04 RX ADMIN — Medication 25 MILLIGRAM(S): at 21:30

## 2021-01-04 RX ADMIN — Medication 20 MILLIGRAM(S): at 12:53

## 2021-01-04 RX ADMIN — Medication 25 MILLIGRAM(S): at 05:06

## 2021-01-04 NOTE — PROGRESS NOTE ADULT - ASSESSMENT
· Assessment	  60y M PMHx HTN, Anxiety (on Prozac), b/l DVT while on Coumadin (s/p IVF filter, now off coumadin), GBM s/p craniotomy w/ resection c/b ESBL meningitis for which repeat craniotomy (2/2020) performed and pt was given 8 weeks Meropenem, pt subsequently had repeat ESBL meningitis s/p craniectomy w/ washout w/ intrathecal Jin and 10 week course IV Jin, COVID infection in 3/2020 who was BIBEMS from [facility] for AMS, hypoxia and hypotension. s/p MICU stay for sepsis, w/o clear source identified. Hypoglycemia improved on decadron 2mg. Patient BPs now elevated.     Problem/Plan - 1:  ·  Problem: Glioblastoma multiforme.  Plan: s/p craniotomy w/resection and subsequent ESBL infection for which repeat craniotomy was performed (2/2020) now with  shunt.  - c/w 500 BID Keppra PO for seizure ppx (hx of EEGs in the past without seizure activity)  - c/w decadron 2mg PO qd.   -c/w ppx Bactrim DS qd. (Per pt's sister Maribel pt is to be on BID but provider called [facility] Dr. Dominguez (infectious disease) office- they had no records of Dr. Dominguez prescribing Bactrim for this pt. However they saw a Dr. Simeon (rad onc) who did but were not able to find any reasoning in notes as to why BID. Per house ID, no reason to have BID dosing)  - s/p LP with low c/f CNS infection  - palliative care consulted, recs appreciated     Problem/Plan - 2:  ·  Problem: HTN  Plan:  - held on admission due to sepsis, BPs stable  - c/w hydralazine 25 mg TID  - monitor vitals     Problem/Plan - 3:  ·  Problem: Altered mental status.  Plan: - Patient AxO to self and time, knows he is in hospital. Per patient's sister and HCP, Maribel, patient is AxO x3, but can be aphasic, with blunted responses at time at baseline.      Problem/Plan - 4:  ·   Problem: Hypoglycemia.  Insulinoma Plan: - No history of diabetes. AM 12/23 BG to 50s, while on D5LR, increase to 96 with hypoglycemia protocol. Rule out adrenal insufficiency; patient with recent sepsis and history of steroid use.   - resolving  - endo consulted - To further work up the etiology of hypoglycemia, send the following, ONLY if patient's glucose is less than 55mg/dl. Check pro-insulin, insulin, c-peptide level, beta-hydroxybutyrate, insulin antibody and preserved glucose level.   - decadron 2mg and encourage PO intake      Problem/Plan - 5:  ·  Problem: Septic shock.  Plan: Resolved. s/p pressors in the MICU. CTAP w/o intrabdominal pathology. Low cocnern for CNS infection per LP.   - s/p extubation in MICU on 12/2 (originally intubated for airway protection)  - s/p meropenem course for 7 days in MICU.      Problem/Plan - 6:  ·  Problem: Prophylactic measure.  Plan: DVT ppx: lovenox  Diet: Softs.

## 2021-01-04 NOTE — PROGRESS NOTE ADULT - SUBJECTIVE AND OBJECTIVE BOX
Chief Complaint: Hypoglycemia    History: Tolerating some po. Nutritional shakes seen at bedside.  No hypoglycemia events past 24 hours    MEDICATIONS  (STANDING):  dexAMETHasone     Tablet 2 milliGRAM(s) Oral daily  dextrose 40% Gel 15 Gram(s) Oral once  dextrose 5%. 1000 milliLiter(s) (100 mL/Hr) IV Continuous <Continuous>  dextrose 5%. 1000 milliLiter(s) (50 mL/Hr) IV Continuous <Continuous>  dextrose 50% Injectable 25 Gram(s) IV Push once  dextrose 50% Injectable 12.5 Gram(s) IV Push once  dextrose 50% Injectable 25 Gram(s) IV Push once  enoxaparin Injectable 40 milliGRAM(s) SubCutaneous daily  FLUoxetine 20 milliGRAM(s) Oral daily  glucagon  Injectable 1 milliGRAM(s) IntraMuscular once  hydrALAZINE 25 milliGRAM(s) Oral three times a day  influenza   Vaccine 0.5 milliLiter(s) IntraMuscular once  levETIRAcetam 500 milliGRAM(s) Oral two times a day  multivitamin 1 Tablet(s) Oral daily  pantoprazole   Suspension 40 milliGRAM(s) Oral daily  senna 2 Tablet(s) Oral at bedtime  trimethoprim  160 mG/sulfamethoxazole 800 mG 1 Tablet(s) Oral daily    MEDICATIONS  (PRN):  sodium chloride 0.9% lock flush 10 milliLiter(s) IV Push every 1 hour PRN Pre/post blood products, medications, blood draw, and to maintain line patency      Allergies    aspirin (Unknown)  shellfish (Hives)  shellfish (Unknown)  Tegretol (Unknown)    Intolerances    ACE inhibitors (Other)  angiotensin II inhibitors (Other)    Review of Systems:    ALL OTHER SYSTEMS REVIEWED AND NEGATIVE      PHYSICAL EXAM:  VITALS: T(C): 36.8 (01-04-21 @ 12:59)  T(F): 98.2 (01-04-21 @ 12:59), Max: 98.5 (01-04-21 @ 05:05)  HR: 98 (01-04-21 @ 12:59) (96 - 100)  BP: 112/73 (01-04-21 @ 12:59) (112/73 - 140/90)  RR:  (18 - 18)  SpO2:  (96% - 98%)  Wt(kg): --  GENERAL: NAD, well-groomed, well-developed  EYES: No proptosis, no lid lag, anicteric  HEENT:  Atraumatic, Normocephalic, moist mucous membranes  RESPIRATORY: nonlabored respirations  PSYCH: arousable, poor historian    CAPILLARY BLOOD GLUCOSE      POCT Blood Glucose.: 123 mg/dL (04 Jan 2021 11:56)  POCT Blood Glucose.: 89 mg/dL (04 Jan 2021 08:29)  POCT Blood Glucose.: 81 mg/dL (03 Jan 2021 21:41)  POCT Blood Glucose.: 99 mg/dL (03 Jan 2021 17:15)              A1C with Estimated Average Glucose Result: 4.8 % (12-25-20 @ 07:48)      Thyroid Function Tests:

## 2021-01-04 NOTE — PROGRESS NOTE ADULT - ATTENDING COMMENTS
Israel Araujo MD  Division of Endocrinology  Pager: [phone]    If after 6PM or before 9AM, or on weekends/holidays, please call endocrine answering service for assistance ([phone]).  For nonurgent matters email [email or web address] for assistance.

## 2021-01-04 NOTE — PROGRESS NOTE ADULT - ASSESSMENT
60-year-old male with history of hypertension, anxiety, GBM status post craniotomy complicated by ESBL meningitis, status post multiple antibiotic courses, currently residing at [facility] for rehab, presenting with altered mental status, hypoxia, hypotension, requiring intubation, treated for sepsis.  Endocrinology consulted for hypoglycemia.    1) Hypoglycemia  -Differential diagnosis most commonly includes insulinoma, IGF mediated versus adrenal insufficiency versus malnourishment vs medication induced (Bactrim). Malnourishment at the top of the differential, however will evaluate for other causes  - Can not rule out Adrenal insufficiency as patient is on decadron, AM cortisol will be low as patient is on steroids.    -Nonislet cell tumor hypoglycemia (NICTH) is in in the differential diagnosis of this patient given the history of glioblastoma.  The most common cause of this hypoglycemia is the production of IGF2, leading to hypoglycemia, IGF2, pending testing.  Treatment would be treating the underlying tumor, glucocorticoids (which patient is already on).  - Patient does not need to stay inpatient to wait for IGF2 results  -To further work up the etiology of hypoglycemia, please send the following, ONLY if patient's glucose is less than 55mg/dl. Check pro-insulin, insulin, c-peptide level, beta-hydroxybutyrate, insulin antibody and preserved glucose level.   - Recommend optimize nutrition. Requires persistent encouragement with oral feeding + Ensure.  -Glucose currently stable off IV dextrose.

## 2021-01-04 NOTE — PROGRESS NOTE ADULT - SUBJECTIVE AND OBJECTIVE BOX
Reno Weeks, pgy 2  [facility] Team 1  [phone]  After 7pm; page night float    Patient is a 60y old  Male who presents with a chief complaint of hypoxic, minimally responsive requiring intubation (03 Jan 2021 07:28)      SUBJECTIVE / OVERNIGHT EVENTS:  ADDITIONAL REVIEW OF SYSTEMS:    MEDICATIONS  (STANDING):  dexAMETHasone     Tablet 2 milliGRAM(s) Oral daily  dextrose 40% Gel 15 Gram(s) Oral once  dextrose 5%. 1000 milliLiter(s) (100 mL/Hr) IV Continuous <Continuous>  dextrose 5%. 1000 milliLiter(s) (50 mL/Hr) IV Continuous <Continuous>  dextrose 50% Injectable 25 Gram(s) IV Push once  dextrose 50% Injectable 12.5 Gram(s) IV Push once  dextrose 50% Injectable 25 Gram(s) IV Push once  enoxaparin Injectable 40 milliGRAM(s) SubCutaneous daily  FLUoxetine 20 milliGRAM(s) Oral daily  glucagon  Injectable 1 milliGRAM(s) IntraMuscular once  hydrALAZINE 25 milliGRAM(s) Oral three times a day  influenza   Vaccine 0.5 milliLiter(s) IntraMuscular once  levETIRAcetam 500 milliGRAM(s) Oral two times a day  multivitamin 1 Tablet(s) Oral daily  pantoprazole   Suspension 40 milliGRAM(s) Oral daily  senna 2 Tablet(s) Oral at bedtime  trimethoprim  160 mG/sulfamethoxazole 800 mG 1 Tablet(s) Oral daily    MEDICATIONS  (PRN):  sodium chloride 0.9% lock flush 10 milliLiter(s) IV Push every 1 hour PRN Pre/post blood products, medications, blood draw, and to maintain line patency      CAPILLARY BLOOD GLUCOSE      POCT Blood Glucose.: 81 mg/dL (03 Jan 2021 21:41)  POCT Blood Glucose.: 99 mg/dL (03 Jan 2021 17:15)  POCT Blood Glucose.: 134 mg/dL (03 Jan 2021 12:07)  POCT Blood Glucose.: 82 mg/dL (03 Jan 2021 08:21)    I&O's Summary    03 Jan 2021 07:01  -  04 Jan 2021 07:00  --------------------------------------------------------  IN: 1210 mL / OUT: 775 mL / NET: 435 mL        PHYSICAL EXAM:  Vital Signs Last 24 Hrs  T(C): 36.9 (04 Jan 2021 05:05), Max: 36.9 (04 Jan 2021 05:05)  T(F): 98.5 (04 Jan 2021 05:05), Max: 98.5 (04 Jan 2021 05:05)  HR: 100 (04 Jan 2021 05:05) (96 - 100)  BP: 140/90 (04 Jan 2021 05:05) (114/74 - 140/90)  BP(mean): --  RR: 18 (04 Jan 2021 05:05) (18 - 18)  SpO2: 97% (04 Jan 2021 05:05) (96% - 98%)  CONSTITUTIONAL: NAD, well-developed, well-groomed  EYES: PERRLA; conjunctiva and sclera clear  ENMT: Moist oral mucosa, no pharyngeal injection or exudates; normal dentition  NECK: Supple, no palpable masses; no thyromegaly  RESPIRATORY: Normal respiratory effort; lungs are clear to auscultation bilaterally  CARDIOVASCULAR: Regular rate and rhythm, normal S1 and S2, no murmur/rub/gallop; No lower extremity edema; Peripheral pulses are 2+ bilaterally  ABDOMEN: Nontender to palpation, normoactive bowel sounds, no rebound/guarding; No hepatosplenomegaly  MUSCULOSKELETAL:  Normal gait; no clubbing or cyanosis of digits; no joint swelling or tenderness to palpation  PSYCH: A+O to person, place, and time; affect appropriate  NEUROLOGY: CN 2-12 are intact and symmetric; no gross sensory deficits   SKIN: No rashes; no palpable lesions    LABS:                      RADIOLOGY & ADDITIONAL TESTS:  Results Reviewed:   Imaging Personally Reviewed:  Electrocardiogram Personally Reviewed:    COORDINATION OF CARE:  Care Discussed with Consultants/Other Providers [Y/N]:  Prior or Outpatient Records Reviewed [Y/N]:     Reno Weeks, pgy 2  [facility] Team 1  [phone]  After 7pm; page night float    Patient is a 60y old  Male who presents with a chief complaint of hypoxic, minimally responsive requiring intubation (03 Jan 2021 07:28)      SUBJECTIVE / OVERNIGHT EVENTS: Pt seen and examined at bedside. No events overnight. No complaints  ADDITIONAL REVIEW OF SYSTEMS:  Unable to fully assess.     MEDICATIONS  (STANDING):  dexAMETHasone     Tablet 2 milliGRAM(s) Oral daily  dextrose 40% Gel 15 Gram(s) Oral once  dextrose 5%. 1000 milliLiter(s) (100 mL/Hr) IV Continuous <Continuous>  dextrose 5%. 1000 milliLiter(s) (50 mL/Hr) IV Continuous <Continuous>  dextrose 50% Injectable 25 Gram(s) IV Push once  dextrose 50% Injectable 12.5 Gram(s) IV Push once  dextrose 50% Injectable 25 Gram(s) IV Push once  enoxaparin Injectable 40 milliGRAM(s) SubCutaneous daily  FLUoxetine 20 milliGRAM(s) Oral daily  glucagon  Injectable 1 milliGRAM(s) IntraMuscular once  hydrALAZINE 25 milliGRAM(s) Oral three times a day  influenza   Vaccine 0.5 milliLiter(s) IntraMuscular once  levETIRAcetam 500 milliGRAM(s) Oral two times a day  multivitamin 1 Tablet(s) Oral daily  pantoprazole   Suspension 40 milliGRAM(s) Oral daily  senna 2 Tablet(s) Oral at bedtime  trimethoprim  160 mG/sulfamethoxazole 800 mG 1 Tablet(s) Oral daily    MEDICATIONS  (PRN):  sodium chloride 0.9% lock flush 10 milliLiter(s) IV Push every 1 hour PRN Pre/post blood products, medications, blood draw, and to maintain line patency      CAPILLARY BLOOD GLUCOSE      POCT Blood Glucose.: 81 mg/dL (03 Jan 2021 21:41)  POCT Blood Glucose.: 99 mg/dL (03 Jan 2021 17:15)  POCT Blood Glucose.: 134 mg/dL (03 Jan 2021 12:07)  POCT Blood Glucose.: 82 mg/dL (03 Jan 2021 08:21)    I&O's Summary    03 Jan 2021 07:01  -  04 Jan 2021 07:00  --------------------------------------------------------  IN: 1210 mL / OUT: 775 mL / NET: 435 mL        PHYSICAL EXAM:  Vital Signs Last 24 Hrs  T(C): 36.9 (04 Jan 2021 05:05), Max: 36.9 (04 Jan 2021 05:05)  T(F): 98.5 (04 Jan 2021 05:05), Max: 98.5 (04 Jan 2021 05:05)  HR: 100 (04 Jan 2021 05:05) (96 - 100)  BP: 140/90 (04 Jan 2021 05:05) (114/74 - 140/90)  BP(mean): --  RR: 18 (04 Jan 2021 05:05) (18 - 18)  SpO2: 97% (04 Jan 2021 05:05) (96% - 98%)  GENERAL: No acute distress, well-developed  HEAD:  Atraumatic, Normocephalic  EYES: EOMI, PERRLA, conjunctiva and sclera clear  NECK: Supple, no lymphadenopathy, no JVD  CHEST/LUNG: CTAB; No wheezes, rales, or rhonchi  HEART: Regular rate and rhythm; No murmurs, rubs, or gallops  ABDOMEN: Soft, non-tender, non-distended; normal bowel sounds, no organomegaly  EXTREMITIES:  2+ peripheral pulses b/l, No clubbing, cyanosis, or edema  NEUROLOGY: A&O x 3, no focal deficits  SKIN: No rashes or lesions        LABS:                      RADIOLOGY & ADDITIONAL TESTS:  Results Reviewed:   Imaging Personally Reviewed:  Electrocardiogram Personally Reviewed:    COORDINATION OF CARE:  Care Discussed with Consultants/Other Providers [Y/N]:  Prior or Outpatient Records Reviewed [Y/N]:

## 2021-01-04 NOTE — PROGRESS NOTE ADULT - ATTENDING COMMENTS
60 M h/o intellectual delay, DVT s/p IVCF, GBM s/p craniotomy, admitted with altered mental status, hypoxia, and hypotension. Required ICU stay for sepsis of unclear etiology. Now off pressors, extubated, and stable. Course c/b hypoglycemia, but asymptomatic and no insulin use. Endocrine consulted. No indication for further evaluation. Likely hypoglycemic from lack of eating. Stable for transfer to [facility].    I spent 35 minutes counseling this patient and coordinating their discharge. Filler: Juvederm Ultra Plus XC

## 2021-01-05 VITALS
DIASTOLIC BLOOD PRESSURE: 68 MMHG | SYSTOLIC BLOOD PRESSURE: 130 MMHG | RESPIRATION RATE: 18 BRPM | OXYGEN SATURATION: 100 % | TEMPERATURE: 98 F | HEART RATE: 90 BPM

## 2021-01-05 LAB
CORTICOSTEROID BINDING GLOBULIN RESULT: 1.6 MG/DL — LOW
CORTIS F/TOTAL MFR SERPL: 4 % — SIGNIFICANT CHANGE UP
CORTIS SERPL-MCNC: 2.3 UG/DL — LOW
CORTISOL, FREE RESULT: 0.09 UG/DL — LOW
GLUCOSE BLDC GLUCOMTR-MCNC: 87 MG/DL — SIGNIFICANT CHANGE UP (ref 70–99)
GLUCOSE BLDC GLUCOMTR-MCNC: 89 MG/DL — SIGNIFICANT CHANGE UP (ref 70–99)

## 2021-01-05 PROCEDURE — 99233 SBSQ HOSP IP/OBS HIGH 50: CPT

## 2021-01-05 PROCEDURE — 99239 HOSP IP/OBS DSCHRG MGMT >30: CPT

## 2021-01-05 RX ORDER — HYDRALAZINE HCL 50 MG
1 TABLET ORAL
Qty: 0 | Refills: 0 | DISCHARGE
Start: 2021-01-05

## 2021-01-05 RX ORDER — ZINC SULFATE TAB 220 MG (50 MG ZINC EQUIVALENT) 220 (50 ZN) MG
220 TAB ORAL
Refills: 0 | Status: DISCONTINUED | OUTPATIENT
Start: 2021-01-05 | End: 2021-01-05

## 2021-01-05 RX ORDER — MECLIZINE HCL 12.5 MG
25 TABLET ORAL ONCE
Refills: 0 | Status: COMPLETED | OUTPATIENT
Start: 2021-01-05 | End: 2021-01-05

## 2021-01-05 RX ADMIN — Medication 1 TABLET(S): at 13:16

## 2021-01-05 RX ADMIN — LEVETIRACETAM 500 MILLIGRAM(S): 250 TABLET, FILM COATED ORAL at 05:45

## 2021-01-05 RX ADMIN — Medication 20 MILLIGRAM(S): at 13:16

## 2021-01-05 RX ADMIN — LEVETIRACETAM 500 MILLIGRAM(S): 250 TABLET, FILM COATED ORAL at 16:38

## 2021-01-05 RX ADMIN — Medication 25 MILLIGRAM(S): at 16:38

## 2021-01-05 RX ADMIN — Medication 25 MILLIGRAM(S): at 05:45

## 2021-01-05 RX ADMIN — Medication 2 MILLIGRAM(S): at 05:45

## 2021-01-05 RX ADMIN — ENOXAPARIN SODIUM 40 MILLIGRAM(S): 100 INJECTION SUBCUTANEOUS at 13:16

## 2021-01-05 RX ADMIN — PANTOPRAZOLE SODIUM 40 MILLIGRAM(S): 20 TABLET, DELAYED RELEASE ORAL at 13:16

## 2021-01-05 RX ADMIN — Medication 25 MILLIGRAM(S): at 13:17

## 2021-01-05 NOTE — PROGRESS NOTE ADULT - ATTENDING COMMENTS
60 M h/o intellectual delay, DVT s/p IVCF, GBM s/p craniotomy, admitted with altered mental status, hypoxia, and hypotension. Required ICU stay for sepsis of unclear etiology. Now off pressors, extubated, and stable. Course c/b hypoglycemia, but asymptomatic and no insulin use. Endocrine consulted. No indication for further evaluation. Likely hypoglycemic from lack of eating. Stable for discharge to [place].    I spent 35 minutes counseling this patient and coordinating their discharge.

## 2021-01-05 NOTE — PROGRESS NOTE ADULT - PROBLEM SELECTOR PLAN 5
.  Complex medical decision making / symptom management in the setting of GBM.    Emotional support provided, questions answered.  Active Psychosocial Referrals: WINSTON, CASSANDRA    For new or uncontrolled symptoms, please page the Palliative Medicine team ([facility] #[id number]).   The service is available 24/7 (including nights & weekends) to provide symptom management recommendations over the phone as appropriate
.  Complex medical decision making / symptom management in the setting of GBM.    Emotional support provided, questions answered.  Active Psychosocial Referrals: WINSTON, CASSANDRA    For new or uncontrolled symptoms, please page the Palliative Medicine team ([facility] #[id number]).   The service is available 24/7 (including nights & weekends) to provide symptom management recommendations over the phone as appropriate
DVT ppx: lovenox  Diet: Softs
.  Complex medical decision making / symptom management in the setting of GBM.    Emotional support provided, questions answered.  Active Psychosocial Referrals: WINSTON, CASSANDRA    For new or uncontrolled symptoms, please page the Palliative Medicine team ([facility] #[id number]).   The service is available 24/7 (including nights & weekends) to provide symptom management recommendations over the phone as appropriate
.  Complex medical decision making / symptom management in the setting of GBM.    Will continue to follow for ongoing GOC discussion.   Emotional support provided, questions answered.  Active Psychosocial Referrals: CASSANDRA MONTERO    For new or uncontrolled symptoms, please page the Palliative Medicine team ([facility] #[id number]).   The service is available 24/7 (including nights & weekends) to provide symptom management recommendations over the phone as appropriate
DVT ppx: lovenox  Diet: Softs
DVT ppx: heparin SQ  Diet: Softs

## 2021-01-05 NOTE — PROGRESS NOTE ADULT - PROBLEM SELECTOR PROBLEM 4
Advanced care planning/counseling discussion
Septic shock
Advanced care planning/counseling discussion
Septic shock
Septic shock

## 2021-01-05 NOTE — PROGRESS NOTE ADULT - ASSESSMENT
· Assessment	  60y M PMHx HTN, Anxiety (on Prozac), b/l DVT while on Coumadin (s/p IVF filter, now off coumadin), GBM s/p craniotomy w/ resection c/b ESBL meningitis for which repeat craniotomy (2/2020) performed and pt was given 8 weeks Meropenem, pt subsequently had repeat ESBL meningitis s/p craniectomy w/ washout w/ intrathecal Jin and 10 week course IV Jin, COVID infection in 3/2020 who was BIBEMS from [facility] for AMS, hypoxia and hypotension. s/p MICU stay for sepsis, w/o clear source identified. Hypoglycemia improved on decadron 2mg. Patient BPs now elevated.     Problem/Plan - 1:  ·  Problem: Glioblastoma multiforme.  Plan: s/p craniotomy w/resection and subsequent ESBL infection for which repeat craniotomy was performed (2/2020) now with  shunt.  - c/w 500 BID Keppra PO for seizure ppx (hx of EEGs in the past without seizure activity)  - c/w decadron 2mg PO qd.   -c/w ppx Bactrim DS qd. (Per pt's sister Maribel pt is to be on BID but provider called [facility] Dr. Dominguez (infectious disease) office- they had no records of Dr. Dominguez prescribing Bactrim for this pt. However they saw a Dr. Simeon (rad onc) who did but were not able to find any reasoning in notes as to why BID. Per house ID, no reason to have BID dosing)  - s/p LP with low c/f CNS infection  - palliative care consulted, recs appreciated     Problem/Plan - 2:  ·  Problem: HTN  Plan:  - held on admission due to sepsis, BPs stable  - c/w hydralazine 25 mg TID  - monitor vitals     Problem/Plan - 3:  ·  Problem: Altered mental status.  Plan: - Patient AxO to self and time, knows he is in hospital. Per patient's sister and HCP, Maribel, patient is AxO x3, but can be aphasic, with blunted responses at time at baseline.      Problem/Plan - 4:  ·   Problem: Hypoglycemia.  Insulinoma Plan: - No history of diabetes. AM 12/23 BG to 50s, while on D5LR, increase to 96 with hypoglycemia protocol. Rule out adrenal insufficiency; patient with recent sepsis and history of steroid use.   - resolving  - endo consulted - To further work up the etiology of hypoglycemia, send the following, ONLY if patient's glucose is less than 55mg/dl. Check pro-insulin, insulin, c-peptide level, beta-hydroxybutyrate, insulin antibody and preserved glucose level.   - decadron 2mg and encourage PO intake      Problem/Plan - 5:  ·  Problem: Septic shock.  Plan: Resolved. s/p pressors in the MICU. CTAP w/o intrabdominal pathology. Low cocnern for CNS infection per LP.   - s/p extubation in MICU on 12/2 (originally intubated for airway protection)  - s/p meropenem course for 7 days in MICU.      Problem/Plan - 6:  ·  Problem: Prophylactic measure.  Plan: DVT ppx: lovenox  Diet: Softs.

## 2021-01-05 NOTE — PROGRESS NOTE ADULT - PROBLEM SELECTOR PROBLEM 5
Encounter for palliative care
Prophylactic measure
Encounter for palliative care
Prophylactic measure
Prophylactic measure

## 2021-01-05 NOTE — PROGRESS NOTE ADULT - ASSESSMENT
61yo M with PMH of GBM (s/p resection, c/b ESBL Meningitis), Anxiety, HTN, and h/o DVT (s/p IVC Filter) p/w AMS due to sepsis requiring intubation. Palliative consulted for complex medical decision making in the setting of life-limiting disease.    ·	ordered Meclizine x1 for nausea associated with dizziness  ·	recommend Zinc PO 50mg q8h to help with dysgeusia which is affecting his appetite 59yo M with PMH of GBM (s/p resection, c/b ESBL Meningitis), Anxiety, HTN, and h/o DVT (s/p IVC Filter) p/w AMS due to sepsis requiring intubation. Palliative consulted for complex medical decision making in the setting of life-limiting disease.    ·	ordered Meclizine x1 for nausea associated with dizziness  ·	ordered Zinc supplement PO BID to help with dysgeusia which is affecting his appetite

## 2021-01-05 NOTE — PROGRESS NOTE ADULT - PROBLEM SELECTOR PLAN 1
.  Complicated by recurrent hypoglycemia  -c/w Decadron to 2mg PO  -would avoid other appetite stimulants for various reasons stated in consult note  -discussed outpatient certification for Medical Marijuana but patient is hesitant to pursue .  Complicated by recurrent hypoglycemia  -c/w Decadron to 2mg PO  -ordered Zinc supplement PO BID to help with dysgeusia which is affecting his appetite  -ordered Meclizine x1 for nausea associated with dizziness  -would avoid other appetite stimulants for various reasons stated in consult note  -discussed outpatient certification for Medical Marijuana but patient is hesitant to pursue

## 2021-01-05 NOTE — PROGRESS NOTE ADULT - SUBJECTIVE AND OBJECTIVE BOX
PROGRESS NOTE:   Authored by Sola Julien MD  Pager [phone]  Pager [phone]    Patient is a 60y old  Male who presents with a chief complaint of hypoxic, minimally responsive requiring intubation (04 Jan 2021 15:01)      SUBJECTIVE / OVERNIGHT EVENTS:    MEDICATIONS  (STANDING):  dexAMETHasone     Tablet 2 milliGRAM(s) Oral daily  dextrose 40% Gel 15 Gram(s) Oral once  dextrose 5%. 1000 milliLiter(s) (100 mL/Hr) IV Continuous <Continuous>  dextrose 5%. 1000 milliLiter(s) (50 mL/Hr) IV Continuous <Continuous>  dextrose 50% Injectable 25 Gram(s) IV Push once  dextrose 50% Injectable 12.5 Gram(s) IV Push once  dextrose 50% Injectable 25 Gram(s) IV Push once  enoxaparin Injectable 40 milliGRAM(s) SubCutaneous daily  FLUoxetine 20 milliGRAM(s) Oral daily  glucagon  Injectable 1 milliGRAM(s) IntraMuscular once  hydrALAZINE 25 milliGRAM(s) Oral three times a day  influenza   Vaccine 0.5 milliLiter(s) IntraMuscular once  levETIRAcetam 500 milliGRAM(s) Oral two times a day  multivitamin 1 Tablet(s) Oral daily  pantoprazole   Suspension 40 milliGRAM(s) Oral daily  senna 2 Tablet(s) Oral at bedtime  trimethoprim  160 mG/sulfamethoxazole 800 mG 1 Tablet(s) Oral daily    MEDICATIONS  (PRN):  sodium chloride 0.9% lock flush 10 milliLiter(s) IV Push every 1 hour PRN Pre/post blood products, medications, blood draw, and to maintain line patency      CAPILLARY BLOOD GLUCOSE      POCT Blood Glucose.: 91 mg/dL (04 Jan 2021 17:10)  POCT Blood Glucose.: 123 mg/dL (04 Jan 2021 11:56)  POCT Blood Glucose.: 89 mg/dL (04 Jan 2021 08:29)    I&O's Summary    04 Jan 2021 07:01  -  05 Jan 2021 07:00  --------------------------------------------------------  IN: 420 mL / OUT: 850 mL / NET: -430 mL        PHYSICAL EXAM:  Vital Signs Last 24 Hrs  T(C): 36.2 (05 Jan 2021 05:41), Max: 36.8 (04 Jan 2021 12:59)  T(F): 97.2 (05 Jan 2021 05:41), Max: 98.2 (04 Jan 2021 12:59)  HR: 97 (05 Jan 2021 05:41) (88 - 98)  BP: 143/88 (05 Jan 2021 05:41) (112/73 - 143/88)  BP(mean): --  RR: 17 (05 Jan 2021 05:41) (17 - 18)  SpO2: 97% (05 Jan 2021 05:41) (97% - 98%)    GENERAL: No acute distress, well-developed  HEAD:  Atraumatic, Normocephalic  EYES: EOMI, PERRLA, conjunctiva and sclera clear  NECK: Supple, no lymphadenopathy, no JVD  CHEST/LUNG: CTAB; No wheezes, rales, or rhonchi  HEART: Regular rate and rhythm; No murmurs, rubs, or gallops  ABDOMEN: Soft, non-tender, non-distended; normal bowel sounds, no organomegaly  EXTREMITIES:  2+ peripheral pulses b/l, No clubbing, cyanosis, or edema  NEUROLOGY: A&O x 3, no focal deficits  SKIN: No rashes or lesions    LABS:                      RADIOLOGY & ADDITIONAL TESTS:  Results Reviewed:   Imaging Personally Reviewed:  Electrocardiogram Personally Reviewed:    COORDINATION OF CARE:  Care Discussed with Consultants/Other Providers [Y/N]:  Prior or Outpatient Records Reviewed [Y/N]:   PROGRESS NOTE:   Authored by Sola Julien MD  Pager [phone]  Pager [facility] [phone]    Patient is a 60y old  Male who presents with a chief complaint of hypoxic, minimally responsive requiring intubation (04 Jan 2021 15:01)      SUBJECTIVE / OVERNIGHT EVENTS: NAEO. Patient states that he feels nauseous this AM. Denies vomiting or abdominal pain. Denies any other pain.     MEDICATIONS  (STANDING):  dexAMETHasone     Tablet 2 milliGRAM(s) Oral daily  dextrose 40% Gel 15 Gram(s) Oral once  dextrose 5%. 1000 milliLiter(s) (100 mL/Hr) IV Continuous <Continuous>  dextrose 5%. 1000 milliLiter(s) (50 mL/Hr) IV Continuous <Continuous>  dextrose 50% Injectable 25 Gram(s) IV Push once  dextrose 50% Injectable 12.5 Gram(s) IV Push once  dextrose 50% Injectable 25 Gram(s) IV Push once  enoxaparin Injectable 40 milliGRAM(s) SubCutaneous daily  FLUoxetine 20 milliGRAM(s) Oral daily  glucagon  Injectable 1 milliGRAM(s) IntraMuscular once  hydrALAZINE 25 milliGRAM(s) Oral three times a day  influenza   Vaccine 0.5 milliLiter(s) IntraMuscular once  levETIRAcetam 500 milliGRAM(s) Oral two times a day  multivitamin 1 Tablet(s) Oral daily  pantoprazole   Suspension 40 milliGRAM(s) Oral daily  senna 2 Tablet(s) Oral at bedtime  trimethoprim  160 mG/sulfamethoxazole 800 mG 1 Tablet(s) Oral daily    MEDICATIONS  (PRN):  sodium chloride 0.9% lock flush 10 milliLiter(s) IV Push every 1 hour PRN Pre/post blood products, medications, blood draw, and to maintain line patency      CAPILLARY BLOOD GLUCOSE      POCT Blood Glucose.: 91 mg/dL (04 Jan 2021 17:10)  POCT Blood Glucose.: 123 mg/dL (04 Jan 2021 11:56)  POCT Blood Glucose.: 89 mg/dL (04 Jan 2021 08:29)    I&O's Summary    04 Jan 2021 07:01  -  05 Jan 2021 07:00  --------------------------------------------------------  IN: 420 mL / OUT: 850 mL / NET: -430 mL        PHYSICAL EXAM:  Vital Signs Last 24 Hrs  T(C): 36.2 (05 Jan 2021 05:41), Max: 36.8 (04 Jan 2021 12:59)  T(F): 97.2 (05 Jan 2021 05:41), Max: 98.2 (04 Jan 2021 12:59)  HR: 97 (05 Jan 2021 05:41) (88 - 98)  BP: 143/88 (05 Jan 2021 05:41) (112/73 - 143/88)  BP(mean): --  RR: 17 (05 Jan 2021 05:41) (17 - 18)  SpO2: 97% (05 Jan 2021 05:41) (97% - 98%)    GENERAL: No acute distress, well-developed  HEAD:  Atraumatic, Normocephalic  EYES: EOMI, PERRLA, conjunctiva and sclera clear  NECK: Supple, no lymphadenopathy, no JVD  CHEST/LUNG: CTAB; No wheezes, rales, or rhonchi  HEART: Regular rate and rhythm; No murmurs, rubs, or gallops  ABDOMEN: Soft, non-tender, non-distended; normal bowel sounds, no organomegaly  EXTREMITIES:  2+ peripheral pulses b/l, No clubbing, cyanosis, or edema  NEUROLOGY: A&O x 3, no focal deficits  SKIN: No rashes or lesions    LABS:                      RADIOLOGY & ADDITIONAL TESTS:  Results Reviewed:   Imaging Personally Reviewed:  Electrocardiogram Personally Reviewed:    COORDINATION OF CARE:  Care Discussed with Consultants/Other Providers [Y/N]:  Prior or Outpatient Records Reviewed [Y/N]:

## 2021-01-05 NOTE — PROGRESS NOTE ADULT - REASON FOR ADMISSION
hypoxic, minimally responsive requiring intubation

## 2021-01-05 NOTE — PROGRESS NOTE ADULT - PROBLEM SELECTOR PROBLEM 1
Hypoglycemia
Glioblastoma multiforme
Loss of appetite
Glioblastoma multiforme
Hypoglycemia
Loss of appetite
Hypoglycemia

## 2021-01-05 NOTE — PROGRESS NOTE ADULT - SUBJECTIVE AND OBJECTIVE BOX
[facility] Palliative [facility]  Adiel Rodriguez, Palliative Care Attending  Contact Info: Page [phone] (including Nights/Weekend), message on Microsoft Teams (Adiel Rodriguez), or leave VM at Palliative Office [phone] (Non-Urgent)    SUBJECTIVE AND OBJECTIVE:  INTERVAL HPI/OVERNIGHT EVENTS: No acute events overnight. Patient endorses nausea and dizziness from this AM but slowly improving. Patient required no additional PRNs in past 24hrs. Encouraged to increase PO intake.    Allergies  aspirin (Unknown)  shellfish (Hives)  shellfish (Unknown)  Tegretol (Unknown)    Intolerances  ACE inhibitors (Other)  angiotensin II inhibitors (Other)    MEDICATIONS  (STANDING):  dexAMETHasone     Tablet 2 milliGRAM(s) Oral daily  dextrose 40% Gel 15 Gram(s) Oral once  dextrose 5%. 1000 milliLiter(s) (100 mL/Hr) IV Continuous <Continuous>  dextrose 5%. 1000 milliLiter(s) (50 mL/Hr) IV Continuous <Continuous>  dextrose 50% Injectable 25 Gram(s) IV Push once  dextrose 50% Injectable 12.5 Gram(s) IV Push once  dextrose 50% Injectable 25 Gram(s) IV Push once  enoxaparin Injectable 40 milliGRAM(s) SubCutaneous daily  FLUoxetine 20 milliGRAM(s) Oral daily  glucagon  Injectable 1 milliGRAM(s) IntraMuscular once  hydrALAZINE 25 milliGRAM(s) Oral three times a day  influenza   Vaccine 0.5 milliLiter(s) IntraMuscular once  levETIRAcetam 500 milliGRAM(s) Oral two times a day  meclizine 25 milliGRAM(s) Oral once  multivitamin 1 Tablet(s) Oral daily  pantoprazole   Suspension 40 milliGRAM(s) Oral daily  senna 2 Tablet(s) Oral at bedtime  trimethoprim  160 mG/sulfamethoxazole 800 mG 1 Tablet(s) Oral daily    MEDICATIONS  (PRN):  sodium chloride 0.9% lock flush 10 milliLiter(s) IV Push every 1 hour PRN Pre/post blood products, medications, blood draw, and to maintain line patency      ITEMS UNCHECKED ARE NOT PRESENT    PRESENT SYMPTOMS: []Unable to obtain due to poor mentation   Source if other than patient:  []Family   []Team     Pain: [ ] yes [x] no  QOL impact -   Location -                    Aggravating factors -  Quality -  Radiation -  Timing -  Severity (0-10 scale):  Minimal acceptable level (0-10 scale):    PAIN AD Score:  [email or web address] (press ctrl +  left click to view)    Dyspnea:                           []Mild  []Moderate []Severe  Anxiety:                             []Mild []Moderate []Severe  Fatigue:                             []Mild []Moderate []Severe  Nausea:                             []Mild []Moderate []Severe  Loss of appetite:              []Mild []Moderate []Severe  Constipation:                    []Mild []Moderate []Severe    Other Symptoms:  [x]All other review of systems negative     Palliative Performance Status Version 2:  40%  [email or web address]    PHYSICAL EXAM:  Vital Signs Last 24 Hrs  T(C): 36.7 (05 Jan 2021 13:15), Max: 36.8 (04 Jan 2021 21:21)  T(F): 98 (05 Jan 2021 13:15), Max: 98.2 (04 Jan 2021 21:21)  HR: 90 (05 Jan 2021 13:15) (88 - 98)  BP: 130/68 (05 Jan 2021 13:15) (130/68 - 143/88)  BP(mean): --  RR: 18 (05 Jan 2021 13:15) (17 - 19)  SpO2: 100% (05 Jan 2021 13:15) (97% - 100%) I&O's Summary    04 Jan 2021 07:01  -  05 Jan 2021 07:00  --------------------------------------------------------  IN: 420 mL / OUT: 850 mL / NET: -430 mL      GENERAL:  [x]Alert  [x]Oriented x3   []Lethargic  []Cachexia  []Unarousable  [x]Verbal  []Non-Verbal  Behavioral:   [] Anxiety  [] Delirium [] Agitation [x] Forgetful  HEENT:  [x]Normal   []Dry mouth   []ET Tube/Trach  []Oral lesions  PULMONARY:   [x]Clear []Tachypnea  []Audible excessive secretions   []Rhonchi        []Right []Left []Bilateral  []Crackles        []Right []Left []Bilateral  []Wheezing     []Right []Left []Bilateral  CARDIOVASCULAR:    [x]Regular []Irregular []Tachy  []Amor []Murmur []Other  GASTROINTESTINAL:  [x]Soft  []Distended   [x]+BS  [x]Non tender []Tender  []PEG []OGT/ NGT  Last BM:   GENITOURINARY:  [x]Normal [] Incontinent   []Oliguria/Anuria   []Sandoval  MUSCULOSKELETAL:   []Normal   [x]Weakness  []Bed/Wheelchair bound []Edema  NEUROLOGIC:   []No focal deficits  [x] Cognitive impairment  [] Dysphagia []Dysarthria [] Paresis [x]Expressive Aphasia  SKIN:   [x]Normal   []Pressure ulcer(s)  []Rash      LABS: None new    RADIOLOGY & ADDITIONAL STUDIES: None new    PROTEIN CALORIE MALNUTRITION PRESENT: [ ]mild [ ]moderate [ ]severe [ ]underweight [ ]morbid obesity  [] PPSV2 < or = 30% [] significant weight loss [] poor nutritional intake [] anasarca [] catabolic state   Albumin, Serum: 2.2 g/dL (12-25-20 @ 07:48)   Artificial Nutrition []     REFERRALS:   []Chaplaincy  []Hospice  []Child Life  [x]Social Work  []Case management []Holistic Therapy []Physical Therapy []Dietary     Goals of Care Document:   Care Coordination Document: Progress Notes - Care Coordination [C. Provider] (01-05-21 @ 11:51)                                       Progress Notes    PROGRESS NOTE  Date & Time of Note   2021-01-05 11:50    Notes    Notes: Medical team 1 identified patient as medically cleared for discharge to  return to skilled nursing facility.  Patient and sister Maribel [phone] are  in agreement with this discharge plan.  Patient medically accepted to return to  [facility] [place] [id number], [phone] and they accepted the bed for todays  discharge. [facility] scheduled [facility] transportation for 4:30pm under  tripTRIP #: [id number] CONFIRMED BY: MARCIE . WINSTON updated medical, nursing, patient  and family in regards to the above.       Electronically signed by:  Krystle Lee  Electronically signed on:  2021-01-05  11:51

## 2021-01-05 NOTE — PROGRESS NOTE ADULT - PROBLEM SELECTOR PROBLEM 3
GBM (glioblastoma multiforme)
Altered mental status
Hypoglycemia
GBM (glioblastoma multiforme)
Hypoglycemia

## 2021-01-05 NOTE — PROGRESS NOTE ADULT - PROBLEM SELECTOR PLAN 4
.  Patient is FULL CODE for now  -after conversation on 12/29: sister/HCP (Maribel) is open to exploring Hospice. Patient can remain FULL CODE and enroll in hospice but sister/HCP understands that at some point the decision for DNR/DNI may full to her and she feels ready to make that decision at that time  -updated sister (Katrin) that Hospice eval can take place at [place]    Another discussion with patient re: his prognosis and the recommendation for Hospice. Discussed prognosis and expected disease trajectory. I do not believe the patient has capacity to make decisions about his cancer treatment. .  Patient is FULL CODE for now  -after conversation on 12/29: sister/HCP (Maribel) is open to exploring Hospice. Patient can remain FULL CODE and enroll in hospice but sister/HCP understands that at some point the decision for DNR/DNI may fall to her and she feels ready to make that decision at that time. Updated sister (Katrin) that Hospice eval can take place at [place]    Another discussion with patient re: his prognosis and the recommendation for Hospice. Discussed prognosis and expected disease trajectory. I do not believe the patient has capacity to make decisions about his cancer treatment.    Communication between family members has been an issue.

## 2021-01-05 NOTE — PROGRESS NOTE ADULT - PROBLEM SELECTOR PLAN 3
- Patient AxO to self and time. Per patient's sister and HCP, Maribel, patient is AxO x3, but can be aphasic, with blunted responses at time at baseline.
.  Aggressive disease, s/p resection/chemo/RT  -no plan for RT currently  -if no further DMT were offered/pursued, patient would be appropriate for hospice but not a candidate for inpatient hospice
.  Aggressive disease, s/p resection/chemo/RT  -no plan for RT currently  -if no further DMT were offered/pursued, patient would be appropriate for hospice ---> family was open to exploring at [facility]
.  Aggressive disease, s/p resection/chemo/RT  -no plan for RT currently  -will explore if patient still wants to pursue further DMT  -if no further DMT were offered/pursued, patient would be appropriate for hospice
.  Aggressive disease, s/p resection/chemo/RT  -no plan for RT currently  -if no further DMT were offered/pursued, patient would be appropriate for hospice but not a candidate for inpatient hospice
- No history of diabetes. AM 12/23 BG to 50s, while on D5LR, increase to 96 with hypoglycemia protocol.   - Encourage PO intake.   - D5/LR for now . Will wean as possible
- No history of diabetes. AM 12/23 BG to 50s, while on D5LR, increase to 96 with hypoglycemia protocol.   - Encourage PO intake.   - D5/LR for now . Will wean as possible

## 2021-01-05 NOTE — PROGRESS NOTE ADULT - PROBLEM SELECTOR PLAN 2
.  -c/w Prozac  -sister inquired if Psych input would be helpful but no overt acute pathologic mental process .  -c/w Prozac

## 2021-01-05 NOTE — PROGRESS NOTE ADULT - PROVIDER SPECIALTY LIST ADULT
Infectious Disease
Internal Medicine
Internal Medicine
MICU
MICU
Endocrinology
Infectious Disease
Internal Medicine
MICU
Endocrinology
Internal Medicine
MICU
MICU
Palliative Care
Palliative Care
Infectious Disease
Internal Medicine
Internal Medicine
MICU
MICU
Palliative Care
Palliative Care
Endocrinology
Endocrinology

## 2021-01-09 LAB — IGF BP2 SERPL-MCNC: 634 NG/ML — SIGNIFICANT CHANGE UP

## 2021-01-16 LAB
CORTICOSTEROID BINDING GLOBULIN RESULT: 1 MG/DL — LOW
CORTIS F/TOTAL MFR SERPL: 50 % — SIGNIFICANT CHANGE UP
CORTIS SERPL-MCNC: 13 UG/DL — SIGNIFICANT CHANGE UP
CORTISOL, FREE RESULT: 6.5 UG/DL — HIGH

## 2021-05-19 NOTE — ED ADULT NURSE REASSESSMENT NOTE - NS ED NURSE REASSESS COMMENT FT1
Pt states allergy to shellfish results in hives, denies difficulty breathing. Pt reports he has never had a CT scan before, but he has had an MRA prior. MD Gutierres aware, okay to get CTA per MD. no distress

## 2022-01-13 NOTE — PATIENT PROFILE ADULT - NSPROIMPLANTSMEDDEV_GEN_A_NUR
[facility] Progress Note      Subjective   patient seen and examined.  Denies any complaints.  Still has significant drainage.  Pain is well controlled.     Medications  Current Facility-Administered Medications   Medication Dose Route Frequency Provider Last Rate Last Admin   • potassium CHLORIDE (KLOR-CON M) jerman ER tablet 40 mEq  40 mEq Oral Daily with breakfast Dionte Tomas MD   40 mEq at 01/13/22 0758   • losartan (COZAAR) tablet 50 mg  50 mg Oral Daily Everette Salazar MD   50 mg at 01/13/22 0758   • cholecalciferol (VITAMIN D) tablet 100 mcg  100 mcg Oral Daily Torsten Paniagua MD   100 mcg at 01/13/22 0758   • sodium chloride 0.9% infusion   Intravenous Continuous PRN William Bailey MD 25 mL/hr at 01/08/22 1843 New Bag at 01/08/22 1843   • acetaminophen (TYLENOL) tablet 1,000 mg  1,000 mg Oral Q8H Nelida Brown MD   1,000 mg at 01/13/22 1604   • traMADol (ULTRAM) tablet 50 mg  50 mg Oral Q6H PRN Nelida Brown MD   50 mg at 01/13/22 0540   • traMADol (ULTRAM) tablet 25 mg  25 mg Oral Q6H PRN Nelida Brown MD   25 mg at 01/11/22 0158   • sodium chloride 0.9 % flush bag 25 mL  25 mL Intravenous PRN Nelida Brown MD       • sodium chloride (PF) 0.9 % injection 2 mL  2 mL Intracatheter 2 times per day Nelida Brown MD   2 mL at 01/13/22 0759   • rivaroxaban (XARELTO) tablet 10 mg  10 mg Oral Daily Nelida Brown MD   10 mg at 01/13/22 0758   • ondansetron (ZOFRAN ODT) disintegrating tablet 4 mg  4 mg Oral Q12H PRN Nelida Brown MD        Or   • ondansetron (ZOFRAN) injection 4 mg  4 mg Intravenous Q12H PRN Nelida Brown MD       • polyethylene glycol (MIRALAX) packet 17 g  17 g Oral Daily PRN Nelida Brown MD   17 g at 01/10/22 1740   • docusate sodium-sennosides (SENOKOT S) 50-8.6 MG 2 tablet  2 tablet Oral BID PRN Nelida Brown MD   2 tablet at 01/13/22 1603   • bisacodyl (DULCOLAX) suppository 10 mg  10 mg Rectal Daily PRN Nelida Brown MD       • magnesium hydroxide  (MILK OF MAGNESIA) 400 MG/5ML suspension 30 mL  30 mL Oral Daily PRN Nelida Brown MD       • ezetimibe (ZETIA) tablet 10 mg  10 mg Oral QHS William Bailey MD   10 mg at 01/12/22 2034   • ferrous sulfate (65 mg Fe per 325 mg) tablet 325 mg  325 mg Oral Daily William Bailey MD   325 mg at 01/13/22 0758   • levothyroxine (SYNTHROID, LEVOTHROID) tablet 88 mcg  88 mcg Oral QAM AC William Bailey MD   88 mcg at 01/13/22 0540   • melatonin tablet 6 mg  6 mg Oral Nightly PRN Hanna Patel MD   6 mg at 01/05/22 2227          Vitals:     Vitals with min/max:      Vital Last Value 24 Hour Range   Temperature 98.1 °F (36.7 °C) (01/13/22 1313) Temp  Min: 97.9 °F (36.6 °C)  Max: 98.1 °F (36.7 °C)   Pulse 78 (01/13/22 1313) Pulse  Min: 76  Max: 84   Respiratory 16 (01/13/22 1313) Resp  Min: 16  Max: 16   Non-Invasive  Blood Pressure 122/54 (01/13/22 1313) BP  Min: 112/80  Max: 153/64   Pulse Oximetry 95 % (01/13/22 1313) SpO2  Min: 95 %  Max: 97 %   Arterial   Blood Pressure   No data recorded         Physical Exam  Physical Exam  Vitals and nursing note reviewed.   Constitutional:       General: She is not in acute distress.  Eyes:      Extraocular Movements: Extraocular movements intact.      Conjunctiva/sclera: Conjunctivae normal.   Cardiovascular:      Rate and Rhythm: Normal rate and regular rhythm.   Pulmonary:      Effort: Pulmonary effort is normal. No respiratory distress.      Breath sounds: Normal breath sounds. No wheezing.   Abdominal:      General: Abdomen is flat. Bowel sounds are normal.      Palpations: Abdomen is soft.   Musculoskeletal:         General: Normal range of motion.      Cervical back: Normal range of motion.      Comments: Left hip with Hemovac wound present.     Skin:     General: Skin is warm.   Neurological:      Mental Status: She is alert and oriented to person, place, and time. Mental status is at baseline.   Psychiatric:         Mood and Affect: Mood normal.         Behavior: Behavior  normal.           Imaging  CT LOWER EXTREMITY LEFT WO CONTRAST    Result Date: 1/4/2022   FINDINGS: There is a spiral fracture through the proximal left femoral diaphysis with proximal and medial displacement and anterior and medial angulation.  There is a left hip arthroplasty.  The fracture begins along the inferior medial portion of the femoral stem.  There is a new transverse plane to the distal femur and gas in the soft tissues.  Associated with the fracture is a intramuscular hematoma.      Periprosthetic fracture involving the proximal left femoral diaphysis as described above.     XR FEMUR 1 VIEW LEFT  Result Date: 1/5/2022    FINDINGS/IMPRESSION: There is a left hip arthroplasty in place.  There is a displaced mid femoral diaphyseal fracture along the inferior margin of the femoral component of the hip arthroplasty.  There is medial displacement of the distal fracture fragment by one shaft width, similar to the prior.  Cannot evaluate anterior to posterior displacement on frontal views.  Traction pin is seen within the distal femur diaphysis.   XR LUMBAR SPINE 2 OR 3 VIEWS    Result Date: 1/4/2022  EXAM: XR LUMBAR SPINE 2 OR 3 VIEWS   FINDINGS: Overlying bowel gas and/or stool and overlying soft tissue partially obscure bone detail.  There is mild scoliosis.  There is moderate degenerative disc disease and facet arthropathy in the lower lumbar spine. There is mild interspinous narrowing in the lower lumbar spine, also likely degenerative.  There is no visualized acute compression deformity.  There is no visualized acute displaced fracture or dislocation.  There is no focal soft tissue swelling.  There is vascular calcification.     1.   No acute radiographic abnormality. 2.   Moderate lumbar spondylosis. 3.   Mild scoliosis.     XR KNEE 2 VIEWS LEFT    Result Date: 1/4/2022  FINDINGS: There is mild soft tissue swelling and mild subcutaneous emphysema in the medial distal thigh, likely iatrogenic.  There is  a transverse traction pin traversing the distal femur.  There is no new acute displaced fracture. There is no knee malalignment.  There is normal bone mineral density for age.  The knee joint spaces are preserved.  There is a questionable trace knee joint effusion.  There is vascular calcification.     Status post traction pin in the distal femur.  Mild soft tissue swelling and emphysema in the medial distal thigh, likely iatrogenic. Electronically Signed by: MEG SOTO MD Signed on: 1/4/2022 12:08 PM     XR THORACIC SPINE 2 VIEWS    Result Date: 1/4/2022  FINDINGS: There is mild scoliotic curvature of the spine.  There is minimal thoracic kyphosis.  The vertebral body heights are maintained. Overlying bones and overlying soft tissue partially obscure spine detail.  The disc spaces are preserved.  There is no acute displaced fracture or traumatic subluxation. There is mild spondylosis.     1.   No acute radiographic abnormality. 2.   Mild scoliosis and spondylosis. Electronically Signed by: MEG SOTO MD Signed on: 1/4/2022 10:22 AM         Labs     Recent Labs   Lab 01/13/22  0406 01/12/22  1813 01/12/22  0552 01/11/22  1720 01/11/22  1720 01/11/22  0414 01/11/22  0414 01/10/22  0358 01/10/22  0357   WBC 10.2  --  11.0  --   --   --  12.4*   < >  --    HCT 27.4*  --  27.7*  --   --   --  28.1*   < >  --    HGB 8.9*  --  9.2*  --   --   --  9.4*   < >  --    *  --  490*  --   --   --  433   < >  --    SODIUM 132* 132* 127*   < > 127*   < > 128*   < > 127*   POTASSIUM 4.8  --  4.7  --  3.4   < > 3.7   < > 3.7   CHLORIDE 101  --  96*  --  93*   < > 96*   < > 97*   CO2 30  --  27  --  29   < > 26   < > 26   CALCIUM 8.6  --  8.3*  --  8.1*   < > 7.8*   < > 7.9*   GLUCOSE 84  --  86  --  114*   < > 108*   < > 90   BUN 25*  --  22*  --  22*   < > 18   < > 22*   CREATININE 0.77  --  0.60  --  0.57   < > 0.48*   < > 0.50*   PHOS  --   --  2.6  --   --   --   --   --  2.0*    < > = values in this interval not  displayed.         Assessment and Plan:    Periprosthetic L femur fx just distal L RENATA femoral component S/p mechanical fall   S/p revision left total hip arthroplasty, femoral component; Open reduction internal excision of left femoral shaft fracture; wound VAC left thigh (1/6/2021)  L RENATA by Dr. Ruth in Nov 2021, R RENATA 5 years ago by Dr. Ruth   Status post traction pin in the distal femur (1/4/2022)  S/p Perioperative Abx: Ancef (1/6 -1/7)   - Pain control: tylenol 1000 mg q8h and PRN tramadol   - Avoid narcotics   - Continue PT/OT as tolerated for gait training, mobility   - Continue WBAT LLE   - Encourage IS   - Maintain hip abduction brace   - Maintain fall precautions, hip precautions   - OrthoSx following, follow up in 2 weeks post op    Leukocytosis - resolved, procal-neg    Acute urinary retention  S/p Harley placement (1/5)   - Monitoring I/Os, harley, voiding trial    Acute hyponatremia, low stable  Urine chem (1/5): WNL, Na 62  S/p 7.5mg tolvaptan (1/12)   - Na 132 (1/13), goal Na at or above 130 per nephrology   - Discontinue NaCl tablets   - Nephrology following, ok for dc    Hypokalemia - resolved    Hypophosphatemia - resolved     Thrombocytosis - Plt 530 (1/13)    COVID-19 viral infection asymptomatic, no treatment needed  PCR detected 1/4   - Oxygenating on RA   - can discontinue COVID isolation tomorrow 1/14     Acute blood loss anemia with hx of chronic iron def Anemia   S/p pRBCs x3 during surgery (1/6)  S/p 1u pRBC 1/8    - Hgb 8.9 (1/13)   - Continue w/ home ferrous sulfate      Hypotensive episodes with hx of primary hypertension, now NP improved   - SBP 110s-150s in last 24 hours   - Continue w/ home losartan at lower dose, 50 mg qd   - Avoid thiazides indefinitely as per Nephrology     Hypothyroidism - continue w/ home levothyroxine 88 mcg qd  Hyperlipidemia - continue w/ home ezetimibe    History of alcohol abuse quit in 1986  Mitral regurgitation  Osteopenia  Shingles  Hx spinal  stenosis    DIET: Regular FR 1800ml, ensure BID  DVT PROPHYLAXIS: Xarelto 10mg qd, TEDs, SCDs  CODE STATUS: Full code.    Disposition:pending ortho clearance. Anticipate discharge tomorrow to SNF.   D/w ortho  D/w RN    Primary Care Physician  Faustino Mccurdy MD    All patient questions answered  All labs and imaging reviewed    Charting performed by jordy Loyd for Dr. Sivlia Henry    All medical record entries made by the scribe were at my direction. I have reviewed the chart and agree that the record accurately reflects my personal performance of the history, physicalexam, hospital course, and assessment and plan.      None

## 2022-02-03 ENCOUNTER — INPATIENT (INPATIENT)
Facility: HOSPITAL | Age: 62
LOS: 8 days | Discharge: NURSING, REHAB OR LONG-TERM CARE | End: 2022-02-12
Attending: INTERNAL MEDICINE | Admitting: INTERNAL MEDICINE
Payer: MEDICARE

## 2022-02-03 VITALS
OXYGEN SATURATION: 95 % | RESPIRATION RATE: 18 BRPM | HEIGHT: 72 IN | SYSTOLIC BLOOD PRESSURE: 119 MMHG | TEMPERATURE: 99 F | DIASTOLIC BLOOD PRESSURE: 81 MMHG | HEART RATE: 112 BPM

## 2022-02-03 DIAGNOSIS — U07.1 COVID-19: ICD-10-CM

## 2022-02-03 DIAGNOSIS — T81.32XA DISRUPTION OF INTERNAL OPERATION (SURGICAL) WOUND, NOT ELSEWHERE CLASSIFIED, INITIAL ENCOUNTER: Chronic | ICD-10-CM

## 2022-02-03 LAB
ALBUMIN SERPL ELPH-MCNC: 3.2 G/DL — LOW (ref 3.3–5)
ALP SERPL-CCNC: 98 U/L — SIGNIFICANT CHANGE UP (ref 40–120)
ALT FLD-CCNC: 57 U/L — HIGH (ref 4–41)
ANION GAP SERPL CALC-SCNC: 13 MMOL/L — SIGNIFICANT CHANGE UP (ref 7–14)
APTT BLD: 35.1 SEC — SIGNIFICANT CHANGE UP (ref 27–36.3)
AST SERPL-CCNC: 42 U/L — HIGH (ref 4–40)
B PERT DNA SPEC QL NAA+PROBE: SIGNIFICANT CHANGE UP
B PERT+PARAPERT DNA PNL SPEC NAA+PROBE: SIGNIFICANT CHANGE UP
BASE EXCESS BLDV CALC-SCNC: 1.5 MMOL/L — SIGNIFICANT CHANGE UP (ref -2–3)
BASOPHILS # BLD AUTO: 0.02 K/UL — SIGNIFICANT CHANGE UP (ref 0–0.2)
BASOPHILS NFR BLD AUTO: 0.5 % — SIGNIFICANT CHANGE UP (ref 0–2)
BILIRUB SERPL-MCNC: 0.3 MG/DL — SIGNIFICANT CHANGE UP (ref 0.2–1.2)
BLOOD GAS VENOUS COMPREHENSIVE RESULT: SIGNIFICANT CHANGE UP
BORDETELLA PARAPERTUSSIS (RAPRVP): SIGNIFICANT CHANGE UP
BUN SERPL-MCNC: 26 MG/DL — HIGH (ref 7–23)
C PNEUM DNA SPEC QL NAA+PROBE: SIGNIFICANT CHANGE UP
CALCIUM SERPL-MCNC: 9.5 MG/DL — SIGNIFICANT CHANGE UP (ref 8.4–10.5)
CHLORIDE BLDV-SCNC: 105 MMOL/L — SIGNIFICANT CHANGE UP (ref 96–108)
CHLORIDE SERPL-SCNC: 106 MMOL/L — SIGNIFICANT CHANGE UP (ref 98–107)
CO2 BLDV-SCNC: 27.9 MMOL/L — HIGH (ref 22–26)
CO2 SERPL-SCNC: 23 MMOL/L — SIGNIFICANT CHANGE UP (ref 22–31)
CREAT SERPL-MCNC: 0.78 MG/DL — SIGNIFICANT CHANGE UP (ref 0.5–1.3)
EOSINOPHIL # BLD AUTO: 0.01 K/UL — SIGNIFICANT CHANGE UP (ref 0–0.5)
EOSINOPHIL NFR BLD AUTO: 0.2 % — SIGNIFICANT CHANGE UP (ref 0–6)
FLUAV SUBTYP SPEC NAA+PROBE: SIGNIFICANT CHANGE UP
FLUBV RNA SPEC QL NAA+PROBE: SIGNIFICANT CHANGE UP
GAS PNL BLDV: 138 MMOL/L — SIGNIFICANT CHANGE UP (ref 136–145)
GLUCOSE BLDC GLUCOMTR-MCNC: 133 MG/DL — HIGH (ref 70–99)
GLUCOSE BLDV-MCNC: 116 MG/DL — HIGH (ref 70–99)
GLUCOSE SERPL-MCNC: 120 MG/DL — HIGH (ref 70–99)
HADV DNA SPEC QL NAA+PROBE: SIGNIFICANT CHANGE UP
HCO3 BLDV-SCNC: 27 MMOL/L — SIGNIFICANT CHANGE UP (ref 22–29)
HCOV 229E RNA SPEC QL NAA+PROBE: SIGNIFICANT CHANGE UP
HCOV HKU1 RNA SPEC QL NAA+PROBE: SIGNIFICANT CHANGE UP
HCOV NL63 RNA SPEC QL NAA+PROBE: SIGNIFICANT CHANGE UP
HCOV OC43 RNA SPEC QL NAA+PROBE: SIGNIFICANT CHANGE UP
HCT VFR BLD CALC: 40.4 % — SIGNIFICANT CHANGE UP (ref 39–50)
HCT VFR BLDA CALC: 39 % — SIGNIFICANT CHANGE UP (ref 39–51)
HGB BLD CALC-MCNC: 13.1 G/DL — SIGNIFICANT CHANGE UP (ref 13–17)
HGB BLD-MCNC: 12.9 G/DL — LOW (ref 13–17)
HMPV RNA SPEC QL NAA+PROBE: SIGNIFICANT CHANGE UP
HPIV1 RNA SPEC QL NAA+PROBE: SIGNIFICANT CHANGE UP
HPIV2 RNA SPEC QL NAA+PROBE: SIGNIFICANT CHANGE UP
HPIV3 RNA SPEC QL NAA+PROBE: SIGNIFICANT CHANGE UP
HPIV4 RNA SPEC QL NAA+PROBE: SIGNIFICANT CHANGE UP
IANC: 3.52 K/UL — SIGNIFICANT CHANGE UP (ref 1.5–8.5)
IMM GRANULOCYTES NFR BLD AUTO: 0.5 % — SIGNIFICANT CHANGE UP (ref 0–1.5)
INR BLD: 1.25 RATIO — HIGH (ref 0.88–1.16)
LACTATE BLDV-MCNC: 1.1 MMOL/L — SIGNIFICANT CHANGE UP (ref 0.5–2)
LYMPHOCYTES # BLD AUTO: 0.26 K/UL — LOW (ref 1–3.3)
LYMPHOCYTES # BLD AUTO: 6.2 % — LOW (ref 13–44)
M PNEUMO DNA SPEC QL NAA+PROBE: SIGNIFICANT CHANGE UP
MCHC RBC-ENTMCNC: 29.7 PG — SIGNIFICANT CHANGE UP (ref 27–34)
MCHC RBC-ENTMCNC: 31.9 GM/DL — LOW (ref 32–36)
MCV RBC AUTO: 93.1 FL — SIGNIFICANT CHANGE UP (ref 80–100)
MONOCYTES # BLD AUTO: 0.39 K/UL — SIGNIFICANT CHANGE UP (ref 0–0.9)
MONOCYTES NFR BLD AUTO: 9.2 % — SIGNIFICANT CHANGE UP (ref 2–14)
NEUTROPHILS # BLD AUTO: 3.52 K/UL — SIGNIFICANT CHANGE UP (ref 1.8–7.4)
NEUTROPHILS NFR BLD AUTO: 83.4 % — HIGH (ref 43–77)
NRBC # BLD: 0 /100 WBCS — SIGNIFICANT CHANGE UP
NRBC # FLD: 0 K/UL — SIGNIFICANT CHANGE UP
PCO2 BLDV: 43 MMHG — SIGNIFICANT CHANGE UP (ref 42–55)
PH BLDV: 7.4 — SIGNIFICANT CHANGE UP (ref 7.32–7.43)
PLATELET # BLD AUTO: 163 K/UL — SIGNIFICANT CHANGE UP (ref 150–400)
PO2 BLDV: 90 MMHG — SIGNIFICANT CHANGE UP
POTASSIUM BLDV-SCNC: 4.6 MMOL/L — SIGNIFICANT CHANGE UP (ref 3.5–5.1)
POTASSIUM SERPL-MCNC: 4.8 MMOL/L — SIGNIFICANT CHANGE UP (ref 3.5–5.3)
POTASSIUM SERPL-SCNC: 4.8 MMOL/L — SIGNIFICANT CHANGE UP (ref 3.5–5.3)
PROT SERPL-MCNC: 5.8 G/DL — LOW (ref 6–8.3)
PROTHROM AB SERPL-ACNC: 14.1 SEC — HIGH (ref 10.6–13.6)
RAPID RVP RESULT: DETECTED
RBC # BLD: 4.34 M/UL — SIGNIFICANT CHANGE UP (ref 4.2–5.8)
RBC # FLD: 13.5 % — SIGNIFICANT CHANGE UP (ref 10.3–14.5)
RSV RNA SPEC QL NAA+PROBE: SIGNIFICANT CHANGE UP
RV+EV RNA SPEC QL NAA+PROBE: SIGNIFICANT CHANGE UP
SAO2 % BLDV: 97.4 % — SIGNIFICANT CHANGE UP
SARS-COV-2 RNA SPEC QL NAA+PROBE: DETECTED
SODIUM SERPL-SCNC: 142 MMOL/L — SIGNIFICANT CHANGE UP (ref 135–145)
WBC # BLD: 4.22 K/UL — SIGNIFICANT CHANGE UP (ref 3.8–10.5)
WBC # FLD AUTO: 4.22 K/UL — SIGNIFICANT CHANGE UP (ref 3.8–10.5)

## 2022-02-03 PROCEDURE — 99285 EMERGENCY DEPT VISIT HI MDM: CPT | Mod: CS,25,GC

## 2022-02-03 PROCEDURE — 95816 EEG AWAKE AND DROWSY: CPT | Mod: 26

## 2022-02-03 PROCEDURE — 99223 1ST HOSP IP/OBS HIGH 75: CPT

## 2022-02-03 PROCEDURE — 93010 ELECTROCARDIOGRAM REPORT: CPT

## 2022-02-03 PROCEDURE — 71045 X-RAY EXAM CHEST 1 VIEW: CPT | Mod: 26

## 2022-02-03 RX ORDER — ONDANSETRON 8 MG/1
4 TABLET, FILM COATED ORAL EVERY 6 HOURS
Refills: 0 | Status: DISCONTINUED | OUTPATIENT
Start: 2022-02-03 | End: 2022-02-05

## 2022-02-03 RX ORDER — ENOXAPARIN SODIUM 100 MG/ML
40 INJECTION SUBCUTANEOUS DAILY
Refills: 0 | Status: DISCONTINUED | OUTPATIENT
Start: 2022-02-03 | End: 2022-02-12

## 2022-02-03 RX ORDER — ALBUTEROL 90 UG/1
2 AEROSOL, METERED ORAL EVERY 6 HOURS
Refills: 0 | Status: DISCONTINUED | OUTPATIENT
Start: 2022-02-03 | End: 2022-02-12

## 2022-02-03 RX ORDER — SODIUM CHLORIDE 9 MG/ML
1000 INJECTION, SOLUTION INTRAVENOUS
Refills: 0 | Status: DISCONTINUED | OUTPATIENT
Start: 2022-02-03 | End: 2022-02-07

## 2022-02-03 RX ORDER — VANCOMYCIN HCL 1 G
1000 VIAL (EA) INTRAVENOUS ONCE
Refills: 0 | Status: COMPLETED | OUTPATIENT
Start: 2022-02-03 | End: 2022-02-03

## 2022-02-03 RX ORDER — SODIUM CHLORIDE 9 MG/ML
1000 INJECTION INTRAMUSCULAR; INTRAVENOUS; SUBCUTANEOUS ONCE
Refills: 0 | Status: COMPLETED | OUTPATIENT
Start: 2022-02-03 | End: 2022-02-03

## 2022-02-03 RX ORDER — ACETAMINOPHEN 500 MG
650 TABLET ORAL EVERY 4 HOURS
Refills: 0 | Status: DISCONTINUED | OUTPATIENT
Start: 2022-02-03 | End: 2022-02-12

## 2022-02-03 RX ORDER — PIPERACILLIN AND TAZOBACTAM 4; .5 G/20ML; G/20ML
3.38 INJECTION, POWDER, LYOPHILIZED, FOR SOLUTION INTRAVENOUS EVERY 8 HOURS
Refills: 0 | Status: DISCONTINUED | OUTPATIENT
Start: 2022-02-03 | End: 2022-02-04

## 2022-02-03 RX ORDER — PIPERACILLIN AND TAZOBACTAM 4; .5 G/20ML; G/20ML
3.38 INJECTION, POWDER, LYOPHILIZED, FOR SOLUTION INTRAVENOUS ONCE
Refills: 0 | Status: COMPLETED | OUTPATIENT
Start: 2022-02-03 | End: 2022-02-03

## 2022-02-03 RX ORDER — ACETAMINOPHEN 500 MG
650 TABLET ORAL ONCE
Refills: 0 | Status: COMPLETED | OUTPATIENT
Start: 2022-02-03 | End: 2022-02-03

## 2022-02-03 RX ORDER — VANCOMYCIN HCL 1 G
1000 VIAL (EA) INTRAVENOUS EVERY 12 HOURS
Refills: 0 | Status: DISCONTINUED | OUTPATIENT
Start: 2022-02-04 | End: 2022-02-04

## 2022-02-03 RX ORDER — DEXAMETHASONE 0.5 MG/5ML
6 ELIXIR ORAL ONCE
Refills: 0 | Status: COMPLETED | OUTPATIENT
Start: 2022-02-03 | End: 2022-02-03

## 2022-02-03 RX ADMIN — SODIUM CHLORIDE 75 MILLILITER(S): 9 INJECTION, SOLUTION INTRAVENOUS at 22:53

## 2022-02-03 RX ADMIN — PIPERACILLIN AND TAZOBACTAM 200 GRAM(S): 4; .5 INJECTION, POWDER, LYOPHILIZED, FOR SOLUTION INTRAVENOUS at 17:56

## 2022-02-03 RX ADMIN — Medication 6 MILLIGRAM(S): at 17:55

## 2022-02-03 RX ADMIN — Medication 650 MILLIGRAM(S): at 17:18

## 2022-02-03 RX ADMIN — SODIUM CHLORIDE 1000 MILLILITER(S): 9 INJECTION INTRAMUSCULAR; INTRAVENOUS; SUBCUTANEOUS at 17:18

## 2022-02-03 RX ADMIN — Medication 250 MILLIGRAM(S): at 18:33

## 2022-02-03 NOTE — H&P ADULT - HISTORY OF PRESENT ILLNESS
Pt lethargic, arousable to verbal stimulation, unable to provide the reason for his hospital visit. Multiple attempts to contact emergency contact Mrs Maribel Vazquez @ [phone], made. All unsuccessful.     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 Currently on Bactrim DS BID for prophylaxis and to be continued until d/c'd  by PCP. The pt was sent from [place] with a 4 day history of fevers, chills, nausea and non bloody vomiting. There pt had a CXR and was diagnosed with multifocal pneumonia and a positive UTI. The pt denies neck pain. But when asked other ROS, he dose not answer.   In the ED, the pt was placed on Zosyn and Vancomycin IV. COVID positive and given Decadron 6 mg IV X 1. Airborne precautions. Failed bedside dysphagia screen.   Vitals: T Max; 100.2 rectal, HR = 92, BP = 115/ 72, RR= 17b/ min, SPO2= 98% 3L NC

## 2022-02-03 NOTE — H&P ADULT - PROBLEM SELECTOR PLAN 1
Air borne precautions.  F/U Inflammatory markers.  Pt not in CIERRA with lowest SPO2= 95% on RA and RR = 18b/ min.  Decadron on hold.  Give Pro Air inhaler PRN, Tylenol OR PRN, Incentive spirometry.   Chest PT.   F/u D Dimer and if > 500 increase VTE.

## 2022-02-03 NOTE — H&P ADULT - NSHPLABSRESULTS_GEN_ALL_CORE
- CXR R basilar linear opacity and patchy left lung opacity may be infectious or inflammatory.  - EKG NSR @ 80's b/ min, QW 2.3.AVF. V6. TWI 3, V1, V2 V3. QT/ QTC= 168/ 280  - COVID positive  - vPH= 7.4  - V Lactate = 1,1                          12.9   4.22  )-----------( 163      ( 03 Feb 2022 17:32 )             40.4   02-03    142  |  106  |  26<H>  ----------------------------<  120<H>  4.8   |  23  |  0.78    Ca    9.5      03 Feb 2022 17:32    TPro  5.8<L>  /  Alb  3.2<L>  /  TBili  0.3  /  DBili  x   /  AST  42<H>  /  ALT  57<H>  /  AlkPhos  98  02-03

## 2022-02-03 NOTE — ED PROVIDER NOTE - NSICDXPASTMEDICALHX_GEN_ALL_CORE_FT
PAST MEDICAL HISTORY:  Borderline diabetes     Diabetes mellitus     Essential hypertension     Hypertension

## 2022-02-03 NOTE — H&P ADULT - NSICDXPASTMEDICALHX_GEN_ALL_CORE_FT
PAST MEDICAL HISTORY:  Anxiety     Deep vein thrombosis (DVT) B/L    Diabetes mellitus     Essential hypertension     Glioblastoma multiforme     Hypertension     Meningitis

## 2022-02-03 NOTE — ED ADULT NURSE REASSESSMENT NOTE - NS ED NURSE REASSESS COMMENT FT1
Received report from Day RN: Pt A&Ox3 resting comfortably, No NAD or complaints, antibiotics infusing, CM in progress, NSR. Respirations are even and unlabored, safety precautions implemented as per protocol, will continue to monitor.

## 2022-02-03 NOTE — ED PROVIDER NOTE - OBJECTIVE STATEMENT
61 y/o M w/ PMH HTN, Anxiety (on Prozac), b/l DVT while on Coumadin (s/p IVF filter, now off coumadin), GBM s/p craniotomy w/ resection c/b ESBL meningitis for which repeat craniotomy (2/2020) performed and pt was given 8 weeks Meropenem, pt subsequently had repeat ESBL meningitis s/p craniectomy w/ washout w/ intrathecal Jin and 10 week course IV Jin, COVID infection in 3/2020 59 y/o M w/ PMH HTN, Anxiety (on Prozac), b/l DVT while on Coumadin (s/p IVF filter, now off coumadin), GBM s/p craniotomy w/ resection c/b ESBL meningitis for which repeat craniotomy (2/2020) performed and pt was given 8 weeks Meropenem, pt subsequently had repeat ESBL meningitis s/p craniectomy w/ washout w/ intrathecal Jin and 10 week course IV Jin, COVID infection in 3/2020 c/o 4 day history of fevers, chills, nausea, and NBNB vomiting. Pt had a CXR yesterday and was diagnosed w/ multifocal pneumonia. Pt is also COVID positive. Denies dizziness, chest pain, SOB, abdominal pain, dysuria, hematuria. 61 y/o M w/ PMH HTN, Anxiety (on Prozac), b/l DVT while on Coumadin (s/p IVF filter, now off coumadin), GBM s/p craniotomy w/ resection c/b ESBL meningitis for which repeat craniotomy (2/2020) performed and pt was given 8 weeks Meropenem, pt subsequently had repeat ESBL meningitis s/p craniectomy w/ washout w/ intrathecal Jin and 10 week course IV Jin, COVID infection in 3/2020 c/o 4 day history of fevers, chills, nausea, and NBNB vomiting. Pt had a CXR yesterday and was diagnosed w/ multifocal pneumonia. Pt also had a recent positive UTI. Pt is also COVID positive. Denies dizziness, chest pain, SOB, abdominal pain, dysuria, hematuria.

## 2022-02-03 NOTE — ED ADULT NURSE NOTE - NSIMPLEMENTINTERV_GEN_ALL_ED
Implemented All Fall Risk Interventions:  [place] to call system. Call bell, personal items and telephone within reach. Instruct patient to call for assistance. Room bathroom lighting operational. Non-slip footwear when patient is off stretcher. Physically safe environment: no spills, clutter or unnecessary equipment. Stretcher in lowest position, wheels locked, appropriate side rails in place. Provide visual cue, wrist band, yellow gown, etc. Monitor gait and stability. Monitor for mental status changes and reorient to person, place, and time. Review medications for side effects contributing to fall risk. Reinforce activity limits and safety measures with patient and family.

## 2022-02-03 NOTE — ED ADULT NURSE NOTE - OBJECTIVE STATEMENT
Break coverage RN: pt aaox3 from [facility] COVID+ c/o fever, chills, N+V. Denies chest pain, diarrhea, abdominal pain. Vital signs as noted. 20g in L wrist; labs collected and sent as per MD order. Pt medicated as per MD order. Pt arrived with 2L NC from [place]. Call light within reach. Will continue to monitor.

## 2022-02-03 NOTE — H&P ADULT - ASSESSMENT
61M history of HTN, Anxiety, b/l DVT s/p IVF filter, glioblastoma multiforme s/p craniotomy with resection c/b ESBL meningitis for which repeat craniotomy performed 2/2020 treated with 8 weeks Meropenem and subsequently had repeat ESBL meningitis s/p craniectomy with washout w/ intrathecal and 10 week course IV Meropenem, COVID infection in 3/2020. Currently on Bactrim DS BID for prophylaxis. Admitted for fever not meeting SIRS criteria, likely due to COVID 19 and nosocomial PNA.

## 2022-02-03 NOTE — ED ADULT NURSE NOTE - CHIEF COMPLAINT QUOTE
Pt brought in by EMS from [facility], as per paperwork chest x-ray shows multifocal pneumonia and bilateral hazy opacities. Warm to touch. Pt denies SOB/cough. Pt is +Covid.

## 2022-02-03 NOTE — ED PROVIDER NOTE - ATTENDING CONTRIBUTION TO CARE
61M sent from [place], HTN HLD, was in MICU for covid pna 2ya; 4 d hx of fever/chills.  OP CXR showed multifocal pna.  Currently covid pos.  No CP/SOB.  (+)N/V as well.  Tachycardic and LG temp.  Diaphoretic.  Normal mental status, neuro exam at baseline (RUE>RLE weakness, bedbound)  AAOX3.  LG temp and tachycardia here, pt appears unwell generally.  Rx IV abx, check labs and cultures, admit.  HD stable at present.  VS:  LG temp, tachycardia, hypoxia    GEN - NAD;  ill appearing;   A+O x3  Bedbound.  HEAD - NC/AT   except L craniectomy, healed.  ENT - PEERL, EOMI, mucous membranes  dry , no discharge      NECK: Neck supple, non-tender without lymphadenopathy, no masses, no JVD  PULM -fine crackles bilat,  symmetric breath sounds  COR -  normal heart sounds    ABD - , ND, NT, soft,  BACK - no CVA tenderness, nontender spine     EXTREMS - no edema, no deformity, warm and well perfused    SKIN - no rash    or bruising      NEUROLOGIC - alert, face symmetric, speech fluent, sensation nl, motor RUE>RLE weakness (baseline per pt).  L arm and leg no deficit.

## 2022-02-03 NOTE — H&P ADULT - PROBLEM SELECTOR PLAN 3
Failed bedside dysphagia screen.  NPO including meds.  F/U S& S eval.  Aspiration precautions.   IVF with LR @ 75ml/ h x 1 Liter.  F/U ProBNP - [facility] pt with CXR showing R basilar linear opacity and patchy left lung opacity may be infectious or inflammatory.  - F/U CT Chest  - Continue Vancomycin and Zosyn IV.  - F/U Blood Cx.  - Tylenol PRN.

## 2022-02-03 NOTE — H&P ADULT - PROBLEM SELECTOR PLAN 2
- [facility] pt with CXR showing R basilar linear opacity and patchy left lung opacity may be infectious or inflammatory.  - F/U CT Chest  - Continue Vancomycin and Zosyn IV.  - F/U Blood Cx.  - Tylenol PRN. -likely related to pneumonia (bacterial and COVID-19)  -manage as stated on separate sections  -check head CT w/ iv contrast give hx of GBM

## 2022-02-03 NOTE — H&P ADULT - ATTENDING COMMENTS
Agree w/ above  #Toxic metabolic encephalopathy 2/2 COVID pneumonia w/ bacterial pneumonia superimposed  -NPO pending speech and swallow  -a/o times 2 and moves all extremities, just somewhat aphasic but from prior notes this is around his baseline  -neck is supple, wbc count wnl, and afebrile here less concern for meningitis  -cw vanc/zosyn as above keppra conversion to iv formulation for now

## 2022-02-03 NOTE — ED PROVIDER NOTE - PHYSICAL EXAMINATION
GENERAL: well appearing in no acute distress, non-toxic appearing  HEAD: normocephalic, atraumatic  HEENT: normal conjunctiva, oral mucosa moist, uvula midline, no tonsilar exudates, neck supple, no JVD  CARDIAC: Sinus tachycardia, normal S1S2, no appreciable murmurs,   PULM: normal breath sounds, clear to ascultation bilaterally, no rales, rhonchi, wheezing  GI: abdomen nondistended, soft, nontender, no guarding, rebound tenderness  : no CVA tenderness b/l, no suprapubic tenderness  NEURO: no focal motor or sensory deficits, CN2-12 intact, normal speech, PERRLA, EOMI, normal gait, AAOx3  MSK: no peripheral edema, no calf tenderness b/l  SKIN: well-perfused, extremities warm, no visible rashes  PSYCH: appropriate mood and affect GENERAL: Pt is speaking in full sentences and A&Ox3, tired.   HEAD: normocephalic, atraumatic  HEENT: normal conjunctiva, oral mucosa moist, uvula midline, no tonsilar exudates, neck supple, no JVD  CARDIAC: Sinus tachycardia, normal S1S2, no appreciable murmurs,   PULM: normal breath sounds, clear to ascultation bilaterally, no rales, rhonchi, wheezing  GI: abdomen nondistended, soft, nontender, no guarding, rebound tenderness  : no CVA tenderness b/l, no suprapubic tenderness  NEURO: no focal motor or sensory deficits, CN2-12 intact, normal speech, PERRLA, EOMI, normal gait, AAOx3  MSK: no peripheral edema, no calf tenderness b/l  SKIN: well-perfused, extremities warm, no visible rashes  PSYCH: appropriate mood and affect

## 2022-02-03 NOTE — H&P ADULT - PROBLEM SELECTOR PLAN 8
VTE with Lovenox 40 mg sub cut daily.  Fall, Aspiration, safety and seizure precautions.  PT, S &S eval.

## 2022-02-03 NOTE — ED PROVIDER NOTE - CLINICAL SUMMARY MEDICAL DECISION MAKING FREE TEXT BOX
59 y/o M w/ c/o fevers, chills, nausea, and vomiting in the setting of COVID PNA. Will screen for other sources of infection w/ septic workup. Likely admit for COVID PNA. 61 y/o M w/ c/o fevers, chills, nausea, and vomiting in the setting of COVID PNA. Will screen for other sources of infection w/ septic workup. Likely admit for COVID PNA. Recent +UTI. Will empirically treat w/ vanc and zosyn and decadron, likely admit due to increasing oxygen requirement (from 2L to 4L nasal canula at [facility], satting 98%).

## 2022-02-03 NOTE — ED ADULT NURSE REASSESSMENT NOTE - NS ED NURSE REASSESS COMMENT FT1
Report received from break shift Zena DOSS. pt in NAD, VSS, pt skin intact on backside, pt has gauze noted to left lateral thigh however skin intact beneath gauze. Will continue to monitor.

## 2022-02-03 NOTE — ED ADULT TRIAGE NOTE - CHIEF COMPLAINT QUOTE
Pt brought in by EMS from [facility], as per paperwork chest x-ray shows multifocal pneumonia and bilateral hazy opacities. Warm to touch. Pt denies SOB/cough. Pt is +Covid.

## 2022-02-03 NOTE — ED PROVIDER NOTE - NS ED ROS FT
CONSTITUTIONAL: + fever, no weight loss, or fatigue  EYES: No eye pain, visual disturbances, or discharge  ENMT:  No difficulty hearing, tinnitus, vertigo; No sinus or throat pain  NECK: No pain or stiffness  RESPIRATORY: No cough, wheezing, chills or hemoptysis; No shortness of breath  CARDIOVASCULAR: No chest pain, palpitations, dizziness, or leg swelling  GASTROINTESTINAL: No abdominal or epigastric pain. + nausea, +vomiting, no hematemesis; No diarrhea or constipation. No melena or hematochezia.  GENITOURINARY: No dysuria, frequency, hematuria, or incontinence  NEUROLOGICAL: No headaches, memory loss, loss of strength, numbness, or tremors  SKIN: No itching, burning, rashes, or lesions

## 2022-02-03 NOTE — H&P ADULT - PROBLEM SELECTOR PLAN 5
BP = 115/ 72.  Holding po BP meds.  For SBP > 145. Hydralazine 5 mg IV F/U CT Head with contrast to assess for mass.  Continue Keppra 500 mg IV BID  F/U Keppra level.  Seizure precautions.

## 2022-02-03 NOTE — H&P ADULT - PROBLEM SELECTOR PLAN 7
No neck pain.  No Nucala rigidity.   No photosensitivity.   Bactrim DS BID on hold while pt is NPO.  Continue Abx IV Currently lethargic.  Prozac on hold

## 2022-02-03 NOTE — H&P ADULT - PROBLEM SELECTOR PLAN 4
F/U CT Head with contrast to assess for mass.  Continue Keppra 500 mg IV BID  F/U Keppra level.  Seizure precautions. Failed bedside dysphagia screen.  NPO including meds.  F/U S& S eval.  Aspiration precautions.   IVF with LR @ 75ml/ h x 1 Liter.  F/U ProBNP

## 2022-02-03 NOTE — ED PROVIDER NOTE - CARE PLAN
1 Principal Discharge DX:	2019 novel coronavirus disease (COVID-19)   Principal Discharge DX:	2019 novel coronavirus disease (COVID-19)  Secondary Diagnosis:	Nosocomial pneumonia

## 2022-02-03 NOTE — H&P ADULT - NSICDXPASTSURGICALHX_GEN_ALL_CORE_FT
PAST SURGICAL HISTORY:  Disruption of closure of skull or craniotomy     Presence of IVC filter

## 2022-02-03 NOTE — H&P ADULT - PROBLEM SELECTOR PLAN 6
Currently lethargic.  Prozac on hold BP = 115/ 72.  Holding po BP meds.  For SBP > 145. Hydralazine 5 mg IV

## 2022-02-04 DIAGNOSIS — F41.9 ANXIETY DISORDER, UNSPECIFIED: ICD-10-CM

## 2022-02-04 DIAGNOSIS — Z95.828 PRESENCE OF OTHER VASCULAR IMPLANTS AND GRAFTS: Chronic | ICD-10-CM

## 2022-02-04 DIAGNOSIS — R13.10 DYSPHAGIA, UNSPECIFIED: ICD-10-CM

## 2022-02-04 DIAGNOSIS — G03.9 MENINGITIS, UNSPECIFIED: ICD-10-CM

## 2022-02-04 DIAGNOSIS — Z29.9 ENCOUNTER FOR PROPHYLACTIC MEASURES, UNSPECIFIED: ICD-10-CM

## 2022-02-04 DIAGNOSIS — C71.9 MALIGNANT NEOPLASM OF BRAIN, UNSPECIFIED: ICD-10-CM

## 2022-02-04 DIAGNOSIS — G92.8 OTHER TOXIC ENCEPHALOPATHY: ICD-10-CM

## 2022-02-04 DIAGNOSIS — R09.89 OTHER SPECIFIED SYMPTOMS AND SIGNS INVOLVING THE CIRCULATORY AND RESPIRATORY SYSTEMS: ICD-10-CM

## 2022-02-04 DIAGNOSIS — I10 ESSENTIAL (PRIMARY) HYPERTENSION: ICD-10-CM

## 2022-02-04 DIAGNOSIS — A41.9 SEPSIS, UNSPECIFIED ORGANISM: ICD-10-CM

## 2022-02-04 DIAGNOSIS — U07.1 COVID-19: ICD-10-CM

## 2022-02-04 DIAGNOSIS — G40.909 EPILEPSY, UNSPECIFIED, NOT INTRACTABLE, WITHOUT STATUS EPILEPTICUS: ICD-10-CM

## 2022-02-04 DIAGNOSIS — J18.9 PNEUMONIA, UNSPECIFIED ORGANISM: ICD-10-CM

## 2022-02-04 LAB
24R-OH-CALCIDIOL SERPL-MCNC: 62.2 NG/ML — SIGNIFICANT CHANGE UP (ref 30–80)
A1C WITH ESTIMATED AVERAGE GLUCOSE RESULT: 5.1 % — SIGNIFICANT CHANGE UP (ref 4–5.6)
ALBUMIN SERPL ELPH-MCNC: 3 G/DL — LOW (ref 3.3–5)
ALP SERPL-CCNC: 87 U/L — SIGNIFICANT CHANGE UP (ref 40–120)
ALT FLD-CCNC: 43 U/L — HIGH (ref 4–41)
ANION GAP SERPL CALC-SCNC: 11 MMOL/L — SIGNIFICANT CHANGE UP (ref 7–14)
ANION GAP SERPL CALC-SCNC: 11 MMOL/L — SIGNIFICANT CHANGE UP (ref 7–14)
AST SERPL-CCNC: 25 U/L — SIGNIFICANT CHANGE UP (ref 4–40)
BASOPHILS # BLD AUTO: 0 K/UL — SIGNIFICANT CHANGE UP (ref 0–0.2)
BASOPHILS NFR BLD AUTO: 0 % — SIGNIFICANT CHANGE UP (ref 0–2)
BILIRUB SERPL-MCNC: 0.3 MG/DL — SIGNIFICANT CHANGE UP (ref 0.2–1.2)
BUN SERPL-MCNC: 21 MG/DL — SIGNIFICANT CHANGE UP (ref 7–23)
BUN SERPL-MCNC: 24 MG/DL — HIGH (ref 7–23)
CALCIUM SERPL-MCNC: 8.9 MG/DL — SIGNIFICANT CHANGE UP (ref 8.4–10.5)
CALCIUM SERPL-MCNC: 9.3 MG/DL — SIGNIFICANT CHANGE UP (ref 8.4–10.5)
CHLORIDE SERPL-SCNC: 104 MMOL/L — SIGNIFICANT CHANGE UP (ref 98–107)
CHLORIDE SERPL-SCNC: 105 MMOL/L — SIGNIFICANT CHANGE UP (ref 98–107)
CHOLEST SERPL-MCNC: 119 MG/DL — SIGNIFICANT CHANGE UP
CO2 SERPL-SCNC: 23 MMOL/L — SIGNIFICANT CHANGE UP (ref 22–31)
CO2 SERPL-SCNC: 25 MMOL/L — SIGNIFICANT CHANGE UP (ref 22–31)
CREAT SERPL-MCNC: 0.56 MG/DL — SIGNIFICANT CHANGE UP (ref 0.5–1.3)
CREAT SERPL-MCNC: 0.61 MG/DL — SIGNIFICANT CHANGE UP (ref 0.5–1.3)
EOSINOPHIL # BLD AUTO: 0 K/UL — SIGNIFICANT CHANGE UP (ref 0–0.5)
EOSINOPHIL NFR BLD AUTO: 0 % — SIGNIFICANT CHANGE UP (ref 0–6)
ESTIMATED AVERAGE GLUCOSE: 100 — SIGNIFICANT CHANGE UP
FERRITIN SERPL-MCNC: 3110 NG/ML — HIGH (ref 30–400)
GLUCOSE BLDC GLUCOMTR-MCNC: 174 MG/DL — HIGH (ref 70–99)
GLUCOSE SERPL-MCNC: 135 MG/DL — HIGH (ref 70–99)
GLUCOSE SERPL-MCNC: 182 MG/DL — HIGH (ref 70–99)
HCT VFR BLD CALC: 37.3 % — LOW (ref 39–50)
HCT VFR BLD CALC: 37.9 % — LOW (ref 39–50)
HDLC SERPL-MCNC: 29 MG/DL — LOW
HGB BLD-MCNC: 11.6 G/DL — LOW (ref 13–17)
HGB BLD-MCNC: 12.2 G/DL — LOW (ref 13–17)
IANC: 2.07 K/UL — SIGNIFICANT CHANGE UP (ref 1.5–8.5)
IMM GRANULOCYTES NFR BLD AUTO: 0.4 % — SIGNIFICANT CHANGE UP (ref 0–1.5)
LIPID PNL WITH DIRECT LDL SERPL: 65 MG/DL — SIGNIFICANT CHANGE UP
LYMPHOCYTES # BLD AUTO: 0.21 K/UL — LOW (ref 1–3.3)
LYMPHOCYTES # BLD AUTO: 8.4 % — LOW (ref 13–44)
MAGNESIUM SERPL-MCNC: 2.2 MG/DL — SIGNIFICANT CHANGE UP (ref 1.6–2.6)
MCHC RBC-ENTMCNC: 29.1 PG — SIGNIFICANT CHANGE UP (ref 27–34)
MCHC RBC-ENTMCNC: 30.2 PG — SIGNIFICANT CHANGE UP (ref 27–34)
MCHC RBC-ENTMCNC: 30.6 GM/DL — LOW (ref 32–36)
MCHC RBC-ENTMCNC: 32.7 GM/DL — SIGNIFICANT CHANGE UP (ref 32–36)
MCV RBC AUTO: 92.3 FL — SIGNIFICANT CHANGE UP (ref 80–100)
MCV RBC AUTO: 95 FL — SIGNIFICANT CHANGE UP (ref 80–100)
MONOCYTES # BLD AUTO: 0.2 K/UL — SIGNIFICANT CHANGE UP (ref 0–0.9)
MONOCYTES NFR BLD AUTO: 8 % — SIGNIFICANT CHANGE UP (ref 2–14)
NEUTROPHILS # BLD AUTO: 2.07 K/UL — SIGNIFICANT CHANGE UP (ref 1.8–7.4)
NEUTROPHILS NFR BLD AUTO: 83.2 % — HIGH (ref 43–77)
NON HDL CHOLESTEROL: 90 MG/DL — SIGNIFICANT CHANGE UP
NRBC # BLD: 0 /100 WBCS — SIGNIFICANT CHANGE UP
NRBC # BLD: 0 /100 WBCS — SIGNIFICANT CHANGE UP
NRBC # FLD: 0 K/UL — SIGNIFICANT CHANGE UP
NRBC # FLD: 0 K/UL — SIGNIFICANT CHANGE UP
NT-PROBNP SERPL-SCNC: 346 PG/ML — HIGH
PHOSPHATE SERPL-MCNC: 3.3 MG/DL — SIGNIFICANT CHANGE UP (ref 2.5–4.5)
PLATELET # BLD AUTO: 144 K/UL — LOW (ref 150–400)
PLATELET # BLD AUTO: 158 K/UL — SIGNIFICANT CHANGE UP (ref 150–400)
POTASSIUM SERPL-MCNC: 4.5 MMOL/L — SIGNIFICANT CHANGE UP (ref 3.5–5.3)
POTASSIUM SERPL-MCNC: 4.5 MMOL/L — SIGNIFICANT CHANGE UP (ref 3.5–5.3)
POTASSIUM SERPL-SCNC: 4.5 MMOL/L — SIGNIFICANT CHANGE UP (ref 3.5–5.3)
POTASSIUM SERPL-SCNC: 4.5 MMOL/L — SIGNIFICANT CHANGE UP (ref 3.5–5.3)
PROT SERPL-MCNC: 5.6 G/DL — LOW (ref 6–8.3)
RBC # BLD: 3.99 M/UL — LOW (ref 4.2–5.8)
RBC # BLD: 4.04 M/UL — LOW (ref 4.2–5.8)
RBC # FLD: 13 % — SIGNIFICANT CHANGE UP (ref 10.3–14.5)
RBC # FLD: 13.3 % — SIGNIFICANT CHANGE UP (ref 10.3–14.5)
SODIUM SERPL-SCNC: 139 MMOL/L — SIGNIFICANT CHANGE UP (ref 135–145)
SODIUM SERPL-SCNC: 140 MMOL/L — SIGNIFICANT CHANGE UP (ref 135–145)
TRIGL SERPL-MCNC: 123 MG/DL — SIGNIFICANT CHANGE UP
TROPONIN T, HIGH SENSITIVITY RESULT: 16 NG/L — SIGNIFICANT CHANGE UP
TSH SERPL-MCNC: 0.67 UIU/ML — SIGNIFICANT CHANGE UP (ref 0.27–4.2)
VANCOMYCIN TROUGH SERPL-MCNC: 9.7 UG/ML — LOW (ref 10–20)
WBC # BLD: 1.98 K/UL — LOW (ref 3.8–10.5)
WBC # BLD: 2.49 K/UL — LOW (ref 3.8–10.5)
WBC # FLD AUTO: 1.98 K/UL — LOW (ref 3.8–10.5)
WBC # FLD AUTO: 2.49 K/UL — LOW (ref 3.8–10.5)

## 2022-02-04 PROCEDURE — 70470 CT HEAD/BRAIN W/O & W/DYE: CPT | Mod: 26

## 2022-02-04 PROCEDURE — 71250 CT THORAX DX C-: CPT | Mod: 26

## 2022-02-04 PROCEDURE — 99233 SBSQ HOSP IP/OBS HIGH 50: CPT

## 2022-02-04 RX ORDER — VANCOMYCIN HCL 1 G
1250 VIAL (EA) INTRAVENOUS EVERY 12 HOURS
Refills: 0 | Status: DISCONTINUED | OUTPATIENT
Start: 2022-02-04 | End: 2022-02-05

## 2022-02-04 RX ORDER — PANTOPRAZOLE SODIUM 20 MG/1
1 TABLET, DELAYED RELEASE ORAL
Qty: 0 | Refills: 0 | DISCHARGE

## 2022-02-04 RX ORDER — MEROPENEM 1 G/30ML
1000 INJECTION INTRAVENOUS ONCE
Refills: 0 | Status: COMPLETED | OUTPATIENT
Start: 2022-02-04 | End: 2022-02-04

## 2022-02-04 RX ORDER — LIDOCAINE 4 G/100G
1 CREAM TOPICAL DAILY
Refills: 0 | Status: DISCONTINUED | OUTPATIENT
Start: 2022-02-04 | End: 2022-02-12

## 2022-02-04 RX ORDER — LEVETIRACETAM 250 MG/1
500 TABLET, FILM COATED ORAL EVERY 12 HOURS
Refills: 0 | Status: DISCONTINUED | OUTPATIENT
Start: 2022-02-03 | End: 2022-02-05

## 2022-02-04 RX ORDER — HYDRALAZINE HCL 50 MG
5 TABLET ORAL EVERY 8 HOURS
Refills: 0 | Status: DISCONTINUED | OUTPATIENT
Start: 2022-02-04 | End: 2022-02-05

## 2022-02-04 RX ORDER — MEROPENEM 1 G/30ML
1000 INJECTION INTRAVENOUS EVERY 8 HOURS
Refills: 0 | Status: COMPLETED | OUTPATIENT
Start: 2022-02-04 | End: 2022-02-10

## 2022-02-04 RX ORDER — MEROPENEM 1 G/30ML
INJECTION INTRAVENOUS
Refills: 0 | Status: COMPLETED | OUTPATIENT
Start: 2022-02-04 | End: 2022-02-10

## 2022-02-04 RX ADMIN — LIDOCAINE 1 PATCH: 4 CREAM TOPICAL at 23:00

## 2022-02-04 RX ADMIN — ENOXAPARIN SODIUM 40 MILLIGRAM(S): 100 INJECTION SUBCUTANEOUS at 11:38

## 2022-02-04 RX ADMIN — Medication 166.67 MILLIGRAM(S): at 18:18

## 2022-02-04 RX ADMIN — LIDOCAINE 1 PATCH: 4 CREAM TOPICAL at 11:39

## 2022-02-04 RX ADMIN — SODIUM CHLORIDE 75 MILLILITER(S): 9 INJECTION, SOLUTION INTRAVENOUS at 05:03

## 2022-02-04 RX ADMIN — LIDOCAINE 1 PATCH: 4 CREAM TOPICAL at 11:40

## 2022-02-04 RX ADMIN — PIPERACILLIN AND TAZOBACTAM 25 GRAM(S): 4; .5 INJECTION, POWDER, LYOPHILIZED, FOR SOLUTION INTRAVENOUS at 07:27

## 2022-02-04 RX ADMIN — LIDOCAINE 1 PATCH: 4 CREAM TOPICAL at 18:07

## 2022-02-04 RX ADMIN — LEVETIRACETAM 400 MILLIGRAM(S): 250 TABLET, FILM COATED ORAL at 17:04

## 2022-02-04 RX ADMIN — LEVETIRACETAM 400 MILLIGRAM(S): 250 TABLET, FILM COATED ORAL at 05:02

## 2022-02-04 RX ADMIN — Medication 250 MILLIGRAM(S): at 05:21

## 2022-02-04 RX ADMIN — MEROPENEM 100 MILLIGRAM(S): 1 INJECTION INTRAVENOUS at 22:51

## 2022-02-04 RX ADMIN — PIPERACILLIN AND TAZOBACTAM 25 GRAM(S): 4; .5 INJECTION, POWDER, LYOPHILIZED, FOR SOLUTION INTRAVENOUS at 00:14

## 2022-02-04 RX ADMIN — MEROPENEM 100 MILLIGRAM(S): 1 INJECTION INTRAVENOUS at 17:46

## 2022-02-04 NOTE — PHYSICAL THERAPY INITIAL EVALUATION ADULT - RANGE OF MOTION EXAMINATION, REHAB EVAL
unable to formally assess right UE strength secondary to pt. fearful of movement./Left UE ROM was WFL (within functional limits)/bilateral lower extremity ROM was WFL (within functional limits)

## 2022-02-04 NOTE — CONSULT NOTE ADULT - SUBJECTIVE AND OBJECTIVE BOX
HEATHER VAZQUEZ  Male  MRN-[id number]    HPI: 60 yo RH male with a PMHx of L parietal GBM (s/p resection, c/b ESBL meningitis requiring repeat craniotomy in 02/2020, followed by repeat ESBL meningitis   Pt lethargic, arousable to verbal stimulation, unable to provide the reason for his hospital visit. Multiple attempts to contact emergency contact Mrs Maribel Vazquez @ [phone], made. All unsuccessful.     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 Currently on Bactrim DS BID for prophylaxis and to be continued until d/c'd  by PCP. The pt was sent from [place] with a 4 day history of fevers, chills, nausea and non bloody vomiting. There pt had a CXR and was diagnosed with multifocal pneumonia and a positive UTI. The pt denies neck pain. But when asked other ROS, he dose not answer.   In the ED, the pt was placed on Zosyn and Vancomycin IV. COVID positive and given Decadron 6 mg IV X 1. Airborne precautions. Failed bedside dysphagia screen.   Vitals: T Max; 100.2 rectal, HR = 92, BP = 115/ 72, RR= 17b/ min, SPO2= 98% 3L NC    (03 Feb 2022 23:36)      NIHSS:  MRS:     ROS: All negative except as mentioned in HPI.    PAST MEDICAL & SURGICAL HISTORY:  Essential hypertension    Hypertension    Diabetes mellitus    Anxiety    Deep vein thrombosis (DVT)  B/L    Glioblastoma multiforme    Meningitis    Disruption of closure of skull or craniotomy    Presence of IVC filter      MEDICATIONS  (STANDING):  enoxaparin Injectable 40 milliGRAM(s) SubCutaneous daily  hydrALAZINE Injectable 5 milliGRAM(s) IV Push every 8 hours  lactated ringers. 1000 milliLiter(s) (75 mL/Hr) IV Continuous <Continuous>  levETIRAcetam  IVPB 500 milliGRAM(s) IV Intermittent every 12 hours  lidocaine   4% Patch 1 Patch Transdermal daily  lidocaine   4% Patch 1 Patch Transdermal daily  meropenem  IVPB      meropenem  IVPB 1000 milliGRAM(s) IV Intermittent every 8 hours  vancomycin  IVPB 1250 milliGRAM(s) IV Intermittent every 12 hours    MEDICATIONS  (PRN):  acetaminophen  Suppository .. 650 milliGRAM(s) Rectal every 4 hours PRN Temp greater or equal to 38.5C (101.3F)  ALBUTerol    90 MICROgram(s) HFA Inhaler 2 Puff(s) Inhalation every 6 hours PRN Shortness of Breath and/or Wheezing  ondansetron Injectable 4 milliGRAM(s) IV Push every 6 hours PRN Nausea and/or Vomiting    Allergies    aspirin (Unknown)  shellfish (Hives)  shellfish (Unknown)  Tegretol (Unknown)    Intolerances    ACE inhibitors (Other)  angiotensin II inhibitors (Other)      VITAL SIGNS:  T(F): 97.5  HR: 62  BP: 123/80  RR: 17  SpO2: 99%    General Exam:  Constitutional: Lying on stretcher, NAD.  Head: Normocephalic, atraumatic.  Extremities: No edema.    Exam:   MS: Oriented x3. Fluent. Follows crossed commands.   CN: VFF. EOMI. V1-V3 intact. Face symmetric. T/u midline.   Motor: Full strength throughout.   Sensory: Intact to LT throughout   Reflexes: 2+ throughout. Babinski absent bilaterally.   Coordination: No dysmetria on FNF or ataxia on HTS bilaterally   Gait: Deferred    LABS:                          12.2   2.49  )-----------( 158      ( 04 Feb 2022 14:53 )             37.3     02-04    140  |  104  |  21  ----------------------------<  135<H>  4.5   |  25  |  0.56    Ca    9.3      04 Feb 2022 14:53  Phos  3.3     02-04  Mg     2.20     02-04    TPro  5.6<L>  /  Alb  3.0<L>  /  TBili  0.3  /  DBili  x   /  AST  25  /  ALT  43<H>  /  AlkPhos  87  02-04    PT/INR - ( 03 Feb 2022 17:32 )   PT: 14.1 sec;   INR: 1.25 ratio         PTT - ( 03 Feb 2022 17:32 )  PTT:35.1 sec    RADIOLOGY:             HEATHER VAZQUEZ  Male  [id number]    HPI: 62 yo RH male with a PMHx of L parietal GBM (s/p resection, c/b ESBL meningitis requiring repeat craniotomy in 02/2020, followed by repeat ESBL meningitis requiring another repeat craniotomy with washout + intrathecal Merrem in 03/2020), COVID infection 03/2020, HTN, anxiety, bilateral DVTs while on Coumadin (now s/p IVC filter, not on AC) who was sent in from [place] with a 4-day history of fever/chills/nausea/NBNB emesis. Patient had a CXR and UA there, diagnosed with multifocal PNA + UTI. Found to be COVID positive on admission to     Pt lethargic, arousable to verbal stimulation, unable to provide the reason for his hospital visit. Multiple attempts to contact emergency contact Mrs Maribel Vazquez @ [phone], made. All unsuccessful.     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 Currently on Bactrim DS BID for prophylaxis and to be continued until d/c'd  by PCP. The pt was sent from [place] with a 4 day history of fevers, chills, nausea and non bloody vomiting. There pt had a CXR and was diagnosed with multifocal pneumonia and a positive UTI. The pt denies neck pain. But when asked other ROS, he dose not answer.   In the ED, the pt was placed on Zosyn and Vancomycin IV. COVID positive and given Decadron 6 mg IV X 1. Airborne precautions. Failed bedside dysphagia screen.   Vitals: T Max; 100.2 rectal, HR = 92, BP = 115/ 72, RR= 17b/ min, SPO2= 98% 3L NC    (03 Feb 2022 23:36)      NIHSS:  MRS:     ROS: All negative except as mentioned in HPI.    PAST MEDICAL & SURGICAL HISTORY:  Essential hypertension    Hypertension    Diabetes mellitus    Anxiety    Deep vein thrombosis (DVT)  B/L    Glioblastoma multiforme    Meningitis    Disruption of closure of skull or craniotomy    Presence of IVC filter      MEDICATIONS  (STANDING):  enoxaparin Injectable 40 milliGRAM(s) SubCutaneous daily  hydrALAZINE Injectable 5 milliGRAM(s) IV Push every 8 hours  lactated ringers. 1000 milliLiter(s) (75 mL/Hr) IV Continuous <Continuous>  levETIRAcetam  IVPB 500 milliGRAM(s) IV Intermittent every 12 hours  lidocaine   4% Patch 1 Patch Transdermal daily  lidocaine   4% Patch 1 Patch Transdermal daily  meropenem  IVPB      meropenem  IVPB 1000 milliGRAM(s) IV Intermittent every 8 hours  vancomycin  IVPB 1250 milliGRAM(s) IV Intermittent every 12 hours    MEDICATIONS  (PRN):  acetaminophen  Suppository .. 650 milliGRAM(s) Rectal every 4 hours PRN Temp greater or equal to 38.5C (101.3F)  ALBUTerol    90 MICROgram(s) HFA Inhaler 2 Puff(s) Inhalation every 6 hours PRN Shortness of Breath and/or Wheezing  ondansetron Injectable 4 milliGRAM(s) IV Push every 6 hours PRN Nausea and/or Vomiting    Allergies    aspirin (Unknown)  shellfish (Hives)  shellfish (Unknown)  Tegretol (Unknown)    Intolerances    ACE inhibitors (Other)  angiotensin II inhibitors (Other)      VITAL SIGNS:  T(F): 97.5  HR: 62  BP: 123/80  RR: 17  SpO2: 99%    General Exam:  Constitutional: Lying on stretcher, NAD.  Head: Normocephalic, atraumatic.  Extremities: No edema.    Exam:   MS: Oriented x3. Fluent. Follows crossed commands.   CN: VFF. EOMI. V1-V3 intact. Face symmetric. T/u midline.   Motor: Full strength throughout.   Sensory: Intact to LT throughout   Reflexes: 2+ throughout. Babinski absent bilaterally.   Coordination: No dysmetria on FNF or ataxia on HTS bilaterally   Gait: Deferred    LABS:                          12.2   2.49  )-----------( 158      ( 04 Feb 2022 14:53 )             37.3     02-04    140  |  104  |  21  ----------------------------<  135<H>  4.5   |  25  |  0.56    Ca    9.3      04 Feb 2022 14:53  Phos  3.3     02-04  Mg     2.20     02-04    TPro  5.6<L>  /  Alb  3.0<L>  /  TBili  0.3  /  DBili  x   /  AST  25  /  ALT  43<H>  /  AlkPhos  87  02-04    PT/INR - ( 03 Feb 2022 17:32 )   PT: 14.1 sec;   INR: 1.25 ratio         PTT - ( 03 Feb 2022 17:32 )  PTT:35.1 sec    RADIOLOGY:             HEATHER SINCLAIR  Male  MRN-[id number]    HPI: 60 yo RH male with a PMHx of L parietal GBM (s/p resection, c/b ESBL meningitis requiring repeat craniotomy in 02/2020, followed by repeat ESBL meningitis requiring another repeat craniotomy with washout + intrathecal Merrem in 03/2020), COVID infection 03/2020, HTN, anxiety, bilateral DVTs while on Coumadin (now s/p IVC filter, not on AC) who was sent in from [place] on 02/03 with a 4-day history of fever/chills/nausea/NBNB emesis. Patient had a CXR and UA there, diagnosed with multifocal PNA + UTI. Found to be COVID positive on admission. Neurology consulted due to concern for seizure. Patient was seen by primary team on 02/04 in the AM at his baseline. He then had an episode of decreased level of consciousness despite sternal rub/noxious stimuli, lasting for ~30 minutes before returning to his baseline. Patient was unable to recall this event. Primary team concerned this may have been a post ictal period. No seizure-like activity witnessed. On evaluation by neurology, patient alert and oriented to self, location, month, year, president. States he had to come to the hospital due to COVID infection. Denies history of seizures. On Keppra 500MG BID as home med, but this is likely for seizure prophylaxis in GBM patient. Patient denies HA, dizziness, vision changes, numbness/tingling, weakness.      ROS: All negative except as mentioned in HPI.    PAST MEDICAL & SURGICAL HISTORY:  Essential hypertension    Hypertension    Diabetes mellitus    Anxiety    Deep vein thrombosis (DVT)  B/L    Glioblastoma multiforme    Meningitis    Disruption of closure of skull or craniotomy    Presence of IVC filter    MEDICATIONS  (STANDING):  enoxaparin Injectable 40 milliGRAM(s) SubCutaneous daily  hydrALAZINE Injectable 5 milliGRAM(s) IV Push every 8 hours  lactated ringers. 1000 milliLiter(s) (75 mL/Hr) IV Continuous <Continuous>  levETIRAcetam  IVPB 500 milliGRAM(s) IV Intermittent every 12 hours  lidocaine   4% Patch 1 Patch Transdermal daily  lidocaine   4% Patch 1 Patch Transdermal daily  meropenem  IVPB      meropenem  IVPB 1000 milliGRAM(s) IV Intermittent every 8 hours  vancomycin  IVPB 1250 milliGRAM(s) IV Intermittent every 12 hours    MEDICATIONS  (PRN):  acetaminophen  Suppository .. 650 milliGRAM(s) Rectal every 4 hours PRN Temp greater or equal to 38.5C (101.3F)  ALBUTerol    90 MICROgram(s) HFA Inhaler 2 Puff(s) Inhalation every 6 hours PRN Shortness of Breath and/or Wheezing  ondansetron Injectable 4 milliGRAM(s) IV Push every 6 hours PRN Nausea and/or Vomiting    Allergies    aspirin (Unknown)  shellfish (Hives)  shellfish (Unknown)  Tegretol (Unknown)    Intolerances    ACE inhibitors (Other)  angiotensin II inhibitors (Other)    Vital Signs Last 24 Hrs  T(C): 36.4 (04 Feb 2022 13:24), Max: 36.4 (04 Feb 2022 05:20)  T(F): 97.5 (04 Feb 2022 13:24), Max: 97.6 (04 Feb 2022 05:20)  HR: 62 (04 Feb 2022 13:24) (62 - 70)  BP: 123/80 (04 Feb 2022 13:24) (111/78 - 123/80)  RR: 17 (04 Feb 2022 13:24) (17 - 18)  SpO2: 99% (04 Feb 2022 13:24) (98% - 99%)    General Exam:  Constitutional: Lying in bed, NAD.  HEENT: s/p L parietal craniectomy. No scleral icterus.  Extremities: No edema.    Neuro Exam:   MS: Alert, eyes open spontaneously. Oriented to self, age, location, month, year, president. Able to state 4 quarters in a dollar. Speech is fluent, not slurred. Able to name objects and repeat. Follows simple commands.  CN: PERRL. RHH. EOMI. Face symmetric. Tongue midline.   Motor: All extremities antigravity without drift.   Sensory: Intact to LT throughout. No extinction.  Reflexes: 1+ throughout. Babinski absent bilaterally.   Coordination: No dysmetria on FNF or on HTS bilaterally.  Gait: Deferred.    LABS:               12.2   2.49  )-----------( 158      ( 04 Feb 2022 14:53 )             37.3     02-04    140  |  104  |  21  ----------------------------<  135<H>  4.5   |  25  |  0.56    Ca    9.3      04 Feb 2022 14:53  Phos  3.3     02-04  Mg     2.20     02-04    TPro  5.6<L>  /  Alb  3.0<L>  /  TBili  0.3  /  DBili  x   /  AST  25  /  ALT  43<H>  /  AlkPhos  87  02-04    PT/INR - ( 03 Feb 2022 17:32 )   PT: 14.1 sec;   INR: 1.25 ratio      PTT - ( 03 Feb 2022 17:32 )  PTT:35.1 sec    RADIOLOGY:    -02/04 CTH w/wo: Similar extensive encephalomalacia in the left temporal and parietal lobes with volume loss, consistent with postsurgical changes. 3 mm focus of enhancement within the region of the left parietal surgical cavity (7-34). This may be postsurgical in nature. The presence of residual/recurrent enhancing neoplasm is not excluded. Similar nonspecific low density in the left frontal white matter and  minimally surrounding the surgical cavity.

## 2022-02-04 NOTE — PHYSICAL THERAPY INITIAL EVALUATION ADULT - PERTINENT HX OF CURRENT PROBLEM, REHAB EVAL
61 year old Male history of HTN, Anxiety, bilateral DVT, glioblastoma multiforme s/p craniotomy with resection c/b ESBL meningitis for which repeat craniotomy performed 2/2020 pt. admitted withCOVID-19.

## 2022-02-04 NOTE — PHYSICAL THERAPY INITIAL EVALUATION ADULT - ADDITIONAL COMMENTS
Unable to obtain accurate social history secondary to pt. presenting with confusion during subjective history and presenting with difficulty following commands, limited social history obtained through past medical documents, please refer to social work for more attainable social history. as per documents, pt. admitted to [facility] From [facility].     Pt. left comfortable in bed with all tubes/lines intact, call bell in reach and in NAD.

## 2022-02-04 NOTE — CHART NOTE - NSCHARTNOTEFT_GEN_A_CORE
During patient evaluation found the patient in bed asleep.  Patient was arousable to touch. When lightly sternal rubbing the patient the patient will awake , although appeared sleepy. When asked the patient where he is the patient said "[place] [employer]" however patient will fall back asleep. Full neuro exam was not able to be performed secondary to patient status.       HR 62  Temp 36.4  /80  O2 17/99 3L    Informed  who immediately came bedside to re-evaluate the patient.   Once the attending came patient came back to previous known status. Although patient was sleepy patient no longer appeared so. Patient was aware he is in [facility], stated he was previously in [facility], stated it is 2022. He denies of any chest pain palpitation, fever. chills, abd pain, N/V, Shortness of breath.    Discussed with . Informed to take patient to CT head and call Neurology.     Patient is no back to baseline. During patient evaluation found the patient in bed asleep.  Patient was arousable to light touch and voice. When asked the patient where he is the patient said "[place] [employer]" however patient will fall back asleep. Patient was oriented to self and place however appeared sleepy.      Off note according to H+P patient came in lethargic however arousable to voice during admission.    Vitals:    HR 62  Temp 36.4  /80  O2 17/99 3L    Informed Dr. Kennedy who immediately came bedside to re-evaluate the patient.   Once the attending came patient came back to previous known status baseline according to the  and his last physical exam. Patient was aware he is in [facility], stated he was previously in [facility], stated it is 2022. He denies of any chest pain palpitation, fever. chills, abd pain, N/V, Shortness of breath.    Discussed with . Informed to obtain CT head and call Neurology.     Patient escorted to CT head.    Called Neurology, recs are appreciated.    Patient sister was update by .

## 2022-02-04 NOTE — SWALLOW BEDSIDE ASSESSMENT ADULT - ADDITIONAL RECOMMENDATIONS
Monitor for any changes in neurological status that may impact oral intake. Reconsult this department for PO tolerance/possible diet advancement. This service to follow up as schedule permits.

## 2022-02-04 NOTE — SWALLOW BEDSIDE ASSESSMENT ADULT - COMMENTS
Per progress note 2/4/22, patient is "61M HTN, Anxiety, DVT s/p IVC filter, not on coumadin, GBM s/p multiple craniotomy c/b ESBL meningitis requiring prolonged Abx, recently dx'd COVID 1/19 p/w Sepsis from multifocal PNA c/b acute encephalopathy and dysphagia."    WBC is reduced. Most recent CXR revealed "Patchy left lung opacity may be infectious or inflammatory."    Patient seen at bedside this afternoon for an initial assessment of the swallow function, at which time patient was alert and cooperative. Patient receiving supplemental O2 via nasal cannula at this time. Patient able to follow directives and verbally responsive. Patient denies pain/difficulty swallowing pre/post today's assessment.

## 2022-02-04 NOTE — SWALLOW BEDSIDE ASSESSMENT ADULT - SWALLOW EVAL: DIAGNOSIS
1. Patient demonstrated a functional oral stage for puree, mildly thick and thin liquids marked by adequate bolus collection, transfer and posterior transport. 2. Patient demonstrated a mild-moderate oral dysphagia for regular solids marked by delayed collection with eventual transport. 3. Patient demonstrated a functional pharyngeal phase of swallow phase for puree, regular solids, mildly thick and thin liquids marked by a present pharyngeal swallow trigger with hyolaryngeal elevation noted upon digital palpation without evidence of airway penetration/aspiration.

## 2022-02-04 NOTE — PROGRESS NOTE ADULT - SUBJECTIVE AND OBJECTIVE BOX
[facility] Division of Hospital Medicine  Kamlesh Kennedy MD  Pager (CASANDRA-F, 8A-5P): [phone]  Other Times:  [phone]    Patient is a 61y old  Male who presents with a chief complaint of Fever (03 Feb 2022 23:36)    SUBJECTIVE / OVERNIGHT EVENTS:  Patient able to make his needs known - confused about place and time - thought he was at [facility] for his brain issues.  Offers no new compliants but still with cough.  No F/C, N/V, CP, SOB, lightheadedness, dizziness, abdominal pain, diarrhea, dysuria.    MEDICATIONS  (STANDING):  enoxaparin Injectable 40 milliGRAM(s) SubCutaneous daily  hydrALAZINE Injectable 5 milliGRAM(s) IV Push every 8 hours  lactated ringers. 1000 milliLiter(s) (75 mL/Hr) IV Continuous <Continuous>  levETIRAcetam  IVPB 500 milliGRAM(s) IV Intermittent every 12 hours  lidocaine   4% Patch 1 Patch Transdermal daily  lidocaine   4% Patch 1 Patch Transdermal daily  piperacillin/tazobactam IVPB.. 3.375 Gram(s) IV Intermittent every 8 hours  vancomycin  IVPB 1000 milliGRAM(s) IV Intermittent every 12 hours    MEDICATIONS  (PRN):  acetaminophen  Suppository .. 650 milliGRAM(s) Rectal every 4 hours PRN Temp greater or equal to 38.5C (101.3F)  ALBUTerol    90 MICROgram(s) HFA Inhaler 2 Puff(s) Inhalation every 6 hours PRN Shortness of Breath and/or Wheezing  ondansetron Injectable 4 milliGRAM(s) IV Push every 6 hours PRN Nausea and/or Vomiting      Vital Signs Last 24 Hrs  T(C): 36.4 (04 Feb 2022 05:20), Max: 37.9 (03 Feb 2022 18:46)  T(F): 97.6 (04 Feb 2022 05:20), Max: 100.2 (03 Feb 2022 18:46)  HR: 70 (04 Feb 2022 05:20) (70 - 112)  BP: 111/78 (04 Feb 2022 05:20) (111/78 - 119/81)  BP(mean): --  RR: 18 (04 Feb 2022 05:20) (17 - 18)  SpO2: 98% (04 Feb 2022 05:20) (95% - 99%)  CAPILLARY BLOOD GLUCOSE      POCT Blood Glucose.: 174 mg/dL (04 Feb 2022 06:48)  POCT Blood Glucose.: 133 mg/dL (03 Feb 2022 22:36)    I&O's Summary      PHYSICAL EXAM:  CONSTITUTIONAL: NAD  HEAD: Left craniotomy  EYES: PERRLA; conjunctiva and sclera clear  ENMT: Moist oral mucosa, no pharyngeal injection or exudates; normal dentition  NECK: Supple, no palpable masses; no thyromegaly  RESPIRATORY: Normal respiratory effort; lungs are clear to auscultation bilaterally  CARDIOVASCULAR: Regular rate and rhythm, normal S1 and S2, no murmur/rub/gallop; No lower extremity edema; Peripheral pulses are 2+ bilaterally  ABDOMEN: Nontender to palpation, normoactive bowel sounds, no rebound/guarding; No hepatosplenomegaly  MUSCULOSKELETAL:  Not assessed gait; no clubbing or cyanosis of digits; no joint swelling or tenderness to palpation  PSYCH: A+O to person; affect appropriate  NEUROLOGY: CN 2-12 are intact and symmetric; no gross sensory deficits   SKIN: No rashes; no palpable lesions.    LABS:                        11.6   1.98  )-----------( 144      ( 04 Feb 2022 06:41 )             37.9     02-04    139  |  105  |  24<H>  ----------------------------<  182<H>  4.5   |  23  |  0.61    Ca    8.9      04 Feb 2022 06:41  Phos  3.3     02-04  Mg     2.20     02-04    TPro  5.8<L>  /  Alb  3.2<L>  /  TBili  0.3  /  DBili  x   /  AST  42<H>  /  ALT  57<H>  /  AlkPhos  98  02-03    PT/INR - ( 03 Feb 2022 17:32 )   PT: 14.1 sec;   INR: 1.25 ratio         PTT - ( 03 Feb 2022 17:32 )  PTT:35.1 sec                  Ferritin, Serum: 3110 ng/mL (02-04-22 @ 06:41)        RADIOLOGY & ADDITIONAL TESTS:    Imaging Personally Reviewed:    Care Discussed with Consultants/Other Providers:   [facility] Division of Hospital Medicine  Kamlesh Kennedy MD  Pager (CASANDRA-DARLING, 8A-5P): [phone]  Other Times:  [phone]    Patient is a 61y old  Male who presents with a chief complaint of Fever (03 Feb 2022 23:36)    SUBJECTIVE / OVERNIGHT EVENTS:  Patient able to make his needs known - confused about place and time - thought he was at [facility] for his brain issues.  Offers no new compliants but still with cough.  No F/C, N/V, CP, SOB, lightheadedness, dizziness, abdominal pain, diarrhea, dysuria.  Addendum - had episode of near unresponsiveness at 1PM for 30+ minutes despite sternum rubs and noxious stimulus. However reverted back to A/Ox2, conversant after.  Unable to recall the event.    MEDICATIONS  (STANDING):  enoxaparin Injectable 40 milliGRAM(s) SubCutaneous daily  hydrALAZINE Injectable 5 milliGRAM(s) IV Push every 8 hours  lactated ringers. 1000 milliLiter(s) (75 mL/Hr) IV Continuous <Continuous>  levETIRAcetam  IVPB 500 milliGRAM(s) IV Intermittent every 12 hours  lidocaine   4% Patch 1 Patch Transdermal daily  lidocaine   4% Patch 1 Patch Transdermal daily  piperacillin/tazobactam IVPB.. 3.375 Gram(s) IV Intermittent every 8 hours  vancomycin  IVPB 1000 milliGRAM(s) IV Intermittent every 12 hours    MEDICATIONS  (PRN):  acetaminophen  Suppository .. 650 milliGRAM(s) Rectal every 4 hours PRN Temp greater or equal to 38.5C (101.3F)  ALBUTerol    90 MICROgram(s) HFA Inhaler 2 Puff(s) Inhalation every 6 hours PRN Shortness of Breath and/or Wheezing  ondansetron Injectable 4 milliGRAM(s) IV Push every 6 hours PRN Nausea and/or Vomiting      Vital Signs Last 24 Hrs  T(C): 36.4 (04 Feb 2022 05:20), Max: 37.9 (03 Feb 2022 18:46)  T(F): 97.6 (04 Feb 2022 05:20), Max: 100.2 (03 Feb 2022 18:46)  HR: 70 (04 Feb 2022 05:20) (70 - 112)  BP: 111/78 (04 Feb 2022 05:20) (111/78 - 119/81)  BP(mean): --  RR: 18 (04 Feb 2022 05:20) (17 - 18)  SpO2: 98% (04 Feb 2022 05:20) (95% - 99%)  CAPILLARY BLOOD GLUCOSE      POCT Blood Glucose.: 174 mg/dL (04 Feb 2022 06:48)  POCT Blood Glucose.: 133 mg/dL (03 Feb 2022 22:36)    I&O's Summary      PHYSICAL EXAM:  CONSTITUTIONAL: NAD  HEAD: Left craniotomy  EYES: PERRLA; conjunctiva and sclera clear  ENMT: Moist oral mucosa, no pharyngeal injection or exudates; normal dentition  NECK: Supple, no palpable masses; no thyromegaly  RESPIRATORY: Normal respiratory effort; lungs are clear to auscultation bilaterally  CARDIOVASCULAR: Regular rate and rhythm, normal S1 and S2, no murmur/rub/gallop; No lower extremity edema; Peripheral pulses are 2+ bilaterally  ABDOMEN: Nontender to palpation, normoactive bowel sounds, no rebound/guarding; No hepatosplenomegaly  MUSCULOSKELETAL:  Not assessed gait; no clubbing or cyanosis of digits; no joint swelling or tenderness to palpation  PSYCH: A+O to person; affect appropriate  NEUROLOGY: CN 2-12 are intact and symmetric; no gross sensory deficits   SKIN: No rashes; no palpable lesions.    LABS:                        11.6   1.98  )-----------( 144      ( 04 Feb 2022 06:41 )             37.9     02-04    139  |  105  |  24<H>  ----------------------------<  182<H>  4.5   |  23  |  0.61    Ca    8.9      04 Feb 2022 06:41  Phos  3.3     02-04  Mg     2.20     02-04    TPro  5.8<L>  /  Alb  3.2<L>  /  TBili  0.3  /  DBili  x   /  AST  42<H>  /  ALT  57<H>  /  AlkPhos  98  02-03    PT/INR - ( 03 Feb 2022 17:32 )   PT: 14.1 sec;   INR: 1.25 ratio         PTT - ( 03 Feb 2022 17:32 )  PTT:35.1 sec                  Ferritin, Serum: 3110 ng/mL (02-04-22 @ 06:41)        RADIOLOGY & ADDITIONAL TESTS:    Imaging Personally Reviewed:    Care Discussed with Consultants/Other Providers:

## 2022-02-04 NOTE — PATIENT PROFILE ADULT - NSPROPTRIGHTBILLOFRIGHTS_GEN_A_NUR
patient W Plasty Text: The lesion was extirpated to the level of the fat with a #15 scalpel blade.  Given the location of the defect, shape of the defect and the proximity to free margins a W-plasty was deemed most appropriate for repair.  Using a sterile surgical marker, the appropriate transposition arms of the W-plasty were drawn incorporating the defect and placing the expected incisions within the relaxed skin tension lines where possible.    The area thus outlined was incised deep to adipose tissue with a #15 scalpel blade.  The skin margins were undermined to an appropriate distance in all directions utilizing iris scissors.  The opposing transposition arms were then transposed into place in opposite direction and anchored with interrupted buried subcutaneous sutures.

## 2022-02-04 NOTE — PROGRESS NOTE ADULT - PROBLEM SELECTOR PLAN 3
Concern for superinfection post COVID  Zosyn IV and Vanco IV started on 2/3.  Monitor respiratory status. Concern for superinfection post COVID  Zosyn to Meropenem IV and Vanco IV started on 2/3.  Monitor respiratory status.  ID consult for evaluation and management.  Acute encephalopathy may be SZ related rather than infection. Will continue to monitor.

## 2022-02-04 NOTE — PATIENT PROFILE ADULT - FALL HARM RISK - HARM RISK INTERVENTIONS

## 2022-02-04 NOTE — PROGRESS NOTE ADULT - PROBLEM SELECTOR PLAN 1
LIkely due to multifocal PNA  Continue Zosyn/Vanco IV  IVF started in ED - continue IVF hydration  Monitor CBC and vitals. 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  IVF started in ED - continue IVF hydration  Monitor CBC and vitals.

## 2022-02-04 NOTE — CONSULT NOTE ADULT - ASSESSMENT
INCOMPLETE NOTE. 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 Patient had a CXR and UA there, diagnosed with multifocal PNA + UTI. Found to be COVID positive on admission. Neurology consulted due to concern for seizure. Patient was seen by primary team on 02/04 in the AM at his baseline. He then had an episode of decreased level of consciousness despite sternal rub/noxious stimuli, lasting for ~30 minutes before returning to his baseline. Patient was unable to recall this event. Primary team concerned this may have been a post ictal period. No seizure-like activity witnessed. Patient appears to be at his neurologic baseline on assessment by neurology.    Impression: Transient episode of decreased level of consciousness. Etiology unclear. Could be waxing/waning in mentation due to toxic metabolic encephalopathy vs post ictal period following an unwitnessed seizure.    Recommendations:  [] Neuro checks Q4HR.  [] Telemetry.  [] vEEG.  [] c/w home dose Keppra. Would not increase dose or add further AEDs at this time.  [] Send lactate, creatinine kinase.  [] Rest of care per primary team.    Case to be discussed with neurology attending Dr. Ramos.

## 2022-02-04 NOTE — CHART NOTE - NSCHARTNOTEFT_GEN_A_CORE
ID consult called.  Discussed with ID current vanc level recommended increasing to 1250 mg of vanco Q12.

## 2022-02-04 NOTE — PROGRESS NOTE ADULT - PROBLEM SELECTOR PLAN 5
Monitor mental status  Lovenox SC for VTE ppx c/b meningitis  On chronic Bactrim - changed to Meropenem IV for now.  Monitor mental status  Lovenox SC for VTE ppx

## 2022-02-04 NOTE — PROGRESS NOTE ADULT - PROBLEM SELECTOR PLAN 2
likely due to active infection - improving  Continue to monitor. Concern for breakthrough seizure due to abx vs infection.  CT Head pending.  EEG ordered  Neurology consult

## 2022-02-04 NOTE — CHART NOTE - NSCHARTNOTEFT_GEN_A_CORE
Received sign out regarding patient having an episode of being more confused and difficult to arouse during her shift. As per day time provider patient was evaluated by supervising attending and  noted to have appear at  baseline. Neurology consult was ordered and CTH was ordered. I went to the bedside to evaluate the patient at the beginning of my shift with the RN. Patient was A &O x4, slower to respond to questioning but able to provide accurate answers to a majority of questions asked of him: season, month, current president, location. Discussed day events with Neurologist ,  as well as CTH results. As per specialist no need for change in management at this time. Will order video EEG and appreciate any further Neurology recommendations. Received sign out regarding patient having an episode of being more confused and difficult to arouse during her shift. As per day time provider patient was evaluated by supervising attending and  noted to have appear at  baseline. Neurology consult was ordered and CTH was ordered. I went to the bedside to evaluate the patient at the beginning of my shift with the RN. Patient was A &O x4, slower to respond to questioning but able to provide accurate answers to a majority of questions asked of him: season, month, current president, location. Discussed day events with Neurologist ,  as well as CTH results. As per specialist no need for change in management at this time. Will order video EEG and appreciate any further Neurology recommendations.    02/04 CTH w/wo: Similar extensive encephalomalacia in the left temporal and parietal lobes with volume loss, consistent with postsurgical changes. 3 mm focus of enhancement within the region of the left parietal surgical cavity (7-34). This may be postsurgical in nature. The presence of residual/recurrent enhancing neoplasm is not excluded. Similar nonspecific low density in the left frontal white matter and  minimally surrounding the surgical cavity. Received sign out regarding patient having an episode of being more confused and difficult to arouse during her shift. As per day time provider patient was evaluated by supervising attending and noted to have returned to baseline. Neurology consult was ordered and CTH was ordered. I went to the bedside to evaluate the patient at the beginning of my shift with the RN. Patient was A &O x4, slower to respond to questioning but able to provide accurate answers to a majority of questions asked of him: season, month, current president, location. Discussed day events with Neurologist ,  as well as CTH results. As per specialist no need for change in management at this time. Will order video EEG and appreciate any further Neurology recommendations.    02/04 CTH w/wo: Similar extensive encephalomalacia in the left temporal and parietal lobes with volume loss, consistent with postsurgical changes. 3 mm focus of enhancement within the region of the left parietal surgical cavity (7-34). This may be postsurgical in nature. The presence of residual/recurrent enhancing neoplasm is not excluded. Similar nonspecific low density in the left frontal white matter and  minimally surrounding the surgical cavity.

## 2022-02-04 NOTE — PROGRESS NOTE ADULT - ASSESSMENT
61M HTN, Anxiety, DVT s/p IVC filter, not on coumadin, GBM s/p multiple craniotomy c/b ESBL meningitis requiring prolonged Abx, recently dx'd COVID 1/19 p/w Sepsis from multifocal PNA c/b acute encephalopathy and dysphagia. 61M HTN, Anxiety, DVT s/p IVC filter, not on coumadin, GBM s/p multiple craniotomy c/b ESBL meningitis requiring prolonged Abx, recently dx'd COVID 1/19 p/w Sepsis from multifocal PNA c/b acute encephalopathy, concern for breakthrough SZ.

## 2022-02-05 LAB
ANION GAP SERPL CALC-SCNC: 10 MMOL/L — SIGNIFICANT CHANGE UP (ref 7–14)
BASOPHILS # BLD AUTO: 0.01 K/UL — SIGNIFICANT CHANGE UP (ref 0–0.2)
BASOPHILS NFR BLD AUTO: 0.3 % — SIGNIFICANT CHANGE UP (ref 0–2)
BUN SERPL-MCNC: 17 MG/DL — SIGNIFICANT CHANGE UP (ref 7–23)
CALCIUM SERPL-MCNC: 9.1 MG/DL — SIGNIFICANT CHANGE UP (ref 8.4–10.5)
CHLORIDE SERPL-SCNC: 107 MMOL/L — SIGNIFICANT CHANGE UP (ref 98–107)
CK SERPL-CCNC: 12 U/L — LOW (ref 30–200)
CO2 SERPL-SCNC: 24 MMOL/L — SIGNIFICANT CHANGE UP (ref 22–31)
CREAT SERPL-MCNC: 0.55 MG/DL — SIGNIFICANT CHANGE UP (ref 0.5–1.3)
EOSINOPHIL # BLD AUTO: 0 K/UL — SIGNIFICANT CHANGE UP (ref 0–0.5)
EOSINOPHIL NFR BLD AUTO: 0 % — SIGNIFICANT CHANGE UP (ref 0–6)
GLUCOSE SERPL-MCNC: 102 MG/DL — HIGH (ref 70–99)
HCT VFR BLD CALC: 38.5 % — LOW (ref 39–50)
HGB BLD-MCNC: 11.9 G/DL — LOW (ref 13–17)
IANC: 2.89 K/UL — SIGNIFICANT CHANGE UP (ref 1.5–8.5)
IMM GRANULOCYTES NFR BLD AUTO: 0.5 % — SIGNIFICANT CHANGE UP (ref 0–1.5)
LACTATE SERPL-SCNC: 1.3 MMOL/L — SIGNIFICANT CHANGE UP (ref 0.5–2)
LYMPHOCYTES # BLD AUTO: 0.3 K/UL — LOW (ref 1–3.3)
LYMPHOCYTES # BLD AUTO: 8 % — LOW (ref 13–44)
MAGNESIUM SERPL-MCNC: 2 MG/DL — SIGNIFICANT CHANGE UP (ref 1.6–2.6)
MCHC RBC-ENTMCNC: 29.1 PG — SIGNIFICANT CHANGE UP (ref 27–34)
MCHC RBC-ENTMCNC: 30.9 GM/DL — LOW (ref 32–36)
MCV RBC AUTO: 94.1 FL — SIGNIFICANT CHANGE UP (ref 80–100)
MONOCYTES # BLD AUTO: 0.52 K/UL — SIGNIFICANT CHANGE UP (ref 0–0.9)
MONOCYTES NFR BLD AUTO: 13.9 % — SIGNIFICANT CHANGE UP (ref 2–14)
NEUTROPHILS # BLD AUTO: 2.89 K/UL — SIGNIFICANT CHANGE UP (ref 1.8–7.4)
NEUTROPHILS NFR BLD AUTO: 77.3 % — HIGH (ref 43–77)
NRBC # BLD: 0 /100 WBCS — SIGNIFICANT CHANGE UP
NRBC # FLD: 0 K/UL — SIGNIFICANT CHANGE UP
PHOSPHATE SERPL-MCNC: 2 MG/DL — LOW (ref 2.5–4.5)
PLATELET # BLD AUTO: 157 K/UL — SIGNIFICANT CHANGE UP (ref 150–400)
POTASSIUM SERPL-MCNC: 4.1 MMOL/L — SIGNIFICANT CHANGE UP (ref 3.5–5.3)
POTASSIUM SERPL-SCNC: 4.1 MMOL/L — SIGNIFICANT CHANGE UP (ref 3.5–5.3)
RBC # BLD: 4.09 M/UL — LOW (ref 4.2–5.8)
RBC # FLD: 13.1 % — SIGNIFICANT CHANGE UP (ref 10.3–14.5)
SODIUM SERPL-SCNC: 141 MMOL/L — SIGNIFICANT CHANGE UP (ref 135–145)
WBC # BLD: 3.74 K/UL — LOW (ref 3.8–10.5)
WBC # FLD AUTO: 3.74 K/UL — LOW (ref 3.8–10.5)

## 2022-02-05 PROCEDURE — 99233 SBSQ HOSP IP/OBS HIGH 50: CPT

## 2022-02-05 PROCEDURE — 99223 1ST HOSP IP/OBS HIGH 75: CPT

## 2022-02-05 RX ORDER — FLUOXETINE HCL 10 MG
10 CAPSULE ORAL DAILY
Refills: 0 | Status: DISCONTINUED | OUTPATIENT
Start: 2022-02-06 | End: 2022-02-12

## 2022-02-05 RX ORDER — REMDESIVIR 5 MG/ML
200 INJECTION INTRAVENOUS EVERY 24 HOURS
Refills: 0 | Status: COMPLETED | OUTPATIENT
Start: 2022-02-05 | End: 2022-02-05

## 2022-02-05 RX ORDER — LEVETIRACETAM 250 MG/1
500 TABLET, FILM COATED ORAL
Refills: 0 | Status: DISCONTINUED | OUTPATIENT
Start: 2022-02-05 | End: 2022-02-12

## 2022-02-05 RX ORDER — DEXAMETHASONE 0.5 MG/5ML
6 ELIXIR ORAL DAILY
Refills: 0 | Status: DISCONTINUED | OUTPATIENT
Start: 2022-02-05 | End: 2022-02-08

## 2022-02-05 RX ORDER — MIRTAZAPINE 45 MG/1
15 TABLET, ORALLY DISINTEGRATING ORAL AT BEDTIME
Refills: 0 | Status: DISCONTINUED | OUTPATIENT
Start: 2022-02-05 | End: 2022-02-12

## 2022-02-05 RX ORDER — REMDESIVIR 5 MG/ML
INJECTION INTRAVENOUS
Refills: 0 | Status: DISCONTINUED | OUTPATIENT
Start: 2022-02-05 | End: 2022-02-07

## 2022-02-05 RX ORDER — REMDESIVIR 5 MG/ML
100 INJECTION INTRAVENOUS EVERY 24 HOURS
Refills: 0 | Status: DISCONTINUED | OUTPATIENT
Start: 2022-02-06 | End: 2022-02-07

## 2022-02-05 RX ORDER — FAMOTIDINE 10 MG/ML
20 INJECTION INTRAVENOUS
Refills: 0 | Status: DISCONTINUED | OUTPATIENT
Start: 2022-02-05 | End: 2022-02-12

## 2022-02-05 RX ADMIN — Medication 6 MILLIGRAM(S): at 17:17

## 2022-02-05 RX ADMIN — LEVETIRACETAM 400 MILLIGRAM(S): 250 TABLET, FILM COATED ORAL at 06:24

## 2022-02-05 RX ADMIN — ENOXAPARIN SODIUM 40 MILLIGRAM(S): 100 INJECTION SUBCUTANEOUS at 11:24

## 2022-02-05 RX ADMIN — FAMOTIDINE 20 MILLIGRAM(S): 10 INJECTION INTRAVENOUS at 21:35

## 2022-02-05 RX ADMIN — LEVETIRACETAM 500 MILLIGRAM(S): 250 TABLET, FILM COATED ORAL at 17:55

## 2022-02-05 RX ADMIN — SODIUM CHLORIDE 75 MILLILITER(S): 9 INJECTION, SOLUTION INTRAVENOUS at 11:24

## 2022-02-05 RX ADMIN — MEROPENEM 100 MILLIGRAM(S): 1 INJECTION INTRAVENOUS at 15:50

## 2022-02-05 RX ADMIN — LIDOCAINE 1 PATCH: 4 CREAM TOPICAL at 11:24

## 2022-02-05 RX ADMIN — LIDOCAINE 1 PATCH: 4 CREAM TOPICAL at 23:12

## 2022-02-05 RX ADMIN — REMDESIVIR 500 MILLIGRAM(S): 5 INJECTION INTRAVENOUS at 17:54

## 2022-02-05 RX ADMIN — LIDOCAINE 1 PATCH: 4 CREAM TOPICAL at 18:06

## 2022-02-05 RX ADMIN — LIDOCAINE 1 PATCH: 4 CREAM TOPICAL at 11:25

## 2022-02-05 RX ADMIN — MIRTAZAPINE 15 MILLIGRAM(S): 45 TABLET, ORALLY DISINTEGRATING ORAL at 21:35

## 2022-02-05 RX ADMIN — Medication 166.67 MILLIGRAM(S): at 06:24

## 2022-02-05 RX ADMIN — MEROPENEM 100 MILLIGRAM(S): 1 INJECTION INTRAVENOUS at 08:47

## 2022-02-05 NOTE — PROGRESS NOTE ADULT - PROBLEM SELECTOR PLAN 3
Concern for bacterial infection, CXR with patchy left lung opacity, CT chest w/ b/l opacities  - c/w COVID19 treatment as below along with IV meropenem  - continue to monitor closely

## 2022-02-05 NOTE — CONSULT NOTE ADULT - ATTENDING COMMENTS
Transient episode of unrespnsiveness.    Exam:  MS: Psychomotor slowing.    CN: Mild right facial weakness.  R HH.   Motor: moderate spasticity in the right arm with weakness;  Delt 4 -; Tri - minimal effort; Biceps 4; FF 4; FE 3.  Moves right leg slightly < left.  Reflexes 3+except ankles 2.  Plantar - w/d, B.      < from: CT Head w/wo IV Cont (02.04.22 @ 16:18) >    IMPRESSION:    Similar extensive encephalomalacia in the left temporal and parietal   lobes with volume loss, consistent with postsurgical changes.    3 mm focus of enhancement within the region of the left parietal surgical   cavity (17-34). This may be postsurgical in nature. The presence of   residual/recurrent enhancing neoplasm is not excluded.    Similar nonspecific low density in the left frontal white matter and   minimally surrounding the surgical cavity.    < end of copied text >      A/P  Transient episode of unresponsiveness - DDx: toxic metabolic septic encephalopathy, unwitnessed seizure with post-ictal state.   Continue Keppra 500 mg Q12H  continuous EEG  Check Keppra level.    Thank you
62 yo M w/ PMHx of HTN, DM, anxiety, b/l DVT while on Coumadin (s/p IVF filter, now off coumadin), glioblastoma multiforme s/p craniotomy with resection c/b ESBL meningitis for which repeat craniotomy performed 2/2020 and patient was given 8 weeks Meropenem with repeat ESBL meningitis s/p craniectomy w/ washout w/ intrathecal and 10 week course IV Meropenem w/ history of VPS shunt unclear if still in place, with fevers  Prior episode COVID  Prior episode ESBL meningitis, on PO bactrim suppressive  Leukopenia, mild temp elevation  CXR with patchy left lung opacity  CT chest with ground glass opacities, infectious/inflammatory  Overall,  1) COVID  - 3LNC, uncertain bacterial vs viral cause  - RemD 5 days then DC  - Add Dexa if progressive O2 requirements  - O2 supplementation per primary team  2) Abnormal finding on imaging  - Cover for possible superinfection  - Meropenem 1g q 8  - DC Vanco  - Monitor clinically  3) ESBL Meningitis  - Jin as above for PPX  - From ID perspective can hold off on LP/shunt tap as long as is mentating well  - Monitor    Art Mcpherson MD  Contact on TEAMS messaging from 9am - 5pm  From 5pm-9am, and on weekends call [phone]    I was physically present for the key portions of the evaluation and management service provided. I saw and examined the patient. I agree with the above history, physical, and plan except for any discrepancies which I have documented in “Attending Statements.” Please refer to “Attending Statements” for final plan.

## 2022-02-05 NOTE — PROGRESS NOTE ADULT - NSPROGADDITIONALINFOA_GEN_ALL_CORE
Med rec pharmacist contacted for assistance verifying home medications with plan to resume PO medications once verified
Discussed with sister/HCP Maribel on 2/4 for 30 minutes.  ANswered all the questions.

## 2022-02-05 NOTE — PROGRESS NOTE ADULT - PROBLEM SELECTOR PLAN 5
c/b meningitis  On chronic Bactrim - changed to Meropenem IV for now.  Monitor mental status  Lovenox SC for VTE ppx

## 2022-02-05 NOTE — PROGRESS NOTE ADULT - PROBLEM SELECTOR PLAN 1
#Altered mental status  Presented with 4 days of fever, nausea, NB vomiting, here with AMS and COVID19 positive  - Mental status improving. Today, awake, alert, oriented to person, place (hospital, not the name), and year, following simple commands, answering appropriately  - AMS may be 2/2 recent seizure vs infectious  - ID consulted, recommendations appreciated especially given Hx ESBL meningitis  - C/w IV meropenem at this time. D/c vanco per ID. Per ID, can hold off on LP/shunt tap as long as mentating well  - Continue to monitor closely  - f/u cultures

## 2022-02-05 NOTE — PROGRESS NOTE ADULT - SUBJECTIVE AND OBJECTIVE BOX
[facility] Division of Hospital Medicine  Dahlia Hernandez MD  Pager #[phone]    Patient is a 61y old  Male who presents with a chief complaint of Fever (05 Feb 2022 08:24)    SUBJECTIVE / OVERNIGHT EVENTS: No acute events. Awake, oriented to person, hospital (uncertain of name, stated "[facility]"), and year. Reports reduced appetite. No focal pain or discomfort.     MEDICATIONS  (STANDING):  dexAMETHasone  Injectable 6 milliGRAM(s) IV Push daily  enoxaparin Injectable 40 milliGRAM(s) SubCutaneous daily  lactated ringers. 1000 milliLiter(s) (75 mL/Hr) IV Continuous <Continuous>  levETIRAcetam 500 milliGRAM(s) Oral two times a day  lidocaine   4% Patch 1 Patch Transdermal daily  lidocaine   4% Patch 1 Patch Transdermal daily  meropenem  IVPB      meropenem  IVPB 1000 milliGRAM(s) IV Intermittent every 8 hours  remdesivir  IVPB   IV Intermittent   remdesivir  IVPB 200 milliGRAM(s) IV Intermittent every 24 hours    MEDICATIONS  (PRN):  acetaminophen  Suppository .. 650 milliGRAM(s) Rectal every 4 hours PRN Temp greater or equal to 38.5C (101.3F)  ALBUTerol    90 MICROgram(s) HFA Inhaler 2 Puff(s) Inhalation every 6 hours PRN Shortness of Breath and/or Wheezing    CAPILLARY BLOOD GLUCOSE    POCT Blood Glucose.: 87 mg/dL (05 Feb 2022 13:07)  POCT Blood Glucose.: 110 mg/dL (05 Feb 2022 08:58)  POCT Blood Glucose.: 129 mg/dL (04 Feb 2022 22:50)  POCT Blood Glucose.: 122 mg/dL (04 Feb 2022 18:45)    I&O's Summary    PHYSICAL EXAM:  Vital Signs Last 24 Hrs  T(C): 36.3 (05 Feb 2022 14:00), Max: 36.7 (05 Feb 2022 06:30)  T(F): 97.4 (05 Feb 2022 14:00), Max: 98.1 (05 Feb 2022 06:30)  HR: 73 (05 Feb 2022 14:00) (63 - 73)  BP: 107/67 (05 Feb 2022 14:00) (107/67 - 117/62)  BP(mean): --  RR: 17 (05 Feb 2022 14:00) (17 - 18)  SpO2: 99% (05 Feb 2022 14:00) (96% - 100%)    CONSTITUTIONAL: NAD, laying in bed  EYES: EOMI; conjunctiva and sclera clear  ENMT: Moist oral mucosa  NECK: Supple, no JVD  RESPIRATORY: Normal respiratory effort; lungs are clear to auscultation bilaterally  CARDIOVASCULAR: Regular rate and rhythm, normal S1 and S2; No lower extremity edema; Peripheral pulses are 2+ bilaterally  ABDOMEN: Nontender to palpation, normoactive bowel sounds, no rebound/guarding; +BS  PSYCH: A+O to person, place (uncertain of hospital name), and year (not month)  NEUROLOGY: moving all extremities  SKIN: No rashes; no palpable lesions    LABS:                        11.9   3.74  )-----------( 157      ( 05 Feb 2022 07:22 )             38.5     02-05    141  |  107  |  17  ----------------------------<  102<H>  4.1   |  24  |  0.55    Ca    9.1      05 Feb 2022 07:22  Phos  2.0     02-05  Mg     2.00     02-05    TPro  5.6<L>  /  Alb  3.0<L>  /  TBili  0.3  /  DBili  x   /  AST  25  /  ALT  43<H>  /  AlkPhos  87  02-04    PT/INR - ( 03 Feb 2022 17:32 )   PT: 14.1 sec;   INR: 1.25 ratio      PTT - ( 03 Feb 2022 17:32 )  PTT:35.1 sec  CARDIAC MARKERS ( 05 Feb 2022 07:22 )  x     / x     / 12 U/L / x     / x        Culture - Blood (collected 04 Feb 2022 06:37)  Source: .Blood Blood-Peripheral  Preliminary Report (05 Feb 2022 07:01):    No growth to date.    Culture - Blood (collected 04 Feb 2022 06:33)  Source: .Blood Blood-Peripheral  Preliminary Report (05 Feb 2022 07:01):    No growth to date.    RADIOLOGY & ADDITIONAL TESTS: Reviewed    2/4/22 CT chest no contrast  Bilateral linear, consolidative and groundglass opacities, which can be   due to infectious or inflammatory etiology.    2/4/22 CTH w/ w/o IV contrast  Similar extensive encephalomalacia in the left temporal and parietal   lobes with volume loss, consistent with postsurgical changes.    3 mm focus of enhancement within the region of the left parietal surgical   cavity (17-34). This may be postsurgical in nature. The presence of   residual/recurrent enhancing neoplasm is not excluded.    Similar nonspecific low density in the left frontal white matter and   minimally surrounding the surgical cavity.    2/3/22 CXR:  Patchy left lung opacity may be infectious or inflammatory.    COORDINATION OF CARE:  Care Discussed with Consultants/Other Providers [YJose J RN, Medicine ACP]

## 2022-02-05 NOTE — PROGRESS NOTE ADULT - ASSESSMENT
61M HTN, Anxiety, DVT s/p IVC filter, not on coumadin, GBM s/p multiple craniotomy c/b ESBL meningitis requiring prolonged Abx, recently dx'd COVID 1/19 p/w Sepsis from multifocal PNA c/b acute encephalopathy, concern for breakthrough SZ.

## 2022-02-05 NOTE — PROGRESS NOTE ADULT - PROBLEM SELECTOR PLAN 2
Concern for breakthrough seizure due to abx vs infection.  CT Head as above. Neurology consulted, recommendations appreciated.  Pending EEG  F/u additional Neurology recs

## 2022-02-05 NOTE — CONSULT NOTE ADULT - SUBJECTIVE AND OBJECTIVE BOX
Patient is a 61 year old male who presents with a chief complaint of fever. (04 Feb 2022 19:10)    HPI:  Pt lethargic, arousable to verbal stimulation, unable to provide the reason for his hospital visit. Multiple attempts to contact emergency contact Mrs Maribel Vazquez @ [phone], made. All unsuccessful.     The patient is a 61 year old male w/ PMHx of HTN, anxiety, b/l DVT while on Coumadin (s/p IVF filter, now off coumadin), glioblastoma multiforme s/p craniotomy with resection c/b ESBL meningitis for which repeat craniotomy performed 2/2020 and patient was given 8 weeks Meropenem with repeat ESBL meningitis s/p craniectomy w/ washout w/ intrathecal and 10 week course IV Meropenem, COVID-19 infection in 3/2020 who was sent from [facility] for four day history of fevers, chills, nausea, and non-bloody vomiting. He was on Bactrim DS BID for prophylaxis and to be continued until discontinued by PCP. The patient was sent from [place] with a 4 day history of fevers, chills, nausea and non bloody vomiting. At [place] he had a CXR and was diagnosed with multifocal pneumonia with UA concerning for UTI. Patient is a poor historian.     Labs showed WBC 3.74K, Hgb 11.9 w/ normal renal function and liver function.        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   25 mL/Hr IV Intermittent (02-04-22 @ 07:27)   25 mL/Hr IV Intermittent (02-04-22 @ 00:14)    piperacillin/tazobactam IVPB...   200 mL/Hr IV Intermittent (02-03-22 @ 17:56)    vancomycin  IVPB   250 mL/Hr IV Intermittent (02-04-22 @ 05:21)    vancomycin  IVPB   166.67 mL/Hr IV Intermittent (02-05-22 @ 06:24)   166.67 mL/Hr IV Intermittent (02-04-22 @ 18:18)    vancomycin  IVPB.   250 mL/Hr IV Intermittent (02-03-22 @ 18:33)        meropenem  IVPB    meropenem  IVPB 1000 every 8 hours  vancomycin  IVPB 1250 every 12 hours    OTHER MEDS: MEDICATIONS  (STANDING):  acetaminophen  Suppository .. 650 every 4 hours PRN  ALBUTerol    90 MICROgram(s) HFA Inhaler 2 every 6 hours PRN  enoxaparin Injectable 40 daily  hydrALAZINE Injectable 5 every 8 hours  levETIRAcetam  IVPB 500 every 12 hours  ondansetron Injectable 4 every 6 hours PRN    SOCIAL HISTORY:       FAMILY HISTORY:  Cerebrovascular accident (Father)      REVIEW OF SYSTEMS  [  ] ROS unobtainable because:    [  ] All other systems negative except as noted below:	    Constitutional:  [ ] fever [ ] chills  [ ] weight loss  [ ] weakness  Skin:  [ ] rash [ ] phlebitis	  Eyes: [ ] icterus [ ] pain  [ ] discharge	  ENMT: [ ] sore throat  [ ] thrush [ ] ulcers [ ] exudates  Respiratory: [ ] dyspnea [ ] hemoptysis [ ] cough [ ] sputum	  Cardiovascular:  [ ] chest pain [ ] palpitations [ ] edema	  Gastrointestinal:  [ ] nausea [ ] vomiting [ ] diarrhea [ ] constipation [ ] pain	  Genitourinary:  [ ] dysuria [ ] frequency [ ] hematuria [ ] discharge [ ] flank pain  [ ] incontinence  Musculoskeletal:  [ ] myalgias [ ] arthralgias [ ] arthritis  [ ] back pain  Neurological:  [ ] headache [ ] seizures  [ ] confusion/altered mental status  Psychiatric:  [ ] anxiety [ ] depression	  Hematology/Lymphatics:  [ ] lymphadenopathy  Endocrine:  [ ] adrenal [ ] thyroid  Allergic/Immunologic:	 [ ] transplant [ ] seasonal    Vital Signs Last 24 Hrs  T(F): 98.1 (02-05-22 @ 06:30), Max: 100.2 (02-03-22 @ 18:46)  Vital Signs Last 24 Hrs  HR: 65 (02-05-22 @ 06:30) (62 - 65)  BP: 110/65 (02-05-22 @ 06:30) (110/65 - 123/80)  RR: 18 (02-05-22 @ 06:30)  SpO2: 100% (02-05-22 @ 06:30) (96% - 100%)  Wt(kg): --    PHYSICAL EXAM:  Constitutional: non-toxic, no distress  HEAD/EYES: anicteric, no conjunctival injection  ENT:  supple, no thrush  Cardiovascular:   normal S1, S2, no murmur, no edema  Respiratory:  clear BS bilaterally, no wheezes, no rales  GI:  soft, non-tender, normal bowel sounds  :  no francois, no CVA tenderness  Musculoskeletal:  no synovitis, normal ROM  Neurologic: awake and alert, normal strength, no focal findings  Skin:  no rash, no erythema, no phlebitis  Heme/Onc: no lymphadenopathy   Psychiatric:  awake, alert, appropriate mood                            11.9   3.74  )-----------( 157      ( 05 Feb 2022 07:22 )             38.5   02-05    141  |  107  |  17  ----------------------------<  102<H>  4.1   |  24  |  0.55    Ca    9.1      05 Feb 2022 07:22  Phos  2.0     02-05  Mg     2.00     02-05    TPro  5.6<L>  /  Alb  3.0<L>  /  TBili  0.3  /  DBili  x   /  AST  25  /  ALT  43<H>  /  AlkPhos  87  02-04    MICROBIOLOGY:Vancomycin Level, Trough: 9.7 (02-04 @ 14:53)    Culture - Blood (collected 04 Feb 2022 06:37)  Source: .Blood Blood-Peripheral  Preliminary Report (05 Feb 2022 07:01):    No growth to date.    Culture - Blood (collected 04 Feb 2022 06:33)  Source: .Blood Blood-Peripheral  Preliminary Report (05 Feb 2022 07:01):    No growth to date.              Rapid RVP Result: Detected (02-03 @ 17:34)      RADIOLOGY:  imaging below personally reviewed and agree with findings Patient is a 61 year old male who presents with a chief complaint of fever. (04 Feb 2022 19:10)    HPI:  Pt lethargic, arousable to verbal stimulation, unable to provide the reason for his hospital visit. Multiple attempts to contact emergency contact Mrs Maribel Vazquez @ [phone], made. All unsuccessful.     The patient is a 61 year old male w/ PMHx of HTN, DM, anxiety, b/l DVT while on Coumadin (s/p IVF filter, now off coumadin), glioblastoma multiforme s/p craniotomy with resection c/b ESBL meningitis for which repeat craniotomy performed 2/2020 and patient was given 8 weeks Meropenem with repeat ESBL meningitis s/p craniectomy w/ washout w/ intrathecal and 10 week course IV Meropenem, COVID-19 infection in 3/2020 and 8/2021 who was sent from [facility] for four day history of fevers, chills, nausea, and non-bloody vomiting. He was on Bactrim DS BID for prophylaxis and to be continued until discontinued by PCP. The patient was sent from [place] with a 4 day history of fevers, chills, nausea and non bloody vomiting. At [place] he had a CXR and was diagnosed with multifocal pneumonia with UA concerning for pyuria and + LE. Patient is a poor historian. Patient had an episode of confusion due to possible toxic encephalopathy or post-ictal state due to possible unwitnessed seizure. Tmax was 100.2. Patient is on 3 LPM via nasal cannula. He received his COVID-19 vaccines in March/April 2021 with the Pfizer series.     Labs showed WBC 3.74K, Hgb 11.9 w/ normal renal function. AST 25, ALT 43, Ferritin 3110, , RVP was positive for SARS-CoV-2. BCx x 2 showed NGTD. CXR showed a patchy left lung opacity. CT head w/o contrast showed extensive encephalomalacia in the left temporal and parietal lobes with volume loss, consistent with postsurgical changes and a 3 mm focus of enhancement within the region of the left parietal surgical that may be postsurgical in nature w/ possible presence of residual/recurrent enhancing neoplasm. CT chest w/o contrast showed bilateral linear, consolidative and groundglass opacities, which can be due to infectious or inflammatory etiology. He received IV zosyn and started on IV vancomycin and IV meropenem. EEG was ordered. ID was consulted for further recommendations.          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   25 mL/Hr IV Intermittent (02-04-22 @ 07:27)   25 mL/Hr IV Intermittent (02-04-22 @ 00:14)    piperacillin/tazobactam IVPB...   200 mL/Hr IV Intermittent (02-03-22 @ 17:56)    vancomycin  IVPB   250 mL/Hr IV Intermittent (02-04-22 @ 05:21)    vancomycin  IVPB   166.67 mL/Hr IV Intermittent (02-05-22 @ 06:24)   166.67 mL/Hr IV Intermittent (02-04-22 @ 18:18)    vancomycin  IVPB.   250 mL/Hr IV Intermittent (02-03-22 @ 18:33)        meropenem  IVPB    meropenem  IVPB 1000 every 8 hours  vancomycin  IVPB 1250 every 12 hours    OTHER MEDS: MEDICATIONS  (STANDING):  acetaminophen  Suppository .. 650 every 4 hours PRN  ALBUTerol    90 MICROgram(s) HFA Inhaler 2 every 6 hours PRN  enoxaparin Injectable 40 daily  hydrALAZINE Injectable 5 every 8 hours  levETIRAcetam  IVPB 500 every 12 hours  ondansetron Injectable 4 every 6 hours PRN    SOCIAL HISTORY:   unknown    FAMILY HISTORY:  Cerebrovascular accident (Father)      REVIEW OF SYSTEMS  [x] ROS unobtainable because:  AMS  [  ] All other systems negative except as noted below:	    Constitutional:  [ ] fever [x] chills  [ ] weight loss  [x] weakness  Skin:  [ ] rash [ ] phlebitis	  Eyes: [ ] icterus [ ] pain  [ ] discharge	  ENMT: [ ] sore throat  [ ] thrush [ ] ulcers [ ] exudates  Respiratory: [ ] dyspnea [ ] hemoptysis [ ] cough [ ] sputum	  Cardiovascular:  [ ] chest pain [ ] palpitations [ ] edema	  Gastrointestinal:  [x] nausea [x] vomiting [ ] diarrhea [ ] constipation [ ] pain	  Genitourinary:  [ ] dysuria [ ] frequency [ ] hematuria [ ] discharge [ ] flank pain  [ ] incontinence  Musculoskeletal:  [ ] myalgias [ ] arthralgias [ ] arthritis  [ ] back pain  Neurological:  [ ] headache [ ] seizures  [x] confusion/altered mental status  Psychiatric:  [x] anxiety [ ] depression	  Hematology/Lymphatics:  [ ] lymphadenopathy  Endocrine:  [ ] adrenal [ ] thyroid  Allergic/Immunologic:	 [ ] transplant [ ] seasonal    Vital Signs Last 24 Hrs  T(F): 98.1 (02-05-22 @ 06:30), Max: 100.2 (02-03-22 @ 18:46)  Vital Signs Last 24 Hrs  HR: 65 (02-05-22 @ 06:30) (62 - 65)  BP: 110/65 (02-05-22 @ 06:30) (110/65 - 123/80)  RR: 18 (02-05-22 @ 06:30)  SpO2: 100% (02-05-22 @ 06:30) (96% - 100%)  Wt(kg): --    PHYSICAL EXAM:  Constitutional: non-toxic, no distress  HEAD/EYES: anicteric, no conjunctival injection  ENT:  supple, no thrush  Cardiovascular:   normal S1, S2, no murmur, no edema  Respiratory:  + decreased breath sounds bilaterally  GI:  soft, non-tender, normal bowel sounds  :  no francois  Musculoskeletal: decreased ROM d/t weakness   Neurologic: + appears confused and lethargic   Skin:  no rashes  Heme/Onc: no lymphadenopathy   Psychiatric:  minimal response to commands                             11.9   3.74  )-----------( 157      ( 05 Feb 2022 07:22 )             38.5   02-05    141  |  107  |  17  ----------------------------<  102<H>  4.1   |  24  |  0.55    Ca    9.1      05 Feb 2022 07:22  Phos  2.0     02-05  Mg     2.00     02-05    TPro  5.6<L>  /  Alb  3.0<L>  /  TBili  0.3  /  DBili  x   /  AST  25  /  ALT  43<H>  /  AlkPhos  87  02-04    MICROBIOLOGY:    Vancomycin Level, Trough: 9.7 (02-04 @ 14:53)    Culture - Blood (collected 04 Feb 2022 06:37)  Source: .Blood Blood-Peripheral  Preliminary Report (05 Feb 2022 07:01):    No growth to date.    Culture - Blood (collected 04 Feb 2022 06:33)  Source: .Blood Blood-Peripheral  Preliminary Report (05 Feb 2022 07:01):    No growth to date.      Rapid RVP Result: Detected (02-03 @ 17:34) + SARS CoV-2    Prior microbiology      RADIOLOGY:      < from: CT Head w/wo IV Cont (02.04.22 @ 16:18) >  IMPRESSION:    Similar extensive encephalomalacia in the left temporal and parietal   lobes with volume loss, consistent with postsurgical changes.    3 mm focus of enhancement within the region of the left parietal surgical   cavity (17-34). This may be postsurgical in nature. The presence of   residual/recurrent enhancing neoplasm is not excluded.    Similar nonspecific low density in the left frontal white matter and   minimally surrounding the surgical cavity.    --- End of Report ---    < end of copied text >    < from: CT Chest No Cont (02.04.22 @ 16:18) >  IMPRESSION:    Bilateral linear, consolidative and groundglass opacities, which can be   due to infectious or inflammatory etiology.    --- End of Report ---    < end of copied text >    < from: Xray Chest 1 View- PORTABLE-Urgent (02.03.22 @ 18:46) >    IMPRESSION:  Patchy left lung opacity may be infectious or inflammatory.    --- End of Report ---    < end of copied text >     Patient is a 61 year old male who presents with a chief complaint of fever. (04 Feb 2022 19:10)    HPI:  The patient is a 61 year old male w/ PMHx of HTN, DM, anxiety, b/l DVT while on Coumadin (s/p IVF filter, now off coumadin), glioblastoma multiforme s/p craniotomy with resection c/b ESBL meningitis for which repeat craniotomy performed 2/2020 and patient was given 8 weeks Meropenem with repeat ESBL meningitis s/p craniectomy w/ washout w/ intrathecal and 10 week course IV Meropenem w/ history of VPS shunt unclear if still in place, COVID-19 infection in 3/2020 and 8/2021 who was sent from [facility] for four day history of fevers, chills, nausea, and non-bloody vomiting. He was on Bactrim DS BID for prophylaxis and to be continued until discontinued by PCP. The patient was sent from [place] with a 4 day history of fevers, chills, nausea and non bloody vomiting. At [place] he had a CXR and was diagnosed with multifocal pneumonia with UA concerning for pyuria and + LE. Patient is a poor historian. Patient had an episode of confusion due to possible toxic encephalopathy or post-ictal state due to possible unwitnessed seizure. Tmax was 100.2. Patient is on 3 LPM via nasal cannula. He received his COVID-19 vaccines in March/April 2021 with the Pfizer series.     Labs showed WBC 3.74K, Hgb 11.9 w/ normal renal function. AST 25, ALT 43, Ferritin 3110, , RVP was positive for SARS-CoV-2. BCx x 2 showed NGTD. CXR showed a patchy left lung opacity. CT head w/o contrast showed extensive encephalomalacia in the left temporal and parietal lobes with volume loss, consistent with postsurgical changes and a 3 mm focus of enhancement within the region of the left parietal surgical that may be postsurgical in nature w/ possible presence of residual/recurrent enhancing neoplasm. CT chest w/o contrast showed bilateral linear, consolidative and groundglass opacities, which can be due to infectious or inflammatory etiology. He received IV zosyn and started on IV vancomycin and IV meropenem. EEG was ordered. ID was consulted for further recommendations.          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   25 mL/Hr IV Intermittent (02-04-22 @ 07:27)   25 mL/Hr IV Intermittent (02-04-22 @ 00:14)    piperacillin/tazobactam IVPB...   200 mL/Hr IV Intermittent (02-03-22 @ 17:56)    vancomycin  IVPB   250 mL/Hr IV Intermittent (02-04-22 @ 05:21)    vancomycin  IVPB   166.67 mL/Hr IV Intermittent (02-05-22 @ 06:24)   166.67 mL/Hr IV Intermittent (02-04-22 @ 18:18)    vancomycin  IVPB.   250 mL/Hr IV Intermittent (02-03-22 @ 18:33)        meropenem  IVPB    meropenem  IVPB 1000 every 8 hours  vancomycin  IVPB 1250 every 12 hours    OTHER MEDS: MEDICATIONS  (STANDING):  acetaminophen  Suppository .. 650 every 4 hours PRN  ALBUTerol    90 MICROgram(s) HFA Inhaler 2 every 6 hours PRN  enoxaparin Injectable 40 daily  hydrALAZINE Injectable 5 every 8 hours  levETIRAcetam  IVPB 500 every 12 hours  ondansetron Injectable 4 every 6 hours PRN    SOCIAL HISTORY:   unknown    FAMILY HISTORY:  Cerebrovascular accident (Father)      REVIEW OF SYSTEMS  [x] ROS unobtainable because:  AMS  [  ] All other systems negative except as noted below:	    Constitutional:  [ ] fever [x] chills  [ ] weight loss  [x] weakness  Skin:  [ ] rash [ ] phlebitis	  Eyes: [ ] icterus [ ] pain  [ ] discharge	  ENMT: [ ] sore throat  [ ] thrush [ ] ulcers [ ] exudates  Respiratory: [ ] dyspnea [ ] hemoptysis [ ] cough [ ] sputum	  Cardiovascular:  [ ] chest pain [ ] palpitations [ ] edema	  Gastrointestinal:  [x] nausea [x] vomiting [ ] diarrhea [ ] constipation [ ] pain	  Genitourinary:  [ ] dysuria [ ] frequency [ ] hematuria [ ] discharge [ ] flank pain  [ ] incontinence  Musculoskeletal:  [ ] myalgias [ ] arthralgias [ ] arthritis  [ ] back pain  Neurological:  [ ] headache [ ] seizures  [x] confusion/altered mental status  Psychiatric:  [x] anxiety [ ] depression	  Hematology/Lymphatics:  [ ] lymphadenopathy  Endocrine:  [ ] adrenal [ ] thyroid  Allergic/Immunologic:	 [ ] transplant [ ] seasonal    Vital Signs Last 24 Hrs  T(F): 98.1 (02-05-22 @ 06:30), Max: 100.2 (02-03-22 @ 18:46)  Vital Signs Last 24 Hrs  HR: 65 (02-05-22 @ 06:30) (62 - 65)  BP: 110/65 (02-05-22 @ 06:30) (110/65 - 123/80)  RR: 18 (02-05-22 @ 06:30)  SpO2: 100% (02-05-22 @ 06:30) (96% - 100%)  Wt(kg): --    PHYSICAL EXAM:  Constitutional: non-toxic, no distress  HEAD/EYES: anicteric, no conjunctival injection  ENT:  supple, no thrush  Cardiovascular:   normal S1, S2, no murmur, no edema  Respiratory:  + decreased breath sounds bilaterally  GI:  soft, non-tender, normal bowel sounds  :  no francois  Musculoskeletal: decreased ROM d/t weakness   Neurologic: + appears confused and lethargic   Skin:  no rashes  Heme/Onc: no lymphadenopathy   Psychiatric:  minimal response to commands                             11.9   3.74  )-----------( 157      ( 05 Feb 2022 07:22 )             38.5   02-05    141  |  107  |  17  ----------------------------<  102<H>  4.1   |  24  |  0.55    Ca    9.1      05 Feb 2022 07:22  Phos  2.0     02-05  Mg     2.00     02-05    TPro  5.6<L>  /  Alb  3.0<L>  /  TBili  0.3  /  DBili  x   /  AST  25  /  ALT  43<H>  /  AlkPhos  87  02-04    MICROBIOLOGY:    Vancomycin Level, Trough: 9.7 (02-04 @ 14:53)    Culture - Blood (collected 04 Feb 2022 06:37)  Source: .Blood Blood-Peripheral  Preliminary Report (05 Feb 2022 07:01):    No growth to date.    Culture - Blood (collected 04 Feb 2022 06:33)  Source: .Blood Blood-Peripheral  Preliminary Report (05 Feb 2022 07:01):    No growth to date.      Rapid RVP Result: Detected (02-03 @ 17:34) + SARS CoV-2    Prior microbiology      RADIOLOGY:      < from: CT Head w/wo IV Cont (02.04.22 @ 16:18) >  IMPRESSION:    Similar extensive encephalomalacia in the left temporal and parietal   lobes with volume loss, consistent with postsurgical changes.    3 mm focus of enhancement within the region of the left parietal surgical   cavity (17-34). This may be postsurgical in nature. The presence of   residual/recurrent enhancing neoplasm is not excluded.    Similar nonspecific low density in the left frontal white matter and   minimally surrounding the surgical cavity.    --- End of Report ---    < end of copied text >    < from: CT Chest No Cont (02.04.22 @ 16:18) >  IMPRESSION:    Bilateral linear, consolidative and groundglass opacities, which can be   due to infectious or inflammatory etiology.    --- End of Report ---    < end of copied text >    < from: Xray Chest 1 View- PORTABLE-Urgent (02.03.22 @ 18:46) >    IMPRESSION:  Patchy left lung opacity may be infectious or inflammatory.    --- End of Report ---    < end of copied text >     Patient is a 61 year old male who presents with a chief complaint of fever. (04 Feb 2022 19:10)    HPI:  The patient is a 61 year old male w/ PMHx of HTN, DM, anxiety, b/l DVT while on Coumadin (s/p IVF filter, now off coumadin), glioblastoma multiforme s/p craniotomy with resection c/b ESBL meningitis for which repeat craniotomy performed 2/2020 and patient was given 8 weeks Meropenem with repeat ESBL meningitis s/p craniectomy w/ washout w/ intrathecal and 10 week course IV Meropenem w/ history of VPS shunt unclear if still in place, COVID-19 infection in 3/2020 and 8/2021 who was sent from [facility] for four day history of fevers, chills, nausea, and non-bloody vomiting. He was on Bactrim DS BID for prophylaxis and to be continued until discontinued by PCP. The patient was sent from [place] with a 4 day history of fevers, chills, nausea and non bloody vomiting. At [place] he had a CXR and was diagnosed with multifocal pneumonia with UA concerning for pyuria and + LE. Patient is a poor historian. Patient had an episode of confusion due to possible toxic encephalopathy or post-ictal state due to possible unwitnessed seizure. Tmax was 100.2. Patient is on 3 LPM via nasal cannula. He received his COVID-19 vaccines in March/April 2021 with the Pfizer series.     Labs showed WBC 3.74K, Hgb 11.9 w/ normal renal function. AST 25, ALT 43, Ferritin 3110, , RVP was positive for SARS-CoV-2. BCx x 2 showed NGTD. CXR showed a patchy left lung opacity. CT head w/o contrast showed extensive encephalomalacia in the left temporal and parietal lobes with volume loss, consistent with postsurgical changes and a 3 mm focus of enhancement within the region of the left parietal surgical that may be postsurgical in nature w/ possible presence of residual/recurrent enhancing neoplasm. CT chest w/o contrast showed bilateral linear, consolidative and groundglass opacities, which can be due to infectious or inflammatory etiology. He received IV zosyn and started on IV vancomycin and IV meropenem. EEG was ordered. ID was consulted for further recommendations.          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   25 mL/Hr IV Intermittent (02-04-22 @ 07:27)   25 mL/Hr IV Intermittent (02-04-22 @ 00:14)    piperacillin/tazobactam IVPB...   200 mL/Hr IV Intermittent (02-03-22 @ 17:56)    vancomycin  IVPB   250 mL/Hr IV Intermittent (02-04-22 @ 05:21)    vancomycin  IVPB   166.67 mL/Hr IV Intermittent (02-05-22 @ 06:24)   166.67 mL/Hr IV Intermittent (02-04-22 @ 18:18)    vancomycin  IVPB.   250 mL/Hr IV Intermittent (02-03-22 @ 18:33)        meropenem  IVPB    meropenem  IVPB 1000 every 8 hours  vancomycin  IVPB 1250 every 12 hours    OTHER MEDS: MEDICATIONS  (STANDING):  acetaminophen  Suppository .. 650 every 4 hours PRN  ALBUTerol    90 MICROgram(s) HFA Inhaler 2 every 6 hours PRN  enoxaparin Injectable 40 daily  hydrALAZINE Injectable 5 every 8 hours  levETIRAcetam  IVPB 500 every 12 hours  ondansetron Injectable 4 every 6 hours PRN    SOCIAL HISTORY:  does not smoke, drink alcohol, or use recreational drugs     FAMILY HISTORY:  Cerebrovascular accident (Father)      REVIEW OF SYSTEMS  [x] ROS unobtainable because:  AMS  [  ] All other systems negative except as noted below:	    Constitutional:  [ ] fever [x] chills  [ ] weight loss  [x] weakness  Skin:  [ ] rash [ ] phlebitis	  Eyes: [ ] icterus [ ] pain  [ ] discharge	  ENMT: [ ] sore throat  [ ] thrush [ ] ulcers [ ] exudates  Respiratory: [ ] dyspnea [ ] hemoptysis [ ] cough [ ] sputum	  Cardiovascular:  [ ] chest pain [ ] palpitations [ ] edema	  Gastrointestinal:  [x] nausea [x] vomiting [ ] diarrhea [ ] constipation [ ] pain	  Genitourinary:  [ ] dysuria [ ] frequency [ ] hematuria [ ] discharge [ ] flank pain  [ ] incontinence  Musculoskeletal:  [ ] myalgias [ ] arthralgias [ ] arthritis  [ ] back pain  Neurological:  [ ] headache [ ] seizures  [x] confusion/altered mental status  Psychiatric:  [x] anxiety [ ] depression	  Hematology/Lymphatics:  [ ] lymphadenopathy  Endocrine:  [ ] adrenal [ ] thyroid  Allergic/Immunologic:	 [ ] transplant [ ] seasonal    Vital Signs Last 24 Hrs  T(F): 98.1 (02-05-22 @ 06:30), Max: 100.2 (02-03-22 @ 18:46)  Vital Signs Last 24 Hrs  HR: 65 (02-05-22 @ 06:30) (62 - 65)  BP: 110/65 (02-05-22 @ 06:30) (110/65 - 123/80)  RR: 18 (02-05-22 @ 06:30)  SpO2: 100% (02-05-22 @ 06:30) (96% - 100%)  Wt(kg): --    PHYSICAL EXAM:  Constitutional: non-toxic, no distress  HEAD/EYES: anicteric, + prior L craniotomy site w/ internal VPS shunt in place   ENT:  supple, + nasal cannula in place   Cardiovascular:   normal S1, S2, no murmur, + generalized edema   Respiratory:  + decreased breath sounds bilaterally  GI:  soft  :  + francois  Musculoskeletal: decreased ROM d/t weakness   Neurologic: AAOx3, responds to commands   Skin:  no rashes  Heme/Onc: no lymphadenopathy   Psychiatric:  flat affect                            11.9   3.74  )-----------( 157      ( 05 Feb 2022 07:22 )             38.5   02-05    141  |  107  |  17  ----------------------------<  102<H>  4.1   |  24  |  0.55    Ca    9.1      05 Feb 2022 07:22  Phos  2.0     02-05  Mg     2.00     02-05    TPro  5.6<L>  /  Alb  3.0<L>  /  TBili  0.3  /  DBili  x   /  AST  25  /  ALT  43<H>  /  AlkPhos  87  02-04    MICROBIOLOGY:    Vancomycin Level, Trough: 9.7 (02-04 @ 14:53)    Culture - Blood (collected 04 Feb 2022 06:37)  Source: .Blood Blood-Peripheral  Preliminary Report (05 Feb 2022 07:01):    No growth to date.    Culture - Blood (collected 04 Feb 2022 06:33)  Source: .Blood Blood-Peripheral  Preliminary Report (05 Feb 2022 07:01):    No growth to date.      Rapid RVP Result: Detected (02-03 @ 17:34) + SARS CoV-2    Prior microbiology      RADIOLOGY:      < from: CT Head w/wo IV Cont (02.04.22 @ 16:18) >  IMPRESSION:    Similar extensive encephalomalacia in the left temporal and parietal   lobes with volume loss, consistent with postsurgical changes.    3 mm focus of enhancement within the region of the left parietal surgical   cavity (17-34). This may be postsurgical in nature. The presence of   residual/recurrent enhancing neoplasm is not excluded.    Similar nonspecific low density in the left frontal white matter and   minimally surrounding the surgical cavity.    --- End of Report ---    < end of copied text >    < from: CT Chest No Cont (02.04.22 @ 16:18) >  IMPRESSION:    Bilateral linear, consolidative and groundglass opacities, which can be   due to infectious or inflammatory etiology.    --- End of Report ---    < end of copied text >    < from: Xray Chest 1 View- PORTABLE-Urgent (02.03.22 @ 18:46) >    IMPRESSION:  Patchy left lung opacity may be infectious or inflammatory.    --- End of Report ---    < end of copied text >     Patient is a 61 year old male who presents with a chief complaint of fever. (04 Feb 2022 19:10)    HPI:  The patient is a 61 year old male w/ PMHx of HTN, DM, anxiety, b/l DVT while on Coumadin (s/p IVF filter, now off coumadin), glioblastoma multiforme s/p craniotomy with resection c/b ESBL meningitis for which repeat craniotomy performed 2/2020 and patient was given 8 weeks Meropenem with repeat ESBL meningitis s/p craniectomy w/ washout w/ intrathecal and 10 week course IV Meropenem w/ history of VPS shunt unclear if still in place, COVID-19 infection in 3/2020 and 8/2021 who was sent from [facility] for four day history of fevers, chills, nausea, and non-bloody vomiting. He was on Bactrim DS BID for prophylaxis and to be continued until discontinued by PCP. The patient was sent from [place] with a 4 day history of fevers, chills, nausea and non bloody vomiting. At [place] he had a CXR and was diagnosed with multifocal pneumonia with UA concerning for pyuria and + LE. Patient is a poor historian. Patient had an episode of confusion due to possible toxic encephalopathy or post-ictal state due to possible unwitnessed seizure. Tmax was 100.2. Patient is on 3 LPM via nasal cannula. He received his COVID-19 vaccines in March/April 2021 with the Pfizer series.  He reports this was his third COVID-19 diagnosis. He reports multiple patients that tested positive for COVID-19 in the nursing home with a known exposure.     Labs showed WBC 3.74K, Hgb 11.9 w/ normal renal function. AST 25, ALT 43, Ferritin 3110, , RVP was positive for SARS-CoV-2. BCx x 2 showed NGTD. CXR showed a patchy left lung opacity. CT head w/o contrast showed extensive encephalomalacia in the left temporal and parietal lobes with volume loss, consistent with postsurgical changes and a 3 mm focus of enhancement within the region of the left parietal surgical that may be postsurgical in nature w/ possible presence of residual/recurrent enhancing neoplasm. CT chest w/o contrast showed bilateral linear, consolidative and groundglass opacities, which can be due to infectious or inflammatory etiology. He received IV zosyn and started on IV vancomycin and IV meropenem. EEG was ordered. ID was consulted for further recommendations.          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   25 mL/Hr IV Intermittent (02-04-22 @ 07:27)   25 mL/Hr IV Intermittent (02-04-22 @ 00:14)    piperacillin/tazobactam IVPB...   200 mL/Hr IV Intermittent (02-03-22 @ 17:56)    vancomycin  IVPB   250 mL/Hr IV Intermittent (02-04-22 @ 05:21)    vancomycin  IVPB   166.67 mL/Hr IV Intermittent (02-05-22 @ 06:24)   166.67 mL/Hr IV Intermittent (02-04-22 @ 18:18)    vancomycin  IVPB.   250 mL/Hr IV Intermittent (02-03-22 @ 18:33)        meropenem  IVPB    meropenem  IVPB 1000 every 8 hours  vancomycin  IVPB 1250 every 12 hours    OTHER MEDS: MEDICATIONS  (STANDING):  acetaminophen  Suppository .. 650 every 4 hours PRN  ALBUTerol    90 MICROgram(s) HFA Inhaler 2 every 6 hours PRN  enoxaparin Injectable 40 daily  hydrALAZINE Injectable 5 every 8 hours  levETIRAcetam  IVPB 500 every 12 hours  ondansetron Injectable 4 every 6 hours PRN    SOCIAL HISTORY:  does not smoke, drink alcohol, or use recreational drugs     FAMILY HISTORY:  Cerebrovascular accident (Father)      REVIEW OF SYSTEMS  [x] ROS unobtainable because:  AMS  [  ] All other systems negative except as noted below:	    Constitutional:  [ ] fever [x] chills  [ ] weight loss  [x] weakness  Skin:  [ ] rash [ ] phlebitis	  Eyes: [ ] icterus [ ] pain  [ ] discharge	  ENMT: [ ] sore throat  [ ] thrush [ ] ulcers [ ] exudates  Respiratory: [ ] dyspnea [ ] hemoptysis [ ] cough [ ] sputum	  Cardiovascular:  [ ] chest pain [ ] palpitations [ ] edema	  Gastrointestinal:  [x] nausea [x] vomiting [ ] diarrhea [ ] constipation [ ] pain	  Genitourinary:  [ ] dysuria [ ] frequency [ ] hematuria [ ] discharge [ ] flank pain  [ ] incontinence  Musculoskeletal:  [ ] myalgias [ ] arthralgias [ ] arthritis  [ ] back pain  Neurological:  [ ] headache [ ] seizures  [x] confusion/altered mental status  Psychiatric:  [x] anxiety [ ] depression	  Hematology/Lymphatics:  [ ] lymphadenopathy  Endocrine:  [ ] adrenal [ ] thyroid  Allergic/Immunologic:	 [ ] transplant [ ] seasonal    Vital Signs Last 24 Hrs  T(F): 98.1 (02-05-22 @ 06:30), Max: 100.2 (02-03-22 @ 18:46)  Vital Signs Last 24 Hrs  HR: 65 (02-05-22 @ 06:30) (62 - 65)  BP: 110/65 (02-05-22 @ 06:30) (110/65 - 123/80)  RR: 18 (02-05-22 @ 06:30)  SpO2: 100% (02-05-22 @ 06:30) (96% - 100%)  Wt(kg): --    PHYSICAL EXAM:  Constitutional: non-toxic, no distress  HEAD/EYES: anicteric, + prior L craniotomy site w/ internal VPS shunt in place   ENT:  supple, + nasal cannula in place   Cardiovascular:   normal S1, S2, no murmur, + generalized edema   Respiratory:  + decreased breath sounds bilaterally  GI:  soft  :  + francois  Musculoskeletal: decreased ROM d/t weakness   Neurologic: AAOx3, responds to commands   Skin:  no rashes  Heme/Onc: no lymphadenopathy   Psychiatric:  flat affect                            11.9   3.74  )-----------( 157      ( 05 Feb 2022 07:22 )             38.5   02-05    141  |  107  |  17  ----------------------------<  102<H>  4.1   |  24  |  0.55    Ca    9.1      05 Feb 2022 07:22  Phos  2.0     02-05  Mg     2.00     02-05    TPro  5.6<L>  /  Alb  3.0<L>  /  TBili  0.3  /  DBili  x   /  AST  25  /  ALT  43<H>  /  AlkPhos  87  02-04    MICROBIOLOGY:    Vancomycin Level, Trough: 9.7 (02-04 @ 14:53)    Culture - Blood (collected 04 Feb 2022 06:37)  Source: .Blood Blood-Peripheral  Preliminary Report (05 Feb 2022 07:01):    No growth to date.    Culture - Blood (collected 04 Feb 2022 06:33)  Source: .Blood Blood-Peripheral  Preliminary Report (05 Feb 2022 07:01):    No growth to date.      Rapid RVP Result: Detected (02-03 @ 17:34) + SARS CoV-2    Prior microbiology      RADIOLOGY:      < from: CT Head w/wo IV Cont (02.04.22 @ 16:18) >  IMPRESSION:    Similar extensive encephalomalacia in the left temporal and parietal   lobes with volume loss, consistent with postsurgical changes.    3 mm focus of enhancement within the region of the left parietal surgical   cavity (17-34). This may be postsurgical in nature. The presence of   residual/recurrent enhancing neoplasm is not excluded.    Similar nonspecific low density in the left frontal white matter and   minimally surrounding the surgical cavity.    --- End of Report ---    < end of copied text >    < from: CT Chest No Cont (02.04.22 @ 16:18) >  IMPRESSION:    Bilateral linear, consolidative and groundglass opacities, which can be   due to infectious or inflammatory etiology.    --- End of Report ---    < end of copied text >    < from: Xray Chest 1 View- PORTABLE-Urgent (02.03.22 @ 18:46) >    IMPRESSION:  Patchy left lung opacity may be infectious or inflammatory.    --- End of Report ---    < end of copied text >     Patient is a 61 year old male who presents with a chief complaint of fever. (04 Feb 2022 19:10)    HPI:  The patient is a 61 year old male w/ PMHx of HTN, DM, anxiety, b/l DVT while on Coumadin (s/p IVF filter, now off coumadin), glioblastoma multiforme s/p craniotomy with resection c/b ESBL meningitis for which repeat craniotomy performed 2/2020 and patient was given 8 weeks Meropenem with repeat ESBL meningitis s/p craniectomy w/ washout w/ intrathecal and 10 week course IV Meropenem w/ history of VPS shunt unclear if still in place, COVID-19 infection in 3/2020 and 8/2021 who was sent from [facility] for four day history of fevers, chills, nausea, and non-bloody vomiting. He was on Bactrim DS BID for prophylaxis and to be continued until discontinued by PCP. The patient was sent from [place] with a 4 day history of fevers, chills, nausea and non bloody vomiting. At [place] he had a CXR and was diagnosed with multifocal pneumonia with UA concerning for pyuria and + LE. Patient is a poor historian. Patient had an episode of confusion due to possible toxic encephalopathy or post-ictal state due to possible unwitnessed seizure. Tmax was 100.2. Patient is on 3 LPM via nasal cannula. He received his COVID-19 vaccines in March/April 2021 with the Pfizer series.  He reports this was his third COVID-19 diagnosis. He reports multiple patients that tested positive for COVID-19 in the nursing home with a known exposure.     Labs showed WBC 3.74K, Hgb 11.9 w/ normal renal function. AST 25, ALT 43, Ferritin 3110, , RVP was positive for SARS-CoV-2. BCx x 2 showed NGTD. CXR showed a patchy left lung opacity. CT head w/o contrast showed extensive encephalomalacia in the left temporal and parietal lobes with volume loss, consistent with postsurgical changes and a 3 mm focus of enhancement within the region of the left parietal surgical that may be postsurgical in nature w/ possible presence of residual/recurrent enhancing neoplasm. CT chest w/o contrast showed bilateral linear, consolidative and groundglass opacities, which can be due to infectious or inflammatory etiology. He received IV zosyn and started on IV vancomycin and IV meropenem. EEG was ordered. ID was consulted for further recommendations.      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   25 mL/Hr IV Intermittent (02-04-22 @ 07:27)   25 mL/Hr IV Intermittent (02-04-22 @ 00:14)    piperacillin/tazobactam IVPB...   200 mL/Hr IV Intermittent (02-03-22 @ 17:56)    vancomycin  IVPB   250 mL/Hr IV Intermittent (02-04-22 @ 05:21)    vancomycin  IVPB   166.67 mL/Hr IV Intermittent (02-05-22 @ 06:24)   166.67 mL/Hr IV Intermittent (02-04-22 @ 18:18)    vancomycin  IVPB.   250 mL/Hr IV Intermittent (02-03-22 @ 18:33)    meropenem  IVPB    meropenem  IVPB 1000 every 8 hours  vancomycin  IVPB 1250 every 12 hours    OTHER MEDS: MEDICATIONS  (STANDING):  acetaminophen  Suppository .. 650 every 4 hours PRN  ALBUTerol    90 MICROgram(s) HFA Inhaler 2 every 6 hours PRN  enoxaparin Injectable 40 daily  hydrALAZINE Injectable 5 every 8 hours  levETIRAcetam  IVPB 500 every 12 hours  ondansetron Injectable 4 every 6 hours PRN    SOCIAL HISTORY:  does not smoke, drink alcohol, or use recreational drugs     FAMILY HISTORY:  Cerebrovascular accident (Father)      REVIEW OF SYSTEMS  [x] ROS unobtainable because:  AMS  [  ] All other systems negative except as noted below:	    Constitutional:  [ ] fever [x] chills  [ ] weight loss  [x] weakness  Skin:  [ ] rash [ ] phlebitis	  Eyes: [ ] icterus [ ] pain  [ ] discharge	  ENMT: [ ] sore throat  [ ] thrush [ ] ulcers [ ] exudates  Respiratory: [ ] dyspnea [ ] hemoptysis [ ] cough [ ] sputum	  Cardiovascular:  [ ] chest pain [ ] palpitations [ ] edema	  Gastrointestinal:  [x] nausea [x] vomiting [ ] diarrhea [ ] constipation [ ] pain	  Genitourinary:  [ ] dysuria [ ] frequency [ ] hematuria [ ] discharge [ ] flank pain  [ ] incontinence  Musculoskeletal:  [ ] myalgias [ ] arthralgias [ ] arthritis  [ ] back pain  Neurological:  [ ] headache [ ] seizures  [x] confusion/altered mental status  Psychiatric:  [x] anxiety [ ] depression	  Hematology/Lymphatics:  [ ] lymphadenopathy  Endocrine:  [ ] adrenal [ ] thyroid  Allergic/Immunologic:	 [ ] transplant [ ] seasonal    Vital Signs Last 24 Hrs  T(F): 98.1 (02-05-22 @ 06:30), Max: 100.2 (02-03-22 @ 18:46)  Vital Signs Last 24 Hrs  HR: 65 (02-05-22 @ 06:30) (62 - 65)  BP: 110/65 (02-05-22 @ 06:30) (110/65 - 123/80)  RR: 18 (02-05-22 @ 06:30)  SpO2: 100% (02-05-22 @ 06:30) (96% - 100%)  Wt(kg): --    PHYSICAL EXAM:  Constitutional: non-toxic, no distress  HEAD/EYES: anicteric, + prior L craniotomy site w/ internal VPS shunt in place   ENT:  supple, + nasal cannula in place   Cardiovascular:   normal S1, S2, no murmur, + generalized edema   Respiratory:  + decreased breath sounds bilaterally  GI:  soft  :  + francois  Musculoskeletal: decreased ROM d/t weakness   Neurologic: AAOx3, responds to commands   Skin:  no rashes  Heme/Onc: no lymphadenopathy   Psychiatric:  flat affect                            11.9   3.74  )-----------( 157      ( 05 Feb 2022 07:22 )             38.5   02-05    141  |  107  |  17  ----------------------------<  102<H>  4.1   |  24  |  0.55    Ca    9.1      05 Feb 2022 07:22  Phos  2.0     02-05  Mg     2.00     02-05    TPro  5.6<L>  /  Alb  3.0<L>  /  TBili  0.3  /  DBili  x   /  AST  25  /  ALT  43<H>  /  AlkPhos  87  02-04    MICROBIOLOGY:    Vancomycin Level, Trough: 9.7 (02-04 @ 14:53)    Culture - Blood (collected 04 Feb 2022 06:37)  Source: .Blood Blood-Peripheral  Preliminary Report (05 Feb 2022 07:01):    No growth to date.    Culture - Blood (collected 04 Feb 2022 06:33)  Source: .Blood Blood-Peripheral  Preliminary Report (05 Feb 2022 07:01):    No growth to date.      Rapid RVP Result: Detected (02-03 @ 17:34) + SARS CoV-2    Prior microbiology      RADIOLOGY:      < from: CT Head w/wo IV Cont (02.04.22 @ 16:18) >  IMPRESSION:    Similar extensive encephalomalacia in the left temporal and parietal   lobes with volume loss, consistent with postsurgical changes.    3 mm focus of enhancement within the region of the left parietal surgical   cavity (17-34). This may be postsurgical in nature. The presence of   residual/recurrent enhancing neoplasm is not excluded.    Similar nonspecific low density in the left frontal white matter and   minimally surrounding the surgical cavity.    --- End of Report ---    < end of copied text >    < from: CT Chest No Cont (02.04.22 @ 16:18) >  IMPRESSION:    Bilateral linear, consolidative and groundglass opacities, which can be   due to infectious or inflammatory etiology.    --- End of Report ---    < end of copied text >    < from: Xray Chest 1 View- PORTABLE-Urgent (02.03.22 @ 18:46) >    IMPRESSION:  Patchy left lung opacity may be infectious or inflammatory.    --- End of Report ---    < end of copied text >     Patient is a 61 year old male who presents with a chief complaint of fever. (04 Feb 2022 19:10)    HPI:  The patient is a 61 year old male w/ PMHx of HTN, DM, anxiety, b/l DVT while on Coumadin (s/p IVF filter, now off coumadin), glioblastoma multiforme s/p craniotomy with resection c/b ESBL meningitis for which repeat craniotomy performed 2/2020 and patient was given 8 weeks Meropenem with repeat ESBL meningitis s/p craniectomy w/ washout w/ intrathecal and 10 week course IV Meropenem w/ history of VPS shunt unclear if still in place, COVID-19 infection in 3/2020 and 8/2021 who was sent from [facility] for four day history of fevers, chills, nausea, and non-bloody vomiting. He was on Bactrim DS BID for prophylaxis and to be continued until discontinued by PCP. The patient was sent from [place] with a 4 day history of fevers, chills, nausea and non bloody vomiting. At [place] he had a CXR and was diagnosed with multifocal pneumonia with UA concerning for pyuria and + LE. Patient is a poor historian. Patient had an episode of confusion due to possible toxic encephalopathy or post-ictal state due to possible unwitnessed seizure. Tmax was 100.2. Patient is on 3 LPM via nasal cannula. He received his COVID-19 vaccines in March/April 2021 with the Pfizer series.  He reports this was his third COVID-19 diagnosis. He reports multiple patients that tested positive for COVID-19 in the nursing home with a known exposure.     Labs showed WBC 3.74K, Hgb 11.9 w/ normal renal function. AST 25, ALT 43, Ferritin 3110, , RVP was positive for SARS-CoV-2. BCx x 2 showed NGTD. CXR showed a patchy left lung opacity. CT head w/o contrast showed extensive encephalomalacia in the left temporal and parietal lobes with volume loss, consistent with postsurgical changes and a 3 mm focus of enhancement within the region of the left parietal surgical that may be postsurgical in nature w/ possible presence of residual/recurrent enhancing neoplasm. CT chest w/o contrast showed bilateral linear, consolidative and groundglass opacities, which can be due to infectious or inflammatory etiology. He received IV zosyn and started on IV vancomycin and IV meropenem. EEG was ordered. ID was consulted for further recommendations.      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   25 mL/Hr IV Intermittent (02-04-22 @ 07:27)   25 mL/Hr IV Intermittent (02-04-22 @ 00:14)    piperacillin/tazobactam IVPB...   200 mL/Hr IV Intermittent (02-03-22 @ 17:56)    vancomycin  IVPB   250 mL/Hr IV Intermittent (02-04-22 @ 05:21)    vancomycin  IVPB   166.67 mL/Hr IV Intermittent (02-05-22 @ 06:24)   166.67 mL/Hr IV Intermittent (02-04-22 @ 18:18)    vancomycin  IVPB.   250 mL/Hr IV Intermittent (02-03-22 @ 18:33)    meropenem  IVPB    meropenem  IVPB 1000 every 8 hours  vancomycin  IVPB 1250 every 12 hours    OTHER MEDS: MEDICATIONS  (STANDING):  acetaminophen  Suppository .. 650 every 4 hours PRN  ALBUTerol    90 MICROgram(s) HFA Inhaler 2 every 6 hours PRN  enoxaparin Injectable 40 daily  hydrALAZINE Injectable 5 every 8 hours  levETIRAcetam  IVPB 500 every 12 hours  ondansetron Injectable 4 every 6 hours PRN    SOCIAL HISTORY:  does not smoke, drink alcohol, or use recreational drugs     FAMILY HISTORY:  Cerebrovascular accident (Father)      REVIEW OF SYSTEMS  [x] ROS unobtainable because:  AMS  [  ] All other systems negative except as noted below:	    Constitutional:  [ ] fever [x] chills  [ ] weight loss  [x] weakness  Skin:  [ ] rash [ ] phlebitis	  Eyes: [ ] icterus [ ] pain  [ ] discharge	  ENMT: [ ] sore throat  [ ] thrush [ ] ulcers [ ] exudates  Respiratory: [ ] dyspnea [ ] hemoptysis [ ] cough [ ] sputum	  Cardiovascular:  [ ] chest pain [ ] palpitations [ ] edema	  Gastrointestinal:  [x] nausea [x] vomiting [ ] diarrhea [ ] constipation [ ] pain	  Genitourinary:  [ ] dysuria [ ] frequency [ ] hematuria [ ] discharge [ ] flank pain  [ ] incontinence  Musculoskeletal:  [ ] myalgias [ ] arthralgias [ ] arthritis  [ ] back pain  Neurological:  [ ] headache [ ] seizures  [x] confusion/altered mental status  Psychiatric:  [x] anxiety [ ] depression	  Hematology/Lymphatics:  [ ] lymphadenopathy  Endocrine:  [ ] adrenal [ ] thyroid  Allergic/Immunologic:	 [ ] transplant [ ] seasonal    Vital Signs Last 24 Hrs  T(F): 98.1 (02-05-22 @ 06:30), Max: 100.2 (02-03-22 @ 18:46)  Vital Signs Last 24 Hrs  HR: 65 (02-05-22 @ 06:30) (62 - 65)  BP: 110/65 (02-05-22 @ 06:30) (110/65 - 123/80)  RR: 18 (02-05-22 @ 06:30)  SpO2: 100% (02-05-22 @ 06:30) (96% - 100%)  Wt(kg): --    PHYSICAL EXAM:  Constitutional: in respiratory distress  HEAD/EYES: anicteric, + prior L craniotomy site  ENT:  supple, + nasal cannula in place   Cardiovascular:   normal S1, S2, no murmur, + generalized edema   Respiratory:  + decreased breath sounds bilaterally  GI:  soft  :  + Sandoval   Musculoskeletal: decreased ROM d/t weakness   Neurologic: AAOx3, responds to commands, appears lethargic and drowsy   Skin:  no rashes  Heme/Onc: no lymphadenopathy   Psychiatric: appeared fatigued                             11.9   3.74  )-----------( 157      ( 05 Feb 2022 07:22 )             38.5   02-05    141  |  107  |  17  ----------------------------<  102<H>  4.1   |  24  |  0.55    Ca    9.1      05 Feb 2022 07:22  Phos  2.0     02-05  Mg     2.00     02-05    TPro  5.6<L>  /  Alb  3.0<L>  /  TBili  0.3  /  DBili  x   /  AST  25  /  ALT  43<H>  /  AlkPhos  87  02-04    MICROBIOLOGY:    Vancomycin Level, Trough: 9.7 (02-04 @ 14:53)    Culture - Blood (collected 04 Feb 2022 06:37)  Source: .Blood Blood-Peripheral  Preliminary Report (05 Feb 2022 07:01):    No growth to date.    Culture - Blood (collected 04 Feb 2022 06:33)  Source: .Blood Blood-Peripheral  Preliminary Report (05 Feb 2022 07:01):    No growth to date.      Rapid RVP Result: Detected (02-03 @ 17:34) + SARS CoV-2    Prior microbiology      RADIOLOGY:      < from: CT Head w/wo IV Cont (02.04.22 @ 16:18) >  IMPRESSION:    Similar extensive encephalomalacia in the left temporal and parietal   lobes with volume loss, consistent with postsurgical changes.    3 mm focus of enhancement within the region of the left parietal surgical   cavity (17-34). This may be postsurgical in nature. The presence of   residual/recurrent enhancing neoplasm is not excluded.    Similar nonspecific low density in the left frontal white matter and   minimally surrounding the surgical cavity.    --- End of Report ---    < end of copied text >    < from: CT Chest No Cont (02.04.22 @ 16:18) >  IMPRESSION:    Bilateral linear, consolidative and groundglass opacities, which can be   due to infectious or inflammatory etiology.    --- End of Report ---    < end of copied text >    < from: Xray Chest 1 View- PORTABLE-Urgent (02.03.22 @ 18:46) >    IMPRESSION:  Patchy left lung opacity may be infectious or inflammatory.    --- End of Report ---    < end of copied text >

## 2022-02-05 NOTE — CONSULT NOTE ADULT - ASSESSMENT
Assessment:  The patient is a 61 year old male w/ PMHx of HTN, DM, anxiety, b/l DVT while on Coumadin (s/p IVF filter, now off coumadin), glioblastoma multiforme s/p craniotomy with resection c/b ESBL meningitis for which repeat craniotomy performed 2/2020 and patient was given 8 weeks Meropenem with repeat ESBL meningitis s/p craniectomy w/ washout w/ intrathecal and 10 week course IV Meropenem, COVID-19 infection in 3/2020 who was sent from [facility] for four day history of fevers, chills, nausea, and non-bloody vomiting. He was on Bactrim DS BID for prophylaxis and to be continued until discontinued by PCP. The patient was sent from [place] with a 4 day history of fevers, chills, nausea and non bloody vomiting. He was admitted for AMS and found to be positive for SARS-CoV-2 on admission. ID was consulted for worsening AMS given patient's history of ESBL meningitis and for further recommendations    Plan:    # AMS w/ history of ESBL meningitis  - c/w empiric IV vancomycin and IV meropenem for now   - could be due to recent bactrim use   - repeat urinalysis and UCx   - neurology on board      # COVID-19 disease  - start intravenous remdesevir total 5 days  - start intravenous dexamethasone total 10 days  - trend CRP, LDH, D-dimer, and Ferritin q48 hours  - recommend proning for further oxygenation  - f/u BCx x 2 w/ sensitivities  - trend white count, renal function, and liver function daily   - monitor fever curve  - maintain airborne and contact precautions  - ID will continue to follow    Case not yet discussed w/ attending    Lyndon Burns MD PGY-5  Fellow, Infectious Diseases   Pager: [phone]  If no response, after 5pm and on weekends: Call [phone]   Assessment:  The patient is a 61 year old male w/ PMHx of HTN, DM, anxiety, b/l DVT while on Coumadin (s/p IVF filter, now off coumadin), glioblastoma multiforme s/p craniotomy with resection c/b ESBL meningitis for which repeat craniotomy performed 2/2020 and patient was given 8 weeks Meropenem with repeat ESBL meningitis s/p craniectomy w/ washout w/ intrathecal and 10 week course IV Meropenem, COVID-19 infection in 3/2020 who was sent from [facility] for four day history of fevers, chills, nausea, and non-bloody vomiting. He was on Bactrim DS BID for prophylaxis and to be continued until discontinued by PCP. The patient was sent from [place] with a 4 day history of fevers, chills, nausea and non bloody vomiting. He was admitted for AMS and found to be positive for SARS-CoV-2 on admission. ID was consulted for worsening AMS given patient's history of ESBL meningitis and for further recommendations    Plan:    # AMS w/ history of ESBL meningitis and VPS shunt in place   - c/w empiric IV vancomycin and IV meropenem for now   - could be due to recent bactrim use - discontinue bactrim for now   - repeat urinalysis and UCx   - neurology on board      # COVID-19 disease  - start intravenous remdesevir total 5 days  - start intravenous dexamethasone total 10 days  - trend CRP, LDH, D-dimer, and Ferritin q48 hours  - recommend proning for further oxygenation  - f/u BCx x 2 w/ sensitivities  - trend white count, renal function, and liver function daily   - monitor fever curve  - maintain airborne and contact precautions  - ID will continue to follow    Case not yet discussed w/ attending    Lyndon Burns MD PGY-5  Fellow, Infectious Diseases   Pager: [phone]  If no response, after 5pm and on weekends: Call [phone]   Assessment:  The patient is a 61 year old male w/ PMHx of HTN, DM, anxiety, b/l DVT while on Coumadin (s/p IVF filter, now off coumadin), glioblastoma multiforme s/p craniotomy with resection c/b ESBL meningitis for which repeat craniotomy performed 2/2020 and patient was given 8 weeks Meropenem with repeat ESBL meningitis s/p craniectomy w/ washout w/ intrathecal and 10 week course IV Meropenem, COVID-19 infection in 3/2020 who was sent from [facility] for four day history of fevers, chills, nausea, and non-bloody vomiting. He was on Bactrim DS BID for prophylaxis and to be continued until discontinued by PCP. The patient was sent from [place] with a 4 day history of fevers, chills, nausea and non bloody vomiting. He was admitted for AMS and found to be positive for SARS-CoV-2 on admission. ID was consulted for worsening AMS given patient's history of ESBL meningitis and for further recommendations    Plan:    # COVID-19 disease  - start intravenous remdesevir total 5 days  - start intravenous dexamethasone total 10 days  - trend CRP, LDH, D-dimer, and Ferritin q48 hours  - recommend proning for further oxygenation  - f/u BCx x 2 w/ sensitivities  - trend white count, renal function, and liver function daily   - monitor fever curve  - maintain airborne and contact precautions  - ID will continue to follow    # AMS w/ history of ESBL meningitis and VPS shunt in place   - c/w IV meropenem 1 g q8 hours for now   - discontinue IV vancomycin   - hold off on PO bactrim prophylaxis  - hematology/oncology evaluation for thrombocytopenia   - repeat urinalysis and UCx   - neurology on board    Patient seen w/ attending MD Lyndon Knox MD PGY-5  Fellow, Infectious Diseases   Pager: [phone]  If no response, after 5pm and on weekends: Call [phone]   Assessment:  The patient is a 61 year old male w/ PMHx of HTN, DM, anxiety, b/l DVT while on Coumadin (s/p IVF filter, now off coumadin), glioblastoma multiforme s/p craniotomy with resection c/b ESBL meningitis for which repeat craniotomy performed 2/2020 and patient was given 8 weeks Meropenem with repeat ESBL meningitis s/p craniectomy w/ washout w/ intrathecal and 10 week course IV Meropenem, COVID-19 infection in 3/2020 who was sent from [facility] for four day history of fevers, chills, nausea, and non-bloody vomiting. He was on Bactrim DS BID for prophylaxis and to be continued until discontinued by PCP. The patient was sent from [place] with a 4 day history of fevers, chills, nausea and non bloody vomiting. He was admitted for AMS and found to be positive for SARS-CoV-2 on admission. ID was consulted for worsening AMS given patient's history of ESBL meningitis and for further recommendations    Plan:    # COVID-19 disease  - start intravenous remdesevir total 5 days  - start intravenous dexamethasone total 10 days  - trend CRP, LDH, D-dimer, and Ferritin q48 hours  - recommend proning for further oxygenation  - f/u BCx x 2 w/ sensitivities  - trend white count, renal function, and liver function daily   - monitor fever curve  - maintain airborne and contact precautions  - ID will continue to follow    # AMS w/ history of ESBL meningitis  - c/w IV meropenem 1 g q8 hours for now   - discontinue IV vancomycin   - hold off on PO bactrim prophylaxis  - hematology/oncology evaluation for thrombocytopenia   - repeat urinalysis and UCx   - neurology on board    Patient seen w/ attending MD Lyndon Knox MD PGY-5  Fellow, Infectious Diseases   Pager: [phone]  If no response, after 5pm and on weekends: Call [phone]   Assessment:  The patient is a 61 year old male w/ PMHx of HTN, DM, anxiety, b/l DVT while on Coumadin (s/p IVF filter, now off coumadin), glioblastoma multiforme s/p craniotomy with resection c/b ESBL meningitis for which repeat craniotomy performed 2/2020 and patient was given 8 weeks Meropenem with repeat ESBL meningitis s/p craniectomy w/ washout w/ intrathecal and 10 week course IV Meropenem, COVID-19 infection in 3/2020 who was sent from [facility] for four day history of fevers, chills, nausea, and non-bloody vomiting. He was on Bactrim DS BID for prophylaxis and to be continued until discontinued by PCP. The patient was sent from [place] with a 4 day history of fevers, chills, nausea and non bloody vomiting. He was admitted for AMS and found to be positive for SARS-CoV-2 on admission. ID was consulted for worsening AMS given patient's history of ESBL meningitis and for further recommendations    Plan:    # COVID-19 disease  - start intravenous remdesevir total 5 days  - start intravenous dexamethasone total 10 days  - trend CRP, LDH, D-dimer, and Ferritin q48 hours  - recommend proning for further oxygenation  - f/u BCx x 2 w/ sensitivities  - trend white count, renal function, and liver function daily   - monitor fever curve  - maintain airborne and contact precautions  - ID will continue to follow    # AMS w/ history of ESBL meningitis  - c/w IV meropenem 1 g q8 hours for now   - discontinue IV vancomycin   - hold off on PO bactrim prophylaxis  - repeat urinalysis and UCx   - neurology on board    Patient seen w/ attending MD Lyndon Knox MD PGY-5  Fellow, Infectious Diseases   Pager: [phone]  If no response, after 5pm and on weekends: Call [phone]   Assessment:  The patient is a 61 year old male w/ PMHx of HTN, DM, anxiety, b/l DVT while on Coumadin (s/p IVF filter, now off coumadin), glioblastoma multiforme s/p craniotomy with resection c/b ESBL meningitis for which repeat craniotomy performed 2/2020 and patient was given 8 weeks Meropenem with repeat ESBL meningitis s/p craniectomy w/ washout w/ intrathecal and 10 week course IV Meropenem, COVID-19 infection in 3/2020 who was sent from [facility] for four day history of fevers, chills, nausea, and non-bloody vomiting. He was on Bactrim DS BID for prophylaxis and to be continued until discontinued by PCP. The patient was sent from [place] with a 4 day history of fevers, chills, nausea and non bloody vomiting. He was admitted for AMS and found to be positive for SARS-CoV-2 on admission. ID was consulted for worsening AMS given patient's history of ESBL meningitis and for further recommendations    Plan:    # COVID-19 disease  - start intravenous remdesevir total 5 days  - start intravenous dexamethasone total 10 days  - trend CRP, LDH, D-dimer, and Ferritin q48 hours  - recommend proning for further oxygenation  - f/u BCx x 2 w/ sensitivities  - trend white count, renal function, and liver function daily   - monitor fever curve  - maintain airborne and contact precautions  - ID will continue to follow    # AMS w/ history of ESBL meningitis  - c/w IV meropenem 1 g q8 hours for now   - discontinue IV vancomycin   - hold off on PO bactrim prophylaxis  - repeat urinalysis and UCx   - neurology on board  - plan of care discussed w/ primary team     Patient seen w/ attending MD Lyndon Knox MD PGY-5  Fellow, Infectious Diseases   Pager: [phone]  If no response, after 5pm and on weekends: Call [phone]

## 2022-02-06 LAB
ANION GAP SERPL CALC-SCNC: 11 MMOL/L — SIGNIFICANT CHANGE UP (ref 7–14)
BASOPHILS # BLD AUTO: 0.01 K/UL — SIGNIFICANT CHANGE UP (ref 0–0.2)
BASOPHILS NFR BLD AUTO: 0.4 % — SIGNIFICANT CHANGE UP (ref 0–2)
BUN SERPL-MCNC: 13 MG/DL — SIGNIFICANT CHANGE UP (ref 7–23)
CALCIUM SERPL-MCNC: 8.9 MG/DL — SIGNIFICANT CHANGE UP (ref 8.4–10.5)
CHLORIDE SERPL-SCNC: 108 MMOL/L — HIGH (ref 98–107)
CO2 SERPL-SCNC: 24 MMOL/L — SIGNIFICANT CHANGE UP (ref 22–31)
CREAT SERPL-MCNC: 0.5 MG/DL — SIGNIFICANT CHANGE UP (ref 0.5–1.3)
EOSINOPHIL # BLD AUTO: 0 K/UL — SIGNIFICANT CHANGE UP (ref 0–0.5)
EOSINOPHIL NFR BLD AUTO: 0 % — SIGNIFICANT CHANGE UP (ref 0–6)
GLUCOSE SERPL-MCNC: 115 MG/DL — HIGH (ref 70–99)
HCT VFR BLD CALC: 38.7 % — LOW (ref 39–50)
HGB BLD-MCNC: 12.1 G/DL — LOW (ref 13–17)
IANC: 1.93 K/UL — SIGNIFICANT CHANGE UP (ref 1.5–8.5)
IMM GRANULOCYTES NFR BLD AUTO: 0.9 % — SIGNIFICANT CHANGE UP (ref 0–1.5)
LYMPHOCYTES # BLD AUTO: 0.2 K/UL — LOW (ref 1–3.3)
LYMPHOCYTES # BLD AUTO: 8.5 % — LOW (ref 13–44)
MAGNESIUM SERPL-MCNC: 1.9 MG/DL — SIGNIFICANT CHANGE UP (ref 1.6–2.6)
MCHC RBC-ENTMCNC: 29.1 PG — SIGNIFICANT CHANGE UP (ref 27–34)
MCHC RBC-ENTMCNC: 31.3 GM/DL — LOW (ref 32–36)
MCV RBC AUTO: 93 FL — SIGNIFICANT CHANGE UP (ref 80–100)
MONOCYTES # BLD AUTO: 0.19 K/UL — SIGNIFICANT CHANGE UP (ref 0–0.9)
MONOCYTES NFR BLD AUTO: 8.1 % — SIGNIFICANT CHANGE UP (ref 2–14)
NEUTROPHILS # BLD AUTO: 1.93 K/UL — SIGNIFICANT CHANGE UP (ref 1.8–7.4)
NEUTROPHILS NFR BLD AUTO: 82.1 % — HIGH (ref 43–77)
NRBC # BLD: 0 /100 WBCS — SIGNIFICANT CHANGE UP
NRBC # FLD: 0 K/UL — SIGNIFICANT CHANGE UP
PHOSPHATE SERPL-MCNC: 2.8 MG/DL — SIGNIFICANT CHANGE UP (ref 2.5–4.5)
PLATELET # BLD AUTO: 149 K/UL — LOW (ref 150–400)
POTASSIUM SERPL-MCNC: 4.5 MMOL/L — SIGNIFICANT CHANGE UP (ref 3.5–5.3)
POTASSIUM SERPL-SCNC: 4.5 MMOL/L — SIGNIFICANT CHANGE UP (ref 3.5–5.3)
RBC # BLD: 4.16 M/UL — LOW (ref 4.2–5.8)
RBC # FLD: 12.8 % — SIGNIFICANT CHANGE UP (ref 10.3–14.5)
SODIUM SERPL-SCNC: 143 MMOL/L — SIGNIFICANT CHANGE UP (ref 135–145)
WBC # BLD: 2.35 K/UL — LOW (ref 3.8–10.5)
WBC # FLD AUTO: 2.35 K/UL — LOW (ref 3.8–10.5)

## 2022-02-06 PROCEDURE — 99233 SBSQ HOSP IP/OBS HIGH 50: CPT | Mod: GC

## 2022-02-06 RX ADMIN — Medication 6 MILLIGRAM(S): at 06:13

## 2022-02-06 RX ADMIN — SODIUM CHLORIDE 75 MILLILITER(S): 9 INJECTION, SOLUTION INTRAVENOUS at 06:13

## 2022-02-06 RX ADMIN — FAMOTIDINE 20 MILLIGRAM(S): 10 INJECTION INTRAVENOUS at 17:25

## 2022-02-06 RX ADMIN — LIDOCAINE 1 PATCH: 4 CREAM TOPICAL at 18:04

## 2022-02-06 RX ADMIN — LEVETIRACETAM 500 MILLIGRAM(S): 250 TABLET, FILM COATED ORAL at 17:25

## 2022-02-06 RX ADMIN — MIRTAZAPINE 15 MILLIGRAM(S): 45 TABLET, ORALLY DISINTEGRATING ORAL at 22:01

## 2022-02-06 RX ADMIN — Medication 10 MILLIGRAM(S): at 11:49

## 2022-02-06 RX ADMIN — MEROPENEM 100 MILLIGRAM(S): 1 INJECTION INTRAVENOUS at 15:17

## 2022-02-06 RX ADMIN — REMDESIVIR 500 MILLIGRAM(S): 5 INJECTION INTRAVENOUS at 17:24

## 2022-02-06 RX ADMIN — LIDOCAINE 1 PATCH: 4 CREAM TOPICAL at 11:48

## 2022-02-06 RX ADMIN — LIDOCAINE 1 PATCH: 4 CREAM TOPICAL at 11:49

## 2022-02-06 RX ADMIN — MEROPENEM 100 MILLIGRAM(S): 1 INJECTION INTRAVENOUS at 23:44

## 2022-02-06 RX ADMIN — MEROPENEM 100 MILLIGRAM(S): 1 INJECTION INTRAVENOUS at 00:06

## 2022-02-06 RX ADMIN — FAMOTIDINE 20 MILLIGRAM(S): 10 INJECTION INTRAVENOUS at 06:14

## 2022-02-06 RX ADMIN — MEROPENEM 100 MILLIGRAM(S): 1 INJECTION INTRAVENOUS at 07:37

## 2022-02-06 RX ADMIN — ENOXAPARIN SODIUM 40 MILLIGRAM(S): 100 INJECTION SUBCUTANEOUS at 11:47

## 2022-02-06 RX ADMIN — LEVETIRACETAM 500 MILLIGRAM(S): 250 TABLET, FILM COATED ORAL at 06:14

## 2022-02-06 NOTE — PROGRESS NOTE ADULT - SUBJECTIVE AND OBJECTIVE BOX
Shonna Serna MD  PGY 3 Department of Internal Medicine  Pager: [phone] ([facility])   Pager: [phone] ([facility]    Patient is a 61y old  Male who presents with a chief complaint of Fever (05 Feb 2022 16:35)      SUBJECTIVE / OVERNIGHT EVENTS: Pt seen and examined. No acute overnight events.       MEDICATIONS  (STANDING):  dexAMETHasone  Injectable 6 milliGRAM(s) IV Push daily  enoxaparin Injectable 40 milliGRAM(s) SubCutaneous daily  famotidine    Tablet 20 milliGRAM(s) Oral two times a day  FLUoxetine 10 milliGRAM(s) Oral daily  lactated ringers. 1000 milliLiter(s) (75 mL/Hr) IV Continuous <Continuous>  levETIRAcetam 500 milliGRAM(s) Oral two times a day  lidocaine   4% Patch 1 Patch Transdermal daily  lidocaine   4% Patch 1 Patch Transdermal daily  meropenem  IVPB      meropenem  IVPB 1000 milliGRAM(s) IV Intermittent every 8 hours  mirtazapine 15 milliGRAM(s) Oral at bedtime  remdesivir  IVPB 100 milliGRAM(s) IV Intermittent every 24 hours  remdesivir  IVPB   IV Intermittent     MEDICATIONS  (PRN):  acetaminophen  Suppository .. 650 milliGRAM(s) Rectal every 4 hours PRN Temp greater or equal to 38.5C (101.3F)  ALBUTerol    90 MICROgram(s) HFA Inhaler 2 Puff(s) Inhalation every 6 hours PRN Shortness of Breath and/or Wheezing      I&O's Summary    05 Feb 2022 07:01  -  06 Feb 2022 07:00  --------------------------------------------------------  IN: 800 mL / OUT: 1700 mL / NET: -900 mL    06 Feb 2022 07:01  -  06 Feb 2022 08:43  --------------------------------------------------------  IN: 50 mL / OUT: 0 mL / NET: 50 mL        Vital Signs Last 24 Hrs  T(C): 36.7 (06 Feb 2022 06:33), Max: 36.7 (06 Feb 2022 06:33)  T(F): 98 (06 Feb 2022 06:33), Max: 98 (06 Feb 2022 06:33)  HR: 79 (06 Feb 2022 06:33) (69 - 79)  BP: 120/81 (06 Feb 2022 06:33) (107/67 - 137/90)  BP(mean): --  RR: 17 (06 Feb 2022 06:33) (17 - 18)  SpO2: 97% (06 Feb 2022 06:33) (97% - 99%)    CAPILLARY BLOOD GLUCOSE      POCT Blood Glucose.: 127 mg/dL (05 Feb 2022 21:47)  POCT Blood Glucose.: 98 mg/dL (05 Feb 2022 17:43)  POCT Blood Glucose.: 87 mg/dL (05 Feb 2022 13:07)  POCT Blood Glucose.: 110 mg/dL (05 Feb 2022 08:58)      PHYSICAL EXAM:  CONSTITUTIONAL: NAD, laying in bed  EYES: EOMI; conjunctiva and sclera clear  ENMT: Moist oral mucosa  NECK: Supple  RESPIRATORY: Normal respiratory effort; lungs are clear to auscultation bilaterally  CARDIOVASCULAR: Regular rate and rhythm, normal S1 and S2; No lower extremity edema; Peripheral pulses are 2+ bilaterally  ABDOMEN: Nontender to palpation, normoactive bowel sounds, no rebound/guarding; +BS  PSYCH: A+O x 2-3  NEUROLOGY: moving all extremities  SKIN: No rashes; no palpable lesions       LABS:                        12.1   2.35  )-----------( 149      ( 06 Feb 2022 07:10 )             38.7     Auto Eosinophil # 0.00  / Auto Eosinophil % 0.0   / Auto Neutrophil # 1.93  / Auto Neutrophil % 82.1  / BANDS % x                            11.9   3.74  )-----------( 157      ( 05 Feb 2022 07:22 )             38.5     Auto Eosinophil # 0.00  / Auto Eosinophil % 0.0   / Auto Neutrophil # 2.89  / Auto Neutrophil % 77.3  / BANDS % x                            12.2   2.49  )-----------( 158      ( 04 Feb 2022 14:53 )             37.3     Auto Eosinophil # 0.00  / Auto Eosinophil % 0.0   / Auto Neutrophil # 2.07  / Auto Neutrophil % 83.2  / BANDS % x        02-06    143  |  108<H>  |  13  ----------------------------<  115<H>  4.5   |  24  |  0.50  02-05    141  |  107  |  17  ----------------------------<  102<H>  4.1   |  24  |  0.55  02-04    140  |  104  |  21  ----------------------------<  135<H>  4.5   |  25  |  0.56    Ca    8.9      06 Feb 2022 07:10  Mg     1.90     02-06  Phos  2.8     02-06  TPro  5.6<L>  /  Alb  3.0<L>  /  TBili  0.3  /  DBili  x   /  AST  25  /  ALT  43<H>  /  AlkPhos  87  02-04      CARDIAC MARKERS ( 05 Feb 2022 07:22 )  x     / x     / 12 U/L / x     / x            Lactate, Blood: 1.3 mmol/L (02-05 @ 07:22)        RADIOLOGY & ADDITIONAL TESTS:    Imaging Personally Reviewed:    Consultant(s) Notes Reviewed:      Care Discussed with Consultants/Other Providers:   Shonna Serna MD  PGY 3 Department of Internal Medicine  Pager: [phone] ([facility])   Pager: [phone] ([facility])    Patient is a 61y old  Male who presents with a chief complaint of Fever (05 Feb 2022 16:35)      SUBJECTIVE / OVERNIGHT EVENTS: Pt seen and examined. No acute overnight events.       MEDICATIONS  (STANDING):  dexAMETHasone  Injectable 6 milliGRAM(s) IV Push daily  enoxaparin Injectable 40 milliGRAM(s) SubCutaneous daily  famotidine    Tablet 20 milliGRAM(s) Oral two times a day  FLUoxetine 10 milliGRAM(s) Oral daily  lactated ringers. 1000 milliLiter(s) (75 mL/Hr) IV Continuous <Continuous>  levETIRAcetam 500 milliGRAM(s) Oral two times a day  lidocaine   4% Patch 1 Patch Transdermal daily  lidocaine   4% Patch 1 Patch Transdermal daily  meropenem  IVPB      meropenem  IVPB 1000 milliGRAM(s) IV Intermittent every 8 hours  mirtazapine 15 milliGRAM(s) Oral at bedtime  remdesivir  IVPB 100 milliGRAM(s) IV Intermittent every 24 hours  remdesivir  IVPB   IV Intermittent     MEDICATIONS  (PRN):  acetaminophen  Suppository .. 650 milliGRAM(s) Rectal every 4 hours PRN Temp greater or equal to 38.5C (101.3F)  ALBUTerol    90 MICROgram(s) HFA Inhaler 2 Puff(s) Inhalation every 6 hours PRN Shortness of Breath and/or Wheezing      I&O's Summary    05 Feb 2022 07:01  -  06 Feb 2022 07:00  --------------------------------------------------------  IN: 800 mL / OUT: 1700 mL / NET: -900 mL    06 Feb 2022 07:01  -  06 Feb 2022 08:43  --------------------------------------------------------  IN: 50 mL / OUT: 0 mL / NET: 50 mL        Vital Signs Last 24 Hrs  T(C): 36.7 (06 Feb 2022 06:33), Max: 36.7 (06 Feb 2022 06:33)  T(F): 98 (06 Feb 2022 06:33), Max: 98 (06 Feb 2022 06:33)  HR: 79 (06 Feb 2022 06:33) (69 - 79)  BP: 120/81 (06 Feb 2022 06:33) (107/67 - 137/90)  BP(mean): --  RR: 17 (06 Feb 2022 06:33) (17 - 18)  SpO2: 97% (06 Feb 2022 06:33) (97% - 99%)    CAPILLARY BLOOD GLUCOSE      POCT Blood Glucose.: 127 mg/dL (05 Feb 2022 21:47)  POCT Blood Glucose.: 98 mg/dL (05 Feb 2022 17:43)  POCT Blood Glucose.: 87 mg/dL (05 Feb 2022 13:07)  POCT Blood Glucose.: 110 mg/dL (05 Feb 2022 08:58)      PHYSICAL EXAM:  CONSTITUTIONAL: NAD, laying in bed  EYES: EOMI; conjunctiva and sclera clear  ENMT: Moist oral mucosa  NECK: Supple  RESPIRATORY: Normal respiratory effort; lungs are clear to auscultation bilaterally  CARDIOVASCULAR: Regular rate and rhythm, normal S1 and S2; No lower extremity edema; Peripheral pulses are 2+ bilaterally  ABDOMEN: Nontender to palpation, normoactive bowel sounds, no rebound/guarding; +BS  PSYCH: Aaox1-2 (knows name, thinks [place] [facility] [place]   NEUROLOGY: moving all extremities  SKIN: No rashes; no palpable lesions       LABS:                        12.1   2.35  )-----------( 149      ( 06 Feb 2022 07:10 )             38.7     Auto Eosinophil # 0.00  / Auto Eosinophil % 0.0   / Auto Neutrophil # 1.93  / Auto Neutrophil % 82.1  / BANDS % x                            11.9   3.74  )-----------( 157      ( 05 Feb 2022 07:22 )             38.5     Auto Eosinophil # 0.00  / Auto Eosinophil % 0.0   / Auto Neutrophil # 2.89  / Auto Neutrophil % 77.3  / BANDS % x                            12.2   2.49  )-----------( 158      ( 04 Feb 2022 14:53 )             37.3     Auto Eosinophil # 0.00  / Auto Eosinophil % 0.0   / Auto Neutrophil # 2.07  / Auto Neutrophil % 83.2  / BANDS % x        02-06    143  |  108<H>  |  13  ----------------------------<  115<H>  4.5   |  24  |  0.50  02-05    141  |  107  |  17  ----------------------------<  102<H>  4.1   |  24  |  0.55  02-04    140  |  104  |  21  ----------------------------<  135<H>  4.5   |  25  |  0.56    Ca    8.9      06 Feb 2022 07:10  Mg     1.90     02-06  Phos  2.8     02-06  TPro  5.6<L>  /  Alb  3.0<L>  /  TBili  0.3  /  DBili  x   /  AST  25  /  ALT  43<H>  /  AlkPhos  87  02-04      CARDIAC MARKERS ( 05 Feb 2022 07:22 )  x     / x     / 12 U/L / x     / x            Lactate, Blood: 1.3 mmol/L (02-05 @ 07:22)        RADIOLOGY & ADDITIONAL TESTS:    Imaging Personally Reviewed:    Consultant(s) Notes Reviewed:      Care Discussed with Consultants/Other Providers:   Shonna Serna MD  PGY 3 Department of Internal Medicine  Pager: [phone] ([facility])   Pager: [phone] ([facility])    Patient is a 61y old  Male who presents with a chief complaint of Fever (05 Feb 2022 16:35)    SUBJECTIVE / OVERNIGHT EVENTS: Pt seen and examined. No acute overnight events.     MEDICATIONS  (STANDING):  dexAMETHasone  Injectable 6 milliGRAM(s) IV Push daily  enoxaparin Injectable 40 milliGRAM(s) SubCutaneous daily  famotidine    Tablet 20 milliGRAM(s) Oral two times a day  FLUoxetine 10 milliGRAM(s) Oral daily  lactated ringers. 1000 milliLiter(s) (75 mL/Hr) IV Continuous <Continuous>  levETIRAcetam 500 milliGRAM(s) Oral two times a day  lidocaine   4% Patch 1 Patch Transdermal daily  lidocaine   4% Patch 1 Patch Transdermal daily  meropenem  IVPB      meropenem  IVPB 1000 milliGRAM(s) IV Intermittent every 8 hours  mirtazapine 15 milliGRAM(s) Oral at bedtime  remdesivir  IVPB 100 milliGRAM(s) IV Intermittent every 24 hours  remdesivir  IVPB   IV Intermittent     MEDICATIONS  (PRN):  acetaminophen  Suppository .. 650 milliGRAM(s) Rectal every 4 hours PRN Temp greater or equal to 38.5C (101.3F)  ALBUTerol    90 MICROgram(s) HFA Inhaler 2 Puff(s) Inhalation every 6 hours PRN Shortness of Breath and/or Wheezing    I&O's Summary    05 Feb 2022 07:01  -  06 Feb 2022 07:00  --------------------------------------------------------  IN: 800 mL / OUT: 1700 mL / NET: -900 mL    06 Feb 2022 07:01  -  06 Feb 2022 08:43  --------------------------------------------------------  IN: 50 mL / OUT: 0 mL / NET: 50 mL    Vital Signs Last 24 Hrs  T(C): 36.7 (06 Feb 2022 06:33), Max: 36.7 (06 Feb 2022 06:33)  T(F): 98 (06 Feb 2022 06:33), Max: 98 (06 Feb 2022 06:33)  HR: 79 (06 Feb 2022 06:33) (69 - 79)  BP: 120/81 (06 Feb 2022 06:33) (107/67 - 137/90)  BP(mean): --  RR: 17 (06 Feb 2022 06:33) (17 - 18)  SpO2: 97% (06 Feb 2022 06:33) (97% - 99%)    CAPILLARY BLOOD GLUCOSE    POCT Blood Glucose.: 127 mg/dL (05 Feb 2022 21:47)  POCT Blood Glucose.: 98 mg/dL (05 Feb 2022 17:43)  POCT Blood Glucose.: 87 mg/dL (05 Feb 2022 13:07)  POCT Blood Glucose.: 110 mg/dL (05 Feb 2022 08:58)      PHYSICAL EXAM:  CONSTITUTIONAL: NAD, laying in bed  EYES: EOMI; conjunctiva and sclera clear  ENMT: Moist oral mucosa  NECK: Supple  RESPIRATORY: Normal respiratory effort; lungs are clear to auscultation bilaterally  CARDIOVASCULAR: Regular rate and rhythm, normal S1 and S2; No lower extremity edema; Peripheral pulses are 2+ bilaterally  ABDOMEN: Nontender to palpation, normoactive bowel sounds, no rebound/guarding; +BS  PSYCH: Aaox1-2 (knows name, thinks he's at [facility] and its 2021)   NEUROLOGY: moving all extremities  SKIN: No rashes; no palpable lesions       LABS:                        12.1   2.35  )-----------( 149      ( 06 Feb 2022 07:10 )             38.7     Auto Eosinophil # 0.00  / Auto Eosinophil % 0.0   / Auto Neutrophil # 1.93  / Auto Neutrophil % 82.1  / BANDS % x                            11.9   3.74  )-----------( 157      ( 05 Feb 2022 07:22 )             38.5     Auto Eosinophil # 0.00  / Auto Eosinophil % 0.0   / Auto Neutrophil # 2.89  / Auto Neutrophil % 77.3  / BANDS % x                            12.2   2.49  )-----------( 158      ( 04 Feb 2022 14:53 )             37.3     Auto Eosinophil # 0.00  / Auto Eosinophil % 0.0   / Auto Neutrophil # 2.07  / Auto Neutrophil % 83.2  / BANDS % x        02-06    143  |  108<H>  |  13  ----------------------------<  115<H>  4.5   |  24  |  0.50  02-05    141  |  107  |  17  ----------------------------<  102<H>  4.1   |  24  |  0.55  02-04    140  |  104  |  21  ----------------------------<  135<H>  4.5   |  25  |  0.56    Ca    8.9      06 Feb 2022 07:10  Mg     1.90     02-06  Phos  2.8     02-06  TPro  5.6<L>  /  Alb  3.0<L>  /  TBili  0.3  /  DBili  x   /  AST  25  /  ALT  43<H>  /  AlkPhos  87  02-04      CARDIAC MARKERS ( 05 Feb 2022 07:22 )  x     / x     / 12 U/L / x     / x          Lactate, Blood: 1.3 mmol/L (02-05 @ 07:22)    RADIOLOGY & ADDITIONAL TESTS: Reviewed    2/4/22 CT chest no contrast  Bilateral linear, consolidative and groundglass opacities, which can be   due to infectious or inflammatory etiology.    2/4/22 CTH w/ w/o IV contrast  Similar extensive encephalomalacia in the left temporal and parietal   lobes with volume loss, consistent with postsurgical changes.    3 mm focus of enhancement within the region of the left parietal surgical   cavity (17-34). This may be postsurgical in nature. The presence of   residual/recurrent enhancing neoplasm is not excluded.    Similar nonspecific low density in the left frontal white matter and   minimally surrounding the surgical cavity.    2/3/22 CXR:  Patchy left lung opacity may be infectious or inflammatory.

## 2022-02-06 NOTE — PROGRESS NOTE ADULT - ATTENDING COMMENTS
61 year old male with HTN, anxiety, DVT s/p IVC filter, GBM s/p multiple craniotomy c/b ESBL meningitis requiring prolonged abx, COVID19 infection p/w sepsis from multifocal PNA along with concern for acute encephalopathy and possible breakthrough seizure. Mental status fluctuating at times but appears to be improving from initial admission, AOx person, hospital, and month not year today, answering simple questions appropriately. Afebrile. Continues on meropenem per ID, continue to f/u culture results, holding off on LP/shunt tap at this time per ID as long as continues to mentate well. Continues on remdesivir per ID for COVID19, saturating high 90s on ~3L O2. F/u EEG re concern for breakthrough seizure, appreciate Neurology consult and recs. Remainder as above.

## 2022-02-07 LAB
ANION GAP SERPL CALC-SCNC: 9 MMOL/L — SIGNIFICANT CHANGE UP (ref 7–14)
BASOPHILS # BLD AUTO: 0 K/UL — SIGNIFICANT CHANGE UP (ref 0–0.2)
BASOPHILS NFR BLD AUTO: 0 % — SIGNIFICANT CHANGE UP (ref 0–2)
BUN SERPL-MCNC: 16 MG/DL — SIGNIFICANT CHANGE UP (ref 7–23)
CALCIUM SERPL-MCNC: 8.6 MG/DL — SIGNIFICANT CHANGE UP (ref 8.4–10.5)
CHLORIDE SERPL-SCNC: 109 MMOL/L — HIGH (ref 98–107)
CO2 SERPL-SCNC: 24 MMOL/L — SIGNIFICANT CHANGE UP (ref 22–31)
CREAT SERPL-MCNC: 0.53 MG/DL — SIGNIFICANT CHANGE UP (ref 0.5–1.3)
EOSINOPHIL # BLD AUTO: 0 K/UL — SIGNIFICANT CHANGE UP (ref 0–0.5)
EOSINOPHIL NFR BLD AUTO: 0 % — SIGNIFICANT CHANGE UP (ref 0–6)
GLUCOSE SERPL-MCNC: 102 MG/DL — HIGH (ref 70–99)
HCT VFR BLD CALC: 32.9 % — LOW (ref 39–50)
HGB BLD-MCNC: 10.5 G/DL — LOW (ref 13–17)
IANC: 1.78 K/UL — SIGNIFICANT CHANGE UP (ref 1.5–8.5)
IMM GRANULOCYTES NFR BLD AUTO: 1.9 % — HIGH (ref 0–1.5)
LYMPHOCYTES # BLD AUTO: 0.42 K/UL — LOW (ref 1–3.3)
LYMPHOCYTES # BLD AUTO: 15.6 % — SIGNIFICANT CHANGE UP (ref 13–44)
MAGNESIUM SERPL-MCNC: 2 MG/DL — SIGNIFICANT CHANGE UP (ref 1.6–2.6)
MCHC RBC-ENTMCNC: 29.2 PG — SIGNIFICANT CHANGE UP (ref 27–34)
MCHC RBC-ENTMCNC: 31.9 GM/DL — LOW (ref 32–36)
MCV RBC AUTO: 91.4 FL — SIGNIFICANT CHANGE UP (ref 80–100)
MONOCYTES # BLD AUTO: 0.45 K/UL — SIGNIFICANT CHANGE UP (ref 0–0.9)
MONOCYTES NFR BLD AUTO: 16.7 % — HIGH (ref 2–14)
NEUTROPHILS # BLD AUTO: 1.78 K/UL — LOW (ref 1.8–7.4)
NEUTROPHILS NFR BLD AUTO: 65.8 % — SIGNIFICANT CHANGE UP (ref 43–77)
NRBC # BLD: 0 /100 WBCS — SIGNIFICANT CHANGE UP
NRBC # FLD: 0 K/UL — SIGNIFICANT CHANGE UP
PHOSPHATE SERPL-MCNC: 1.7 MG/DL — LOW (ref 2.5–4.5)
PLATELET # BLD AUTO: 141 K/UL — LOW (ref 150–400)
POTASSIUM SERPL-MCNC: 3.9 MMOL/L — SIGNIFICANT CHANGE UP (ref 3.5–5.3)
POTASSIUM SERPL-SCNC: 3.9 MMOL/L — SIGNIFICANT CHANGE UP (ref 3.5–5.3)
RBC # BLD: 3.6 M/UL — LOW (ref 4.2–5.8)
RBC # FLD: 12.5 % — SIGNIFICANT CHANGE UP (ref 10.3–14.5)
SODIUM SERPL-SCNC: 142 MMOL/L — SIGNIFICANT CHANGE UP (ref 135–145)
WBC # BLD: 2.7 K/UL — LOW (ref 3.8–10.5)
WBC # FLD AUTO: 2.7 K/UL — LOW (ref 3.8–10.5)

## 2022-02-07 PROCEDURE — 99232 SBSQ HOSP IP/OBS MODERATE 35: CPT

## 2022-02-07 PROCEDURE — 99233 SBSQ HOSP IP/OBS HIGH 50: CPT | Mod: GC

## 2022-02-07 RX ORDER — SODIUM,POTASSIUM PHOSPHATES 278-250MG
2 POWDER IN PACKET (EA) ORAL EVERY 4 HOURS
Refills: 0 | Status: COMPLETED | OUTPATIENT
Start: 2022-02-07 | End: 2022-02-07

## 2022-02-07 RX ADMIN — LIDOCAINE 1 PATCH: 4 CREAM TOPICAL at 00:06

## 2022-02-07 RX ADMIN — LIDOCAINE 1 PATCH: 4 CREAM TOPICAL at 18:08

## 2022-02-07 RX ADMIN — LIDOCAINE 1 PATCH: 4 CREAM TOPICAL at 22:58

## 2022-02-07 RX ADMIN — Medication 2 PACKET(S): at 17:14

## 2022-02-07 RX ADMIN — Medication 2 PACKET(S): at 13:01

## 2022-02-07 RX ADMIN — MEROPENEM 100 MILLIGRAM(S): 1 INJECTION INTRAVENOUS at 22:03

## 2022-02-07 RX ADMIN — ENOXAPARIN SODIUM 40 MILLIGRAM(S): 100 INJECTION SUBCUTANEOUS at 11:26

## 2022-02-07 RX ADMIN — LIDOCAINE 1 PATCH: 4 CREAM TOPICAL at 11:25

## 2022-02-07 RX ADMIN — LEVETIRACETAM 500 MILLIGRAM(S): 250 TABLET, FILM COATED ORAL at 06:11

## 2022-02-07 RX ADMIN — Medication 10 MILLIGRAM(S): at 11:26

## 2022-02-07 RX ADMIN — LIDOCAINE 1 PATCH: 4 CREAM TOPICAL at 18:09

## 2022-02-07 RX ADMIN — LEVETIRACETAM 500 MILLIGRAM(S): 250 TABLET, FILM COATED ORAL at 17:14

## 2022-02-07 RX ADMIN — LIDOCAINE 1 PATCH: 4 CREAM TOPICAL at 11:26

## 2022-02-07 RX ADMIN — SODIUM CHLORIDE 75 MILLILITER(S): 9 INJECTION, SOLUTION INTRAVENOUS at 06:11

## 2022-02-07 RX ADMIN — MIRTAZAPINE 15 MILLIGRAM(S): 45 TABLET, ORALLY DISINTEGRATING ORAL at 22:03

## 2022-02-07 RX ADMIN — Medication 2 PACKET(S): at 09:40

## 2022-02-07 RX ADMIN — FAMOTIDINE 20 MILLIGRAM(S): 10 INJECTION INTRAVENOUS at 17:14

## 2022-02-07 RX ADMIN — MEROPENEM 100 MILLIGRAM(S): 1 INJECTION INTRAVENOUS at 14:00

## 2022-02-07 RX ADMIN — Medication 6 MILLIGRAM(S): at 06:12

## 2022-02-07 RX ADMIN — FAMOTIDINE 20 MILLIGRAM(S): 10 INJECTION INTRAVENOUS at 06:11

## 2022-02-07 RX ADMIN — MEROPENEM 100 MILLIGRAM(S): 1 INJECTION INTRAVENOUS at 07:15

## 2022-02-07 NOTE — PROGRESS NOTE ADULT - PROBLEM SELECTOR PLAN 2
Concern for breakthrough seizure due to abx vs infection.  CT Head as above. Neurology consulted, recommendations appreciated.  f/u EEG results  F/u additional Neurology recs

## 2022-02-07 NOTE — PROGRESS NOTE ADULT - ATTENDING COMMENTS
60 yo M w/ hx HTN, DVT s/p IVC GBM s/p resection and complicated by meningitis with ESBL p/w encephalopathy. CT head neg. Ct chest + COVID    Metabolic encephalopathy Possible PNA w/ underlying COVID vs seizure- discontinue dexamethasone no longer requiring oxygen                                                 - meropenem/ remdesivir as per ID                                                 - EEG pending                                                 - con keppra                                                - bcx NGTD

## 2022-02-07 NOTE — PROGRESS NOTE ADULT - SUBJECTIVE AND OBJECTIVE BOX
CC: F/U for PNA    Saw/spoke to patient. Unchanged. No new events.    Allergies  aspirin (Unknown)  shellfish (Hives)  shellfish (Unknown)  Tegretol (Unknown)    ANTIMICROBIALS:  meropenem  IVPB    meropenem  IVPB 1000 every 8 hours  remdesivir  IVPB    remdesivir  IVPB 100 every 24 hours    PE:    Vital Signs Last 24 Hrs  T(C): 36.6 (07 Feb 2022 15:42), Max: 36.6 (07 Feb 2022 15:42)  T(F): 97.9 (07 Feb 2022 15:42), Max: 97.9 (07 Feb 2022 15:42)  HR: 64 (07 Feb 2022 15:42) (62 - 76)  BP: 108/63 (07 Feb 2022 15:42) (108/63 - 128/76)  RR: 17 (07 Feb 2022 15:42) (16 - 17)  SpO2: 98% (07 Feb 2022 15:42) (95% - 98%)    Gen: AOx3, NAD, non-toxic  Resp: Breathing comfortably bu on NC3    LABS:                        10.5   2.70  )-----------( 141      ( 07 Feb 2022 07:15 )             32.9     02-07    142  |  109<H>  |  16  ----------------------------<  102<H>  3.9   |  24  |  0.53    Ca    8.6      07 Feb 2022 07:15  Phos  1.7     02-07  Mg     2.00     02-07    MICROBIOLOGY:    .Blood Blood-Peripheral  02-04-22   No growth to date.     .Blood Blood-Peripheral  02-04-22   No growth to date.  --  --    Rapid RVP Result: Detected (02-03 @ 17:34)    RADIOLOGY:    2/4 CT:    IMPRESSION:    Bilateral linear, consolidative and groundglass opacities, which can be   due to infectious or inflammatory etiology.

## 2022-02-07 NOTE — CHART NOTE - NSCHARTNOTEFT_GEN_A_CORE
PRELIMINARY EEG REVIEW    EEG reviewed to   Date: 02-07-22    - No epileptiform pattern or seizure seen.    This Preliminary report is based on fellow review. Final report pending Completion of study tomorrow morning and following attending review.    Reading Room: [phone]  On Call Service After Hours: [phone]    Kamlesh Davison MD PGY-5  Epilepsy Fellow

## 2022-02-07 NOTE — PROVIDER CONTACT NOTE (OTHER) - ASSESSMENT
Patient not eating just had an apple juice; Encouraged to drink supplement drink;  patient got upset and stated to leave it on the table. ACP Katey made aware.

## 2022-02-07 NOTE — PROGRESS NOTE ADULT - SUBJECTIVE AND OBJECTIVE BOX
Medicine Progress Note  Authored by Pavan Jones, MS4    Patient is a 61y old  Male who presents with a chief complaint of Fever (07 Feb 2022 13:05)      SUBJECTIVE / OVERNIGHT EVENTS: No acute overnight events. Pt reporting cough, otherwise denies subjective fever/chills, CP, SOB, abdominal pain.       MEDICATIONS  (STANDING):  dexAMETHasone  Injectable 6 milliGRAM(s) IV Push daily  enoxaparin Injectable 40 milliGRAM(s) SubCutaneous daily  famotidine    Tablet 20 milliGRAM(s) Oral two times a day  FLUoxetine 10 milliGRAM(s) Oral daily  lactated ringers. 1000 milliLiter(s) (75 mL/Hr) IV Continuous <Continuous>  levETIRAcetam 500 milliGRAM(s) Oral two times a day  lidocaine   4% Patch 1 Patch Transdermal daily  lidocaine   4% Patch 1 Patch Transdermal daily  meropenem  IVPB      meropenem  IVPB 1000 milliGRAM(s) IV Intermittent every 8 hours  mirtazapine 15 milliGRAM(s) Oral at bedtime  potassium phosphate / sodium phosphate Powder (PHOS-NaK) 2 Packet(s) Oral every 4 hours  remdesivir  IVPB 100 milliGRAM(s) IV Intermittent every 24 hours  remdesivir  IVPB   IV Intermittent     MEDICATIONS  (PRN):  acetaminophen  Suppository .. 650 milliGRAM(s) Rectal every 4 hours PRN Temp greater or equal to 38.5C (101.3F)  ALBUTerol    90 MICROgram(s) HFA Inhaler 2 Puff(s) Inhalation every 6 hours PRN Shortness of Breath and/or Wheezing    CAPILLARY BLOOD GLUCOSE      POCT Blood Glucose.: 135 mg/dL (07 Feb 2022 12:25)  POCT Blood Glucose.: 104 mg/dL (07 Feb 2022 08:54)  POCT Blood Glucose.: 116 mg/dL (06 Feb 2022 22:00)  POCT Blood Glucose.: 129 mg/dL (06 Feb 2022 17:49)    I&O's Summary    06 Feb 2022 07:01  -  07 Feb 2022 07:00  --------------------------------------------------------  IN: 900 mL / OUT: 100 mL / NET: 800 mL    07 Feb 2022 07:01  -  07 Feb 2022 13:18  --------------------------------------------------------  IN: 50 mL / OUT: 0 mL / NET: 50 mL        PHYSICAL EXAM:  Vital Signs Last 24 Hrs  T(C): 36.4 (07 Feb 2022 06:32), Max: 36.4 (06 Feb 2022 14:25)  T(F): 97.6 (07 Feb 2022 06:32), Max: 97.6 (07 Feb 2022 06:32)  HR: 62 (07 Feb 2022 06:32) (62 - 76)  BP: 128/76 (07 Feb 2022 06:32) (106/74 - 128/76)  BP(mean): --  RR: 16 (07 Feb 2022 06:32) (16 - 17)  SpO2: 95% (07 Feb 2022 06:32) (95% - 97%)    CONSTITUTIONAL: NAD, lying in bed, tired appearing, speaking slowly  HEENT: EOMI, normal sclera and conjunctiva; moist mucous membranes  RESPIRATORY: Normal respiratory effort; lungs CTA bilaterally  CARDIOVASCULAR: Regular rate and rhythm, +S1/S2, no murmur/rub/gallop  ABDOMEN: Soft, nontender, nondistended, no rebound/guarding  SKIN: No rashes; no palpable lesions  EXTREMITIES: No peripheral edema  NEUROLOGY: A/O to self and place, no gross sensory deficits  PSYCH: Appropriate mood and affect    LABS:                        10.5   2.70  )-----------( 141      ( 07 Feb 2022 07:15 )             32.9     02-07    142  |  109<H>  |  16  ----------------------------<  102<H>  3.9   |  24  |  0.53    Ca    8.6      07 Feb 2022 07:15  Phos  1.7     02-07  Mg     2.00     02-07                SARS-CoV-2: Detected (03 Feb 2022 17:34)      RADIOLOGY & ADDITIONAL TESTS:  Imaging from Last 24 Hours:    Electrocardiogram/QTc Interval:    COORDINATION OF CARE:  Care Discussed with Consultants/Other Providers:   Medicine Progress Note  Authored by Pavan Jones, MS4    Patient is a 61y old  Male who presents with a chief complaint of Fever (07 Feb 2022 13:05)      SUBJECTIVE / OVERNIGHT EVENTS: No acute overnight events. Pt reporting cough, otherwise denies subjective fever/chills, CP, SOB, abdominal pain.     Spoke to family member Mrs. Vazquez at [phone], who states pt's baseline prior to his COVID infection in 2020 was very high functioning - without long term memory problems and able to hold conversations. After his COVID infection these functions seemed to deteriorate, although he is conversive at baseline, and seems to be improved since Friday.      MEDICATIONS  (STANDING):  dexAMETHasone  Injectable 6 milliGRAM(s) IV Push daily  enoxaparin Injectable 40 milliGRAM(s) SubCutaneous daily  famotidine    Tablet 20 milliGRAM(s) Oral two times a day  FLUoxetine 10 milliGRAM(s) Oral daily  lactated ringers. 1000 milliLiter(s) (75 mL/Hr) IV Continuous <Continuous>  levETIRAcetam 500 milliGRAM(s) Oral two times a day  lidocaine   4% Patch 1 Patch Transdermal daily  lidocaine   4% Patch 1 Patch Transdermal daily  meropenem  IVPB      meropenem  IVPB 1000 milliGRAM(s) IV Intermittent every 8 hours  mirtazapine 15 milliGRAM(s) Oral at bedtime  potassium phosphate / sodium phosphate Powder (PHOS-NaK) 2 Packet(s) Oral every 4 hours  remdesivir  IVPB 100 milliGRAM(s) IV Intermittent every 24 hours  remdesivir  IVPB   IV Intermittent     MEDICATIONS  (PRN):  acetaminophen  Suppository .. 650 milliGRAM(s) Rectal every 4 hours PRN Temp greater or equal to 38.5C (101.3F)  ALBUTerol    90 MICROgram(s) HFA Inhaler 2 Puff(s) Inhalation every 6 hours PRN Shortness of Breath and/or Wheezing    CAPILLARY BLOOD GLUCOSE      POCT Blood Glucose.: 135 mg/dL (07 Feb 2022 12:25)  POCT Blood Glucose.: 104 mg/dL (07 Feb 2022 08:54)  POCT Blood Glucose.: 116 mg/dL (06 Feb 2022 22:00)  POCT Blood Glucose.: 129 mg/dL (06 Feb 2022 17:49)    I&O's Summary    06 Feb 2022 07:01  -  07 Feb 2022 07:00  --------------------------------------------------------  IN: 900 mL / OUT: 100 mL / NET: 800 mL    07 Feb 2022 07:01  -  07 Feb 2022 13:18  --------------------------------------------------------  IN: 50 mL / OUT: 0 mL / NET: 50 mL        PHYSICAL EXAM:  Vital Signs Last 24 Hrs  T(C): 36.4 (07 Feb 2022 06:32), Max: 36.4 (06 Feb 2022 14:25)  T(F): 97.6 (07 Feb 2022 06:32), Max: 97.6 (07 Feb 2022 06:32)  HR: 62 (07 Feb 2022 06:32) (62 - 76)  BP: 128/76 (07 Feb 2022 06:32) (106/74 - 128/76)  BP(mean): --  RR: 16 (07 Feb 2022 06:32) (16 - 17)  SpO2: 95% (07 Feb 2022 06:32) (95% - 97%)    CONSTITUTIONAL: NAD, lying in bed, tired appearing, speaking slowly  HEENT: EOMI, normal sclera and conjunctiva; moist mucous membranes  RESPIRATORY: Normal respiratory effort; lungs CTA bilaterally  CARDIOVASCULAR: Regular rate and rhythm, +S1/S2, no murmur/rub/gallop  ABDOMEN: Soft, nontender, nondistended, no rebound/guarding  SKIN: No rashes; no palpable lesions  EXTREMITIES: No peripheral edema  NEUROLOGY: A/O to self and place, no gross sensory deficits  PSYCH: Appropriate mood and affect    LABS:                        10.5   2.70  )-----------( 141      ( 07 Feb 2022 07:15 )             32.9     02-07    142  |  109<H>  |  16  ----------------------------<  102<H>  3.9   |  24  |  0.53    Ca    8.6      07 Feb 2022 07:15  Phos  1.7     02-07  Mg     2.00     02-07                SARS-CoV-2: Detected (03 Feb 2022 17:34)      RADIOLOGY & ADDITIONAL TESTS:  Imaging from Last 24 Hours:    Electrocardiogram/QTc Interval:    COORDINATION OF CARE:  Care Discussed with Consultants/Other Providers:

## 2022-02-07 NOTE — CHART NOTE - NSCHARTNOTEFT_GEN_A_CORE
PRELIMINARY EEG REVIEW    EEG reviewed to 16:09  Date: 02-07-22    - No epileptiform pattern or seizure seen.    This Preliminary report is based on fellow review. Final report pending Completion of study tomorrow morning and following attending review.    Reading Room: [phone]  On Call Service After Hours: [phone]    Kamlesh Davison MD PGY-5  Epilepsy Fellow

## 2022-02-07 NOTE — PROGRESS NOTE ADULT - PROBLEM SELECTOR PLAN 1
#Altered mental status  Presented with 4 days of fever, nausea, NB vomiting, here with AMS and COVID19 positive  - Mental status improving. Today, awake, alert, oriented to person, and place, following simple commands, answering appropriately  - AMS may be 2/2 recent seizure vs encephalopathy  - ID consulted, recommendations appreciated especially given Hx ESBL meningitis  - C/w IV meropenem at this time. D/c vanco per ID. Per ID, can hold off on LP/shunt tap as long as mentating well  - Continue to monitor closely  - f/u cultures

## 2022-02-07 NOTE — PROGRESS NOTE ADULT - ASSESSMENT
60 yo M w/ PMHx of HTN, DM, anxiety, b/l DVT while on Coumadin (s/p IVF filter, now off coumadin), glioblastoma multiforme s/p craniotomy with resection c/b ESBL meningitis for which repeat craniotomy performed 2/2020 and patient was given 8 weeks Meropenem with repeat ESBL meningitis s/p craniectomy w/ washout w/ intrathecal and 10 week course IV Meropenem w/ history of VPS shunt unclear if still in place, with fevers  Prior episode COVID  Prior episode ESBL meningitis, on PO bactrim suppressive  Leukopenia, mild temp elevation  CXR with patchy left lung opacity  CT chest with ground glass opacities, infectious/inflammatory  Overall,  1) COVID  - 3LNC, uncertain bacterial vs viral cause  - Hold RemD  - Would DC Dexa if patient continues to improve O2 status  - O2 supplementation per primary team  2) Abnormal finding on imaging  - Cover for possible superinfection  - Meropenem 1g q 8  - Monitor clinically  3) ESBL Meningitis  - Jin as above for PPX  - From ID perspective can hold off on LP/shunt tap as long as is mentating well  - Monitor    Art Mcpherson MD  Contact on TEAMS messaging from 9am - 5pm  From 5pm-9am, and on weekends call [phone]

## 2022-02-08 LAB — LEVETIRACETAM SERPL-MCNC: 17.8 UG/ML — SIGNIFICANT CHANGE UP (ref 10–40)

## 2022-02-08 PROCEDURE — 99233 SBSQ HOSP IP/OBS HIGH 50: CPT | Mod: GC

## 2022-02-08 PROCEDURE — 71045 X-RAY EXAM CHEST 1 VIEW: CPT | Mod: 26

## 2022-02-08 PROCEDURE — 99232 SBSQ HOSP IP/OBS MODERATE 35: CPT

## 2022-02-08 RX ORDER — DEXTROSE 50 % IN WATER 50 %
25 SYRINGE (ML) INTRAVENOUS ONCE
Refills: 0 | Status: DISCONTINUED | OUTPATIENT
Start: 2022-02-08 | End: 2022-02-08

## 2022-02-08 RX ORDER — DEXTROSE 50 % IN WATER 50 %
15 SYRINGE (ML) INTRAVENOUS ONCE
Refills: 0 | Status: DISCONTINUED | OUTPATIENT
Start: 2022-02-08 | End: 2022-02-08

## 2022-02-08 RX ORDER — SODIUM CHLORIDE 9 MG/ML
1000 INJECTION, SOLUTION INTRAVENOUS
Refills: 0 | Status: DISCONTINUED | OUTPATIENT
Start: 2022-02-08 | End: 2022-02-08

## 2022-02-08 RX ORDER — INSULIN LISPRO 100/ML
VIAL (ML) SUBCUTANEOUS AT BEDTIME
Refills: 0 | Status: DISCONTINUED | OUTPATIENT
Start: 2022-02-08 | End: 2022-02-08

## 2022-02-08 RX ORDER — INSULIN LISPRO 100/ML
VIAL (ML) SUBCUTANEOUS
Refills: 0 | Status: DISCONTINUED | OUTPATIENT
Start: 2022-02-08 | End: 2022-02-08

## 2022-02-08 RX ORDER — GLUCAGON INJECTION, SOLUTION 0.5 MG/.1ML
1 INJECTION, SOLUTION SUBCUTANEOUS ONCE
Refills: 0 | Status: DISCONTINUED | OUTPATIENT
Start: 2022-02-08 | End: 2022-02-08

## 2022-02-08 RX ORDER — DEXTROSE 50 % IN WATER 50 %
12.5 SYRINGE (ML) INTRAVENOUS ONCE
Refills: 0 | Status: DISCONTINUED | OUTPATIENT
Start: 2022-02-08 | End: 2022-02-08

## 2022-02-08 RX ADMIN — LIDOCAINE 1 PATCH: 4 CREAM TOPICAL at 23:49

## 2022-02-08 RX ADMIN — FAMOTIDINE 20 MILLIGRAM(S): 10 INJECTION INTRAVENOUS at 05:28

## 2022-02-08 RX ADMIN — ENOXAPARIN SODIUM 40 MILLIGRAM(S): 100 INJECTION SUBCUTANEOUS at 11:52

## 2022-02-08 RX ADMIN — LIDOCAINE 1 PATCH: 4 CREAM TOPICAL at 11:50

## 2022-02-08 RX ADMIN — MEROPENEM 100 MILLIGRAM(S): 1 INJECTION INTRAVENOUS at 05:27

## 2022-02-08 RX ADMIN — MEROPENEM 100 MILLIGRAM(S): 1 INJECTION INTRAVENOUS at 23:53

## 2022-02-08 RX ADMIN — LIDOCAINE 1 PATCH: 4 CREAM TOPICAL at 19:58

## 2022-02-08 RX ADMIN — Medication 6 MILLIGRAM(S): at 05:27

## 2022-02-08 RX ADMIN — Medication 10 MILLIGRAM(S): at 11:50

## 2022-02-08 RX ADMIN — LEVETIRACETAM 500 MILLIGRAM(S): 250 TABLET, FILM COATED ORAL at 05:27

## 2022-02-08 RX ADMIN — FAMOTIDINE 20 MILLIGRAM(S): 10 INJECTION INTRAVENOUS at 17:39

## 2022-02-08 RX ADMIN — MEROPENEM 100 MILLIGRAM(S): 1 INJECTION INTRAVENOUS at 15:51

## 2022-02-08 RX ADMIN — MIRTAZAPINE 15 MILLIGRAM(S): 45 TABLET, ORALLY DISINTEGRATING ORAL at 22:38

## 2022-02-08 RX ADMIN — LEVETIRACETAM 500 MILLIGRAM(S): 250 TABLET, FILM COATED ORAL at 17:39

## 2022-02-08 RX ADMIN — LIDOCAINE 1 PATCH: 4 CREAM TOPICAL at 11:51

## 2022-02-08 NOTE — PROGRESS NOTE ADULT - PROBLEM SELECTOR PLAN 2
Concern for breakthrough seizure due to abx vs infection.  CT Head as above. Neurology consulted, recommendations appreciated.  24hr continuous EEG negative for epileptiform activity  F/u additional Neurology recs

## 2022-02-08 NOTE — EEG REPORT - NS EEG TEXT BOX
HEATHER SINCLAIR [facility]-[id number]     Study Date: 		02-08-22 0800-1232    --------------------------------------------------------------------------------------------------  History:  CC/ HPI Patient is a 61y old  Male who presents with a chief complaint of Fever (07 Feb 2022 13:05)    MEDICATIONS  (STANDING):  dexAMETHasone  Injectable 6 milliGRAM(s) IV Push daily  dextrose 40% Gel 15 Gram(s) Oral once  dextrose 5%. 1000 milliLiter(s) (50 mL/Hr) IV Continuous <Continuous>  dextrose 5%. 1000 milliLiter(s) (100 mL/Hr) IV Continuous <Continuous>  dextrose 50% Injectable 25 Gram(s) IV Push once  dextrose 50% Injectable 12.5 Gram(s) IV Push once  dextrose 50% Injectable 25 Gram(s) IV Push once  enoxaparin Injectable 40 milliGRAM(s) SubCutaneous daily  famotidine    Tablet 20 milliGRAM(s) Oral two times a day  FLUoxetine 10 milliGRAM(s) Oral daily  glucagon  Injectable 1 milliGRAM(s) IntraMuscular once  insulin lispro (ADMELOG) corrective regimen sliding scale   SubCutaneous three times a day before meals  insulin lispro (ADMELOG) corrective regimen sliding scale   SubCutaneous at bedtime  levETIRAcetam 500 milliGRAM(s) Oral two times a day  lidocaine   4% Patch 1 Patch Transdermal daily  lidocaine   4% Patch 1 Patch Transdermal daily  meropenem  IVPB      meropenem  IVPB 1000 milliGRAM(s) IV Intermittent every 8 hours  mirtazapine 15 milliGRAM(s) Oral at bedtime    --------------------------------------------------------------------------------------------------  Study Interpretation:    [[[Abbreviation Key:  PDR=alpha rhythm/posterior dominant rhythm. A-P=anterior posterior gradient.  Amplitude: ‘very low’:<20; ‘low’:20-50; ‘medium’:; ‘high’:>200uV.  Persistence for periodic/rhythmic patterns (% of epoch) ‘rare’:<1%; ‘occasional’:1-10%; ‘frequent’:10-50%; ‘abundant’:50-90%; ‘continuous’:>90%.  Persistence for sporadic discharges: ‘rare’:<1/hr; ‘occasional’:1/min-1/hr; ‘frequent’:>1/min; ‘abundant’:>1/10 sec.  GRDA=generalized rhythmic delta activity; FIRDA=frontal intermittent GRDA; LRDA=lateralized rhythmic delta activity; TIRDA=temporal intermittent rhythmic delta activity;  LPD=PLED=lateralized periodic discharges; GPD=generalized periodic discharges; BiPDs=BiPLEDs=bilateral independent periodic epileptiform discharges; SIRPID=stimulus induced rhythmic, periodic, or ictal appearing discharges; BIRDs=brief potentially ictal rhythmic discharges >4 Hz, lasting .5-10s; PFA (paroxysmal bursts >13 Hz or =8 Hz).  Modifiers: +F=with fast component; +S=with spike component; +R=with rhythmic component.  S-B=burst suppression pattern.  Max=maximal. N1-drowsy; N2-stage II sleep; N3-slow wave sleep. SSS/BETS=small sharp spikes/benign epileptiform transients of sleep. HV=hyperventilation; PS=photic stimulation]]]    FINDINGS:  The background was continuous, spontaneously variable and reactive.  During wakefulness, the posteriorly dominant rhythm was poorly modulated.  More diffuse theta to 6hz.  There was diffuse irregular theta and delta activity present.  Left posterior quadrant irregular theta/delta, accentuation of cerebral activity    Sleep Background:  Drowsiness was characterized by fragmentation, attenuation, and slowing of the background activity.    Sleep was characterized by the presence of vertex waves, spindles, and K-complexes.    Epileptiform Activity:   No interictal epileptiform discharges were present.    Events:  No clinical events were recorded.  No seizures were recorded.    Activation Procedures:   -Hyperventilation was not performed.    -Photic stimulation was not performed.    Artifacts:  Intermittent myogenic and external motion artifacts were noted.    ECG:  The heart rate on single channel ECG at baseline was predominantly near BPM = 66-80  -----------------------------------------------------------------------------------------------------    EEG Classification / Summary:  Abnormal EEG study  Left posterior quadrant irregular theta/delta, accentuation of cerebral activity  Moderate background slowing    -----------------------------------------------------------------------------------------------------    Clinical Impression:  Left posterior quadrant focal cerebral dysfunction.  Associated regional skull defect.  Moderate diffuse or multifocal cerebral dysfunction  There were no epileptiform abnormalities recorded.      -------------------------------------------------------------------------------------------------------  [facility] EEG Reading Room Ph#: [phone]  Epilepsy Answering Service after 5PM and before 8:30AM: Ph#: [phone]    Kamlesh Davison MD PGY-5  Epilepsy Fellow      This Preliminary report is based on fellow review. Final report pending attending review.    Reading Room: [phone]  On Call Service After Hours: [phone] HEATHER SINCLAIR [id number]     Study Date: 		02-08-22 0800-1232    --------------------------------------------------------------------------------------------------  History:  CC/ HPI Patient is a 61y old  Male who presents with a chief complaint of Fever (07 Feb 2022 13:05)    MEDICATIONS  (STANDING):  dexAMETHasone  Injectable 6 milliGRAM(s) IV Push daily  enoxaparin Injectable 40 milliGRAM(s) SubCutaneous daily  famotidine    Tablet 20 milliGRAM(s) Oral two times a day  FLUoxetine 10 milliGRAM(s) Oral daily  glucagon  Injectable 1 milliGRAM(s) IntraMuscular once  insulin lispro (ADMELOG) corrective regimen sliding scale   SubCutaneous three times a day before meals  insulin lispro (ADMELOG) corrective regimen sliding scale   SubCutaneous at bedtime  levETIRAcetam 500 milliGRAM(s) Oral two times a day  lidocaine   4% Patch 1 Patch Transdermal daily  lidocaine   4% Patch 1 Patch Transdermal daily  meropenem  IVPB 1000 milliGRAM(s) IV Intermittent every 8 hours  mirtazapine 15 milliGRAM(s) Oral at bedtime    --------------------------------------------------------------------------------------------------  Study Interpretation:    [[[Abbreviation Key:  PDR=alpha rhythm/posterior dominant rhythm. A-P=anterior posterior gradient.  Amplitude: ‘very low’:<20; ‘low’:20-50; ‘medium’:; ‘high’:>200uV.  Persistence for periodic/rhythmic patterns (% of epoch) ‘rare’:<1%; ‘occasional’:1-10%; ‘frequent’:10-50%; ‘abundant’:50-90%; ‘continuous’:>90%.  Persistence for sporadic discharges: ‘rare’:<1/hr; ‘occasional’:1/min-1/hr; ‘frequent’:>1/min; ‘abundant’:>1/10 sec.  GRDA=generalized rhythmic delta activity; FIRDA=frontal intermittent GRDA; LRDA=lateralized rhythmic delta activity; TIRDA=temporal intermittent rhythmic delta activity;  LPD=PLED=lateralized periodic discharges; GPD=generalized periodic discharges; BiPDs=BiPLEDs=bilateral independent periodic epileptiform discharges; SIRPID=stimulus induced rhythmic, periodic, or ictal appearing discharges; BIRDs=brief potentially ictal rhythmic discharges >4 Hz, lasting .5-10s; PFA (paroxysmal bursts >13 Hz or =8 Hz).  Modifiers: +F=with fast component; +S=with spike component; +R=with rhythmic component.  S-B=burst suppression pattern.  Max=maximal. N1-drowsy; N2-stage II sleep; N3-slow wave sleep. SSS/BETS=small sharp spikes/benign epileptiform transients of sleep. HV=hyperventilation; PS=photic stimulation]]]    FINDINGS:  The background was continuous, spontaneously variable and reactive.  During wakefulness, the posteriorly dominant rhythm was poorly modulated.  More diffuse theta to 6hz.  There was diffuse irregular theta and delta activity present.  Left posterior quadrant irregular theta/delta, accentuation of cerebral activity    Sleep Background:  Drowsiness was characterized by fragmentation, attenuation, and slowing of the background activity.    Sleep was characterized by the presence of vertex waves, spindles, and K-complexes.    Epileptiform Activity:   No interictal epileptiform discharges were present.    Events:  No clinical events were recorded.  No seizures were recorded.    Activation Procedures:   -Hyperventilation was not performed.    -Photic stimulation was not performed.    Artifacts:  Intermittent myogenic and external motion artifacts were noted.    ECG:  The heart rate on single channel ECG at baseline was predominantly near BPM = 60-70  -----------------------------------------------------------------------------------------------------    EEG Classification / Summary:  Abnormal EEG study  Left posterior quadrant irregular theta/delta, accentuation of cerebral activity  Moderate background slowing    -----------------------------------------------------------------------------------------------------    Clinical Impression:  Left posterior quadrant focal cerebral dysfunction.  Associated regional skull defect.  Moderate diffuse or multifocal cerebral dysfunction  There were no epileptiform abnormalities recorded.      -------------------------------------------------------------------------------------------------------  [facility] EEG Reading Room Ph#: [phone]  Epilepsy Answering Service after 5PM and before 8:30AM: Ph#: [phone]    Kamlesh Davison MD PGY-5  Epilepsy Fellow    Reading Room: [phone]  On Call Service After Hours: [phone]

## 2022-02-08 NOTE — PROGRESS NOTE ADULT - ATTENDING COMMENTS
62 yo M w/ hx HTN, DVT s/p IVC GBM s/p resection and complicated by meningitis with ESBL p/w encephalopathy. CT head neg. Ct chest Central airways are patent. Bilateral linear, consolidative and ground glass opacities, most prominent at the left upper and bilateral lower lobes. No pleural effusions or pneumothorax. + COVID.     Metabolic encephalopathy Possible underlying bacterial PNA w/ underlying COVID vs seizure- discontinue dexamethasone no longer requiring oxygen                                                 - meropenem                                                 - EEG no overt seizure currently on continuous EEG                                                  - con keppra                                                - bcx NGTD                                                - repeat CXR                                                 - seems more lethargic today refusing PO will discussed with Dr Turner will review case and see if any other causes for MS. will consider NS eval tapping 62 yo M w/ hx HTN, DVT s/p IVC GBM s/p resection and complicated by meningitis with ESBL p/w encephalopathy. CT head neg. Ct chest Central airways are patent. Bilateral linear, consolidative and ground glass opacities, most prominent at the left upper and bilateral lower lobes. No pleural effusions or pneumothorax. + COVID.     Metabolic encephalopathy Possible underlying bacterial PNA w/ underlying COVID vs seizure- discontinue dexamethasone no longer requiring oxygen                                                 - meropenem                                                 - EEG no overt seizure currently on continuous EEG                                                  - con keppra                                                - bcx NGTD                                                - repeat CXR / Check CT A/P                                                 - seems more lethargic today refusing PO will discussed with Dr Turner will review case and see if any other causes for MS. will consider NS eval tapping

## 2022-02-08 NOTE — EEG REPORT - NS EEG TEXT BOX
HEATHER SINCLAIR [facility]-[id number]     Study Date: 		02-08-22    --------------------------------------------------------------------------------------------------  History:  CC/ HPI Patient is a 61y old  Male who presents with a chief complaint of Fever (07 Feb 2022 13:05)    MEDICATIONS  (STANDING):  dexAMETHasone  Injectable 6 milliGRAM(s) IV Push daily  dextrose 40% Gel 15 Gram(s) Oral once  dextrose 5%. 1000 milliLiter(s) (50 mL/Hr) IV Continuous <Continuous>  dextrose 5%. 1000 milliLiter(s) (100 mL/Hr) IV Continuous <Continuous>  dextrose 50% Injectable 25 Gram(s) IV Push once  dextrose 50% Injectable 12.5 Gram(s) IV Push once  dextrose 50% Injectable 25 Gram(s) IV Push once  enoxaparin Injectable 40 milliGRAM(s) SubCutaneous daily  famotidine    Tablet 20 milliGRAM(s) Oral two times a day  FLUoxetine 10 milliGRAM(s) Oral daily  glucagon  Injectable 1 milliGRAM(s) IntraMuscular once  insulin lispro (ADMELOG) corrective regimen sliding scale   SubCutaneous three times a day before meals  insulin lispro (ADMELOG) corrective regimen sliding scale   SubCutaneous at bedtime  levETIRAcetam 500 milliGRAM(s) Oral two times a day  lidocaine   4% Patch 1 Patch Transdermal daily  lidocaine   4% Patch 1 Patch Transdermal daily  meropenem  IVPB      meropenem  IVPB 1000 milliGRAM(s) IV Intermittent every 8 hours  mirtazapine 15 milliGRAM(s) Oral at bedtime    --------------------------------------------------------------------------------------------------  Study Interpretation:    [[[Abbreviation Key:  PDR=alpha rhythm/posterior dominant rhythm. A-P=anterior posterior gradient.  Amplitude: ‘very low’:<20; ‘low’:20-50; ‘medium’:; ‘high’:>200uV.  Persistence for periodic/rhythmic patterns (% of epoch) ‘rare’:<1%; ‘occasional’:1-10%; ‘frequent’:10-50%; ‘abundant’:50-90%; ‘continuous’:>90%.  Persistence for sporadic discharges: ‘rare’:<1/hr; ‘occasional’:1/min-1/hr; ‘frequent’:>1/min; ‘abundant’:>1/10 sec.  GRDA=generalized rhythmic delta activity; FIRDA=frontal intermittent GRDA; LRDA=lateralized rhythmic delta activity; TIRDA=temporal intermittent rhythmic delta activity;  LPD=PLED=lateralized periodic discharges; GPD=generalized periodic discharges; BiPDs=BiPLEDs=bilateral independent periodic epileptiform discharges; SIRPID=stimulus induced rhythmic, periodic, or ictal appearing discharges; BIRDs=brief potentially ictal rhythmic discharges >4 Hz, lasting .5-10s; PFA (paroxysmal bursts >13 Hz or =8 Hz).  Modifiers: +F=with fast component; +S=with spike component; +R=with rhythmic component.  S-B=burst suppression pattern.  Max=maximal. N1-drowsy; N2-stage II sleep; N3-slow wave sleep. SSS/BETS=small sharp spikes/benign epileptiform transients of sleep. HV=hyperventilation; PS=photic stimulation]]]    FINDINGS:  The background was continuous, spontaneously variable and reactive.  During wakefulness, the posteriorly dominant rhythm was poorly modulated.  More diffuse theta to 6hz.  There was diffuse irregular theta and delta activity present.  Left posterior quadrant irregular theta/delta, accentuation of cerebral activity    Sleep Background:  Drowsiness was characterized by fragmentation, attenuation, and slowing of the background activity.    Sleep was characterized by the presence of vertex waves, spindles, and K-complexes.    Epileptiform Activity:   No interictal epileptiform discharges were present.    Events:  No clinical events were recorded.  No seizures were recorded.    Activation Procedures:   -Hyperventilation was not performed.    -Photic stimulation was not performed.    Artifacts:  Intermittent myogenic and external motion artifacts were noted.    ECG:  The heart rate on single channel ECG at baseline was predominantly near BPM = 66-80  -----------------------------------------------------------------------------------------------------    EEG Classification / Summary:  Abnormal EEG study  Left posterior quadrant irregular theta/delta, accentuation of cerebral activity  Moderate background slowing    -----------------------------------------------------------------------------------------------------    Clinical Impression:  Left posterior quadrant focal cerebral dysfunction.  Associated regional skull defect.  Moderate diffuse or multifocal cerebral dysfunction  There were no epileptiform abnormalities recorded.      -------------------------------------------------------------------------------------------------------  [facility] EEG Reading Room Ph#: [phone]  Epilepsy Answering Service after 5PM and before 8:30AM: Ph#: [phone]    Jcarlos Floyd M.D.   of Neurology, [facility] Epilepsy [place]

## 2022-02-08 NOTE — PROGRESS NOTE ADULT - ASSESSMENT
60 yo M w/ PMHx of HTN, DM, anxiety, b/l DVT while on Coumadin (s/p IVF filter, now off coumadin), glioblastoma multiforme s/p craniotomy with resection c/b ESBL meningitis for which repeat craniotomy performed 2/2020 and patient was given 8 weeks Meropenem with repeat ESBL meningitis s/p craniectomy w/ washout w/ intrathecal and 10 week course IV Meropenem w/ history of VPS shunt unclear if still in place, with fevers  Prior episode COVID  Prior episode ESBL meningitis, on PO bactrim suppressive  Leukopenia, mild temp elevation  CXR with patchy left lung opacity  CT chest with ground glass opacities, infectious/inflammatory  Still waxing and waning mental status  Overall,  1) COVID  - RA--was positive for COVID prior to admission (HIE)  - Hold RemD  - Would DC Dexa if patient continues to improve O2 status  - O2 supplementation per primary team  2) Abnormal finding on imaging  - Cover for possible superinfection  - Meropenem 1g q 8  - Monitor clinically  3) Prior ESBL Meningitis/AMS  - Jin as above for PPX  - If no other explanation for altered mental status would sample CSF to determine if underlying infectious process  - F/U neurology    My colleagues will be covering this patient on 2/9/22, I will return 2/10. Please call [phone] or on call fellow with any questions or change in status.     Art Mcpherson MD  Contact on TEAMS messaging from 9am - 5pm  From 5pm-9am, and on weekends call [phone]

## 2022-02-08 NOTE — PROGRESS NOTE ADULT - SUBJECTIVE AND OBJECTIVE BOX
Medicine Progress Note  Authored by Pavan Jones, MS4    Patient is a 61y old  Male who presents with a chief complaint of Fever (07 Feb 2022 13:05)      SUBJECTIVE / OVERNIGHT EVENTS: No acute overnight events. Pt reports continued intermittent cough and chills. No subjective fever, CP, SOB. Unsure of last bowel movement, but adamantly declines bowel regimen, stating "it won't help"      MEDICATIONS  (STANDING):  enoxaparin Injectable 40 milliGRAM(s) SubCutaneous daily  famotidine    Tablet 20 milliGRAM(s) Oral two times a day  FLUoxetine 10 milliGRAM(s) Oral daily  levETIRAcetam 500 milliGRAM(s) Oral two times a day  lidocaine   4% Patch 1 Patch Transdermal daily  lidocaine   4% Patch 1 Patch Transdermal daily  meropenem  IVPB      meropenem  IVPB 1000 milliGRAM(s) IV Intermittent every 8 hours  mirtazapine 15 milliGRAM(s) Oral at bedtime    MEDICATIONS  (PRN):  acetaminophen  Suppository .. 650 milliGRAM(s) Rectal every 4 hours PRN Temp greater or equal to 38.5C (101.3F)  ALBUTerol    90 MICROgram(s) HFA Inhaler 2 Puff(s) Inhalation every 6 hours PRN Shortness of Breath and/or Wheezing    CAPILLARY BLOOD GLUCOSE      POCT Blood Glucose.: 117 mg/dL (08 Feb 2022 09:06)  POCT Blood Glucose.: 134 mg/dL (07 Feb 2022 18:08)  POCT Blood Glucose.: 135 mg/dL (07 Feb 2022 12:25)    I&O's Summary    07 Feb 2022 07:01  -  08 Feb 2022 07:00  --------------------------------------------------------  IN: 50 mL / OUT: 0 mL / NET: 50 mL        PHYSICAL EXAM:  Vital Signs Last 24 Hrs  T(C): 36.6 (08 Feb 2022 05:27), Max: 36.6 (07 Feb 2022 15:42)  T(F): 97.8 (08 Feb 2022 05:27), Max: 97.9 (07 Feb 2022 15:42)  HR: 62 (08 Feb 2022 05:27) (61 - 64)  BP: 112/80 (08 Feb 2022 05:27) (108/63 - 127/79)  BP(mean): --  RR: 18 (08 Feb 2022 05:27) (17 - 18)  SpO2: 97% (08 Feb 2022 05:27) (96% - 98%)    CONSTITUTIONAL: NAD, awake, alert, lying in bed  HEENT: Wearing EEG cap, EOMI, normal sclera and conjunctiva; moist mucous membranes  RESPIRATORY: Normal respiratory effort; lungs CTA bilaterally  CARDIOVASCULAR: Regular rate and rhythm, +S1/S2, no murmur/rub/gallop  ABDOMEN: Soft, nontender, nondistended, no rebound/guarding  SKIN: No rashes; no palpable lesions  EXTREMITIES: No peripheral edema  NEUROLOGY: A/O to self only, no gross sensory deficits  PSYCH: Appropriate mood and affect    LABS:                        10.5   2.70  )-----------( 141      ( 07 Feb 2022 07:15 )             32.9     02-07    142  |  109<H>  |  16  ----------------------------<  102<H>  3.9   |  24  |  0.53    Ca    8.6      07 Feb 2022 07:15  Phos  1.7     02-07  Mg     2.00     02-07                SARS-CoV-2: Detected (03 Feb 2022 17:34)      RADIOLOGY & ADDITIONAL TESTS:  Imaging from Last 24 Hours:    Electrocardiogram/QTc Interval:    COORDINATION OF CARE:  Care Discussed with Consultants/Other Providers:   Medicine Progress Note  Authored by Pavan Jones, MS4    Patient is a 61y old  Male who presents with a chief complaint of Fever (07 Feb 2022 13:05)      SUBJECTIVE / OVERNIGHT EVENTS: No acute overnight events. Pt reports continued intermittent cough and chills. No subjective fever, CP, SOB. Unsure of last bowel movement, but adamantly declines bowel regimen, stating "it won't help". States his appetite is poor      MEDICATIONS  (STANDING):  enoxaparin Injectable 40 milliGRAM(s) SubCutaneous daily  famotidine    Tablet 20 milliGRAM(s) Oral two times a day  FLUoxetine 10 milliGRAM(s) Oral daily  levETIRAcetam 500 milliGRAM(s) Oral two times a day  lidocaine   4% Patch 1 Patch Transdermal daily  lidocaine   4% Patch 1 Patch Transdermal daily  meropenem  IVPB      meropenem  IVPB 1000 milliGRAM(s) IV Intermittent every 8 hours  mirtazapine 15 milliGRAM(s) Oral at bedtime    MEDICATIONS  (PRN):  acetaminophen  Suppository .. 650 milliGRAM(s) Rectal every 4 hours PRN Temp greater or equal to 38.5C (101.3F)  ALBUTerol    90 MICROgram(s) HFA Inhaler 2 Puff(s) Inhalation every 6 hours PRN Shortness of Breath and/or Wheezing    CAPILLARY BLOOD GLUCOSE      POCT Blood Glucose.: 117 mg/dL (08 Feb 2022 09:06)  POCT Blood Glucose.: 134 mg/dL (07 Feb 2022 18:08)  POCT Blood Glucose.: 135 mg/dL (07 Feb 2022 12:25)    I&O's Summary    07 Feb 2022 07:01  -  08 Feb 2022 07:00  --------------------------------------------------------  IN: 50 mL / OUT: 0 mL / NET: 50 mL        PHYSICAL EXAM:  Vital Signs Last 24 Hrs  T(C): 36.6 (08 Feb 2022 05:27), Max: 36.6 (07 Feb 2022 15:42)  T(F): 97.8 (08 Feb 2022 05:27), Max: 97.9 (07 Feb 2022 15:42)  HR: 62 (08 Feb 2022 05:27) (61 - 64)  BP: 112/80 (08 Feb 2022 05:27) (108/63 - 127/79)  BP(mean): --  RR: 18 (08 Feb 2022 05:27) (17 - 18)  SpO2: 97% (08 Feb 2022 05:27) (96% - 98%)    CONSTITUTIONAL: NAD, awake, alert, lying in bed  HEENT: Wearing EEG cap, EOMI, normal sclera and conjunctiva; moist mucous membranes  RESPIRATORY: Normal respiratory effort; lungs CTA bilaterally  CARDIOVASCULAR: Regular rate and rhythm, +S1/S2, no murmur/rub/gallop  ABDOMEN: Soft, nontender, nondistended, no rebound/guarding  SKIN: No rashes; no palpable lesions  EXTREMITIES: No peripheral edema  NEUROLOGY: A/O to self only, no gross sensory deficits  PSYCH: Appropriate mood and affect    LABS:                        10.5   2.70  )-----------( 141      ( 07 Feb 2022 07:15 )             32.9     02-07    142  |  109<H>  |  16  ----------------------------<  102<H>  3.9   |  24  |  0.53    Ca    8.6      07 Feb 2022 07:15  Phos  1.7     02-07  Mg     2.00     02-07                SARS-CoV-2: Detected (03 Feb 2022 17:34)      RADIOLOGY & ADDITIONAL TESTS:  Imaging from Last 24 Hours:    Electrocardiogram/QTc Interval:    COORDINATION OF CARE:  Care Discussed with Consultants/Other Providers:

## 2022-02-08 NOTE — PROGRESS NOTE ADULT - PROBLEM SELECTOR PLAN 1
#Altered mental status  Presented with 4 days of fever, nausea, NB vomiting, here with AMS and COVID19 positive  - Mental status improving. Today, awake, alert, oriented to person, following simple commands  - AMS may be 2/2 recent seizure vs encephalopathy  - ID consulted, recommendations appreciated especially given Hx ESBL meningitis  - C/w IV meropenem at this time. D/c vanco per ID. Per ID, can hold off on LP/shunt tap as long as mentating well  - Continue to monitor closely  - f/u cultures from 2/4 - NGTD  -seizure w/u per below

## 2022-02-08 NOTE — PROGRESS NOTE ADULT - ASSESSMENT
61M HTN, Anxiety, DVT s/p IVC filter, not on coumadin, GBM s/p multiple craniotomy c/b ESBL meningitis requiring prolonged Abx, recently dx'd COVID 1/19 p/w Sepsis from multifocal PNA c/b acute encephalopathy, concern for breakthrough SZ. On meropenem to treat pneumonia given hx of ESBL infection

## 2022-02-08 NOTE — PROGRESS NOTE ADULT - SUBJECTIVE AND OBJECTIVE BOX
CC: F/U for AMS    Saw/spoke to patient. More lethargic today. No fevers, no chills. No new focal complaints.    Allergies  aspirin (Unknown)  shellfish (Hives)  shellfish (Unknown)  Tegretol (Unknown)    ANTIMICROBIALS:  meropenem  IVPB    meropenem  IVPB 1000 every 8 hours    PE:    Vital Signs Last 24 Hrs  T(C): 36.6 (08 Feb 2022 05:27), Max: 36.6 (07 Feb 2022 15:42)  T(F): 97.8 (08 Feb 2022 05:27), Max: 97.9 (07 Feb 2022 15:42)  HR: 62 (08 Feb 2022 05:27) (61 - 64)  BP: 112/80 (08 Feb 2022 05:27) (108/63 - 127/79)  RR: 18 (08 Feb 2022 05:27) (17 - 18)  SpO2: 97% (08 Feb 2022 05:27) (96% - 98%)    Gen: AOx3, NAD, non-toxic, pleasant  Resp: Clear bilat, no resp distress, no crackles/wheezes  Abd: Soft, nontender, +BS    LABS:                        10.5   2.70  )-----------( 141      ( 07 Feb 2022 07:15 )             32.9     02-07    142  |  109<H>  |  16  ----------------------------<  102<H>  3.9   |  24  |  0.53    Ca    8.6      07 Feb 2022 07:15  Phos  1.7     02-07  Mg     2.00     02-07    MICROBIOLOGY:    .Blood Blood-Peripheral  02-04-22   No growth to date.     .Blood Blood-Peripheral  02-04-22   No growth to date.     Rapid RVP Result: Detected (02-03 @ 17:34) COVID    RADIOLOGY:    2/4 CT:    IMPRESSION:    Bilateral linear, consolidative and groundglass opacities, which can be   due to infectious or inflammatory etiology.

## 2022-02-09 LAB
ANION GAP SERPL CALC-SCNC: 11 MMOL/L — SIGNIFICANT CHANGE UP (ref 7–14)
BASOPHILS # BLD AUTO: 0.03 K/UL — SIGNIFICANT CHANGE UP (ref 0–0.2)
BASOPHILS NFR BLD AUTO: 0.6 % — SIGNIFICANT CHANGE UP (ref 0–2)
BUN SERPL-MCNC: 13 MG/DL — SIGNIFICANT CHANGE UP (ref 7–23)
CALCIUM SERPL-MCNC: 8.9 MG/DL — SIGNIFICANT CHANGE UP (ref 8.4–10.5)
CHLORIDE SERPL-SCNC: 107 MMOL/L — SIGNIFICANT CHANGE UP (ref 98–107)
CO2 SERPL-SCNC: 24 MMOL/L — SIGNIFICANT CHANGE UP (ref 22–31)
CREAT SERPL-MCNC: 0.5 MG/DL — SIGNIFICANT CHANGE UP (ref 0.5–1.3)
CRP SERPL-MCNC: 12 MG/L — HIGH
CULTURE RESULTS: SIGNIFICANT CHANGE UP
CULTURE RESULTS: SIGNIFICANT CHANGE UP
D DIMER BLD IA.RAPID-MCNC: 207 NG/ML DDU — SIGNIFICANT CHANGE UP
EOSINOPHIL # BLD AUTO: 0 K/UL — SIGNIFICANT CHANGE UP (ref 0–0.5)
EOSINOPHIL NFR BLD AUTO: 0 % — SIGNIFICANT CHANGE UP (ref 0–6)
GLUCOSE SERPL-MCNC: 88 MG/DL — SIGNIFICANT CHANGE UP (ref 70–99)
HCT VFR BLD CALC: 40.9 % — SIGNIFICANT CHANGE UP (ref 39–50)
HGB BLD-MCNC: 13 G/DL — SIGNIFICANT CHANGE UP (ref 13–17)
IANC: 3.78 K/UL — SIGNIFICANT CHANGE UP (ref 1.5–8.5)
IMM GRANULOCYTES NFR BLD AUTO: 5 % — HIGH (ref 0–1.5)
LDH SERPL L TO P-CCNC: 211 U/L — SIGNIFICANT CHANGE UP (ref 135–225)
LYMPHOCYTES # BLD AUTO: 0.71 K/UL — LOW (ref 1–3.3)
LYMPHOCYTES # BLD AUTO: 13.1 % — SIGNIFICANT CHANGE UP (ref 13–44)
MAGNESIUM SERPL-MCNC: 2.3 MG/DL — SIGNIFICANT CHANGE UP (ref 1.6–2.6)
MCHC RBC-ENTMCNC: 29.3 PG — SIGNIFICANT CHANGE UP (ref 27–34)
MCHC RBC-ENTMCNC: 31.8 GM/DL — LOW (ref 32–36)
MCV RBC AUTO: 92.1 FL — SIGNIFICANT CHANGE UP (ref 80–100)
MONOCYTES # BLD AUTO: 0.64 K/UL — SIGNIFICANT CHANGE UP (ref 0–0.9)
MONOCYTES NFR BLD AUTO: 11.8 % — SIGNIFICANT CHANGE UP (ref 2–14)
NEUTROPHILS # BLD AUTO: 3.78 K/UL — SIGNIFICANT CHANGE UP (ref 1.8–7.4)
NEUTROPHILS NFR BLD AUTO: 69.5 % — SIGNIFICANT CHANGE UP (ref 43–77)
NRBC # BLD: 0 /100 WBCS — SIGNIFICANT CHANGE UP
NRBC # FLD: 0 K/UL — SIGNIFICANT CHANGE UP
PHOSPHATE SERPL-MCNC: 2.1 MG/DL — LOW (ref 2.5–4.5)
PLATELET # BLD AUTO: 194 K/UL — SIGNIFICANT CHANGE UP (ref 150–400)
POTASSIUM SERPL-MCNC: 3.6 MMOL/L — SIGNIFICANT CHANGE UP (ref 3.5–5.3)
POTASSIUM SERPL-SCNC: 3.6 MMOL/L — SIGNIFICANT CHANGE UP (ref 3.5–5.3)
RBC # BLD: 4.44 M/UL — SIGNIFICANT CHANGE UP (ref 4.2–5.8)
RBC # FLD: 12.5 % — SIGNIFICANT CHANGE UP (ref 10.3–14.5)
SODIUM SERPL-SCNC: 142 MMOL/L — SIGNIFICANT CHANGE UP (ref 135–145)
SPECIMEN SOURCE: SIGNIFICANT CHANGE UP
SPECIMEN SOURCE: SIGNIFICANT CHANGE UP
WBC # BLD: 5.43 K/UL — SIGNIFICANT CHANGE UP (ref 3.8–10.5)
WBC # FLD AUTO: 5.43 K/UL — SIGNIFICANT CHANGE UP (ref 3.8–10.5)

## 2022-02-09 PROCEDURE — 74177 CT ABD & PELVIS W/CONTRAST: CPT | Mod: 26

## 2022-02-09 PROCEDURE — 99233 SBSQ HOSP IP/OBS HIGH 50: CPT | Mod: GC

## 2022-02-09 RX ORDER — LACTOBACILLUS ACIDOPHILUS 100MM CELL
1 CAPSULE ORAL DAILY
Refills: 0 | Status: DISCONTINUED | OUTPATIENT
Start: 2022-02-09 | End: 2022-02-12

## 2022-02-09 RX ORDER — POTASSIUM CHLORIDE 20 MEQ
40 PACKET (EA) ORAL ONCE
Refills: 0 | Status: COMPLETED | OUTPATIENT
Start: 2022-02-09 | End: 2022-02-09

## 2022-02-09 RX ORDER — POTASSIUM CHLORIDE 20 MEQ
40 PACKET (EA) ORAL ONCE
Refills: 0 | Status: DISCONTINUED | OUTPATIENT
Start: 2022-02-09 | End: 2022-02-09

## 2022-02-09 RX ORDER — SODIUM,POTASSIUM PHOSPHATES 278-250MG
2 POWDER IN PACKET (EA) ORAL
Refills: 0 | Status: COMPLETED | OUTPATIENT
Start: 2022-02-09 | End: 2022-02-09

## 2022-02-09 RX ADMIN — Medication 2 PACKET(S): at 09:40

## 2022-02-09 RX ADMIN — Medication 10 MILLIGRAM(S): at 11:34

## 2022-02-09 RX ADMIN — FAMOTIDINE 20 MILLIGRAM(S): 10 INJECTION INTRAVENOUS at 05:21

## 2022-02-09 RX ADMIN — MIRTAZAPINE 15 MILLIGRAM(S): 45 TABLET, ORALLY DISINTEGRATING ORAL at 21:42

## 2022-02-09 RX ADMIN — LIDOCAINE 1 PATCH: 4 CREAM TOPICAL at 23:00

## 2022-02-09 RX ADMIN — LEVETIRACETAM 500 MILLIGRAM(S): 250 TABLET, FILM COATED ORAL at 17:02

## 2022-02-09 RX ADMIN — ENOXAPARIN SODIUM 40 MILLIGRAM(S): 100 INJECTION SUBCUTANEOUS at 11:34

## 2022-02-09 RX ADMIN — FAMOTIDINE 20 MILLIGRAM(S): 10 INJECTION INTRAVENOUS at 17:02

## 2022-02-09 RX ADMIN — LIDOCAINE 1 PATCH: 4 CREAM TOPICAL at 19:38

## 2022-02-09 RX ADMIN — LIDOCAINE 1 PATCH: 4 CREAM TOPICAL at 11:35

## 2022-02-09 RX ADMIN — Medication 2 PACKET(S): at 11:34

## 2022-02-09 RX ADMIN — MEROPENEM 100 MILLIGRAM(S): 1 INJECTION INTRAVENOUS at 15:20

## 2022-02-09 RX ADMIN — Medication 1 TABLET(S): at 17:03

## 2022-02-09 RX ADMIN — MEROPENEM 100 MILLIGRAM(S): 1 INJECTION INTRAVENOUS at 07:12

## 2022-02-09 RX ADMIN — LEVETIRACETAM 500 MILLIGRAM(S): 250 TABLET, FILM COATED ORAL at 05:20

## 2022-02-09 RX ADMIN — Medication 40 MILLIEQUIVALENT(S): at 13:47

## 2022-02-09 NOTE — CHART NOTE - NSCHARTNOTEFT_GEN_A_CORE
60 yo RH male with a PMHx of L parietal GBM (s/p resection, c/b ESBL meningitis requiring repeat craniotomy in 02/2020, followed by repeat ESBL meningitis requiring another repeat craniotomy with washout + intrathecal Merrem in 03/2020), COVID infection 03/2020, HTN, anxiety, bilateral DVTs while on Coumadin (now s/p IVC filter, not on AC) who was sent in from [facility] on 02/03 with a 4-day history of fever/chills/nausea/NBNB emesis. Patient had a CXR and UA there, diagnosed with multifocal PNA + UTI. Found to be COVID positive on admission. Neurology consulted initial for AMS and concern for seizure. EEG was negative for seizure activity, showed breech artifact and associated slowing due to known area of resection. CTH reviewed and no evidence of hydrocephalus. Pt's mental status has been fluctuating as per primary team.     Continuous EEG 2/8/22  Clinical Impression:  Left posterior quadrant focal cerebral dysfunction.  Associated regional skull defect.  Moderate diffuse or multifocal cerebral dysfunction  There were no epileptiform abnormalities recorded.      < from: CT Head w/wo IV Cont (02.04.22 @ 16:18) >    FINDINGS:    Redemonstration of left parietal craniectomy. Redemonstration of surgical clip in the left anterolateral temporal region.    Redemonstration of right frontal approach ventriculostomy catheter in unchanged position.    Similar extensive encephalomalacia in the left temporal and parietal lobes with volume loss.    Similar ex vacuo dilatation of the left posterior ventricular body, ventricular atrium, and temporal horn. Similar mild dilatation of the   right ventricular body, frontal horns, and third and fourth ventricles.    No midline shift or effacement of the basal cisterns.    No acute intracranial hemorrhage.    Similar nonspecific low density in the left frontal white matter and minimally surrounding the surgical cavity.    3 mm focus of enhancement within the region of the left parietal surgical cavity (17-34).    Air-fluid levels in the bilateral sphenoid and right maxillary sinuses. Small left mastoid air cell effusion. Bilateral ethmoid sinus mucosal   thickening.        Impression: Most likely prolonged metabolic encephalopathy and/or delirium in setting of PNA, COVID infection and poor cognitive function from glioma.   Continue to monitor   Delirium prevention:    Encourage mobility, receiving physical therapy, occupational therapy, and performing primary mobility in the first   Reduce disturbing noises in the patient’s environment should be decreased too improve patient’s sleep and rest.  Regular sleep/wake schedule  Familiar objects, pleasant fragrances, or soothing sounds to decrease stress.   Frequent reorientation, make sure clock and calendar is within eyesight,  if possible, the patient’s bed should be placed close to the window so he/she can notice the change in the morning and night light, and if not lights on during the day and off at night as much as possible.  Glasses for patients with vision impairments and hearing aid in patients with hearing impairment

## 2022-02-09 NOTE — PROGRESS NOTE ADULT - ATTENDING COMMENTS
62 yo M w/ hx HTN, DVT s/p IVC GBM s/p resection and complicated by meningitis with ESBL p/w encephalopathy. CT head neg. Ct chest Central airways are patent. Bilateral linear, consolidative and ground glass opacities, most prominent at the left upper and bilateral lower lobes. No pleural effusions or pneumothorax. + COVID.    Metabolic encephalopathy Possible underlying bacterial PNA w/ underlying COVID - discontinue dexamethasone no longer requiring oxygen                                                 - meropenem                                                 - EEG no overt seizure low suspicion of seizure                                                 - cont keppra                                                - bcx NGTD                                                - CXR unchanged no worsening of PNA                                                - Pt was admitted with 4 day hx nausea and vomiting NBNB; Check CT A/P                                                 - appears improved today knew he was in hospital and  and yr. Also stated his HCP was [facility].                                                 - d/w Dr Marcos suspect waxing and waning MS secondary to glioma and acute PNA.

## 2022-02-09 NOTE — PROGRESS NOTE ADULT - SUBJECTIVE AND OBJECTIVE BOX
Medicine Progress Note  Authored by Pavan Jones, MS4    Patient is a 61y old  Male who presents with a chief complaint of Fever (09 Feb 2022 08:59)      SUBJECTIVE / OVERNIGHT EVENTS: No acute overnight events. Pt reports improvement of his cough, now not coughing. Notes he feels feverish currently but no chills, CP, SOB, HA, dizziness. Still feels subjectively confused and voices concern about not being able to ambulate which he is normally able to. Last bowel movement yesterday which was reportedly normal. Remains with poor appetite.       MEDICATIONS  (STANDING):  enoxaparin Injectable 40 milliGRAM(s) SubCutaneous daily  famotidine    Tablet 20 milliGRAM(s) Oral two times a day  FLUoxetine 10 milliGRAM(s) Oral daily  levETIRAcetam 500 milliGRAM(s) Oral two times a day  lidocaine   4% Patch 1 Patch Transdermal daily  lidocaine   4% Patch 1 Patch Transdermal daily  meropenem  IVPB      meropenem  IVPB 1000 milliGRAM(s) IV Intermittent every 8 hours  mirtazapine 15 milliGRAM(s) Oral at bedtime  potassium chloride    Tablet ER 40 milliEquivalent(s) Oral once  potassium phosphate / sodium phosphate Powder (PHOS-NaK) 2 Packet(s) Oral every 2 hours    MEDICATIONS  (PRN):  acetaminophen  Suppository .. 650 milliGRAM(s) Rectal every 4 hours PRN Temp greater or equal to 38.5C (101.3F)  ALBUTerol    90 MICROgram(s) HFA Inhaler 2 Puff(s) Inhalation every 6 hours PRN Shortness of Breath and/or Wheezing    CAPILLARY BLOOD GLUCOSE        I&O's Summary    08 Feb 2022 07:01  -  09 Feb 2022 07:00  --------------------------------------------------------  IN: 240 mL / OUT: 400 mL / NET: -160 mL        PHYSICAL EXAM:  Vital Signs Last 24 Hrs  T(C): 36.4 (09 Feb 2022 05:21), Max: 36.6 (08 Feb 2022 21:41)  T(F): 97.5 (09 Feb 2022 05:21), Max: 97.8 (08 Feb 2022 21:41)  HR: 63 (09 Feb 2022 05:21) (63 - 71)  BP: 130/76 (09 Feb 2022 05:21) (102/63 - 138/90)  BP(mean): --  RR: 18 (09 Feb 2022 05:21) (18 - 18)  SpO2: 98% (09 Feb 2022 05:21) (97% - 98%)    CONSTITUTIONAL: NAD, awake, alert, lying in bed, tired appearing, conversive  HEENT: EOMI, normal sclera and conjunctiva; moist mucous membranes  RESPIRATORY: Normal respiratory effort; lungs CTA bilaterally  CARDIOVASCULAR: Regular rate and rhythm, +S1/S2, no murmur/rub/gallop  ABDOMEN: Soft, nontender, nondistended, no rebound/guarding  SKIN: No rashes; no palpable lesions  EXTREMITIES: No peripheral edema  NEUROLOGY: AO to self, unsure of location and year  PSYCH: Appropriate mood and affect    LABS:                        13.0   5.43  )-----------( 194      ( 09 Feb 2022 06:45 )             40.9     02-09    142  |  107  |  13  ----------------------------<  88  3.6   |  24  |  0.50    Ca    8.9      09 Feb 2022 06:45  Phos  2.1     02-09  Mg     2.30     02-09                SARS-CoV-2: Detected (03 Feb 2022 17:34)      RADIOLOGY & ADDITIONAL TESTS:  Imaging from Last 24 Hours:    Electrocardiogram/QTc Interval:    COORDINATION OF CARE:  Care Discussed with Consultants/Other Providers:   Medicine Progress Note  Authored by Pavan Jones, MS4    Patient is a 61y old  Male who presents with a chief complaint of Fever (09 Feb 2022 08:59)      SUBJECTIVE / OVERNIGHT EVENTS: No acute overnight events. Pt reports improvement of his cough, now not coughing. Notes he feels feverish currently but no chills, CP, SOB, HA, dizziness. Still feels subjectively confused and voices concern about not being able to ambulate which he states he is normally able to. Last bowel movement yesterday which was reportedly normal. Remains with poor appetite.       MEDICATIONS  (STANDING):  enoxaparin Injectable 40 milliGRAM(s) SubCutaneous daily  famotidine    Tablet 20 milliGRAM(s) Oral two times a day  FLUoxetine 10 milliGRAM(s) Oral daily  levETIRAcetam 500 milliGRAM(s) Oral two times a day  lidocaine   4% Patch 1 Patch Transdermal daily  lidocaine   4% Patch 1 Patch Transdermal daily  meropenem  IVPB      meropenem  IVPB 1000 milliGRAM(s) IV Intermittent every 8 hours  mirtazapine 15 milliGRAM(s) Oral at bedtime  potassium chloride    Tablet ER 40 milliEquivalent(s) Oral once  potassium phosphate / sodium phosphate Powder (PHOS-NaK) 2 Packet(s) Oral every 2 hours    MEDICATIONS  (PRN):  acetaminophen  Suppository .. 650 milliGRAM(s) Rectal every 4 hours PRN Temp greater or equal to 38.5C (101.3F)  ALBUTerol    90 MICROgram(s) HFA Inhaler 2 Puff(s) Inhalation every 6 hours PRN Shortness of Breath and/or Wheezing    CAPILLARY BLOOD GLUCOSE        I&O's Summary    08 Feb 2022 07:01  -  09 Feb 2022 07:00  --------------------------------------------------------  IN: 240 mL / OUT: 400 mL / NET: -160 mL        PHYSICAL EXAM:  Vital Signs Last 24 Hrs  T(C): 36.4 (09 Feb 2022 05:21), Max: 36.6 (08 Feb 2022 21:41)  T(F): 97.5 (09 Feb 2022 05:21), Max: 97.8 (08 Feb 2022 21:41)  HR: 63 (09 Feb 2022 05:21) (63 - 71)  BP: 130/76 (09 Feb 2022 05:21) (102/63 - 138/90)  BP(mean): --  RR: 18 (09 Feb 2022 05:21) (18 - 18)  SpO2: 98% (09 Feb 2022 05:21) (97% - 98%)    CONSTITUTIONAL: NAD, awake, alert, lying in bed, tired appearing, conversive  HEENT: EOMI, normal sclera and conjunctiva; moist mucous membranes  RESPIRATORY: Normal respiratory effort; lungs CTA bilaterally  CARDIOVASCULAR: Regular rate and rhythm, +S1/S2, no murmur/rub/gallop  ABDOMEN: Soft, nontender, nondistended, no rebound/guarding  SKIN: No rashes; no palpable lesions  EXTREMITIES: No peripheral edema  NEUROLOGY: AO to self, unsure of location and year  PSYCH: Appropriate mood and affect    LABS:                        13.0   5.43  )-----------( 194      ( 09 Feb 2022 06:45 )             40.9     02-09    142  |  107  |  13  ----------------------------<  88  3.6   |  24  |  0.50    Ca    8.9      09 Feb 2022 06:45  Phos  2.1     02-09  Mg     2.30     02-09                SARS-CoV-2: Detected (03 Feb 2022 17:34)      RADIOLOGY & ADDITIONAL TESTS:  Imaging from Last 24 Hours:    Electrocardiogram/QTc Interval:    COORDINATION OF CARE:  Care Discussed with Consultants/Other Providers:

## 2022-02-09 NOTE — PROGRESS NOTE ADULT - PROBLEM SELECTOR PLAN 3
Concern for bacterial infection, CXR with patchy left lung opacity, CT chest w/ b/l opacities  - c/w COVID19 treatment as below along with IV meropenem  - continue to monitor closely Concern for bacterial infection, CXR with patchy left lung opacity, CT chest w/ b/l opacities  - c/w COVID19 treatment as below along with IV meropenem  - continue to monitor closely  - 2/8 CXR showing continued multifocal pneumonia/COVID infection

## 2022-02-09 NOTE — PROGRESS NOTE ADULT - PROBLEM SELECTOR PLAN 2
Concern for breakthrough seizure due to abx vs infection.  CT Head as above. Neurology consulted, recommendations appreciated.  24hr continuous EEG negative for epileptiform activity  F/u additional Neurology recs Concern for breakthrough seizure due to abx vs infection.  CT Head showing postsurgical changes. Neurology consulted, recommendations appreciated.  24hr continuous EEG negative for epileptiform activity  F/u additional Neurology recs

## 2022-02-09 NOTE — PROGRESS NOTE ADULT - PROBLEM SELECTOR PLAN 1
#Altered mental status  Presented with 4 days of fever, nausea, NB vomiting, here with AMS and COVID19 positive  - Mental status improving. Today, awake, alert, oriented to person, following simple commands  - AMS may be 2/2 recent seizure vs encephalopathy  - ID consulted, recommendations appreciated especially given Hx ESBL meningitis  - C/w IV meropenem at this time. D/c vanco per ID. Per ID, can hold off on LP/shunt tap as long as mentating well, although if mental status does not improve then may pursue as an option  - Continue to monitor closely  -BCx cultures from 2/4 NG  -seizure w/u per below #Altered mental status  Presented with 4 days of fever, nausea, NB vomiting, here with AMS and COVID19 positive  - Mental status improving. Today, awake, alert, oriented to person, following simple commands  - AMS may be 2/2 recent seizure vs encephalopathy  - ID consulted, recommendations appreciated especially given Hx ESBL meningitis  - C/w IV meropenem at this time. D/c vanco per ID. Per ID, can hold off on LP/shunt tap as long as mentating well, although if mental status does not improve then may pursue as an option  - f/u CT abd to evaluate for intraabdominal infection   - Continue to monitor closely  -BCx cultures from 2/4 NG  -seizure w/u per below

## 2022-02-10 ENCOUNTER — TRANSCRIPTION ENCOUNTER (OUTPATIENT)
Age: 62
End: 2022-02-10

## 2022-02-10 LAB — SARS-COV-2 RNA SPEC QL NAA+PROBE: SIGNIFICANT CHANGE UP

## 2022-02-10 PROCEDURE — 99232 SBSQ HOSP IP/OBS MODERATE 35: CPT

## 2022-02-10 PROCEDURE — 99232 SBSQ HOSP IP/OBS MODERATE 35: CPT | Mod: GC

## 2022-02-10 RX ORDER — SENNA PLUS 8.6 MG/1
1 TABLET ORAL AT BEDTIME
Refills: 0 | Status: DISCONTINUED | OUTPATIENT
Start: 2022-02-10 | End: 2022-02-12

## 2022-02-10 RX ADMIN — Medication 10 MILLIGRAM(S): at 11:07

## 2022-02-10 RX ADMIN — MIRTAZAPINE 15 MILLIGRAM(S): 45 TABLET, ORALLY DISINTEGRATING ORAL at 21:59

## 2022-02-10 RX ADMIN — MEROPENEM 100 MILLIGRAM(S): 1 INJECTION INTRAVENOUS at 00:03

## 2022-02-10 RX ADMIN — LIDOCAINE 1 PATCH: 4 CREAM TOPICAL at 11:07

## 2022-02-10 RX ADMIN — MEROPENEM 100 MILLIGRAM(S): 1 INJECTION INTRAVENOUS at 07:07

## 2022-02-10 RX ADMIN — LIDOCAINE 1 PATCH: 4 CREAM TOPICAL at 23:00

## 2022-02-10 RX ADMIN — LEVETIRACETAM 500 MILLIGRAM(S): 250 TABLET, FILM COATED ORAL at 17:21

## 2022-02-10 RX ADMIN — MEROPENEM 100 MILLIGRAM(S): 1 INJECTION INTRAVENOUS at 15:33

## 2022-02-10 RX ADMIN — LEVETIRACETAM 500 MILLIGRAM(S): 250 TABLET, FILM COATED ORAL at 06:28

## 2022-02-10 RX ADMIN — SENNA PLUS 1 TABLET(S): 8.6 TABLET ORAL at 21:59

## 2022-02-10 RX ADMIN — ENOXAPARIN SODIUM 40 MILLIGRAM(S): 100 INJECTION SUBCUTANEOUS at 11:06

## 2022-02-10 RX ADMIN — Medication 1 TABLET(S): at 11:06

## 2022-02-10 RX ADMIN — LIDOCAINE 1 PATCH: 4 CREAM TOPICAL at 18:48

## 2022-02-10 RX ADMIN — FAMOTIDINE 20 MILLIGRAM(S): 10 INJECTION INTRAVENOUS at 17:21

## 2022-02-10 RX ADMIN — FAMOTIDINE 20 MILLIGRAM(S): 10 INJECTION INTRAVENOUS at 06:28

## 2022-02-10 NOTE — PROGRESS NOTE ADULT - PROBLEM SELECTOR PLAN 1
#Altered mental status  Presented with 4 days of fever, nausea, NB vomiting, here with AMS and COVID19 positive  - Mental status improving. Today, awake, alert, oriented to person, following simple commands  - per neuro, AMS likely 2/2 prolonged toxic metabolic encephalopathy vs delirium iso prior glioma  - ID consulted, recommendations appreciated especially given Hx ESBL meningitis  - C/w IV meropenem at this time. D/c vanco per ID. Per ID, can hold off on LP/shunt tap as long as mentating well, although if mental status does not improve then may pursue as an option  - CT abd neg for intraabdominal infection   - Continue to monitor closely  - BCx cultures from 2/4 NG

## 2022-02-10 NOTE — PROVIDER CONTACT NOTE (OTHER) - SITUATION
Pt BP is 96/64 but asymptomatic
Patient not eating just had an apple juice; Encouraged to drink supplement drink;  patient got upset and stated to leave it on the table. ACP Angeline made aware.

## 2022-02-10 NOTE — PROGRESS NOTE ADULT - SUBJECTIVE AND OBJECTIVE BOX
Medicine Progress Note  Authored by Pavan Jones, MS4    Patient is a 61y old  Male who presents with a chief complaint of Fever (09 Feb 2022 08:59)      SUBJECTIVE / OVERNIGHT EVENTS: No acute overnight events. Pt reports continued weakness, otherwise no other complaints. No CP, SOB, cough, dizziness, HA, or peg pain. Last bowel movement yesterday      MEDICATIONS  (STANDING):  enoxaparin Injectable 40 milliGRAM(s) SubCutaneous daily  famotidine    Tablet 20 milliGRAM(s) Oral two times a day  FLUoxetine 10 milliGRAM(s) Oral daily  lactobacillus acidophilus 1 Tablet(s) Oral daily  levETIRAcetam 500 milliGRAM(s) Oral two times a day  lidocaine   4% Patch 1 Patch Transdermal daily  lidocaine   4% Patch 1 Patch Transdermal daily  meropenem  IVPB      meropenem  IVPB 1000 milliGRAM(s) IV Intermittent every 8 hours  mirtazapine 15 milliGRAM(s) Oral at bedtime    MEDICATIONS  (PRN):  acetaminophen  Suppository .. 650 milliGRAM(s) Rectal every 4 hours PRN Temp greater or equal to 38.5C (101.3F)  ALBUTerol    90 MICROgram(s) HFA Inhaler 2 Puff(s) Inhalation every 6 hours PRN Shortness of Breath and/or Wheezing    CAPILLARY BLOOD GLUCOSE        I&O's Summary      PHYSICAL EXAM:  Vital Signs Last 24 Hrs  T(C): 37.1 (10 Feb 2022 06:15), Max: 37.1 (10 Feb 2022 06:15)  T(F): 98.8 (10 Feb 2022 06:15), Max: 98.8 (10 Feb 2022 06:15)  HR: 80 (10 Feb 2022 06:15) (72 - 80)  BP: 103/68 (10 Feb 2022 06:15) (103/68 - 108/68)  BP(mean): --  RR: 18 (10 Feb 2022 06:15) (18 - 18)  SpO2: 97% (10 Feb 2022 06:15) (96% - 99%)    CONSTITUTIONAL: NAD, sleeping but easily arousable  HEENT: EOMI, normal sclera and conjunctiva; moist mucous membranes  RESPIRATORY: Normal respiratory effort; lungs CTA bilaterally  CARDIOVASCULAR: Regular rate and rhythm, +S1/S2, no murmur/rub/gallop  ABDOMEN: Soft, nontender, nondistended, no rebound/guarding  SKIN: No rashes; no palpable lesions  EXTREMITIES: No peripheral edema  NEUROLOGY: AOx3, no gross sensory deficits  PSYCH: Appropriate mood and affect    LABS:                        13.0   5.43  )-----------( 194      ( 09 Feb 2022 06:45 )             40.9     02-09    142  |  107  |  13  ----------------------------<  88  3.6   |  24  |  0.50    Ca    8.9      09 Feb 2022 06:45  Phos  2.1     02-09  Mg     2.30     02-09      SARS-CoV-2: Detected (03 Feb 2022 17:34)      RADIOLOGY & ADDITIONAL TESTS:  Imaging from Last 24 Hours:    Electrocardiogram/QTc Interval:    COORDINATION OF CARE:  Care Discussed with Consultants/Other Providers:   Medicine Progress Note  Authored by Pavan Jones, MS4    Patient is a 61y old  Male who presents with a chief complaint of Fever (09 Feb 2022 08:59)      SUBJECTIVE / OVERNIGHT EVENTS: No acute overnight events. Pt reports continued weakness, otherwise no other complaints. No CP, SOB, cough, dizziness, HA, or peg pain. Last bowel movement yesterday      MEDICATIONS  (STANDING):  enoxaparin Injectable 40 milliGRAM(s) SubCutaneous daily  famotidine    Tablet 20 milliGRAM(s) Oral two times a day  FLUoxetine 10 milliGRAM(s) Oral daily  lactobacillus acidophilus 1 Tablet(s) Oral daily  levETIRAcetam 500 milliGRAM(s) Oral two times a day  lidocaine   4% Patch 1 Patch Transdermal daily  lidocaine   4% Patch 1 Patch Transdermal daily  meropenem  IVPB      meropenem  IVPB 1000 milliGRAM(s) IV Intermittent every 8 hours  mirtazapine 15 milliGRAM(s) Oral at bedtime    MEDICATIONS  (PRN):  acetaminophen  Suppository .. 650 milliGRAM(s) Rectal every 4 hours PRN Temp greater or equal to 38.5C (101.3F)  ALBUTerol    90 MICROgram(s) HFA Inhaler 2 Puff(s) Inhalation every 6 hours PRN Shortness of Breath and/or Wheezing    CAPILLARY BLOOD GLUCOSE        I&O's Summary      PHYSICAL EXAM:  Vital Signs Last 24 Hrs  T(C): 37.1 (10 Feb 2022 06:15), Max: 37.1 (10 Feb 2022 06:15)  T(F): 98.8 (10 Feb 2022 06:15), Max: 98.8 (10 Feb 2022 06:15)  HR: 80 (10 Feb 2022 06:15) (72 - 80)  BP: 103/68 (10 Feb 2022 06:15) (103/68 - 108/68)  BP(mean): --  RR: 18 (10 Feb 2022 06:15) (18 - 18)  SpO2: 97% (10 Feb 2022 06:15) (96% - 99%)    CONSTITUTIONAL: NAD, sleeping but easily arousable  HEENT: EOMI, normal sclera and conjunctiva; moist mucous membranes  RESPIRATORY: Normal respiratory effort; lungs CTA bilaterally  CARDIOVASCULAR: Regular rate and rhythm, +S1/S2, no murmur/rub/gallop  ABDOMEN: Soft, nontender, nondistended, no rebound/guarding  SKIN: No rashes; no palpable lesions  EXTREMITIES: No peripheral edema  NEUROLOGY: AOx3, no gross sensory deficits  PSYCH: Appropriate mood and affect    LABS:                        13.0   5.43  )-----------( 194      ( 09 Feb 2022 06:45 )             40.9     02-09    142  |  107  |  13  ----------------------------<  88  3.6   |  24  |  0.50    Ca    8.9      09 Feb 2022 06:45  Phos  2.1     02-09  Mg     2.30     02-09      SARS-CoV-2: Detected (03 Feb 2022 17:34)      RADIOLOGY & ADDITIONAL TESTS:  Imaging from Last 24 Hours:  < from: CT Abdomen and Pelvis w/ IV Cont (02.09.22 @ 18:57) >  CONTRAST/COMPLICATIONS:  IV Contrast: Omnipaque 350  90 cc administered   10 cc discarded  Oral Contrast: NONE  Complications: None reported at time of study completion    PROCEDURE:  CT of the Abdomen and Pelvis was performed.  Sagittal and coronal reformats were performed.    FINDINGS:  LOWER CHEST: There are coarse peripheral groundglass and consolidative   opacities at the visualized lung bases, suspicious for Covid pneumonia.   Moderate atelectasis in the dependent left lower lobe.    LIVER: Within normal limits.  BILE DUCTS: Normal caliber.  GALLBLADDER: Within normal limits.  SPLEEN: Within normal limits.  PANCREAS: Within normal limits.  ADRENALS: Within normal limits.  KIDNEYS/URETERS: Within normal limits.    BLADDER: Within normal limits.  REPRODUCTIVE ORGANS: The prostate is prominent.    BOWEL: No bowel obstruction.  PERITONEUM: No ascites.  shunt catheter extending to the left lower   quadrant.  VESSELS: IVC filter. Mild atherosclerotic disease of the nonaneurysmal   abdominal aorta..  RETROPERITONEUM/LYMPH NODES: No lymphadenopathy.  ABDOMINAL WALL: Within normal limits.  BONES: Within normal limits.    IMPRESSION: No evidence of acute inflammatory or obstructive process in   the abdomen and pelvis.    --- End of Report ---          < end of copied text >    Electrocardiogram/QTc Interval:    COORDINATION OF CARE:  Care Discussed with Consultants/Other Providers:

## 2022-02-10 NOTE — DISCHARGE NOTE NURSING/CASE MANAGEMENT/SOCIAL WORK - NSDCPEFALRISK_GEN_ALL_CORE
For information on Fall & Injury Prevention, visit: [email or web address] OR  [email or web address] OR  https://www.cdc.gov/steadi/patient.html

## 2022-02-10 NOTE — PROGRESS NOTE ADULT - SUBJECTIVE AND OBJECTIVE BOX
CC: F/U for PNA    Saw/spoke to patient. Improved mentation, uncertain baseline. Otherwise unchanged.    Allergies  aspirin (Unknown)  shellfish (Hives)  shellfish (Unknown)  Tegretol (Unknown)    ANTIMICROBIALS:  meropenem  IVPB 1000 every 8 hours  meropenem  IVPB      PE:    Vital Signs Last 24 Hrs  T(C): 37.1 (10 Feb 2022 06:15), Max: 37.1 (10 Feb 2022 06:15)  T(F): 98.8 (10 Feb 2022 06:15), Max: 98.8 (10 Feb 2022 06:15)  HR: 80 (10 Feb 2022 06:15) (72 - 80)  BP: 103/68 (10 Feb 2022 06:15) (103/68 - 108/68)  RR: 18 (10 Feb 2022 06:15) (18 - 18)  SpO2: 97% (10 Feb 2022 06:15) (96% - 99%)    Gen: AOx3, NAD, non-toxic  Resp: Breathing comfortably, RA    LABS:                        13.0   5.43  )-----------( 194      ( 09 Feb 2022 06:45 )             40.9     02-09    142  |  107  |  13  ----------------------------<  88  3.6   |  24  |  0.50    Ca    8.9      09 Feb 2022 06:45  Phos  2.1     02-09  Mg     2.30     02-09    MICROBIOLOGY:    .Blood Blood-Peripheral  02-03-22   No Growth Final     .Blood Blood-Peripheral  02-03-22   No Growth Final    Rapid RVP Result: Detected (02-03 @ 17:34) COVID    RADIOLOGY:    2/9 XR:    IMPRESSION: No evidence of acute inflammatory or obstructive process in   the abdomen and pelvis.

## 2022-02-10 NOTE — PROVIDER CONTACT NOTE (OTHER) - ACTION/TREATMENT ORDERED:
MD said he would give an update about further actions. MD said to continue to monitor pt and no further actions needed at this time.

## 2022-02-10 NOTE — DISCHARGE NOTE NURSING/CASE MANAGEMENT/SOCIAL WORK - NSDCPNINST_GEN_ALL_CORE
pt Axox2 , confused.  stable to discharge to rehab. pt vitals stable, denies pain. no signs of acute distress noted. iv removed. Incontinent care provided. pt Axox2 , confused.  stable to discharge to rehab. pt vitals stable, denies pain. no signs of acute distress noted. iv removed. Incontinent care provided. copy of discharge instruction provided.

## 2022-02-10 NOTE — PROGRESS NOTE ADULT - ASSESSMENT
62 yo M w/ PMHx of HTN, DM, anxiety, b/l DVT while on Coumadin (s/p IVF filter, now off coumadin), glioblastoma multiforme s/p craniotomy with resection c/b ESBL meningitis for which repeat craniotomy performed 2/2020 and patient was given 8 weeks Meropenem with repeat ESBL meningitis s/p craniectomy w/ washout w/ intrathecal and 10 week course IV Meropenem w/ history of VPS shunt unclear if still in place, with fevers  Prior episode COVID  Prior episode ESBL meningitis, on PO bactrim suppressive  Leukopenia, mild temp elevation  CXR with patchy left lung opacity  CT chest with ground glass opacities, infectious/inflammatory  Improved mental status today?  Overall,  1) COVID  - RA--was positive for COVID prior to admission (HIE)  - Hold RemD  - O2 supplementation per primary team  2) Abnormal finding on imaging  - Cover for possible superinfection  - Meropenem 1g q 8, 7 days then discontinue (after nancy completed, would resume Bactrim PPX)  - Monitor clinically  3) Prior ESBL Meningitis/AMS  - Nancy as above for PPX  - If no other explanation for altered mental status would sample CSF to determine if underlying infectious process  - F/U neurology    Art Mcpherson MD  Contact on TEAMS messaging from 9am - 5pm  From 5pm-9am, and on weekends call [phone]

## 2022-02-10 NOTE — PROGRESS NOTE ADULT - ATTENDING COMMENTS
60 yo M w/ hx HTN, DVT s/p IVC GBM s/p resection and complicated by meningitis with ESBL p/w encephalopathy. CT head neg. Ct chest Central airways are patent. Bilateral linear, consolidative and ground glass opacities, most prominent at the left upper and bilateral lower lobes. No pleural effusions or pneumothorax. + COVID.    Metabolic encephalopathy Possible underlying bacterial PNA w/ underlying COVID 1/19 - discontinue dexamethasone no longer requiring oxygen                                                 - meropenem                                                 - EEG no overt seizure low suspicion of seizure                                                 - cont keppra                                                - bcx NGTD                                                - CXR unchanged no worsening of PNA                                                - Pt was admitted with 4 day hx nausea and vomiting NBNB; Check CT A/P without overt source                                                 - d/w Dr Marcos Neuro/ID suspect waxing and waning MS secondary to glioma and acute PNA.                                                - case d/w HCP partha to visit pt today

## 2022-02-10 NOTE — DISCHARGE NOTE NURSING/CASE MANAGEMENT/SOCIAL WORK - PATIENT PORTAL LINK FT
You can access the FollowMyHealth Patient Portal offered by [facility] by registering at the following website: [email or web address]. By joining [employer]’s FollowMyHealth portal, you will also be able to view your health information using other applications (apps) compatible with our system.

## 2022-02-10 NOTE — PROGRESS NOTE ADULT - PROBLEM SELECTOR PLAN 3
Concern for bacterial infection, CXR with patchy left lung opacity, CT chest w/ b/l opacities  - c/w COVID19 treatment as below along with IV meropenem  - continue to monitor closely  - 2/8 CXR showing continued multifocal pneumonia/COVID infection

## 2022-02-11 ENCOUNTER — TRANSCRIPTION ENCOUNTER (OUTPATIENT)
Age: 62
End: 2022-02-11

## 2022-02-11 PROCEDURE — 99232 SBSQ HOSP IP/OBS MODERATE 35: CPT | Mod: GC

## 2022-02-11 PROCEDURE — 99232 SBSQ HOSP IP/OBS MODERATE 35: CPT

## 2022-02-11 RX ORDER — GABAPENTIN 400 MG/1
1 CAPSULE ORAL
Qty: 0 | Refills: 0 | DISCHARGE

## 2022-02-11 RX ORDER — LACTOBACILLUS ACIDOPHILUS 100MM CELL
1 CAPSULE ORAL
Qty: 0 | Refills: 0 | DISCHARGE
Start: 2022-02-11

## 2022-02-11 RX ORDER — ALBUTEROL 90 UG/1
2 AEROSOL, METERED ORAL
Qty: 0 | Refills: 0 | DISCHARGE
Start: 2022-02-11

## 2022-02-11 RX ADMIN — Medication 10 MILLIGRAM(S): at 12:13

## 2022-02-11 RX ADMIN — LIDOCAINE 1 PATCH: 4 CREAM TOPICAL at 19:15

## 2022-02-11 RX ADMIN — LEVETIRACETAM 500 MILLIGRAM(S): 250 TABLET, FILM COATED ORAL at 05:51

## 2022-02-11 RX ADMIN — Medication 1 TABLET(S): at 12:21

## 2022-02-11 RX ADMIN — Medication 1 TABLET(S): at 17:37

## 2022-02-11 RX ADMIN — LIDOCAINE 1 PATCH: 4 CREAM TOPICAL at 12:11

## 2022-02-11 RX ADMIN — MIRTAZAPINE 15 MILLIGRAM(S): 45 TABLET, ORALLY DISINTEGRATING ORAL at 21:59

## 2022-02-11 RX ADMIN — FAMOTIDINE 20 MILLIGRAM(S): 10 INJECTION INTRAVENOUS at 17:37

## 2022-02-11 RX ADMIN — LIDOCAINE 1 PATCH: 4 CREAM TOPICAL at 12:10

## 2022-02-11 RX ADMIN — LEVETIRACETAM 500 MILLIGRAM(S): 250 TABLET, FILM COATED ORAL at 17:37

## 2022-02-11 RX ADMIN — Medication 1 TABLET(S): at 05:52

## 2022-02-11 RX ADMIN — LIDOCAINE 1 PATCH: 4 CREAM TOPICAL at 19:16

## 2022-02-11 RX ADMIN — ENOXAPARIN SODIUM 40 MILLIGRAM(S): 100 INJECTION SUBCUTANEOUS at 12:10

## 2022-02-11 RX ADMIN — FAMOTIDINE 20 MILLIGRAM(S): 10 INJECTION INTRAVENOUS at 05:52

## 2022-02-11 RX ADMIN — SENNA PLUS 1 TABLET(S): 8.6 TABLET ORAL at 21:59

## 2022-02-11 NOTE — DISCHARGE NOTE PROVIDER - NSDCCPCAREPLAN_GEN_ALL_CORE_FT
PRINCIPAL DISCHARGE DIAGNOSIS  Diagnosis: Pneumonia  Assessment and Plan of Treatment: You were admitted for treatment of suspected bacterial pneumonia imposed on your COVID-19 infection. Given the history of your drug resistant meningitis, you were given a 7 day course of meropenem with improvement of your symptoms.      SECONDARY DISCHARGE DIAGNOSES  Diagnosis: Nosocomial pneumonia  Assessment and Plan of Treatment:     Diagnosis: Altered mental status  Assessment and Plan of Treatment: During your admission, there was concern that your mental status was decreased from your baseline. Neurology was consulted to evaluate for breakthrough seizures as a possible etiology. An EEG study was performed and was negative for seizures. While receiving antibiotics, your mental status seemed to wax and wane although overall improved.     PRINCIPAL DISCHARGE DIAGNOSIS  Diagnosis: Pneumonia  Assessment and Plan of Treatment: You were admitted for treatment of suspected bacterial pneumonia imposed on your COVID-19 infection as seen on chest XR. Given the history of your drug resistant meningitis, you were given a 7 day course of meropenem with improvement of your symptoms.      SECONDARY DISCHARGE DIAGNOSES  Diagnosis: Altered mental status  Assessment and Plan of Treatment: During your admission, there was concern that your mental status was decreased from your baseline. Neurology was consulted to evaluate for breakthrough seizures as a possible etiology. An EEG study was performed and was negative for seizures. While receiving antibiotics, your mental status was found to slowly improve.     PRINCIPAL DISCHARGE DIAGNOSIS  Diagnosis: Pneumonia  Assessment and Plan of Treatment: You were admitted for treatment of suspected bacterial pneumonia imposed on your COVID-19 infection as seen on chest XR. Given the history of your drug resistant meningitis, you were given a 7 day course of meropenem with improvement of your symptoms.  Seek care immediately if:  -You cough up blood.  -Your heart beats more than 100 beats in 1 minute.  -You are very tired, confused, and cannot think clearly.  -You have chest pain or trouble breathing.  -Your lips or fingernails turn gray or blue.  Call your doctor if:  -Your symptoms are the same or get worse 48 hours after you start antibiotics.  -Your fever is not below 99°F (37.2°C) 48 hours after you start antibiotics.  -You have a fever higher than 101°F (38.3°C).  -You cannot eat, or you have loss of appetite, nausea, or are vomiting.  -You have questions or concerns about your condition or care.      SECONDARY DISCHARGE DIAGNOSES  Diagnosis: Altered mental status  Assessment and Plan of Treatment: During your admission, there was concern that your mental status was decreased from your baseline. Neurology was consulted to evaluate for breakthrough seizures as a possible etiology. An EEG study was performed and was negative for seizures. While receiving antibiotics, your mental status was found to slowly improve, and it is expected to return to baseline as you recover from the infection.     PRINCIPAL DISCHARGE DIAGNOSIS  Diagnosis: Pneumonia  Assessment and Plan of Treatment: You were admitted for treatment of suspected bacterial pneumonia imposed on your COVID-19 infection as seen on chest XR. Given the history of your drug resistant meningitis, you were given a 7 day course of meropenem with improvement of your symptoms.  Seek care immediately if:  -You cough up blood.  -Your heart beats more than 100 beats in 1 minute.  -You are very tired, confused, and cannot think clearly.  -You have chest pain or trouble breathing.  -Your lips or fingernails turn gray or blue.  Call your doctor if:  -Your symptoms are the same or get worse 48 hours after you start antibiotics.  -Your fever is not below 99°F (37.2°C) 48 hours after you start antibiotics.  -You have a fever higher than 101°F (38.3°C).  -You cannot eat, or you have loss of appetite, nausea, or are vomiting.  -You have questions or concerns about your condition or care.      SECONDARY DISCHARGE DIAGNOSES  Diagnosis: Glioblastoma multiforme  Assessment and Plan of Treatment: Please follow-up with your neurosurgery and neuroncology after disharge.    Diagnosis: Altered mental status  Assessment and Plan of Treatment: During your admission, there was concern that your mental status was decreased from your baseline. Neurology was consulted to evaluate for breakthrough seizures as a possible etiology. Neurology and Infectious disease team feel that your mental status change was due to your covid with superimposed bacterial pneumonia.  An EEG study was performed and was negative for seizures. Your mental status has improved with the course of antibiotic. Please continue your home bactrim for meningitis ppx after discharge.    Diagnosis: Essential hypertension  Assessment and Plan of Treatment: Your blood pressure has not been elevated during your stay off blood pressure medication. Please follow-up with your primary care doctor after discharge to determine if and when to resume the medication.     PRINCIPAL DISCHARGE DIAGNOSIS  Diagnosis: Pneumonia  Assessment and Plan of Treatment: You were admitted for treatment of suspected bacterial pneumonia imposed on your COVID-19 infection as seen on chest XR. You were treated with steroid and remdesivir per infectious disease. Given the history of your drug resistant meningitis, you were given a 7 day course of meropenem with improvement of your symptoms.  Seek care immediately if:  -You cough up blood.  -Your heart beats more than 100 beats in 1 minute.  -You are very tired, confused, and cannot think clearly.  -You have chest pain or trouble breathing.  -Your lips or fingernails turn gray or blue.  Call your doctor if:  -Your symptoms are the same or get worse 48 hours after you start antibiotics.  -Your fever is not below 99°F (37.2°C) 48 hours after you start antibiotics.  -You have a fever higher than 101°F (38.3°C).  -You cannot eat, or you have loss of appetite, nausea, or are vomiting.  -You have questions or concerns about your condition or care.      SECONDARY DISCHARGE DIAGNOSES  Diagnosis: Glioblastoma multiforme  Assessment and Plan of Treatment: Please follow-up with your neurosurgery and neuroncology after disharge.    Diagnosis: Altered mental status  Assessment and Plan of Treatment: During your admission, there was concern that your mental status was decreased from your baseline. Neurology was consulted to evaluate for breakthrough seizures as a possible etiology. Neurology and Infectious disease team feel that your mental status change was due to your covid with superimposed bacterial pneumonia.  An EEG study was performed and was negative for seizures. Your mental status has improved with the course of antibiotic. Please continue your home bactrim for meningitis ppx after discharge.    Diagnosis: Essential hypertension  Assessment and Plan of Treatment: Your blood pressure has not been elevated during your stay off blood pressure medication. Please follow-up with your primary care doctor after discharge to determine if and when to resume the medication.

## 2022-02-11 NOTE — DIETITIAN INITIAL EVALUATION ADULT. - PROBLEM SELECTOR PLAN 4
Failed bedside dysphagia screen.  NPO including meds.  F/U S& S eval.  Aspiration precautions.   IVF with LR @ 75ml/ h x 1 Liter.  F/U ProBNP

## 2022-02-11 NOTE — DISCHARGE NOTE PROVIDER - NSDCFUADDAPPT_GEN_ALL_CORE_FT
Patrice CALVO)  Neurosurgery  [address] [place] [address] [address]  [place] [address]  Phone: [phone]  Follow Up Time: Routine

## 2022-02-11 NOTE — DIETITIAN INITIAL EVALUATION ADULT. - PERTINENT LABORATORY DATA
02-09 Na 142 mmol/L Glu 88 mg/dL K+ 3.6 mmol/L Cr 0.50 mg/dL BUN 13 mg/dL Phos 2.1 mg/dL<L>    A1C with Estimated Average Glucose (02.04.22 @ 06:41), A1C with Estimated Average Glucose Result: 5.1

## 2022-02-11 NOTE — DIETITIAN INITIAL EVALUATION ADULT. - PROBLEM SELECTOR PLAN 3
- [place] rehab pt with CXR showing R basilar linear opacity and patchy left lung opacity may be infectious or inflammatory.  - F/U CT Chest  - Continue Vancomycin and Zosyn IV.  - F/U Blood Cx.  - Tylenol PRN.

## 2022-02-11 NOTE — PROGRESS NOTE ADULT - PROBLEM SELECTOR PLAN 2
Concern for breakthrough seizure due to abx vs infection.  - CT Head showing postsurgical changes. Neurology consulted, recommendations appreciated.  - 24hr continuous EEG negative for epileptiform activity  - c/w Keppra  - F/u additional Neurology recs

## 2022-02-11 NOTE — DIETITIAN INITIAL EVALUATION ADULT. - ORAL INTAKE PTA/DIET HISTORY
Patient seen for assessment. Unable to obtain information from patient at this time, patient sleeping despite multiple attempts to get attention. Information obtained from chart review and RN.

## 2022-02-11 NOTE — PROGRESS NOTE ADULT - SUBJECTIVE AND OBJECTIVE BOX
Medicine Progress Note  Authored by Pavan Jones, MS4    Patient is a 61y old  Male who presents with a chief complaint of Fever (10 Feb 2022 09:02)      SUBJECTIVE / OVERNIGHT EVENTS: No acute overnight events. Pt reports continued fatigue but otherwise is without complaints. No CP, SOB, cough, HA, dizziness, neck pain.       MEDICATIONS  (STANDING):  enoxaparin Injectable 40 milliGRAM(s) SubCutaneous daily  famotidine    Tablet 20 milliGRAM(s) Oral two times a day  FLUoxetine 10 milliGRAM(s) Oral daily  lactobacillus acidophilus 1 Tablet(s) Oral daily  levETIRAcetam 500 milliGRAM(s) Oral two times a day  lidocaine   4% Patch 1 Patch Transdermal daily  lidocaine   4% Patch 1 Patch Transdermal daily  mirtazapine 15 milliGRAM(s) Oral at bedtime  senna 1 Tablet(s) Oral at bedtime  trimethoprim  160 mG/sulfamethoxazole 800 mG 1 Tablet(s) Oral two times a day    MEDICATIONS  (PRN):  acetaminophen  Suppository .. 650 milliGRAM(s) Rectal every 4 hours PRN Temp greater or equal to 38.5C (101.3F)  ALBUTerol    90 MICROgram(s) HFA Inhaler 2 Puff(s) Inhalation every 6 hours PRN Shortness of Breath and/or Wheezing    CAPILLARY BLOOD GLUCOSE        I&O's Summary    10 Feb 2022 07:01  -  11 Feb 2022 07:00  --------------------------------------------------------  IN: 600 mL / OUT: 0 mL / NET: 600 mL        PHYSICAL EXAM:  Vital Signs Last 24 Hrs  T(C): 36.8 (11 Feb 2022 06:08), Max: 36.8 (10 Feb 2022 15:11)  T(F): 98.3 (11 Feb 2022 06:08), Max: 98.3 (10 Feb 2022 15:11)  HR: 83 (11 Feb 2022 06:08) (77 - 84)  BP: 107/79 (11 Feb 2022 06:08) (96/64 - 107/79)  BP(mean): --  RR: 19 (11 Feb 2022 06:08) (18 - 19)  SpO2: 96% (11 Feb 2022 06:08) (93% - 96%)    CONSTITUTIONAL: NAD  HEENT: EOMI, normal sclera and conjunctiva; moist mucous membranes  RESPIRATORY: Normal respiratory effort; lungs CTA bilaterally  CARDIOVASCULAR: Regular rate and rhythm, +S1/S2, no murmur/rub/gallop  ABDOMEN: Soft, nontender, nondistended, no rebound/guarding  SKIN: No rashes; no palpable lesions  EXTREMITIES: No peripheral edema  NEUROLOGY: AOx3, no gross sensory deficits  PSYCH: Appropriate mood and affect    LABS:                    COVID-19 PCR: NotDetec (10 Feb 2022 14:31)  SARS-CoV-2: Detected (03 Feb 2022 17:34)      RADIOLOGY & ADDITIONAL TESTS:  Imaging from Last 24 Hours:    Electrocardiogram/QTc Interval:    COORDINATION OF CARE:  Care Discussed with Consultants/Other Providers:

## 2022-02-11 NOTE — DISCHARGE NOTE PROVIDER - PROVIDER TOKENS
PROVIDER:[TOKEN:[[id number]],FOLLOWUP:[2 weeks]],PROVIDER:[TOKEN:[[id number]],FOLLOWUP:[Routine]]

## 2022-02-11 NOTE — DIETITIAN INITIAL EVALUATION ADULT. - PROBLEM SELECTOR PLAN 1
Air borne precautions.  F/U Inflammatory markers.  Pt not in CIERRA with lowest SPO2= 95% on RA and RR = 18b/ min.  Decadron on hold.  Give Pro Air inhaler PRN, Tylenol DC PRN, Incentive spirometry.   Chest PT.   F/u D Dimer and if > 500 increase VTE.

## 2022-02-11 NOTE — DISCHARGE NOTE PROVIDER - HOSPITAL COURSE
61M PMHx GBM s/p craniotomy w/ resection c/b ESBL meningitis for which repeat craniotomy performed 2/2020 treated with 8 weeks Meropenem, subsequently had repeat ESBL meningitis s/p craniectomy with washout w/ 10 wk course intrathecal and IV Meropenem, b/t DVT s/p IVC filter, HTN, anxiety presented from [facility] on 2/3 with fever, chills, N/V. Pt had been diagnosed with COVID on 1/19 and was found to have multifocal pneumonia on CXR on 2/2 also with UTI. In ED, was started on Vanc/Zosyn and found to still be COVID positive, noted to be altered likely toxic metabolic encephalopathy.     On 2/4, pt noted to have a 30 minute event where he became unresponsive and unarousable, was subsequently unable to recall this event. Neuro consulted to r/o seizure activity. ID also consulted given hx of ESBL meningitis, recommended changing abx to meropenem. During admission, 24h vEEG was without epileptiform activity. A CT abdomen was negative for intraabdominal infection source. Pt completed 7d meropenem course (2/4-2/10) with clinical improvement of pneumonia sx. Mental status improved per sister (Maribel), therefore decided against tapping  shunt to evaluate for meningitis. Pt was discharged on  _______ 61M PMHx GBM s/p craniotomy w/ resection c/b ESBL meningitis for which repeat craniotomy performed 2/2020 treated with 8 weeks Meropenem, subsequently had repeat ESBL meningitis s/p craniectomy with washout w/ 10 wk course intrathecal and IV Meropenem, b/t DVT s/p IVC filter, HTN, anxiety presented from [facility] on 2/3 with fever, chills, N/V. Pt had been diagnosed with COVID on 1/19 and was found to have multifocal pneumonia on CXR on 2/2 also with UTI. In ED, was started on Vanc/Zosyn and found to still be COVID positive, noted to be altered likely toxic metabolic encephalopathy.     On 2/4, pt noted to have a 30 minute event where he became unresponsive and unarousable, was subsequently unable to recall this event. Neuro consulted to r/o seizure activity. ID also consulted given hx of ESBL meningitis, recommended changing abx to meropenem. During admission, 24h vEEG was without epileptiform activity. A CT abdomen was negative for intraabdominal infection source. Pt completed 7d meropenem course (2/4-2/10) with clinical improvement of pneumonia sx. Mental status improved during admission per sister (Maribel), therefore decided against tapping  shunt to evaluate for meningitis. Pt was discharged back to [facility] on  _______ 61M PMHx GBM s/p craniotomy w/ resection c/b ESBL meningitis for which repeat craniotomy performed 2/2020 treated with 8 weeks Meropenem, subsequently had repeat ESBL meningitis s/p craniectomy with washout w/ 10 wk course intrathecal and IV Meropenem, b/t DVT s/p IVC filter, HTN, anxiety presented from [facility] on 2/3 with fever, chills, N/V. Pt had been diagnosed with COVID on 1/19 and was found to have multifocal pneumonia on CXR on 2/2 also with UTI. In ED, was started on Vanc/Zosyn and found to still be COVID positive, noted to be altered likely toxic metabolic encephalopathy.     On 2/4, pt noted to have a 30 minute event where he became unresponsive and unarousable, was subsequently unable to recall this event. Neuro consulted to r/o seizure activity. ID also consulted given hx of ESBL meningitis, recommended changing abx to meropenem. During admission, 24h vEEG was without epileptiform activity. A CT abdomen was negative for intraabdominal infection source. Pt completed 7d meropenem course (2/4-2/10) with clinical improvement of pneumonia sx. Mental status improved during admission per sister (Maribel). ID and Neuro onboard, feel that his mental status is due to prolonged toxic metabolic from COVID w/ superimposed bacterial PNA, glioma. Low suscipion for meningitis.  shunt not indicated. MRI ordered but can be pursued outpt for routine surveilance from neuro-onc at [facility].

## 2022-02-11 NOTE — PROGRESS NOTE ADULT - PROBLEM SELECTOR PLAN 1
Presented with 4 days of fever, nausea, NB vomiting, here with AMS and COVID19 positive. Now resolved   - No longer meeting SIRS criteria  - Mental status improving. Today A/Ox3 with minimal prompting  - per neuro, AMS likely 2/2 prolonged toxic metabolic encephalopathy vs delirium iso prior glioma  - ID consulted, recommendations appreciated especially given Hx ESBL meningitis  - S/p 7d meropenem course (2/4-2/10), will resume bactrim ppx. Per ID, can hold off on LP/shunt tap as long as mentating well, although if mental status does not improve then may pursue as an option for further workup  - CT abd neg for intraabdominal infection   - Continue to monitor closely  - BCx cultures from 2/4 NG

## 2022-02-11 NOTE — PROGRESS NOTE ADULT - PROBLEM SELECTOR PLAN 5
c/b meningitis  On chronic Bactrim - will resume s/p nancy course  Monitor mental status  Lovenox SC for VTE ppx

## 2022-02-11 NOTE — DIETITIAN INITIAL EVALUATION ADULT. - PROBLEM SELECTOR PLAN 5
F/U CT Head with contrast to assess for mass.  Continue Keppra 500 mg IV BID  F/U Keppra level.  Seizure precautions.

## 2022-02-11 NOTE — DIETITIAN INITIAL EVALUATION ADULT. - OTHER INFO
61 year old male with a PMH of HTN, Anxiety, DVT, DM, GBM s/p multiple craniotomy c/b ESBL meningitis requiring prolonged Abx, recently dx'd COVID 1/19 p/w Sepsis from multifocal PNA c/b acute encephalopathy s/p 7d meropenem course for pneumonia given hx of ESBL infection per chart.    Per RN patient ate fairly today. Noted with mostly poor PO intake 0-25% per RN flow sheet. No GI distress or intolerances to diet consistency reported. Has food allergy to shellfish. Unable to obtain UBW at this time. Per HIE, noted with weights 74.8 kg (8/23/21) and 77.1 kg (5/8/21). ABW is 86.2 kg (2/3) per chart indicating weight gain, will monitor. No edema or pressure injuries noted per RN flow sheet. A1c noted to be 5.1% (2/4) indicating good glycemic control.

## 2022-02-11 NOTE — DISCHARGE NOTE PROVIDER - NSDCMRMEDTOKEN_GEN_ALL_CORE_FT
Bactrim  mg-160 mg oral tablet: 1 tab(s) orally every 12 hours  Calcium 500+D oral tablet, chewable: 1 tab(s) orally 2 times a day  famotidine 20 mg oral tablet: 1 tab(s) orally 2 times a day  FLUoxetine 10 mg oral tablet: 1 tab(s) orally once a day  hydrALAZINE 25 mg oral tablet: 1 tab(s) orally 3 times a day  Keppra 500 mg oral tablet: 1 tab(s) orally 2 times a day  MiraLax oral powder for reconstitution: 1 cap(s) orally once a day  mirtazapine 15 mg oral tablet: 1 tab(s) orally once a day (at bedtime)  Multiple Vitamins oral tablet: 1 tab(s) orally once a day  Neurontin 300 mg oral capsule: 1 cap(s) orally once a day (at bedtime)  Senna 8.6 mg oral tablet: 1 tab(s) orally once a day (at bedtime)  Vitamin C 500 mg oral capsule: 1 cap(s) orally once a day  Vitamin D3 25 mcg (1000 intl units) oral tablet: 1 tab(s) orally once a day  Voltaren 1% topical gel: Apply topically to affected area 2 times a day  R Shoulder &amp; R Knee.   zinc sulfate 140 mg (as elemental zinc 50 mg) oral tablet: 1 tab(s) orally once a day   albuterol 90 mcg/inh inhalation aerosol: 2 puff(s) inhaled every 6 hours, As needed, Shortness of Breath and/or Wheezing  Bactrim  mg-160 mg oral tablet: 1 tab(s) orally every 12 hours  Calcium 500+D oral tablet, chewable: 1 tab(s) orally 2 times a day  famotidine 20 mg oral tablet: 1 tab(s) orally 2 times a day  FLUoxetine 10 mg oral tablet: 1 tab(s) orally once a day  Keppra 500 mg oral tablet: 1 tab(s) orally 2 times a day  lactobacillus acidophilus oral capsule: 1 cap(s) orally once a day  MiraLax oral powder for reconstitution: 1 cap(s) orally once a day  mirtazapine 15 mg oral tablet: 1 tab(s) orally once a day (at bedtime)  Multiple Vitamins oral tablet: 1 tab(s) orally once a day  Senna 8.6 mg oral tablet: 1 tab(s) orally once a day (at bedtime)  Vitamin C 500 mg oral capsule: 1 cap(s) orally once a day  Vitamin D3 25 mcg (1000 intl units) oral tablet: 1 tab(s) orally once a day  Voltaren 1% topical gel: Apply topically to affected area 2 times a day  R Shoulder &amp; R Knee.   zinc sulfate 140 mg (as elemental zinc 50 mg) oral tablet: 1 tab(s) orally once a day   albuterol 90 mcg/inh inhalation aerosol: 2 puff(s) inhaled every 6 hours, As needed, Shortness of Breath and/or Wheezing  Bactrim  mg-160 mg oral tablet: 1 tab(s) orally every 12 hours  Calcium 500+D oral tablet, chewable: 1 tab(s) orally 2 times a day  enoxaparin: 30 milligram(s) injectable once a day  famotidine 20 mg oral tablet: 1 tab(s) orally 2 times a day  FLUoxetine 10 mg oral tablet: 1 tab(s) orally once a day  Keppra 500 mg oral tablet: 1 tab(s) orally 2 times a day  lactobacillus acidophilus oral capsule: 1 cap(s) orally once a day  MiraLax oral powder for reconstitution: 1 cap(s) orally once a day  mirtazapine 15 mg oral tablet: 1 tab(s) orally once a day (at bedtime)  Multiple Vitamins oral tablet: 1 tab(s) orally once a day  Senna 8.6 mg oral tablet: 1 tab(s) orally once a day (at bedtime)  Vitamin C 500 mg oral capsule: 1 cap(s) orally once a day  Vitamin D3 25 mcg (1000 intl units) oral tablet: 1 tab(s) orally once a day  Voltaren 1% topical gel: Apply topically to affected area 2 times a day  R Shoulder &amp; R Knee.   zinc sulfate 140 mg (as elemental zinc 50 mg) oral tablet: 1 tab(s) orally once a day

## 2022-02-11 NOTE — PROGRESS NOTE ADULT - SUBJECTIVE AND OBJECTIVE BOX
CC: F/U for AMS    Saw/spoke to patient. Unchanged. Able to answer questions, sleeping.    Allergies  aspirin (Unknown)  shellfish (Hives)  shellfish (Unknown)  Tegretol (Unknown)    ANTIMICROBIALS:  trimethoprim  160 mG/sulfamethoxazole 800 mG 1 two times a day    PE:    Vital Signs Last 24 Hrs  T(C): 36.8 (11 Feb 2022 06:08), Max: 36.8 (10 Feb 2022 15:11)  T(F): 98.3 (11 Feb 2022 06:08), Max: 98.3 (10 Feb 2022 15:11)  HR: 83 (11 Feb 2022 06:08) (77 - 84)  BP: 107/79 (11 Feb 2022 06:08) (96/64 - 107/79)  RR: 19 (11 Feb 2022 06:08) (18 - 19)  SpO2: 96% (11 Feb 2022 06:08) (93% - 96%)    Gen: AOx2-3, fatigued  CV:  Nontachycardic  Resp: Breathing comfortably, RA  Abd: Soft, nontender  : No suprapubic tenderness    LABS:    No new available    MICROBIOLOGY:    .Blood Blood-Peripheral  02-03-22   No Growth Final     .Blood Blood-Peripheral  02-03-22   No Growth Final     RADIOLOGY:    2/9 CT:    IMPRESSION: No evidence of acute inflammatory or obstructive process in   the abdomen and pelvis.

## 2022-02-11 NOTE — PROGRESS NOTE ADULT - ASSESSMENT
60 yo M w/ PMHx of HTN, DM, anxiety, b/l DVT while on Coumadin (s/p IVF filter, now off coumadin), glioblastoma multiforme s/p craniotomy with resection c/b ESBL meningitis for which repeat craniotomy performed 2/2020 and patient was given 8 weeks Meropenem with repeat ESBL meningitis s/p craniectomy w/ washout w/ intrathecal and 10 week course IV Meropenem w/ history of VPS shunt unclear if still in place, with fevers  Prior episode COVID  Prior episode ESBL meningitis, on PO bactrim suppressive  Leukopenia, mild temp elevation  CXR with patchy left lung opacity  CT chest with ground glass opacities, infectious/inflammatory  Waxing and waning mental status  Overall,  1) COVID  - RA  - Hold RemD  - O2 supplementation per primary team  2) Abnormal finding on imaging  - Cover for possible superinfection  - S/p Jin for possible pna  - Monitor clinically  3) Prior ESBL Meningitis/AMS  - Home dose bactrim PPX  - If no other explanation for altered mental status from baseline would sample CSF to determine if underlying infectious process  - F/U neurology    Art Mcpherson MD  Contact on TEAMS messaging from 9am - 5pm  From 5pm-9am, and on weekends call [phone]

## 2022-02-11 NOTE — DIETITIAN INITIAL EVALUATION ADULT. - PERTINENT MEDS FT
MEDICATIONS  (STANDING):  enoxaparin Injectable 40 milliGRAM(s) SubCutaneous daily  famotidine    Tablet 20 milliGRAM(s) Oral two times a day  FLUoxetine 10 milliGRAM(s) Oral daily  lactobacillus acidophilus 1 Tablet(s) Oral daily  levETIRAcetam 500 milliGRAM(s) Oral two times a day  lidocaine   4% Patch 1 Patch Transdermal daily  lidocaine   4% Patch 1 Patch Transdermal daily  mirtazapine 15 milliGRAM(s) Oral at bedtime  senna 1 Tablet(s) Oral at bedtime  trimethoprim  160 mG/sulfamethoxazole 800 mG 1 Tablet(s) Oral two times a day

## 2022-02-11 NOTE — PROGRESS NOTE ADULT - ATTENDING COMMENTS
60 yo M w/ hx HTN, DVT s/p IVC GBM s/p resection and complicated by meningitis with ESBL p/w encephalopathy. CT head neg. Ct chest Bilateral linear, consolidative and ground glass opacities, most prominent at the left upper and bilateral lower lobes. No pleural effusions or pneumothorax. + COVID 1/19/22    Metabolic encephalopathy Possible underlying bacterial PNA w/ underlying COVID 1/19 - discontinue dexamethasone no longer requiring oxygen                                                 - s/p meropenem                                                - EEG no overt seizure low suspicion of seizure                                                 - cont keppra                                                - bcx NGTD                                                - CXR unchanged no worsening of PNA                                                - Pt was admitted with 4 day hx nausea and vomiting NBNB; Check CT A/P without overt source                                                 - d/w Dr Marcos Neuro/ID suspect waxing and waning MS secondary to glioma and acute PNA.                                               - case d/w Dr. Maya at baseline AOx2 at best and mental status waxes and wanes.                                                - case d/w HCP partha states he is improved from admission understands encephalopathy may take some time to resolve. requesting MRI be performed as he is due for MRI. advised that if [facility] had a bed pt would be dc'ed with outpt MRI, but if no bed available will attempt to get MRI as inpt until bed availability.

## 2022-02-11 NOTE — PROGRESS NOTE ADULT - ASSESSMENT
61M HTN, Anxiety, DVT s/p IVC filter, not on coumadin, GBM s/p multiple craniotomy c/b ESBL meningitis requiring prolonged Abx, recently dx'd COVID 1/19 p/w Sepsis from multifocal PNA c/b acute encephalopathy. S/p 7d meropenem course for pneumonia given hx of ESBL infection

## 2022-02-12 VITALS
RESPIRATION RATE: 18 BRPM | OXYGEN SATURATION: 100 % | HEART RATE: 100 BPM | SYSTOLIC BLOOD PRESSURE: 94 MMHG | DIASTOLIC BLOOD PRESSURE: 73 MMHG | TEMPERATURE: 98 F

## 2022-02-12 PROCEDURE — 99239 HOSP IP/OBS DSCHRG MGMT >30: CPT

## 2022-02-12 PROCEDURE — 93010 ELECTROCARDIOGRAM REPORT: CPT

## 2022-02-12 RX ORDER — ENOXAPARIN SODIUM 100 MG/ML
40 INJECTION SUBCUTANEOUS
Qty: 0 | Refills: 0 | DISCHARGE
Start: 2022-02-12

## 2022-02-12 RX ORDER — ENOXAPARIN SODIUM 100 MG/ML
30 INJECTION SUBCUTANEOUS
Qty: 0 | Refills: 0 | DISCHARGE
Start: 2022-02-12

## 2022-02-12 RX ADMIN — LIDOCAINE 1 PATCH: 4 CREAM TOPICAL at 00:31

## 2022-02-12 RX ADMIN — LIDOCAINE 1 PATCH: 4 CREAM TOPICAL at 12:18

## 2022-02-12 RX ADMIN — LEVETIRACETAM 500 MILLIGRAM(S): 250 TABLET, FILM COATED ORAL at 06:05

## 2022-02-12 RX ADMIN — Medication 1 TABLET(S): at 06:05

## 2022-02-12 RX ADMIN — FAMOTIDINE 20 MILLIGRAM(S): 10 INJECTION INTRAVENOUS at 06:05

## 2022-02-12 RX ADMIN — ENOXAPARIN SODIUM 40 MILLIGRAM(S): 100 INJECTION SUBCUTANEOUS at 12:17

## 2022-02-12 RX ADMIN — Medication 10 MILLIGRAM(S): at 12:18

## 2022-02-12 NOTE — PROGRESS NOTE ADULT - PROBLEM SELECTOR PROBLEM 4
2019 novel coronavirus disease (COVID-19)

## 2022-02-12 NOTE — PROGRESS NOTE ADULT - PROBLEM SELECTOR PLAN 4
Appreciate ID recs  - Per ID, hold remdesivir, will dc dexa given O2 status improving  - continue to monitor closely
Per ID, recommending 5 days remdesivir, consider adding dex if progressive O2 requirements  - continue to monitor closely
Appreciate ID recs  - COVID PCR neg 2/10  - Per ID, hold remdesivir, will dc dexa given O2 status improving  - continue to monitor closely
Monitor for O2 status  No indication for remdesivir or decadron due to outside the therapeutic range - first dx in 1/19.
Appreciate ID recs  - COVID PCR neg 2/10  - Per ID, hold remdesivir, will dc dexa given O2 status improving  - continue to monitor closely
Appreciate ID recs  - Per ID, hold remdesivir, will dc dexa given O2 status improving  - continue to monitor closely
Appreciate ID recs  - Per ID, hold remdesivir, will dc dexa given O2 status improving  - continue to monitor closely

## 2022-02-12 NOTE — PROGRESS NOTE ADULT - ATTENDING COMMENTS
62 yo M w/ hx HTN, DVT s/p IVC GBM s/p resection and complicated by meningitis with ESBL p/w encephalopathy. CT head neg. Ct chest Bilateral linear, consolidative and ground glass opacities, most prominent at the left upper and bilateral lower lobes. No pleural effusions or pneumothorax. + COVID 1/19/22    Metabolic encephalopathy Possible underlying bacterial PNA w/ underlying COVID 1/19 - off dexamethasone no longer requiring oxygen                                                 - s/p meropenem                                                - EEG no overt seizure low suspicion of seizure                                                 - cont keppra                                                - bcx NGTD                                                - CXR unchanged no worsening of PNA                                                - Pt was admitted with 4 day hx nausea and vomiting NBNB; CT A/P without overt source                                                 - d/w Dr Marcos Neuro/ID suspect waxing and waning MS secondary to glioma and acute PNA.                                               - case d/w Dr. Maya at [place] at baseline AOx2 at best and mental status waxes and wanes.                                                - case d/w HCP partha states he is improved from admission understands encephalopathy may take some time to resolve. requesting MRI be performed as he is due for MRI. advised that if [place] had a bed pt would be dc'ed with outpt MRI, but if no bed available will attempt to get MRI as inpt until bed availability.    no compliant of Chest pain when I saw him this AM. RN stated he had his food. EKG done unchanged from prior as per Dr. Rodrigues 60 yo M w/ hx HTN, DVT s/p IVC GBM s/p resection and complicated by meningitis with ESBL p/w encephalopathy. CT head neg. Ct chest Bilateral linear, consolidative and ground glass opacities, most prominent at the left upper and bilateral lower lobes. No pleural effusions or pneumothorax. + COVID 1/19/22    Metabolic encephalopathy Possible underlying bacterial PNA w/ underlying COVID 1/19 - off dexamethasone no longer requiring oxygen                                                 - s/p meropenem                                                - EEG no overt seizure low suspicion of seizure                                                 - cont keppra                                                - bcx NGTD                                                - CXR unchanged no worsening of PNA                                                - Pt was admitted with 4 day hx nausea and vomiting NBNB; CT A/P without overt source                                                 - d/w Dr Marcos Neuro/ID suspect waxing and waning MS secondary to glioma and acute PNA.                                               - case d/w Dr. Maya at [place] at baseline AOx2 at best and mental status waxes and wanes.                                                - case d/w HCP partha states he is improved from admission understands encephalopathy may take some time to resolve.     no compliant of Chest pain when I saw him this AM. RN stated he had his food. EKG done unchanged from prior as per Dr. Rodrigues.  discharge 35 min

## 2022-02-12 NOTE — PROGRESS NOTE ADULT - PROBLEM SELECTOR PROBLEM 3
Nosocomial pneumonia

## 2022-02-12 NOTE — PROGRESS NOTE ADULT - PROBLEM SELECTOR PLAN 6
Appreciate s/s eval, soft and bite sized diet at this time
S+S eval pending  resume PO meds once patient passes S+S eval.
Appreciate s/s eval, soft and bite sized diet at this time
Appreciate s/s eval, soft and bite sized diet at this time

## 2022-02-12 NOTE — PROGRESS NOTE ADULT - PROVIDER SPECIALTY LIST ADULT
Infectious Disease
Internal Medicine
Infectious Disease
Infectious Disease
Internal Medicine
Infectious Disease
Internal Medicine
Hospitalist
Hospitalist
Internal Medicine
Internal Medicine

## 2022-02-12 NOTE — PROGRESS NOTE ADULT - SUBJECTIVE AND OBJECTIVE BOX
Patient is a 61y old  Male who presents with a chief complaint of Fever (11 Feb 2022 15:34)      SUBJECTIVE / OVERNIGHT EVENTS:    12 point ROS negative except as noted above.     MEDICATIONS  (STANDING):  enoxaparin Injectable 40 milliGRAM(s) SubCutaneous daily  famotidine    Tablet 20 milliGRAM(s) Oral two times a day  FLUoxetine 10 milliGRAM(s) Oral daily  lactobacillus acidophilus 1 Tablet(s) Oral daily  levETIRAcetam 500 milliGRAM(s) Oral two times a day  lidocaine   4% Patch 1 Patch Transdermal daily  lidocaine   4% Patch 1 Patch Transdermal daily  mirtazapine 15 milliGRAM(s) Oral at bedtime  senna 1 Tablet(s) Oral at bedtime  trimethoprim  160 mG/sulfamethoxazole 800 mG 1 Tablet(s) Oral two times a day    MEDICATIONS  (PRN):  acetaminophen  Suppository .. 650 milliGRAM(s) Rectal every 4 hours PRN Temp greater or equal to 38.5C (101.3F)  ALBUTerol    90 MICROgram(s) HFA Inhaler 2 Puff(s) Inhalation every 6 hours PRN Shortness of Breath and/or Wheezing      CAPILLARY BLOOD GLUCOSE        I&O's Summary    11 Feb 2022 07:01  -  12 Feb 2022 07:00  --------------------------------------------------------  IN: 960 mL / OUT: 0 mL / NET: 960 mL        Vital Signs Last 24 Hrs  T(C): 36.8 (12 Feb 2022 06:05), Max: 36.8 (12 Feb 2022 06:05)  T(F): 98.2 (12 Feb 2022 06:05), Max: 98.2 (12 Feb 2022 06:05)  HR: 85 (12 Feb 2022 06:05) (84 - 95)  BP: 120/75 (12 Feb 2022 06:05) (93/66 - 120/75)  BP(mean): --  RR: 18 (12 Feb 2022 06:05) (18 - 19)  SpO2: 97% (12 Feb 2022 06:05) (94% - 97%)    PHYSICAL EXAM:  GENERAL: NAD, well-developed, well-nourished  HEAD: Atraumatic, Normocephalic  EYES: EOMI, PERRLA, conjunctiva and sclera clear  NECK: Supple, No JVD  CHEST/LUNG: Clear to auscultation bilaterally; No wheezes or crackles  HEART: Normal S1/S2; Regular rate and rhythm; No murmurs, rubs, or gallops  ABDOMEN: Soft, Nontender, Nondistended; Bowel sounds present  EXTREMITIES: 2+ Peripheral Pulses; No clubbing, cyanosis, or edema  PSYCH: A&Ox3  NEUROLOGY: no focal neurologic deficit  SKIN: No rashes or lesions    LABS:                     RADIOLOGY & ADDITIONAL TESTS:    Imaging Personally Reviewed:    Consultant(s) Notes Reviewed:      Care Discussed with Consultants/Other Providers:   Patient is a 61y old  Male who presents with a chief complaint of Fever (11 Feb 2022 15:34)      SUBJECTIVE / OVERNIGHT EVENTS: no acute events. Endorses 4/10 chest pain across sternum, feels "like someone is pressing down". No dyspnea, abdominal pain, diarrhea/constipation, nausea/vomiting, dysuria.     12 point ROS negative except as noted above.     MEDICATIONS  (STANDING):  enoxaparin Injectable 40 milliGRAM(s) SubCutaneous daily  famotidine    Tablet 20 milliGRAM(s) Oral two times a day  FLUoxetine 10 milliGRAM(s) Oral daily  lactobacillus acidophilus 1 Tablet(s) Oral daily  levETIRAcetam 500 milliGRAM(s) Oral two times a day  lidocaine   4% Patch 1 Patch Transdermal daily  lidocaine   4% Patch 1 Patch Transdermal daily  mirtazapine 15 milliGRAM(s) Oral at bedtime  senna 1 Tablet(s) Oral at bedtime  trimethoprim  160 mG/sulfamethoxazole 800 mG 1 Tablet(s) Oral two times a day    MEDICATIONS  (PRN):  acetaminophen  Suppository .. 650 milliGRAM(s) Rectal every 4 hours PRN Temp greater or equal to 38.5C (101.3F)  ALBUTerol    90 MICROgram(s) HFA Inhaler 2 Puff(s) Inhalation every 6 hours PRN Shortness of Breath and/or Wheezing      CAPILLARY BLOOD GLUCOSE        I&O's Summary    11 Feb 2022 07:01  -  12 Feb 2022 07:00  --------------------------------------------------------  IN: 960 mL / OUT: 0 mL / NET: 960 mL        Vital Signs Last 24 Hrs  T(C): 36.8 (12 Feb 2022 06:05), Max: 36.8 (12 Feb 2022 06:05)  T(F): 98.2 (12 Feb 2022 06:05), Max: 98.2 (12 Feb 2022 06:05)  HR: 85 (12 Feb 2022 06:05) (84 - 95)  BP: 120/75 (12 Feb 2022 06:05) (93/66 - 120/75)  BP(mean): --  RR: 18 (12 Feb 2022 06:05) (18 - 19)  SpO2: 97% (12 Feb 2022 06:05) (94% - 97%)    PHYSICAL EXAM:  GENERAL: NAD, well-developed, well-nourished  HEAD: Atraumatic, Normocephalic  EYES: EOMI, PERRLA, conjunctiva and sclera clear  NECK: Supple, No JVD  CHEST/LUNG: Clear to auscultation bilaterally; No wheezes or crackles  HEART: Normal S1/S2; Regular rate and rhythm; No murmurs, rubs, or gallops  ABDOMEN: Soft, Nontender, Nondistended; Bowel sounds present  EXTREMITIES: 2+ Peripheral Pulses; No clubbing, cyanosis, or edema  PSYCH: A&Ox2  NEUROLOGY: no focal neurologic deficit  SKIN: No rashes or lesions    LABS:                     RADIOLOGY & ADDITIONAL TESTS:    Imaging Personally Reviewed:    Consultant(s) Notes Reviewed:      Care Discussed with Consultants/Other Providers:

## 2022-09-26 ENCOUNTER — INPATIENT (INPATIENT)
Facility: HOSPITAL | Age: 62
LOS: 6 days | Discharge: NURSING, REHAB OR LONG-TERM CARE | End: 2022-10-03
Attending: STUDENT IN AN ORGANIZED HEALTH CARE EDUCATION/TRAINING PROGRAM | Admitting: STUDENT IN AN ORGANIZED HEALTH CARE EDUCATION/TRAINING PROGRAM
Payer: MEDICARE

## 2022-09-26 VITALS
HEIGHT: 72 IN | OXYGEN SATURATION: 98 % | RESPIRATION RATE: 18 BRPM | TEMPERATURE: 99 F | DIASTOLIC BLOOD PRESSURE: 91 MMHG | SYSTOLIC BLOOD PRESSURE: 135 MMHG | HEART RATE: 83 BPM

## 2022-09-26 DIAGNOSIS — T81.32XA DISRUPTION OF INTERNAL OPERATION (SURGICAL) WOUND, NOT ELSEWHERE CLASSIFIED, INITIAL ENCOUNTER: Chronic | ICD-10-CM

## 2022-09-26 DIAGNOSIS — Z95.828 PRESENCE OF OTHER VASCULAR IMPLANTS AND GRAFTS: Chronic | ICD-10-CM

## 2022-09-26 LAB
ALBUMIN SERPL ELPH-MCNC: 3.8 G/DL — SIGNIFICANT CHANGE UP (ref 3.3–5)
ALP SERPL-CCNC: 100 U/L — SIGNIFICANT CHANGE UP (ref 40–120)
ALT FLD-CCNC: 18 U/L — SIGNIFICANT CHANGE UP (ref 4–41)
ANION GAP SERPL CALC-SCNC: 12 MMOL/L — SIGNIFICANT CHANGE UP (ref 7–14)
APPEARANCE UR: CLEAR — SIGNIFICANT CHANGE UP
AST SERPL-CCNC: 24 U/L — SIGNIFICANT CHANGE UP (ref 4–40)
BASOPHILS # BLD AUTO: 0.04 K/UL — SIGNIFICANT CHANGE UP (ref 0–0.2)
BASOPHILS NFR BLD AUTO: 0.5 % — SIGNIFICANT CHANGE UP (ref 0–2)
BILIRUB SERPL-MCNC: 0.5 MG/DL — SIGNIFICANT CHANGE UP (ref 0.2–1.2)
BILIRUB UR-MCNC: NEGATIVE — SIGNIFICANT CHANGE UP
BLOOD GAS VENOUS COMPREHENSIVE RESULT: SIGNIFICANT CHANGE UP
BUN SERPL-MCNC: 8 MG/DL — SIGNIFICANT CHANGE UP (ref 7–23)
CALCIUM SERPL-MCNC: 9.2 MG/DL — SIGNIFICANT CHANGE UP (ref 8.4–10.5)
CHLORIDE SERPL-SCNC: 105 MMOL/L — SIGNIFICANT CHANGE UP (ref 98–107)
CO2 SERPL-SCNC: 23 MMOL/L — SIGNIFICANT CHANGE UP (ref 22–31)
COLOR SPEC: YELLOW — SIGNIFICANT CHANGE UP
CREAT SERPL-MCNC: 0.79 MG/DL — SIGNIFICANT CHANGE UP (ref 0.5–1.3)
DIFF PNL FLD: ABNORMAL
EGFR: 100 ML/MIN/1.73M2 — SIGNIFICANT CHANGE UP
EOSINOPHIL # BLD AUTO: 0.29 K/UL — SIGNIFICANT CHANGE UP (ref 0–0.5)
EOSINOPHIL NFR BLD AUTO: 3.5 % — SIGNIFICANT CHANGE UP (ref 0–6)
FLUAV AG NPH QL: SIGNIFICANT CHANGE UP
FLUBV AG NPH QL: SIGNIFICANT CHANGE UP
GLUCOSE SERPL-MCNC: 98 MG/DL — SIGNIFICANT CHANGE UP (ref 70–99)
GLUCOSE UR QL: NEGATIVE — SIGNIFICANT CHANGE UP
HCT VFR BLD CALC: 49 % — SIGNIFICANT CHANGE UP (ref 39–50)
HGB BLD-MCNC: 15.8 G/DL — SIGNIFICANT CHANGE UP (ref 13–17)
IANC: 6.43 K/UL — SIGNIFICANT CHANGE UP (ref 1.8–7.4)
IMM GRANULOCYTES NFR BLD AUTO: 0.6 % — SIGNIFICANT CHANGE UP (ref 0–0.9)
KETONES UR-MCNC: NEGATIVE — SIGNIFICANT CHANGE UP
LEUKOCYTE ESTERASE UR-ACNC: ABNORMAL
LYMPHOCYTES # BLD AUTO: 0.77 K/UL — LOW (ref 1–3.3)
LYMPHOCYTES # BLD AUTO: 9.3 % — LOW (ref 13–44)
MCHC RBC-ENTMCNC: 29.8 PG — SIGNIFICANT CHANGE UP (ref 27–34)
MCHC RBC-ENTMCNC: 32.2 GM/DL — SIGNIFICANT CHANGE UP (ref 32–36)
MCV RBC AUTO: 92.5 FL — SIGNIFICANT CHANGE UP (ref 80–100)
MONOCYTES # BLD AUTO: 0.67 K/UL — SIGNIFICANT CHANGE UP (ref 0–0.9)
MONOCYTES NFR BLD AUTO: 8.1 % — SIGNIFICANT CHANGE UP (ref 2–14)
NEUTROPHILS # BLD AUTO: 6.43 K/UL — SIGNIFICANT CHANGE UP (ref 1.8–7.4)
NEUTROPHILS NFR BLD AUTO: 78 % — HIGH (ref 43–77)
NITRITE UR-MCNC: NEGATIVE — SIGNIFICANT CHANGE UP
NRBC # BLD: 0 /100 WBCS — SIGNIFICANT CHANGE UP (ref 0–0)
NRBC # FLD: 0 K/UL — SIGNIFICANT CHANGE UP (ref 0–0)
PH UR: 7 — SIGNIFICANT CHANGE UP (ref 5–8)
PLATELET # BLD AUTO: 171 K/UL — SIGNIFICANT CHANGE UP (ref 150–400)
POTASSIUM SERPL-MCNC: 5.3 MMOL/L — SIGNIFICANT CHANGE UP (ref 3.5–5.3)
POTASSIUM SERPL-SCNC: 5.3 MMOL/L — SIGNIFICANT CHANGE UP (ref 3.5–5.3)
PROT SERPL-MCNC: 5.8 G/DL — LOW (ref 6–8.3)
PROT UR-MCNC: ABNORMAL
RBC # BLD: 5.3 M/UL — SIGNIFICANT CHANGE UP (ref 4.2–5.8)
RBC # FLD: 12.6 % — SIGNIFICANT CHANGE UP (ref 10.3–14.5)
RSV RNA NPH QL NAA+NON-PROBE: SIGNIFICANT CHANGE UP
SARS-COV-2 RNA SPEC QL NAA+PROBE: SIGNIFICANT CHANGE UP
SODIUM SERPL-SCNC: 140 MMOL/L — SIGNIFICANT CHANGE UP (ref 135–145)
SP GR SPEC: 1.02 — SIGNIFICANT CHANGE UP (ref 1.01–1.05)
UROBILINOGEN FLD QL: SIGNIFICANT CHANGE UP
WBC # BLD: 8.25 K/UL — SIGNIFICANT CHANGE UP (ref 3.8–10.5)
WBC # FLD AUTO: 8.25 K/UL — SIGNIFICANT CHANGE UP (ref 3.8–10.5)

## 2022-09-26 PROCEDURE — 99285 EMERGENCY DEPT VISIT HI MDM: CPT | Mod: GC

## 2022-09-26 PROCEDURE — 74018 RADEX ABDOMEN 1 VIEW: CPT | Mod: 26

## 2022-09-26 PROCEDURE — 71045 X-RAY EXAM CHEST 1 VIEW: CPT | Mod: 26

## 2022-09-26 PROCEDURE — 70250 X-RAY EXAM OF SKULL: CPT | Mod: 26

## 2022-09-26 RX ORDER — SODIUM CHLORIDE 9 MG/ML
1000 INJECTION INTRAMUSCULAR; INTRAVENOUS; SUBCUTANEOUS ONCE
Refills: 0 | Status: COMPLETED | OUTPATIENT
Start: 2022-09-26 | End: 2022-09-26

## 2022-09-26 RX ADMIN — SODIUM CHLORIDE 1000 MILLILITER(S): 9 INJECTION INTRAMUSCULAR; INTRAVENOUS; SUBCUTANEOUS at 21:28

## 2022-09-26 NOTE — ED PROVIDER NOTE - ATTENDING CONTRIBUTION TO CARE
Brief HPI:  62M with pmh of GBM, multiple craniotomy,  shunt, low baseline mental status brought from [facility] for worsening mental status since 9/24.     Vitals:   Reviewed    Exam:    GEN:  Non-toxic appearing, non-distressed, speaking full sentences, non-diaphoretic, intermittently awake  HEENT:  NCAT, neck supple, EOMI, PERRLA, sclera anicteric, no conjunctival pallor or injection, no stridor, no tonsillar exudate, uvula midline  CV:  regular rhythm and rate, s1/s2 audible, no murmurs, rubs or gallops, peripheral pulses 2+ and symmetric  PULM:  non-labored respirations, lungs clear to auscultation bilaterally, no wheezes, crackles or rales  ABD:  non distended, non-tender, no rebound, no guarding, negative Graham's sign, bowel sounds normal, no cvat  MSK:  no gross deformity, non-tender extremities and joints, range of motion grossly normal appearing, no extremity edema, extremities warm and well perfused   NEURO: moves extremities, limited exam  SKIN:  warm, dry, no rash or vesicles    A/P:  62M with pmh of GBM, multiple craniotomy, low baseline mental status brought from [facility] for worsening mental status since 9/24.  VSS.  Possible uti given reported positive urine.  Shunt infection also considered.  Plan for head ct, shunt series, labs.  Anticipate admission.

## 2022-09-26 NOTE — ED PROVIDER NOTE - SHIFT CHANGE DETAILS
HILLARY:  Patient signed out to Dr. Oliva pending head ct, nsgy and id consult.  HD stable at time of signout.

## 2022-09-26 NOTE — ED PROVIDER NOTE - OBJECTIVE STATEMENT
62M with pmh of GBM, multiple craniotomy, low baseline mental status brought from [facility] for worsening mental status since 9/24. UA showed large LE, wbc >750. Zosyn began 9/24 evening, despite this has had worsening mental status. Pt is unable to contribute to history due to disability. 62M with pmh of GBM, multiple craniotomy, low baseline mental status brought from [facility] for worsening mental status since 9/24. UA showed large LE, wbc >750. Zosyn began 9/24 evening, despite this has had worsening mental status. Limited patient contribution to history due to disability. Patient able to answer yes/no questions- endorses sob, denies cp, abdominal pain.

## 2022-09-26 NOTE — ED PROVIDER NOTE - PHYSICAL EXAMINATION
PHYSICAL EXAM:  CONSTITUTIONAL: awake, in no apparent distress.   HEAD:  shunt, craniotomy scar visible  EYES: Clear bilaterally, pupils equal, round and reactive to light.  ENMT: Airway patent, Nasal mucosa clear. Mouth with normal mucosa. Uvula is midline.   CARDIAC: no appreciable murmur  RESPIRATORY: Breathing unlabored. Breath sounds clear and equal bilaterally.  ABDOMEN:  Soft, nontender, nondistended. No rebound tenderness or guarding.  SKIN: Skin warm and dry. No evidence of rashes or lesions.

## 2022-09-26 NOTE — ED PROVIDER NOTE - PROGRESS NOTE DETAILS
Discussed care with infectious disease fellow- no recommendations for antibiotics yet, possibly sterile pyuria Juliana1- consulted nsgy for recommendations regarding CTH results. Will see patient. Patient status currently unchanged

## 2022-09-26 NOTE — ED PROVIDER NOTE - CLINICAL SUMMARY MEDICAL DECISION MAKING FREE TEXT BOX
62M w hx of GBM and multiple craniotomies and  shunt. Has been having decreased mental status from baseline since saturday. Pt is a [place] resident, was suspected to have UTI 2/2 ams and was given zosyn at [place] on saturday evening. Had UA showing elevated leuk esterase and Mental status continued to worsen following abx. Pt is unable to contribute to history today but on speaking with daughter she states that he is not at his baseline. 62M w hx of GBM and multiple craniotomies and  shunt. Has been having decreased mental status from baseline since saturday. Pt is a [place] resident, was suspected to have UTI 2/2 ams and was given zosyn at [place] on saturday evening. Had UA showing elevated leuk esterase and Mental status continued to worsen following abx. Pt contribution is limited- patient does endorse some shortness of breath. Denies other complaints. Plan for sepsis workup, imaging of head and  shunt.

## 2022-09-26 NOTE — ED ADULT NURSE NOTE - OBJECTIVE STATEMENT
Received pt in room 11. Pt with hx of GBM, multiple craniotomy's. pt coming in from [facility] for worsening mental status since 9/24, pt diagnosed with UTI with worsening mental status. pt unable to contribute to hx due to disability, nonverbal. pt placed on cardiac monitor. airway patent, respirations are equal and nonlabored, no respiratory distress noted, sating 96% on room air. vitals as noted. pt afebrile rectally. edema noted to b/l LE. skin intact. 14 Moroccan Sandoval placed using sterile technique. yellow urine noted to Sandoval bag. 24 gauge iv noted to the left hand from [place], flushing well. 20 gauge iv placed in the right hand, sepsis labs sent. iv fluids infusing. pt stable, safety maintained, side rails up awaiting further plan will reassess and monitor.

## 2022-09-27 DIAGNOSIS — Z29.9 ENCOUNTER FOR PROPHYLACTIC MEASURES, UNSPECIFIED: ICD-10-CM

## 2022-09-27 DIAGNOSIS — I10 ESSENTIAL (PRIMARY) HYPERTENSION: ICD-10-CM

## 2022-09-27 DIAGNOSIS — C71.9 MALIGNANT NEOPLASM OF BRAIN, UNSPECIFIED: ICD-10-CM

## 2022-09-27 DIAGNOSIS — R82.71 BACTERIURIA: ICD-10-CM

## 2022-09-27 DIAGNOSIS — R41.82 ALTERED MENTAL STATUS, UNSPECIFIED: ICD-10-CM

## 2022-09-27 DIAGNOSIS — F41.9 ANXIETY DISORDER, UNSPECIFIED: ICD-10-CM

## 2022-09-27 DIAGNOSIS — R82.81 PYURIA: ICD-10-CM

## 2022-09-27 PROBLEM — G03.9 MENINGITIS, UNSPECIFIED: Chronic | Status: ACTIVE | Noted: 2022-02-04

## 2022-09-27 PROBLEM — I82.409 ACUTE EMBOLISM AND THROMBOSIS OF UNSPECIFIED DEEP VEINS OF UNSPECIFIED LOWER EXTREMITY: Chronic | Status: ACTIVE | Noted: 2022-02-04

## 2022-09-27 LAB
CULTURE RESULTS: SIGNIFICANT CHANGE UP
SPECIMEN SOURCE: SIGNIFICANT CHANGE UP

## 2022-09-27 PROCEDURE — 72125 CT NECK SPINE W/O DYE: CPT | Mod: 26,MA

## 2022-09-27 PROCEDURE — 99223 1ST HOSP IP/OBS HIGH 75: CPT

## 2022-09-27 PROCEDURE — 99282 EMERGENCY DEPT VISIT SF MDM: CPT | Mod: FS

## 2022-09-27 PROCEDURE — 70450 CT HEAD/BRAIN W/O DYE: CPT | Mod: 26,MA

## 2022-09-27 PROCEDURE — 99497 ADVNCD CARE PLAN 30 MIN: CPT | Mod: 25

## 2022-09-27 RX ORDER — LEVETIRACETAM 250 MG/1
500 TABLET, FILM COATED ORAL EVERY 12 HOURS
Refills: 0 | Status: DISCONTINUED | OUTPATIENT
Start: 2022-09-27 | End: 2022-10-03

## 2022-09-27 RX ORDER — SENNA PLUS 8.6 MG/1
1 TABLET ORAL EVERY 24 HOURS
Refills: 0 | Status: DISCONTINUED | OUTPATIENT
Start: 2022-09-27 | End: 2022-09-28

## 2022-09-27 RX ORDER — ENOXAPARIN SODIUM 100 MG/ML
40 INJECTION SUBCUTANEOUS EVERY 24 HOURS
Refills: 0 | Status: DISCONTINUED | OUTPATIENT
Start: 2022-09-27 | End: 2022-09-28

## 2022-09-27 RX ORDER — ASCORBIC ACID 60 MG
1 TABLET,CHEWABLE ORAL
Qty: 0 | Refills: 0 | DISCHARGE

## 2022-09-27 RX ORDER — MIRTAZAPINE 45 MG/1
1 TABLET, ORALLY DISINTEGRATING ORAL
Qty: 0 | Refills: 0 | DISCHARGE

## 2022-09-27 RX ORDER — DEXAMETHASONE 0.5 MG/5ML
4 ELIXIR ORAL ONCE
Refills: 0 | Status: COMPLETED | OUTPATIENT
Start: 2022-09-27 | End: 2022-09-27

## 2022-09-27 RX ORDER — MIRTAZAPINE 45 MG/1
7.5 TABLET, ORALLY DISINTEGRATING ORAL AT BEDTIME
Refills: 0 | Status: DISCONTINUED | OUTPATIENT
Start: 2022-09-27 | End: 2022-10-03

## 2022-09-27 RX ORDER — PIPERACILLIN AND TAZOBACTAM 4; .5 G/20ML; G/20ML
3.38 INJECTION, POWDER, LYOPHILIZED, FOR SOLUTION INTRAVENOUS ONCE
Refills: 0 | Status: COMPLETED | OUTPATIENT
Start: 2022-09-27 | End: 2022-09-27

## 2022-09-27 RX ORDER — ZINC SULFATE TAB 220 MG (50 MG ZINC EQUIVALENT) 220 (50 ZN) MG
1 TAB ORAL
Qty: 0 | Refills: 0 | DISCHARGE

## 2022-09-27 RX ORDER — FAMOTIDINE 10 MG/ML
1 INJECTION INTRAVENOUS
Qty: 0 | Refills: 0 | DISCHARGE

## 2022-09-27 RX ORDER — DICLOFENAC SODIUM 30 MG/G
1 GEL TOPICAL
Qty: 0 | Refills: 0 | DISCHARGE

## 2022-09-27 RX ORDER — POLYETHYLENE GLYCOL 3350 17 G/17G
1 POWDER, FOR SOLUTION ORAL
Qty: 0 | Refills: 0 | DISCHARGE

## 2022-09-27 RX ORDER — LEVETIRACETAM 250 MG/1
500 TABLET, FILM COATED ORAL
Refills: 0 | Status: DISCONTINUED | OUTPATIENT
Start: 2022-09-27 | End: 2022-09-27

## 2022-09-27 RX ORDER — DEXAMETHASONE 0.5 MG/5ML
4 ELIXIR ORAL EVERY 6 HOURS
Refills: 0 | Status: DISCONTINUED | OUTPATIENT
Start: 2022-09-27 | End: 2022-09-30

## 2022-09-27 RX ORDER — ACETAMINOPHEN 500 MG
1000 TABLET ORAL ONCE
Refills: 0 | Status: COMPLETED | OUTPATIENT
Start: 2022-09-27 | End: 2022-09-27

## 2022-09-27 RX ADMIN — PIPERACILLIN AND TAZOBACTAM 25 GRAM(S): 4; .5 INJECTION, POWDER, LYOPHILIZED, FOR SOLUTION INTRAVENOUS at 07:59

## 2022-09-27 RX ADMIN — LEVETIRACETAM 400 MILLIGRAM(S): 250 TABLET, FILM COATED ORAL at 17:03

## 2022-09-27 RX ADMIN — Medication 4 MILLIGRAM(S): at 07:59

## 2022-09-27 RX ADMIN — Medication 1 TABLET(S): at 20:05

## 2022-09-27 RX ADMIN — Medication 4 MILLIGRAM(S): at 20:02

## 2022-09-27 RX ADMIN — Medication 400 MILLIGRAM(S): at 16:15

## 2022-09-27 RX ADMIN — PIPERACILLIN AND TAZOBACTAM 200 GRAM(S): 4; .5 INJECTION, POWDER, LYOPHILIZED, FOR SOLUTION INTRAVENOUS at 00:25

## 2022-09-27 RX ADMIN — ENOXAPARIN SODIUM 40 MILLIGRAM(S): 100 INJECTION SUBCUTANEOUS at 20:03

## 2022-09-27 NOTE — H&P ADULT - PROBLEM SELECTOR PLAN 3
Pt. was suspected to have UTI in the setting of AMS and was started on Zosyn on 9/24. UA with pyuria but no bacteria. ID consulted in the ED for pyuria. Culture on 9/24 negative. Sandoval placed here. Unlikely pyuria represents a UTI.   - Monitor off of antibiotics.

## 2022-09-27 NOTE — H&P ADULT - ASSESSMENT
61 y/o M with PMH GBM s/p craniotomy w/ resection c/b ESBL meningitis for which repeat craniotomy performed 2/2020 treated with 8 weeks Meropenem, subsequently had repeat ESBL meningitis s/p craniectomy with washout w/ 10 wk course intrathecal and IV Meropenem, b/t DVT s/p IVC filter, HTN, anxiety presented from [facility] for worsening mental status

## 2022-09-27 NOTE — ED ADULT NURSE REASSESSMENT NOTE - NS ED NURSE REASSESS COMMENT FT1
Report received from night RN. Patient is 62yr old male, baseline nonverbal but follows commands. PMHx brain ca. Vitals are stable. Patient is resting comfortably. Patient admitted, pending bed assignment.

## 2022-09-27 NOTE — H&P ADULT - HISTORY OF PRESENT ILLNESS
61 y/o M with PMH GBM s/p craniotomy w/ resection c/b ESBL meningitis for which repeat craniotomy performed 2/2020 treated with 8 weeks Meropenem, subsequently had repeat ESBL meningitis s/p craniectomy with washout w/ 10 wk course intrathecal and IV Meropenem, b/t DVT s/p IVC filter, HTN, anxiety presented from [facility] for worsening mental status. As per the family at bedside, patient has been having worsening mental status over the past week. Normally, he is alert and can converse/follow commands. Over the past week, they had to rephrase sentences to yes or no questions. He also seems more fatigued. They tried to give him water but he choked on it. Unable to obtain ROS from the patient given his decline in mental status. He has been living at [facility] for 2 years. His sister Maribel is a labor and delivery physician at [employer].

## 2022-09-27 NOTE — H&P ADULT - PROBLEM SELECTOR PLAN 2
Hx of GBM now with recurrence of disease in the setting of worsening mental status.   - Plan as stated above

## 2022-09-27 NOTE — H&P ADULT - NSHPSOCIALHISTORY_GEN_ALL_CORE
Lives in [facility] for 2 years. Has three sisters. Maribel is the HCP and is an L&D doctor at [employer].

## 2022-09-27 NOTE — CONSULT NOTE ADULT - ASSESSMENT
62M GBM s/p craniotomy complicated by recurrent ESBL meningitis s/p washout and prolonged antibiotics 2020.   Here 9/26 from [facility] for worsening mental status.   Extensive left hemispheric vasogenic edema with subfalcine herniation, consistent with recurrent malignancy.   Doubt pyuria represents a UTI. Culture 9/24 was negative, denied dysuria (francois placed here), afebrile and has alternative explanation for mental status.   On suppressive Bactrim for prior meningitis so relapse is less likely.   Will defer sampling of VPS to neurosurgery.   Goals of care.     Will sign off, call back if needed    Arash Galicia MD   Infectious Disease   Available on TEAMS. After 5PM and on weekends please page fellow on call or call [phone]

## 2022-09-27 NOTE — ED ADULT NURSE REASSESSMENT NOTE - NS ED NURSE REASSESS COMMENT FT1
Pt returned from CT. Placed on cardiac monitor. Respirations even and unlabored, sating 99% on RA. pt sleeping at this time. NAD noted. Bed in lowest position, safety maintained.

## 2022-09-27 NOTE — CONSULT NOTE ADULT - SUBJECTIVE AND OBJECTIVE BOX
HPI:  62M glioblastoma multiforme s/p craniotomy and resection complicated by ESBL meningitis s/p repeat craniotomy 2020.   and patient was given 8 weeks Meropenem with repeat ESBL meningitis s/p craniectomy w/ washout w/ intrathecal and 10 week course IV Meropenem w/ history of VPS shunt unclear if still in place, with fevers  Prior episode COVID  Prior episode ESBL meningitis, on PO bactrim suppressive  Leukopenia, mild temp elevation  CXR with patchy left lung opacity  CT chest with ground glass opacities, infectious/inflammatory  Waxing and waning mental status  Overall,  1) COVID  - RA  - Hold RemD  - O2 supplementation per primary team  2) Abnormal finding on imaging  - Cover for possible superinfection  - S/p Jin for possible pna  - Monitor clinically  3) Prior ESBL Meningitis/AMS  - Home dose bactrim PPX  - If no other explanation for altered mental status from baseline would sample CSF to determine if underlying infectious process  - F/U neurology    PAST MEDICAL & SURGICAL HISTORY:  Essential hypertension      Hypertension      Diabetes mellitus      Anxiety      Deep vein thrombosis (DVT)  B/L      Glioblastoma multiforme      Meningitis      Disruption of closure of skull or craniotomy      Presence of IVC filter          Allergies    aspirin (Unknown)  shellfish (Hives)  shellfish (Unknown)  Tegretol (Unknown)    Intolerances    ACE inhibitors (Other)  angiotensin II inhibitors (Other)      ANTIMICROBIALS:      OTHER MEDS:      SOCIAL HISTORY:    FAMILY HISTORY:  Cerebrovascular accident (Father)        ROS:  Unobtainable because:   All other systems negative   Constitutional: no fever, no chills  Eye: no vision changes  ENT: no sore throat, no rhinorrhea  Cardiovascular: no chest pain, no palpitation  Respiratory: no SOB, no cough  GI:  no abd pain, no vomiting, no diarrhea  urinary: no dysuria, no hematuria, no flank pain  musculoskeletal: no joint pain, no joint swelling  skin: no rash  neurology: no headache, no change in mental status  psych: no anxiety    Physical Exam:  General: awake, alert, non toxic  Head: atraumatic, normocephalic  Eyes: normal sclera and conjunctiva  ENT: no oropharyngeal lesions, no LAD, neck supple  Cardio: regular rate and rhythm   Respiratory: nonlabored on room air, clear bilaterally, no wheezing  abd: soft, bowel sounds present, not tender  : no suprapubic tenderness, no francois  Musculoskeletal: no joint swelling, no edema  Skin: no rash  vascular: no phlebitis  Neurologic: no focal deficits  psych: normal affect       Drug Dosing Weight  Height (cm): 182.9 (26 Sep 2022 16:55)  Weight (kg): 86.3 (2022 21:30)  BMI (kg/m2): 25.8 (26 Sep 2022 16:55)  BSA (m2): 2.09 (26 Sep 2022 16:55)    Vital Signs Last 24 Hrs  T(F): 98.2 (22 @ 12:53), Max: 99.3 (22 @ 21:27)    Vital Signs Last 24 Hrs  HR: 66 (22 @ 12:53) (61 - 94)  BP: 140/87 (22 @ 12:53) (135/91 - 172/93)  RR: 12 (22 @ 12:53)  SpO2: 99% (22 @ 12:53) (94% - 100%)  Wt(kg): --                          15.8   8.25  )-----------( 171      ( 26 Sep 2022 21:10 )             49.0           140  |  105  |  8   ----------------------------<  98  5.3   |  23  |  0.79    Ca    9.2      26 Sep 2022 21:10    TPro  5.8<L>  /  Alb  3.8  /  TBili  0.5  /  DBili  x   /  AST  24  /  ALT  18  /  AlkPhos  100        Urinalysis Basic - ( 26 Sep 2022 22:00 )    Color: Yellow / Appearance: Clear / S.025 / pH: x  Gluc: x / Ketone: Negative  / Bili: Negative / Urobili: <2 mg/dL   Blood: x / Protein: Trace / Nitrite: Negative   Leuk Esterase: Large / RBC: 25 /HPF / WBC 57 /HPF   Sq Epi: x / Non Sq Epi: 7 /HPF / Bacteria: Negative        MICROBIOLOGY:    RADIOLOGY:  Images below reviewed personally  CT Head No Cont (22 @ 03:27)   HEAD:  1. Extensive vasogenic edema involving essentially the entirety of the   residual left cerebral hemisphere, extending into the brainstem, and into   the genu of the corpus callosum and right frontal lobe. Findings are   compatible with recurrent disease.  2. 1.7 cm left-to-right subfalcine herniation.  3. Early left sided infratentorial herniation suspected.  CERVICAL SPINE:  1. No acute finding.    Xray Shunt Series (22 @ 23:09)   Intact VPshunt catheter.   HPI:  62M glioblastoma multiforme s/p craniotomy and resection complicated by ESBL meningitis s/p repeat craniotomy washout 2020 and 8 weeks of Meropenem, recurrent infection s/p repeat washout and 10 weeks of Meropenem followed by suppressive PO Bactrim.    shunt in place.   [facility].   CT with recurrent malignancy and vasogenic edema.   I was asked about significance of pyuria. Urine culture  at rehab was no growth. He has a francois in place - placed here. Denies dysuria.   Says he has a headache. Otherwise shakes his head denying pain elsewhere, cough, diarrhea, fevers or chills.       PAST MEDICAL & SURGICAL HISTORY:  Essential hypertension      Hypertension      Diabetes mellitus      Anxiety      Deep vein thrombosis (DVT)  B/L      Glioblastoma multiforme      Meningitis      Disruption of closure of skull or craniotomy      Presence of IVC filter          Allergies    aspirin (Unknown)  shellfish (Hives)  shellfish (Unknown)  Tegretol (Unknown)    Intolerances    ACE inhibitors (Other)  angiotensin II inhibitors (Other)      ANTIMICROBIALS:      OTHER MEDS:      SOCIAL HISTORY: Unable to obtain due to clinical condition     FAMILY HISTORY:  Cerebrovascular accident (Father)        ROS:  All other systems Unable to obtain due to clinical condition   Constitutional: no fever, no chills  ENT: no sore throat, no rhinorrhea  Respiratory: no SOB, no cough  GI:  no abd pain, no diarrhea  urinary: no dysuria  musculoskeletal: no pain     Physical Exam:  General: drowsy, opens his eyes, nods/shakes his head slightly   Head: wearing a helmet   Eyes: normal sclera and conjunctiva  ENT: no LAD   Cardio: regular rate and rhythm   Respiratory: nonlabored on room air, grossly clear bilaterally, no wheezing  abd: soft, bowel sounds present, not tender  : no suprapubic tenderness. francois  Musculoskeletal: no joint swelling, no edema  Skin: no rash  vascular: no phlebitis  Neurologic: unable to assess   psych: calm       Drug Dosing Weight  Height (cm): 182.9 (26 Sep 2022 16:55)  Weight (kg): 86.3 (2022 21:30)  BMI (kg/m2): 25.8 (26 Sep 2022 16:55)  BSA (m2): 2.09 (26 Sep 2022 16:55)    Vital Signs Last 24 Hrs  T(F): 98.2 (22 @ 12:53), Max: 99.3 (22 @ 21:27)    Vital Signs Last 24 Hrs  HR: 66 (22 @ 12:53) (61 - 94)  BP: 140/87 (22 @ 12:53) (135/91 - 172/93)  RR: 12 (22 @ 12:53)  SpO2: 99% (22 @ 12:53) (94% - 100%)  Wt(kg): --                          15.8   8.25  )-----------( 171      ( 26 Sep 2022 21:10 )             49.0           140  |  105  |  8   ----------------------------<  98  5.3   |  23  |  0.79    Ca    9.2      26 Sep 2022 21:10    TPro  5.8<L>  /  Alb  3.8  /  TBili  0.5  /  DBili  x   /  AST  24  /  ALT  18  /  AlkPhos  100        Urinalysis Basic - ( 26 Sep 2022 22:00 )    Color: Yellow / Appearance: Clear / S.025 / pH: x  Gluc: x / Ketone: Negative  / Bili: Negative / Urobili: <2 mg/dL   Blood: x / Protein: Trace / Nitrite: Negative   Leuk Esterase: Large / RBC: 25 /HPF / WBC 57 /HPF   Sq Epi: x / Non Sq Epi: 7 /HPF / Bacteria: Negative        MICROBIOLOGY:  Blood and urine cultures in lab     RADIOLOGY:  Images below reviewed personally  CT Head No Cont (22 @ 03:27)   HEAD:  1. Extensive vasogenic edema involving essentially the entirety of the   residual left cerebral hemisphere, extending into the brainstem, and into   the genu of the corpus callosum and right frontal lobe. Findings are   compatible with recurrent disease.  2. 1.7 cm left-to-right subfalcine herniation.  3. Early left sided infratentorial herniation suspected.  CERVICAL SPINE:  1. No acute finding.    Xray Shunt Series (22 @ 23:09)   Intact VPshunt catheter.

## 2022-09-27 NOTE — CONSULT NOTE ADULT - NS ATTEND AMEND GEN_ALL_CORE FT
Pt seen and examined 9/29/22.  History as above.  Pt alert and slowly responsive.  No movement right side.  MRI with disease progression.  Palliative care is reasonable under circumstances.  Please recall as needed.

## 2022-09-27 NOTE — ED ADULT NURSE REASSESSMENT NOTE - NS ED NURSE REASSESS COMMENT FT1
Report and pt received from SELAM Arellano, pt non-verbal at baseline. Respirations even and unlabored, NSR on monitor. IV site patent. Sandoval draining urine - site intact. Pt well appearing, NAD noted. bed in lowest position, side rails up, call bell in hand, safety maintained. awaiting CT.

## 2022-09-27 NOTE — H&P ADULT - NSHPLABSRESULTS_GEN_ALL_CORE
.  LABS:                         15.8   8.25  )-----------( 171      ( 26 Sep 2022 21:10 )             49.0         140  |  105  |  8   ----------------------------<  98  5.3   |  23  |  0.79    Ca    9.2      26 Sep 2022 21:10    TPro  5.8<L>  /  Alb  3.8  /  TBili  0.5  /  DBili  x   /  AST  24  /  ALT  18  /  AlkPhos  100        Urinalysis Basic - ( 26 Sep 2022 22:00 )    Color: Yellow / Appearance: Clear / S.025 / pH: x  Gluc: x / Ketone: Negative  / Bili: Negative / Urobili: <2 mg/dL   Blood: x / Protein: Trace / Nitrite: Negative   Leuk Esterase: Large / RBC: 25 /HPF / WBC 57 /HPF   Sq Epi: x / Non Sq Epi: 7 /HPF / Bacteria: Negative                RADIOLOGY, EKG & ADDITIONAL TESTS: Reviewed.

## 2022-09-27 NOTE — H&P ADULT - NSHPPHYSICALEXAM_GEN_ALL_CORE
.  VITAL SIGNS:  T(C): 36.8 (09-27-22 @ 12:53), Max: 37.4 (09-26-22 @ 21:27)  T(F): 98.2 (09-27-22 @ 12:53), Max: 99.3 (09-26-22 @ 21:27)  HR: 88 (09-27-22 @ 15:15) (61 - 94)  BP: 138/93 (09-27-22 @ 15:15) (135/91 - 172/93)  BP(mean): 116 (09-27-22 @ 07:01) (116 - 116)  RR: 18 (09-27-22 @ 15:15) (12 - 19)  SpO2: 99% (09-27-22 @ 15:15) (94% - 100%)  Wt(kg): --    PHYSICAL EXAM:    Constitutional: WDWN male resting comfortably in bed; NAD  Head: NC/AT  Eyes: PERRL, EOMI, anicteric sclera  ENT: no nasal discharge; uvula midline, no oropharyngeal erythema or exudates; MMM  Neck: supple; no JVD or thyromegaly  Respiratory: CTA B/L; no W/R/R, no retractions  Cardiac: +S1/S2; RRR; no M/R/G; PMI non-displaced  Gastrointestinal: abdomen soft, NT/ND; no rebound or guarding; +BSx4  Back: spine midline, no bony tenderness or step-offs; no CVAT B/L  Extremities: WWP, no clubbing or cyanosis; no peripheral edema  Musculoskeletal: NROM x4; no joint swelling, tenderness or erythema  Vascular: 2+ radial, femoral, DP/PT pulses B/L  Dermatologic: skin warm, dry and intact; no rashes, wounds, or scars  Lymphatic: no submandibular or cervical LAD  Neurologic: AAOx0 - alert but unable to answer orientation questions; CNII-XII grossly intact; no focal deficits  Psychiatric: affect and characteristics of appearance, verbalizations, behaviors are appropriate

## 2022-09-27 NOTE — H&P ADULT - PROBLEM SELECTOR PLAN 1
Pt. presenting with AMS. Worsening mentation and alertness over the past week. He was able to speak in sentences but now can only answer yes/no questions. At [place], presumed to have UTI and was started on Zosyn on 9/24. CT scan showing extensive vasogenic edema involving the entirety of the residual left cerebral hemisphere extending into the brainstem and the genu of the corpus callosum and right frontal lobe - there is recurrent disease as well as subfalcine herniation and infratentorial herniation. No bleeding. Neurosurgery consulted. Given afebrile and findings on CT scan - lower suspicion for meningitis at this time and on suppressive bactrim so meningitis relapse less likely.   - MRI brain   - Decadron 4q6 hours   - Palliative care consult   - DNR/DNI  - C/w Bactrim (suppressive for prior meningitis)

## 2022-09-27 NOTE — CONSULT NOTE ADULT - ASSESSMENT
61 yo M PMH GBM s/p craniotomy w/ resection c/b ESBL meningitis with repeat craniotomy, c/b repeat ESBL meningitis s/p craniectomy w/ washout in 2/2020 presenting from [place] w worsening AMS x2d with CTH findings of extensive recurrent disease, 1.7cm L to R subfalcine herniation and early L sided infratentorial herniation    PLAN:  - MRI brain with and without contrast  - Palliative consult  - Case to be d/w attending 63 yo M PMH GBM s/p craniotomy w/ resection c/b ESBL meningitis with repeat craniotomy, c/b repeat ESBL meningitis s/p craniectomy w/ washout in 2/2020 presenting from [place] w worsening AMS x2d with CTH findings of extensive recurrent disease, 1.7cm L to R subfalcine herniation and early L sided infratentorial herniation    PLAN:  - MRI brain with and without contrast  - Decadron 4mg Q6h  - Palliative consult  - Case to be d/w attending

## 2022-09-27 NOTE — CONSULT NOTE ADULT - SUBJECTIVE AND OBJECTIVE BOX
NEUROSURGERY CONSULT    Consulted for: recurrent GBM, subfalcine herniation    HPI: 61yo M PMHx HTN, anxiety, GBM s/p craniotomy w/ resection c/b ESBL meningitis with repeat craniotomy (2/2020) and pt was given 8 weeks Meropenem, pt subsequently had repeat ESBL meningitis s/p craniectomy w/ washout w/ intrathecal Jin and 10 week course IV Jin, presenting from [facility] for worsening AMS x2 days.      RADIOLOGY:   < from: CT Head No Cont (09.27.22 @ 03:27) >    IMPRESSION:  HEAD:  1. Extensive vasogenic edema involving essentially the entirety of the   residual left cerebral hemisphere, extending into the brainstem, and into   the genu of the corpus callosum and right frontal lobe. Findings are   compatible with recurrent disease.  2. 1.7 cm left-to-right subfalcine herniation.  3. Early left sided infratentorial herniation suspected.    CERVICAL SPINE:  1. No acute finding.      MEDS:  piperacillin/tazobactam IVPB.. 3.375 Gram(s) IV Intermittent Once      PHYSICAL EXAM:  Vital Signs Last 24 Hrs  T(C): 36.8 (27 Sep 2022 03:01), Max: 37.4 (26 Sep 2022 21:27)  T(F): 98.3 (27 Sep 2022 03:01), Max: 99.3 (26 Sep 2022 21:27)  HR: 94 (27 Sep 2022 03:01) (65 - 94)  BP: 141/88 (27 Sep 2022 03:01) (135/91 - 149/102)  BP(mean): --  RR: 16 (27 Sep 2022 03:01) (12 - 18)  SpO2: 100% (27 Sep 2022 03:01) (96% - 100%)    Parameters below as of 27 Sep 2022 03:01  Patient On (Oxygen Delivery Method): room air    Awake, alert, follows simple commands, nonverbal  Moves L arm spontaneously  Withdraws to noxious in BLE    LABS:                        15.8   8.25  )-----------( 171      ( 26 Sep 2022 21:10 )             49.0     09-26    140  |  105  |  8   ----------------------------<  98  5.3   |  23  |  0.79    Ca    9.2      26 Sep 2022 21:10    TPro  5.8<L>  /  Alb  3.8  /  TBili  0.5  /  DBili  x   /  AST  24  /  ALT  18  /  AlkPhos  100  09-26

## 2022-09-27 NOTE — H&P ADULT - CONVERSATION DETAILS
HCP Maribel Vazquez at bedside. Discussed findings on the patient's CT scan of the brain. There is worsening of disease as well as edema and herniation of the brain. The family is interested in palliative care and possible hospice. They understand that this is disease progression but would like the MRI for peace of mind. Discussed DNR/DNI with Maribel. She understands DNR means we would not perform CPR. She understands DNI means we would not intubate if he could no longer breathe on his own. She is in agreement for DNR/DNI status. She is amenable to ongoing goals of care conversations and wants to prioritize comfort.

## 2022-09-28 DIAGNOSIS — G93.49 OTHER ENCEPHALOPATHY: ICD-10-CM

## 2022-09-28 DIAGNOSIS — R53.2 FUNCTIONAL QUADRIPLEGIA: ICD-10-CM

## 2022-09-28 DIAGNOSIS — G93.40 ENCEPHALOPATHY, UNSPECIFIED: ICD-10-CM

## 2022-09-28 DIAGNOSIS — Z51.5 ENCOUNTER FOR PALLIATIVE CARE: ICD-10-CM

## 2022-09-28 DIAGNOSIS — Z71.89 OTHER SPECIFIED COUNSELING: ICD-10-CM

## 2022-09-28 LAB
A1C WITH ESTIMATED AVERAGE GLUCOSE RESULT: 5.3 % — SIGNIFICANT CHANGE UP (ref 4–5.6)
ALBUMIN SERPL ELPH-MCNC: 4.2 G/DL — SIGNIFICANT CHANGE UP (ref 3.3–5)
ALP SERPL-CCNC: 103 U/L — SIGNIFICANT CHANGE UP (ref 40–120)
ALT FLD-CCNC: 11 U/L — SIGNIFICANT CHANGE UP (ref 4–41)
ANION GAP SERPL CALC-SCNC: 10 MMOL/L — SIGNIFICANT CHANGE UP (ref 7–14)
AST SERPL-CCNC: 9 U/L — SIGNIFICANT CHANGE UP (ref 4–40)
BILIRUB SERPL-MCNC: 0.6 MG/DL — SIGNIFICANT CHANGE UP (ref 0.2–1.2)
BUN SERPL-MCNC: 13 MG/DL — SIGNIFICANT CHANGE UP (ref 7–23)
CALCIUM SERPL-MCNC: 9.5 MG/DL — SIGNIFICANT CHANGE UP (ref 8.4–10.5)
CHLORIDE SERPL-SCNC: 109 MMOL/L — HIGH (ref 98–107)
CO2 SERPL-SCNC: 24 MMOL/L — SIGNIFICANT CHANGE UP (ref 22–31)
CREAT SERPL-MCNC: 0.63 MG/DL — SIGNIFICANT CHANGE UP (ref 0.5–1.3)
EGFR: 107 ML/MIN/1.73M2 — SIGNIFICANT CHANGE UP
ESTIMATED AVERAGE GLUCOSE: 105 — SIGNIFICANT CHANGE UP
GLUCOSE BLDC GLUCOMTR-MCNC: 120 MG/DL — HIGH (ref 70–99)
GLUCOSE BLDC GLUCOMTR-MCNC: 121 MG/DL — HIGH (ref 70–99)
GLUCOSE BLDC GLUCOMTR-MCNC: 123 MG/DL — HIGH (ref 70–99)
GLUCOSE SERPL-MCNC: 117 MG/DL — HIGH (ref 70–99)
HCT VFR BLD CALC: 50.3 % — HIGH (ref 39–50)
HGB BLD-MCNC: 16.7 G/DL — SIGNIFICANT CHANGE UP (ref 13–17)
MAGNESIUM SERPL-MCNC: 2.2 MG/DL — SIGNIFICANT CHANGE UP (ref 1.6–2.6)
MCHC RBC-ENTMCNC: 30 PG — SIGNIFICANT CHANGE UP (ref 27–34)
MCHC RBC-ENTMCNC: 33.2 GM/DL — SIGNIFICANT CHANGE UP (ref 32–36)
MCV RBC AUTO: 90.3 FL — SIGNIFICANT CHANGE UP (ref 80–100)
NRBC # BLD: 0 /100 WBCS — SIGNIFICANT CHANGE UP (ref 0–0)
NRBC # FLD: 0 K/UL — SIGNIFICANT CHANGE UP (ref 0–0)
PHOSPHATE SERPL-MCNC: 3.1 MG/DL — SIGNIFICANT CHANGE UP (ref 2.5–4.5)
PLATELET # BLD AUTO: 209 K/UL — SIGNIFICANT CHANGE UP (ref 150–400)
POTASSIUM SERPL-MCNC: 4.5 MMOL/L — SIGNIFICANT CHANGE UP (ref 3.5–5.3)
POTASSIUM SERPL-SCNC: 4.5 MMOL/L — SIGNIFICANT CHANGE UP (ref 3.5–5.3)
PROT SERPL-MCNC: 6 G/DL — SIGNIFICANT CHANGE UP (ref 6–8.3)
RBC # BLD: 5.57 M/UL — SIGNIFICANT CHANGE UP (ref 4.2–5.8)
RBC # FLD: 12.5 % — SIGNIFICANT CHANGE UP (ref 10.3–14.5)
SODIUM SERPL-SCNC: 143 MMOL/L — SIGNIFICANT CHANGE UP (ref 135–145)
WBC # BLD: 8.26 K/UL — SIGNIFICANT CHANGE UP (ref 3.8–10.5)
WBC # FLD AUTO: 8.26 K/UL — SIGNIFICANT CHANGE UP (ref 3.8–10.5)

## 2022-09-28 PROCEDURE — 99497 ADVNCD CARE PLAN 30 MIN: CPT | Mod: 25

## 2022-09-28 PROCEDURE — 99223 1ST HOSP IP/OBS HIGH 75: CPT

## 2022-09-28 PROCEDURE — 99358 PROLONG SERVICE W/O CONTACT: CPT | Mod: NC

## 2022-09-28 PROCEDURE — 99233 SBSQ HOSP IP/OBS HIGH 50: CPT | Mod: GC

## 2022-09-28 RX ORDER — PANTOPRAZOLE SODIUM 20 MG/1
40 TABLET, DELAYED RELEASE ORAL
Refills: 0 | Status: DISCONTINUED | OUTPATIENT
Start: 2022-09-28 | End: 2022-10-03

## 2022-09-28 RX ORDER — GLUCAGON INJECTION, SOLUTION 0.5 MG/.1ML
1 INJECTION, SOLUTION SUBCUTANEOUS ONCE
Refills: 0 | Status: DISCONTINUED | OUTPATIENT
Start: 2022-09-28 | End: 2022-09-28

## 2022-09-28 RX ORDER — DEXTROSE 50 % IN WATER 50 %
12.5 SYRINGE (ML) INTRAVENOUS ONCE
Refills: 0 | Status: DISCONTINUED | OUTPATIENT
Start: 2022-09-28 | End: 2022-09-28

## 2022-09-28 RX ORDER — DEXTROSE 50 % IN WATER 50 %
25 SYRINGE (ML) INTRAVENOUS ONCE
Refills: 0 | Status: DISCONTINUED | OUTPATIENT
Start: 2022-09-28 | End: 2022-10-03

## 2022-09-28 RX ORDER — DEXTROSE 50 % IN WATER 50 %
25 SYRINGE (ML) INTRAVENOUS ONCE
Refills: 0 | Status: DISCONTINUED | OUTPATIENT
Start: 2022-09-28 | End: 2022-09-28

## 2022-09-28 RX ORDER — ONDANSETRON 8 MG/1
4 TABLET, FILM COATED ORAL ONCE
Refills: 0 | Status: COMPLETED | OUTPATIENT
Start: 2022-09-28 | End: 2022-09-28

## 2022-09-28 RX ORDER — DEXTROSE 50 % IN WATER 50 %
15 SYRINGE (ML) INTRAVENOUS ONCE
Refills: 0 | Status: DISCONTINUED | OUTPATIENT
Start: 2022-09-28 | End: 2022-10-03

## 2022-09-28 RX ORDER — DEXTROSE 50 % IN WATER 50 %
15 SYRINGE (ML) INTRAVENOUS ONCE
Refills: 0 | Status: DISCONTINUED | OUTPATIENT
Start: 2022-09-28 | End: 2022-09-28

## 2022-09-28 RX ORDER — SODIUM CHLORIDE 9 MG/ML
1000 INJECTION, SOLUTION INTRAVENOUS
Refills: 0 | Status: DISCONTINUED | OUTPATIENT
Start: 2022-09-28 | End: 2022-10-03

## 2022-09-28 RX ORDER — GLUCAGON INJECTION, SOLUTION 0.5 MG/.1ML
1 INJECTION, SOLUTION SUBCUTANEOUS ONCE
Refills: 0 | Status: DISCONTINUED | OUTPATIENT
Start: 2022-09-28 | End: 2022-10-03

## 2022-09-28 RX ORDER — DEXTROSE 50 % IN WATER 50 %
12.5 SYRINGE (ML) INTRAVENOUS ONCE
Refills: 0 | Status: DISCONTINUED | OUTPATIENT
Start: 2022-09-28 | End: 2022-10-03

## 2022-09-28 RX ORDER — SENNA PLUS 8.6 MG/1
2 TABLET ORAL AT BEDTIME
Refills: 0 | Status: DISCONTINUED | OUTPATIENT
Start: 2022-09-28 | End: 2022-10-03

## 2022-09-28 RX ADMIN — Medication 4 MILLIGRAM(S): at 18:20

## 2022-09-28 RX ADMIN — Medication 4 MILLIGRAM(S): at 13:18

## 2022-09-28 RX ADMIN — LEVETIRACETAM 400 MILLIGRAM(S): 250 TABLET, FILM COATED ORAL at 18:20

## 2022-09-28 RX ADMIN — LEVETIRACETAM 400 MILLIGRAM(S): 250 TABLET, FILM COATED ORAL at 05:45

## 2022-09-28 RX ADMIN — SENNA PLUS 2 TABLET(S): 8.6 TABLET ORAL at 22:55

## 2022-09-28 RX ADMIN — ONDANSETRON 4 MILLIGRAM(S): 8 TABLET, FILM COATED ORAL at 15:21

## 2022-09-28 RX ADMIN — MIRTAZAPINE 7.5 MILLIGRAM(S): 45 TABLET, ORALLY DISINTEGRATING ORAL at 22:56

## 2022-09-28 RX ADMIN — Medication 4 MILLIGRAM(S): at 05:46

## 2022-09-28 RX ADMIN — Medication 1 TABLET(S): at 18:20

## 2022-09-28 RX ADMIN — Medication 4 MILLIGRAM(S): at 00:29

## 2022-09-28 NOTE — PROGRESS NOTE ADULT - ASSESSMENT
63 y/o M with PMH GBM s/p craniotomy w/ resection c/b ESBL meningitis for which repeat craniotomy performed 2/2020 treated with 8 weeks Meropenem, subsequently had repeat ESBL meningitis s/p craniectomy with washout w/ 10 wk course intrathecal and IV Meropenem, b/t DVT s/p IVC filter, HTN, anxiety presented from [facility] for worsening mental status

## 2022-09-28 NOTE — SWALLOW BEDSIDE ASSESSMENT ADULT - DIET PRIOR TO ADMI
Mechanical soft solids with Thin liquids, per [facility] diet documentation

## 2022-09-28 NOTE — CONSULT NOTE ADULT - PROBLEM SELECTOR RECOMMENDATION 4
Patient is DNR  Please see separate GOC note.  >16 minutes spent on advanced care planning with family.

## 2022-09-28 NOTE — SWALLOW BEDSIDE ASSESSMENT ADULT - ADDITIONAL RECOMMENDATIONS
1. monitor for any changes in neurological status that may impact oral intake. 2. This service remains available as needed. 3. This service to follow up a schedule permits.

## 2022-09-28 NOTE — PATIENT PROFILE ADULT - FUNCTIONAL ASSESSMENT - BASIC MOBILITY 6.
1-calculated by average/Not able to assess (calculate score using [facility] averaging method)

## 2022-09-28 NOTE — PATIENT PROFILE ADULT - FALL HARM RISK - HARM RISK INTERVENTIONS

## 2022-09-28 NOTE — PROGRESS NOTE ADULT - PROBLEM SELECTOR PLAN 3
Pt. was suspected to have UTI in the setting of AMS and was started on Zosyn on 9/24. UA with pyuria but no bacteria. ID consulted in the ED for pyuria. Culture on 9/24 negative. Sandoval placed here. Unlikely pyuria represents a UTI.   - Monitor off of antibiotics. Pt. was suspected to have UTI in the setting of AMS and was started on Zosyn on 9/24. UA with pyuria but no bacteria. ID consulted in the ED for pyuria. Culture on 9/24 negative. Francois placed here. Unlikely pyuria represents a UTI.   - Monitor off of antibiotics.  - dc francois --> TOV

## 2022-09-28 NOTE — GOALS OF CARE CONVERSATION - ADVANCED CARE PLANNING - CONVERSATION DETAILS
Palliative Care consulted for complex decision making  related to goals of care discussions in the setting of advanced illness    Patient currently resides as long term resident at NH; prior to that he had been living at home with his mother. Patient was never  and never had children. Per Maribel who is a physician at [facility], she and her sister Geraldine, who is also a physician are patient's HCP and they include their mother as well in the decision making process.     Sister verbalized understanding of patient's oncologic history as well as his current hospitalization course.   She shares that patient had told family a few months ago that he would not want to undergo further treatments for his cancer due to his quality of life. Reviewed with Maribel about hospice philosophy of care/services for patient's care if no further DMT were pursued/ offered. Maribel shares she will need to further discuss with her sister and mother prior to making a final decision. Maribel agrees that we can rediscuss next steps for patient's care after MRI Brain is completed.     Maribel confirms that patient is DNR/DNI.     Emotional support provided.

## 2022-09-28 NOTE — CONSULT NOTE ADULT - PROBLEM SELECTOR RECOMMENDATION 3
- Patient with  GBM s/p craniotomy w/ resection c/b ESBL meningitis for which repeat craniotomy performed 2/2020 treated with 8 weeks Meropenem, subsequently had repeat ESBL meningitis s/p craniectomy with washout w/ 10 wk course intrathecal and IV Meropenem  - CT Head as above  - pending MRI Brain

## 2022-09-28 NOTE — CONSULT NOTE ADULT - PROBLEM SELECTOR RECOMMENDATION 2
- due to vasogenic edema and herniation from GBM  - CT Head- Extensive vasogenic edema involving essentially the entirety of the residual left cerebral hemisphere, extending into the brainstem, and into the genu of the corpus callosum and right frontal lobe. Findings are   compatible with recurrent disease.2. 1.7 cm left-to-right subfalcine herniation.3. Early left sided infratentorial herniation suspected.  - Infectious Disease Recommendations appreciated - no evidence of infection; monitoring off abx  - Neurosurgery recommendations appreciated.   - on decadron as per primary team   - please coordinate care with patient's family

## 2022-09-28 NOTE — PROGRESS NOTE ADULT - SUBJECTIVE AND OBJECTIVE BOX
Internal Medicine Progress Note    Patient is a 62y old  Male who presents with a chief complaint of AMS (27 Sep 2022 15:16)    OVERNIGHT EVENTS/SUBJECTIVE:    OBJECTIVE:  Vital Signs Last 24 Hrs  T(C): 36.7 (28 Sep 2022 05:49), Max: 37.1 (27 Sep 2022 17:08)  T(F): 98.1 (28 Sep 2022 05:49), Max: 98.8 (27 Sep 2022 17:08)  HR: 93 (28 Sep 2022 05:49) (61 - 93)  BP: 121/90 (28 Sep 2022 05:49) (112/89 - 145/82)  BP(mean): --  RR: 18 (28 Sep 2022 05:49) (12 - 18)  SpO2: 97% (28 Sep 2022 05:49) (94% - 100%)    Parameters below as of 28 Sep 2022 05:49  Patient On (Oxygen Delivery Method): room air      I&O's Detail    Daily Height in cm: 177.8 (28 Sep 2022 02:34)    Daily Weight in k.3 (28 Sep 2022 02:34)  Physical Exam:  General: NAD, resting comfortably in bed  Neuro: A&Ox4, 5/5 strength in all ext  HEENT: NC/AT, EOMI, normal hearing, oral mucosa moist, no oral lesions noted, no pharyngeal erythema, uvula midline  Neck: supple, thyroid not enlarged, no LAD  Resp: Breathing comfortably on RA, LCTA b/l  CV: Normal sinus rhythm, S1 and S2, no r/m/g  Abd: soft, non-distended, non-tender. No HSM.  Ext: ROMIx4, no edema, +2 pulses bilaterally  Skin: Warm and dry, no rashes or discolorations  Psych: Appropriate affect    Medications:  MEDICATIONS  (STANDING):  dexAMETHasone  Injectable 4 milliGRAM(s) IV Push every 6 hours  dextrose 5%. 1000 milliLiter(s) (100 mL/Hr) IV Continuous <Continuous>  dextrose 5%. 1000 milliLiter(s) (50 mL/Hr) IV Continuous <Continuous>  dextrose 50% Injectable 25 Gram(s) IV Push once  dextrose 50% Injectable 12.5 Gram(s) IV Push once  dextrose 50% Injectable 25 Gram(s) IV Push once  enoxaparin Injectable 40 milliGRAM(s) SubCutaneous every 24 hours  glucagon  Injectable 1 milliGRAM(s) IntraMuscular once  levETIRAcetam  IVPB 500 milliGRAM(s) IV Intermittent every 12 hours  mirtazapine 7.5 milliGRAM(s) Oral at bedtime  senna 1 Tablet(s) Oral every 24 hours  trimethoprim  160 mG/sulfamethoxazole 800 mG 1 Tablet(s) Oral every 12 hours    MEDICATIONS  (PRN):  dextrose Oral Gel 15 Gram(s) Oral once PRN Blood Glucose LESS THAN 70 milliGRAM(s)/deciliter      Labs:                        15.8   8.25  )-----------( 171      ( 26 Sep 2022 21:10 )             49.0         140  |  105  |  8   ----------------------------<  98  5.3   |  23  |  0.79    Ca    9.2      26 Sep 2022 21:10    TPro  5.8<L>  /  Alb  3.8  /  TBili  0.5  /  DBili  x   /  AST  24  /  ALT  18  /  AlkPhos  100        Urinalysis Basic - ( 26 Sep 2022 22:00 )    Color: Yellow / Appearance: Clear / S.025 / pH: x  Gluc: x / Ketone: Negative  / Bili: Negative / Urobili: <2 mg/dL   Blood: x / Protein: Trace / Nitrite: Negative   Leuk Esterase: Large / RBC: 25 /HPF / WBC 57 /HPF   Sq Epi: x / Non Sq Epi: 7 /HPF / Bacteria: Negative          Radiology: Internal Medicine Progress Note    Patient is a 62y old  Male who presents with a chief complaint of AMS (27 Sep 2022 15:16)    OVERNIGHT EVENTS/SUBJECTIVE:Pt admitted overnight from [facility] for altered mental status. This morning, responding to questions only with 'yes' or 'no' he: endorses constipation, is not feeling like himself last week or two, is not thinking straight, had no recent illness, no headache, no n/v.    OBJECTIVE:  Vital Signs Last 24 Hrs  T(C): 36.7 (28 Sep 2022 05:49), Max: 37.1 (27 Sep 2022 17:08)  T(F): 98.1 (28 Sep 2022 05:49), Max: 98.8 (27 Sep 2022 17:08)  HR: 93 (28 Sep 2022 05:49) (61 - 93)  BP: 121/90 (28 Sep 2022 05:49) (112/89 - 145/82)  BP(mean): --  RR: 18 (28 Sep 2022 05:49) (12 - 18)  SpO2: 97% (28 Sep 2022 05:49) (94% - 100%)    Parameters below as of 28 Sep 2022 05:49  Patient On (Oxygen Delivery Method): room air      I&O's Detail    Daily Height in cm: 177.8 (28 Sep 2022 02:34)    Daily Weight in k.3 (28 Sep 2022 02:34)  Physical Exam:  General: NAD, resting comfortably in bed  Neuro: A&Ox3, only responds yes or no, moves L foot and hands along with head, tracks with eyes. Follows commands such as move feet, move hands, move eyes  HEENT: NC/AT, EOMI, normal hearing, some white plaque on tongue  Resp: Breathing comfortably on RA, LCTA b/l  CV: Normal sinus rhythm, S1 and S2, no r/m/g  Abd: soft, non-distended, non-tender  Ext: no edema, +2 pulses bilaterally  Skin: Warm and dry, no rashes or discolorations  Psych: Appropriate affect    Medications:  MEDICATIONS  (STANDING):  dexAMETHasone  Injectable 4 milliGRAM(s) IV Push every 6 hours  dextrose 5%. 1000 milliLiter(s) (100 mL/Hr) IV Continuous <Continuous>  dextrose 5%. 1000 milliLiter(s) (50 mL/Hr) IV Continuous <Continuous>  dextrose 50% Injectable 25 Gram(s) IV Push once  dextrose 50% Injectable 12.5 Gram(s) IV Push once  dextrose 50% Injectable 25 Gram(s) IV Push once  enoxaparin Injectable 40 milliGRAM(s) SubCutaneous every 24 hours  glucagon  Injectable 1 milliGRAM(s) IntraMuscular once  levETIRAcetam  IVPB 500 milliGRAM(s) IV Intermittent every 12 hours  mirtazapine 7.5 milliGRAM(s) Oral at bedtime  senna 1 Tablet(s) Oral every 24 hours  trimethoprim  160 mG/sulfamethoxazole 800 mG 1 Tablet(s) Oral every 12 hours    MEDICATIONS  (PRN):  dextrose Oral Gel 15 Gram(s) Oral once PRN Blood Glucose LESS THAN 70 milliGRAM(s)/deciliter      Labs:                        15.8   8.25  )-----------( 171      ( 26 Sep 2022 21:10 )             49.0         140  |  105  |  8   ----------------------------<  98  5.3   |  23  |  0.79    Ca    9.2      26 Sep 2022 21:10    TPro  5.8<L>  /  Alb  3.8  /  TBili  0.5  /  DBili  x   /  AST  24  /  ALT  18  /  AlkPhos  100        Urinalysis Basic - ( 26 Sep 2022 22:00 )    Color: Yellow / Appearance: Clear / S.025 / pH: x  Gluc: x / Ketone: Negative  / Bili: Negative / Urobili: <2 mg/dL   Blood: x / Protein: Trace / Nitrite: Negative   Leuk Esterase: Large / RBC: 25 /HPF / WBC 57 /HPF   Sq Epi: x / Non Sq Epi: 7 /HPF / Bacteria: Negative          Radiology:

## 2022-09-28 NOTE — CONSULT NOTE ADULT - SUBJECTIVE AND OBJECTIVE BOX
HPI:  63 y/o M with PMH GBM s/p craniotomy w/ resection c/b ESBL meningitis for which repeat craniotomy performed 2020 treated with 8 weeks Meropenem, subsequently had repeat ESBL meningitis s/p craniectomy with washout w/ 10 wk course intrathecal and IV Meropenem, b/t DVT s/p IVC filter, HTN, anxiety presented from [facility] for worsening mental status. As per the family at bedside, patient has been having worsening mental status over the past week. Normally, he is alert and can converse/follow commands. Over the past week, they had to rephrase sentences to yes or no questions. He also seems more fatigued. They tried to give him water but he choked on it. Unable to obtain ROS from the patient given his decline in mental status. He has been [facility] at [facility]. His sister Maribel is a labor and delivery physician at [employer].  (27 Sep 2022 15:16)    Interval History:   Patient seen and examined at bedside. Patient awake and alert. Patient unable to state his name or where he was. He stated he lived at home with his mother in [place] and that he had 2 sisters Maribel and Geraldine. Denies any pain, dyspnea, nausea.     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  Anxiety  Deep vein thrombosis (DVT)  Glioblastoma multiforme  Meningitis  No significant past surgical history  Disruption of closure of skull or craniotomy  Presence of IVC filter    FAMILY HISTORY:  Cerebrovascular accident (Father)    ITEMS NOT CHECKED ARE NOT PRESENT    SOCIAL HISTORY:   Significant other/partner[ ]  Children[ ]  Nondenominational/Spirituality: [employer]  Substance hx:  [ ]   Tobacco hx:  [ ]   Alcohol hx: [ ]   Home Opioid hx:  [ ] I-Stop Reference No:  Living Situation: [ ]Home  [x ]Long term care  [ ]Rehab [ ]Other      ADVANCE DIRECTIVES:    MOLST  [ ]  Living Will  [ ]   DECISION MAKER(s):  [ ] Health Care Proxy(s)  [x ] Surrogate(s)  [ ] Guardian           Name(s): Phone Number(s):  Sister Maribel Sone: [phone]  Sister Katrin Sone: [phone]    BASELINE (I)ADL(s) (prior to admission):  [place]: [ ]Total  [ ] Moderate [ x]Dependent    Allergies    aspirin (Unknown)  shellfish (Hives)  shellfish (Unknown)  Tegretol (Unknown)    Intolerances    ACE inhibitors (Other)  angiotensin II inhibitors (Other)  MEDICATIONS  (STANDING):  dexAMETHasone  Injectable 4 milliGRAM(s) IV Push every 6 hours  dextrose 5%. 1000 milliLiter(s) (100 mL/Hr) IV Continuous <Continuous>  dextrose 5%. 1000 milliLiter(s) (50 mL/Hr) IV Continuous <Continuous>  dextrose 50% Injectable 25 Gram(s) IV Push once  dextrose 50% Injectable 12.5 Gram(s) IV Push once  dextrose 50% Injectable 25 Gram(s) IV Push once  glucagon  Injectable 1 milliGRAM(s) IntraMuscular once  levETIRAcetam  IVPB 500 milliGRAM(s) IV Intermittent every 12 hours  mirtazapine 7.5 milliGRAM(s) Oral at bedtime  pantoprazole    Tablet 40 milliGRAM(s) Oral before breakfast  senna 2 Tablet(s) Oral at bedtime  trimethoprim  160 mG/sulfamethoxazole 800 mG 1 Tablet(s) Oral every 12 hours    MEDICATIONS  (PRN):  dextrose Oral Gel 15 Gram(s) Oral once PRN Blood Glucose LESS THAN 70 milliGRAM(s)/deciliter      PRESENT SYMPTOMS: [ ]Unable to self-report due to altered mental status  [ ] CPOT [x ] PAINADs [x ] RDOS  Source if other than patient:  [ ]Family   [ ]Team     Pain: [ ]yes [x ]no  QOL impact -   Location -                    Aggravating factors -  Quality -  Radiation -  Timing-  Severity (0-10 scale):  Minimal acceptable level (0-10 scale):     CPOT:    https://www.sccm.org/getattachment/gxf77t42-1s5c-1m5p-6o8x-8998x6830f9g/Critical-Care-Pain-Observation-Tool-(CPOT)      PAIN AD Score: 0    http://geriatrictoolkit.[place].[email or web address]/cog/painad.pdf (press ctrl +  left click to view)    Dyspnea:                           [ ]Mild [ ]Moderate [ ]Severe  RDOS:0  0 to 2  minimal or no respiratory distress   3  mild distress  4 to 6 moderate distress  >7 severe distress  https://homecareinformation.net/handouts/hen/Respiratory_Distress_Observation_Scale.pdf (Ctrl +  left click to view)     Anxiety:                             [ ]Mild [ ]Moderate [ ]Severe  Agitation:                          [ ]Mild [ ]Moderate [ ]Severe  Fatigue:                             [ ]Mild [ ]Moderate [ ]Severe  Nausea:                             [ ]Mild [ ]Moderate [ ]Severe  Loss of appetite:              [ ]Mild [ ]Moderate [ ]Severe  Constipation:                   [ ]Mild [ ]Moderate [ ]Severe  Diarrhea:                          [ ]Mild [ ]Moderate [ ]Severe      PCSSQ[Palliative Care Spiritual Screening Question]   Severity (0-10):  Score of 4 or > indicate consideration of Chaplaincy referral.    Chaplaincy Referral: [ ] yes [ ] refused [ ] following    Caregiver [place]? : [ ] yes [ ] no [ x] Deferred [ ] Declined             Social work referral [ ] Patient & Family Centered Care Referral [ ]     Anticipatory Grief present?:  [ x] yes [ ] no  [ ] Deferred                  Social work referral [x ] Chaplaincy Referral[ ]    Other Symptoms:  [ x]All other review of systems negative     Palliative Performance Status Version 2:        30 %    http://[facility].org/files/news/palliative_performance_scale_ppsv2.pdf    PHYSICAL EXAM:  Vital Signs Last 24 Hrs  T(C): 36.6 (28 Sep 2022 13:16), Max: 36.8 (28 Sep 2022 02:34)  T(F): 97.9 (28 Sep 2022 13:16), Max: 98.2 (28 Sep 2022 02:34)  HR: 103 (28 Sep 2022 13:16) (80 - 103)  BP: 123/91 (28 Sep 2022 13:16) (112/89 - 129/94)  BP(mean): --  RR: 18 (28 Sep 2022 13:16) (13 - 18)  SpO2: 95% (28 Sep 2022 13:16) (95% - 100%)    Parameters below as of 28 Sep 2022 13:16  Patient On (Oxygen Delivery Method): room air         I&O's Summary    27 Sep 2022 07:01  -  28 Sep 2022 07:00  --------------------------------------------------------  IN: 100 mL / OUT: 650 mL / NET: -550 mL    28 Sep 2022 07:01  -  28 Sep 2022 17:26  --------------------------------------------------------  IN: 0 mL / OUT: 500 mL / NET: -500 mL        GENERAL:  [ x]Alert  [x ]Oriented x0   [ ]Lethargic  [ ]Cachexia  [ ]Unarousable  [x ]Verbal  [ ]Non-Verbal  [x ] No Distress  Behavioral:   [ ] Anxiety  [ ] Delirium [ ] Agitation [x ] Calm  [x ] Other- encephalopathy  HEENT:  [x ]Normal  [ ] Temporal Wasting  [ ]Dry mouth   [ ]ET Tube/Trach  [ ]Oral lesions  [ ] Mucositis  PULMONARY:   [x ]Clear [ ]Tachypnea  [ ]Audible excessive secretions   [ ]Rhonchi        [ ]Right [ ]Left [ ]Bilateral  [ ]Crackles        [ ]Right [ ]Left [ ]Bilateral  [ ]Wheezing     [ ]Right [ ]Left [ ]Bilateral  [ ]Diminished breath sounds [ ]right [ ]left [ ]bilateral  CARDIOVASCULAR:    [x ]Regular [ ]Irregular [ ]Tachy  [ ]Amor [ ]Murmur [ ]Other  GASTROINTESTINAL:  [ x]Soft  [ ]Distended   [ ]+BS  [ x]Non tender [ ]Tender  [ ]PEG [ ]OGT/ NGT  Last BM:   GENITOURINARY:  [ ]Normal [ ] Incontinent   [ ]Oliguria/Anuria   [x ]Sandoval  MUSCULOSKELETAL:   [ ]Normal   [ ]Weakness  [ x]Bed/Wheelchair bound [ ]Edema  [  ] amputation  [  ] contraction  NEUROLOGIC: able to follow simple commands  [x ]No focal deficits, right upper/lower extremity paresis  [ ]Cognitive impairment  [ ]Dysphagia [ ]Dysarthria [ ]Paresis [ ]Other   SKIN: Moisture and Incontinence Associated Dermatitis. See Nursing Skin Assessment for further details  [ ]Normal    [ ]Rash  [ ]Pressure ulcer(s)       Present on admission [ ]y [ ]n   [  ]  Wound    [  ] hyperpigmentation    CRITICAL CARE:  [ ] Shock Present  [ ]Septic [ ]Cardiogenic [ ]Neurologic [ ]Hypovolemic  [ ]  Vasopressors [ ]  Inotropes   [ ]Respiratory failure present [ ]Mechanical ventilation [ ]Non-invasive ventilatory support [ ]High flow    [ ]Acute  [ ]Chronic [ ]Hypoxic  [ ]Hypercarbic [ ]Other  [ ]Other organ failure     LABS:                        16.7   8.26  )-----------( 209      ( 28 Sep 2022 09:24 )             50.3       143  |  109<H>  |  13  ----------------------------<  117<H>  4.5   |  24  |  0.63    Ca    9.5      28 Sep 2022 09:24  Phos  3.1       Mg     2.20         TPro  6.0  /  Alb  4.2  /  TBili  0.6  /  DBili  x   /  AST  9   /  ALT  11  /  AlkPhos  103      Urinalysis Basic - ( 26 Sep 2022 22:00 )    Color: Yellow / Appearance: Clear / S.025 / pH: x  Gluc: x / Ketone: Negative  / Bili: Negative / Urobili: <2 mg/dL   Blood: x / Protein: Trace / Nitrite: Negative   Leuk Esterase: Large / RBC: 25 /HPF / WBC 57 /HPF   Sq Epi: x / Non Sq Epi: 7 /HPF / Bacteria: Negative      CAPILLARY BLOOD GLUCOSE      POCT Blood Glucose.: 120 mg/dL (28 Sep 2022 12:23)  POCT Blood Glucose.: 99 mg/dL (28 Sep 2022 05:33)  POCT Blood Glucose.: 93 mg/dL (28 Sep 2022 02:02)  POCT Blood Glucose.: 89 mg/dL (27 Sep 2022 22:42)      RADIOLOGY & ADDITIONAL STUDIES:  CT 2022  HEAD:  1. Extensive vasogenic edema involving essentially the entirety of the   residual left cerebral hemisphere, extending into the brainstem, and into   the genu of the corpus callosum and right frontal lobe. Findings are   compatible with recurrent disease.  2. 1.7 cm left-to-right subfalcine herniation.  3. Early left sided infratentorial herniation suspected.    CERVICAL SPINE:  1. No acute finding.  XRAY Shunt 2022  IMPRESSION:  Intact  shunt catheter.    PROTEIN CALORIE MALNUTRITION PRESENT: [ ]mild [ ]moderate [ ]severe [ ]underweight [ ]morbid obesity  https://www.andeal.org/vault/3680/web/files/ONC/Table_Clinical%20Characteristics%20to%20Document%20Malnutrition-White%20JV%20et%20al%2020.pdf    Height (cm): 177.8 (22 @ 02:34), 182.9 (22 @ 16:14)  Weight (kg): 86.3 (22 @ 21:30)  BMI (kg/m2): 27.3 (22 @ 02:34), 25.8 (22 @ 21:30)    [x ]PPSV2 < or = to 30% [ ]significant weight loss  [ ]poor nutritional intake  [ ]anasarca [ ]Artificial Nutrition      REFERRALS:   [ ]Chaplaincy  [ ]Hospice  [ ]Child Life  [ ]Social Work  [ x]Case management [ ]Holistic Therapy     ************************************************************************  PALLIATIVE MEDICINE COORDINATION OF CARE DOCUMENTATION  [x] Inpatient Consult  [ ] Other:  ************************************************************************    HPI reviewed     ------------------------------------------------------------------------    MEDICATION REVIEW:  --- Pls refer to current medicatons in the body of this note  ISTOP REFERENCE:  [id number]. No prescriptions found on ISTOP  --- PRN usage: The patient HAS NOT used PRN's in the last 24h.  ------------------------------------------------------------------------  COORDINATION OF CARE:  --- Palliative Care consulted for: goals of care  --- Patient assessed:   --- Patient previously seen by Palliative Care service: Yes  ADVANCE CARE PLANNING  --- Code status: DNR  --- MOLST reviewed in chart: Yes  --- HCP/ Surrogate: Emanuel  --- GOC document found in Alpha: Yes  --- HCP/ Living will/ Other advanced directives in Alpha: NONE  ------------------------------------------------------------------------  CARE PROVIDER DOCUMENTATION:  --- SW/CM notes reviewed  --- Sp/Sw recs: soft and bite sized  --- Medicine notes reviewed  --- Neurosurgery notes reviewed  --- Infectious Disease notes reviewed   PLAN OF CARE  --- Known admissions in past year: 2  --- Current admit date: 2022  --- LOS:1 day  --- LACE score: 9  --- Current dispo plan: TO BE DETERMINED  ------------------------------------------------------------------------  --- Chart reviewed: 30 Minutes [including time used to gather, review and transfer data to this note]    Prolonged services rendered, as part of this patient's care provided by Palliative Medicine, include: i.chart review for provider and ancillary service documentation, ii.pertinent diagnostics including laboratory and imaging studies,iii. medication review including PRN use, iv. admission history including previous palliative care encounters and GOC notes, v.advance care planning documents including HCP and MOLST forms in Alpha. Part of Palliative Medicine extended evaluation and management also involves coordination of care with our IDT, the primary and consulting delmy, and unit CM/SW and Hospice if eligible. Recommendations based on the information gathered and discussed are outline in the AP of our notes.    ************************************************************************

## 2022-09-28 NOTE — PROGRESS NOTE ADULT - PROBLEM SELECTOR PLAN 1
Pt. presenting with AMS. Worsening mentation and alertness over the past week. He was able to speak in sentences but now can only answer yes/no questions. At [place], presumed to have UTI and was started on Zosyn on 9/24. CT scan showing extensive vasogenic edema involving the entirety of the residual left cerebral hemisphere extending into the brainstem and the genu of the corpus callosum and right frontal lobe - there is recurrent disease as well as subfalcine herniation and infratentorial herniation. No bleeding. Neurosurgery consulted. Given afebrile and findings on CT scan - lower suspicion for meningitis at this time and on suppressive bactrim so meningitis relapse less likely.   - MRI brain   - Decadron 4q6 hours   - Palliative care consult   - DNR/DNI  - C/w Bactrim (suppressive for prior meningitis) Pt. presenting with AMS. Worsening mentation and alertness over the past week.  CT scan showing extensive vasogenic edema involving the entirety of the residual left cerebral hemisphere extending into the brainstem and the genu of the corpus callosum and right frontal lobe - there is recurrent disease as well as subfalcine herniation and infratentorial herniation. No bleeding. Neurosurgery consulted. Per family, sounds close to baseline  - MRI brain   - Decadron 4q6 hours   - Palliative care consult   - DNR/DNI  - C/w Bactrim (suppressive for prior meningitis)

## 2022-09-28 NOTE — CONSULT NOTE ADULT - PROBLEM SELECTOR RECOMMENDATION 5
Discussed with primary team regarding goals of care discussion     Thank you for allowing us to participate in your patient's care. Please page [phone] for any questions/concerns.

## 2022-09-28 NOTE — CONSULT NOTE ADULT - ASSESSMENT
62M with PMH GBM s/p craniotomy w/ resection c/b ESBL meningitis for which repeat craniotomy performed 2/2020 treated with 8 weeks Meropenem, subsequently had repeat ESBL meningitis s/p craniectomy with washout w/ 10 wk course intrathecal and IV Meropenem, b/t DVT s/p IVC filter, HTN, anxiety presented from [facility] for worsening mental status found to have extensive vasogenic edema involving the entirety of the residual left cerebral hemisphere extending into the brainstem and the genu of the corpus callosum and right frontal lobe w/ recurrent disease as well as subfalcine herniation and infratentorial herniation.   Palliative Care consulted for complex decision making  related to goals of care discussions in the setting of advanced illness

## 2022-09-29 DIAGNOSIS — R13.10 DYSPHAGIA, UNSPECIFIED: ICD-10-CM

## 2022-09-29 LAB
ALBUMIN SERPL ELPH-MCNC: 4.1 G/DL — SIGNIFICANT CHANGE UP (ref 3.3–5)
ALP SERPL-CCNC: 99 U/L — SIGNIFICANT CHANGE UP (ref 40–120)
ALT FLD-CCNC: 10 U/L — SIGNIFICANT CHANGE UP (ref 4–41)
ANION GAP SERPL CALC-SCNC: 12 MMOL/L — SIGNIFICANT CHANGE UP (ref 7–14)
AST SERPL-CCNC: 8 U/L — SIGNIFICANT CHANGE UP (ref 4–40)
BILIRUB SERPL-MCNC: 0.4 MG/DL — SIGNIFICANT CHANGE UP (ref 0.2–1.2)
BUN SERPL-MCNC: 19 MG/DL — SIGNIFICANT CHANGE UP (ref 7–23)
CALCIUM SERPL-MCNC: 9.7 MG/DL — SIGNIFICANT CHANGE UP (ref 8.4–10.5)
CHLORIDE SERPL-SCNC: 106 MMOL/L — SIGNIFICANT CHANGE UP (ref 98–107)
CO2 SERPL-SCNC: 23 MMOL/L — SIGNIFICANT CHANGE UP (ref 22–31)
CREAT SERPL-MCNC: 0.74 MG/DL — SIGNIFICANT CHANGE UP (ref 0.5–1.3)
EGFR: 102 ML/MIN/1.73M2 — SIGNIFICANT CHANGE UP
GLUCOSE BLDC GLUCOMTR-MCNC: 116 MG/DL — HIGH (ref 70–99)
GLUCOSE BLDC GLUCOMTR-MCNC: 121 MG/DL — HIGH (ref 70–99)
GLUCOSE BLDC GLUCOMTR-MCNC: 126 MG/DL — HIGH (ref 70–99)
GLUCOSE BLDC GLUCOMTR-MCNC: 158 MG/DL — HIGH (ref 70–99)
GLUCOSE BLDC GLUCOMTR-MCNC: 170 MG/DL — HIGH (ref 70–99)
GLUCOSE SERPL-MCNC: 138 MG/DL — HIGH (ref 70–99)
HCT VFR BLD CALC: 51.4 % — HIGH (ref 39–50)
HGB BLD-MCNC: 16.5 G/DL — SIGNIFICANT CHANGE UP (ref 13–17)
MAGNESIUM SERPL-MCNC: 2.3 MG/DL — SIGNIFICANT CHANGE UP (ref 1.6–2.6)
MCHC RBC-ENTMCNC: 29.5 PG — SIGNIFICANT CHANGE UP (ref 27–34)
MCHC RBC-ENTMCNC: 32.1 GM/DL — SIGNIFICANT CHANGE UP (ref 32–36)
MCV RBC AUTO: 91.9 FL — SIGNIFICANT CHANGE UP (ref 80–100)
NRBC # BLD: 0 /100 WBCS — SIGNIFICANT CHANGE UP (ref 0–0)
NRBC # FLD: 0 K/UL — SIGNIFICANT CHANGE UP (ref 0–0)
PHOSPHATE SERPL-MCNC: 3.2 MG/DL — SIGNIFICANT CHANGE UP (ref 2.5–4.5)
PLATELET # BLD AUTO: 233 K/UL — SIGNIFICANT CHANGE UP (ref 150–400)
POTASSIUM SERPL-MCNC: 4.3 MMOL/L — SIGNIFICANT CHANGE UP (ref 3.5–5.3)
POTASSIUM SERPL-SCNC: 4.3 MMOL/L — SIGNIFICANT CHANGE UP (ref 3.5–5.3)
PROT SERPL-MCNC: 6 G/DL — SIGNIFICANT CHANGE UP (ref 6–8.3)
RBC # BLD: 5.59 M/UL — SIGNIFICANT CHANGE UP (ref 4.2–5.8)
RBC # FLD: 12.8 % — SIGNIFICANT CHANGE UP (ref 10.3–14.5)
SODIUM SERPL-SCNC: 141 MMOL/L — SIGNIFICANT CHANGE UP (ref 135–145)
WBC # BLD: 9.19 K/UL — SIGNIFICANT CHANGE UP (ref 3.8–10.5)
WBC # FLD AUTO: 9.19 K/UL — SIGNIFICANT CHANGE UP (ref 3.8–10.5)

## 2022-09-29 PROCEDURE — 99232 SBSQ HOSP IP/OBS MODERATE 35: CPT | Mod: GC

## 2022-09-29 PROCEDURE — 99222 1ST HOSP IP/OBS MODERATE 55: CPT | Mod: FS

## 2022-09-29 PROCEDURE — 71045 X-RAY EXAM CHEST 1 VIEW: CPT | Mod: 26

## 2022-09-29 PROCEDURE — 70553 MRI BRAIN STEM W/O & W/DYE: CPT | Mod: 26

## 2022-09-29 PROCEDURE — 99232 SBSQ HOSP IP/OBS MODERATE 35: CPT | Mod: FS

## 2022-09-29 RX ORDER — ONDANSETRON 8 MG/1
4 TABLET, FILM COATED ORAL ONCE
Refills: 0 | Status: COMPLETED | OUTPATIENT
Start: 2022-09-29 | End: 2022-09-29

## 2022-09-29 RX ORDER — ACETAMINOPHEN 500 MG
650 TABLET ORAL EVERY 6 HOURS
Refills: 0 | Status: DISCONTINUED | OUTPATIENT
Start: 2022-09-29 | End: 2022-10-03

## 2022-09-29 RX ORDER — ONDANSETRON 8 MG/1
4 TABLET, FILM COATED ORAL
Refills: 0 | Status: DISCONTINUED | OUTPATIENT
Start: 2022-09-29 | End: 2022-09-29

## 2022-09-29 RX ORDER — ONDANSETRON 8 MG/1
4 TABLET, FILM COATED ORAL
Refills: 0 | Status: DISCONTINUED | OUTPATIENT
Start: 2022-09-29 | End: 2022-10-03

## 2022-09-29 RX ADMIN — MIRTAZAPINE 7.5 MILLIGRAM(S): 45 TABLET, ORALLY DISINTEGRATING ORAL at 22:10

## 2022-09-29 RX ADMIN — Medication 4 MILLIGRAM(S): at 23:32

## 2022-09-29 RX ADMIN — PANTOPRAZOLE SODIUM 40 MILLIGRAM(S): 20 TABLET, DELAYED RELEASE ORAL at 06:04

## 2022-09-29 RX ADMIN — LEVETIRACETAM 400 MILLIGRAM(S): 250 TABLET, FILM COATED ORAL at 18:43

## 2022-09-29 RX ADMIN — SENNA PLUS 2 TABLET(S): 8.6 TABLET ORAL at 22:09

## 2022-09-29 RX ADMIN — Medication 1 TABLET(S): at 18:43

## 2022-09-29 RX ADMIN — Medication 4 MILLIGRAM(S): at 12:00

## 2022-09-29 RX ADMIN — LEVETIRACETAM 400 MILLIGRAM(S): 250 TABLET, FILM COATED ORAL at 05:46

## 2022-09-29 RX ADMIN — ONDANSETRON 4 MILLIGRAM(S): 8 TABLET, FILM COATED ORAL at 19:47

## 2022-09-29 RX ADMIN — Medication 4 MILLIGRAM(S): at 18:45

## 2022-09-29 RX ADMIN — Medication 4 MILLIGRAM(S): at 00:18

## 2022-09-29 RX ADMIN — ONDANSETRON 4 MILLIGRAM(S): 8 TABLET, FILM COATED ORAL at 06:41

## 2022-09-29 RX ADMIN — Medication 4 MILLIGRAM(S): at 05:49

## 2022-09-29 RX ADMIN — Medication 1 TABLET(S): at 05:52

## 2022-09-29 NOTE — SWALLOW BEDSIDE ASSESSMENT ADULT - ASR SWALLOW RECOMMEND DIAG
Objective testing NOT warranted given no overt signs of penetration/aspiration and no CXR during this admission
cinesophagram is recommended to objectively assess swallow mechanism given inconsistent presentation at bedside/during meals, report of globus sensation and diagnosis of GBM/VFSS/MBS

## 2022-09-29 NOTE — CONSULT NOTE ADULT - PROBLEM SELECTOR RECOMMENDATION 9
- Patient seen with Dr. hunt today  - MRI reviewed which demonstrated marked progression of disease as expected  - Would not offer further surgical intervention as it will not improve quality of life  - Palliative care consult reviewed  - Consider reviewing MRI with [facility] primary neurosurgeon as previous surgeries and care was performed there  - Can continue dexamethasone for now
Patient requires total support for all ADL's.   Please assist with ADLs  Skin care as per protocol

## 2022-09-29 NOTE — CONSULT NOTE ADULT - ASSESSMENT
63 yo M PMH GBM s/p craniotomy w/ resection c/b ESBL meningitis with repeat craniotomy, c/b repeat ESBL meningitis s/p craniectomy w/ washout in 2/2020 presenting from [place] [facility] worsening AMS x2d with CTH findings of extensive recurrent disease, 1.7cm L to R subfalcine herniation and early L sided infratentorial herniation  MRi brain demonstrates marked progression of disease compared to last [facility] study from December 2020

## 2022-09-29 NOTE — PROGRESS NOTE ADULT - PROBLEM SELECTOR PLAN 3
Pt. was suspected to have UTI in the setting of AMS and was started on Zosyn on 9/24. UA with pyuria but no bacteria. ID consulted in the ED for pyuria. Culture on 9/24 negative. Francois placed here. Unlikely pyuria represents a UTI.   - Monitor off of antibiotics.  - dc francois --> TOV

## 2022-09-29 NOTE — SWALLOW BEDSIDE ASSESSMENT ADULT - SWALLOW EVAL: DIAGNOSIS
1. Functional oral stage for puree and thin liquids marked by adequate oral acceptance, collection/containment and transfer. 2. Mild oral dysphagia for regular solids marked by adequate acceptance with slow/delayed chewing and eventual transfer. 3. Functional pharyngeal phase for puree, regular solids and thin liquids marked by a present pharyngeal swallow trigger with hyolaryngeal elevation upon palpation without evidence of airway penetration/aspiration.
Patient presents with oropharyngeal dysphagia given thin liquids, mildly thick liquids, puree, soft & bite sized and regular solids marked by adequate bolus containment, slowed bolus manipulation, slowed mastication with adequate ability to break down soft & bite sized solids and regular solids and adequate anterior-posterior transport. mild oral residue noted post primary swallow though cleared with liquid wash. Pharyngeal stage marked by observed initiation of the pharyngeal swallow trigger judged via laryngeal palpation. Patient with inconsistent cough response following thin liquids, possibly indicative of impaired airway protection. No overt s/sx of impaired airway protection observed for puree, soft & bite sized, regular solids and mildly thick liquids. Patient further reported globus sensation and inconsistent stuck sensation following regular solids.

## 2022-09-29 NOTE — PROGRESS NOTE ADULT - PROBLEM SELECTOR PLAN 1
Pt. presenting with AMS. Worsening mentation and alertness over the past week.  CT scan showing extensive vasogenic edema involving the entirety of the residual left cerebral hemisphere extending into the brainstem and the genu of the corpus callosum and right frontal lobe - there is recurrent disease as well as subfalcine herniation and infratentorial herniation. No bleeding. Neurosurgery consulted. Per family, sounds close to baseline  - MRI brain   - Decadron 4q6 hours   - Palliative care consult   - DNR/DNI  - C/w Bactrim (suppressive for prior meningitis) Pt. presenting with AMS. Worsening mentation and alertness over the past week.  CT scan showing extensive vasogenic edema involving the entirety of the residual left cerebral hemisphere extending into the brainstem and the genu of the corpus callosum and right frontal lobe - there is recurrent disease as well as subfalcine herniation and infratentorial herniation. No bleeding. Neurosurgery consulted. Per family, sounds close to baseline  - MRI brain - with progression of disease (9/29), family informed, printed report for family, in chart. Will review with [facility] JOY  - Decadron 4q6 hours   - Palliative care consult   - DNR/DNI  - C/w Bactrim (suppressive for prior meningitis)

## 2022-09-29 NOTE — CONSULT NOTE ADULT - SUBJECTIVE AND OBJECTIVE BOX
SUBJECTIVE EVENTS:    Vital Signs Last 24 Hrs  T(C): 36.5 (29 Sep 2022 13:15), Max: 36.7 (28 Sep 2022 22:30)  T(F): 97.7 (29 Sep 2022 13:15), Max: 98.1 (28 Sep 2022 22:30)  HR: 89 (29 Sep 2022 13:15) (87 - 96)  BP: 123/96 (29 Sep 2022 13:15) (111/84 - 123/96)  BP(mean): --  RR: 18 (29 Sep 2022 13:15) (17 - 18)  SpO2: 96% (29 Sep 2022 13:15) (96% - 97%)    Parameters below as of 29 Sep 2022 13:15  Patient On (Oxygen Delivery Method): room air          PHYSICAL EXAM:  Wide Awake Alert, Slow to respond  RUE contracted, minimal movement  RLE withdraws to noxious  Amble to move Left side spontaneously and follow commands      DIET:      MEDICATIONS  (STANDING):  dexAMETHasone  Injectable 4 milliGRAM(s) IV Push every 6 hours  dextrose 5%. 1000 milliLiter(s) (50 mL/Hr) IV Continuous <Continuous>  dextrose 5%. 1000 milliLiter(s) (100 mL/Hr) IV Continuous <Continuous>  dextrose 50% Injectable 25 Gram(s) IV Push once  dextrose 50% Injectable 12.5 Gram(s) IV Push once  dextrose 50% Injectable 25 Gram(s) IV Push once  glucagon  Injectable 1 milliGRAM(s) IntraMuscular once  levETIRAcetam  IVPB 500 milliGRAM(s) IV Intermittent every 12 hours  mirtazapine 7.5 milliGRAM(s) Oral at bedtime  pantoprazole    Tablet 40 milliGRAM(s) Oral before breakfast  senna 2 Tablet(s) Oral at bedtime  trimethoprim  160 mG/sulfamethoxazole 800 mG 1 Tablet(s) Oral every 12 hours    MEDICATIONS  (PRN):  dextrose Oral Gel 15 Gram(s) Oral once PRN Blood Glucose LESS THAN 70 milliGRAM(s)/deciliter                            16.5   9.19  )-----------( 233      ( 29 Sep 2022 06:14 )             51.4   09-29    141  |  106  |  19  ----------------------------<  138<H>  4.3   |  23  |  0.74    Ca    9.7      29 Sep 2022 06:14  Phos  3.2     09-29  Mg     2.30     09-29    TPro  6.0  /  Alb  4.1  /  TBili  0.4  /  DBili  x   /  AST  8   /  ALT  10  /  AlkPhos  99  09-29    Culture - Urine (collected 26 Sep 2022 21:45)  Source: Clean Catch Clean Catch (Midstream)  Final Report (27 Sep 2022 22:41):    <10,000 CFU/mL Normal Urogenital Beverlye    Culture - Blood (collected 26 Sep 2022 21:00)  Source: .Blood Blood-Peripheral  Preliminary Report (28 Sep 2022 01:02):    No growth to date.    Culture - Blood (collected 26 Sep 2022 20:55)  Source: .Blood Blood-Peripheral  Preliminary Report (28 Sep 2022 01:02):    No growth to date.          RADIOLGY:   < from: MR Head w/wo IV Cont (09.29.22 @ 15:55) >  ACC: [id number] EXAM:  MR BRAIN WAW IC                          PROCEDURE DATE:  09/29/2022          INTERPRETATION:  INDICATIONS:  GBM    TECHNIQUE:  Multiplanar imaging was performed using T1 weighted, T2   weighted and FLAIR sequences.  Diffusion weighted and susceptibility   sensitive images were also obtained.  Following intravenous gadolinium,   multiplanar T1 weighted images were performed. 8.5 cc Gadavist were   administered. 1.5 cc were discarded.    COMPARISON EXAMINATION:  CT 9/26/2022and MR 12/18/2020 and CT 2/4/2022    FINDINGS:  VENTRICLES AND SULCI:  Marked progression of disease compared with prior   MR study 12/18/2020 with mass effect on the left lateral ventricle and   midline shift to the right of roughly 7.25 mm.Subfalcine herniation is   identified. Early uncal herniation is seen. Evidence of enhancement along   the ependymal surface of the left lateral ventricle with porencephalic   dilatation of the left lateral ventricle appreciated at the site of prior   surgery. Right frontal ventricular catheter with its tip in the region of   the right lateral ventricular body  INTRA-AXIAL:  New appearing pathology at the level of the left frontal   and parietal lobes crossing the corpus callosum and involving the right   frontal lobe region. Diffusion hyperintensity is seen in association with   this pathology with extensive peripheral enhancement and susceptibility.   Area of abnormal enhancement measures 7.3 x 5.5 x 5.3 cm ( [id number]).   Evidence of left parietal encephalomalacia as seen on the prior studies   as well consistent with regions of treated disease on these prior imaging   studies  EXTRA-AXIAL:  No mass or collection.  VISUALIZED SINUSES:  Normal.  VISUALIZED MASTOIDS:  Left mastoid opacification  CALVARIUM: Left parietal craniectomy  CAROTID FLOW VOIDS:  Present.  MISCELLANEOUS:  None.      IMPRESSION:  Marked progression of disease compared with prior MR study 12/18/2020 and   prior CT evaluation 2/4/2022. New large necrotic peripherally enhancing   lesion occupying the left frontoparietal region and extending across the   genu and body of the corpus callosum with evidence of subfalcine   herniation, early signs of uncal and transtentorial herniation and marked   vasogenic edema.    < end of copied text >

## 2022-09-29 NOTE — PROGRESS NOTE ADULT - SUBJECTIVE AND OBJECTIVE BOX
PROGRESS NOTE:   Authored by Dr. Naomi Thomas MD (PGY-1). Pager [facility] [phone] / [facility]     Patient is a 62y old  Male who presents with a chief complaint of AMS (28 Sep 2022 17:25)      SUBJECTIVE / OVERNIGHT EVENTS:  No acute events overnight.     ADDITIONAL REVIEW OF SYSTEMS:  Patient denies fevers, chills, chest pain, shortness of breath, nausea, abdominal pain, diarrhea, constipation, dysuria, leg swelling, headache, light headedness.    MEDICATIONS  (STANDING):  dexAMETHasone  Injectable 4 milliGRAM(s) IV Push every 6 hours  dextrose 5%. 1000 milliLiter(s) (50 mL/Hr) IV Continuous <Continuous>  dextrose 5%. 1000 milliLiter(s) (100 mL/Hr) IV Continuous <Continuous>  dextrose 50% Injectable 25 Gram(s) IV Push once  dextrose 50% Injectable 12.5 Gram(s) IV Push once  dextrose 50% Injectable 25 Gram(s) IV Push once  glucagon  Injectable 1 milliGRAM(s) IntraMuscular once  levETIRAcetam  IVPB 500 milliGRAM(s) IV Intermittent every 12 hours  mirtazapine 7.5 milliGRAM(s) Oral at bedtime  pantoprazole    Tablet 40 milliGRAM(s) Oral before breakfast  senna 2 Tablet(s) Oral at bedtime  trimethoprim  160 mG/sulfamethoxazole 800 mG 1 Tablet(s) Oral every 12 hours    MEDICATIONS  (PRN):  dextrose Oral Gel 15 Gram(s) Oral once PRN Blood Glucose LESS THAN 70 milliGRAM(s)/deciliter      CAPILLARY BLOOD GLUCOSE      POCT Blood Glucose.: 116 mg/dL (29 Sep 2022 09:10)  POCT Blood Glucose.: 126 mg/dL (29 Sep 2022 06:13)  POCT Blood Glucose.: 121 mg/dL (28 Sep 2022 22:25)  POCT Blood Glucose.: 123 mg/dL (28 Sep 2022 17:33)  POCT Blood Glucose.: 120 mg/dL (28 Sep 2022 12:23)    I&O's Summary    28 Sep 2022 07:01  -  29 Sep 2022 07:00  --------------------------------------------------------  IN: 0 mL / OUT: 1100 mL / NET: -1100 mL    29 Sep 2022 07:01  -  29 Sep 2022 11:50  --------------------------------------------------------  IN: 100 mL / OUT: 250 mL / NET: -150 mL        PHYSICAL EXAM:  Vital Signs Last 24 Hrs  T(C): 36.7 (29 Sep 2022 05:57), Max: 36.7 (28 Sep 2022 22:30)  T(F): 98.1 (29 Sep 2022 05:57), Max: 98.1 (28 Sep 2022 22:30)  HR: 87 (29 Sep 2022 05:57) (87 - 103)  BP: 111/84 (29 Sep 2022 05:57) (111/84 - 123/91)  BP(mean): --  RR: 17 (29 Sep 2022 05:57) (17 - 18)  SpO2: 97% (29 Sep 2022 05:57) (95% - 97%)    Parameters below as of 29 Sep 2022 05:57  Patient On (Oxygen Delivery Method): room air        CONSTITUTIONAL: NAD, well-developed  RESPIRATORY: Normal respiratory effort; lungs are clear to auscultation bilaterally  CARDIOVASCULAR: Regular rate and rhythm, normal S1 and S2, no murmur/rub/gallop; No lower extremity edema; Peripheral pulses are 2+ bilaterally  ABDOMEN: Nontender to palpation, normoactive bowel sounds, no rebound/guarding; No hepatosplenomegaly  MUSCLOSKELETAL: no clubbing or cyanosis of digits; no joint swelling or tenderness to palpation  PSYCH: A+O to person, place, and time; affect appropriate    LABS:                        16.5   9.19  )-----------( 233      ( 29 Sep 2022 06:14 )             51.4     09-29    141  |  106  |  19  ----------------------------<  138<H>  4.3   |  23  |  0.74    Ca    9.7      29 Sep 2022 06:14  Phos  3.2     09-29  Mg     2.30     09-29    TPro  6.0  /  Alb  4.1  /  TBili  0.4  /  DBili  x   /  AST  8   /  ALT  10  /  AlkPhos  99  09-29              Culture - Urine (collected 26 Sep 2022 21:45)  Source: Clean Catch Clean Catch (Midstream)  Final Report (27 Sep 2022 22:41):    <10,000 CFU/mL Normal Urogenital Beverely    Culture - Blood (collected 26 Sep 2022 21:00)  Source: .Blood Blood-Peripheral  Preliminary Report (28 Sep 2022 01:02):    No growth to date.    Culture - Blood (collected 26 Sep 2022 20:55)  Source: .Blood Blood-Peripheral  Preliminary Report (28 Sep 2022 01:02):    No growth to date.        Tele Reviewed:    RADIOLOGY & ADDITIONAL TESTS:  Results Reviewed:   Imaging Personally Reviewed:  Electrocardiogram Personally Reviewed:     PROGRESS NOTE:   Authored by Dr. Naomi Thomas MD (PGY-1). Pager [facility] [phone] / [facility]     Patient is a 62y old  Male who presents with a chief complaint of AMS (28 Sep 2022 17:25)  Patient stable, and responsive     SUBJECTIVE / OVERNIGHT EVENTS:  No acute events overnight.     ADDITIONAL REVIEW OF SYSTEMS:  limited given mental status     MEDICATIONS  (STANDING):  dexAMETHasone  Injectable 4 milliGRAM(s) IV Push every 6 hours  dextrose 5%. 1000 milliLiter(s) (50 mL/Hr) IV Continuous <Continuous>  dextrose 5%. 1000 milliLiter(s) (100 mL/Hr) IV Continuous <Continuous>  dextrose 50% Injectable 25 Gram(s) IV Push once  dextrose 50% Injectable 12.5 Gram(s) IV Push once  dextrose 50% Injectable 25 Gram(s) IV Push once  glucagon  Injectable 1 milliGRAM(s) IntraMuscular once  levETIRAcetam  IVPB 500 milliGRAM(s) IV Intermittent every 12 hours  mirtazapine 7.5 milliGRAM(s) Oral at bedtime  pantoprazole    Tablet 40 milliGRAM(s) Oral before breakfast  senna 2 Tablet(s) Oral at bedtime  trimethoprim  160 mG/sulfamethoxazole 800 mG 1 Tablet(s) Oral every 12 hours    MEDICATIONS  (PRN):  dextrose Oral Gel 15 Gram(s) Oral once PRN Blood Glucose LESS THAN 70 milliGRAM(s)/deciliter      CAPILLARY BLOOD GLUCOSE      POCT Blood Glucose.: 116 mg/dL (29 Sep 2022 09:10)  POCT Blood Glucose.: 126 mg/dL (29 Sep 2022 06:13)  POCT Blood Glucose.: 121 mg/dL (28 Sep 2022 22:25)  POCT Blood Glucose.: 123 mg/dL (28 Sep 2022 17:33)  POCT Blood Glucose.: 120 mg/dL (28 Sep 2022 12:23)    I&O's Summary    28 Sep 2022 07:01  -  29 Sep 2022 07:00  --------------------------------------------------------  IN: 0 mL / OUT: 1100 mL / NET: -1100 mL    29 Sep 2022 07:01  -  29 Sep 2022 11:50  --------------------------------------------------------  IN: 100 mL / OUT: 250 mL / NET: -150 mL        PHYSICAL EXAM:  Vital Signs Last 24 Hrs  T(C): 36.7 (29 Sep 2022 05:57), Max: 36.7 (28 Sep 2022 22:30)  T(F): 98.1 (29 Sep 2022 05:57), Max: 98.1 (28 Sep 2022 22:30)  HR: 87 (29 Sep 2022 05:57) (87 - 103)  BP: 111/84 (29 Sep 2022 05:57) (111/84 - 123/91)  BP(mean): --  RR: 17 (29 Sep 2022 05:57) (17 - 18)  SpO2: 97% (29 Sep 2022 05:57) (95% - 97%)    Parameters below as of 29 Sep 2022 05:57  Patient On (Oxygen Delivery Method): room air        CONSTITUTIONAL: NAD,  RESPIRATORY: Normal respiratory effort; lungs are clear to auscultation bilaterally  CARDIOVASCULAR: Regular rate and rhythm, normal S1 and S2, no murmur/rub/gallop; No lower extremity edema; Peripheral pulses are 2+ bilaterally  ABDOMEN: Nontender to palpation, normoactive bowel sounds, no rebound/guarding;   MUSCLOSKELETAL: no clubbing or cyanosis of digits; no joint swelling or tenderness to palpation  PSYCH: A+O to self, answers yes and no only   LABS:                        16.5   9.19  )-----------( 233      ( 29 Sep 2022 06:14 )             51.4     09-29    141  |  106  |  19  ----------------------------<  138<H>  4.3   |  23  |  0.74    Ca    9.7      29 Sep 2022 06:14  Phos  3.2     09-29  Mg     2.30     09-29    TPro  6.0  /  Alb  4.1  /  TBili  0.4  /  DBili  x   /  AST  8   /  ALT  10  /  AlkPhos  99  09-29              Culture - Urine (collected 26 Sep 2022 21:45)  Source: Clean Catch Clean Catch (Midstream)  Final Report (27 Sep 2022 22:41):    <10,000 CFU/mL Normal Urogenital Beverley    Culture - Blood (collected 26 Sep 2022 21:00)  Source: .Blood Blood-Peripheral  Preliminary Report (28 Sep 2022 01:02):    No growth to date.    Culture - Blood (collected 26 Sep 2022 20:55)  Source: .Blood Blood-Peripheral  Preliminary Report (28 Sep 2022 01:02):    No growth to date.        Tele Reviewed:    RADIOLOGY & ADDITIONAL TESTS:  Results Reviewed:   Imaging Personally Reviewed:  Electrocardiogram Personally Reviewed:

## 2022-09-29 NOTE — SWALLOW BEDSIDE ASSESSMENT ADULT - NS ASR SWALLOW FINDINGS DISCUS
Teams messaged T7 Angel Luis Riddle/Nursing/Patient
message sent to medicine team/Nursing/Patient/Family

## 2022-09-29 NOTE — PROGRESS NOTE ADULT - PROBLEM SELECTOR PLAN 4
Hx of HTN. Normotensive.   - Not on any meds - monitor - h/o of dysphagia   - 9/29 reconsulted speech and swallow as pt did not tolerate thin liquid   - switched to mildly thick liquids with soft/bite sized  - pending cinesophogram

## 2022-09-29 NOTE — PROGRESS NOTE ADULT - ASSESSMENT
61 y/o M with PMH GBM s/p craniotomy w/ resection c/b ESBL meningitis for which repeat craniotomy performed 2/2020 treated with 8 weeks Meropenem, subsequently had repeat ESBL meningitis s/p craniectomy with washout w/ 10 wk course intrathecal and IV Meropenem, b/t DVT s/p IVC filter, HTN, anxiety presented from [facility] for worsening mental status

## 2022-09-29 NOTE — SWALLOW BEDSIDE ASSESSMENT ADULT - COMMENTS
Internal Medicine 9/28 '61 y/o M with PMH GBM s/p craniotomy w/ resection c/b ESBL meningitis for which repeat craniotomy performed 2/2020 treated with 8 weeks Meropenem, subsequently had repeat ESBL meningitis s/p craniectomy with washout w/ 10 wk course intrathecal and IV Meropenem, b/t DVT s/p IVC filter, HTN, anxiety presented from [facility] for worsening mental status"    CT head 9/27 "Extensive vasogenic edema involving essentially the entirety of the residual left cerebral hemisphere, extending into the brainstem, and into the genu of the corpus callosum and right frontal lobe. Findings are compatible with recurrent disease."    No CXR during this admission    Patient seen at bedside this AM for an initial assessment of the swallow function, at which time patient was alert. patient is able to gesture via head nodding yes/no and verbalizes wants and needs.
Per Internal Medicine Note 9/29/22: "61 y/o M with PMH GBM s/p craniotomy w/ resection c/b ESBL meningitis for which repeat craniotomy performed 2/2020 treated with 8 weeks Meropenem, subsequently had repeat ESBL meningitis s/p craniectomy with washout w/ 10 wk course intrathecal and IV Meropenem, b/t DVT s/p IVC filter, HTN, anxiety presented from [facility] for worsening mental status"    Patient is familiar to this department as the patient was seen for an initial swallow evaluation on 9/28/22 with recommendation of soft & bite sized with thin liquids. see report for full details.     Patient received upright in bed, awake and alert with sister present at bedside who served as a reliable informant. RN reported that patient was coughing/choking during meals this AM, specifically on thin liquids. Patient reports odynophagia and globus sensation.

## 2022-09-30 LAB
GLUCOSE BLDC GLUCOMTR-MCNC: 124 MG/DL — HIGH (ref 70–99)
GLUCOSE BLDC GLUCOMTR-MCNC: 124 MG/DL — HIGH (ref 70–99)
GLUCOSE BLDC GLUCOMTR-MCNC: 127 MG/DL — HIGH (ref 70–99)
GLUCOSE BLDC GLUCOMTR-MCNC: 148 MG/DL — HIGH (ref 70–99)
SARS-COV-2 RNA SPEC QL NAA+PROBE: SIGNIFICANT CHANGE UP

## 2022-09-30 PROCEDURE — 99233 SBSQ HOSP IP/OBS HIGH 50: CPT

## 2022-09-30 PROCEDURE — 74230 X-RAY XM SWLNG FUNCJ C+: CPT | Mod: 26

## 2022-09-30 PROCEDURE — 99497 ADVNCD CARE PLAN 30 MIN: CPT | Mod: 25

## 2022-09-30 PROCEDURE — 99232 SBSQ HOSP IP/OBS MODERATE 35: CPT | Mod: GC

## 2022-09-30 RX ORDER — DIPHENHYDRAMINE HCL 50 MG
25 CAPSULE ORAL ONCE
Refills: 0 | Status: COMPLETED | OUTPATIENT
Start: 2022-09-30 | End: 2022-09-30

## 2022-09-30 RX ORDER — DEXAMETHASONE 0.5 MG/5ML
4 ELIXIR ORAL
Refills: 0 | Status: DISCONTINUED | OUTPATIENT
Start: 2022-09-30 | End: 2022-10-03

## 2022-09-30 RX ADMIN — LEVETIRACETAM 400 MILLIGRAM(S): 250 TABLET, FILM COATED ORAL at 06:03

## 2022-09-30 RX ADMIN — Medication 4 MILLIGRAM(S): at 06:03

## 2022-09-30 RX ADMIN — MIRTAZAPINE 7.5 MILLIGRAM(S): 45 TABLET, ORALLY DISINTEGRATING ORAL at 21:10

## 2022-09-30 RX ADMIN — PANTOPRAZOLE SODIUM 40 MILLIGRAM(S): 20 TABLET, DELAYED RELEASE ORAL at 06:03

## 2022-09-30 RX ADMIN — Medication 4 MILLIGRAM(S): at 17:29

## 2022-09-30 RX ADMIN — SENNA PLUS 2 TABLET(S): 8.6 TABLET ORAL at 21:09

## 2022-09-30 RX ADMIN — Medication 1 TABLET(S): at 06:03

## 2022-09-30 RX ADMIN — Medication 25 MILLIGRAM(S): at 19:12

## 2022-09-30 RX ADMIN — Medication 4 MILLIGRAM(S): at 13:06

## 2022-09-30 RX ADMIN — LEVETIRACETAM 400 MILLIGRAM(S): 250 TABLET, FILM COATED ORAL at 17:44

## 2022-09-30 RX ADMIN — Medication 1 TABLET(S): at 17:30

## 2022-09-30 NOTE — PROGRESS NOTE ADULT - SUBJECTIVE AND OBJECTIVE BOX
Internal Medicine Progress Note    Patient is a 62y old  Male who presents with a chief complaint of AMS (29 Sep 2022 18:06)    OVERNIGHT EVENTS/SUBJECTIVE:    OBJECTIVE:  Vital Signs Last 24 Hrs  T(C): 36.3 (29 Sep 2022 21:01), Max: 36.5 (29 Sep 2022 13:15)  T(F): 97.4 (29 Sep 2022 21:01), Max: 97.7 (29 Sep 2022 13:15)  HR: 99 (29 Sep 2022 21:01) (89 - 99)  BP: 114/92 (29 Sep 2022 21:01) (114/92 - 123/96)  BP(mean): --  RR: 18 (29 Sep 2022 21:01) (18 - 18)  SpO2: 97% (29 Sep 2022 21:01) (96% - 97%)    Parameters below as of 29 Sep 2022 21:01  Patient On (Oxygen Delivery Method): room air      I&O's Detail    28 Sep 2022 07:01  -  29 Sep 2022 07:00  --------------------------------------------------------  IN:  Total IN: 0 mL    OUT:    Indwelling Catheter - Urethral (mL): 1100 mL  Total OUT: 1100 mL    Total NET: -1100 mL      29 Sep 2022 07:01  -  30 Sep 2022 06:58  --------------------------------------------------------  IN:    IV PiggyBack: 100 mL    Oral Fluid: 100 mL  Total IN: 200 mL    OUT:    Indwelling Catheter - Urethral (mL): 550 mL  Total OUT: 550 mL    Total NET: -350 mL        Daily     Daily   Physical Exam:  General: NAD, resting comfortably in bed  Neuro: A&Ox4, 5/5 strength in all ext  HEENT: NC/AT, EOMI, normal hearing, oral mucosa moist, no oral lesions noted, no pharyngeal erythema, uvula midline  Neck: supple, thyroid not enlarged, no LAD  Resp: Breathing comfortably on RA, LCTA b/l  CV: Normal sinus rhythm, S1 and S2, no r/m/g  Abd: soft, non-distended, non-tender. No HSM.  Ext: ROMIx4, no edema, +2 pulses bilaterally  Skin: Warm and dry, no rashes or discolorations  Psych: Appropriate affect    Medications:  MEDICATIONS  (STANDING):  dexAMETHasone  Injectable 4 milliGRAM(s) IV Push every 6 hours  dextrose 5%. 1000 milliLiter(s) (50 mL/Hr) IV Continuous <Continuous>  dextrose 5%. 1000 milliLiter(s) (100 mL/Hr) IV Continuous <Continuous>  dextrose 50% Injectable 25 Gram(s) IV Push once  dextrose 50% Injectable 12.5 Gram(s) IV Push once  dextrose 50% Injectable 25 Gram(s) IV Push once  glucagon  Injectable 1 milliGRAM(s) IntraMuscular once  levETIRAcetam  IVPB 500 milliGRAM(s) IV Intermittent every 12 hours  mirtazapine 7.5 milliGRAM(s) Oral at bedtime  pantoprazole    Tablet 40 milliGRAM(s) Oral before breakfast  senna 2 Tablet(s) Oral at bedtime  trimethoprim  160 mG/sulfamethoxazole 800 mG 1 Tablet(s) Oral every 12 hours    MEDICATIONS  (PRN):  acetaminophen     Tablet .. 650 milliGRAM(s) Oral every 6 hours PRN Temp greater or equal to 38C (100.4F), Mild Pain (1 - 3)  dextrose Oral Gel 15 Gram(s) Oral once PRN Blood Glucose LESS THAN 70 milliGRAM(s)/deciliter  ondansetron Injectable 4 milliGRAM(s) IV Push two times a day PRN Nausea and/or Vomiting      Labs:                        16.5   9.19  )-----------( 233      ( 29 Sep 2022 06:14 )             51.4     09-29    141  |  106  |  19  ----------------------------<  138<H>  4.3   |  23  |  0.74    Ca    9.7      29 Sep 2022 06:14  Phos  3.2     09-29  Mg     2.30     09-29    TPro  6.0  /  Alb  4.1  /  TBili  0.4  /  DBili  x   /  AST  8   /  ALT  10  /  AlkPhos  99  09-29            Radiology: Internal Medicine Progress Note    Patient is a 62y old  Male who presents with a chief complaint of AMS (29 Sep 2022 18:06)    OVERNIGHT EVENTS/SUBJECTIVE: No overnight events. He responds that he has no headache, cp, palps, sob, abd pain, n/v/d.    OBJECTIVE:  Vital Signs Last 24 Hrs  T(C): 36.3 (29 Sep 2022 21:01), Max: 36.5 (29 Sep 2022 13:15)  T(F): 97.4 (29 Sep 2022 21:01), Max: 97.7 (29 Sep 2022 13:15)  HR: 99 (29 Sep 2022 21:01) (89 - 99)  BP: 114/92 (29 Sep 2022 21:01) (114/92 - 123/96)  BP(mean): --  RR: 18 (29 Sep 2022 21:01) (18 - 18)  SpO2: 97% (29 Sep 2022 21:01) (96% - 97%)    Parameters below as of 29 Sep 2022 21:01  Patient On (Oxygen Delivery Method): room air      I&O's Detail    28 Sep 2022 07:01  -  29 Sep 2022 07:00  --------------------------------------------------------  IN:  Total IN: 0 mL    OUT:    Indwelling Catheter - Urethral (mL): 1100 mL  Total OUT: 1100 mL    Total NET: -1100 mL      29 Sep 2022 07:01  -  30 Sep 2022 06:58  --------------------------------------------------------  IN:    IV PiggyBack: 100 mL    Oral Fluid: 100 mL  Total IN: 200 mL    OUT:    Indwelling Catheter - Urethral (mL): 550 mL  Total OUT: 550 mL    Total NET: -350 mL        Daily  Physical Exam:  General: NAD, resting comfortably in bed  Neuro: A&Ox1, responds yes and no to questions  HEENT: NC/AT, EOMI, normal hearing  Resp: Breathing comfortably on RA, LCTA b/l  CV: Normal sinus rhythm, S1 and S2, no r/m/g  Abd: soft, non-distended, non-tender  Ext: no edema, +2 pulses bilaterally  Skin: Warm and dry, no rashes or discolorations      Medications:  MEDICATIONS  (STANDING):  dexAMETHasone  Injectable 4 milliGRAM(s) IV Push every 6 hours  dextrose 5%. 1000 milliLiter(s) (50 mL/Hr) IV Continuous <Continuous>  dextrose 5%. 1000 milliLiter(s) (100 mL/Hr) IV Continuous <Continuous>  dextrose 50% Injectable 25 Gram(s) IV Push once  dextrose 50% Injectable 12.5 Gram(s) IV Push once  dextrose 50% Injectable 25 Gram(s) IV Push once  glucagon  Injectable 1 milliGRAM(s) IntraMuscular once  levETIRAcetam  IVPB 500 milliGRAM(s) IV Intermittent every 12 hours  mirtazapine 7.5 milliGRAM(s) Oral at bedtime  pantoprazole    Tablet 40 milliGRAM(s) Oral before breakfast  senna 2 Tablet(s) Oral at bedtime  trimethoprim  160 mG/sulfamethoxazole 800 mG 1 Tablet(s) Oral every 12 hours    MEDICATIONS  (PRN):  acetaminophen     Tablet .. 650 milliGRAM(s) Oral every 6 hours PRN Temp greater or equal to 38C (100.4F), Mild Pain (1 - 3)  dextrose Oral Gel 15 Gram(s) Oral once PRN Blood Glucose LESS THAN 70 milliGRAM(s)/deciliter  ondansetron Injectable 4 milliGRAM(s) IV Push two times a day PRN Nausea and/or Vomiting      Labs:                        16.5   9.19  )-----------( 233      ( 29 Sep 2022 06:14 )             51.4     09-29    141  |  106  |  19  ----------------------------<  138<H>  4.3   |  23  |  0.74    Ca    9.7      29 Sep 2022 06:14  Phos  3.2     09-29  Mg     2.30     09-29    TPro  6.0  /  Alb  4.1  /  TBili  0.4  /  DBili  x   /  AST  8   /  ALT  10  /  AlkPhos  99  09-29            Radiology:

## 2022-09-30 NOTE — PROGRESS NOTE ADULT - PROBLEM SELECTOR PLAN 4
Spoke with patient's sister Maribel Vazquez. She states family is interested in hospice services upon discharge as they understand the results from MRI Brain yesterday. Family asking about inpatient hospice at [facility]". Reviewed with family that patient is not a candidate for inpatient hospice at this time, but is eligible for hospice services at his NH. Discussed that should patient become symptomatic and symptoms cannot be managed at NH in the future and requiring inpatient management, patient would then be transitioned to inpatient hospice at that time.   Sister verbalized understanding.   Emotional support provided.   >16 minutes spent on goals of care discussion.

## 2022-09-30 NOTE — PROGRESS NOTE ADULT - PROBLEM SELECTOR PLAN 3
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 New large necrotic peripherally enhancing lesion occupying the left frontoparietal region and extending across the genu and body of the corpus callosum with evidence of subfalcine herniation, early signs of uncal and transtentorial herniation and marked vasogenic edema.  - Neurosurgery recommendations appreciated.

## 2022-09-30 NOTE — PROGRESS NOTE ADULT - SUBJECTIVE AND OBJECTIVE BOX
SUBJECTIVE AND OBJECTIVE:  Patient seen and examined at bedside. Patient lethargic this AM, but arousable to voice.    INTERVAL HPI/OVERNIGHT EVENTS:  No acute overnight events.     Allergies    aspirin (Unknown)  shellfish (Hives)  shellfish (Unknown)  Tegretol (Unknown)    Intolerances    ACE inhibitors (Other)  angiotensin II inhibitors (Other)  MEDICATIONS  (STANDING):  dexAMETHasone  Injectable 4 milliGRAM(s) IV Push <User Schedule>  dextrose 5%. 1000 milliLiter(s) (50 mL/Hr) IV Continuous <Continuous>  dextrose 5%. 1000 milliLiter(s) (100 mL/Hr) IV Continuous <Continuous>  dextrose 50% Injectable 12.5 Gram(s) IV Push once  dextrose 50% Injectable 25 Gram(s) IV Push once  dextrose 50% Injectable 25 Gram(s) IV Push once  glucagon  Injectable 1 milliGRAM(s) IntraMuscular once  levETIRAcetam  IVPB 500 milliGRAM(s) IV Intermittent every 12 hours  mirtazapine 7.5 milliGRAM(s) Oral at bedtime  pantoprazole    Tablet 40 milliGRAM(s) Oral before breakfast  senna 2 Tablet(s) Oral at bedtime  trimethoprim  160 mG/sulfamethoxazole 800 mG 1 Tablet(s) Oral every 12 hours    MEDICATIONS  (PRN):  acetaminophen     Tablet .. 650 milliGRAM(s) Oral every 6 hours PRN Temp greater or equal to 38C (100.4F), Mild Pain (1 - 3)  dextrose Oral Gel 15 Gram(s) Oral once PRN Blood Glucose LESS THAN 70 milliGRAM(s)/deciliter  ondansetron Injectable 4 milliGRAM(s) IV Push two times a day PRN Nausea and/or Vomiting      ITEMS UNCHECKED ARE NOT PRESENT    PRESENT SYMPTOMS: [ x]Unable to self-report due to altered mental status- see [ ] CPOT [ x] PAINADS [x ] RDOS  Source if other than patient:  [ ]Family   [ ]Team     Pain:  [ ]yes [ ]no  QOL impact -   Location -                    Aggravating factors -  Quality -  Radiation -  Timing-  Severity (0-10 scale):  Minimal acceptable level (0-10 scale):     Dyspnea:                           [ ]Mild [ ]Moderate [ ]Severe  Anxiety:                             [ ]Mild [ ]Moderate [ ]Severe  Agitation:                          [ ]Mild [ ]Moderate [ ]Severe  Fatigue:                             [ ]Mild [ ]Moderate [ ]Severe  Nausea:                             [ ]Mild [ ]Moderate [ ]Severe  Loss of appetite:              [ ]Mild [ ]Moderate [ ]Severe  Constipation:                   [ ]Mild [ ]Moderate [ ]Severe  Diarrhea:                          [ ]Mild [ ]Moderate [ ]Severe    CPOT:    https://www.[facility].org/getattachment/[email or web address]/Critical-Care-Pain-Observation-Tool-(CPOT)    PCSSQ[Palliative Care Spiritual Screening Question]   Severity (0-10):  Score of 4 or > indicate consideration of Chaplaincy referral.  Chaplaincy Referral: [ ] yes [ ] refused [ ] following [x ] deferred    Caregiver [place]? : [ x] yes [ ] no [ ] Declined [ ] Deferred              Social work referral [x ] Patient & Family Centered Care Referral [ ]     Anticipatory Grief present?:  [ x] yes [ ] no  [ ] Deferred                  Social work referral [ x] Chaplaincy Referral[ ]    Other Symptoms:  [ ]All other review of systems negative - unable to obtain due to encephalopathy     PHYSICAL EXAM:  Vital Signs Last 24 Hrs  T(C): 36.7 (30 Sep 2022 13:51), Max: 36.7 (30 Sep 2022 13:51)  T(F): 98 (30 Sep 2022 13:51), Max: 98 (30 Sep 2022 13:51)  HR: 80 (30 Sep 2022 13:51) (80 - 99)  BP: 122/86 (30 Sep 2022 13:51) (114/92 - 122/86)  BP(mean): --  RR: 18 (30 Sep 2022 13:51) (18 - 18)  SpO2: 98% (30 Sep 2022 13:51) (97% - 98%)    Parameters below as of 30 Sep 2022 13:51  Patient On (Oxygen Delivery Method): room air         I&O's Summary    29 Sep 2022 07:01  -  30 Sep 2022 07:00  --------------------------------------------------------  IN: 200 mL / OUT: 550 mL / NET: -350 mL       GENERAL:  [ ]Alert  [ ]Oriented    [ x]Lethargic but arousable to voice  [ ]Cachexia  [ ]Unarousable   [x ] No Distress  Behavioral:   [ ] Anxiety  [ ] Delirium [ ] Agitation [x ] Calm  [x ] Other- encephalopathy  HEENT:  [x ]Normal  [ ] Temporal Wasting  [ ]Dry mouth   [ ]ET Tube/Trach  [ ]Oral lesions  [ ] Mucositis  PULMONARY:   [x ]Clear [ ]Tachypnea  [ ]Audible excessive secretions   [ ]Rhonchi        [ ]Right [ ]Left [ ]Bilateral  [ ]Crackles        [ ]Right [ ]Left [ ]Bilateral  [ ]Wheezing     [ ]Right [ ]Left [ ]Bilateral  [ ]Diminished breath sounds [ ]right [ ]left [ ]bilateral  CARDIOVASCULAR:    [x ]Regular [ ]Irregular [ ]Tachy  [ ]Amor [ ]Murmur [ ]Other  GASTROINTESTINAL:  [ x]Soft  [ ]Distended   [ ]+BS  [ x]Non tender [ ]Tender  [ ]PEG [ ]OGT/ NGT  Last BM: fecal incontinence   GENITOURINARY:  [ ]Normal [ ] Incontinent   [ ]Oliguria/Anuria   [x ]Sandoval  MUSCULOSKELETAL:   [ ]Normal   [ ]Weakness  [ x]Bed/Wheelchair bound [ ]Edema  [  ] amputation  [  ] contraction  NEUROLOGIC:   [x ]No focal deficits, right upper/lower extremity paresis  [ ]Cognitive impairment  [ ]Dysphagia [ ]Dysarthria [ ]Paresis [ ]Other   SKIN: Moisture and Incontinence Associated Dermatitis. See Nursing Skin Assessment for further details  [ ]Normal    [ ]Rash  [ ]Pressure ulcer(s)       Present on admission [ ]y [ ]n   [  ]  Wound    [  ] hyperpigmentation    CRITICAL CARE:  [ ]Shock Present  [ ]Septic [ ]Cardiogenic [ ]Neurologic [ ]Hypovolemic  [ ]Vasopressors [ ]Inotropes  [ ]Respiratory failure present [ ]Mechanical Ventilation [ ]Non-invasive ventilatory support [ ]High-Flow   [ ]Acute  [ ]Chronic [ ]Hypoxic  [ ]Hypercarbic [ ]Other  [ ]Other organ failure     LABS:                        16.5   9.19  )-----------( 233      ( 29 Sep 2022 06:14 )             51.4   09-29    141  |  106  |  19  ----------------------------<  138<H>  4.3   |  23  |  0.74    Ca    9.7      29 Sep 2022 06:14  Phos  3.2     09-29  Mg     2.30     09-29    TPro  6.0  /  Alb  4.1  /  TBili  0.4  /  DBili  x   /  AST  8   /  ALT  10  /  AlkPhos  99  09-29      CAPILLARY BLOOD GLUCOSE      POCT Blood Glucose.: 127 mg/dL (30 Sep 2022 12:49)  POCT Blood Glucose.: 124 mg/dL (30 Sep 2022 08:47)  POCT Blood Glucose.: 158 mg/dL (29 Sep 2022 22:41)  POCT Blood Glucose.: 170 mg/dL (29 Sep 2022 18:03)          RADIOLOGY & ADDITIONAL STUDIES:  MRI Head 9/29/2022  Marked progression of disease compared with prior MR study 12/18/2020 and   prior CT evaluation 2/4/2022. New large necrotic peripherally enhancing   lesion occupying the left frontoparietal region and extending across the   genu and body of the corpus callosum with evidence of subfalcine   herniation, early signs of uncal and transtentorial herniation and marked   vasogenic edema.      Protein Calorie Malnutrition Present: [ ]mild [ ]moderate [ ]severe [ ]underweight [ ]morbid obesity  https://www.andeal.org/vault/2440/web/files/ONC/Table_Clinical%20Characteristics%20to%20Document%20Malnutrition-White%20JV%20et%20al%358955.pdf    Height (cm): 177.8 (09-28-22 @ 02:34), 182.9 (02-03-22 @ 16:14)  Weight (kg): 86.3 (02-03-22 @ 21:30)  BMI (kg/m2): 27.3 (09-28-22 @ 02:34), 25.8 (02-03-22 @ 21:30)    [ x]PPSV2 < or = 30%  [ ]significant weight loss [ ]poor nutritional intake [ ]anasarca   [ ]Artificial Nutrition    REFERRALS:   [ ]Chaplaincy  [x ]Hospice  [ ]Child Life  [ ]Social Work  [ x]Case management [ ]Holistic Therapy     Goals of Care Document:  JOSSIE Morin (09-28-22 @ 17:45)  Goals of Care Conversation:   Participants:  · Participants  Family; Staff  · Relative  Sister Maribel  · Provider  Dr. Morin, Dr. Amaya  ·   Airam Simpson    Advance Directives:  · Does patient have Advance Directive  Yes  · Indicate Type  Do Not Resuscitate (DNR); Medical Orders for Life-Sustaining Treatment (MOLST)  · Are any of the items on the chart  Yes  · Specify which ones are on chart  Do Not Resuscitate (DNR)  Medical Orders for Life-Sustaining Treatment (MOLST)  · Caregiver:  declines    Conversation Discussion:  · Conversation  Diagnosis; Prognosis; MOLST Discussed; Treatment Options; Hospice Referral  · Conversation Details  Palliative Care consulted for complex decision making  related to goals of care discussions in the setting of advanced illness    Patient currently resides as long term resident at NH; prior to that he had been living at home with his mother. Patient was never  and never had children. Per Maribel who is a physician at [facility] [employer], she and her sister Geraldine, who is also a physician are patient's HCP and they include their mother as well in the decision making process.     Sister verbalized understanding of patient's oncologic history as well as his current hospitalization course.   She shares that patient had told family a few months ago that he would not want to undergo further treatments for his cancer due to his quality of life. Reviewed with Maribel about hospice philosophy of care/services for patient's care if no further DMT were pursued/ offered. Maribel shares she will need to further discuss with her sister and mother prior to making a final decision. Maribel agrees that we can rediscuss next steps for patient's care after MRI Brain is completed.     Maribel confirms that patient is DNR/DNI.     Emotional support provided.    Personal Advance Directives Treatment Guidelines:   Treatment Guidelines:  · Treatment Guidelines  DNR Order    MOLST:  · Completed  27-Sep-2022    Location of Discussion:   Time Spent on Advance Care Planning:  I spent 16 (in minutes) on advance care planning services with the patient.  This time is separate and distinct from any other care management services provided on this date.    Location of Discussion:  · Location of discussion  Telephone      Electronic Signatures:  Missy Morin)  (Signed 28-Sep-2022 17:51)  	Authored: Goals of Care Conversation, Personal Advance Directives Treatment Guidelines, Location of Discussion      Last Updated: 28-Sep-2022 17:51 by Missy Morin)

## 2022-09-30 NOTE — PROGRESS NOTE ADULT - PROBLEM SELECTOR PLAN 3
Pt. was suspected to have UTI in the setting of AMS and was started on Zosyn on 9/24. UA with pyuria but no bacteria. ID consulted in the ED for pyuria. Culture on 9/24 negative. Francois placed here. Unlikely pyuria represents a UTI.   - Monitor off of antibiotics.  - dc francois --> TOV Pt. was suspected to have UTI in the setting of AMS and was started on Zosyn on 9/24. UA with pyuria but no bacteria. ID consulted in the ED for pyuria. Culture on 9/24 negative. Francois placed here. Unlikely pyuria represents a UTI.   - Monitor off of antibiotics.  - dc francois --> TOV passed

## 2022-09-30 NOTE — PROGRESS NOTE ADULT - PROBLEM SELECTOR PLAN 1
Pt. presenting with AMS. Worsening mentation and alertness over the past week.  CT scan showing extensive vasogenic edema involving the entirety of the residual left cerebral hemisphere extending into the brainstem and the genu of the corpus callosum and right frontal lobe - there is recurrent disease as well as subfalcine herniation and infratentorial herniation. No bleeding. Neurosurgery consulted. Per family, sounds close to baseline  - MRI brain - with progression of disease (9/29), family informed, printed report for family, in chart. Will review with [facility] JOY  - Decadron 4q6 hours   - Palliative care consult   - DNR/DNI  - C/w Bactrim (suppressive for prior meningitis) CT scan showing recurrent disease as well as subfalcine herniation and infratentorial herniation. No bleeding. Neurosurgery consulted - no intervention besides steroids. Per family, sounds close to baseline  - MRI brain - with progression of disease (9/29), family informed, printed report for family, in chart. reviewed with [facility] onc Dr Liang who has seen him before   - change to Decadron 4 BID per [facility] onc   - Palliative care on board, much appreciated  - DNR/DNI  - C/w Bactrim (suppressive for prior meningitis)

## 2022-09-30 NOTE — PROGRESS NOTE ADULT - ASSESSMENT
62M with PMH GBM s/p craniotomy w/ resection c/b ESBL meningitis for which repeat craniotomy performed 2/2020 treated with 8 weeks Meropenem, subsequently had repeat ESBL meningitis s/p craniectomy with washout w/ 10 wk course intrathecal and IV Meropenem, b/t DVT s/p IVC filter, HTN, anxiety presented from [facility] for worsening mental status found to have extensive vasogenic edema involving the entirety of the residual left cerebral hemisphere extending into the brainstem and the genu of the corpus callosum and right frontal lobe w/ recurrent disease as well as subfalcine herniation and infratentorial herniation.   Palliative Care consulted for complex decision making  related to goals of care discussions in the setting of advanced illness

## 2022-09-30 NOTE — PROGRESS NOTE ADULT - ASSESSMENT
63 y/o M with PMH GBM s/p craniotomy w/ resection c/b ESBL meningitis for which repeat craniotomy performed 2/2020 treated with 8 weeks Meropenem, subsequently had repeat ESBL meningitis s/p craniectomy with washout w/ 10 wk course intrathecal and IV Meropenem, b/t DVT s/p IVC filter, HTN, anxiety presented from [facility] for worsening mental status

## 2022-09-30 NOTE — SWALLOW VFSS/MBS ASSESSMENT ADULT - ADDITIONAL INFORMATION
***Please note, pt with baseline poor head control and patient's body habitus/positioning is negatively impacting clarity of images.

## 2022-09-30 NOTE — PROGRESS NOTE ADULT - PROBLEM SELECTOR PLAN 4
- h/o of dysphagia   - 9/29 reconsulted speech and swallow as pt did not tolerate thin liquid   - switched to mildly thick liquids with soft/bite sized  - pending cinesophogram - h/o of dysphagia   - 9/29 reconsulted speech and swallow as pt did not tolerate thin liquid   - switched to mildly thick liquids with soft/bite sized  - cinesophogram today

## 2022-09-30 NOTE — PROGRESS NOTE ADULT - PROBLEM SELECTOR PLAN 5
Discussed with primary team regarding goals of care discussion     Thank you for allowing us to participate in your patient's care. Please page [phone] for any questions/concerns.

## 2022-09-30 NOTE — SWALLOW VFSS/MBS ASSESSMENT ADULT - ADDITIONAL RECOMMENDATIONS
1. MD to consider a FEES as an additional objective swallowing assessment in light of pt's body positioning which negatively impacted clarity of fluoroscopy images.   2. Reconsult this service with any changes in status or diet intolerance.

## 2022-09-30 NOTE — SWALLOW VFSS/MBS ASSESSMENT ADULT - COMMENTS
"63 y/o M with PMH GBM s/p craniotomy w/ resection c/b ESBL meningitis for which repeat craniotomy performed 2/2020 treated with 8 weeks Meropenem, subsequently had repeat ESBL meningitis s/p craniectomy with washout w/ 10 wk course intrathecal and IV Meropenem, b/t DVT s/p IVC filter, HTN, anxiety presented from [facility] for worsening mental status"    Patient is familiar to this department as the patient was seen for an initial swallow evaluation on 9/28/22 with recommendation of soft & bite sized with thin liquids and on 9/29 with rx for soft and bite sized solids and mildly thick liquids see report for full details.    Pt received seated upright in the [facility] chair in the lateral plane. Pt received awake, alert, in NAD, +helmet (s/p craniotomy). Pt is able to follow commands and communicated verbally. Of note, pt with baseline poor head control. "63 y/o M with PMH GBM s/p craniotomy w/ resection c/b ESBL meningitis for which repeat craniotomy performed 2/2020 treated with 8 weeks Meropenem, subsequently had repeat ESBL meningitis s/p craniectomy with washout w/ 10 wk course intrathecal and IV Meropenem, b/t DVT s/p IVC filter, HTN, anxiety presented from [facility] for worsening mental status"    Patient is familiar to this department as the patient was seen for an initial swallow evaluation on 9/28/22 with recommendation of soft & bite sized with thin liquids and on 9/29 with rx for soft and bite sized solids and mildly thick liquids see report for full details.    Pt received seated upright in the [facility] chair in the lateral plane. Pt received awake, alert, in NAD, +helmet (s/p craniotomy). Pt is able to follow commands and communicated verbally. Of note, pt with baseline poor head control and patient's body habitus/positioning is negatively impacting clarity of images.

## 2022-09-30 NOTE — SWALLOW VFSS/MBS ASSESSMENT ADULT - DIAGNOSTIC IMPRESSIONS
Pt presents with functional oral stages of swallow and pharyngeal stages are grossly WNL with no penetration/aspiration or residue viewed. However, the clarity of images are negatively impacted by patient's body habitus and poor head control and therefore pt may be a candidate for a FEES to objectively assess swallow function at MD discretion.

## 2022-10-01 LAB
GLUCOSE BLDC GLUCOMTR-MCNC: 101 MG/DL — HIGH (ref 70–99)
GLUCOSE BLDC GLUCOMTR-MCNC: 139 MG/DL — HIGH (ref 70–99)
GLUCOSE BLDC GLUCOMTR-MCNC: 143 MG/DL — HIGH (ref 70–99)
GLUCOSE BLDC GLUCOMTR-MCNC: 93 MG/DL — SIGNIFICANT CHANGE UP (ref 70–99)

## 2022-10-01 PROCEDURE — 99232 SBSQ HOSP IP/OBS MODERATE 35: CPT

## 2022-10-01 RX ADMIN — LEVETIRACETAM 400 MILLIGRAM(S): 250 TABLET, FILM COATED ORAL at 06:09

## 2022-10-01 RX ADMIN — SENNA PLUS 2 TABLET(S): 8.6 TABLET ORAL at 21:43

## 2022-10-01 RX ADMIN — Medication 4 MILLIGRAM(S): at 09:00

## 2022-10-01 RX ADMIN — Medication 4 MILLIGRAM(S): at 17:38

## 2022-10-01 RX ADMIN — MIRTAZAPINE 7.5 MILLIGRAM(S): 45 TABLET, ORALLY DISINTEGRATING ORAL at 21:43

## 2022-10-01 RX ADMIN — Medication 1 TABLET(S): at 06:11

## 2022-10-01 RX ADMIN — LEVETIRACETAM 400 MILLIGRAM(S): 250 TABLET, FILM COATED ORAL at 17:38

## 2022-10-01 RX ADMIN — Medication 1 TABLET(S): at 17:38

## 2022-10-01 RX ADMIN — PANTOPRAZOLE SODIUM 40 MILLIGRAM(S): 20 TABLET, DELAYED RELEASE ORAL at 06:11

## 2022-10-01 NOTE — PROGRESS NOTE ADULT - PROBLEM SELECTOR PLAN 3
Pt. was suspected to have UTI in the setting of AMS and was started on Zosyn on 9/24. UA with pyuria but no bacteria. ID consulted in the ED for pyuria. Culture on 9/24 negative. Francois placed here. Unlikely pyuria represents a UTI.   - Monitor off of antibiotics.  - dc francois --> TOV passed

## 2022-10-01 NOTE — PROGRESS NOTE ADULT - ASSESSMENT
63 y/o M with PMH GBM s/p craniotomy w/ resection c/b ESBL meningitis for which repeat craniotomy performed 2/2020 treated with 8 weeks Meropenem, subsequently had repeat ESBL meningitis s/p craniectomy with washout w/ 10 wk course intrathecal and IV Meropenem, b/t DVT s/p IVC filter, HTN, anxiety presented from [facility] for worsening mental status

## 2022-10-01 NOTE — PROGRESS NOTE ADULT - PROBLEM SELECTOR PLAN 1
CT scan showing recurrent disease as well as subfalcine herniation and infratentorial herniation. No bleeding. Neurosurgery consulted - no intervention besides steroids. Per family, sounds close to baseline  - MRI brain - with progression of disease (9/29), family informed, printed report for family, in chart. reviewed with [facility] onc Dr Liang who has seen him before   - change to Decadron 4 BID per Health system onc   - Palliative care on board, much appreciated  - DNR/DNI  - C/w Bactrim (suppressive for prior meningitis) CT scan showing recurrent disease as well as subfalcine herniation and infratentorial herniation. No bleeding. Neurosurgery consulted - no intervention besides steroids. Per family, sounds close to baseline  - MRI brain - with progression of disease (9/29), family informed, printed report for family, in chart. reviewed with [facility] onc Dr Liang who has seen him before   - Decadron 4 BID per [facility] onc   - Palliative care on board, much appreciated  - DNR/DNI  - C/w Bactrim (suppressive for prior meningitis)

## 2022-10-01 NOTE — PROGRESS NOTE ADULT - SUBJECTIVE AND OBJECTIVE BOX
Internal Medicine Progress Note    Patient is a 62y old  Male who presents with a chief complaint of AMS (30 Sep 2022 15:52)    OVERNIGHT EVENTS/SUBJECTIVE:    OBJECTIVE:  Vital Signs Last 24 Hrs  T(C): 36.8 (01 Oct 2022 06:14), Max: 36.8 (01 Oct 2022 06:14)  T(F): 98.3 (01 Oct 2022 06:14), Max: 98.3 (01 Oct 2022 06:14)  HR: 62 (01 Oct 2022 06:14) (62 - 100)  BP: 136/87 (01 Oct 2022 06:14) (121/87 - 136/87)  BP(mean): --  RR: 18 (01 Oct 2022 06:14) (18 - 18)  SpO2: 99% (01 Oct 2022 06:14) (98% - 99%)    Parameters below as of 01 Oct 2022 06:14  Patient On (Oxygen Delivery Method): room air      I&O's Detail    Daily     Daily   Physical Exam:  General: NAD, resting comfortably in bed  Neuro: A&Ox4, 5/5 strength in all ext  HEENT: NC/AT, EOMI, normal hearing, oral mucosa moist, no oral lesions noted, no pharyngeal erythema, uvula midline  Neck: supple, thyroid not enlarged, no LAD  Resp: Breathing comfortably on RA, LCTA b/l  CV: Normal sinus rhythm, S1 and S2, no r/m/g  Abd: soft, non-distended, non-tender. No HSM.  Ext: ROMIx4, no edema, +2 pulses bilaterally  Skin: Warm and dry, no rashes or discolorations  Psych: Appropriate affect    Medications:  MEDICATIONS  (STANDING):  dexAMETHasone  Injectable 4 milliGRAM(s) IV Push <User Schedule>  dextrose 5%. 1000 milliLiter(s) (50 mL/Hr) IV Continuous <Continuous>  dextrose 5%. 1000 milliLiter(s) (100 mL/Hr) IV Continuous <Continuous>  dextrose 50% Injectable 25 Gram(s) IV Push once  dextrose 50% Injectable 12.5 Gram(s) IV Push once  dextrose 50% Injectable 25 Gram(s) IV Push once  glucagon  Injectable 1 milliGRAM(s) IntraMuscular once  levETIRAcetam  IVPB 500 milliGRAM(s) IV Intermittent every 12 hours  mirtazapine 7.5 milliGRAM(s) Oral at bedtime  pantoprazole    Tablet 40 milliGRAM(s) Oral before breakfast  senna 2 Tablet(s) Oral at bedtime  trimethoprim  160 mG/sulfamethoxazole 800 mG 1 Tablet(s) Oral every 12 hours    MEDICATIONS  (PRN):  acetaminophen     Tablet .. 650 milliGRAM(s) Oral every 6 hours PRN Temp greater or equal to 38C (100.4F), Mild Pain (1 - 3)  dextrose Oral Gel 15 Gram(s) Oral once PRN Blood Glucose LESS THAN 70 milliGRAM(s)/deciliter  ondansetron Injectable 4 milliGRAM(s) IV Push two times a day PRN Nausea and/or Vomiting      Labs:              COVID-19 PCR: NotDetec (30 Sep 2022 13:00)      Radiology: Internal Medicine Progress Note    Patient is a 62y old  Male who presents with a chief complaint of AMS (30 Sep 2022 15:52)    OVERNIGHT EVENTS/SUBJECTIVE: No overnight events. Yesterday he reported feeling cold and does this am however does not want more blankets. Had skin redness/flush yesterday, given benadryl 25 iv. Improved now but not completely resolved. Denies CP, HA, palps, abd pain, n/v/d. Understanding of plan to dc soon.     OBJECTIVE:  Vital Signs Last 24 Hrs  T(C): 36.8 (01 Oct 2022 06:14), Max: 36.8 (01 Oct 2022 06:14)  T(F): 98.3 (01 Oct 2022 06:14), Max: 98.3 (01 Oct 2022 06:14)  HR: 62 (01 Oct 2022 06:14) (62 - 100)  BP: 136/87 (01 Oct 2022 06:14) (121/87 - 136/87)  BP(mean): --  RR: 18 (01 Oct 2022 06:14) (18 - 18)  SpO2: 99% (01 Oct 2022 06:14) (98% - 99%)    Parameters below as of 01 Oct 2022 06:14  Patient On (Oxygen Delivery Method): room air      I&O's Detail    Daily     Daily   Physical Exam:  General: NAD, resting comfortably in bed  Neuro: A&Ox3, mostly answers yes or no or other 1 word answers   HEENT: NC/AT, EOMI, normal hearing, oral mucosa moist  Resp: Breathing comfortably on RA, LCTA b/l  CV: Normal sinus rhythm, S1 and S2, no r/m/g  Abd: soft, non-distended, non-tender  Ext: no edema, +2 pulses bilaterally  Skin: Warm and dry, no rashes or discolorations  Psych: Appropriate affect    Medications:  MEDICATIONS  (STANDING):  dexAMETHasone  Injectable 4 milliGRAM(s) IV Push <User Schedule>  dextrose 5%. 1000 milliLiter(s) (50 mL/Hr) IV Continuous <Continuous>  dextrose 5%. 1000 milliLiter(s) (100 mL/Hr) IV Continuous <Continuous>  dextrose 50% Injectable 25 Gram(s) IV Push once  dextrose 50% Injectable 12.5 Gram(s) IV Push once  dextrose 50% Injectable 25 Gram(s) IV Push once  glucagon  Injectable 1 milliGRAM(s) IntraMuscular once  levETIRAcetam  IVPB 500 milliGRAM(s) IV Intermittent every 12 hours  mirtazapine 7.5 milliGRAM(s) Oral at bedtime  pantoprazole    Tablet 40 milliGRAM(s) Oral before breakfast  senna 2 Tablet(s) Oral at bedtime  trimethoprim  160 mG/sulfamethoxazole 800 mG 1 Tablet(s) Oral every 12 hours    MEDICATIONS  (PRN):  acetaminophen     Tablet .. 650 milliGRAM(s) Oral every 6 hours PRN Temp greater or equal to 38C (100.4F), Mild Pain (1 - 3)  dextrose Oral Gel 15 Gram(s) Oral once PRN Blood Glucose LESS THAN 70 milliGRAM(s)/deciliter  ondansetron Injectable 4 milliGRAM(s) IV Push two times a day PRN Nausea and/or Vomiting      Labs:              COVID-19 PCR: NotDetec (30 Sep 2022 13:00)      Radiology:

## 2022-10-01 NOTE — PROGRESS NOTE ADULT - PROBLEM SELECTOR PLAN 4
- h/o of dysphagia   - 9/29 reconsulted speech and swallow as pt did not tolerate thin liquid   - switched to mildly thick liquids with soft/bite sized  - cinesophogram today - h/o of dysphagia   - 9/29 reconsulted speech and swallow as pt did not tolerate thin liquid   - switched to mildly thick liquids with soft/bite sized  - s/p cinesophogram

## 2022-10-02 ENCOUNTER — TRANSCRIPTION ENCOUNTER (OUTPATIENT)
Age: 62
End: 2022-10-02

## 2022-10-02 LAB
ALBUMIN SERPL ELPH-MCNC: 3.6 G/DL — SIGNIFICANT CHANGE UP (ref 3.3–5)
ALP SERPL-CCNC: 88 U/L — SIGNIFICANT CHANGE UP (ref 40–120)
ALT FLD-CCNC: 27 U/L — SIGNIFICANT CHANGE UP (ref 4–41)
ANION GAP SERPL CALC-SCNC: 13 MMOL/L — SIGNIFICANT CHANGE UP (ref 7–14)
AST SERPL-CCNC: 18 U/L — SIGNIFICANT CHANGE UP (ref 4–40)
BILIRUB SERPL-MCNC: 0.2 MG/DL — SIGNIFICANT CHANGE UP (ref 0.2–1.2)
BUN SERPL-MCNC: 18 MG/DL — SIGNIFICANT CHANGE UP (ref 7–23)
CALCIUM SERPL-MCNC: 9 MG/DL — SIGNIFICANT CHANGE UP (ref 8.4–10.5)
CHLORIDE SERPL-SCNC: 104 MMOL/L — SIGNIFICANT CHANGE UP (ref 98–107)
CO2 SERPL-SCNC: 21 MMOL/L — LOW (ref 22–31)
CREAT SERPL-MCNC: 0.7 MG/DL — SIGNIFICANT CHANGE UP (ref 0.5–1.3)
CULTURE RESULTS: SIGNIFICANT CHANGE UP
CULTURE RESULTS: SIGNIFICANT CHANGE UP
EGFR: 104 ML/MIN/1.73M2 — SIGNIFICANT CHANGE UP
GLUCOSE BLDC GLUCOMTR-MCNC: 101 MG/DL — HIGH (ref 70–99)
GLUCOSE BLDC GLUCOMTR-MCNC: 112 MG/DL — HIGH (ref 70–99)
GLUCOSE BLDC GLUCOMTR-MCNC: 142 MG/DL — HIGH (ref 70–99)
GLUCOSE BLDC GLUCOMTR-MCNC: 93 MG/DL — SIGNIFICANT CHANGE UP (ref 70–99)
GLUCOSE SERPL-MCNC: 127 MG/DL — HIGH (ref 70–99)
POTASSIUM SERPL-MCNC: 4.9 MMOL/L — SIGNIFICANT CHANGE UP (ref 3.5–5.3)
POTASSIUM SERPL-SCNC: 4.9 MMOL/L — SIGNIFICANT CHANGE UP (ref 3.5–5.3)
PROT SERPL-MCNC: 4.9 G/DL — LOW (ref 6–8.3)
SODIUM SERPL-SCNC: 138 MMOL/L — SIGNIFICANT CHANGE UP (ref 135–145)
SPECIMEN SOURCE: SIGNIFICANT CHANGE UP
SPECIMEN SOURCE: SIGNIFICANT CHANGE UP

## 2022-10-02 PROCEDURE — 99232 SBSQ HOSP IP/OBS MODERATE 35: CPT | Mod: GC

## 2022-10-02 RX ADMIN — LEVETIRACETAM 400 MILLIGRAM(S): 250 TABLET, FILM COATED ORAL at 06:06

## 2022-10-02 RX ADMIN — Medication 1 TABLET(S): at 06:07

## 2022-10-02 RX ADMIN — PANTOPRAZOLE SODIUM 40 MILLIGRAM(S): 20 TABLET, DELAYED RELEASE ORAL at 06:06

## 2022-10-02 RX ADMIN — Medication 4 MILLIGRAM(S): at 09:47

## 2022-10-02 RX ADMIN — SENNA PLUS 2 TABLET(S): 8.6 TABLET ORAL at 21:28

## 2022-10-02 RX ADMIN — Medication 4 MILLIGRAM(S): at 18:07

## 2022-10-02 RX ADMIN — LEVETIRACETAM 400 MILLIGRAM(S): 250 TABLET, FILM COATED ORAL at 18:07

## 2022-10-02 RX ADMIN — Medication 1 TABLET(S): at 18:08

## 2022-10-02 RX ADMIN — MIRTAZAPINE 7.5 MILLIGRAM(S): 45 TABLET, ORALLY DISINTEGRATING ORAL at 21:28

## 2022-10-02 NOTE — PROGRESS NOTE ADULT - ASSESSMENT
63 y/o M with PMH GBM s/p craniotomy w/ resection c/b ESBL meningitis for which repeat craniotomy performed 2/2020 treated with 8 weeks Meropenem, subsequently had repeat ESBL meningitis s/p craniectomy with washout w/ 10 wk course intrathecal and IV Meropenem, b/t DVT s/p IVC filter, HTN, anxiety presented from [facility] for worsening mental status

## 2022-10-02 NOTE — PROGRESS NOTE ADULT - PROBLEM SELECTOR PLAN 4
- h/o of dysphagia   - 9/29 reconsulted speech and swallow as pt did not tolerate thin liquid   - switched to mildly thick liquids with soft/bite sized  - s/p cinesophogram

## 2022-10-02 NOTE — PROGRESS NOTE ADULT - SUBJECTIVE AND OBJECTIVE BOX
Internal Medicine Progress Note    Patient is a 62y old  Male who presents with a chief complaint of AMS (01 Oct 2022 07:12)    OVERNIGHT EVENTS/SUBJECTIVE:    OBJECTIVE:  Vital Signs Last 24 Hrs  T(C): 36.7 (02 Oct 2022 06:11), Max: 36.7 (02 Oct 2022 06:11)  T(F): 98.1 (02 Oct 2022 06:11), Max: 98.1 (02 Oct 2022 06:11)  HR: 73 (02 Oct 2022 06:11) (73 - 109)  BP: 123/83 (02 Oct 2022 06:11) (118/85 - 133/98)  BP(mean): --  RR: 18 (02 Oct 2022 06:11) (17 - 18)  SpO2: 98% (02 Oct 2022 06:11) (97% - 98%)    Parameters below as of 02 Oct 2022 06:11  Patient On (Oxygen Delivery Method): room air      I&O's Detail    01 Oct 2022 07:01  -  02 Oct 2022 06:54  --------------------------------------------------------  IN:    IV PiggyBack: 100 mL    Oral Fluid: 380 mL  Total IN: 480 mL    OUT:  Total OUT: 0 mL    Total NET: 480 mL        Daily     Daily   Physical Exam:  General: NAD, resting comfortably in bed  Neuro: A&Ox4, 5/5 strength in all ext  HEENT: NC/AT, EOMI, normal hearing, oral mucosa moist, no oral lesions noted, no pharyngeal erythema, uvula midline  Neck: supple, thyroid not enlarged, no LAD  Resp: Breathing comfortably on RA, LCTA b/l  CV: Normal sinus rhythm, S1 and S2, no r/m/g  Abd: soft, non-distended, non-tender. No HSM.  Ext: ROMIx4, no edema, +2 pulses bilaterally  Skin: Warm and dry, no rashes or discolorations  Psych: Appropriate affect    Medications:  MEDICATIONS  (STANDING):  dexAMETHasone  Injectable 4 milliGRAM(s) IV Push <User Schedule>  dextrose 5%. 1000 milliLiter(s) (100 mL/Hr) IV Continuous <Continuous>  dextrose 5%. 1000 milliLiter(s) (50 mL/Hr) IV Continuous <Continuous>  dextrose 50% Injectable 25 Gram(s) IV Push once  dextrose 50% Injectable 12.5 Gram(s) IV Push once  dextrose 50% Injectable 25 Gram(s) IV Push once  glucagon  Injectable 1 milliGRAM(s) IntraMuscular once  levETIRAcetam  IVPB 500 milliGRAM(s) IV Intermittent every 12 hours  mirtazapine 7.5 milliGRAM(s) Oral at bedtime  pantoprazole    Tablet 40 milliGRAM(s) Oral before breakfast  senna 2 Tablet(s) Oral at bedtime  trimethoprim  160 mG/sulfamethoxazole 800 mG 1 Tablet(s) Oral every 12 hours    MEDICATIONS  (PRN):  acetaminophen     Tablet .. 650 milliGRAM(s) Oral every 6 hours PRN Temp greater or equal to 38C (100.4F), Mild Pain (1 - 3)  dextrose Oral Gel 15 Gram(s) Oral once PRN Blood Glucose LESS THAN 70 milliGRAM(s)/deciliter  ondansetron Injectable 4 milliGRAM(s) IV Push two times a day PRN Nausea and/or Vomiting      Labs:              COVID-19 PCR: NotDetec (30 Sep 2022 13:00)      Radiology: Internal Medicine Progress Note    Patient is a 62y old  Male who presents with a chief complaint of AMS (01 Oct 2022 07:12)    OVERNIGHT EVENTS/SUBJECTIVE: No overnight events. He reports that he feels OK with no complaints this am.     OBJECTIVE:  Vital Signs Last 24 Hrs  T(C): 36.7 (02 Oct 2022 06:11), Max: 36.7 (02 Oct 2022 06:11)  T(F): 98.1 (02 Oct 2022 06:11), Max: 98.1 (02 Oct 2022 06:11)  HR: 73 (02 Oct 2022 06:11) (73 - 109)  BP: 123/83 (02 Oct 2022 06:11) (118/85 - 133/98)  BP(mean): --  RR: 18 (02 Oct 2022 06:11) (17 - 18)  SpO2: 98% (02 Oct 2022 06:11) (97% - 98%)    Parameters below as of 02 Oct 2022 06:11  Patient On (Oxygen Delivery Method): room air      I&O's Detail    01 Oct 2022 07:01  -  02 Oct 2022 06:54  --------------------------------------------------------  IN:    IV PiggyBack: 100 mL    Oral Fluid: 380 mL  Total IN: 480 mL    OUT:  Total OUT: 0 mL    Total NET: 480 mL        Daily     Daily   Physical Exam:  General: NAD, resting comfortably in bed  Neuro: A&Ox2-3  HEENT: NC/AT, EOMI, normal hearing  Resp: Breathing comfortably on RA, LCTA b/l  CV: Normal sinus rhythm, S1 and S2, no r/m/g  Abd: soft, non-distended, non-tende  Ext: no edema, +2 pulses bilaterally  Skin: Warm and dry, no rashes or discolorations      Medications:  MEDICATIONS  (STANDING):  dexAMETHasone  Injectable 4 milliGRAM(s) IV Push <User Schedule>  dextrose 5%. 1000 milliLiter(s) (100 mL/Hr) IV Continuous <Continuous>  dextrose 5%. 1000 milliLiter(s) (50 mL/Hr) IV Continuous <Continuous>  dextrose 50% Injectable 25 Gram(s) IV Push once  dextrose 50% Injectable 12.5 Gram(s) IV Push once  dextrose 50% Injectable 25 Gram(s) IV Push once  glucagon  Injectable 1 milliGRAM(s) IntraMuscular once  levETIRAcetam  IVPB 500 milliGRAM(s) IV Intermittent every 12 hours  mirtazapine 7.5 milliGRAM(s) Oral at bedtime  pantoprazole    Tablet 40 milliGRAM(s) Oral before breakfast  senna 2 Tablet(s) Oral at bedtime  trimethoprim  160 mG/sulfamethoxazole 800 mG 1 Tablet(s) Oral every 12 hours    MEDICATIONS  (PRN):  acetaminophen     Tablet .. 650 milliGRAM(s) Oral every 6 hours PRN Temp greater or equal to 38C (100.4F), Mild Pain (1 - 3)  dextrose Oral Gel 15 Gram(s) Oral once PRN Blood Glucose LESS THAN 70 milliGRAM(s)/deciliter  ondansetron Injectable 4 milliGRAM(s) IV Push two times a day PRN Nausea and/or Vomiting      Labs:              COVID-19 PCR: Heladio (30 Sep 2022 13:00)      Radiology:

## 2022-10-02 NOTE — DISCHARGE NOTE PROVIDER - NSDCMRMEDTOKEN_GEN_ALL_CORE_FT
Bactrim  mg-160 mg oral tablet: 1 tab(s) orally every 12 hours  enoxaparin: 40 milligram(s) injectable once a day  Keppra 500 mg oral tablet: 1 tab(s) orally 2 times a day  mirtazapine 15 mg oral tablet: 0.5 tab(s) orally once a day (at bedtime)  Senna 8.6 mg oral tablet: 1 tab(s) orally once a day (at bedtime)  Vitamin D3 25 mcg (1000 intl units) oral tablet: 1 tab(s) orally once a day   acetaminophen 325 mg oral tablet: 2 tab(s) orally every 6 hours, As needed, Temp greater or equal to 38C (100.4F), Mild Pain (1 - 3)  dexamethasone 6 mg/25 mL-NaCl 0.9% intravenous solution: 16.67 milliliter(s) intravenous 2 times a day  levETIRAcetam 100 mg/mL intravenous solution: 5 milliliter(s) intravenous every 12 hours  mirtazapine 7.5 mg oral tablet: 1 tab(s) orally once a day (at bedtime)  pantoprazole 40 mg oral delayed release tablet: 1 tab(s) orally once a day (before a meal)  senna leaf extract oral tablet: 2 tab(s) orally once a day (at bedtime)  sulfamethoxazole-trimethoprim 800 mg-160 mg oral tablet: 1 tab(s) orally every 12 hours  Vitamin D3 25 mcg (1000 intl units) oral tablet: 1 tab(s) orally once a day   dexamethasone 6 mg/25 mL-NaCl 0.9% intravenous solution: 16.67 milliliter(s) intravenous 2 times a day  levETIRAcetam 100 mg/mL intravenous solution: 5 milliliter(s) intravenous every 12 hours  mirtazapine 7.5 mg oral tablet: 1 tab(s) orally once a day (at bedtime)  pantoprazole 40 mg oral delayed release tablet: 1 tab(s) orally once a day (before a meal)  senna leaf extract oral tablet: 2 tab(s) orally once a day (at bedtime)  sulfamethoxazole-trimethoprim 800 mg-160 mg oral tablet: 1 tab(s) orally every 12 hours  Vitamin D3 25 mcg (1000 intl units) oral tablet: 1 tab(s) orally once a day

## 2022-10-02 NOTE — DISCHARGE NOTE PROVIDER - NSDCCPTREATMENT_GEN_ALL_CORE_FT
PRINCIPAL PROCEDURE  Procedure: MRI head  Findings and Treatment: Marked progression of disease compared with prior MR study 12/18/2020 and   prior CT evaluation 2/4/2022. New large necrotic peripherally enhancing   lesion occupying the left frontoparietal region and extending across the   genu and body of the corpus callosum with evidence of subfalcine   herniation, early signs of uncal and transtentorial herniation and marked   vasogenic edema.

## 2022-10-02 NOTE — DISCHARGE NOTE PROVIDER - NSDCFUADDAPPT_GEN_ALL_CORE_FT
Please follow up with your oncologist within 1 week. Please follow up with your oncologist within 1 week for further management of your steroids.

## 2022-10-02 NOTE — DISCHARGE NOTE PROVIDER - HOSPITAL COURSE
63 y/o M with PMH GBM s/p craniotomy w/ resection c/b ESBL meningitis for which repeat craniotomy performed 2/2020 treated with 8 weeks Meropenem, subsequently had repeat ESBL meningitis s/p craniectomy with washout w/ 10 wk course intrathecal and IV Meropenem, b/t DVT s/p IVC filter, HTN, anxiety presented from [facility] for worsening mental status. As per the family at bedside, patient has been having worsening mental status over the past week. Normally, he is alert and can converse/follow commands. Over the past week, they had to rephrase sentences to yes or no questions. He also seems more fatigued. They tried to give him water but he choked on it.  He has been living at [facility] for 2 years.    Imaging showed worsening of disease as well as edema and herniation of the brain. Neurosurgery evaluated him and determined there was no indicated surgical intervention. He was treated with steroids which he will be discharged with. He should follow up with his oncologist to manage the steroids.    He is now medically optimized for discharge to rehab with home hospice. 63 y/o M with PMH GBM s/p craniotomy w/ resection c/b ESBL meningitis for which repeat craniotomy performed 2/2020 treated with 8 weeks Meropenem, subsequently had repeat ESBL meningitis s/p craniectomy with washout w/ 10 wk course intrathecal and IV Meropenem, b/t DVT s/p IVC filter, HTN, anxiety presented from [facility] for worsening mental status. As per the family at bedside, patient has been having worsening mental status over the past week. Normally, he is alert and can converse/follow commands. Over the past week, they had to rephrase sentences to yes or no questions. He also seems more fatigued. They tried to give him water but he choked on it.  He has been living at [facility] for [facility] years.    Imaging showed worsening of disease as well as edema and herniation of the brain. Neurosurgery evaluated him and determined there was no indicated surgical intervention. He was treated with steroids which he will be discharged with. He should follow up with his oncologist to manage the steroids.    He is now medically optimized for discharge to rehab with hospice services. 61 y/o M with PMH GBM s/p craniotomy w/ resection c/b ESBL meningitis for which repeat craniotomy performed 2/2020 treated with 8 weeks Meropenem, subsequently had repeat ESBL meningitis s/p craniectomy with washout w/ 10 wk course intrathecal and IV Meropenem, b/t DVT s/p IVC filter, HTN, anxiety presented from [facility] for worsening mental status. As per the family at bedside, patient has been having worsening mental status over the past week. Normally, he is alert and can converse/follow commands. Over the past week, they had to rephrase sentences to yes or no questions. He also seems more fatigued. They tried to give him water but he choked on it.  He has been living at [facility] for [facility] years.    Imaging showed marked progression of disease as well as edema and herniation of the brain. Neurosurgery evaluated him and determined no indicated surgical intervention would be offered as it would not improve his quality of life. He was treated with steroids which he will be discharged with. He should follow up with his oncologist to manage the steroids.     He is now medically optimized for discharge to rehab with hospice services.

## 2022-10-02 NOTE — PROGRESS NOTE ADULT - PROBLEM SELECTOR PLAN 1
CT scan showing recurrent disease as well as subfalcine herniation and infratentorial herniation. No bleeding. Neurosurgery consulted - no intervention besides steroids. Per family, sounds close to baseline  - MRI brain - with progression of disease (9/29), family informed, printed report for family, in chart. reviewed with [facility] onc Dr Liang who has seen him before   - Decadron 4 BID per [facility] onc   - Palliative care on board, much appreciated  - DNR/DNI  - C/w Bactrim (suppressive for prior meningitis)

## 2022-10-02 NOTE — DISCHARGE NOTE PROVIDER - NSDCCPCAREPLAN_GEN_ALL_CORE_FT
PRINCIPAL DISCHARGE DIAGNOSIS  Diagnosis: GBM (glioblastoma multiforme)  Assessment and Plan of Treatment: You were diagnosed with swelling in your brain attributed to worsening of your disease. There was no surgery indicated so you were given steroids to help decrease swelling of the brain.       PRINCIPAL DISCHARGE DIAGNOSIS  Diagnosis: GBM (glioblastoma multiforme)  Assessment and Plan of Treatment: You were diagnosed with swelling in your brain attributed to worsening of your underlying brain cancer. You were seen by our neurosurgery team who recommended against a surgical intervention. You and your family decided that you would like to go to [facility] with hospice services.   You were discharged with steroids, and should follow up with your outpatient oncologist after discharge for further management of your steroids.

## 2022-10-03 ENCOUNTER — TRANSCRIPTION ENCOUNTER (OUTPATIENT)
Age: 62
End: 2022-10-03

## 2022-10-03 VITALS
HEART RATE: 94 BPM | RESPIRATION RATE: 17 BRPM | SYSTOLIC BLOOD PRESSURE: 119 MMHG | OXYGEN SATURATION: 97 % | DIASTOLIC BLOOD PRESSURE: 89 MMHG | TEMPERATURE: 98 F

## 2022-10-03 LAB
GLUCOSE BLDC GLUCOMTR-MCNC: 100 MG/DL — HIGH (ref 70–99)
GLUCOSE BLDC GLUCOMTR-MCNC: 126 MG/DL — HIGH (ref 70–99)
SARS-COV-2 RNA SPEC QL NAA+PROBE: SIGNIFICANT CHANGE UP

## 2022-10-03 PROCEDURE — 99239 HOSP IP/OBS DSCHRG MGMT >30: CPT

## 2022-10-03 RX ORDER — ACETAMINOPHEN 500 MG
2 TABLET ORAL
Qty: 0 | Refills: 0 | DISCHARGE
Start: 2022-10-03

## 2022-10-03 RX ORDER — DEXAMETHASONE 0.5 MG/5ML
16.67 ELIXIR ORAL
Qty: 0 | Refills: 0 | DISCHARGE
Start: 2022-10-03

## 2022-10-03 RX ORDER — SENNA PLUS 8.6 MG/1
2 TABLET ORAL
Qty: 0 | Refills: 0 | DISCHARGE
Start: 2022-10-03

## 2022-10-03 RX ORDER — MIRTAZAPINE 45 MG/1
1 TABLET, ORALLY DISINTEGRATING ORAL
Qty: 0 | Refills: 0 | DISCHARGE
Start: 2022-10-03

## 2022-10-03 RX ORDER — LEVETIRACETAM 250 MG/1
5 TABLET, FILM COATED ORAL
Qty: 0 | Refills: 0 | DISCHARGE
Start: 2022-10-03

## 2022-10-03 RX ORDER — MIRTAZAPINE 45 MG/1
0.5 TABLET, ORALLY DISINTEGRATING ORAL
Qty: 0 | Refills: 0 | DISCHARGE

## 2022-10-03 RX ORDER — SENNA PLUS 8.6 MG/1
1 TABLET ORAL
Qty: 0 | Refills: 0 | DISCHARGE

## 2022-10-03 RX ORDER — PANTOPRAZOLE SODIUM 20 MG/1
1 TABLET, DELAYED RELEASE ORAL
Qty: 0 | Refills: 0 | DISCHARGE
Start: 2022-10-03

## 2022-10-03 RX ORDER — AZTREONAM 2 G
1 VIAL (EA) INJECTION
Qty: 0 | Refills: 0 | DISCHARGE

## 2022-10-03 RX ORDER — LEVETIRACETAM 250 MG/1
1 TABLET, FILM COATED ORAL
Qty: 0 | Refills: 0 | DISCHARGE

## 2022-10-03 RX ADMIN — Medication 4 MILLIGRAM(S): at 09:05

## 2022-10-03 RX ADMIN — Medication 1 TABLET(S): at 06:20

## 2022-10-03 RX ADMIN — PANTOPRAZOLE SODIUM 40 MILLIGRAM(S): 20 TABLET, DELAYED RELEASE ORAL at 06:06

## 2022-10-03 RX ADMIN — LEVETIRACETAM 400 MILLIGRAM(S): 250 TABLET, FILM COATED ORAL at 06:05

## 2022-10-03 NOTE — PROGRESS NOTE ADULT - SUBJECTIVE AND OBJECTIVE BOX
***************************************************************  Darrick Amanda, PGY1  Internal Medicine   TEAMS Preferred  [facility] Pager [phone]  [facility] Pager [phone]  ***************************************************************    HEATHER SINCLAIR  62y  MRN: [id number]  09-27-22 (6d)    Patient is a 62y old  Male who presents with a chief complaint of AMS (02 Oct 2022 20:57)      SUBJECTIVE / OVERNIGHT EVENTS:   No acute overnight events. Pt seen and examined at bedside. Denies fevers, chills, CP, SOB, Abdominal pain, N/V, Constipation, Diarrhea. Last BM     12 Point ROS negative with the exception of the above    MEDICATIONS  (STANDING):  dexAMETHasone  Injectable 4 milliGRAM(s) IV Push <User Schedule>  dextrose 5%. 1000 milliLiter(s) (50 mL/Hr) IV Continuous <Continuous>  dextrose 5%. 1000 milliLiter(s) (100 mL/Hr) IV Continuous <Continuous>  dextrose 50% Injectable 25 Gram(s) IV Push once  dextrose 50% Injectable 12.5 Gram(s) IV Push once  dextrose 50% Injectable 25 Gram(s) IV Push once  glucagon  Injectable 1 milliGRAM(s) IntraMuscular once  levETIRAcetam  IVPB 500 milliGRAM(s) IV Intermittent every 12 hours  mirtazapine 7.5 milliGRAM(s) Oral at bedtime  pantoprazole    Tablet 40 milliGRAM(s) Oral before breakfast  senna 2 Tablet(s) Oral at bedtime  trimethoprim  160 mG/sulfamethoxazole 800 mG 1 Tablet(s) Oral every 12 hours    MEDICATIONS  (PRN):  acetaminophen     Tablet .. 650 milliGRAM(s) Oral every 6 hours PRN Temp greater or equal to 38C (100.4F), Mild Pain (1 - 3)  dextrose Oral Gel 15 Gram(s) Oral once PRN Blood Glucose LESS THAN 70 milliGRAM(s)/deciliter  ondansetron Injectable 4 milliGRAM(s) IV Push two times a day PRN Nausea and/or Vomiting      OBJECTIVE:  Vital Signs Last 24 Hrs  T(C): 36.8 (03 Oct 2022 06:18), Max: 36.8 (03 Oct 2022 06:18)  T(F): 98.2 (03 Oct 2022 06:18), Max: 98.2 (03 Oct 2022 06:18)  HR: 97 (03 Oct 2022 06:18) (90 - 97)  BP: 114/82 (03 Oct 2022 06:18) (114/82 - 146/103)  BP(mean): --  RR: 18 (03 Oct 2022 06:18) (17 - 18)  SpO2: 100% (03 Oct 2022 06:18) (96% - 100%)    Parameters below as of 03 Oct 2022 06:18  Patient On (Oxygen Delivery Method): room air        I&O's Summary    01 Oct 2022 07:01  -  02 Oct 2022 07:00  --------------------------------------------------------  IN: 480 mL / OUT: 0 mL / NET: 480 mL    02 Oct 2022 07:01  -  03 Oct 2022 06:37  --------------------------------------------------------  IN: 200 mL / OUT: 0 mL / NET: 200 mL        PHYSICAL EXAM:  GENERAL: Laying comfortably, NAD  HEENT: NCAT, PERRLA, EOMI, no scleral icterus, no LAD  NECK: No JVD  LUNG: CTABL; No wheezes, crackles, or rhonchi  HEART: RRR; normal S1/S2; No murmurs, rubs, or gallops  ABDOMEN: +BS, soft, nontender, nondistended, no HSM; No rebound, guarding, or rigidity  EXTREMITIES:  No LE edema b/l, 2+ Peripheral Pulses, No clubbing or cyanosis  NEUROLOGY: AOx3, non-focal, strength 5/5 in all extremities, sensation intact  PSYCH: calm and cooperative  SKIN: No rashes or lesions    LABS:    10-02    138  |  104  |  18  ----------------------------<  127<H>  4.9   |  21<L>  |  0.70    Ca    9.0      02 Oct 2022 05:28    TPro  4.9<L>  /  Alb  3.6  /  TBili  0.2  /  DBili  x   /  AST  18  /  ALT  27  /  AlkPhos  88  10-02                CAPILLARY BLOOD GLUCOSE      POCT Blood Glucose.: 142 mg/dL (02 Oct 2022 21:53)  POCT Blood Glucose.: 112 mg/dL (02 Oct 2022 18:00)  POCT Blood Glucose.: 93 mg/dL (02 Oct 2022 12:31)  POCT Blood Glucose.: 101 mg/dL (02 Oct 2022 08:53)        RADIOLOGY & ADDITIONAL TESTS:     ***************************************************************  Darrick Amanda, PGY1  Internal Medicine   TEAMS Preferred  [facility] Pager [phone]  [facility] Pager [phone]  ***************************************************************    HEATHER SINCLAIR  62y  MRN: [id number]  09-27-22 (6d)    Patient is a 62y old  Male who presents with a chief complaint of AMS (02 Oct 2022 20:57)      SUBJECTIVE / OVERNIGHT EVENTS:   No acute overnight events. Pt seen and examined at bedside. Pt is able to answer questions appropriately but is only able to answer yes or no. Denies any nausea, vomiting, headache, neck pain, fevers, chills, and confusion. Patient says yes when asked if something is wrong and then yes when asked if he is not feeling like himself. Unable to elaborate.     12 Point ROS negative with the exception of the above    MEDICATIONS  (STANDING):  dexAMETHasone  Injectable 4 milliGRAM(s) IV Push <User Schedule>  dextrose 5%. 1000 milliLiter(s) (50 mL/Hr) IV Continuous <Continuous>  dextrose 5%. 1000 milliLiter(s) (100 mL/Hr) IV Continuous <Continuous>  dextrose 50% Injectable 25 Gram(s) IV Push once  dextrose 50% Injectable 12.5 Gram(s) IV Push once  dextrose 50% Injectable 25 Gram(s) IV Push once  glucagon  Injectable 1 milliGRAM(s) IntraMuscular once  levETIRAcetam  IVPB 500 milliGRAM(s) IV Intermittent every 12 hours  mirtazapine 7.5 milliGRAM(s) Oral at bedtime  pantoprazole    Tablet 40 milliGRAM(s) Oral before breakfast  senna 2 Tablet(s) Oral at bedtime  trimethoprim  160 mG/sulfamethoxazole 800 mG 1 Tablet(s) Oral every 12 hours    MEDICATIONS  (PRN):  acetaminophen     Tablet .. 650 milliGRAM(s) Oral every 6 hours PRN Temp greater or equal to 38C (100.4F), Mild Pain (1 - 3)  dextrose Oral Gel 15 Gram(s) Oral once PRN Blood Glucose LESS THAN 70 milliGRAM(s)/deciliter  ondansetron Injectable 4 milliGRAM(s) IV Push two times a day PRN Nausea and/or Vomiting      OBJECTIVE:  Vital Signs Last 24 Hrs  T(C): 36.8 (03 Oct 2022 06:18), Max: 36.8 (03 Oct 2022 06:18)  T(F): 98.2 (03 Oct 2022 06:18), Max: 98.2 (03 Oct 2022 06:18)  HR: 97 (03 Oct 2022 06:18) (90 - 97)  BP: 114/82 (03 Oct 2022 06:18) (114/82 - 146/103)  BP(mean): --  RR: 18 (03 Oct 2022 06:18) (17 - 18)  SpO2: 100% (03 Oct 2022 06:18) (96% - 100%)    Parameters below as of 03 Oct 2022 06:18  Patient On (Oxygen Delivery Method): room air        I&O's Summary    01 Oct 2022 07:01  -  02 Oct 2022 07:00  --------------------------------------------------------  IN: 480 mL / OUT: 0 mL / NET: 480 mL    02 Oct 2022 07:01  -  03 Oct 2022 06:37  --------------------------------------------------------  IN: 200 mL / OUT: 0 mL / NET: 200 mL        PHYSICAL EXAM:  GENERAL: Laying comfortably, NAD  HEENT: s/p craniotomy to left posterior head, PERRLA, EOMI, no scleral icterus, no LAD  LUNG: CTABL; No wheezes, crackles, or rhonchi  HEART: RRR; normal S1/S2; No murmurs, rubs, or gallops  ABDOMEN: +BS, soft, nontender, nondistended  EXTREMITIES:  No LE edema b/l, 2+ Peripheral Pulses, No clubbing or cyanosis  NEUROLOGY: AOx3, answers yes or no questions   PSYCH: calm and cooperative  SKIN: No rashes or lesions    LABS:    10-02    138  |  104  |  18  ----------------------------<  127<H>  4.9   |  21<L>  |  0.70    Ca    9.0      02 Oct 2022 05:28    TPro  4.9<L>  /  Alb  3.6  /  TBili  0.2  /  DBili  x   /  AST  18  /  ALT  27  /  AlkPhos  88  10-02      CAPILLARY BLOOD GLUCOSE  POCT Blood Glucose.: 142 mg/dL (02 Oct 2022 21:53)  POCT Blood Glucose.: 112 mg/dL (02 Oct 2022 18:00)  POCT Blood Glucose.: 93 mg/dL (02 Oct 2022 12:31)  POCT Blood Glucose.: 101 mg/dL (02 Oct 2022 08:53)     ***************************************************************  Darrick Amanda, PGY1  Internal Medicine   TEAMS Preferred  [facility] Pager [phone]  [facility] Pager [phone]  ***************************************************************    HEATHER SINCLAIR  62y  MRN: [id number]  09-27-22 (6d)    Patient is a 62y old  Male who presents with a chief complaint of AMS (02 Oct 2022 20:57)      SUBJECTIVE / OVERNIGHT EVENTS:   No acute overnight events. Pt seen and examined at bedside. Pt is able to answer questions appropriately but is only able to answer yes or no. Denies any nausea, vomiting, headache, neck pain, fevers, chills, and confusion. Patient says yes when asked if something is wrong and then yes when asked if he is not feeling like himself. Unable to elaborate.     12 Point ROS negative with the exception of the above    MEDICATIONS  (STANDING):  dexAMETHasone  Injectable 4 milliGRAM(s) IV Push <User Schedule>  dextrose 5%. 1000 milliLiter(s) (50 mL/Hr) IV Continuous <Continuous>  dextrose 5%. 1000 milliLiter(s) (100 mL/Hr) IV Continuous <Continuous>  dextrose 50% Injectable 25 Gram(s) IV Push once  dextrose 50% Injectable 12.5 Gram(s) IV Push once  dextrose 50% Injectable 25 Gram(s) IV Push once  glucagon  Injectable 1 milliGRAM(s) IntraMuscular once  levETIRAcetam  IVPB 500 milliGRAM(s) IV Intermittent every 12 hours  mirtazapine 7.5 milliGRAM(s) Oral at bedtime  pantoprazole    Tablet 40 milliGRAM(s) Oral before breakfast  senna 2 Tablet(s) Oral at bedtime  trimethoprim  160 mG/sulfamethoxazole 800 mG 1 Tablet(s) Oral every 12 hours    MEDICATIONS  (PRN):  acetaminophen     Tablet .. 650 milliGRAM(s) Oral every 6 hours PRN Temp greater or equal to 38C (100.4F), Mild Pain (1 - 3)  dextrose Oral Gel 15 Gram(s) Oral once PRN Blood Glucose LESS THAN 70 milliGRAM(s)/deciliter  ondansetron Injectable 4 milliGRAM(s) IV Push two times a day PRN Nausea and/or Vomiting      OBJECTIVE:  Vital Signs Last 24 Hrs  T(C): 36.8 (03 Oct 2022 06:18), Max: 36.8 (03 Oct 2022 06:18)  T(F): 98.2 (03 Oct 2022 06:18), Max: 98.2 (03 Oct 2022 06:18)  HR: 97 (03 Oct 2022 06:18) (90 - 97)  BP: 114/82 (03 Oct 2022 06:18) (114/82 - 146/103)  BP(mean): --  RR: 18 (03 Oct 2022 06:18) (17 - 18)  SpO2: 100% (03 Oct 2022 06:18) (96% - 100%)    Parameters below as of 03 Oct 2022 06:18  Patient On (Oxygen Delivery Method): room air        I&O's Summary    01 Oct 2022 07:01  -  02 Oct 2022 07:00  --------------------------------------------------------  IN: 480 mL / OUT: 0 mL / NET: 480 mL    02 Oct 2022 07:01  -  03 Oct 2022 06:37  --------------------------------------------------------  IN: 200 mL / OUT: 0 mL / NET: 200 mL        PHYSICAL EXAM:  GENERAL: Laying comfortably, NAD  HEENT: s/p craniotomy to left posterior head, PERRLA, EOMI, no scleral icterus, no LAD  LUNG: CTABL; No wheezes, crackles, or rhonchi  HEART: RRR; normal S1/S2; No murmurs, rubs, or gallops  ABDOMEN: +BS, soft, nontender, nondistended  EXTREMITIES:  No LE edema b/l, 2+ Peripheral Pulses, No clubbing or cyanosis  NEUROLOGY: answers yes or no to questions, AOx3, squeezes L hand but not able to squeeze R hand  PSYCH: calm and cooperative  SKIN: No rashes or lesions    LABS:  10-02    138  |  104  |  18  ----------------------------<  127<H>  4.9   |  21<L>  |  0.70    Ca    9.0      02 Oct 2022 05:28    TPro  4.9<L>  /  Alb  3.6  /  TBili  0.2  /  DBili  x   /  AST  18  /  ALT  27  /  AlkPhos  88  10-02      CAPILLARY BLOOD GLUCOSE  POCT Blood Glucose.: 142 mg/dL (02 Oct 2022 21:53)  POCT Blood Glucose.: 112 mg/dL (02 Oct 2022 18:00)  POCT Blood Glucose.: 93 mg/dL (02 Oct 2022 12:31)  POCT Blood Glucose.: 101 mg/dL (02 Oct 2022 08:53)

## 2022-10-03 NOTE — DISCHARGE NOTE NURSING/CASE MANAGEMENT/SOCIAL WORK - NURSING SECTION COMPLETE
Bed: 20  Expected date: 5/1/20  Expected time: 10:32 AM  Means of arrival:   Comments:  RL - 63 M, BLE swelling   Patient/Caregiver provided printed discharge information.

## 2022-10-03 NOTE — PROGRESS NOTE ADULT - PROBLEM SELECTOR PLAN 7
DVT ppx: lovenox 40mg qd    Dc likely back to [facility] with hospice services Dc likely back to [facility] with hospice services

## 2022-10-03 NOTE — PROGRESS NOTE ADULT - ASSESSMENT
63 y/o M with PMH GBM s/p craniotomy w/ resection c/b ESBL meningitis for which repeat craniotomy performed 2/2020 treated with 8 weeks Meropenem, subsequently had repeat ESBL meningitis s/p craniectomy with washout w/ 10 wk course intrathecal and IV Meropenem, b/t DVT s/p IVC filter, HTN, anxiety presented from [facility] for worsening mental status 61 y/o M with PMH GBM s/p craniotomy w/ resection c/b ESBL meningitis for which repeat craniotomy performed 2/2020 treated with 8 weeks Meropenem, subsequently had repeat ESBL meningitis s/p craniectomy with washout w/ 10 wk course intrathecal and IV Meropenem, b/t DVT s/p IVC filter, HTN, anxiety presented from [facility] for worsening mental status. Plan today is to discharge to [facility] with home hospice.

## 2022-10-03 NOTE — DISCHARGE NOTE NURSING/CASE MANAGEMENT/SOCIAL WORK - PATIENT PORTAL LINK FT
You can access the FollowMyHealth Patient Portal offered by [facility] by registering at the following website: [email or web address]. By joining [facility]’s FollowMyHealth portal, you will also be able to view your health information using other applications (apps) compatible with our system.

## 2022-10-03 NOTE — PROGRESS NOTE ADULT - PROBLEM SELECTOR PLAN 2
Hx of GBM now with recurrence of disease in the setting of worsening mental status.   - Plan as stated above
- due to vasogenic edema and herniation from GBM  - CT Head- Extensive vasogenic edema involving essentially the entirety of the residual left cerebral hemisphere, extending into the brainstem, and into the genu of the corpus callosum and right frontal lobe. Findings are   compatible with recurrent disease.2. 1.7 cm left-to-right subfalcine herniation.3. Early left sided infratentorial herniation suspected.  - Infectious Disease Recommendations appreciated - no evidence of infection; monitoring off abx  - Neurosurgery recommendations appreciated.   - on decadron as per primary team   - please coordinate care with patient's family.
Hx of GBM now with recurrence of disease in the setting of worsening mental status.   - Plan as stated above

## 2022-10-03 NOTE — PROGRESS NOTE ADULT - PROBLEM SELECTOR PROBLEM 2
GBM (glioblastoma multiforme)
Other encephalopathy
GBM (glioblastoma multiforme)

## 2022-10-03 NOTE — PROGRESS NOTE ADULT - PROVIDER SPECIALTY LIST ADULT
Internal Medicine
Palliative Care
Internal Medicine

## 2022-10-03 NOTE — DISCHARGE NOTE NURSING/CASE MANAGEMENT/SOCIAL WORK - NSDCPEFALRISK_GEN_ALL_CORE
For information on Fall & Injury Prevention, visit: [email or web address] OR  [email or web address] OR  https://www.cdc.gov/steadi/patient.html

## 2022-10-03 NOTE — PROGRESS NOTE ADULT - ATTENDING COMMENTS
62M with PMH GBM s/p craniotomy w/ resection c/b ESBL meningitis for which repeat craniotomy performed 2/2020 treated with 8 weeks Meropenem, subsequently had repeat ESBL meningitis s/p craniectomy with washout w/ 10 wk course intrathecal and IV Meropenem, b/t DVT s/p IVC filter, HTN, anxiety presented from [facility] for worsening mental status found to have extensive vasogenic edema involving the entirety of the residual left cerebral hemisphere extending into the brainstem and the genu of the corpus callosum and right frontal lobe w/ recurrent disease as well as subfalcine herniation and infratentorial herniation. GOC were discussed on admission and patient was made DNR/DNI.     On exam, patient is able to answer yes and no questions, able to move L arm and wiggle L toes, R sided paralysis.     #Encephalopathy due to vasogenic edema and herniation 2/2 GBM  - Seen by neurosx and started on decadron 4mg q6h, no surgical intervention   - MRI brain pending   - DNR/DNI   - Pall care consulted, recs appreciated    - No evidence of infection. Seen by ID and monitoring off antibiotics.   - Will dc francois, unclear why it was placed, no reported retention.   - Will repeat speech and swallow eval given RN concern w/ eating
62M with PMH GBM s/p craniotomy w/ resection c/b ESBL meningitis for which repeat craniotomy performed 2/2020 treated with 8 weeks Meropenem, subsequently had repeat ESBL meningitis s/p craniectomy with washout w/ 10 wk course intrathecal and IV Meropenem, b/t DVT s/p IVC filter, HTN, anxiety presented from [facility] for worsening mental status found to have extensive vasogenic edema involving the entirety of the residual left cerebral hemisphere extending into the brainstem and the genu of the corpus callosum and right frontal lobe w/ recurrent disease as well as subfalcine herniation and infratentorial herniation. GOC were discussed on admission and patient was made DNR/DNI.     On exam, patient is more interactive today. Denies abd pain, sob, chest pain. Cheeks and upper chest a little bit red, suspect allergic reaction to barium swallow contrast yesterday. Got bendryl yesterday. Today improved from yesterday.     #Encephalopathy due to vasogenic edema and herniation 2/2 GBM  - Seen by neurosx and started on decadron 4mg q6h, no surgical intervention. Team spoke with [place] neuro sx who deferred to patient's outpt onc on dc decadron dose. Reached out to outpt onc who recommended dc on 4mg BID  - MRI brain showed marked progression of disease  - DNR/DNI   - Pall care consulted, recs appreciated    - No evidence of infection. Seen by ID and monitoring off antibiotics.   - Passed TOV  - Cinesophagram showed no evidence of penetration or aspiration with puree, solids and thin fluids. Tried thin liquid and patient was coughing so discussed with S&S and recommended thick liquid  - pending bed availability at [place] with hospice
62M with PMH GBM s/p craniotomy w/ resection c/b ESBL meningitis for which repeat craniotomy performed 2/2020 treated with 8 weeks Meropenem, subsequently had repeat ESBL meningitis s/p craniectomy with washout w/ 10 wk course intrathecal and IV Meropenem, b/t DVT s/p IVC filter, HTN, anxiety presented from [facility] for worsening mental status found to have extensive vasogenic edema involving the entirety of the residual left cerebral hemisphere extending into the brainstem and the genu of the corpus callosum and right frontal lobe w/ recurrent disease as well as subfalcine herniation and infratentorial herniation. GOC were discussed on admission and patient was made DNR/DNI.     On exam, patient is able to answer yes and no questions, able to move L arm and wiggle L toes, R sided paralysis. Per mother at bedside, this is patient's baseline.     #Encephalopathy due to vasogenic edema and herniation 2/2 GBM  - Seen by neurosx and started on decadron 4mg q6h, no surgical intervention   - MRI brain pending   - DNR/DNI   - Pall care consulted, recs pending to assist in GOC ?hospice   - No evidence of infection. Seen by ID and monitoring off antibiotics.   - Will dc francois, unclear why it was placed, no reported retention.     Discussed with patient's mom at bedside
62M with PMH GBM s/p craniotomy w/ resection c/b ESBL meningitis for which repeat craniotomy performed 2/2020 treated with 8 weeks Meropenem, subsequently had repeat ESBL meningitis s/p craniectomy with washout w/ 10 wk course intrathecal and IV Meropenem, b/t DVT s/p IVC filter, HTN, anxiety presented from [facility] for worsening mental status found to have extensive vasogenic edema involving the entirety of the residual left cerebral hemisphere extending into the brainstem and the genu of the corpus callosum and right frontal lobe w/ recurrent disease as well as subfalcine herniation and infratentorial herniation. GOC were discussed on admission and patient was made DNR/DNI.     On exam, patient is more interactive today. Denies abd pain, sob, chest pain. Cheeks and upper chest a little bit red, suspect allergic reaction to barium swallow contrast yesterday. Got bendryl yesterday. Today improved from yesterday.     #Encephalopathy due to vasogenic edema and herniation 2/2 GBM  - Seen by neurosx and started on decadron 4mg q6h, no surgical intervention. Team spoke with [place] neuro sx who deferred to patient's outpt onc on dc decadron dose. Reached out to outpt onc who recommended dc on 4mg BID  - MRI brain showed marked progression of disease  - DNR/DNI   - Pall care consulted, recs appreciated    - No evidence of infection. Seen by ID and monitoring off antibiotics.   - Passed TOV  - Cinesophagram showed no evidence of penetration or aspiration with puree, solids and thin fluids. Tried thin liquid and patient was coughing so discussed with S&S and recommended thick liquid  - pending bed availability at [place] with hospice    I spent 35 min planning and facilitating discharge
62M with PMH GBM s/p craniotomy w/ resection c/b ESBL meningitis for which repeat craniotomy performed 2/2020 treated with 8 weeks Meropenem, subsequently had repeat ESBL meningitis s/p craniectomy with washout w/ 10 wk course intrathecal and IV Meropenem, b/t DVT s/p IVC filter, HTN, anxiety presented from [facility] for worsening mental status found to have extensive vasogenic edema involving the entirety of the residual left cerebral hemisphere extending into the brainstem and the genu of the corpus callosum and right frontal lobe w/ recurrent disease as well as subfalcine herniation and infratentorial herniation. GOC were discussed on admission and patient was made DNR/DNI.     On exam, patient is more interactive today. Denies abd pain, sob, chest pain. Cheeks and upper chest a little bit red, suspect allergic reaction to barium swallow contrast yesterday. Got bendryl yesterday. Today improved from yesterday.     #Encephalopathy due to vasogenic edema and herniation 2/2 GBM  - Seen by neurosx and started on decadron 4mg q6h, no surgical intervention. Will get neuro sx input on dc rec fro decadron  - MRI brain showed marked progression of disease  - DNR/DNI   - Pall care consulted, recs appreciated    - No evidence of infection. Seen by ID and monitoring off antibiotics.   - Passed TOV  - Cinesophagram showed no evidence of penetration or aspiration with puree, solids and thin fluids. F/u Speech and swallow recs       Discussed plan with patient and sister at bedside.
Late entry. Patient seen and examined on 9/30/22.    62M with PMH GBM s/p craniotomy w/ resection c/b ESBL meningitis for which repeat craniotomy performed 2/2020 treated with 8 weeks Meropenem, subsequently had repeat ESBL meningitis s/p craniectomy with washout w/ 10 wk course intrathecal and IV Meropenem, b/t DVT s/p IVC filter, HTN, anxiety presented from [facility] for worsening mental status found to have extensive vasogenic edema involving the entirety of the residual left cerebral hemisphere extending into the brainstem and the genu of the corpus callosum and right frontal lobe w/ recurrent disease as well as subfalcine herniation and infratentorial herniation. GOC were discussed on admission and patient was made DNR/DNI.     On exam, patient is able to answer yes and no questions, able to move L arm and wiggle L toes, R sided paralysis.     #Encephalopathy due to vasogenic edema and herniation 2/2 GBM  - Seen by neurosx and started on decadron 4mg q6h, no surgical intervention. Will get neuro sx input on dc rec fro decadron  - MRI brain showed marked progression of disease  - DNR/DNI   - Pall care consulted, recs appreciated    - No evidence of infection. Seen by ID and monitoring off antibiotics.   - Passed TOV  - Cinesophagram showed no evidence of penetration or aspiration with puree, solids and thin fluids. F/u Speech and swallow recs

## 2022-10-03 NOTE — PROGRESS NOTE ADULT - PROBLEM SELECTOR PLAN 1
CT scan showing recurrent disease as well as subfalcine herniation and infratentorial herniation. No bleeding. Neurosurgery consulted - no intervention besides steroids. Per family, sounds close to baseline  - MRI brain - with progression of disease (9/29), family informed, printed report for family, in chart. reviewed with [facility] onc Dr Liang who has seen him before   - Decadron 4 BID per [facility] onc   - Palliative care on board, much appreciated  - DNR/DNI  - C/w Bactrim (suppressive for prior meningitis)

## 2023-01-10 NOTE — PATIENT PROFILE ADULT - DO YOU FEEL UNSAFE AT HOME, WORK, OR SCHOOL?
Pt lying in bed. Pt is calm, drowsy. Pt respirations are regular, rise and fall of chest is symmetrical. Lights turned off to decrease stimuli. no

## 2023-02-07 NOTE — PROGRESS NOTE ADULT - REASON FOR ADMISSION
AMS
Detail Level: Detailed
General Sunscreen Counseling: I recommended a broad spectrum sunscreen with a SPF of 30 or higher. Sun protective clothing can be used in lieu of sunscreen but must be worn the entire time you are exposed to the sun's rays.

## 2023-03-11 ENCOUNTER — INPATIENT (INPATIENT)
Facility: HOSPITAL | Age: 63
LOS: 8 days | Discharge: NURSING, REHAB OR LONG-TERM CARE | End: 2023-03-20
Attending: INTERNAL MEDICINE | Admitting: INTERNAL MEDICINE
Payer: MEDICARE

## 2023-03-11 VITALS
OXYGEN SATURATION: 100 % | SYSTOLIC BLOOD PRESSURE: 84 MMHG | TEMPERATURE: 101 F | DIASTOLIC BLOOD PRESSURE: 63 MMHG | RESPIRATION RATE: 19 BRPM | HEART RATE: 150 BPM

## 2023-03-11 DIAGNOSIS — A41.9 SEPSIS, UNSPECIFIED ORGANISM: ICD-10-CM

## 2023-03-11 DIAGNOSIS — C71.9 MALIGNANT NEOPLASM OF BRAIN, UNSPECIFIED: ICD-10-CM

## 2023-03-11 DIAGNOSIS — Z95.828 PRESENCE OF OTHER VASCULAR IMPLANTS AND GRAFTS: Chronic | ICD-10-CM

## 2023-03-11 DIAGNOSIS — T81.32XA DISRUPTION OF INTERNAL OPERATION (SURGICAL) WOUND, NOT ELSEWHERE CLASSIFIED, INITIAL ENCOUNTER: Chronic | ICD-10-CM

## 2023-03-11 LAB
ALBUMIN SERPL ELPH-MCNC: 2.8 G/DL — LOW (ref 3.3–5)
ALP SERPL-CCNC: 96 U/L — SIGNIFICANT CHANGE UP (ref 40–120)
ALT FLD-CCNC: 45 U/L — HIGH (ref 4–41)
ANION GAP SERPL CALC-SCNC: 17 MMOL/L — HIGH (ref 7–14)
ANISOCYTOSIS BLD QL: SLIGHT — SIGNIFICANT CHANGE UP
APPEARANCE UR: ABNORMAL
APTT BLD: 32.5 SEC — SIGNIFICANT CHANGE UP (ref 27–36.3)
AST SERPL-CCNC: 34 U/L — SIGNIFICANT CHANGE UP (ref 4–40)
B PERT DNA SPEC QL NAA+PROBE: SIGNIFICANT CHANGE UP
B PERT+PARAPERT DNA PNL SPEC NAA+PROBE: SIGNIFICANT CHANGE UP
BACTERIA # UR AUTO: NEGATIVE — SIGNIFICANT CHANGE UP
BASE EXCESS BLDV CALC-SCNC: -1.3 MMOL/L — SIGNIFICANT CHANGE UP (ref -2–3)
BASE EXCESS BLDV CALC-SCNC: -1.7 MMOL/L — SIGNIFICANT CHANGE UP (ref -2–3)
BASOPHILS # BLD AUTO: 0 K/UL — SIGNIFICANT CHANGE UP (ref 0–0.2)
BASOPHILS NFR BLD AUTO: 0 % — SIGNIFICANT CHANGE UP (ref 0–2)
BILIRUB SERPL-MCNC: 0.8 MG/DL — SIGNIFICANT CHANGE UP (ref 0.2–1.2)
BILIRUB UR-MCNC: NEGATIVE — SIGNIFICANT CHANGE UP
BLOOD GAS VENOUS COMPREHENSIVE RESULT: SIGNIFICANT CHANGE UP
BLOOD GAS VENOUS COMPREHENSIVE RESULT: SIGNIFICANT CHANGE UP
BORDETELLA PARAPERTUSSIS (RAPRVP): SIGNIFICANT CHANGE UP
BUN SERPL-MCNC: 21 MG/DL — SIGNIFICANT CHANGE UP (ref 7–23)
C PNEUM DNA SPEC QL NAA+PROBE: SIGNIFICANT CHANGE UP
CALCIUM SERPL-MCNC: 9 MG/DL — SIGNIFICANT CHANGE UP (ref 8.4–10.5)
CHLORIDE BLDV-SCNC: 101 MMOL/L — SIGNIFICANT CHANGE UP (ref 96–108)
CHLORIDE BLDV-SCNC: 102 MMOL/L — SIGNIFICANT CHANGE UP (ref 96–108)
CHLORIDE SERPL-SCNC: 102 MMOL/L — SIGNIFICANT CHANGE UP (ref 98–107)
CO2 BLDV-SCNC: 23.1 MMOL/L — SIGNIFICANT CHANGE UP (ref 22–26)
CO2 BLDV-SCNC: 26.2 MMOL/L — HIGH (ref 22–26)
CO2 SERPL-SCNC: 17 MMOL/L — LOW (ref 22–31)
COLOR SPEC: ABNORMAL
COMMENT - URINE: SIGNIFICANT CHANGE UP
CREAT SERPL-MCNC: 0.52 MG/DL — SIGNIFICANT CHANGE UP (ref 0.5–1.3)
DIFF PNL FLD: NEGATIVE — SIGNIFICANT CHANGE UP
EGFR: 113 ML/MIN/1.73M2 — SIGNIFICANT CHANGE UP
EOSINOPHIL # BLD AUTO: 0 K/UL — SIGNIFICANT CHANGE UP (ref 0–0.5)
EOSINOPHIL NFR BLD AUTO: 0 % — SIGNIFICANT CHANGE UP (ref 0–6)
EPI CELLS # UR: 7 /HPF — HIGH (ref 0–5)
FLUAV SUBTYP SPEC NAA+PROBE: SIGNIFICANT CHANGE UP
FLUBV RNA SPEC QL NAA+PROBE: SIGNIFICANT CHANGE UP
GAS PNL BLDV: 129 MMOL/L — LOW (ref 136–145)
GAS PNL BLDV: 134 MMOL/L — LOW (ref 136–145)
GAS PNL BLDV: SIGNIFICANT CHANGE UP
GAS PNL BLDV: SIGNIFICANT CHANGE UP
GLUCOSE BLDV-MCNC: 114 MG/DL — HIGH (ref 70–99)
GLUCOSE BLDV-MCNC: 91 MG/DL — SIGNIFICANT CHANGE UP (ref 70–99)
GLUCOSE SERPL-MCNC: 119 MG/DL — HIGH (ref 70–99)
GLUCOSE UR QL: NEGATIVE — SIGNIFICANT CHANGE UP
HADV DNA SPEC QL NAA+PROBE: SIGNIFICANT CHANGE UP
HCO3 BLDV-SCNC: 22 MMOL/L — SIGNIFICANT CHANGE UP (ref 22–29)
HCO3 BLDV-SCNC: 25 MMOL/L — SIGNIFICANT CHANGE UP (ref 22–29)
HCOV 229E RNA SPEC QL NAA+PROBE: SIGNIFICANT CHANGE UP
HCOV HKU1 RNA SPEC QL NAA+PROBE: SIGNIFICANT CHANGE UP
HCOV NL63 RNA SPEC QL NAA+PROBE: SIGNIFICANT CHANGE UP
HCOV OC43 RNA SPEC QL NAA+PROBE: SIGNIFICANT CHANGE UP
HCT VFR BLD CALC: 42.6 % — SIGNIFICANT CHANGE UP (ref 39–50)
HCT VFR BLDA CALC: 39 % — SIGNIFICANT CHANGE UP (ref 39–51)
HCT VFR BLDA CALC: 41 % — SIGNIFICANT CHANGE UP (ref 39–51)
HGB BLD CALC-MCNC: 13 G/DL — SIGNIFICANT CHANGE UP (ref 12.6–17.4)
HGB BLD CALC-MCNC: 13.7 G/DL — SIGNIFICANT CHANGE UP (ref 12.6–17.4)
HGB BLD-MCNC: 13.1 G/DL — SIGNIFICANT CHANGE UP (ref 13–17)
HMPV RNA SPEC QL NAA+PROBE: SIGNIFICANT CHANGE UP
HPIV1 RNA SPEC QL NAA+PROBE: SIGNIFICANT CHANGE UP
HPIV2 RNA SPEC QL NAA+PROBE: SIGNIFICANT CHANGE UP
HPIV3 RNA SPEC QL NAA+PROBE: SIGNIFICANT CHANGE UP
HPIV4 RNA SPEC QL NAA+PROBE: SIGNIFICANT CHANGE UP
HYALINE CASTS # UR AUTO: 5 /LPF — SIGNIFICANT CHANGE UP (ref 0–7)
IANC: 9.38 K/UL — HIGH (ref 1.8–7.4)
INR BLD: 1.37 RATIO — HIGH (ref 0.88–1.16)
KETONES UR-MCNC: ABNORMAL
LACTATE BLDV-MCNC: 3.8 MMOL/L — HIGH (ref 0.5–2)
LACTATE BLDV-MCNC: 4.5 MMOL/L — CRITICAL HIGH (ref 0.5–2)
LACTATE SERPL-SCNC: 4.5 MMOL/L — CRITICAL HIGH (ref 0.5–2)
LEUKOCYTE ESTERASE UR-ACNC: NEGATIVE — SIGNIFICANT CHANGE UP
LYMPHOCYTES # BLD AUTO: 0.09 K/UL — LOW (ref 1–3.3)
LYMPHOCYTES # BLD AUTO: 0.9 % — LOW (ref 13–44)
M PNEUMO DNA SPEC QL NAA+PROBE: SIGNIFICANT CHANGE UP
MACROCYTES BLD QL: SLIGHT — SIGNIFICANT CHANGE UP
MANUAL SMEAR VERIFICATION: SIGNIFICANT CHANGE UP
MCHC RBC-ENTMCNC: 30.8 GM/DL — LOW (ref 32–36)
MCHC RBC-ENTMCNC: 31.6 PG — SIGNIFICANT CHANGE UP (ref 27–34)
MCV RBC AUTO: 102.7 FL — HIGH (ref 80–100)
METAMYELOCYTES # FLD: 4.3 % — HIGH (ref 0–1)
MONOCYTES # BLD AUTO: 0.26 K/UL — SIGNIFICANT CHANGE UP (ref 0–0.9)
MONOCYTES NFR BLD AUTO: 2.6 % — SIGNIFICANT CHANGE UP (ref 2–14)
NEUTROPHILS # BLD AUTO: 9.1 K/UL — HIGH (ref 1.8–7.4)
NEUTROPHILS NFR BLD AUTO: 84.3 % — HIGH (ref 43–77)
NEUTS BAND # BLD: 7 % — HIGH (ref 0–6)
NITRITE UR-MCNC: NEGATIVE — SIGNIFICANT CHANGE UP
NT-PROBNP SERPL-SCNC: 2008 PG/ML — HIGH
PCO2 BLDV: 34 MMHG — LOW (ref 42–55)
PCO2 BLDV: 46 MMHG — SIGNIFICANT CHANGE UP (ref 42–55)
PH BLDV: 7.34 — SIGNIFICANT CHANGE UP (ref 7.32–7.43)
PH BLDV: 7.42 — SIGNIFICANT CHANGE UP (ref 7.32–7.43)
PH UR: 6 — SIGNIFICANT CHANGE UP (ref 5–8)
PLAT MORPH BLD: NORMAL — SIGNIFICANT CHANGE UP
PLATELET # BLD AUTO: 119 K/UL — LOW (ref 150–400)
PLATELET COUNT - ESTIMATE: ABNORMAL
PO2 BLDV: 114 MMHG — HIGH (ref 25–45)
PO2 BLDV: 51 MMHG — HIGH (ref 25–45)
POTASSIUM BLDV-SCNC: 4.1 MMOL/L — SIGNIFICANT CHANGE UP (ref 3.5–5.1)
POTASSIUM BLDV-SCNC: 8.2 MMOL/L — CRITICAL HIGH (ref 3.5–5.1)
POTASSIUM SERPL-MCNC: 5.1 MMOL/L — SIGNIFICANT CHANGE UP (ref 3.5–5.3)
POTASSIUM SERPL-SCNC: 5.1 MMOL/L — SIGNIFICANT CHANGE UP (ref 3.5–5.3)
PROT SERPL-MCNC: 5.4 G/DL — LOW (ref 6–8.3)
PROT UR-MCNC: ABNORMAL
PROTHROM AB SERPL-ACNC: 16 SEC — HIGH (ref 10.5–13.4)
RAPID RVP RESULT: SIGNIFICANT CHANGE UP
RBC # BLD: 4.15 M/UL — LOW (ref 4.2–5.8)
RBC # FLD: 14.5 % — SIGNIFICANT CHANGE UP (ref 10.3–14.5)
RBC BLD AUTO: NORMAL — SIGNIFICANT CHANGE UP
RBC CASTS # UR COMP ASSIST: 7 /HPF — HIGH (ref 0–4)
RSV RNA SPEC QL NAA+PROBE: SIGNIFICANT CHANGE UP
RV+EV RNA SPEC QL NAA+PROBE: SIGNIFICANT CHANGE UP
SAO2 % BLDV: 80.8 % — SIGNIFICANT CHANGE UP (ref 67–88)
SAO2 % BLDV: 96.9 % — HIGH (ref 67–88)
SARS-COV-2 RNA SPEC QL NAA+PROBE: SIGNIFICANT CHANGE UP
SODIUM SERPL-SCNC: 136 MMOL/L — SIGNIFICANT CHANGE UP (ref 135–145)
SP GR SPEC: 1.03 — SIGNIFICANT CHANGE UP (ref 1.01–1.05)
TROPONIN T, HIGH SENSITIVITY RESULT: 55 NG/L — CRITICAL HIGH
TROPONIN T, HIGH SENSITIVITY RESULT: 61 NG/L — CRITICAL HIGH
UROBILINOGEN FLD QL: ABNORMAL
VARIANT LYMPHS # BLD: 0.9 % — SIGNIFICANT CHANGE UP (ref 0–6)
WBC # BLD: 9.97 K/UL — SIGNIFICANT CHANGE UP (ref 3.8–10.5)
WBC # FLD AUTO: 9.97 K/UL — SIGNIFICANT CHANGE UP (ref 3.8–10.5)
WBC UR QL: 8 /HPF — HIGH (ref 0–5)

## 2023-03-11 PROCEDURE — 70450 CT HEAD/BRAIN W/O DYE: CPT | Mod: 26,MA

## 2023-03-11 PROCEDURE — 99291 CRITICAL CARE FIRST HOUR: CPT | Mod: CS

## 2023-03-11 PROCEDURE — 71045 X-RAY EXAM CHEST 1 VIEW: CPT | Mod: 26

## 2023-03-11 PROCEDURE — 71275 CT ANGIOGRAPHY CHEST: CPT | Mod: 26,MA

## 2023-03-11 RX ORDER — MEROPENEM 1 G/30ML
1000 INJECTION INTRAVENOUS EVERY 8 HOURS
Refills: 0 | Status: COMPLETED | OUTPATIENT
Start: 2023-03-11 | End: 2023-03-11

## 2023-03-11 RX ORDER — ACETAMINOPHEN 500 MG
1000 TABLET ORAL ONCE
Refills: 0 | Status: COMPLETED | OUTPATIENT
Start: 2023-03-11 | End: 2023-03-11

## 2023-03-11 RX ORDER — FENTANYL CITRATE 50 UG/ML
75 INJECTION INTRAVENOUS ONCE
Refills: 0 | Status: DISCONTINUED | OUTPATIENT
Start: 2023-03-11 | End: 2023-03-11

## 2023-03-11 RX ORDER — SODIUM CHLORIDE 9 MG/ML
1000 INJECTION INTRAMUSCULAR; INTRAVENOUS; SUBCUTANEOUS ONCE
Refills: 0 | Status: DISCONTINUED | OUTPATIENT
Start: 2023-03-11 | End: 2023-03-11

## 2023-03-11 RX ORDER — SODIUM CHLORIDE 9 MG/ML
500 INJECTION INTRAMUSCULAR; INTRAVENOUS; SUBCUTANEOUS ONCE
Refills: 0 | Status: COMPLETED | OUTPATIENT
Start: 2023-03-11 | End: 2023-03-11

## 2023-03-11 RX ORDER — MEROPENEM 1 G/30ML
1000 INJECTION INTRAVENOUS ONCE
Refills: 0 | Status: COMPLETED | OUTPATIENT
Start: 2023-03-11 | End: 2023-03-11

## 2023-03-11 RX ORDER — MEROPENEM 1 G/30ML
INJECTION INTRAVENOUS
Refills: 0 | Status: COMPLETED | OUTPATIENT
Start: 2023-03-11 | End: 2023-03-11

## 2023-03-11 RX ADMIN — SODIUM CHLORIDE 500 MILLILITER(S): 9 INJECTION INTRAMUSCULAR; INTRAVENOUS; SUBCUTANEOUS at 15:08

## 2023-03-11 RX ADMIN — SODIUM CHLORIDE 500 MILLILITER(S): 9 INJECTION INTRAMUSCULAR; INTRAVENOUS; SUBCUTANEOUS at 11:14

## 2023-03-11 RX ADMIN — MEROPENEM 200 MILLIGRAM(S): 1 INJECTION INTRAVENOUS at 22:28

## 2023-03-11 RX ADMIN — Medication 400 MILLIGRAM(S): at 11:14

## 2023-03-11 RX ADMIN — MEROPENEM 200 MILLIGRAM(S): 1 INJECTION INTRAVENOUS at 11:34

## 2023-03-11 RX ADMIN — FENTANYL CITRATE 75 MICROGRAM(S): 50 INJECTION INTRAVENOUS at 19:29

## 2023-03-11 NOTE — H&P ADULT - PROBLEM SELECTOR PLAN 3
Patient with history of bilateral DVTs s/p IVC filter. On Tuesday patient with new DVTs per daughter, per neurosurgery outpatient needed repeat MRI prior to therapeutic anticoagulation   - will get bilateral DVT scans and if positive will likely need vascular and neurosurgery input prior to initiating anticoagulation

## 2023-03-11 NOTE — CONSULT NOTE ADULT - ASSESSMENT
62-year-old male with a history diabetes glioblastoma multiforme status post craniectomy in the past complicated by meningitis multiple times presents with altered mental status from her surgery which rehabilitations, where his subcutaneous heparin was stopped, despite known DVTs.  Patient comes in febrile tachycardic and tachypneic denying any chest pain or shortness of breath or fevers.  Only saying no to questions.    Patient admitted in September 2022 with MRI demonstrating marked progression of disease. No further surgery will be offerred. Patient was made DNR/DNI and discharged to [facility] on hospice.       3/11 Neurosurgery called for AMS, fever, given h/o  shunt and craniectomy. Ventricles larger compared to prior study. Edema unchanged   62-year-old male with a history diabetes glioblastoma multiforme status post craniectomy Feb 2020 at [facility] in the past complicated by meningitis multiple times presents with altered mental status from her surgery which rehabilitations, where his subcutaneous heparin was stopped, despite known DVTs.  Patient comes in febrile tachycardic and tachypneic denying any chest pain or shortness of breath or fevers.  Only saying no to questions.    Patient admitted in September 2022 with MRI demonstrating marked progression of disease. No further surgery will be offerred. Patient was made DNR/DNI and discharged to [facility] on hospice.       3/11 Neurosurgery called for AMS, fever, given h/o  shunt and craniectomy. Ventricles larger compared to prior study. Edema unchanged

## 2023-03-11 NOTE — H&P ADULT - PROBLEM SELECTOR PLAN 7
Diet: NPO for now given AMS, bedside dysphagia and S+S pending   DVT prophylaxis: heparin subq   Dispo: likely [facility]

## 2023-03-11 NOTE — H&P ADULT - ASSESSMENT
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    Spoke with patient's sister Maribel who stated that patient's baseline mental status is answering yes/no questions. Patient resides at [facility] and his sister was called by the facility tonight because the patient had decreased oxygen saturation, was tachycardic and was not as responsive as usual.     Per sister patient had COVID a few weeks ago. 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 Patient was supposed to be on prophylactic lovenox but according to sister it was accidentally discontinued. On Tuesday of this past week patient was noted to have worsening bilateral lower extremity edema. Patient had ultrasounds which showed new bilateral DVTs. Patient was seen by vascular surgery who stated that he needed neurosurgical approval to be placed on therapeutic anticoagulation. Neurosurgery was contacted and stated patient needs another MRI prior to starting therapeutic anticoagulation.     Patient currently alert, answering only yes/no questions. Shakes his head no when asked to complete neuro exam. Shakes his head no when asked if he has any complaints.     ED course: febrile to 100.5, , BP 84/63, RR 20, SpO2 100% on 8L NC. Labs with EBC 9.97 but with 84.3% neutrophils. BNP 2000. Trop 55 --> 61. Lactate 4.5 --> 3.8. UA with no bacteria. RVP negative. CXR with small left pleural effusion and/or left basilar atelectasis. A left basilar pneumonia cannot be excluded. CTA with no PE and interval resolution of opacities. CT head with  Left frontal vasogenic edema with mass effect and midline shift to the right which is less when compared with 9/29/2022. Calcification versus hemorrhage left corona radiata likely related to prior neoplasm. Hematocrit level left occipital horn. Given tylenol, fent, meropenem, 1L fluids.  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    Spoke with patient's sister Maribel who stated that patient's baseline mental status is answering yes/no questions. Patient resides at [facility] and his sister was called by the facility tonight because the patient had decreased oxygen saturation, was tachycardic and was not as responsive as usual.     Per sister patient had COVID a few weeks ago. 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 Patient was supposed to be on prophylactic lovenox but according to sister it was accidentally discontinued. On Tuesday of this past week patient was noted to have worsening bilateral lower extremity edema. Patient had ultrasounds which showed new bilateral DVTs. Patient was seen by vascular surgery who stated that he needed neurosurgical approval to be placed on therapeutic anticoagulation. Neurosurgery was contacted and stated patient needs another MRI prior to starting therapeutic anticoagulation.     Patient currently alert, answering only yes/no questions. Shakes his head no when asked to complete neuro exam. Shakes his head no when asked if he has any complaints.     ED course: febrile to 100.5, , BP 84/63, RR 20, SpO2 100% on 8L NC. Labs with EBC 9.97 but with 84.3% neutrophils. BNP 2000. Trop 55 --> 61. Lactate 4.5 --> 3.8. UA with no bacteria. RVP negative. CXR with small left pleural effusion and/or left basilar atelectasis. A left basilar pneumonia cannot be excluded. CTA with no PE and interval resolution of opacities. CT head with left frontal vasogenic edema with mass effect and midline shift to the right which is less when compared with 9/29/2022. Calcification versus hemorrhage left corona radiata likely related to prior neoplasm. Hematocrit level left occipital horn. Given tylenol, fent, meropenem, 1L fluids.  63 y/o M with PMH GBM s/p craniotomy w/ resection c/b ESBL meningitis with repeat craniotomy 2/2020 c/b repeat ESBL meningitis s/p craniectomy,b/t DVT (2020) s/p IVC filter, HTN, anxiety presenting for altered mental status. [ x ]  Lab studies personally reviewed  [ x ]  Radiology personally reviewed  [ x ]  Old records personally reviewed    63 y/o M with PMH GBM s/p craniotomy w/ resection c/b ESBL meningitis with repeat craniotomy 2/2020 c/b repeat ESBL meningitis s/p craniectomy,b/t DVT (2020) s/p IVC filter, HTN, anxiety presenting for altered mental status.

## 2023-03-11 NOTE — CONSULT NOTE ADULT - ASSESSMENT
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 In ED patient found to be febrile, tachycardic and hypotensive Labs: BNP 2000. Trop 55 --> 61. Lactate 4.5 --> 3.8. Also found to have to have new bilateral DVTs. Patient was seen by vascular surgery who stated neurosurgical approval needed before anticoagulation. Neurosurgery stated MRI needed prior anticoagulation. Cardiology called for tachycardia and elevated troponin iso sepsis    63 y/o M with PMH GBM s/p craniotomy w/ resection c/b ESBL meningitis with repeat craniotomy 2/2020 c/b repeat ESBL meningitis s/p craniectomy,b/t DVT (2020) s/p IVC filter, HTN, anxiety presenting for altered mental status. In ED patient found to be febrile, tachycardic and hypotensive Labs: BNP 2000. Trop 55 --> 61. Lactate 4.5 --> 3.8. Also found to have to have new bilateral DVTs. Patient was seen by vascular surgery who stated neurosurgical approval needed before anticoagulation. Neurosurgery stated MRI needed prior anticoagulation. Cardiology called for tachycardia and elevated troponin iso sepsis    Patient found to have mildly elevated and flat Troponin Trop 55 --> 61. Pt denies chest pain.  EKG: sinus tachycardia without ischemia or conduction dz iso fever - now sinus rhythm after receiving tylenol and afebrile. Unlikely myocardial injury. Likely type II MI in the setting of sepsis  Plan   - No need to treat for ACS unless clinical story changes or patient develops chest pain, ekg changes, rising trops.   - Serial EKG, PRN chest pain  - Echo to evaluate LV function and wall motion abnormality

## 2023-03-11 NOTE — H&P ADULT - NSICDXPASTMEDICALHX_GEN_ALL_CORE_FT
PAST MEDICAL HISTORY:  Anxiety     Deep vein thrombosis (DVT) B/L    Diabetes mellitus     Essential hypertension     Glioblastoma multiforme     Hypertension     Meningitis      PAST MEDICAL HISTORY:  Anxiety     Deep vein thrombosis (DVT) B/L    Diabetes mellitus     Essential hypertension     Glioblastoma multiforme     Hypertension     Hypothyroidism     MDD (major depressive disorder)     Meningitis     Staphylococcus aureus infection

## 2023-03-11 NOTE — ED ADULT NURSE REASSESSMENT NOTE - NS ED NURSE REASSESS COMMENT FT1
Break coverage Rn: pt sleeping at this time, respirations even and unlabored, sating 99% on 8L o2 via NC. Sinus tachycardia on monitor. No acute distress noted. pending further orders. bed in lowest position, side rails up, call bell in hand, safety maintained. pt mother at bedside.

## 2023-03-11 NOTE — H&P ADULT - NSHPSOCIALHISTORY_GEN_ALL_CORE
Lives at [facility]. Patient is DNR/DNI. Maribel (sister) makes medical decisions for the patient. Baseline mental status- can feed himself with his left hand. Answers yes/no. Can't say many words. Eats a mechanical soft diet. Lives at [facility].   Patient is DNR/DNI. Maribel (sister) makes medical decisions for the patient.   Baseline mental status- can feed himself with his left hand.  Eats a mechanical soft diet.  Answers yes/no. Can't say many words.

## 2023-03-11 NOTE — H&P ADULT - PROBLEM SELECTOR PLAN 5
Elevated BNP on admission   - echo in 2017 with EF 60-65% normal LV function   - CXR with small left pleural effusion   - will continue to monitor fluid status Elevated BNP on admission   - echo in 2017 with EF 60-65% normal LV function   - CXR with small left pleural effusion   - unlikely of cardiac origin  - will continue to monitor fluid status

## 2023-03-11 NOTE — ED PROVIDER NOTE - ATTENDING CONTRIBUTION TO CARE
Attending Statement: I have personally seen and examined this patient. I have fully participated in the care of this patient. I have reviewed all pertinent clinical information, including history physical exam, plan and the Resident's note and agree except as noted    Brought in by EMS from [facility] for altered mental status post triage note.  Patient unable to provide any history.  Spoke to Sister Maribel on the phone states that for unclear reason his subcu heparin was stopped, patient was recently diagnosed with bilateral DVTs and was transferred to [facility].  Patient has a history of prior DVTs and has an IVC filter.  EMS patient found to be hypoxic satting low 90s on room air was placed on nonrebreather.  Patient not normally on oxygen.  Patient able to open his eyes to name, able to cooperate with simple questions.  States he is not in pain.  Denies any difficulty breathing.  And gave permission to speak with Sister Maribel.  He is able to open his eyes and move his left hand.  Vitals patient tachypneic, tachycardic, currently on a nonrebreather with a temperature of 100.5 oral blood pressure 84/63 alert to name and place following very simple commands.  Poor inspiratory effort.  Obese nontender abdomen no grimace to palpation.  Significant volume overloaded with third spacing of arms and legs.  Contracted right arm.  Plan EKG, cardiac monitor, sepsis labs, CT angio rule out PE and CT head to reassess for prior GBM. Attending Statement: I have personally seen and examined this patient. I have fully participated in the care of this patient. I have reviewed all pertinent clinical information, including history physical exam, plan and the Resident's note and agree except as noted    Brought in by EMS from [facility] for altered mental status post triage note.  Patient unable to provide any history.  Spoke to Sister Maribel on the phone states that for unclear reason his subcu heparin was stopped, patient was recently diagnosed with bilateral DVTs and was transferred to [facility].  Patient has a history of prior DVTs and has an IVC filter.  EMS patient found to be hypoxic satting low 90s on room air was placed on nonrebreather.  Patient not normally on oxygen.  Patient able to open his eyes to name, able to cooperate with simple questions.  States he is not in pain.  Denies any difficulty breathing.  And gave permission to speak with Sister Maribel.  He is able to open his eyes and move his left hand.  Vitals patient tachypneic, tachycardic, currently on a nonrebreather with a temperature of 100.5 oral blood pressure 84/63 alert to name and place following very simple commands.  Poor inspiratory effort.  Obese nontender abdomen no grimace to palpation.  Significant volume overloaded with third spacing of arms and legs.  Contracted right arm.  Plan EKG, cardiac monitor, sepsis labs, CT angio rule out PE and CT head to reassess for prior GBM.  ekg tachycardic otherwise unchanged from prior

## 2023-03-11 NOTE — ED PROVIDER NOTE - CLINICAL SUMMARY MEDICAL DECISION MAKING FREE TEXT BOX
62y male with hx diabetes HTN glioblastoma s/p craniectomy c/b ESBL meningitis and edema with herniation presents from [facility] known DNR/DNI tachycardic tachypneic and borderline blood pressures with low grade fever and discontinuation of his subQ heparin concerning for sepsis from healthcare associated infection pna/uti/meningitis v PE. will not be able to push TPA, however will obtain CTA chest and CT head as explanation for vitals and AMS. neurosurgery consult prior to any full dose anticoagulation, if needed. empiric antibiotics and fluids initiated. cardioversion not performed as pt with normal range BPs on arrival. will continue with cardiac monitor and antibiotics. admit.

## 2023-03-11 NOTE — CONSULT NOTE ADULT - SUBJECTIVE AND OBJECTIVE BOX
HPI: 62-year-old male with a history diabetes glioblastoma multiforme status post craniectomy,  shunt [facility] in the past complicated by meningitis multiple times presents with altered mental status from her surgery which rehabilitations, where his subcutaneous heparin was stopped, despite known DVTs.  Patient comes in febrile tachycardic and tachypneic denying any chest pain or shortness of breath or fevers.  Only saying no to questions.    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      Presence of IVC filter        FAMILY HISTORY:  Cerebrovascular accident (Father)      Home Medications:  dexamethasone 6 mg/25 mL-NaCl 0.9% intravenous solution: 16.67 milliliter(s) intravenous 2 times a day (03 Oct 2022 14:18)  levETIRAcetam 100 mg/mL intravenous solution: 5 milliliter(s) intravenous every 12 hours (03 Oct 2022 14:09)  mirtazapine 7.5 mg oral tablet: 1 tab(s) orally once a day (at bedtime) (03 Oct 2022 14:09)  pantoprazole 40 mg oral delayed release tablet: 1 tab(s) orally once a day (before a meal) (03 Oct 2022 14:09)  senna leaf extract oral tablet: 2 tab(s) orally once a day (at bedtime) (03 Oct 2022 14:09)  sulfamethoxazole-trimethoprim 800 mg-160 mg oral tablet: 1 tab(s) orally every 12 hours (03 Oct 2022 14:09)  Vitamin D3 25 mcg (1000 intl units) oral tablet: 1 tab(s) orally once a day (27 Sep 2022 15:25)      MEDICATIONS  (STANDING):  meropenem  IVPB      meropenem  IVPB 1000 milliGRAM(s) IV Intermittent every 8 hours    MEDICATIONS  (PRN):    Vital Signs Last 24 Hrs  T(C): 37.5 (11 Mar 2023 13:36), Max: 38.1 (11 Mar 2023 09:59)  T(F): 99.5 (11 Mar 2023 13:36), Max: 100.5 (11 Mar 2023 09:59)  HR: 133 (11 Mar 2023 15:51) (133 - 150)  BP: 99/74 (11 Mar 2023 15:51) (84/63 - 102/70)  BP(mean): --  RR: 18 (11 Mar 2023 15:51) (18 - 20)  SpO2: 99% (11 Mar 2023 15:51) (99% - 100%)    Parameters below as of 11 Mar 2023 15:51  Patient On (Oxygen Delivery Method): mask, nonrebreather  O2 Flow (L/min): 8      PHYSICAL EXAM:      LABS:  Urinalysis Basic - ( 11 Mar 2023 13:42 )    Color: Jazmin / Appearance: Slightly Turbid / S.028 / pH: x  Gluc: x / Ketone: Trace  / Bili: Negative / Urobili: 3 mg/dL   Blood: x / Protein: 100 mg/dL / Nitrite: Negative   Leuk Esterase: Negative / RBC: 7 /HPF / WBC 8 /HPF   Sq Epi: x / Non Sq Epi: 7 /HPF / Bacteria: Negative      PT/INR - ( 11 Mar 2023 10:43 )   PT: 16.0 sec;   INR: 1.37 ratio         PTT - ( 11 Mar 2023 10:43 )  PTT:32.5 sec                        13.1   9.97  )-----------( 119      ( 11 Mar 2023 10:43 )             42.6     -11    136  |  102  |  21  ----------------------------<  119<H>  5.1   |  17<L>  |  0.52    Ca    9.0      11 Mar 2023 10:43    TPro  5.4<L>  /  Alb  2.8<L>  /  TBili  0.8  /  DBili  x   /  AST  34  /  ALT  45<H>  /  AlkPhos  96        RADIOLOGY:  < from: CT Head No Cont (23 @ 13:06) >    INTERPRETATION:  CLINICAL INDICATION: Altered mental status, sepsis,   prior craniotomy for glioblastoma    5mm axial sections of the brain were obtained from base to vertex,   without the intravenous administration of contrast material. Coronal and   sagittal computer generated reconstructed views are available.    Comparison is made with the prior MRI of 2022.    There has been a left parietal craniotomy. There is vasogenic edema in   the left frontal lobe which is similar to the prior examination. There is   calcification versus hemorrhage in the left corona radiata in the area of   the previous ring-enhancing mass.    A right frontalventricular catheter has its body of the right lateral   ventricle. The ventricles are mildly enlarged. There is a small   hematocrit level in the left occipital horn. There is mild mass effect on   the left lateral ventricle and midline shift to theright which is   decreased compared with the prior exam.          IMPRESSION: Left parietal craniotomy defect. Left frontal vasogenic edema   with mass effect and midline shift to the right which is less when   compared with 2022. Calcification versus hemorrhage left corona   radiata likely related to prior neoplasm. Hematocrit level left occipital   horn.    < end of copied text >

## 2023-03-11 NOTE — CONSULT NOTE ADULT - PROBLEM SELECTOR RECOMMENDATION 9
- No neurosurgical intervention- prognosis poor, patient currently DNR/DNI on hospice   - No role for shunt tap  - OK to use AC if needed  - Case d/w Dr. hunt

## 2023-03-11 NOTE — ED ADULT NURSE NOTE - DOES PATIENT HAVE ADVANCE DIRECTIVE
No You can access the FollowMyHealth Patient Portal offered by [facility] by registering at the following website: [email or web address]. By joining [employer]’s FollowMyHealth portal, you will also be able to view your health information using other applications (apps) compatible with our system.

## 2023-03-11 NOTE — H&P ADULT - NSHPLABSRESULTS_GEN_ALL_CORE
CBC Full  -  ( 11 Mar 2023 10:43 )  WBC Count : 9.97 K/uL  RBC Count : 4.15 M/uL  Hemoglobin : 13.1 g/dL  Hematocrit : 42.6 %  Platelet Count - Automated : 119 K/uL  Mean Cell Volume : 102.7 fL  Mean Cell Hemoglobin : 31.6 pg  Mean Cell Hemoglobin Concentration : 30.8 gm/dL  Auto Neutrophil # : 9.10 K/uL  Auto Lymphocyte # : 0.09 K/uL  Auto Monocyte # : 0.26 K/uL  Auto Eosinophil # : 0.00 K/uL  Auto Basophil # : 0.00 K/uL  Auto Neutrophil % : 84.3 %  Auto Lymphocyte % : 0.9 %  Auto Monocyte % : 2.6 %  Auto Eosinophil % : 0.0 %  Auto Basophil % : 0.0 %    Comprehensive Metabolic Panel (03.11.23 @ 10:43)    Sodium, Serum: 136 mmol/L    Potassium, Serum: 5.1: SPECIMEN MODERATELY HEMOLYZED mmol/L    Chloride, Serum: 102 mmol/L    Carbon Dioxide, Serum: 17 mmol/L    Anion Gap, Serum: 17 mmol/L    Blood Urea Nitrogen, Serum: 21 mg/dL    Creatinine, Serum: 0.52 mg/dL    Glucose, Serum: 119 mg/dL    Calcium, Total Serum: 9.0 mg/dL    Protein Total, Serum: 5.4: SPECIMEN MODERATELY HEMOLYZED g/dL    Albumin, Serum: 2.8 g/dL    Bilirubin Total, Serum: 0.8 mg/dL    Alkaline Phosphatase, Serum: 96 U/L    Aspartate Aminotransferase (AST/SGOT): 34: SPECIMEN MODERATELY HEMOLYZED U/L    Alanine Aminotransferase (ALT/SGPT): 45: SPECIMEN MODERATELY HEMOLYZED U/L    eGFR: 113: The estimated glomerular filtration rate (eGFR) is calculated using the  2021 CKD-EPI creatinine equation, which does not have a coefficient for  race and is validated in individuals 18 years of age and older (N Engl J  Med 2021; 385:2564-5364). Creatinine-based eGFR may be inaccurate in  various situations including but not limited to extremes of muscle mass,  altered dietary protein intake, or medications that affect renal tubular  creatinine secretion. mL/min/1.73m2 CBC Full  -  ( 11 Mar 2023 10:43 )  WBC Count : 9.97 K/uL  RBC Count : 4.15 M/uL  Hemoglobin : 13.1 g/dL  Hematocrit : 42.6 %  Platelet Count - Automated : 119 K/uL  Mean Cell Volume : 102.7 fL  Mean Cell Hemoglobin : 31.6 pg  Mean Cell Hemoglobin Concentration : 30.8 gm/dL  Auto Neutrophil # : 9.10 K/uL  Auto Lymphocyte # : 0.09 K/uL  Auto Monocyte # : 0.26 K/uL  Auto Eosinophil # : 0.00 K/uL  Auto Basophil # : 0.00 K/uL  Auto Neutrophil % : 84.3 %  Auto Lymphocyte % : 0.9 %  Auto Monocyte % : 2.6 %  Auto Eosinophil % : 0.0 %  Auto Basophil % : 0.0 %    Comprehensive Metabolic Panel (03.11.23 @ 10:43)    Sodium, Serum: 136 mmol/L    Potassium, Serum: 5.1: SPECIMEN MODERATELY HEMOLYZED mmol/L    Chloride, Serum: 102 mmol/L    Carbon Dioxide, Serum: 17 mmol/L    Anion Gap, Serum: 17 mmol/L    Blood Urea Nitrogen, Serum: 21 mg/dL    Creatinine, Serum: 0.52 mg/dL    Glucose, Serum: 119 mg/dL    Calcium, Total Serum: 9.0 mg/dL    Protein Total, Serum: 5.4: SPECIMEN MODERATELY HEMOLYZED g/dL    Albumin, Serum: 2.8 g/dL    Bilirubin Total, Serum: 0.8 mg/dL    Alkaline Phosphatase, Serum: 96 U/L    Aspartate Aminotransferase (AST/SGOT): 34: SPECIMEN MODERATELY HEMOLYZED U/L    Alanine Aminotransferase (ALT/SGPT): 45: SPECIMEN MODERATELY HEMOLYZED U/L    eGFR: 113: The estimated glomerular filtration rate (eGFR) is calculated using the  2021 CKD-EPI creatinine equation, which does not have a coefficient for  race and is validated in individuals 18 years of age and older (N Engl J  Med 2021; 385:6122-9411). Creatinine-based eGFR may be inaccurate in  various situations including but not limited to extremes of muscle mass,  altered dietary protein intake, or medications that affect renal tubular  creatinine secretion. mL/min/1.73m2    ================================================================================  ECG = SVT at 149 bp,, QTc = 551    ECG = SVT at 150 bpm, QTc = 546  ================================================================================

## 2023-03-11 NOTE — H&P ADULT - NSHPREVIEWOFSYSTEMS_GEN_ALL_CORE
GENERAL: No fever or chills  EYES: no change in vision   HEENT: no trouble swallowing or speaking   CARDIAC: no chest pain   PULMONARY: no cough or SOB  GI:  No abdominal pain  : No changes in urination   SKIN: no rashes   NEURO: no headache   MSK: No joint pain     All other ROS negative unless otherwise specified in HPI. GENERAL: No fever or chills  EYES: no change in vision, no epiphora  HEENT: no trouble swallowing or speaking   CARDIAC: no chest pain.  No palpitations  PULMONARY: no cough or SOB  GI:  No abdominal pain.  No diarrhea  : No changes in urination.  No lower back pain  SKIN: no itching.  No pain  NEURO: no headache.  No pain  MSK: No joint pain     All other ROS negative unless otherwise specified in HPI.

## 2023-03-11 NOTE — ED ADULT TRIAGE NOTE - CHIEF COMPLAINT QUOTE
Pt arrives via EMS from [facility] for AMS, staff didn't specify onset. Pt recently dx'd with "DVTs in his legs." Arrives with L 20g IV placed to hand, NS running. Pt with NRB on 8L. 92% on RA on scene. Wearing soft helmet. FS 95 on scene. Pt not able to participate in medical exam d/t weakness. PMH: meningitis, glioblastoma, DVT, DM, HTN, TBI, cerebral infarcts. Dr. Odom contacted for stroke eval, no code stroke called.

## 2023-03-11 NOTE — ED ADULT NURSE NOTE - CHIEF COMPLAINT QUOTE
Pt arrives via EMS from [facility] for AMS, staff didn't specify onset. Pt recently dx'd with "DVTs in his legs." Arrives with L 20g IV placed to hand, NS running. Pt with NRB on 8L. 92% on RA on scene. Wearing soft helmet. FS 95 on scene. Pt not able to participate in medical exam d/t weakness. PMH: meningitis, glioblastoma, DVT, DM, HTN, TBI, cerebral infarcts. Dr. Odom contacted for stroke eval, no code stroke called.

## 2023-03-11 NOTE — H&P ADULT - PROBLEM SELECTOR PLAN 2
Patient with history of GBM s/p multiple craniotomies   - Seen by neurosurgery, appreciate recommendations   - No neurosurgical intervention and no role for shunt tap per neurosurgery Patient with history of GBM s/p multiple craniotomies   - CT head with left frontal vasogenic edema with mass effect and midline shift to the right which is less when compared with 9/29/2022. Calcification versus hemorrhage left corona radiata likely related to prior neoplasm. Hematocrit level left occipital horn.   - Seen by neurosurgery, appreciate recommendations   - No neurosurgical intervention and no role for shunt tap per neurosurgery Patient with history of GBM s/p multiple craniotomies   - CT head with left frontal vasogenic edema with mass effect and midline shift to the right which is less when compared with 9/29/2022. Calcification versus hemorrhage left corona radiata likely related to prior neoplasm. Hematocrit level left occipital horn.   - Seen by neurosurgery, appreciate recommendations   - No neurosurgical intervention and no role for shunt tap per neurosurgery  - will c/w outpatient steroids

## 2023-03-11 NOTE — CONSULT NOTE ADULT - SUBJECTIVE AND OBJECTIVE BOX
HISTORY OF PRESENT ILLNESS:  Patient is a 62y old  Male who presents with a chief complaint of   HPI:      ED Course: In ER, patient found to have T max , HR , BP , SpO2 . Labs remarkable for   Echo 2017  EF 60-65%  1. Normal trileaflet aortic valve. Mild aortic  regurgitation.  2. Aortic root dilatation  (Ao: 4.0 cm at the sinuses of  Valsalva) with ascending aorta dilation (4.3 cm  approximately 4 cm above the aortic valve).  Mild atheroma  noted in aortic arch/descending aorta. CTA or MRA can  better evaluate the dimensions of the ascending aorta given  aortic dilation.  3. Normal left atrium. No left atrial thrombus seen.  Normal left atrial appendage function (velocity= (70)  cm/s).  4. Normal left ventricular systolic function. No segmental  wall motion abnormalities.  5. The right ventricle is not well visualized.  6. Agitated saline injection and color flow Doppler  demonstrate no evidence of a patent foramen ovale.  7. No pericardial effusion.  *** No previous Echo exam.    CATH 2016  CORONARY VESSELS: The coronary circulation is right dominant.  LM:   --  LM: Normal.  LAD:   --  LAD: Normal.  CX:   --  Circumflex: Normal.  RCA:   --  RCA: Normal.        Allergies    aspirin (Unknown)  shellfish (Hives)  shellfish (Unknown)  Tegretol (Unknown)    Intolerances    ACE inhibitors (Other)  angiotensin II inhibitors (Other)  	    MEDICATIONS:                  PAST MEDICAL & SURGICAL HISTORY:  Essential hypertension      Hypertension      Diabetes mellitus      Anxiety      Deep vein thrombosis (DVT)  B/L      Glioblastoma multiforme      Meningitis      Disruption of closure of skull or craniotomy      Presence of IVC filter          FAMILY HISTORY:  Cerebrovascular accident (Father)        SOCIAL HISTORY:      Smoker: [ ] Active [ ] never  [ ] previous  Alcohol:  [ ] social [ ] daily [ ] never  Lives with:   Occupation:    REVIEW OF SYSTEMS  See HPI. Otherwise, 10 point ROS done and otherwise negative.  >>> <<<    PHYSICAL EXAM:  T(C): 37.7 (23 @ 17:46), Max: 38.1 (23 @ 09:59)  HR: 135 (23 @ 22:00) (131 - 150)  BP: 102/75 (23 @ 22:00) (84/63 - 104/80)  RR: 18 (23 @ 22:00) (18 - 20)  SpO2: 100% (23 @ 22:00) (99% - 100%)  Wt(kg): --    Appearance: Normal	  HEENT:   Normal oral mucosa, PERRL, EOMI	  Lymphatic: No lymphadenopathy  Cardiovascular: Normal S1 S2, No JVD, No murmurs, No edema  Respiratory: Lungs clear to auscultation	  Psychiatry: A & O x 3, Mood & affect appropriate  Gastrointestinal:  Soft, Non-tender, + BS	  Skin: No rashes, No ecchymoses, No cyanosis	  Neurologic: Non-focal, A&Ox3, nonfocal, MILLER x 4  Extremities: Normal range of motion, No clubbing, cyanosis or edema  Vascular: Peripheral pulses palpable 2+ bilaterally    I&O's Summary    	 	  LABS:	 	                          13.1   9.97  )-----------( 119      ( 11 Mar 2023 10:43 )             42.6         136  |  102  |  21  ----------------------------<  119<H>  5.1   |  17<L>  |  0.52    Ca    9.0      11 Mar 2023 10:43    TPro  5.4<L>  /  Alb  2.8<L>  /  TBili  0.8  /  DBili  x   /  AST  34  /  ALT  45<H>  /  AlkPhos  96      LIVER FUNCTIONS - ( 11 Mar 2023 10:43 )  Alb: 2.8 g/dL / Pro: 5.4 g/dL / ALK PHOS: 96 U/L / ALT: 45 U/L / AST: 34 U/L / GGT: x             proBNP:   Lipid Profile:   HgA1c:   TSH:   PT/INR - ( 11 Mar 2023 10:43 )   PT: 16.0 sec;   INR: 1.37 ratio         PTT - ( 11 Mar 2023 10:43 )  PTT:32.5 sec  CARDIAC MARKERS:          Blood Gas Venous - Lactate: 3.8 mmol/L ( @ 14:45)  Lactate, Blood: 4.5 mmol/L ( @ 14:26)  Blood Gas Venous - Lactate: 4.5 mmol/L ( @ 10:43)      CAPILLARY BLOOD GLUCOSE      POCT Blood Glucose.: 96 mg/dL (11 Mar 2023 18:00)    Urinalysis Basic - ( 11 Mar 2023 13:42 )    Color: Jazmin / Appearance: Slightly Turbid / S.028 / pH: x  Gluc: x / Ketone: Trace  / Bili: Negative / Urobili: 3 mg/dL   Blood: x / Protein: 100 mg/dL / Nitrite: Negative   Leuk Esterase: Negative / RBC: 7 /HPF / WBC 8 /HPF   Sq Epi: x / Non Sq Epi: 7 /HPF / Bacteria: Negative            TELEMETRY: 	    ECG:  	  RADIOLOGY:  OTHER: 	    PREVIOUS DIAGNOSTIC TESTING:    [ ] Echocardiogram:  [ ]  Catheterization:  [ ] Stress Test:  	  	                 HISTORY OF PRESENT ILLNESS:  Patient is a 62y old  Male who presents with a chief complaint of     HPI: 61 y/o M with PMH GBM s/p craniotomy w/ resection c/b ESBL meningitis with repeat craniotomy 2020 c/b repeat ESBL meningitis s/p craniectomy,b/t DVT () s/p IVC filter, HTN, anxiety presenting for altered mental status. In ED patient found to be febrile, tachycardic and hypotensive Labs: BNP 2000. Trop 55 --> 61. Lactate 4.5 --> 3.8. Also found to have to have new bilateral DVTs. Patient was seen by vascular surgery who stated neurosurgical approval needed before anticoagulation. Neurosurgery stated MRI needed prior anticoagulation. Cardiology called for tachycardia and elevated troponin iso sepsis    ED course: T 100.5, , BP 84/63, RR 20, SpO2 100% on 8L NC.   CXR with small left pleural effusion and/or left basilar atelectasis. A left basilar pneumonia cannot be excluded.   CTA with no PE and interval resolution of opacities.   CT head Calcification versus hemorrhage left corona radiata likely related to prior neoplasm.           Echo 2017  EF 60-65%      CATH 2016  CORONARY VESSELS: The coronary circulation is right dominant.  LM:   --  LM: Normal.  LAD:   --  LAD: Normal.  CX:   --  Circumflex: Normal.  RCA:   --  RCA: Normal.        Allergies  aspirin (Unknown)  shellfish (Hives)  shellfish (Unknown)  Tegretol (Unknown)    Intolerances  ACE inhibitors (Other)  angiotensin II inhibitors (Other)  	    MEDICATIONS:    Home Medications:   * Patient Currently Takes Medications as of 12-Mar-2023 01:45 documented in Structured Notes  · 	sulfamethoxazole-trimethoprim 800 mg-160 mg oral tablet: Last Dose Taken:  , 1 tab(s) orally every 12 hours  · 	pantoprazole 40 mg oral delayed release tablet: Last Dose Taken:  , 1 tab(s) orally once a day (before a meal)  · 	senna leaf extract oral tablet: Last Dose Taken:  , 2 tab(s) orally once a day (at bedtime)  · 	mirtazapine 7.5 mg oral tablet: Last Dose Taken:  , 1 tab(s) orally once a day (at bedtime)  · 	Vitamin D3 25 mcg (1000 intl units) oral tablet: Last Dose Taken:  , 1 tab(s) orally once a day  · 	Keppra 500 mg oral tablet: Last Dose Taken:  , 1 tab(s) orally 2 times a day  · 	levothyroxine 50 mcg (0.05 mg) oral tablet: Last Dose Taken:  , 1 tab(s) orally once a day  · 	dexamethasone 4 mg oral tablet: Last Dose Taken:  , 1  orally once a day                PAST MEDICAL & SURGICAL HISTORY:  Essential hypertension      Hypertension      Diabetes mellitus      Anxiety      Deep vein thrombosis (DVT)  B/L      Glioblastoma multiforme      Meningitis      Disruption of closure of skull or craniotomy      Presence of IVC filter          FAMILY HISTORY:  Cerebrovascular accident (Father)        SOCIAL HISTORY:      Smoker: [ ] Active [ ] never  [ ] previous  Alcohol:  [ ] social [ ] daily [ ] never  Lives with:   Occupation:    REVIEW OF SYSTEMS  See HPI. Otherwise, 10 point ROS done and otherwise negative.  >>> <<<    PHYSICAL EXAM:  T(C): 37.7 (23 @ 17:46), Max: 38.1 (23 @ 09:59)  HR: 135 (23 @ 22:00) (131 - 150)  BP: 102/75 (23 @ 22:00) (84/63 - 104/80)  RR: 18 (23 @ 22:00) (18 - 20)  SpO2: 100% (23 @ 22:00) (99% - 100%)  Wt(kg): --    Appearance: In NAD . Protective helmet in place 	  HEENT:   Normal oral mucosa,   Lymphatic: No lymphadenopathy  Cardiovascular: Normal S1 S2, No JVD, No murmurs, bilateral 3(+) edema up to hips  Respiratory: Lungs clear , dim in bases  Psychiatry: Alert  Gastrointestinal:  Soft, Non-tender, + BS	  Skin: No rashes, No ecchymoses, No cyanosis	  Neurologic: Non-focal, A&Ox3, nonfocal, MILLER x 4  Extremities: Normal range of motion, No clubbing, cyanosis   Vascular: Peripheral pulses palpable 2+ bilaterally    I&O's Summary    	 	  LABS:	 	                          13.1   9.97  )-----------( 119      ( 11 Mar 2023 10:43 )             42.6     03-11    136  |  102  |  21  ----------------------------<  119<H>  5.1   |  17<L>  |  0.52    Ca    9.0      11 Mar 2023 10:43    TPro  5.4<L>  /  Alb  2.8<L>  /  TBili  0.8  /  DBili  x   /  AST  34  /  ALT  45<H>  /  AlkPhos  96  03-11    LIVER FUNCTIONS - ( 11 Mar 2023 10:43 )  Alb: 2.8 g/dL / Pro: 5.4 g/dL / ALK PHOS: 96 U/L / ALT: 45 U/L / AST: 34 U/L / GGT: x             proBNP:   Lipid Profile:   HgA1c:   TSH:   PT/INR - ( 11 Mar 2023 10:43 )   PT: 16.0 sec;   INR: 1.37 ratio         PTT - ( 11 Mar 2023 10:43 )  PTT:32.5 sec  CARDIAC MARKERS:          Blood Gas Venous - Lactate: 3.8 mmol/L ( @ 14:45)  Lactate, Blood: 4.5 mmol/L ( @ 14:26)  Blood Gas Venous - Lactate: 4.5 mmol/L ( @ 10:43)      CAPILLARY BLOOD GLUCOSE      POCT Blood Glucose.: 96 mg/dL (11 Mar 2023 18:00)    Urinalysis Basic - ( 11 Mar 2023 13:42 )    Color: Jazmin / Appearance: Slightly Turbid / S.028 / pH: x  Gluc: x / Ketone: Trace  / Bili: Negative / Urobili: 3 mg/dL   Blood: x / Protein: 100 mg/dL / Nitrite: Negative   Leuk Esterase: Negative / RBC: 7 /HPF / WBC 8 /HPF   Sq Epi: x / Non Sq Epi: 7 /HPF / Bacteria: Negative

## 2023-03-11 NOTE — H&P ADULT - ATTENDING COMMENTS
62 years old male, with past history as noted above, being managed for Sepsis, likely due to meningitis versus pneumonia.  Presented with altered mental status, fever, tachycardia and hypotension.  Initially received meropenem; continuing with meropenem and adding vancomycin.  Seen by Neurology team due to CT of the brain finding suspicion for calcification versus hemorrhage (appreciated).  F/u cultures.  ID consult in the AM (please call).  Ensure optimal hydration; has elevated lactate (4.5 --> 3.8).  With suspicion for new DVT, f/u US of the lower extremities; if positive, will need vascular and neurosurgical consults re anticoagulation.    All other management as prescribed.

## 2023-03-11 NOTE — ED PROVIDER NOTE - OBJECTIVE STATEMENT
62-year-old male with a history diabetes glioblastoma multiforme status post craniectomy in the past complicated by meningitis multiple times presents with altered mental status from her surgery which rehabilitations, where his subcutaneous heparin was stopped, despite known DVTs.  Patient comes in febrile tachycardic and tachypneic denying any chest pain or shortness of breath or fevers.  Only saying no to questions.

## 2023-03-11 NOTE — H&P ADULT - PROBLEM SELECTOR PLAN 4
Diet: NPO for now given AMS, S+S pending   DVT prophylaxis: lovenox?   Dispo: likely Chris Diet: NPO for now given AMS, S+S pending   DVT prophylaxis: heparin subq   Dispo: likely Chris Patient with elevated BUN/creatinine ratio   - given 1L fluids in ED, will give an additional 500cc and continue to monitor Patient with elevated BUN/creatinine ratio   Lactate = 4.5 --> 3.8 after initial IVF  - given 1L fluids total in the ED, will give an additional 500cc and continue to monitor

## 2023-03-11 NOTE — H&P ADULT - PROBLEM SELECTOR PLAN 1
Patient Patient febrile, tachycardic and hypotensive on admission. Patient answering yes/no questions but altered according to Chris   - UA negative, CT with resolution of prior lung opacities   - patient with a history of meningitis x2, given meropenem in ED   - blood and urine cultures pending   - given history of craniotomy, will treat empirically for healthcare associated meningitis with vancomycin and meropenem Patient febrile, tachycardic and hypotensive on admission. Patient answering yes/no questions but altered according to Chris   - UA negative, CT with resolution of prior lung opacities   - patient with a history of meningitis x2, given meropenem in ED   - blood and urine cultures pending   - given history of craniotomy, will treat empirically for healthcare associated meningitis with vancomycin and meropenem  - troponin elevation and lactate likely secondary to infection, will trend Patient febrile, tachycardic and hypotensive on admission. Patient answering yes/no questions but altered according to Chris   - UA negative, CT with resolution of prior lung opacities   - patient with a history of meningitis x2, given meropenem in ED   - blood and urine cultures pending   - given history of craniotomy, will treat empirically for healthcare associated meningitis with vancomycin and meropenem  - troponin elevation and lactate likely secondary to infection, will trend  - f/u with family to see if LP within GOC Patient febrile, tachycardic and hypotensive on admission. Patient answering yes/no questions but altered according to Chris   - UA negative, CT with resolution of prior lung opacities   - patient with a history of meningitis x2, given meropenem in ED   - blood and urine cultures pending   - given history of craniotomy, will treat empirically for healthcare associated meningitis with vancomycin and meropenem  - troponin elevation and lactate likely secondary to infection, will trend  - f/u with family to see if LP within GOC  - ID consult in AM   - holding chronic bactrim ppx as patient on vanc and nancy Patient febrile, tachycardic and hypotensive on admission. Patient answering yes/no questions but altered (not as responsive as at baseline) according to Chris   - UA negative, CT with resolution of prior lung opacities   - suspicion for meningitis versus pneumonia  - patient with a history of meningitis x2, given meropenem in ED   - blood and urine cultures pending   - given history of craniotomy, will treat empirically for healthcare associated meningitis with vancomycin and meropenem  - troponin elevation and lactate likely secondary to infection, will trend  - f/u with family to see if LP within GOC  - ID consult in AM   - holding chronic bactrim ppx as patient on vanc and nancy

## 2023-03-11 NOTE — ED PROVIDER NOTE - PROGRESS NOTE DETAILS
XIMENA Young PGY2 bedside echo with poor windows, but IVC and RV appear underfilled. will give additional fluids. meropenem ordered empirically due to hx ESBL infections in the past. XIMENA Young PGY2 received a critical lab call for lactate 5.2 Sister Romana updated on the phone on labs Patient presents without a focus.  Pending neurosurgical evaluation to see if that is a source given his history of meningitis in the past.  Pending admission for for sepsis XIMENA Young PGY2 spoke to neurosurgery. resident has a low suspicion for shunt infection based on ventricular size. will evaluate in ED. XIMENA Young PGY2 spoke to neurosurgery team. they have no interventions as pt is hospice and the diagnosis and treatment of infected  shunt would not be compatible with pt and family goals of care. also low suspicion for shunt infection as the shunt was placed remotely. hospitalist paged for admission. XIMENA Young PGY2 spoke to patients sister partha sebastian who reiterates that the patient and family goals of care are medical management only at this time. Arturo Bass- spoke to hospitalist Dr. Rodriguez to admit. -130's she will have MAR evaluate first. Arturo Bass- spoke to Dr. Fang, will admit, request cardiology consult. will admit. cards notified.

## 2023-03-11 NOTE — H&P ADULT - NSHPPHYSICALEXAM_GEN_ALL_CORE
PHYSICAL EXAM:    GENERAL: NAD  HEENT:  Wearing head brace   CHEST/LUNG: Chest rise equal bilaterally, lungs clear to ascultation with decreased breath sounds at bases  HEART: Regular rate and rhythm, normal S1 S2  ABDOMEN: Soft, Nontender, Nondistended  EXTREMITIES:  Bilateral pitting edema from ankles to hips   PSYCH: alert, answers yes/no questions   SKIN: No obvious rashes or lesions  NEURO: shakes head no when asked to follow commands PHYSICAL EXAM:    GENERAL: NAD  HEENT:  Wearing head brace   CHEST/LUNG: Chest rise equal bilaterally, lungs clear to ascultation with decreased breath sounds at bases  HEART: Tachycardic, normal S1 S2  ABDOMEN: Soft, Nontender, Nondistended  EXTREMITIES:  Bilateral pitting edema from ankles to hips   PSYCH: alert, answers yes/no questions   SKIN: No obvious rashes or lesions  NEURO: shakes head no when asked to follow commands PHYSICAL EXAM:    GENERAL: Adequately groomed male, lying in bed in NAD  HEENT:  Wearing head brace.  Keeps eyes closed  CHEST/LUNG: Chest rise equal bilaterally, lungs clear to ascultation with decreased breath sounds at bases  HEART: Tachycardic, normal S1 S2  ABDOMEN: Soft, Nontender, Nondistended  EXTREMITIES:  Bilateral pitting edema from ankles to hips   PSYCH: alert, answers yes/no questions.  SKIN: No obvious rashes or lesions  NEURO: shakes head no when asked to follow commands.  Responsive to tactile sensation

## 2023-03-11 NOTE — CONSULT NOTE ADULT - NS ATTEND AMEND GEN_ALL_CORE FT
Type II MI in setting of an acute medical illness.  Agree with echo; if LV function is normal, no plan for further cardiac testing.

## 2023-03-11 NOTE — ED ADULT NURSE REASSESSMENT NOTE - NS ED NURSE REASSESS COMMENT FT1
straight catheter  performed via sterile procedure. 100CCs withdrawn. urine is dark tayler. MD Rosenberg  made aware.

## 2023-03-11 NOTE — ED ADULT NURSE REASSESSMENT NOTE - NS ED NURSE REASSESS COMMENT FT1
PT is sleeping. NAD. respirations ar even and un labored. safety precautions maintained. call bell at bedside.

## 2023-03-11 NOTE — H&P ADULT - HISTORY OF PRESENT ILLNESS
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     from her surgery which rehabilitations, where his subcutaneous heparin was stopped, despite known DVTs.     A+Ox2-3 on discharge from prior hospitalization but A+Ox0 on admission.     ED course: febrile to 100.5, , BP 84/63, RR 20, SpO2 100% on 8L NC. Labs with EBC 9.97 but with 84.3% neutrophils. BNP 2000. Trop 55 --> 61. Lactate 4.5 --> 3.8. UA with no bacteria. RVP negative. CXR with small left pleural effusion and/or left basilar atelectasis. A left basilar pneumonia cannot be excluded. CTA with no PE and interval resolution of opacities. CT head with  Left frontal vasogenic edema with mass effect and midline shift to the right which is less when compared with 9/29/2022. Calcification versus hemorrhage left corona radiata likely related to prior neoplasm. Hematocrit level left occipital horn.    Given tylenol, fent, meropenem, 1L fluids.  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     Patient had decreased O2 saturations, and was tachycardic with decreased responsiveness.   Bilateral DVT, [facility] stopped his prophylaxis lovenox. Supposed to be just on prophylaxis. 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 Vascular surgery team came to see patient- stated he should not be on therapeutic unless neurosurgery agrees. Neurosurgery (Dr. Lal) said he needs another MRI scan prior to starting therapeutic anticoagulation.     from her surgery which rehabilitations, where his subcutaneous heparin was stopped, despite known DVTs.     A+Ox2-3 on discharge from prior hospitalization but A+Ox0 on admission.     ED course: febrile to 100.5, , BP 84/63, RR 20, SpO2 100% on 8L NC. Labs with EBC 9.97 but with 84.3% neutrophils. BNP 2000. Trop 55 --> 61. Lactate 4.5 --> 3.8. UA with no bacteria. RVP negative. CXR with small left pleural effusion and/or left basilar atelectasis. A left basilar pneumonia cannot be excluded. CTA with no PE and interval resolution of opacities. CT head with  Left frontal vasogenic edema with mass effect and midline shift to the right which is less when compared with 9/29/2022. Calcification versus hemorrhage left corona radiata likely related to prior neoplasm. Hematocrit level left occipital horn.    Given tylenol, fent, meropenem, 1L fluids.  63 y/o M with PMH GBM s/p craniotomy w/ resection c/b ESBL meningitis with repeat craniotomy 2/2020 c/b repeat ESBL meningitis s/p craniectomy,b/t DVT (2020) s/p IVC filter, HTN, anxiety presenting for altered mental status.     Spoke with patient's sister Maribel who stated that patient's baseline mental status is answering yes/no questions. Patient resides at [facility] and his sister was called by the facility tonight because the patient had decreased oxygen saturation, was tachycardic and was not as responsive as usual.     Per sister patient had COVID a few weeks ago. He has a history of bilateral DVTs in 2020     Patient had decreased O2 saturations, and was tachycardic with decreased responsiveness.   Bilateral DVT, [facility] stopped his prophylaxis lovenox. Supposed to be just on prophylaxis. 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 Vascular surgery team came to see patient- stated he should not be on therapeutic unless neurosurgery agrees. Neurosurgery (Dr. Lal) said he needs another MRI scan prior to starting therapeutic anticoagulation.     from her surgery which rehabilitations, where his subcutaneous heparin was stopped, despite known DVTs.     A+Ox2-3 on discharge from prior hospitalization but A+Ox0 on admission.     ED course: febrile to 100.5, , BP 84/63, RR 20, SpO2 100% on 8L NC. Labs with EBC 9.97 but with 84.3% neutrophils. BNP 2000. Trop 55 --> 61. Lactate 4.5 --> 3.8. UA with no bacteria. RVP negative. CXR with small left pleural effusion and/or left basilar atelectasis. A left basilar pneumonia cannot be excluded. CTA with no PE and interval resolution of opacities. CT head with  Left frontal vasogenic edema with mass effect and midline shift to the right which is less when compared with 9/29/2022. Calcification versus hemorrhage left corona radiata likely related to prior neoplasm. Hematocrit level left occipital horn.    Given tylenol, fent, meropenem, 1L fluids.  63 y/o M with PMH GBM s/p craniotomy w/ resection c/b ESBL meningitis with repeat craniotomy 2/2020 c/b repeat ESBL meningitis s/p craniectomy,b/t DVT (2020) s/p IVC filter, HTN, anxiety presenting for altered mental status.     Spoke with patient's sister Maribel who stated that patient's baseline mental status is answering yes/no questions. Patient resides at [facility] and his sister was called by the facility tonight because the patient had decreased oxygen saturation, was tachycardic and was not as responsive as usual.     Per sister patient had COVID a few weeks ago. 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 Patient was supposed to be on prophylactic lovenox but according to sister it was accidentally discontinued. On Tuesday of this past week patient was noted to have worsening bilateral lower extremity edema. Patient had ultrasounds which showed new bilateral DVTs. Patient was seen by vascular surgery who stated that he needed neurosurgical approval to be placed on therapeutic anticoagulation. Neurosurgery was contacted and stated patient needs another MRI prior to starting therapeutic anticoagulation.     Patient currently alert, answering only yes/no questions. Shakes his head no when asked to complete neuro exam. Shakes his head no when asked if he has any complaints.     ED course: febrile to 100.5, , BP 84/63, RR 20, SpO2 100% on 8L NC. Labs with EBC 9.97 but with 84.3% neutrophils. BNP 2000. Trop 55 --> 61. Lactate 4.5 --> 3.8. UA with no bacteria. RVP negative. CXR with small left pleural effusion and/or left basilar atelectasis. A left basilar pneumonia cannot be excluded. CTA with no PE and interval resolution of opacities. CT head with  Left frontal vasogenic edema with mass effect and midline shift to the right which is less when compared with 9/29/2022. Calcification versus hemorrhage left corona radiata likely related to prior neoplasm. Hematocrit level left occipital horn. Given tylenol, fent, meropenem, 1L fluids.  63 y/o M with PMH GBM s/p craniotomy w/ resection c/b ESBL meningitis with repeat craniotomy 2/2020 c/b repeat ESBL meningitis s/p craniectomy,b/t DVT (2020) s/p IVC filter, HTN, anxiety presenting for altered mental status.     Spoke with patient's sister Maribel who stated that patient's baseline mental status is answering yes/no questions. Patient resides at [place] and his sister was called by the facility tonight because the patient had decreased oxygen saturation, was tachycardic and was not as responsive as usual.     Per sister patient had COVID a few weeks ago. 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 Patient was supposed to be on prophylactic lovenox but according to sister it was accidentally discontinued. On Tuesday of this past week patient was noted to have worsening bilateral lower extremity edema. Patient had ultrasounds which showed new bilateral DVTs. Patient was seen by vascular surgery who stated that he needed neurosurgical approval to be placed on therapeutic anticoagulation. Neurosurgery was contacted and stated patient needs another MRI prior to starting therapeutic anticoagulation.     Patient currently alert, answering only yes/no questions. Shakes his head no when asked to complete neuro exam. Shakes his head no when asked if he has any complaints.     ED course: febrile to 100.5, , BP 84/63, RR 20, SpO2 100% on 8L NC. Labs with WBC 9.97 but with 84.3% neutrophils. BNP 2000. Trop 55 --> 61. Lactate 4.5 --> 3.8. UA with no bacteria. RVP negative. CXR with small left pleural effusion and/or left basilar atelectasis. A left basilar pneumonia cannot be excluded. CTA with no PE and interval resolution of opacities. CT head with  Left frontal vasogenic edema with mass effect and midline shift to the right which is less when compared with 9/29/2022. Calcification versus hemorrhage left corona radiata likely related to prior neoplasm. Hematocrit level left occipital horn. Given tylenol, fent, meropenem, 1L fluids.

## 2023-03-11 NOTE — ED PROVIDER NOTE - PHYSICAL EXAMINATION
General: non-toxic, NAD  HEENT: NCAT, PERRL, soft helmet in place. looking around, fixing gaze on practitioners.   Cardiac: tachycardic no murmurs, 2+ peripheral pulses. brisk cap refill   Resp: CTAB  Abdomen: soft, non-distended, bowel sounds present, no ttp, no rebound or guarding. no organomegaly  Extremities: bilateral significant pitting peripheral edema, no calf tenderness, or leg size discrepancies  Skin: no rashes  Neuro: AAOx1, moving left upper extremity only. cannot determine sensation.   Psych: calm

## 2023-03-11 NOTE — H&P ADULT - PROBLEM SELECTOR PLAN 6
Diet: NPO for now given AMS, bedside dysphagia and S+S pending   DVT prophylaxis: heparin subq   Dispo: likely [facility] Platelets = 119  - unclear etiology, but could be due to infection  - f/u trend w/ repeat lab-work, especially since on DVT prophylaxis of heparin SQ  - f/u folate, Vit B12, TSH levels Platelets = 119  - unclear etiology, but could be due to infection  - f/u trend w/ repeat lab-work, especially since on DVT prophylaxis of heparin SQ  - f/u for any signs of bleeding  - f/u folate, Vit B12, TSH levels

## 2023-03-11 NOTE — H&P ADULT - NSICDXPASTSURGICALHX_GEN_ALL_CORE_FT
PAST SURGICAL HISTORY:  Disruption of closure of skull or craniotomy     Presence of IVC filter      PAST SURGICAL HISTORY:  Disruption of closure of skull or craniotomy     Presence of IVC filter     Status post craniectomy

## 2023-03-11 NOTE — ED ADULT NURSE NOTE - OBJECTIVE STATEMENT
A&Ox2. non ambulatory. sent by NH for AMS. NAD. pt denies SOB, chest pain, dizziness, weakness, urinary symptoms, HA, n/v/d, fevers, chills. positive bilateral lower extremity edema. right forearm has purple discoloration. positive capillary refill and pulse. right arm has purple discoloration. positive pulse and capillary refill. discoloration. PT has soft helmet on. abd is soft an non distended. skin intact. 18g placed via US by MD. labs drawn and sent. EKG obtained. placed on cardiac monitor, sinus tach. safety precautions maintained.

## 2023-03-12 DIAGNOSIS — D69.6 THROMBOCYTOPENIA, UNSPECIFIED: ICD-10-CM

## 2023-03-12 DIAGNOSIS — R65.10 SYSTEMIC INFLAMMATORY RESPONSE SYNDROME (SIRS) OF NON-INFECTIOUS ORIGIN WITHOUT ACUTE ORGAN DYSFUNCTION: ICD-10-CM

## 2023-03-12 DIAGNOSIS — R79.89 OTHER SPECIFIED ABNORMAL FINDINGS OF BLOOD CHEMISTRY: ICD-10-CM

## 2023-03-12 DIAGNOSIS — R78.81 BACTEREMIA: ICD-10-CM

## 2023-03-12 DIAGNOSIS — R09.89 OTHER SPECIFIED SYMPTOMS AND SIGNS INVOLVING THE CIRCULATORY AND RESPIRATORY SYSTEMS: ICD-10-CM

## 2023-03-12 DIAGNOSIS — Z98.890 OTHER SPECIFIED POSTPROCEDURAL STATES: Chronic | ICD-10-CM

## 2023-03-12 DIAGNOSIS — Z29.9 ENCOUNTER FOR PROPHYLACTIC MEASURES, UNSPECIFIED: ICD-10-CM

## 2023-03-12 DIAGNOSIS — A41.9 SEPSIS, UNSPECIFIED ORGANISM: ICD-10-CM

## 2023-03-12 DIAGNOSIS — E86.0 DEHYDRATION: ICD-10-CM

## 2023-03-12 LAB
ALBUMIN SERPL ELPH-MCNC: 2.7 G/DL — LOW (ref 3.3–5)
ALP SERPL-CCNC: 91 U/L — SIGNIFICANT CHANGE UP (ref 40–120)
ALT FLD-CCNC: 27 U/L — SIGNIFICANT CHANGE UP (ref 4–41)
ANION GAP SERPL CALC-SCNC: 12 MMOL/L — SIGNIFICANT CHANGE UP (ref 7–14)
AST SERPL-CCNC: 13 U/L — SIGNIFICANT CHANGE UP (ref 4–40)
BASE EXCESS BLDV CALC-SCNC: 0.7 MMOL/L — SIGNIFICANT CHANGE UP (ref -2–3)
BILIRUB SERPL-MCNC: 0.4 MG/DL — SIGNIFICANT CHANGE UP (ref 0.2–1.2)
BUN SERPL-MCNC: 22 MG/DL — SIGNIFICANT CHANGE UP (ref 7–23)
CA-I SERPL-SCNC: 1.28 MMOL/L — SIGNIFICANT CHANGE UP (ref 1.15–1.33)
CALCIUM SERPL-MCNC: 8.9 MG/DL — SIGNIFICANT CHANGE UP (ref 8.4–10.5)
CHLORIDE BLDV-SCNC: 105 MMOL/L — SIGNIFICANT CHANGE UP (ref 96–108)
CHLORIDE SERPL-SCNC: 106 MMOL/L — SIGNIFICANT CHANGE UP (ref 98–107)
CK MB BLD-MCNC: 2.9 % — HIGH (ref 0–2.5)
CK MB CFR SERPL CALC: 1.5 NG/ML — SIGNIFICANT CHANGE UP
CK SERPL-CCNC: 51 U/L — SIGNIFICANT CHANGE UP (ref 30–200)
CO2 BLDV-SCNC: 26.6 MMOL/L — HIGH (ref 22–26)
CO2 SERPL-SCNC: 23 MMOL/L — SIGNIFICANT CHANGE UP (ref 22–31)
CREAT SERPL-MCNC: 0.44 MG/DL — LOW (ref 0.5–1.3)
CULTURE RESULTS: NO GROWTH — SIGNIFICANT CHANGE UP
EGFR: 119 ML/MIN/1.73M2 — SIGNIFICANT CHANGE UP
GAS PNL BLDV: 135 MMOL/L — LOW (ref 136–145)
GAS PNL BLDV: SIGNIFICANT CHANGE UP
GLUCOSE BLDV-MCNC: 76 MG/DL — SIGNIFICANT CHANGE UP (ref 70–99)
GLUCOSE SERPL-MCNC: 76 MG/DL — SIGNIFICANT CHANGE UP (ref 70–99)
GRAM STN FLD: SIGNIFICANT CHANGE UP
GRAM STN FLD: SIGNIFICANT CHANGE UP
HCO3 BLDV-SCNC: 25 MMOL/L — SIGNIFICANT CHANGE UP (ref 22–29)
HCT VFR BLD CALC: 37.9 % — LOW (ref 39–50)
HCT VFR BLDA CALC: 38 % — LOW (ref 39–51)
HGB BLD CALC-MCNC: 12.7 G/DL — SIGNIFICANT CHANGE UP (ref 12.6–17.4)
HGB BLD-MCNC: 11.8 G/DL — LOW (ref 13–17)
LACTATE BLDV-MCNC: 1.8 MMOL/L — SIGNIFICANT CHANGE UP (ref 0.5–2)
LACTATE BLDV-MCNC: 2.2 MMOL/L — HIGH (ref 0.5–2)
LACTATE SERPL-SCNC: 1.5 MMOL/L — SIGNIFICANT CHANGE UP (ref 0.5–2)
MAGNESIUM SERPL-MCNC: 2.2 MG/DL — SIGNIFICANT CHANGE UP (ref 1.6–2.6)
MCHC RBC-ENTMCNC: 31.1 GM/DL — LOW (ref 32–36)
MCHC RBC-ENTMCNC: 32 PG — SIGNIFICANT CHANGE UP (ref 27–34)
MCV RBC AUTO: 102.7 FL — HIGH (ref 80–100)
METHOD TYPE: SIGNIFICANT CHANGE UP
NRBC # BLD: 0 /100 WBCS — SIGNIFICANT CHANGE UP (ref 0–0)
NRBC # FLD: 0 K/UL — SIGNIFICANT CHANGE UP (ref 0–0)
NT-PROBNP SERPL-SCNC: 1532 PG/ML — HIGH
P AERUGINOSA DNA BLD POS NAA+NON-PROBE: SIGNIFICANT CHANGE UP
PCO2 BLDV: 40 MMHG — LOW (ref 42–55)
PH BLDV: 7.41 — SIGNIFICANT CHANGE UP (ref 7.32–7.43)
PHOSPHATE SERPL-MCNC: 3.1 MG/DL — SIGNIFICANT CHANGE UP (ref 2.5–4.5)
PLATELET # BLD AUTO: 102 K/UL — LOW (ref 150–400)
PO2 BLDV: 52 MMHG — HIGH (ref 25–45)
POTASSIUM BLDV-SCNC: 3.7 MMOL/L — SIGNIFICANT CHANGE UP (ref 3.5–5.1)
POTASSIUM SERPL-MCNC: 3.8 MMOL/L — SIGNIFICANT CHANGE UP (ref 3.5–5.3)
POTASSIUM SERPL-SCNC: 3.8 MMOL/L — SIGNIFICANT CHANGE UP (ref 3.5–5.3)
PROT SERPL-MCNC: 4.9 G/DL — LOW (ref 6–8.3)
RBC # BLD: 3.69 M/UL — LOW (ref 4.2–5.8)
RBC # FLD: 14.4 % — SIGNIFICANT CHANGE UP (ref 10.3–14.5)
SAO2 % BLDV: 84.9 % — SIGNIFICANT CHANGE UP (ref 67–88)
SODIUM SERPL-SCNC: 141 MMOL/L — SIGNIFICANT CHANGE UP (ref 135–145)
SPECIMEN SOURCE: SIGNIFICANT CHANGE UP
TROPONIN T, HIGH SENSITIVITY RESULT: 37 NG/L — SIGNIFICANT CHANGE UP
TROPONIN T, HIGH SENSITIVITY RESULT: 48 NG/L — SIGNIFICANT CHANGE UP
TSH SERPL-MCNC: 1.91 UIU/ML — SIGNIFICANT CHANGE UP (ref 0.27–4.2)
WBC # BLD: 8.83 K/UL — SIGNIFICANT CHANGE UP (ref 3.8–10.5)
WBC # FLD AUTO: 8.83 K/UL — SIGNIFICANT CHANGE UP (ref 3.8–10.5)

## 2023-03-12 PROCEDURE — 12345: CPT | Mod: NC

## 2023-03-12 PROCEDURE — 99223 1ST HOSP IP/OBS HIGH 75: CPT | Mod: GC

## 2023-03-12 PROCEDURE — 99222 1ST HOSP IP/OBS MODERATE 55: CPT | Mod: FS

## 2023-03-12 RX ORDER — MEROPENEM 1 G/30ML
2000 INJECTION INTRAVENOUS EVERY 8 HOURS
Refills: 0 | Status: DISCONTINUED | OUTPATIENT
Start: 2023-03-12 | End: 2023-03-15

## 2023-03-12 RX ORDER — DEXAMETHASONE 0.5 MG/5ML
4 ELIXIR ORAL DAILY
Refills: 0 | Status: DISCONTINUED | OUTPATIENT
Start: 2023-03-12 | End: 2023-03-16

## 2023-03-12 RX ORDER — DEXAMETHASONE 0.5 MG/5ML
1 ELIXIR ORAL
Qty: 0 | Refills: 0 | DISCHARGE

## 2023-03-12 RX ORDER — LEVETIRACETAM 250 MG/1
500 TABLET, FILM COATED ORAL
Refills: 0 | Status: DISCONTINUED | OUTPATIENT
Start: 2023-03-12 | End: 2023-03-12

## 2023-03-12 RX ORDER — HEPARIN SODIUM 5000 [USP'U]/ML
5000 INJECTION INTRAVENOUS; SUBCUTANEOUS EVERY 12 HOURS
Refills: 0 | Status: DISCONTINUED | OUTPATIENT
Start: 2023-03-12 | End: 2023-03-13

## 2023-03-12 RX ORDER — DEXAMETHASONE 0.5 MG/5ML
0 ELIXIR ORAL
Qty: 0 | Refills: 0 | DISCHARGE

## 2023-03-12 RX ORDER — LEVOTHYROXINE SODIUM 125 MCG
37.5 TABLET ORAL AT BEDTIME
Refills: 0 | Status: DISCONTINUED | OUTPATIENT
Start: 2023-03-12 | End: 2023-03-16

## 2023-03-12 RX ORDER — DEXAMETHASONE 0.5 MG/5ML
1 ELIXIR ORAL DAILY
Refills: 0 | Status: DISCONTINUED | OUTPATIENT
Start: 2023-03-12 | End: 2023-03-12

## 2023-03-12 RX ORDER — SODIUM CHLORIDE 9 MG/ML
500 INJECTION INTRAMUSCULAR; INTRAVENOUS; SUBCUTANEOUS ONCE
Refills: 0 | Status: COMPLETED | OUTPATIENT
Start: 2023-03-12 | End: 2023-03-12

## 2023-03-12 RX ORDER — LEVOTHYROXINE SODIUM 125 MCG
50 TABLET ORAL DAILY
Refills: 0 | Status: DISCONTINUED | OUTPATIENT
Start: 2023-03-12 | End: 2023-03-12

## 2023-03-12 RX ORDER — LEVETIRACETAM 250 MG/1
1 TABLET, FILM COATED ORAL
Qty: 0 | Refills: 0 | DISCHARGE

## 2023-03-12 RX ORDER — PANTOPRAZOLE SODIUM 20 MG/1
40 TABLET, DELAYED RELEASE ORAL
Refills: 0 | Status: DISCONTINUED | OUTPATIENT
Start: 2023-03-12 | End: 2023-03-12

## 2023-03-12 RX ORDER — LEVETIRACETAM 250 MG/1
500 TABLET, FILM COATED ORAL EVERY 12 HOURS
Refills: 0 | Status: DISCONTINUED | OUTPATIENT
Start: 2023-03-12 | End: 2023-03-16

## 2023-03-12 RX ORDER — LEVOTHYROXINE SODIUM 125 MCG
1 TABLET ORAL
Qty: 0 | Refills: 0 | DISCHARGE

## 2023-03-12 RX ORDER — VANCOMYCIN HCL 1 G
1250 VIAL (EA) INTRAVENOUS EVERY 12 HOURS
Refills: 0 | Status: DISCONTINUED | OUTPATIENT
Start: 2023-03-12 | End: 2023-03-12

## 2023-03-12 RX ORDER — DEXAMETHASONE 0.5 MG/5ML
4 ELIXIR ORAL DAILY
Refills: 0 | Status: DISCONTINUED | OUTPATIENT
Start: 2023-03-12 | End: 2023-03-12

## 2023-03-12 RX ORDER — CHOLECALCIFEROL (VITAMIN D3) 125 MCG
1 CAPSULE ORAL
Qty: 0 | Refills: 0 | DISCHARGE

## 2023-03-12 RX ORDER — MIRTAZAPINE 45 MG/1
7.5 TABLET, ORALLY DISINTEGRATING ORAL AT BEDTIME
Refills: 0 | Status: DISCONTINUED | OUTPATIENT
Start: 2023-03-12 | End: 2023-03-12

## 2023-03-12 RX ADMIN — Medication 37.5 MICROGRAM(S): at 22:54

## 2023-03-12 RX ADMIN — HEPARIN SODIUM 5000 UNIT(S): 5000 INJECTION INTRAVENOUS; SUBCUTANEOUS at 07:05

## 2023-03-12 RX ADMIN — SODIUM CHLORIDE 500 MILLILITER(S): 9 INJECTION INTRAMUSCULAR; INTRAVENOUS; SUBCUTANEOUS at 01:00

## 2023-03-12 RX ADMIN — Medication 4 MILLIGRAM(S): at 06:34

## 2023-03-12 RX ADMIN — LEVETIRACETAM 400 MILLIGRAM(S): 250 TABLET, FILM COATED ORAL at 06:07

## 2023-03-12 RX ADMIN — Medication 166.67 MILLIGRAM(S): at 06:34

## 2023-03-12 RX ADMIN — HEPARIN SODIUM 5000 UNIT(S): 5000 INJECTION INTRAVENOUS; SUBCUTANEOUS at 18:22

## 2023-03-12 RX ADMIN — MEROPENEM 280 MILLIGRAM(S): 1 INJECTION INTRAVENOUS at 08:16

## 2023-03-12 RX ADMIN — MEROPENEM 280 MILLIGRAM(S): 1 INJECTION INTRAVENOUS at 18:44

## 2023-03-12 RX ADMIN — LEVETIRACETAM 400 MILLIGRAM(S): 250 TABLET, FILM COATED ORAL at 18:22

## 2023-03-12 NOTE — PROGRESS NOTE ADULT - PROBLEM SELECTOR PLAN 6
Diet: NPO for now given AMS, bedside dysphagia and S+S pending   DVT prophylaxis: heparin subq   Dispo: likely [facility] Elevated BNP on admission   - echo in 2017 with EF 60-65% normal LV function   - CXR with small left pleural effusion   - will continue to monitor fluid status Elevated BNP on admission   - echo in 2017 with EF 60-65% normal LV function   - CXR with small left pleural effusion   - will continue to monitor fluid status  - Repeat TTE

## 2023-03-12 NOTE — PATIENT PROFILE ADULT - FALL HARM RISK - HARM RISK INTERVENTIONS

## 2023-03-12 NOTE — CONSULT NOTE ADULT - ASSESSMENT
62 year old Male, [facility] resident, with PMHx of glioblastoma s/p craniotomy with resection c/b ESBL meningitis with craniotomy washout 2/2020 and 8 weeks of Meropenem, recurrent infection s/p repeat washout and 10 weeks of Meropenem followed by suppressive PO Bactrim s/p craniectomy,  shunt, b/l DVT (2020) s/p IVC filter ( supposed to be on Lovenox but ?accidently discontinued per sister), HTN, anxiety presenting for altered mental status, unresponsiveness, tachycardia and desaturation.    Febrile in ER to 100.5  Tachycardic   Hypotensive   SpO2 100% on 8L NC  Lactic acidosis     Workup:  No leukocytosis   Neutrophilia   Bands 7%    UA: 8 WBC, 7 epithelial cells, neg bacteria, neg LE and Nitrite   RVP neg   CXR: small left pleural effusion and/or left basilar atelectasis. A left basilar pneumonia cannot be excluded  CTA: no PE and interval resolution of opacities  CT head: Left frontal vasogenic edema with mass effect and midline shift to the right which is less when compared with 9/29/2022. Calcification versus hemorrhage left corona radiata likely related to prior neoplasm. Hematocrit level left occipital horn  Blood Cx: pseudomonas 2/4 bottles     #Pseudomonas aeruginosa bacteremia, ?infected  shunt   #Acute encephalopathy, fever    Recommendations:   Continue Meropenem 2 g IV q 8hrs    NS on board, ?no role for shunt tap   Patient on hospice    PT TO BE SEEN. PRELIM NOTE  PENDING RECS. PLEASE WAIT FOR FINAL RECS AFTER DISCUSSION WITH ATTENDING#           62 year old Male, [facility] resident, with PMHx of glioblastoma s/p craniotomy with resection c/b ESBL meningitis with craniotomy washout 2/2020 and 8 weeks of Meropenem, recurrent infection s/p repeat washout and 10 weeks of Meropenem followed by suppressive PO Bactrim s/p craniectomy,  shunt, b/l DVT (2020) s/p IVC filter ( supposed to be on Lovenox but ?accidently discontinued per sister), HTN, anxiety presenting for altered mental status, unresponsiveness, tachycardia and desaturation.    Febrile in ER to 100.5  Tachycardic   Hypotensive   SpO2 100% on 8L NC  Lactic acidosis     Workup:  No leukocytosis   Neutrophilia   Bands 7%    UA: 8 WBC, 7 epithelial cells, neg bacteria, neg LE and Nitrite   RVP neg   CXR: small left pleural effusion and/or left basilar atelectasis. A left basilar pneumonia cannot be excluded  CTA: no PE and interval resolution of opacities  CT head: Left frontal vasogenic edema with mass effect and midline shift to the right which is less when compared with 9/29/2022. Calcification versus hemorrhage left corona radiata likely related to prior neoplasm. Hematocrit level left occipital horn  Blood Cx: pseudomonas 2/4 bottles     #Acute encephalopathy, fevers, Pseudomonas aeruginosa bacteremia, source ?infected  shunt, UTI, pneumonia, ?intraabdominal source     Recommendations:   Continue Meropenem 2 g IV q 8hrs    Discontinue Vancomycin   F/up urine culture, if negative will need shunt tap and CT AP to look for source of bacteremia   NS following   Palliative care     Seen and discussed with Dr Fay Hooker MD, PGY4  ID fellow  [employer] Teams Preferred  After 5pm/weekends call [phone]

## 2023-03-12 NOTE — PROGRESS NOTE ADULT - PROBLEM SELECTOR PLAN 3
Patient with history of bilateral DVTs s/p IVC filter. On Tuesday patient with new DVTs per daughter, per neurosurgery outpatient needed repeat MRI prior to therapeutic anticoagulation   - will get bilateral DVT scans and if positive will likely need vascular and neurosurgery input prior to initiating anticoagulation Patient with history of GBM s/p multiple craniotomies   - CT head with left frontal vasogenic edema with mass effect and midline shift to the right which is less when compared with 9/29/2022. Calcification versus hemorrhage left corona radiata likely related to prior neoplasm. Hematocrit level left occipital horn.   - Seen by neurosurgery, appreciate recommendations   - No neurosurgical intervention and no role for shunt tap per neurosurgery  - will c/w outpatient steroids

## 2023-03-12 NOTE — PROGRESS NOTE ADULT - ATTENDING COMMENTS
61 y/o M with baseline mental status responds yes or no by shaking head PMH GBM s/p craniotomy w/ resection c/b ESBL meningitis with repeat craniotomy 2/2020 c/b repeat ESBL meningitis s/p craniectomy, b/t DVT (2020) s/p IVC filter, HTN, anxiety recent COVID presenting for fever 100.5, hypotension, and worsening B/l LE edema. Elevated trop CTA neg for PE and resolution of opacities 2/4. CT head with  Left frontal vasogenic edema with mass effect and midline shift to the right which is less when compared with 9/29/2022. Calcification versus hemorrhage left corona radiata likely related to prior neoplasm.    sepsis w/ bactermia unclear source- ½ Bcx aerobic bottle pseudomonas; ID c/s; cont empiric antibiotics as per ID recs; check CT A/P: will have to revisit shunt tap if UCx and CT A/p unrevealing for source      suspected new DVT- LE edema- reported new DVT in LE prior IVC filter;  CT chest w/o PE; NS OK with AC; start prophylactic dose; check US LE;    Elevated trop- likely demand ischemia; TTE 61 y/o M with baseline mental status responds yes or no by shaking head PMH GBM s/p craniotomy w/ resection c/b ESBL meningitis with repeat craniotomy 2/2020 c/b repeat ESBL meningitis s/p craniectomy, b/t DVT (2020) s/p IVC filter, HTN, anxiety recent COVID presenting for fever 100.5, hypotension, and worsening B/l LE edema. Elevated trop CTA neg for PE and resolution of opacities 2/4. CT head with  Left frontal vasogenic edema with mass effect and midline shift to the right which is less when compared with 9/29/2022. Calcification versus hemorrhage left corona radiata likely related to prior neoplasm.    sepsis w/ bacteremia unclear source- ½ Bcx aerobic bottle pseudomonas; ID c/s; cont empiric antibiotics as per ID recs; check CT A/P: will have to revisit shunt tap if UCx and CT A/p unrevealing for source      suspected new DVT- LE edema- reported new DVT in LE prior IVC filter;  CT chest w/o PE; NS OK with AC; start prophylactic dose; check US LE;    Elevated trop- likely demand ischemia; TTE    Hypoxia- reported on arrival on [facility]; now on 2L NC; CT w/o PE resolving infiltrate

## 2023-03-12 NOTE — CONSULT NOTE ADULT - SUBJECTIVE AND OBJECTIVE BOX
HPI:    62 year old Male, [facility] [place] resident, with PMHx of glioblastoma s/p craniotomy with resection c/b ESBL meningitis with craniotomy washout 2020 and 8 weeks of Meropenem, recurrent infection s/p repeat washout and 10 weeks of Meropenem followed by suppressive PO Bactrim s/p craniectomy,  shunt, b/l DVT () s/p IVC filter ( supposed to be on Lovenox but ?accidently discontinued per sister), HTN, anxiety presenting for altered mental status, unresponsiveness, tachycardia and desaturation.  Per sister patient had COVID a few weeks ago, had worsening bilateral lower extremity edema, US doppler  showed new bilateral DVTs. Patient was seen by vascular surgery who stated that he needed neurosurgical approval to be placed on therapeutic anticoagulation. Neurosurgery was contacted and stated patient needs another MRI prior to starting therapeutic anticoagulation.     ID consulted for further recommendations.     prior hospital charts reviewed [X]  primary team notes reviewed [X]  other consultant notes reviewed [X]    PAST MEDICAL & SURGICAL HISTORY:  Essential hypertension  Hypertension  Diabetes mellitus  Anxiety  Deep vein thrombosis (DVT) B/L  Glioblastoma multiforme  Meningitis  Hypothyroidism  Staphylococcus aureus infection  MDD (major depressive disorder)  Disruption of closure of skull or craniotomy  Presence of IVC filter  Status post craniectomy    Allergies  aspirin (Unknown)  shellfish (Hives)  shellfish (Unknown)  Tegretol (Unknown)    ANTIMICROBIALS (past 90 days)  MEDICATIONS  (STANDING):  meropenem  IVPB   280 mL/Hr IV Intermittent (23 @ 08:16)    meropenem  IVPB   200 mL/Hr IV Intermittent (23 @ 11:34)    meropenem  IVPB   200 mL/Hr IV Intermittent (23 @ 22:28)    vancomycin  IVPB   166.67 mL/Hr IV Intermittent (23 @ 06:34)    meropenem  IVPB 2000 every 8 hours  vancomycin  IVPB 1250 every 12 hours    MEDICATIONS  (STANDING):  dexAMETHasone  Injectable 4 daily  heparin   Injectable 5000 every 12 hours  levETIRAcetam  IVPB 500 every 12 hours  levothyroxine Injectable 37.5 at bedtime    SOCIAL HISTORY:       FAMILY HISTORY:  Cerebrovascular accident (Father)    REVIEW OF SYSTEMS  [  ] ROS unobtainable because:    [  ] All other systems negative except as noted below:	    Constitutional:  [ ] fever [ ] chills  [ ] weight loss  [ ] weakness  Skin:  [ ] rash [ ] phlebitis	  Eyes: [ ] icterus [ ] pain  [ ] discharge	  ENMT: [ ] sore throat  [ ] thrush [ ] ulcers [ ] exudates  Respiratory: [ ] dyspnea [ ] hemoptysis [ ] cough [ ] sputum	  Cardiovascular:  [ ] chest pain [ ] palpitations [ ] edema	  Gastrointestinal:  [ ] nausea [ ] vomiting [ ] diarrhea [ ] constipation [ ] pain	  Genitourinary:  [ ] dysuria [ ] frequency [ ] hematuria [ ] discharge [ ] flank pain  [ ] incontinence  Musculoskeletal:  [ ] myalgias [ ] arthralgias [ ] arthritis  [ ] back pain  Neurological:  [ ] headache [ ] seizures  [ ] confusion/altered mental status  Psychiatric:  [ ] anxiety [ ] depression	  Hematology/Lymphatics:  [ ] lymphadenopathy  Endocrine:  [ ] adrenal [ ] thyroid  Allergic/Immunologic:	 [ ] transplant [ ] seasonal    Vital Signs Last 24 Hrs  T(F): 98.9 (23 @ 05:00), Max: 100.5 (23 @ 09:59)  Vital Signs Last 24 Hrs  HR: 130 (23 @ 05:00) (126 - 150)  BP: 109/84 (23 @ 05:00) (84/63 - 114/79)  RR: 20 (23 @ 05:00)  SpO2: 99% (23 @ 05:00) (98% - 100%)  Wt(kg): --    PHYSICAL EXAM:  Constitutional: non-toxic, no distress  HEAD/EYES: anicteric, no conjunctival injection  ENT:  supple, no thrush  Cardiovascular:   normal S1, S2, no murmur, no edema  Respiratory:  clear BS bilaterally, no wheezes, no rales  GI:  soft, non-tender, normal bowel sounds  :  no francois, no CVA tenderness  Musculoskeletal:  no synovitis, normal ROM  Neurologic: awake and alert, normal strength, no focal findings  Skin:  no rash, no erythema, no phlebitis  Heme/Onc: no lymphadenopathy   Psychiatric:  awake, alert, appropriate mood                          11.8   8.83  )-----------( 102      ( 12 Mar 2023 05:53 )             37.9       141  |  106  |  22  ----------------------------<  76  3.8   |  23  |  0.44<L>    Ca    8.9      12 Mar 2023 05:53  Phos  3.1     -  Mg     2.20     -12    TPro  4.9<L>  /  Alb  2.7<L>  /  TBili  0.4  /  DBili  x   /  AST  13  /  ALT  27  /  AlkPhos  91  -12    Urinalysis Basic - ( 11 Mar 2023 13:42 )    Color: Jazmin / Appearance: Slightly Turbid / S.028 / pH: x  Gluc: x / Ketone: Trace  / Bili: Negative / Urobili: 3 mg/dL   Blood: x / Protein: 100 mg/dL / Nitrite: Negative   Leuk Esterase: Negative / RBC: 7 /HPF / WBC 8 /HPF   Sq Epi: x / Non Sq Epi: 7 /HPF / Bacteria: Negative  < from: CT Head No Cont (23 @ 13:06) >    IMPRESSION: Left parietal craniotomy defect. Left frontal vasogenic edema   with mass effect and midline shift to the right which is less when   compared with 2022. Calcification versus hemorrhage left corona   radiata likely related to prior neoplasm. Hematocrit level left occipital   horn.    --- End of Report ---        < end of copied text >  < from: CT Angio Chest PE Protocol w/ IV Cont (23 @ 13:05) >    IMPRESSION:    No main, right or left main, lobar, or proximal segmental pulmonary   embolism. Limited evaluation of the distal segmental and subsegmental   arteries secondary to motion artifact.    Interval resolution of the previously seen multifocal opacities since   2022.    < end of copied text >  < from: Xray Chest 1 View- PORTABLE-Urgent (23 @ 11:30) >    COMPARISON: 2022    IMPRESSION:: The heart size cannot be adequately assessed on this single   view. There is an overlying  shunt. The right costophrenic angle was   omitted from this. There is a small left pleural effusion and/or left   basilar atelectasis. A left basilar pneumonia cannot be excluded.      < end of copied text >    MICROBIOLOGY:  Culture - Blood (collected 11 Mar 2023 11:32)  Source: .Blood Blood-Peripheral  Gram Stain (12 Mar 2023 06:54):    Growth in aerobic bottle: Gram Negative Rods  Preliminary Report (12 Mar 2023 06:55):    Growth in aerobic bottle: Gram Negative Rods    ***Blood Panel PCR results on this specimen are available    approximately 3 hours after the Gram stain result.***    Gram stain, PCR, and/or culture results may not always    correspond due to difference in methodologies.    ************************************************************    This PCR assay was performed by multiplex PCR. This    Assay tests for 66 bacterial and resistance gene targets.    Please refer to the [facility] test directory    at [email or web address] for details.    Culture - Blood (collected 11 Mar 2023 10:44)  Source: .Blood Blood-Peripheral  Gram Stain (12 Mar 2023 07:26):    Growth in aerobic bottle: Gram Negative Rods  Preliminary Report (12 Mar 2023 07:27):    Growth in aerobic bottle: Gram Negative Rods    Rapid RVP Result: NotDetec ( @ 11:14)    RADIOLOGY:  imaging below personally reviewed and agree with findings               HPI:  62M resident of [facility], hx Glioblastoma multiforme s/p craniotomy and resection complicated by ESBL meningitis s/p repeat craniotomy washout 2020 and 8 weeks of Meropenem, recurrent infection s/p repeat washout and 10 weeks of Meropenem followed by suppressive PO Bactrim. Also hx  shunt, b/l DVT () s/p IVC filter (supposed to be on Lovenox but ?accidently discontinued per sister), HTN, anxiety presenting for altered mental status, unresponsiveness, tachycardia and desaturation.  Recent COVID 2022.  Now with new bilateral DVTs. Patient was seen by vascular surgery who stated that he needed neurosurgical clearance prior to therapeutic anticoagulation. Neurosurgery was contacted and stated patient needs another MRI prior to starting therapeutic anticoagulation.     ID consulted for further recommendations.     prior hospital charts reviewed [X]  primary team notes reviewed [X]  other consultant notes reviewed [X]    PAST MEDICAL & SURGICAL HISTORY:  Essential hypertension  Hypertension  Diabetes mellitus  Anxiety  Deep vein thrombosis (DVT) B/L  Glioblastoma multiforme  Meningitis  Hypothyroidism  Staphylococcus aureus infection  MDD (major depressive disorder)  Disruption of closure of skull or craniotomy  Presence of IVC filter  Status post craniectomy    Allergies  aspirin (Unknown)  shellfish (Hives)  shellfish (Unknown)  Tegretol (Unknown)    ANTIMICROBIALS:  meropenem  IVPB 2000 every 8 hours (3/11-)  vancomycin  IVPB 1250 every 12 hours (3/12-)    MEDICATIONS  (STANDING):  dexAMETHasone  Injectable 4 daily  heparin   Injectable 5000 every 12 hours  levETIRAcetam  IVPB 500 every 12 hours  levothyroxine Injectable 37.5 at bedtime    SOCIAL HISTORY:   lives at [facility]    FAMILY HISTORY:  Cerebrovascular accident (Father)    REVIEW OF SYSTEMS  [  ] ROS unobtainable because:    [  ] All other systems negative except as noted below:	    Constitutional:  [ ] fever [ ] chills  [ ] weight loss  [ ] weakness  Skin:  [ ] rash [ ] phlebitis	  Eyes: [ ] icterus [ ] pain  [ ] discharge	  ENMT: [ ] sore throat  [ ] thrush [ ] ulcers [ ] exudates  Respiratory: [ ] dyspnea [ ] hemoptysis [ ] cough [ ] sputum	  Cardiovascular:  [ ] chest pain [ ] palpitations [ ] edema	  Gastrointestinal:  [ ] nausea [ ] vomiting [ ] diarrhea [ ] constipation [ ] pain	  Genitourinary:  [ ] dysuria [ ] frequency [ ] hematuria [ ] discharge [ ] flank pain  [ ] incontinence  Musculoskeletal:  [ ] myalgias [ ] arthralgias [ ] arthritis  [ ] back pain  Neurological:  [ ] headache [ ] seizures  [ ] confusion/altered mental status  Psychiatric:  [ ] anxiety [ ] depression	  Hematology/Lymphatics:  [ ] lymphadenopathy  Endocrine:  [ ] adrenal [ ] thyroid  Allergic/Immunologic:	 [ ] transplant [ ] seasonal    Vital Signs Last 24 Hrs  T(F): 98.9 (23 @ 05:00), Max: 100.5 (23 @ 09:59)  Vital Signs Last 24 Hrs  HR: 130 (23 @ 05:00) (126 - 150)  BP: 109/84 (23 @ 05:00) (84/63 - 114/79)  RR: 20 (23 @ 05:00)  SpO2: 99% (23 @ 05:00) (98% - 100%)  Wt(kg): --    PHYSICAL EXAM:                            11.8   8.83  )-----------( 102      ( 12 Mar 2023 05:53 )             37.9     141  |  106  |  22  ----------------------------<  76  3.8   |  23  |  0.44<L>    Ca    8.9      12 Mar 2023 05:53  Phos  3.1       Mg     2.20         TPro  4.9<L>  /  Alb  2.7<L>  /  TBili  0.4  /  DBili  x   /  AST  13  /  ALT  27  /  AlkPhos  91  03-12    Urinalysis Basic - ( 11 Mar 2023 13:42 )  Color: Jazmin / Appearance: Slightly Turbid / S.028 / pH: x  Gluc: x / Ketone: Trace  / Bili: Negative / Urobili: 3 mg/dL   Blood: x / Protein: 100 mg/dL / Nitrite: Negative   Leuk Esterase: Negative / RBC: 7 /HPF / WBC 8 /HPF   Sq Epi: x / Non Sq Epi: 7 /HPF / Bacteria: Negative    MICROBIOLOGY:  Culture - Blood (collected 11 Mar 2023 11:32)  Source: .Blood Blood-Peripheral  Gram Stain (12 Mar 2023 06:54):    Growth in aerobic bottle: Gram Negative Rods  Preliminary Report (12 Mar 2023 06:55):    Growth in aerobic bottle: Gram Negative Rods    ***Blood Panel PCR results on this specimen are available    approximately 3 hours after the Gram stain result.***    Gram stain, PCR, and/or culture results may not always    correspond due to difference in methodologies.    ************************************************************    This PCR assay was performed by multiplex PCR. This    Assay tests for 66 bacterial and resistance gene targets.    Please refer to the [facility] test directory    at [email or web address] for details.    Culture - Blood (collected 11 Mar 2023 10:44)  Source: .Blood Blood-Peripheral  Gram Stain (12 Mar 2023 07:26):    Growth in aerobic bottle: Gram Negative Rods  Preliminary Report (12 Mar 2023 07:27):    Growth in aerobic bottle: Gram Negative Rods    Rapid RVP Result: NotDetec ( @ 11:14)    RADIOLOGY:  imaging below personally reviewed and agree with findings    CT Head No Cont (23 @ 13:06) >  IMPRESSION: Left parietal craniotomy defect. Left frontal vasogenic edema with mass effect and midline shift to the right which is less when compared with 2022. Calcification versus hemorrhage left corona radiata likely related to prior neoplasm. Hematocrit level left occipital horn.    CT Angio Chest PE Protocol w/ IV Cont (23 @ 13:05) >  IMPRESSION:  No main, right or left main, lobar, or proximal segmental pulmonary embolism. Limited evaluation of the distal segmental and subsegmental arteries secondary to motion artifact.  Interval resolution of the previously seen multifocal opacities since 2022.    Xray Chest 1 View- PORTABLE-Urgent (23 @ 11:30) >  IMPRESSION:: The heart size cannot be adequately assessed on this single view. There is an overlying  shunt. The right costophrenic angle was omitted from this. There is a small left pleural effusion and/or left basilar atelectasis. A left basilar pneumonia cannot be excluded.           HPI:  62M resident of [facility], hx Glioblastoma multiforme s/p craniotomy and resection complicated by ESBL meningitis s/p repeat craniotomy washout 2020 and 8 weeks of Meropenem, recurrent infection s/p repeat washout and 10 weeks of Meropenem followed by suppressive PO Bactrim. Also hx  shunt, b/l DVT () s/p IVC filter (supposed to be on Lovenox but ?accidently discontinued per sister), HTN, anxiety presenting for altered mental status, unresponsiveness, tachycardia and desaturation.  Recent COVID 2022.  Now with new bilateral DVTs. Patient was seen by vascular surgery who stated that he needed neurosurgical clearance prior to therapeutic anticoagulation. Neurosurgery was contacted and stated patient needs another MRI prior to starting therapeutic anticoagulation.     ID consulted for further recommendations.     prior hospital charts reviewed [X]  primary team notes reviewed [X]  other consultant notes reviewed [X]    PAST MEDICAL & SURGICAL HISTORY:  Essential hypertension  Hypertension  Diabetes mellitus  Anxiety  Deep vein thrombosis (DVT) B/L  Glioblastoma multiforme  Meningitis  Hypothyroidism  Staphylococcus aureus infection  MDD (major depressive disorder)  Disruption of closure of skull or craniotomy  Presence of IVC filter  Status post craniectomy    Allergies  aspirin (Unknown)  shellfish (Hives)  shellfish (Unknown)  Tegretol (Unknown)    ANTIMICROBIALS:  meropenem  IVPB 2000 every 8 hours (3/11-)  vancomycin  IVPB 1250 every 12 hours (3/12-)    MEDICATIONS  (STANDING):  dexAMETHasone  Injectable 4 daily  heparin   Injectable 5000 every 12 hours  levETIRAcetam  IVPB 500 every 12 hours  levothyroxine Injectable 37.5 at bedtime    SOCIAL HISTORY:   lives at [facility]    FAMILY HISTORY:  Cerebrovascular accident (Father)    REVIEW OF SYSTEMS  [  ] ROS unobtainable because:    [X] All other systems negative except as noted below:	    Constitutional:  [ ] fever [ ] chills  [ ] weight loss  [ ] weakness  Skin:  [ ] rash [ ] phlebitis	  Eyes: [ ] icterus [ ] pain  [ ] discharge	  ENMT: [ ] sore throat  [ ] thrush [ ] ulcers [ ] exudates  Respiratory: [ ] dyspnea [ ] hemoptysis [x] cough [ ] sputum	  Cardiovascular:  [ ] chest pain [ ] palpitations [ ] edema	  Gastrointestinal:  [ ] nausea [ ] vomiting [ ] diarrhea [ ] constipation [ ] pain	  Genitourinary:  [ ] dysuria [ ] frequency [ ] hematuria [ ] discharge [ ] flank pain  [ ] incontinence  Musculoskeletal:  [ ] myalgias [ ] arthralgias [ ] arthritis  [ ] back pain  Neurological:  [ ] headache [ ] seizures  [ ] confusion/altered mental status  Psychiatric:  [ ] anxiety [ ] depression	  Hematology/Lymphatics:  [ ] lymphadenopathy  Endocrine:  [ ] adrenal [ ] thyroid  Allergic/Immunologic:	 [ ] transplant [ ] seasonal    Vital Signs Last 24 Hrs  T(F): 98.9 (23 @ 05:00), Max: 100.5 (23 @ 09:59)  Vital Signs Last 24 Hrs  HR: 130 (23 @ 05:00) (126 - 150)  BP: 109/84 (23 @ 05:00) (84/63 - 114/79)  RR: 20 (23 @ 05:00)  SpO2: 99% (23 @ 05:00) (98% - 100%)  Wt(kg): --    PHYSICAL EXAM:    CONSTITUTIONAL: laying down in bed, eyes closed   EYES: could not be assessed  HEAD: s/p craniotomy with wound well healed,  shunt with no tenderness  NECK: could not be assessed  RESP: No respiratory distress, on 2 L NC   CV: RRR, +S1S2, no MRG; no JVD; no peripheral edema  GI: Soft, distended, non tender  MSK: bilateral LE swelling   NEURO: laying down in be, eyes closed, opens eyes on tactile stimuli, answers yes/no, following simple commands                         11.8   8.83  )-----------( 102      ( 12 Mar 2023 05:53 )             37.9     141  |  106  |  22  ----------------------------<  76  3.8   |  23  |  0.44<L>    Ca    8.9      12 Mar 2023 05:53  Phos  3.1     -12  Mg     2.20     -12    TPro  4.9<L>  /  Alb  2.7<L>  /  TBili  0.4  /  DBili  x   /  AST  13  /  ALT  27  /  AlkPhos  91  03-12    Urinalysis Basic - ( 11 Mar 2023 13:42 )  Color: Jazmin / Appearance: Slightly Turbid / S.028 / pH: x  Gluc: x / Ketone: Trace  / Bili: Negative / Urobili: 3 mg/dL   Blood: x / Protein: 100 mg/dL / Nitrite: Negative   Leuk Esterase: Negative / RBC: 7 /HPF / WBC 8 /HPF   Sq Epi: x / Non Sq Epi: 7 /HPF / Bacteria: Negative    MICROBIOLOGY:  Culture - Blood (collected 11 Mar 2023 11:32)  Source: .Blood Blood-Peripheral  Gram Stain (12 Mar 2023 06:54):    Growth in aerobic bottle: Gram Negative Rods  Preliminary Report (12 Mar 2023 06:55):    Growth in aerobic bottle: Gram Negative Rods    ***Blood Panel PCR results on this specimen are available    approximately 3 hours after the Gram stain result.***    Gram stain, PCR, and/or culture results may not always    correspond due to difference in methodologies.    ************************************************************    This PCR assay was performed by multiplex PCR. This    Assay tests for 66 bacterial and resistance gene targets.    Please refer to the [facility] test directory    at [email or web address] for details.    Culture - Blood (collected 11 Mar 2023 10:44)  Source: .Blood Blood-Peripheral  Gram Stain (12 Mar 2023 07:26):    Growth in aerobic bottle: Gram Negative Rods  Preliminary Report (12 Mar 2023 07:27):    Growth in aerobic bottle: Gram Negative Rods    Rapid RVP Result: NotDetec ( @ 11:14)    RADIOLOGY:  imaging below personally reviewed and agree with findings    CT Head No Cont (23 @ 13:06) >  IMPRESSION: Left parietal craniotomy defect. Left frontal vasogenic edema with mass effect and midline shift to the right which is less when compared with 2022. Calcification versus hemorrhage left corona radiata likely related to prior neoplasm. Hematocrit level left occipital horn.    CT Angio Chest PE Protocol w/ IV Cont (23 @ 13:05) >  IMPRESSION:  No main, right or left main, lobar, or proximal segmental pulmonary embolism. Limited evaluation of the distal segmental and subsegmental arteries secondary to motion artifact.  Interval resolution of the previously seen multifocal opacities since 2022.    Xray Chest 1 View- PORTABLE-Urgent (23 @ 11:30) >  IMPRESSION:: The heart size cannot be adequately assessed on this single view. There is an overlying  shunt. The right costophrenic angle was omitted from this. There is a small left pleural effusion and/or left basilar atelectasis. A left basilar pneumonia cannot be excluded.           HPI:  62M resident of [facility], hx Glioblastoma multiforme s/p craniotomy and resection at [facility].  Course complicated by ESBL meningitis s/p repeat craniotomy washout 2020 and 8 weeks of Meropenem.  Developed a recurrent infection s/p repeat washout and 10 weeks of Meropenem followed by suppressive PO Bactrim. Also hx  shunt.  Hx b/l DVT () s/p IVC filter (supposed to be on Lovenox but was off it), HTN, anxiety presenting for altered mental status, unresponsiveness, tachycardia and desaturation.  Recent COVID 2022.  Now with new bilateral DVTs. Patient was seen by vascular surgery who stated that he needed neurosurgical clearance prior to therapeutic anticoagulation. Neurosurgery was contacted and stated patient needs another MRI prior to starting therapeutic anticoagulation.     ID consulted for further recommendations.     prior hospital charts reviewed [X]  primary team notes reviewed [X]  other consultant notes reviewed [X]    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  Presence of IVC filter  Status post craniectomy    Allergies  aspirin (Unknown)  shellfish (Hives)  Tegretol (Unknown)    ANTIMICROBIALS:  meropenem  IVPB 2000 every 8 hours (3/11-)  vancomycin  IVPB 1250 every 12 hours (3/12-)    MEDICATIONS  (STANDING):  dexAMETHasone  Injectable 4 daily  heparin   Injectable 5000 every 12 hours  levETIRAcetam  IVPB 500 every 12 hours  levothyroxine Injectable 37.5 at bedtime    SOCIAL HISTORY:   lives at [facility]    FAMILY HISTORY:  Cerebrovascular accident (Father)    REVIEW OF SYSTEMS  [  ] ROS unobtainable because:    [X] All other systems negative except as noted below:	    Constitutional:  [ ] fever [ ] chills  [ ] weight loss  [ ] weakness  Skin:  [ ] rash [ ] phlebitis	  Eyes: [ ] icterus [ ] pain  [ ] discharge	  ENMT: [ ] sore throat  [ ] thrush [ ] ulcers [ ] exudates  Respiratory: [ ] dyspnea [ ] hemoptysis [x] cough [ ] sputum	  Cardiovascular:  [ ] chest pain [ ] palpitations [ ] edema	  Gastrointestinal:  [ ] nausea [ ] vomiting [ ] diarrhea [ ] constipation [ ] pain	  Genitourinary:  [ ] dysuria [ ] frequency [ ] hematuria [ ] discharge [ ] flank pain  [ ] incontinence  Musculoskeletal:  [ ] myalgias [ ] arthralgias [ ] arthritis  [ ] back pain  Neurological:  [ ] headache [ ] seizures  [ ] confusion/altered mental status  Psychiatric:  [ ] anxiety [ ] depression	  Hematology/Lymphatics:  [ ] lymphadenopathy  Endocrine:  [ ] adrenal [ ] thyroid  Allergic/Immunologic:	 [ ] transplant [ ] seasonal    Vital Signs Last 24 Hrs  T(F): 98.9 (23 @ 05:00), Max: 100.5 (23 @ 09:59)  Vital Signs Last 24 Hrs  HR: 130 (23 @ 05:00) (126 - 150)  BP: 109/84 (23 @ 05:00) (84/63 - 114/79)  RR: 20 (23 @ 05:00)  SpO2: 99% (23 @ 05:00) (98% - 100%)  Wt(kg): --    PHYSICAL EXAM:  CONSTITUTIONAL: laying down in bed, eyes closed   EYES: could not be assessed  HEAD: s/p craniotomy with wound well healed,  shunt with no tenderness  NECK: could not be assessed  RESP: No respiratory distress, on 2 L NC   CV: RRR, +S1S2, no MRG; no JVD; no peripheral edema  GI: Soft, distended, non tender  MSK: bilateral LE swelling   NEURO: laying down in be, eyes closed, opens eyes on tactile stimuli, answers yes/no, following simple commands                         11.8   8.83  )-----------( 102      ( 12 Mar 2023 05:53 )             37.9     141  |  106  |  22  ----------------------------<  76  3.8   |  23  |  0.44<L>    Ca    8.9      12 Mar 2023 05:53  Phos  3.1     03-12  Mg     2.20     -12    TPro  4.9<L>  /  Alb  2.7<L>  /  TBili  0.4  /  DBili  x   /  AST  13  /  ALT  27  /  AlkPhos  91  03-12    Urinalysis Basic - ( 11 Mar 2023 13:42 )  Color: Jazmin / Appearance: Slightly Turbid / S.028 / pH: x  Gluc: x / Ketone: Trace  / Bili: Negative / Urobili: 3 mg/dL   Blood: x / Protein: 100 mg/dL / Nitrite: Negative   Leuk Esterase: Negative / RBC: 7 /HPF / WBC 8 /HPF   Sq Epi: x / Non Sq Epi: 7 /HPF / Bacteria: Negative    MICROBIOLOGY:  Culture - Blood (collected 11 Mar 2023 11:32)  Source: .Blood Blood-Peripheral  Gram Stain (12 Mar 2023 06:54):    Growth in aerobic bottle: Gram Negative Rods  Preliminary Report (12 Mar 2023 06:55):    Growth in aerobic bottle: Gram Negative Rods    ***Blood Panel PCR results on this specimen are available    approximately 3 hours after the Gram stain result.***    Gram stain, PCR, and/or culture results may not always    correspond due to difference in methodologies.    ************************************************************    This PCR assay was performed by multiplex PCR. This    Assay tests for 66 bacterial and resistance gene targets.    Please refer to the [facility] test directory    at [email or web address] for details.    Culture - Blood (collected 11 Mar 2023 10:44)  Source: .Blood Blood-Peripheral  Gram Stain (12 Mar 2023 07:26):    Growth in aerobic bottle: Gram Negative Rods  Preliminary Report (12 Mar 2023 07:27):    Growth in aerobic bottle: Gram Negative Rods    Rapid RVP Result: NotDetec ( @ 11:14)    RADIOLOGY:  imaging below personally reviewed and agree with findings    CT Head No Cont (23 @ 13:06) >  IMPRESSION: Left parietal craniotomy defect. Left frontal vasogenic edema with mass effect and midline shift to the right which is less when compared with 2022. Calcification versus hemorrhage left corona radiata likely related to prior neoplasm. Hematocrit level left occipital horn.    CT Angio Chest PE Protocol w/ IV Cont (23 @ 13:05) >  IMPRESSION:  No main, right or left main, lobar, or proximal segmental pulmonary embolism. Limited evaluation of the distal segmental and subsegmental arteries secondary to motion artifact.  Interval resolution of the previously seen multifocal opacities since 2022.    Xray Chest 1 View- PORTABLE-Urgent (23 @ 11:30) >  IMPRESSION:: The heart size cannot be adequately assessed on this single view. There is an overlying  shunt. The right costophrenic angle was omitted from this. There is a small left pleural effusion and/or left basilar atelectasis. A left basilar pneumonia cannot be excluded.

## 2023-03-12 NOTE — CONSULT NOTE ADULT - ATTENDING COMMENTS
62M resident of [facility], hx Glioblastoma multiforme s/p craniotomy and resection complicated by ESBL meningitis s/p repeat craniotomy washout 2/2020 and 8 weeks of Meropenem, recurrent infection s/p repeat washout and 10 weeks of Meropenem followed by suppressive PO Bactrim. Also hx  shunt, b/l DVT (2020) s/p IVC filter (supposed to be on Lovenox but ?accidently discontinued per sister), HTN, anxiety presenting for altered mental status, unresponsiveness, tachycardia and desaturation.  Recent COVID Sept 2022.  Now with new bilateral DVTs. Patient was seen by vascular surgery who stated that he needed neurosurgical clearance prior to therapeutic anticoagulation. Neurosurgery was contacted and stated patient needs another MRI prior to starting therapeutic anticoagulation.  BC with pseudomonas aeruginosa    Pseudomonas bactereia  - f/u UC  - if UC (-), would pursue CT abdomen and VPS tap  - meropenem for now 62M resident of [facility], hx Glioblastoma multiforme s/p craniotomy and resection complicated by ESBL meningitis s/p repeat craniotomy washout 2/2020 and 8 weeks of Meropenem, recurrent infection s/p repeat washout and 10 weeks of Meropenem followed by suppressive PO Bactrim. Also hx  shunt, b/l DVT (2020) s/p IVC filter (supposed to be on Lovenox but ?accidently discontinued per sister), HTN, anxiety presenting for altered mental status, unresponsiveness, tachycardia and desaturation.  Recent COVID Sept 2022.  Now with new bilateral DVTs. Patient was seen by vascular surgery who stated that he needed neurosurgical clearance prior to therapeutic anticoagulation. Neurosurgery was contacted and stated patient needs another MRI prior to starting therapeutic anticoagulation.  BC with pseudomonas aeruginosa    Pseudomonas bacteremia  - f/u UC  - if UC (-), would pursue CT abdomen and VPS tap  - meropenem for now  - d/c vancomycin  - will resume bactrim suppression (1 DS BID) when off carbapenem    GBM  - per sister there is recurrent disease  - he remains hospice/palliative for that    Please call the ID service [phone] with questions or concerns over the weekend

## 2023-03-12 NOTE — PROGRESS NOTE ADULT - PROBLEM SELECTOR PLAN 1
Patient febrile, tachycardic and hypotensive on admission. Patient answering yes/no questions but altered according to Chris   - UA negative, CT with resolution of prior lung opacities   - patient with a history of meningitis x2, given meropenem in ED   - blood and urine cultures pending   - given history of craniotomy, will treat empirically for healthcare associated meningitis with vancomycin and meropenem  - troponin elevation and lactate likely secondary to infection, will trend  - f/u with family to see if LP within GOC  - ID consult in AM   - holding chronic bactrim ppx as patient on vanc and nancy Patient febrile, tachycardic and hypotensive on admission. Patient answering yes/no questions but altered according to Chris   - UA negative, CT with resolution of prior lung opacities   - patient with a history of meningitis x2, given meropenem in ED   - blood cx + (1/4) pseudomonas; urine cx pending   - Per ID, c/w meropenem (3/11 -); D/c vanc (3/12) x 1   - Family would prefer to hold off LP unless indicated; hold home bactrim for now Patient febrile, tachycardic and hypotensive on admission. Patient answering yes/no questions but altered according to Chris   - UA negative, CT with resolution of prior lung opacities   - patient with a history of meningitis x2, given meropenem in ED   - blood cx + (1/4) pseudomonas; urine cx pending   - Per ID, c/w meropenem (3/11 -); D/c vanc (3/12) x 1   - Droplet precautions; aspiration precautions   - Family would prefer to hold off LP unless indicated; hold home bactrim for now

## 2023-03-12 NOTE — PROGRESS NOTE ADULT - PROBLEM SELECTOR PLAN 5
Elevated BNP on admission   - echo in 2017 with EF 60-65% normal LV function   - CXR with small left pleural effusion   - will continue to monitor fluid status Patient with elevated BUN/creatinine ratio   - given 1L fluids in ED, will give an additional 500cc and continue to monitor

## 2023-03-12 NOTE — PROGRESS NOTE ADULT - SUBJECTIVE AND OBJECTIVE BOX
Patient is a 62y old  Male who presents with a chief complaint of AMS (11 Mar 2023 22:52)      SUBJECTIVE / OVERNIGHT EVENTS:    Brief Daily Plan  - ID cs --> ? meningitis; C/w empiric nancy + vanc   - F/u DVT studies     MEDICATIONS  (STANDING):  dexAMETHasone  Injectable 4 milliGRAM(s) IV Push daily  heparin   Injectable 5000 Unit(s) SubCutaneous every 12 hours  levETIRAcetam  IVPB 500 milliGRAM(s) IV Intermittent every 12 hours  levothyroxine Injectable 37.5 MICROGram(s) IV Push at bedtime  meropenem  IVPB 2000 milliGRAM(s) IV Intermittent every 8 hours  vancomycin  IVPB 1250 milliGRAM(s) IV Intermittent every 12 hours    MEDICATIONS  (PRN):      Vital Signs Last 24 Hrs  T(C): 37.2 (12 Mar 2023 05:00), Max: 38.1 (11 Mar 2023 09:59)  T(F): 98.9 (12 Mar 2023 05:00), Max: 100.5 (11 Mar 2023 09:59)  HR: 130 (12 Mar 2023 05:00) (126 - 150)  BP: 109/84 (12 Mar 2023 05:00) (84/63 - 114/79)  BP(mean): --  RR: 20 (12 Mar 2023 05:00) (18 - 20)  SpO2: 99% (12 Mar 2023 05:00) (98% - 100%)    Parameters below as of 12 Mar 2023 05:00  Patient On (Oxygen Delivery Method): nasal cannula  O2 Flow (L/min): 3    CAPILLARY BLOOD GLUCOSE      POCT Blood Glucose.: 96 mg/dL (11 Mar 2023 18:00)  POCT Blood Glucose.: 107 mg/dL (11 Mar 2023 12:21)  POCT Blood Glucose.: 116 mg/dL (11 Mar 2023 10:15)    I&O's Summary      PHYSICAL EXAM:  GENERAL: NAD  HEENT:  Wearing head brace   CHEST/LUNG: Chest rise equal bilaterally, lungs clear to ascultation with decreased breath sounds at bases  HEART: Tachycardic, normal S1 S2  ABDOMEN: Soft, Nontender, Nondistended  EXTREMITIES:  Bilateral pitting edema from ankles to hips   PSYCH: alert, answers yes/no questions   SKIN: No obvious rashes or lesions  NEURO: shakes head no when asked to follow commands    LABS:                        11.8   8.83  )-----------( 102      ( 12 Mar 2023 05:53 )             37.9     03-12    141  |  106  |  22  ----------------------------<  76  3.8   |  23  |  0.44<L>    Ca    8.9      12 Mar 2023 05:53  Phos  3.1     03-12  Mg     2.20     -12    TPro  4.9<L>  /  Alb  2.7<L>  /  TBili  0.4  /  DBili  x   /  AST  13  /  ALT  27  /  AlkPhos  91  03-12    PT/INR - ( 11 Mar 2023 10:43 )   PT: 16.0 sec;   INR: 1.37 ratio         PTT - ( 11 Mar 2023 10:43 )  PTT:32.5 sec  CARDIAC MARKERS ( 11 Mar 2023 22:31 )  x     / x     / 51 U/L / x     / 1.5 ng/mL      Urinalysis Basic - ( 11 Mar 2023 13:42 )    Color: Jazmin / Appearance: Slightly Turbid / S.028 / pH: x  Gluc: x / Ketone: Trace  / Bili: Negative / Urobili: 3 mg/dL   Blood: x / Protein: 100 mg/dL / Nitrite: Negative   Leuk Esterase: Negative / RBC: 7 /HPF / WBC 8 /HPF   Sq Epi: x / Non Sq Epi: 7 /HPF / Bacteria: Negative        RADIOLOGY & ADDITIONAL TESTS:    Imaging Personally Reviewed:    Consultant(s) Notes Reviewed:      Care Discussed with Consultants/Other Providers:   Patient is a 62y old  Male who presents with a chief complaint of AMS (11 Mar 2023 22:52)      SUBJECTIVE / OVERNIGHT EVENTS: Pt admitted overnight for sepsis of unclear etiology. Pt seen and examined at bedside in the ED. Pt lethargy, awakens to movement. AOx0. Limited ROS.     Brief Daily Plan  - ID cs --> ? meningitis; C/w empiric nancy + vanc   - F/u Blood cx --> + pseudomonas   - F/u DVT studies     MEDICATIONS  (STANDING):  dexAMETHasone  Injectable 4 milliGRAM(s) IV Push daily  heparin   Injectable 5000 Unit(s) SubCutaneous every 12 hours  levETIRAcetam  IVPB 500 milliGRAM(s) IV Intermittent every 12 hours  levothyroxine Injectable 37.5 MICROGram(s) IV Push at bedtime  meropenem  IVPB 2000 milliGRAM(s) IV Intermittent every 8 hours  vancomycin  IVPB 1250 milliGRAM(s) IV Intermittent every 12 hours    MEDICATIONS  (PRN):      Vital Signs Last 24 Hrs  T(C): 37.2 (12 Mar 2023 05:00), Max: 38.1 (11 Mar 2023 09:59)  T(F): 98.9 (12 Mar 2023 05:00), Max: 100.5 (11 Mar 2023 09:59)  HR: 130 (12 Mar 2023 05:00) (126 - 150)  BP: 109/84 (12 Mar 2023 05:00) (84/63 - 114/79)  BP(mean): --  RR: 20 (12 Mar 2023 05:00) (18 - 20)  SpO2: 99% (12 Mar 2023 05:00) (98% - 100%)    Parameters below as of 12 Mar 2023 05:00  Patient On (Oxygen Delivery Method): nasal cannula  O2 Flow (L/min): 3    CAPILLARY BLOOD GLUCOSE      POCT Blood Glucose.: 96 mg/dL (11 Mar 2023 18:00)  POCT Blood Glucose.: 107 mg/dL (11 Mar 2023 12:21)  POCT Blood Glucose.: 116 mg/dL (11 Mar 2023 10:15)    I&O's Summary      PHYSICAL EXAM:  GENERAL: NAD  HEENT:  Wearing head brace   CHEST/LUNG: Chest rise equal bilaterally, lungs clear to ascultation with decreased breath sounds at bases  HEART: Tachycardic, normal S1 S2  ABDOMEN: Soft, Nontender, Nondistended  EXTREMITIES:  Bilateral pitting edema from ankles to hips   PSYCH: alert, answers yes/no questions   SKIN: No obvious rashes or lesions  NEURO: shakes head no when asked to follow commands    LABS:                        11.8   8.83  )-----------( 102      ( 12 Mar 2023 05:53 )             37.9     03-12    141  |  106  |  22  ----------------------------<  76  3.8   |  23  |  0.44<L>    Ca    8.9      12 Mar 2023 05:53  Phos  3.1     03-12  Mg     2.20     03-12    TPro  4.9<L>  /  Alb  2.7<L>  /  TBili  0.4  /  DBili  x   /  AST  13  /  ALT  27  /  AlkPhos  91  03-12    PT/INR - ( 11 Mar 2023 10:43 )   PT: 16.0 sec;   INR: 1.37 ratio         PTT - ( 11 Mar 2023 10:43 )  PTT:32.5 sec  CARDIAC MARKERS ( 11 Mar 2023 22:31 )  x     / x     / 51 U/L / x     / 1.5 ng/mL      Urinalysis Basic - ( 11 Mar 2023 13:42 )    Color: Jazmin / Appearance: Slightly Turbid / S.028 / pH: x  Gluc: x / Ketone: Trace  / Bili: Negative / Urobili: 3 mg/dL   Blood: x / Protein: 100 mg/dL / Nitrite: Negative   Leuk Esterase: Negative / RBC: 7 /HPF / WBC 8 /HPF   Sq Epi: x / Non Sq Epi: 7 /HPF / Bacteria: Negative        RADIOLOGY & ADDITIONAL TESTS:    Imaging Personally Reviewed:    Consultant(s) Notes Reviewed:      Care Discussed with Consultants/Other Providers:   Patient is a 62y old  Male who presents with a chief complaint of AMS (11 Mar 2023 22:52)      SUBJECTIVE / OVERNIGHT EVENTS: Pt admitted overnight for sepsis of unclear etiology. Pt seen and examined at bedside in the ED. Pt lethargy, awakens to movement. AOx0. Limited ROS.     Brief Daily Plan  - ID cs --> ? meningitis; C/w empiric nancy + vanc   - F/u Blood cx --> + pseudomonas   - F/u DVT studies   - F/u Echo    MEDICATIONS  (STANDING):  dexAMETHasone  Injectable 4 milliGRAM(s) IV Push daily  heparin   Injectable 5000 Unit(s) SubCutaneous every 12 hours  levETIRAcetam  IVPB 500 milliGRAM(s) IV Intermittent every 12 hours  levothyroxine Injectable 37.5 MICROGram(s) IV Push at bedtime  meropenem  IVPB 2000 milliGRAM(s) IV Intermittent every 8 hours  vancomycin  IVPB 1250 milliGRAM(s) IV Intermittent every 12 hours    MEDICATIONS  (PRN):      Vital Signs Last 24 Hrs  T(C): 37.2 (12 Mar 2023 05:00), Max: 38.1 (11 Mar 2023 09:59)  T(F): 98.9 (12 Mar 2023 05:00), Max: 100.5 (11 Mar 2023 09:59)  HR: 130 (12 Mar 2023 05:00) (126 - 150)  BP: 109/84 (12 Mar 2023 05:00) (84/63 - 114/79)  BP(mean): --  RR: 20 (12 Mar 2023 05:00) (18 - 20)  SpO2: 99% (12 Mar 2023 05:00) (98% - 100%)    Parameters below as of 12 Mar 2023 05:00  Patient On (Oxygen Delivery Method): nasal cannula  O2 Flow (L/min): 3    CAPILLARY BLOOD GLUCOSE      POCT Blood Glucose.: 96 mg/dL (11 Mar 2023 18:00)  POCT Blood Glucose.: 107 mg/dL (11 Mar 2023 12:21)  POCT Blood Glucose.: 116 mg/dL (11 Mar 2023 10:15)    I&O's Summary      PHYSICAL EXAM:  GENERAL: NAD  HEENT:  Wearing head brace   CHEST/LUNG: Chest rise equal bilaterally, lungs clear to ascultation with decreased breath sounds at bases  HEART: Tachycardic, normal S1 S2  ABDOMEN: Soft, Nontender, Nondistended  EXTREMITIES:  Bilateral pitting edema from ankles to hips   PSYCH: alert, answers yes/no questions   SKIN: No obvious rashes or lesions  NEURO: shakes head no when asked to follow commands    LABS:                        11.8   8.83  )-----------( 102      ( 12 Mar 2023 05:53 )             37.9     03-12    141  |  106  |  22  ----------------------------<  76  3.8   |  23  |  0.44<L>    Ca    8.9      12 Mar 2023 05:53  Phos  3.1     03-12  Mg     2.20     03-12    TPro  4.9<L>  /  Alb  2.7<L>  /  TBili  0.4  /  DBili  x   /  AST  13  /  ALT  27  /  AlkPhos  91  03-12    PT/INR - ( 11 Mar 2023 10:43 )   PT: 16.0 sec;   INR: 1.37 ratio         PTT - ( 11 Mar 2023 10:43 )  PTT:32.5 sec  CARDIAC MARKERS ( 11 Mar 2023 22:31 )  x     / x     / 51 U/L / x     / 1.5 ng/mL      Urinalysis Basic - ( 11 Mar 2023 13:42 )    Color: Jazmin / Appearance: Slightly Turbid / S.028 / pH: x  Gluc: x / Ketone: Trace  / Bili: Negative / Urobili: 3 mg/dL   Blood: x / Protein: 100 mg/dL / Nitrite: Negative   Leuk Esterase: Negative / RBC: 7 /HPF / WBC 8 /HPF   Sq Epi: x / Non Sq Epi: 7 /HPF / Bacteria: Negative        RADIOLOGY & ADDITIONAL TESTS:    Imaging Personally Reviewed:    Consultant(s) Notes Reviewed:      Care Discussed with Consultants/Other Providers:   Patient is a 62y old  Male who presents with a chief complaint of AMS (11 Mar 2023 22:52)      SUBJECTIVE / OVERNIGHT EVENTS: Pt admitted overnight for sepsis of unclear etiology. Pt seen and examined at bedside in the ED. Pt lethargy, awakens to movement. AOx0. Limited ROS.     Brief Daily Plan  - ID cs --> ? meningitis; C/w empiric nancy; d/c vanc  - F/u Blood cx --> + pseudomonas   - F/u DVT studies   - F/u Echo    MEDICATIONS  (STANDING):  dexAMETHasone  Injectable 4 milliGRAM(s) IV Push daily  heparin   Injectable 5000 Unit(s) SubCutaneous every 12 hours  levETIRAcetam  IVPB 500 milliGRAM(s) IV Intermittent every 12 hours  levothyroxine Injectable 37.5 MICROGram(s) IV Push at bedtime  meropenem  IVPB 2000 milliGRAM(s) IV Intermittent every 8 hours  vancomycin  IVPB 1250 milliGRAM(s) IV Intermittent every 12 hours    MEDICATIONS  (PRN):      Vital Signs Last 24 Hrs  T(C): 37.2 (12 Mar 2023 05:00), Max: 38.1 (11 Mar 2023 09:59)  T(F): 98.9 (12 Mar 2023 05:00), Max: 100.5 (11 Mar 2023 09:59)  HR: 130 (12 Mar 2023 05:00) (126 - 150)  BP: 109/84 (12 Mar 2023 05:00) (84/63 - 114/79)  BP(mean): --  RR: 20 (12 Mar 2023 05:00) (18 - 20)  SpO2: 99% (12 Mar 2023 05:00) (98% - 100%)    Parameters below as of 12 Mar 2023 05:00  Patient On (Oxygen Delivery Method): nasal cannula  O2 Flow (L/min): 3    CAPILLARY BLOOD GLUCOSE      POCT Blood Glucose.: 96 mg/dL (11 Mar 2023 18:00)  POCT Blood Glucose.: 107 mg/dL (11 Mar 2023 12:21)  POCT Blood Glucose.: 116 mg/dL (11 Mar 2023 10:15)    I&O's Summary      PHYSICAL EXAM:  GENERAL: NAD  HEENT:  Wearing head brace   CHEST/LUNG: Chest rise equal bilaterally, lungs clear to ascultation with decreased breath sounds at bases  HEART: Tachycardic, normal S1 S2  ABDOMEN: Soft, Nontender, Nondistended  EXTREMITIES:  Bilateral pitting edema from ankles to hips   PSYCH: alert, answers yes/no questions   SKIN: No obvious rashes or lesions  NEURO: shakes head no when asked to follow commands    LABS:                        11.8   8.83  )-----------( 102      ( 12 Mar 2023 05:53 )             37.9     03-12    141  |  106  |  22  ----------------------------<  76  3.8   |  23  |  0.44<L>    Ca    8.9      12 Mar 2023 05:53  Phos  3.1     03-12  Mg     2.20     03-12    TPro  4.9<L>  /  Alb  2.7<L>  /  TBili  0.4  /  DBili  x   /  AST  13  /  ALT  27  /  AlkPhos  91  03-12    PT/INR - ( 11 Mar 2023 10:43 )   PT: 16.0 sec;   INR: 1.37 ratio         PTT - ( 11 Mar 2023 10:43 )  PTT:32.5 sec  CARDIAC MARKERS ( 11 Mar 2023 22:31 )  x     / x     / 51 U/L / x     / 1.5 ng/mL      Urinalysis Basic - ( 11 Mar 2023 13:42 )    Color: Jazmin / Appearance: Slightly Turbid / S.028 / pH: x  Gluc: x / Ketone: Trace  / Bili: Negative / Urobili: 3 mg/dL   Blood: x / Protein: 100 mg/dL / Nitrite: Negative   Leuk Esterase: Negative / RBC: 7 /HPF / WBC 8 /HPF   Sq Epi: x / Non Sq Epi: 7 /HPF / Bacteria: Negative        RADIOLOGY & ADDITIONAL TESTS:    Imaging Personally Reviewed:    Consultant(s) Notes Reviewed:      Care Discussed with Consultants/Other Providers:

## 2023-03-12 NOTE — PROVIDER CONTACT NOTE (CRITICAL VALUE NOTIFICATION) - ACTION/TREATMENT ORDERED:
Provider Aware; continue with vanco and srikanth Provider Aware; continue with vancomycin IVBP and meropenem  IVPB

## 2023-03-12 NOTE — PROGRESS NOTE ADULT - ASSESSMENT
63 y/o M with PMH GBM s/p craniotomy w/ resection c/b ESBL meningitis with repeat craniotomy 2/2020 c/b repeat ESBL meningitis s/p craniectomy,b/t DVT (2020) s/p IVC filter, HTN, anxiety presenting for altered mental status. 63 y/o M with PMH GBM s/p craniotomy w/ resection c/b ESBL meningitis with repeat craniotomy 2/2020 c/b repeat ESBL meningitis s/p craniectomy,b/t DVT (2020) s/p IVC filter, HTN, anxiety presenting for altered mental status found to have pseudomonas bacteremia started on IV antibiotics.

## 2023-03-12 NOTE — PROGRESS NOTE ADULT - PROBLEM SELECTOR PLAN 4
Patient with elevated BUN/creatinine ratio   - given 1L fluids in ED, will give an additional 500cc and continue to monitor Patient with history of bilateral DVTs s/p IVC filter. On Tuesday patient with new DVTs per daughter, per neurosurgery outpatient needed repeat MRI prior to therapeutic anticoagulation   - will get bilateral DVT scans and if positive will likely need vascular and neurosurgery input prior to initiating anticoagulation

## 2023-03-12 NOTE — PATIENT PROFILE ADULT - NSPRESCRUSEDDRG_GEN_A_NUR
SS Note: Initial transition of care assessment completed 7/16 by CM. Today this SW met with pt as PT recommending wheeled walker. Pt stated he has standard walker from [facility] from 5-6 weeks ago and is aware insurance will not cover a new walker. Pt to contact company he received walker from to obtain wheels for walker or stated he will go to local pharmacy to purchase wheels. PT recommending HHPT however pt prefers to inquire of orthopedic physician whether [facility] is recommended. DC plan is for pt to return to girlfriend's home and she is to provide transportation home at dc. Pt stated he has a list of local PCP's at home and has listed provided by CM yesterday. SW offered to assist with a new PCP appointment however pt stated he prefers to review lists after dc and will arrange for PCP himself.   Electronically signed by DOUG Spence on 7/17/2020 at 11:28 AM No

## 2023-03-12 NOTE — PROGRESS NOTE ADULT - PROBLEM SELECTOR PLAN 2
Patient with history of GBM s/p multiple craniotomies   - CT head with left frontal vasogenic edema with mass effect and midline shift to the right which is less when compared with 9/29/2022. Calcification versus hemorrhage left corona radiata likely related to prior neoplasm. Hematocrit level left occipital horn.   - Seen by neurosurgery, appreciate recommendations   - No neurosurgical intervention and no role for shunt tap per neurosurgery  - will c/w outpatient steroids Blood Cx (3/11): + 1/4 pseudomonas pending sensitivities    Source: Urine vs CAP vs meningitis   - C/w nancy + vanc   - Trend fever curve, pending ID recs Blood Cx (3/11): + 1/4 pseudomonas pending sensitivities    Source: Urine vs CAP vs meningitis   - F/w urine cx, if negative --> CT abd/pelvis   - C/w meropenem per ID   - Trend fever curve

## 2023-03-13 LAB
-  AMIKACIN: SIGNIFICANT CHANGE UP
-  AZTREONAM: SIGNIFICANT CHANGE UP
-  CEFEPIME: SIGNIFICANT CHANGE UP
-  CEFTAZIDIME: SIGNIFICANT CHANGE UP
-  CIPROFLOXACIN: SIGNIFICANT CHANGE UP
-  GENTAMICIN: SIGNIFICANT CHANGE UP
-  IMIPENEM: SIGNIFICANT CHANGE UP
-  LEVOFLOXACIN: SIGNIFICANT CHANGE UP
-  MEROPENEM: SIGNIFICANT CHANGE UP
-  PIPERACILLIN/TAZOBACTAM: SIGNIFICANT CHANGE UP
-  TOBRAMYCIN: SIGNIFICANT CHANGE UP
24R-OH-CALCIDIOL SERPL-MCNC: 23.5 NG/ML — LOW (ref 30–80)
A1C WITH ESTIMATED AVERAGE GLUCOSE RESULT: 5.9 % — HIGH (ref 4–5.6)
ALBUMIN SERPL ELPH-MCNC: 2.3 G/DL — LOW (ref 3.3–5)
ALP SERPL-CCNC: 109 U/L — SIGNIFICANT CHANGE UP (ref 40–120)
ALT FLD-CCNC: 25 U/L — SIGNIFICANT CHANGE UP (ref 4–41)
ANION GAP SERPL CALC-SCNC: 12 MMOL/L — SIGNIFICANT CHANGE UP (ref 7–14)
AST SERPL-CCNC: 13 U/L — SIGNIFICANT CHANGE UP (ref 4–40)
BILIRUB SERPL-MCNC: 0.4 MG/DL — SIGNIFICANT CHANGE UP (ref 0.2–1.2)
BLOOD GAS VENOUS COMPREHENSIVE RESULT: SIGNIFICANT CHANGE UP
BUN SERPL-MCNC: 22 MG/DL — SIGNIFICANT CHANGE UP (ref 7–23)
CALCIUM SERPL-MCNC: 9.2 MG/DL — SIGNIFICANT CHANGE UP (ref 8.4–10.5)
CHLORIDE SERPL-SCNC: 107 MMOL/L — SIGNIFICANT CHANGE UP (ref 98–107)
CO2 SERPL-SCNC: 24 MMOL/L — SIGNIFICANT CHANGE UP (ref 22–31)
CREAT SERPL-MCNC: 0.34 MG/DL — LOW (ref 0.5–1.3)
CULTURE RESULTS: SIGNIFICANT CHANGE UP
CULTURE RESULTS: SIGNIFICANT CHANGE UP
EGFR: 129 ML/MIN/1.73M2 — SIGNIFICANT CHANGE UP
ESTIMATED AVERAGE GLUCOSE: 123 — SIGNIFICANT CHANGE UP
FOLATE SERPL-MCNC: 20 NG/ML — HIGH (ref 3.1–17.5)
GLUCOSE SERPL-MCNC: 99 MG/DL — SIGNIFICANT CHANGE UP (ref 70–99)
HCT VFR BLD CALC: 41.1 % — SIGNIFICANT CHANGE UP (ref 39–50)
HGB BLD-MCNC: 12.3 G/DL — LOW (ref 13–17)
MAGNESIUM SERPL-MCNC: 2.4 MG/DL — SIGNIFICANT CHANGE UP (ref 1.6–2.6)
MCHC RBC-ENTMCNC: 29.9 GM/DL — LOW (ref 32–36)
MCHC RBC-ENTMCNC: 32 PG — SIGNIFICANT CHANGE UP (ref 27–34)
MCV RBC AUTO: 107 FL — HIGH (ref 80–100)
METHOD TYPE: SIGNIFICANT CHANGE UP
NRBC # BLD: 0 /100 WBCS — SIGNIFICANT CHANGE UP (ref 0–0)
NRBC # FLD: 0.02 K/UL — HIGH (ref 0–0)
ORGANISM # SPEC MICROSCOPIC CNT: SIGNIFICANT CHANGE UP
PHOSPHATE SERPL-MCNC: 2.8 MG/DL — SIGNIFICANT CHANGE UP (ref 2.5–4.5)
PLATELET # BLD AUTO: 87 K/UL — LOW (ref 150–400)
POTASSIUM SERPL-MCNC: 3.9 MMOL/L — SIGNIFICANT CHANGE UP (ref 3.5–5.3)
POTASSIUM SERPL-SCNC: 3.9 MMOL/L — SIGNIFICANT CHANGE UP (ref 3.5–5.3)
PROT SERPL-MCNC: 5.3 G/DL — LOW (ref 6–8.3)
RBC # BLD: 3.84 M/UL — LOW (ref 4.2–5.8)
RBC # FLD: 14.3 % — SIGNIFICANT CHANGE UP (ref 10.3–14.5)
SODIUM SERPL-SCNC: 143 MMOL/L — SIGNIFICANT CHANGE UP (ref 135–145)
SPECIMEN SOURCE: SIGNIFICANT CHANGE UP
SPECIMEN SOURCE: SIGNIFICANT CHANGE UP
VIT B12 SERPL-MCNC: 2000 PG/ML — HIGH (ref 200–900)
WBC # BLD: 10.64 K/UL — HIGH (ref 3.8–10.5)
WBC # FLD AUTO: 10.64 K/UL — HIGH (ref 3.8–10.5)

## 2023-03-13 PROCEDURE — 99233 SBSQ HOSP IP/OBS HIGH 50: CPT | Mod: GC

## 2023-03-13 PROCEDURE — 93970 EXTREMITY STUDY: CPT | Mod: 26

## 2023-03-13 PROCEDURE — 93306 TTE W/DOPPLER COMPLETE: CPT | Mod: 26

## 2023-03-13 PROCEDURE — 99233 SBSQ HOSP IP/OBS HIGH 50: CPT

## 2023-03-13 RX ORDER — HEPARIN SODIUM 5000 [USP'U]/ML
1600 INJECTION INTRAVENOUS; SUBCUTANEOUS
Qty: 25000 | Refills: 0 | Status: DISCONTINUED | OUTPATIENT
Start: 2023-03-13 | End: 2023-03-15

## 2023-03-13 RX ADMIN — HEPARIN SODIUM 16 UNIT(S)/HR: 5000 INJECTION INTRAVENOUS; SUBCUTANEOUS at 18:35

## 2023-03-13 RX ADMIN — Medication 37.5 MICROGRAM(S): at 22:27

## 2023-03-13 RX ADMIN — HEPARIN SODIUM 5000 UNIT(S): 5000 INJECTION INTRAVENOUS; SUBCUTANEOUS at 06:37

## 2023-03-13 RX ADMIN — MEROPENEM 280 MILLIGRAM(S): 1 INJECTION INTRAVENOUS at 22:27

## 2023-03-13 RX ADMIN — LEVETIRACETAM 400 MILLIGRAM(S): 250 TABLET, FILM COATED ORAL at 18:35

## 2023-03-13 RX ADMIN — MEROPENEM 280 MILLIGRAM(S): 1 INJECTION INTRAVENOUS at 12:26

## 2023-03-13 RX ADMIN — MEROPENEM 280 MILLIGRAM(S): 1 INJECTION INTRAVENOUS at 02:01

## 2023-03-13 RX ADMIN — LEVETIRACETAM 400 MILLIGRAM(S): 250 TABLET, FILM COATED ORAL at 06:37

## 2023-03-13 RX ADMIN — Medication 4 MILLIGRAM(S): at 06:37

## 2023-03-13 NOTE — SWALLOW BEDSIDE ASSESSMENT ADULT - ASR SWALLOW RECOMMEND DIAG
Objective testing is NOT indicated given functional swallow on baseline diet of puree with mildly-thick liquids

## 2023-03-13 NOTE — PROGRESS NOTE ADULT - PROBLEM SELECTOR PLAN 3
Patient with history of GBM s/p multiple craniotomies   - CT head with left frontal vasogenic edema with mass effect and midline shift to the right which is less when compared with 9/29/2022. Calcification versus hemorrhage left corona radiata likely related to prior neoplasm. Hematocrit level left occipital horn.   - Seen by neurosurgery, appreciate recommendations   - No neurosurgical intervention and no role for shunt tap per neurosurgery  - will c/w outpatient steroids

## 2023-03-13 NOTE — PROGRESS NOTE ADULT - PROBLEM SELECTOR PLAN 5
Patient with elevated BUN/creatinine ratio   - given 1L fluids in ED, will give an additional 500cc and continue to monitor

## 2023-03-13 NOTE — PROGRESS NOTE ADULT - SUBJECTIVE AND OBJECTIVE BOX
f/u pseudomonas bacteremia    Interval History/ROS:  no fever/chills.  no n/v/d.  no abdominal pain.  no dysuria.  Remainder of ROS otherwise negative.    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  Presence of IVC filter  Status post craniectomy    Allergies  aspirin (Unknown)  shellfish (Hives)  Tegretol (Unknown)    ANTIMICROBIALS:  vancomycin  IVPB 1250 every 12 hours (3/12 x1)  active:  meropenem  IVPB 2000 every 8 hours (3/11-)    MEDICATIONS  (STANDING):  dexAMETHasone  Injectable 4 daily  heparin   Injectable 5000 every 12 hours  levETIRAcetam  IVPB 500 every 12 hours  levothyroxine Injectable 37.5 at bedtime    Vital Signs Last 24 Hrs  T(F): 97.7 (23 @ 06:35), Max: 98.9 (23 @ 17:42)  HR: 104 (23 @ 06:35)  BP: 116/84 (23 @ 06:35)  RR: 20 (23 @ 06:35)  SpO2: 100% (23 @ 06:35) (95% - 100%)    PHYSICAL EXAM:  Constitutional: lethargic, mom at bedside  HEAD/EYES: eyes remain closed  ENT:  supple  Cardiovascular:   normal S1, S2  Respiratory:  clear BS bilaterally  GI:  soft, non-tender, normal bowel sounds  :  no francois  Musculoskeletal:  no synovitis  Neurologic: lethargic, not really moving  Skin:  no rash  Psychiatric:  not able to assess                        12.3   10.64 )-----------( 87       ( 13 Mar 2023 07:16 )             41.1     143  |  107  |  22  ----------------------------<  99  3.9   |  24  |  0.34  Ca    9.2      13 Mar 2023 07:16Phos  2.8     -Mg     2.40       TPro  5.3  /  Alb  2.3  /  TBili  0.4  /  DBili  x   /  AST  13  /  ALT  25  /  AlkPhos  109      WBC Count: 10.64 (23 @ 07:16)  WBC Count: 8.83 (23 @ 05:53)  WBC Count: 9.97 (23 @ 10:43)    Urinalysis Basic - ( 11 Mar 2023 13:42 )  Color: Jazmin / Appearance: Slightly Turbid / S.028 / pH: x  Gluc: x / Ketone: Trace  / Bili: Negative / Urobili: 3 mg/dL   Blood: x / Protein: 100 mg/dL / Nitrite: Negative   Leuk Esterase: Negative / RBC: 7 /HPF / WBC 8 /HPF   Sq Epi: x / Non Sq Epi: 7 /HPF / Bacteria: Negative    MICROBIOLOGY:  Culture - Urine (collected 23 @ 13:15)  Source: Clean Catch Clean Catch (Midstream)  Final Report (23 @ 16:55):    No growth    Culture - Blood (collected 23 @ 11:32)  Source: .Blood Blood-Peripheral  Gram Stain (23 @ 06:54):    Growth in aerobic bottle: Gram Negative Rods  Preliminary Report (23 @ 20:11):    Growth in aerobic bottle: Pseudomonas aeruginosa    ***Blood Panel PCR results on this specimen are available    approximately 3 hours after the Gram stain result.***    Gram stain, PCR, and/or culture results may not always    correspond due to differencein methodologies.    ************************************************************    This PCR assay was performed by multiplex PCR. This    Assay tests for 66 bacterial and resistance gene targets.    Please refer to the [facility] test directory    at [email or web address] for details.  Organism: Blood Culture PCR (23 @ 08:33)  Organism: Blood Culture PCR (23 @ 08:33)      -  Pseudomonas aeruginosa: Detec      Method Type: PCR    Culture - Blood (collected 23 @ 10:44)  Source: .Blood Blood-Peripheral  Gram Stain (23 @ 07:26):    Growth in aerobic bottle: Gram Negative Rods  Preliminary Report (23 @ 20:10):    Growth in aerobic bottle: Pseudomonas aeruginosa    See previous culture [id number]    Rapid RVP Result: NotDetec ( @ 11:14)    RADIOLOGY:  imaging below personally reviewed and agree with findings    CT Head No Cont (23 @ 13:06) >  IMPRESSION: Left parietal craniotomy defect. Left frontal vasogenic edema with mass effect and midline shift to the right which is less when compared with 2022. Calcification versus hemorrhage left corona radiata likely related to prior neoplasm. Hematocrit level left occipital horn.    CT Angio Chest PE Protocol w/ IV Cont (23 @ 13:05) >  IMPRESSION:  No main, right or left main, lobar, or proximal segmental pulmonary embolism. Limited evaluation of the distal segmental and subsegmental arteries secondary to motion artifact.  Interval resolution of the previously seen multifocal opacities since 2022.    Xray Chest 1 View- PORTABLE-Urgent (23 @ 11:30) >  IMPRESSION:: The heart size cannot be adequately assessed on this single view. There is an overlying  shunt. The right costophrenic angle was omitted from this. There is a small left pleural effusion and/or left basilar atelectasis. A left basilar pneumonia cannot be excluded.

## 2023-03-13 NOTE — PROGRESS NOTE ADULT - ASSESSMENT
63 y/o M with PMH GBM s/p craniotomy w/ resection c/b ESBL meningitis with repeat craniotomy 2/2020 c/b repeat ESBL meningitis s/p craniectomy,b/t DVT (2020) s/p IVC filter, HTN, anxiety presenting for altered mental status found to have pseudomonas bacteremia started on IV antibiotics.

## 2023-03-13 NOTE — SWALLOW BEDSIDE ASSESSMENT ADULT - ADDITIONAL RECOMMENDATIONS
2. Consider dietary consult to ensure adequate caloric/hydration intake. 3. This service to follow-up as schedule permits for diet tolerance. 4. Medical team further advised to reconsult this department with any change in medical status and/or observed change in tolerance of recommended PO diet.

## 2023-03-13 NOTE — PROGRESS NOTE ADULT - ATTENDING COMMENTS
Seen an examined by me this afternoon, not following commands at time of visit or answering questions, seems to be sleeping  Severe sepsis-POA due to Pseudomona's bacteremia of unclear source  C/w IV meropenem, surveillance BCx in AM  F/up CT abd/pelvis, apprec ID recs  TTE-limited and technically difficult study  Duplex LE's shows extensive B/L acute DVT's, pt has an IVC filter in place, will start AC (ok'd by MARY KATE this admission) with IV heparin with low PTT goal given underlying brain malignancy, risk of bleeding and potential need for LP in near future  Calculate accurate BMI, seems to have Obesity  Passed swallow eval-puree diet  DNR/DNI, overall with poor prognosis  Rest as above Seen an examined by me this afternoon, not following commands at time of visit or answering questions, seems to be sleeping  Severe sepsis-POA due to Pseudomona's bacteremia of unclear source  C/w IV meropenem, surveillance BCx in AM  F/up CT abd/pelvis, apprec ID recs  TTE-limited and technically difficult study  Duplex LE's shows extensive B/L acute DVT's, pt has an IVC filter in place, will start AC (ok'd by MARY KATE this admission) with IV heparin with low PTT goal given underlying brain malignancy, risk of bleeding and potential need for LP in near future  DVT in the setting of underlying malignancy, hypercoagulable state  Calculate accurate BMI, seems to have Obesity  Passed swallow eval-puree diet  DNR/DNI, overall with poor prognosis  Rest as above Seen an examined by me this afternoon, not following commands at time of visit or answering questions, seems to be sleeping  Severe sepsis-POA due to Pseudomona's bacteremia of unclear source  C/w IV meropenem, surveillance BCx in AM  F/up CT abd/pelvis, apprec ID recs  TTE-limited and technically difficult study  Duplex LE's shows extensive B/L acute DVT's, pt has an IVC filter in place, will start AC (ok'd by MARY KATE this admission) with IV heparin with low PTT goal given underlying brain malignancy, risk of bleeding and potential need for LP in near future  DVT in the setting of underlying malignancy, hypercoagulable state  Calculate accurate BMI, seems to have Obesity  Passed swallow eval-puree diet  DNR/DNI, overall with poor prognosis  PT eval --> unable to actively participate in therapy  Rest as above

## 2023-03-13 NOTE — PHYSICAL THERAPY INITIAL EVALUATION ADULT - GENERAL OBSERVATIONS, REHAB EVAL
Pt received on a stretcher in the ED , +tele , + moderate to severe bilateral LE swelling,+ O2 NC +helmet ,+pt saying yes or no only , pt in no apparent distress /82 SpO2 100%

## 2023-03-13 NOTE — PHYSICAL THERAPY INITIAL EVALUATION ADULT - PERTINENT HX OF CURRENT PROBLEM, REHAB EVAL
This is a 63 y/o M with PMH GBM s/p craniotomy w/ resection c/b ESBL meningitis with repeat craniotomy 2/2020 c/b repeat ESBL meningitis s/p craniectomy,b/t DVT (2020) s/p IVC filter,  anxiety presenting for altered mental status.

## 2023-03-13 NOTE — PROGRESS NOTE ADULT - PROBLEM SELECTOR PLAN 6
Elevated BNP on admission   - echo in 2017 with EF 60-65% normal LV function   - CXR with small left pleural effusion   - will continue to monitor fluid status  - Repeat TTE

## 2023-03-13 NOTE — PROGRESS NOTE ADULT - PROBLEM SELECTOR PLAN 2
Blood Cx (3/11): + 1/4 pseudomonas pending sensitivities    Source: Urine vs CAP vs meningitis   - F/w urine cx, if negative --> CT abd/pelvis   - C/w meropenem per ID   - Trend fever curve

## 2023-03-13 NOTE — PROGRESS NOTE ADULT - PROBLEM SELECTOR PLAN 1
Patient febrile, tachycardic and hypotensive on admission. Patient answering yes/no questions but altered according to Chris   - UA negative, CT with resolution of prior lung opacities   - patient with a history of meningitis x2, given meropenem in ED   - blood cx + (1/4) pseudomonas; urine cx pending   - Per ID, c/w meropenem (3/11 -); D/c vanc (3/12) x 1   - Droplet precautions; aspiration precautions   - Family would prefer to hold off LP unless indicated; hold home bactrim for now

## 2023-03-13 NOTE — PHYSICAL THERAPY INITIAL EVALUATION ADULT - CRITERIA FOR SKILLED THERAPEUTIC INTERVENTIONS
Pt is unable to actively participate in therapy ,unable to follow vc's , hence not placed on Restorative PT at this time.

## 2023-03-13 NOTE — SWALLOW BEDSIDE ASSESSMENT ADULT - COMMENTS
Patient declined further trials, shaking head no. Per mother, patient with coughing on intake of thin liquids. As per Internal Medicine note dated 3/13/23, "63 y/o M with PMH GBM s/p craniotomy w/ resection c/b ESBL meningitis with repeat craniotomy 2/2020 c/b repeat ESBL meningitis s/p craniectomy,b/t DVT (2020) s/p IVC filter, HTN, anxiety presenting for altered mental status found to have pseudomonas bacteremia started on IV antibiotics."    CT Chest 3/11/23 IMPRESSION: "No main, right or left main, lobar, or proximal segmental pulmonary embolism. Limited evaluation of the distal segmental and subsegmental arteries secondary to motion artifact. Interval resolution of the previously seen multifocal opacities since February 4, 2022."  CT Head 3/11/23 "IMPRESSION: Left parietal craniotomy defect. Left frontal vasogenic edema with mass effect and midline shift to the right which is less when compared with 9/29/2022. Calcification versus hemorrhage left corona radiata likely related to prior neoplasm. Hematocrit level left occipital   horn."    Patient is known to this service, seen during previous admission in September 2022. Further, patient was seen for a Cinesophagram with recommendation of Soft and Bite Sized Solids with Thin Liquids (See notes for details).     Patient visited at bedside for clinical swallow evaluation with mother presents. Patient initially in a sleep state, able to rouse with verbal and tactile cues. Patient is able to answer simple yes/no questions and inconsistently follows 1-step directions.

## 2023-03-13 NOTE — SWALLOW BEDSIDE ASSESSMENT ADULT - SWALLOW EVAL: DIAGNOSIS
1. Mild oral dysphagia for puree, moderately-thick and mildly-thick liquids marked by adequate acceptance and containment, delayed oral transit and adequate oral clearance. 2. Mild pharyngeal dysphagia for aforementioned consistencies marked by a suspected delay in initiation of pharyngeal swallow with no overt s/s of penetration/aspiration evidenced.

## 2023-03-13 NOTE — PROGRESS NOTE ADULT - ASSESSMENT
62M resident of [facility], hx Glioblastoma multiforme s/p craniotomy and resection complicated by ESBL meningitis s/p repeat craniotomy washout 2/2020 and 8 weeks of Meropenem, recurrent infection s/p repeat washout and 10 weeks of Meropenem followed by suppressive PO Bactrim. Also hx  shunt, b/l DVT (2020) s/p IVC filter (supposed to be on Lovenox but ?accidently discontinued per sister), HTN, anxiety presenting for altered mental status, unresponsiveness, tachycardia and desaturation.  Recent COVID Sept 2022.  Now with new bilateral DVTs. Patient was seen by vascular surgery who stated that he needed neurosurgical clearance prior to therapeutic anticoagulation. Neurosurgery was contacted and stated patient needs another MRI prior to starting therapeutic anticoagulation.  BC with pseudomonas aeruginosa    Pseudomonas bacteremia  - UC (-)  - concerned about VPS  - start with CT abdomen  - i think he should have his VPS tapped  - will resume bactrim suppression (1 DS BID) when off carbapenem    GBM  - per sister there is recurrent disease    I have discussed plan of care as detailed above with housestaff

## 2023-03-13 NOTE — PROGRESS NOTE ADULT - SUBJECTIVE AND OBJECTIVE BOX
Patient is a 62y old  Male who presents with a chief complaint of AMS (12 Mar 2023 08:29)      SUBJECTIVE / OVERNIGHT EVENTS:    Brief Daily Plan  - CT abdomen/pelvis r/o underlying infection  - Repeat Blood Cx in AM   - F/u Echo; DVT studies    MEDICATIONS  (STANDING):  dexAMETHasone  Injectable 4 milliGRAM(s) IV Push daily  heparin   Injectable 5000 Unit(s) SubCutaneous every 12 hours  levETIRAcetam  IVPB 500 milliGRAM(s) IV Intermittent every 12 hours  levothyroxine Injectable 37.5 MICROGram(s) IV Push at bedtime  meropenem  IVPB 2000 milliGRAM(s) IV Intermittent every 8 hours    MEDICATIONS  (PRN):      Vital Signs Last 24 Hrs  T(C): 36.7 (12 Mar 2023 22:50), Max: 37.2 (12 Mar 2023 17:42)  T(F): 98 (12 Mar 2023 22:50), Max: 98.9 (12 Mar 2023 17:42)  HR: 118 (12 Mar 2023 22:50) (118 - 123)  BP: 109/77 (12 Mar 2023 22:50) (109/77 - 125/86)  BP(mean): --  RR: 22 (12 Mar 2023 22:50) (20 - 22)  SpO2: 95% (12 Mar 2023 22:50) (95% - 98%)    Parameters below as of 12 Mar 2023 22:50  Patient On (Oxygen Delivery Method): nasal cannula  O2 Flow (L/min): 3    CAPILLARY BLOOD GLUCOSE        I&O's Summary      PHYSICAL EXAM:    GENERAL: NAD  HEENT:  Wearing head brace   CHEST/LUNG: Chest rise equal bilaterally, lungs clear to ascultation with decreased breath sounds at bases  HEART: Tachycardic, normal S1 S2  ABDOMEN: Soft, Nontender, Nondistended  EXTREMITIES: 2+ Bilateral pitting edema from ankles to hips   PSYCH: alert, answers yes/no questions   SKIN: No obvious rashes or lesions  NEURO: shakes head no when asked to follow commands    LABS:                        11.8   8.83  )-----------( 102      ( 12 Mar 2023 05:53 )             37.9     03-12    141  |  106  |  22  ----------------------------<  76  3.8   |  23  |  0.44<L>    Ca    8.9      12 Mar 2023 05:53  Phos  3.1     03-12  Mg     2.20     03-12    TPro  4.9<L>  /  Alb  2.7<L>  /  TBili  0.4  /  DBili  x   /  AST  13  /  ALT  27  /  AlkPhos  91  03-12    PT/INR - ( 11 Mar 2023 10:43 )   PT: 16.0 sec;   INR: 1.37 ratio         PTT - ( 11 Mar 2023 10:43 )  PTT:32.5 sec  CARDIAC MARKERS ( 11 Mar 2023 22:31 )  x     / x     / 51 U/L / x     / 1.5 ng/mL      Urinalysis Basic - ( 11 Mar 2023 13:42 )    Color: Jazmin / Appearance: Slightly Turbid / S.028 / pH: x  Gluc: x / Ketone: Trace  / Bili: Negative / Urobili: 3 mg/dL   Blood: x / Protein: 100 mg/dL / Nitrite: Negative   Leuk Esterase: Negative / RBC: 7 /HPF / WBC 8 /HPF   Sq Epi: x / Non Sq Epi: 7 /HPF / Bacteria: Negative        RADIOLOGY & ADDITIONAL TESTS:    Imaging Personally Reviewed:    Consultant(s) Notes Reviewed:      Care Discussed with Consultants/Other Providers:   Patient is a 62y old  Male who presents with a chief complaint of AMS (12 Mar 2023 08:29)      SUBJECTIVE / OVERNIGHT EVENTS: No acute events overnight. Pt seen and examined at bedside this morning. Pt answering yes/no questions. Denies fever, headache, nausea, vomiting, CP, changes in BM or dysuria.     Brief Daily Plan  - CT abdomen/pelvis r/o underlying infection  - Repeat Blood Cx in AM   - F/u Echo; DVT studies    MEDICATIONS  (STANDING):  dexAMETHasone  Injectable 4 milliGRAM(s) IV Push daily  heparin   Injectable 5000 Unit(s) SubCutaneous every 12 hours  levETIRAcetam  IVPB 500 milliGRAM(s) IV Intermittent every 12 hours  levothyroxine Injectable 37.5 MICROGram(s) IV Push at bedtime  meropenem  IVPB 2000 milliGRAM(s) IV Intermittent every 8 hours    MEDICATIONS  (PRN):      Vital Signs Last 24 Hrs  T(C): 36.7 (12 Mar 2023 22:50), Max: 37.2 (12 Mar 2023 17:42)  T(F): 98 (12 Mar 2023 22:50), Max: 98.9 (12 Mar 2023 17:42)  HR: 118 (12 Mar 2023 22:50) (118 - 123)  BP: 109/77 (12 Mar 2023 22:50) (109/77 - 125/86)  BP(mean): --  RR: 22 (12 Mar 2023 22:50) (20 - 22)  SpO2: 95% (12 Mar 2023 22:50) (95% - 98%)    Parameters below as of 12 Mar 2023 22:50  Patient On (Oxygen Delivery Method): nasal cannula  O2 Flow (L/min): 3    CAPILLARY BLOOD GLUCOSE        I&O's Summary      PHYSICAL EXAM:    GENERAL: NAD  HEENT:  Wearing head brace   CHEST/LUNG: Chest rise equal bilaterally, lungs clear to ascultation with decreased breath sounds at bases  HEART: Tachycardic, normal S1 S2  ABDOMEN: Soft, Nontender, Nondistended  EXTREMITIES: 2+ Bilateral pitting edema from ankles to hips   PSYCH: alert, answers yes/no questions   SKIN: No obvious rashes or lesions  NEURO: shakes head no when asked to follow commands    LABS:                        11.8   8.83  )-----------( 102      ( 12 Mar 2023 05:53 )             37.9     03-12    141  |  106  |  22  ----------------------------<  76  3.8   |  23  |  0.44<L>    Ca    8.9      12 Mar 2023 05:53  Phos  3.1       Mg     2.20         TPro  4.9<L>  /  Alb  2.7<L>  /  TBili  0.4  /  DBili  x   /  AST  13  /  ALT  27  /  AlkPhos  91      PT/INR - ( 11 Mar 2023 10:43 )   PT: 16.0 sec;   INR: 1.37 ratio         PTT - ( 11 Mar 2023 10:43 )  PTT:32.5 sec  CARDIAC MARKERS ( 11 Mar 2023 22:31 )  x     / x     / 51 U/L / x     / 1.5 ng/mL      Urinalysis Basic - ( 11 Mar 2023 13:42 )    Color: Jazmin / Appearance: Slightly Turbid / S.028 / pH: x  Gluc: x / Ketone: Trace  / Bili: Negative / Urobili: 3 mg/dL   Blood: x / Protein: 100 mg/dL / Nitrite: Negative   Leuk Esterase: Negative / RBC: 7 /HPF / WBC 8 /HPF   Sq Epi: x / Non Sq Epi: 7 /HPF / Bacteria: Negative        RADIOLOGY & ADDITIONAL TESTS:    Imaging Personally Reviewed:    Consultant(s) Notes Reviewed:      Care Discussed with Consultants/Other Providers:

## 2023-03-14 LAB
ALBUMIN SERPL ELPH-MCNC: 2.2 G/DL — LOW (ref 3.3–5)
ALP SERPL-CCNC: 120 U/L — SIGNIFICANT CHANGE UP (ref 40–120)
ALT FLD-CCNC: 19 U/L — SIGNIFICANT CHANGE UP (ref 4–41)
ANION GAP SERPL CALC-SCNC: 12 MMOL/L — SIGNIFICANT CHANGE UP (ref 7–14)
APTT BLD: 121.7 SEC — CRITICAL HIGH (ref 27–36.3)
APTT BLD: 27.3 SEC — SIGNIFICANT CHANGE UP (ref 27–36.3)
APTT BLD: 45.2 SEC — HIGH (ref 27–36.3)
APTT BLD: >200 SEC — CRITICAL HIGH (ref 27–36.3)
AST SERPL-CCNC: 12 U/L — SIGNIFICANT CHANGE UP (ref 4–40)
BILIRUB SERPL-MCNC: 0.3 MG/DL — SIGNIFICANT CHANGE UP (ref 0.2–1.2)
BUN SERPL-MCNC: 24 MG/DL — HIGH (ref 7–23)
CALCIUM SERPL-MCNC: 9.5 MG/DL — SIGNIFICANT CHANGE UP (ref 8.4–10.5)
CHLORIDE SERPL-SCNC: 109 MMOL/L — HIGH (ref 98–107)
CO2 SERPL-SCNC: 23 MMOL/L — SIGNIFICANT CHANGE UP (ref 22–31)
CREAT SERPL-MCNC: 0.25 MG/DL — LOW (ref 0.5–1.3)
EGFR: 141 ML/MIN/1.73M2 — SIGNIFICANT CHANGE UP
GLUCOSE SERPL-MCNC: 154 MG/DL — HIGH (ref 70–99)
HCT VFR BLD CALC: 37.2 % — LOW (ref 39–50)
HGB BLD-MCNC: 11.4 G/DL — LOW (ref 13–17)
INR BLD: 9.59 RATIO — CRITICAL HIGH (ref 0.88–1.16)
MAGNESIUM SERPL-MCNC: 2.4 MG/DL — SIGNIFICANT CHANGE UP (ref 1.6–2.6)
MCHC RBC-ENTMCNC: 30.6 GM/DL — LOW (ref 32–36)
MCHC RBC-ENTMCNC: 31.3 PG — SIGNIFICANT CHANGE UP (ref 27–34)
MCV RBC AUTO: 102.2 FL — HIGH (ref 80–100)
NRBC # BLD: 0 /100 WBCS — SIGNIFICANT CHANGE UP (ref 0–0)
NRBC # FLD: 0.02 K/UL — HIGH (ref 0–0)
PHOSPHATE SERPL-MCNC: 2.4 MG/DL — LOW (ref 2.5–4.5)
PLATELET # BLD AUTO: 93 K/UL — LOW (ref 150–400)
POTASSIUM SERPL-MCNC: 3.6 MMOL/L — SIGNIFICANT CHANGE UP (ref 3.5–5.3)
POTASSIUM SERPL-SCNC: 3.6 MMOL/L — SIGNIFICANT CHANGE UP (ref 3.5–5.3)
PROT SERPL-MCNC: 5.3 G/DL — LOW (ref 6–8.3)
PROTHROM AB SERPL-ACNC: 113.8 SEC — HIGH (ref 10.5–13.4)
RBC # BLD: 3.64 M/UL — LOW (ref 4.2–5.8)
RBC # FLD: 13.9 % — SIGNIFICANT CHANGE UP (ref 10.3–14.5)
SODIUM SERPL-SCNC: 144 MMOL/L — SIGNIFICANT CHANGE UP (ref 135–145)
WBC # BLD: 8.37 K/UL — SIGNIFICANT CHANGE UP (ref 3.8–10.5)
WBC # FLD AUTO: 8.37 K/UL — SIGNIFICANT CHANGE UP (ref 3.8–10.5)

## 2023-03-14 PROCEDURE — 99233 SBSQ HOSP IP/OBS HIGH 50: CPT | Mod: GC

## 2023-03-14 PROCEDURE — 99233 SBSQ HOSP IP/OBS HIGH 50: CPT

## 2023-03-14 PROCEDURE — 74177 CT ABD & PELVIS W/CONTRAST: CPT | Mod: 26

## 2023-03-14 RX ORDER — SODIUM,POTASSIUM PHOSPHATES 278-250MG
1 POWDER IN PACKET (EA) ORAL ONCE
Refills: 0 | Status: COMPLETED | OUTPATIENT
Start: 2023-03-14 | End: 2023-03-14

## 2023-03-14 RX ORDER — PANTOPRAZOLE SODIUM 20 MG/1
40 TABLET, DELAYED RELEASE ORAL DAILY
Refills: 0 | Status: COMPLETED | OUTPATIENT
Start: 2023-03-14 | End: 2023-03-16

## 2023-03-14 RX ADMIN — LEVETIRACETAM 400 MILLIGRAM(S): 250 TABLET, FILM COATED ORAL at 18:12

## 2023-03-14 RX ADMIN — MEROPENEM 280 MILLIGRAM(S): 1 INJECTION INTRAVENOUS at 15:43

## 2023-03-14 RX ADMIN — Medication 37.5 MICROGRAM(S): at 22:06

## 2023-03-14 RX ADMIN — HEPARIN SODIUM 13 UNIT(S)/HR: 5000 INJECTION INTRAVENOUS; SUBCUTANEOUS at 10:30

## 2023-03-14 RX ADMIN — MEROPENEM 280 MILLIGRAM(S): 1 INJECTION INTRAVENOUS at 07:01

## 2023-03-14 RX ADMIN — HEPARIN SODIUM 14 UNIT(S)/HR: 5000 INJECTION INTRAVENOUS; SUBCUTANEOUS at 22:36

## 2023-03-14 RX ADMIN — HEPARIN SODIUM 13 UNIT(S)/HR: 5000 INJECTION INTRAVENOUS; SUBCUTANEOUS at 04:56

## 2023-03-14 RX ADMIN — PANTOPRAZOLE SODIUM 40 MILLIGRAM(S): 20 TABLET, DELAYED RELEASE ORAL at 12:46

## 2023-03-14 RX ADMIN — LEVETIRACETAM 400 MILLIGRAM(S): 250 TABLET, FILM COATED ORAL at 05:27

## 2023-03-14 RX ADMIN — MEROPENEM 280 MILLIGRAM(S): 1 INJECTION INTRAVENOUS at 22:06

## 2023-03-14 RX ADMIN — Medication 4 MILLIGRAM(S): at 07:00

## 2023-03-14 NOTE — PROGRESS NOTE ADULT - PROBLEM SELECTOR PLAN 1
Patient febrile, tachycardic and hypotensive on admission. Patient answering yes/no questions but altered according to Chris   - UA negative, CT with resolution of prior lung opacities   - patient with a history of meningitis x2, given meropenem in ED   - blood cx + (1/4) pseudomonas; urine cx pending   - Per ID, c/w meropenem (3/11 -); D/c vanc (3/12) x 1   - Droplet precautions; aspiration precautions   - Family would prefer to hold off LP unless indicated; hold home bactrim for now Patient febrile, tachycardic and hypotensive on admission. Patient answering yes/no questions but altered according to Chris   - UA negative, CT with resolution of prior lung opacities   - patient with a history of meningitis x2, given meropenem in ED   - blood cx + (2/4) pseudomonas; urine cx pending   - Per ID, c/w meropenem (3/11 -); D/c vanc (3/12) x 1   - Droplet precautions; aspiration precautions   - Repeat Blood Cx (3/14):   - Family would prefer to hold off LP unless indicated; hold home bactrim for now

## 2023-03-14 NOTE — PROGRESS NOTE ADULT - ASSESSMENT
63 y/o M with PMH GBM s/p craniotomy w/ resection c/b ESBL meningitis with repeat craniotomy 2/2020 c/b repeat ESBL meningitis s/p craniectomy,b/t DVT (2020) s/p IVC filter, HTN, anxiety presenting for altered mental status found to have pseudomonas bacteremia started on IV antibiotics.  63 y/o M with PMH GBM s/p craniotomy w/ resection c/b ESBL meningitis with repeat craniotomy 2/2020 c/b repeat ESBL meningitis s/p craniectomy,b/t DVT (2020) s/p IVC filter, HTN, anxiety presenting for altered mental status found to have pseudomonas bacteremia started on IV antibiotics. Pt found to have b/l DVT's and started on heparin gtt; pending CT abd/pelvis for pseudomonal bacteremia source.

## 2023-03-14 NOTE — PROGRESS NOTE ADULT - ASSESSMENT
62M resident of [facility], hx Glioblastoma multiforme s/p craniotomy and resection complicated by ESBL meningitis s/p repeat craniotomy washout 2/2020 and 8 weeks of Meropenem, recurrent infection s/p repeat washout and 10 weeks of Meropenem followed by suppressive PO Bactrim. Also hx  shunt, b/l DVT (2020) s/p IVC filter (supposed to be on Lovenox but ?accidently discontinued per sister), HTN, anxiety presenting for altered mental status, unresponsiveness, tachycardia and desaturation.  Recent COVID Sept 2022.  Now with new bilateral DVTs. Patient was seen by vascular surgery who stated that he needed neurosurgical clearance prior to therapeutic anticoagulation. Neurosurgery was contacted and stated patient needs another MRI prior to starting therapeutic anticoagulation.  BC with pseudomonas aeruginosa    Pseudomonas bacteremia  - UC (-)  - concerned about VPS  - awaiting CT abdomen  - i think he should have his VPS tapped  - will resume bactrim suppression (1 DS BID) when off carbapenem  - f/u repeat BC    GBM  - per sister there is recurrent disease    I have discussed plan of care as detailed above with housestaff

## 2023-03-14 NOTE — PROGRESS NOTE ADULT - PROBLEM SELECTOR PLAN 7
Diet: NPO for now given AMS, bedside dysphagia and S+S pending   DVT prophylaxis: heparin subq   Dispo: likely Chris Diet: puree with mildly thick liquids  DVT prophylaxis: heparin subq   Dispo: likely Chris Diet: puree with mildly thick liquids  DVT prophylaxis: heparin gtt  Dispo: likely Chris

## 2023-03-14 NOTE — PROVIDER CONTACT NOTE (CRITICAL VALUE NOTIFICATION) - ASSESSMENT
quantity not sufficient for the aptt sent. Patient on a heparin drip. Patient is a hard stick and would need to be arterially drawn again to receive aptt quantity not sufficient for the aptt sent. Patient on a heparin drip at 13ml/hr. Patient is a hard stick and would need to be arterially drawn again to receive aptt

## 2023-03-14 NOTE — PROGRESS NOTE ADULT - PROBLEM SELECTOR PLAN 6
Elevated BNP on admission   - echo in 2017 with EF 60-65% normal LV function   - CXR with small left pleural effusion   - will continue to monitor fluid status  - Repeat TTE Elevated BNP on admission   - echo in 2017 with EF 60-65% normal LV function   - CXR with small left pleural effusion   - will continue to monitor fluid status  - Repeat TTE: limited study

## 2023-03-14 NOTE — PROGRESS NOTE ADULT - ATTENDING COMMENTS
Seen an examined by me this afternoon, responds to loud and painful stimuli by nodding his head yes and no, denies pain, follows simple commands  Severe sepsis-POA due to Pseudomona's bacteremia of unclear source  C/w IV meropenem, f/up repeat BCx drawn today  F/up CT abd/pelvis, apprec ID recs, VPS will likely need to be tapped, will resume bactrim suppression (1 DS BID) when off carbapenem  TTE-limited and technically difficult study  C/w IV heparin for extensive B/L acute DVT's, pt has an IVC filter in place, low PTT goal given underlying brain malignancy, risk of bleeding and potential need for LP in near future  DVT in the setting of underlying malignancy, hypercoagulable state  Calculate accurate BMI, seems to have Obesity  DNR/DNI, overall with poor prognosis, getting Palliative to see patient (know to them from prior admissions)  PT eval --> unable to actively participate in therapy  Rest as above

## 2023-03-14 NOTE — PROVIDER CONTACT NOTE (CRITICAL VALUE NOTIFICATION) - SITUATION
Blood Culture positive for aerobic gram - rods
quantity not sufficient for the aptt sent. Patient is a hard stick and would need to be arterially drawn again

## 2023-03-14 NOTE — PROGRESS NOTE ADULT - SUBJECTIVE AND OBJECTIVE BOX
f/u pseudomonas bacteremia    Interval History/ROS:  ROS unable.  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  Presence of IVC filter  Status post craniectomy    Allergies  aspirin (Unknown)  shellfish (Hives)  Tegretol (Unknown)    ANTIMICROBIALS:  vancomycin  IVPB 1250 every 12 hours (3/12 x1)  active:  meropenem  IVPB 2000 every 8 hours (3/11-)    MEDICATIONS  (STANDING):  dexAMETHasone  Injectable 4 daily  heparin  Infusion 1600 <Continuous>  levETIRAcetam  IVPB 500 every 12 hours  levothyroxine Injectable 37.5 at bedtime    Vital Signs Last 24 Hrs  T(F): 97.4 (23 @ 06:15), Max: 97.7 (23 @ 22:38)  HR: 89 (23 @ 06:15)  BP: 125/95 (23 @ 06:15)  RR: 19 (23 @ 06:15)  SpO2: 100% (23 @ 06:15) (100% - 100%)  Wt(kg): --    PHYSICAL EXAM:  Constitutional: lethargic  HEAD/EYES: eyes remain closed  ENT:  supple  Cardiovascular:   normal S1, S2  Respiratory:  clear BS bilaterally  GI:  soft, non-tender, normal bowel sounds  :  no francois  Musculoskeletal:  no synovitis  Neurologic: lethargic, not really moving  Skin:  no rash  Psychiatric:  not able to assess                            11.4   8.37  )-----------( 93       ( 14 Mar 2023 06:50 )             37.2     144  |  109  |  24  ----------------------------<  154  3.6   |  23  |  0.25  Ca    9.5      14 Mar 2023 06:50Phos  2.4     -Mg     2.40       TPro  5.3  /  Alb  2.2  /  TBili  0.3  /  DBili  x   /  AST  12  /  ALT  19  /  AlkPhos  120      WBC Count: 10.64 (23 @ 07:16)  WBC Count: 8.83 (23 @ 05:53)  WBC Count: 9.97 (23 @ 10:43)    Urinalysis Basic - ( 11 Mar 2023 13:42 )  Color: Jazmin / Appearance: Slightly Turbid / S.028 / pH: x  Gluc: x / Ketone: Trace  / Bili: Negative / Urobili: 3 mg/dL   Blood: x / Protein: 100 mg/dL / Nitrite: Negative   Leuk Esterase: Negative / RBC: 7 /HPF / WBC 8 /HPF   Sq Epi: x / Non Sq Epi: 7 /HPF / Bacteria: Negative    MICROBIOLOGY:  3/14 BC Pending    Culture - Urine (collected 23 @ 13:15)  Source: Clean Catch Clean Catch (Midstream)  Final Report (23 @ 16:55):    No growth    Culture - Blood (collected 23 @ 11:32)  Source: .Blood Blood-Peripheral  Gram Stain (23 @ 06:54):    Growth in aerobic bottle: Gram Negative Rods  Preliminary Report (23 @ 20:11):    Growth in aerobic bottle: Pseudomonas aeruginosa    ***Blood Panel PCR results on this specimen are available    approximately 3 hours after the Gram stain result.***    Gram stain, PCR, and/or culture results may not always    correspond due to differencein methodologies.    ************************************************************    This PCR assay was performed by multiplex PCR. This    Assay tests for 66 bacterial and resistance gene targets.    Please refer to the [facility] test directory    at [email or web address] for details.  Organism: Blood Culture PCR (23 @ 08:33)  Organism: Blood Culture PCR (23 @ 08:33)      -  Pseudomonas aeruginosa: Detec      Method Type: PCR    Culture - Blood (collected 23 @ 10:44)  Source: .Blood Blood-Peripheral  Gram Stain (23 @ 07:26):    Growth in aerobic bottle: Gram Negative Rods  Preliminary Report (23 @ 20:10):    Growth in aerobic bottle: Pseudomonas aeruginosa    See previous culture [id number]    Rapid RVP Result: NotDetec ( @ 11:14)    RADIOLOGY:  imaging below personally reviewed and agree with findings    CT Head No Cont (23 @ 13:06) >  IMPRESSION: Left parietal craniotomy defect. Left frontal vasogenic edema with mass effect and midline shift to the right which is less when compared with 2022. Calcification versus hemorrhage left corona radiata likely related to prior neoplasm. Hematocrit level left occipital horn.    CT Angio Chest PE Protocol w/ IV Cont (23 @ 13:05) >  IMPRESSION:  No main, right or left main, lobar, or proximal segmental pulmonary embolism. Limited evaluation of the distal segmental and subsegmental arteries secondary to motion artifact.  Interval resolution of the previously seen multifocal opacities since 2022.    Xray Chest 1 View- PORTABLE-Urgent (23 @ 11:30) >  IMPRESSION:: The heart size cannot be adequately assessed on this single view. There is an overlying  shunt. The right costophrenic angle was omitted from this. There is a small left pleural effusion and/or left basilar atelectasis. A left basilar pneumonia cannot be excluded.

## 2023-03-14 NOTE — PROGRESS NOTE ADULT - PROBLEM SELECTOR PLAN 2
Blood Cx (3/11): + 1/4 pseudomonas pending sensitivities    Source: Urine vs CAP vs meningitis   - F/w urine cx, if negative --> CT abd/pelvis   - C/w meropenem per ID   - Trend fever curve Blood Cx (3/11): + 2/4 pseudomonas pending sensitivities    Source: Urine vs CAP vs meningitis   - F/w urine cx, if negative --> CT abd/pelvis   - C/w meropenem (3/11 - ) per ID   - Repeat Blood Cx (3/14) =   - Trend fever curve

## 2023-03-14 NOTE — PROGRESS NOTE ADULT - PROBLEM SELECTOR PLAN 4
Patient with history of bilateral DVTs s/p IVC filter. On Tuesday patient with new DVTs per daughter, per neurosurgery outpatient needed repeat MRI prior to therapeutic anticoagulation   - will get bilateral DVT scans and if positive will likely need vascular and neurosurgery input prior to initiating anticoagulation Patient with history of bilateral DVTs s/p IVC filter. On Tuesday patient with new DVTs per daughter, per neurosurgery outpatient needed repeat MRI prior to therapeutic anticoagulation   - Duplex US (3/13): remarkable for b/l DVT's. Ok to start AC per vasc sx   - C/w pt specific heparin gtt (3/13)

## 2023-03-14 NOTE — PROVIDER CONTACT NOTE (CRITICAL VALUE NOTIFICATION) - ACTION/TREATMENT ORDERED:
MD Sweeney made aware. WIll continue monitoring patient MD Sweeney made aware. MD to come draw aptt arterially. WIll continue monitoring patient

## 2023-03-14 NOTE — PROGRESS NOTE ADULT - SUBJECTIVE AND OBJECTIVE BOX
Patient is a 62y old  Male who presents with a chief complaint of AMS (13 Mar 2023 12:13)      SUBJECTIVE / OVERNIGHT EVENTS:    Brief Daily Plan    MEDICATIONS  (STANDING):  dexAMETHasone  Injectable 4 milliGRAM(s) IV Push daily  heparin  Infusion 1600 Unit(s)/Hr (13 mL/Hr) IV Continuous <Continuous>  levETIRAcetam  IVPB 500 milliGRAM(s) IV Intermittent every 12 hours  levothyroxine Injectable 37.5 MICROGram(s) IV Push at bedtime  meropenem  IVPB 2000 milliGRAM(s) IV Intermittent every 8 hours    MEDICATIONS  (PRN):      Vital Signs Last 24 Hrs  T(C): 36.4 (14 Mar 2023 02:12), Max: 36.5 (13 Mar 2023 06:35)  T(F): 97.5 (14 Mar 2023 02:12), Max: 97.7 (13 Mar 2023 06:35)  HR: 92 (14 Mar 2023 02:12) (92 - 104)  BP: 133/97 (14 Mar 2023 02:12) (116/84 - 133/97)  BP(mean): --  RR: 20 (14 Mar 2023 02:12) (20 - 20)  SpO2: 100% (14 Mar 2023 02:12) (100% - 100%)    Parameters below as of 14 Mar 2023 02:12  Patient On (Oxygen Delivery Method): nasal cannula      CAPILLARY BLOOD GLUCOSE        I&O's Summary      PHYSICAL EXAM:  GENERAL: NAD  HEENT:  Wearing head brace   CHEST/LUNG: Chest rise equal bilaterally, lungs clear to ascultation with decreased breath sounds at bases  HEART: Tachycardic, normal S1 S2  ABDOMEN: Soft, Nontender, Nondistended  EXTREMITIES: 2+ Bilateral pitting edema from ankles to hips   PSYCH: alert, answers yes/no questions   SKIN: No obvious rashes or lesions  NEURO: shakes head no when asked to follow commands    LABS:                        12.3   10.64 )-----------( 87       ( 13 Mar 2023 07:16 )             41.1     03-13    143  |  107  |  22  ----------------------------<  99  3.9   |  24  |  0.34<L>    Ca    9.2      13 Mar 2023 07:16  Phos  2.8     03-13  Mg     2.40     03-13    TPro  5.3<L>  /  Alb  2.3<L>  /  TBili  0.4  /  DBili  x   /  AST  13  /  ALT  25  /  AlkPhos  109  03-13    PTT - ( 14 Mar 2023 00:40 )  PTT:121.7 sec          RADIOLOGY & ADDITIONAL TESTS:    Imaging Personally Reviewed:    Consultant(s) Notes Reviewed:      Care Discussed with Consultants/Other Providers:   Patient is a 62y old  Male who presents with a chief complaint of AMS (13 Mar 2023 12:13)      SUBJECTIVE / OVERNIGHT EVENTS: No acute events overnight. Pt seen and examined at bedside this morning. Pt shaking head no intermittently. AOx0. Limited ROS.     Brief Daily Plan  - Repeat blood cx pending; C/w meropenem   - CT abd/pelvis expedited --> will consider Neuro sx recs  - Heparin gtt for b/l DVT's   - F/u ID recs       MEDICATIONS  (STANDING):  dexAMETHasone  Injectable 4 milliGRAM(s) IV Push daily  heparin  Infusion 1600 Unit(s)/Hr (13 mL/Hr) IV Continuous <Continuous>  levETIRAcetam  IVPB 500 milliGRAM(s) IV Intermittent every 12 hours  levothyroxine Injectable 37.5 MICROGram(s) IV Push at bedtime  meropenem  IVPB 2000 milliGRAM(s) IV Intermittent every 8 hours    MEDICATIONS  (PRN):      Vital Signs Last 24 Hrs  T(C): 36.4 (14 Mar 2023 02:12), Max: 36.5 (13 Mar 2023 06:35)  T(F): 97.5 (14 Mar 2023 02:12), Max: 97.7 (13 Mar 2023 06:35)  HR: 92 (14 Mar 2023 02:12) (92 - 104)  BP: 133/97 (14 Mar 2023 02:12) (116/84 - 133/97)  BP(mean): --  RR: 20 (14 Mar 2023 02:12) (20 - 20)  SpO2: 100% (14 Mar 2023 02:12) (100% - 100%)    Parameters below as of 14 Mar 2023 02:12  Patient On (Oxygen Delivery Method): nasal cannula      CAPILLARY BLOOD GLUCOSE        I&O's Summary      PHYSICAL EXAM:  GENERAL: NAD  HEENT:  Wearing head brace   CHEST/LUNG: Chest rise equal bilaterally, lungs clear to ascultation with decreased breath sounds at bases  HEART: Tachycardic, normal S1 S2  ABDOMEN: Soft, Nontender, Nondistended  EXTREMITIES: 2+ Bilateral pitting edema from ankles to hips   PSYCH: alert, answers yes/no questions   SKIN: No obvious rashes or lesions  NEURO: shakes head no when asked to follow commands    LABS:                        12.3   10.64 )-----------( 87       ( 13 Mar 2023 07:16 )             41.1     03-13    143  |  107  |  22  ----------------------------<  99  3.9   |  24  |  0.34<L>    Ca    9.2      13 Mar 2023 07:16  Phos  2.8     03-13  Mg     2.40     03-13    TPro  5.3<L>  /  Alb  2.3<L>  /  TBili  0.4  /  DBili  x   /  AST  13  /  ALT  25  /  AlkPhos  109  03-13    PTT - ( 14 Mar 2023 00:40 )  PTT:121.7 sec          RADIOLOGY & ADDITIONAL TESTS:    Imaging Personally Reviewed:    Consultant(s) Notes Reviewed:      Care Discussed with Consultants/Other Providers:   Patient is a 62y old  Male who presents with a chief complaint of AMS (13 Mar 2023 12:13)      SUBJECTIVE / OVERNIGHT EVENTS: No acute events overnight. Pt seen and examined at bedside this morning. Pt shaking head no intermittently. AOx0. Limited ROS.     Brief Daily Plan  - Repeat blood cx pending; C/w meropenem   - CT abd/pelvis expedited --> will consider Neuro sx recs  - Heparin gtt for b/l DVT's   - F/u ID recs       MEDICATIONS  (STANDING):  dexAMETHasone  Injectable 4 milliGRAM(s) IV Push daily  heparin  Infusion 1600 Unit(s)/Hr (13 mL/Hr) IV Continuous <Continuous>  levETIRAcetam  IVPB 500 milliGRAM(s) IV Intermittent every 12 hours  levothyroxine Injectable 37.5 MICROGram(s) IV Push at bedtime  meropenem  IVPB 2000 milliGRAM(s) IV Intermittent every 8 hours    MEDICATIONS  (PRN):      Vital Signs Last 24 Hrs  T(C): 36.4 (14 Mar 2023 02:12), Max: 36.5 (13 Mar 2023 06:35)  T(F): 97.5 (14 Mar 2023 02:12), Max: 97.7 (13 Mar 2023 06:35)  HR: 92 (14 Mar 2023 02:12) (92 - 104)  BP: 133/97 (14 Mar 2023 02:12) (116/84 - 133/97)  BP(mean): --  RR: 20 (14 Mar 2023 02:12) (20 - 20)  SpO2: 100% (14 Mar 2023 02:12) (100% - 100%)    Parameters below as of 14 Mar 2023 02:12  Patient On (Oxygen Delivery Method): nasal cannula      CAPILLARY BLOOD GLUCOSE        I&O's Summary      PHYSICAL EXAM:  GENERAL: NAD  HEENT:  Wearing head brace   CHEST/LUNG: Chest rise equal bilaterally, lungs clear to ascultation with decreased breath sounds at bases  HEART: Tachycardic, normal S1 S2  ABDOMEN: Soft, Nontender, Nondistended  EXTREMITIES: 2+ Bilateral pitting edema from ankles to hips   PSYCH: alert, limited  SKIN: No obvious rashes or lesions  NEURO: shakes head no when asked to follow commands    LABS:                        12.3   10.64 )-----------( 87       ( 13 Mar 2023 07:16 )             41.1     03-13    143  |  107  |  22  ----------------------------<  99  3.9   |  24  |  0.34<L>    Ca    9.2      13 Mar 2023 07:16  Phos  2.8     03-13  Mg     2.40     03-13    TPro  5.3<L>  /  Alb  2.3<L>  /  TBili  0.4  /  DBili  x   /  AST  13  /  ALT  25  /  AlkPhos  109  03-13    PTT - ( 14 Mar 2023 00:40 )  PTT:121.7 sec          RADIOLOGY & ADDITIONAL TESTS:    Imaging Personally Reviewed:    Consultant(s) Notes Reviewed:      Care Discussed with Consultants/Other Providers:

## 2023-03-15 DIAGNOSIS — G93.49 OTHER ENCEPHALOPATHY: ICD-10-CM

## 2023-03-15 DIAGNOSIS — Z71.89 OTHER SPECIFIED COUNSELING: ICD-10-CM

## 2023-03-15 DIAGNOSIS — Z51.5 ENCOUNTER FOR PALLIATIVE CARE: ICD-10-CM

## 2023-03-15 DIAGNOSIS — R53.2 FUNCTIONAL QUADRIPLEGIA: ICD-10-CM

## 2023-03-15 DIAGNOSIS — I82.409 ACUTE EMBOLISM AND THROMBOSIS OF UNSPECIFIED DEEP VEINS OF UNSPECIFIED LOWER EXTREMITY: ICD-10-CM

## 2023-03-15 LAB
ALBUMIN SERPL ELPH-MCNC: 2.4 G/DL — LOW (ref 3.3–5)
ALP SERPL-CCNC: 80 U/L — SIGNIFICANT CHANGE UP (ref 40–120)
ALT FLD-CCNC: 18 U/L — SIGNIFICANT CHANGE UP (ref 4–41)
ANION GAP SERPL CALC-SCNC: 9 MMOL/L — SIGNIFICANT CHANGE UP (ref 7–14)
APTT BLD: 83.8 SEC — HIGH (ref 27–36.3)
AST SERPL-CCNC: 9 U/L — SIGNIFICANT CHANGE UP (ref 4–40)
BILIRUB SERPL-MCNC: 0.2 MG/DL — SIGNIFICANT CHANGE UP (ref 0.2–1.2)
BUN SERPL-MCNC: 22 MG/DL — SIGNIFICANT CHANGE UP (ref 7–23)
CALCIUM SERPL-MCNC: 9.4 MG/DL — SIGNIFICANT CHANGE UP (ref 8.4–10.5)
CHLORIDE SERPL-SCNC: 109 MMOL/L — HIGH (ref 98–107)
CO2 SERPL-SCNC: 28 MMOL/L — SIGNIFICANT CHANGE UP (ref 22–31)
CREAT SERPL-MCNC: <0.2 MG/DL — LOW (ref 0.5–1.3)
EGFR: 151 ML/MIN/1.73M2 — SIGNIFICANT CHANGE UP
GLUCOSE SERPL-MCNC: 153 MG/DL — HIGH (ref 70–99)
HCT VFR BLD CALC: 36.9 % — LOW (ref 39–50)
HGB BLD-MCNC: 11.3 G/DL — LOW (ref 13–17)
MAGNESIUM SERPL-MCNC: 2.2 MG/DL — SIGNIFICANT CHANGE UP (ref 1.6–2.6)
MCHC RBC-ENTMCNC: 30.6 GM/DL — LOW (ref 32–36)
MCHC RBC-ENTMCNC: 31.6 PG — SIGNIFICANT CHANGE UP (ref 27–34)
MCV RBC AUTO: 103.1 FL — HIGH (ref 80–100)
NRBC # BLD: 0 /100 WBCS — SIGNIFICANT CHANGE UP (ref 0–0)
NRBC # FLD: 0 K/UL — SIGNIFICANT CHANGE UP (ref 0–0)
PHOSPHATE SERPL-MCNC: 2.4 MG/DL — LOW (ref 2.5–4.5)
PLATELET # BLD AUTO: 102 K/UL — LOW (ref 150–400)
POTASSIUM SERPL-MCNC: 3.5 MMOL/L — SIGNIFICANT CHANGE UP (ref 3.5–5.3)
POTASSIUM SERPL-SCNC: 3.5 MMOL/L — SIGNIFICANT CHANGE UP (ref 3.5–5.3)
PROT SERPL-MCNC: 5 G/DL — LOW (ref 6–8.3)
RBC # BLD: 3.58 M/UL — LOW (ref 4.2–5.8)
RBC # FLD: 14 % — SIGNIFICANT CHANGE UP (ref 10.3–14.5)
SODIUM SERPL-SCNC: 146 MMOL/L — HIGH (ref 135–145)
WBC # BLD: 5.35 K/UL — SIGNIFICANT CHANGE UP (ref 3.8–10.5)
WBC # FLD AUTO: 5.35 K/UL — SIGNIFICANT CHANGE UP (ref 3.8–10.5)

## 2023-03-15 PROCEDURE — 99497 ADVNCD CARE PLAN 30 MIN: CPT | Mod: 25

## 2023-03-15 PROCEDURE — 99223 1ST HOSP IP/OBS HIGH 75: CPT | Mod: 25

## 2023-03-15 PROCEDURE — 99233 SBSQ HOSP IP/OBS HIGH 50: CPT

## 2023-03-15 PROCEDURE — 99233 SBSQ HOSP IP/OBS HIGH 50: CPT | Mod: GC

## 2023-03-15 RX ORDER — CEFEPIME 1 G/1
2000 INJECTION, POWDER, FOR SOLUTION INTRAMUSCULAR; INTRAVENOUS ONCE
Refills: 0 | Status: COMPLETED | OUTPATIENT
Start: 2023-03-15 | End: 2023-03-15

## 2023-03-15 RX ORDER — SODIUM,POTASSIUM PHOSPHATES 278-250MG
1 POWDER IN PACKET (EA) ORAL ONCE
Refills: 0 | Status: COMPLETED | OUTPATIENT
Start: 2023-03-15 | End: 2023-03-15

## 2023-03-15 RX ORDER — CEFEPIME 1 G/1
INJECTION, POWDER, FOR SOLUTION INTRAMUSCULAR; INTRAVENOUS
Refills: 0 | Status: DISCONTINUED | OUTPATIENT
Start: 2023-03-15 | End: 2023-03-20

## 2023-03-15 RX ORDER — ENOXAPARIN SODIUM 100 MG/ML
100 INJECTION SUBCUTANEOUS EVERY 12 HOURS
Refills: 0 | Status: DISCONTINUED | OUTPATIENT
Start: 2023-03-15 | End: 2023-03-20

## 2023-03-15 RX ORDER — CEFEPIME 1 G/1
2000 INJECTION, POWDER, FOR SOLUTION INTRAMUSCULAR; INTRAVENOUS EVERY 8 HOURS
Refills: 0 | Status: DISCONTINUED | OUTPATIENT
Start: 2023-03-15 | End: 2023-03-20

## 2023-03-15 RX ADMIN — HEPARIN SODIUM 14 UNIT(S)/HR: 5000 INJECTION INTRAVENOUS; SUBCUTANEOUS at 07:26

## 2023-03-15 RX ADMIN — LEVETIRACETAM 400 MILLIGRAM(S): 250 TABLET, FILM COATED ORAL at 05:11

## 2023-03-15 RX ADMIN — Medication 4 MILLIGRAM(S): at 05:10

## 2023-03-15 RX ADMIN — MEROPENEM 280 MILLIGRAM(S): 1 INJECTION INTRAVENOUS at 05:33

## 2023-03-15 RX ADMIN — LEVETIRACETAM 400 MILLIGRAM(S): 250 TABLET, FILM COATED ORAL at 17:52

## 2023-03-15 RX ADMIN — ENOXAPARIN SODIUM 100 MILLIGRAM(S): 100 INJECTION SUBCUTANEOUS at 17:51

## 2023-03-15 RX ADMIN — CEFEPIME 100 MILLIGRAM(S): 1 INJECTION, POWDER, FOR SOLUTION INTRAMUSCULAR; INTRAVENOUS at 21:59

## 2023-03-15 RX ADMIN — CEFEPIME 100 MILLIGRAM(S): 1 INJECTION, POWDER, FOR SOLUTION INTRAMUSCULAR; INTRAVENOUS at 15:39

## 2023-03-15 RX ADMIN — Medication 37.5 MICROGRAM(S): at 21:59

## 2023-03-15 RX ADMIN — Medication 1 PACKET(S): at 12:32

## 2023-03-15 RX ADMIN — PANTOPRAZOLE SODIUM 40 MILLIGRAM(S): 20 TABLET, DELAYED RELEASE ORAL at 12:33

## 2023-03-15 RX ADMIN — Medication 1 TABLET(S): at 17:50

## 2023-03-15 RX ADMIN — HEPARIN SODIUM 13 UNIT(S)/HR: 5000 INJECTION INTRAVENOUS; SUBCUTANEOUS at 08:57

## 2023-03-15 NOTE — CONSULT NOTE ADULT - PROBLEM SELECTOR RECOMMENDATION 7
Case reviewed with primary team    Thank you for allowing us to participate in your patient's care.  Patient to be discharged from [facility] team consult service today.   Please re-consult if we can be of further assistance, page [phone].

## 2023-03-15 NOTE — PROGRESS NOTE ADULT - SUBJECTIVE AND OBJECTIVE BOX
f/u pseudomonas bacteremia    Interval History/ROS:  ROS unable.  non-verbal.  no fever.  CT negative.    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  Presence of IVC filter  Status post craniectomy    Allergies  aspirin (Unknown)  shellfish (Hives)  Tegretol (Unknown)    ANTIMICROBIALS:  vancomycin  IVPB 1250 every 12 hours (3/12 x1)  active:  meropenem  IVPB 2000 every 8 hours (3/11-)    MEDICATIONS  (STANDING):  dexAMETHasone  Injectable 4 daily  heparin  Infusion 1600 <Continuous>  levETIRAcetam  IVPB 500 every 12 hours  levothyroxine Injectable 37.5 at bedtime  pantoprazole  Injectable 40 daily    Vital Signs Last 24 Hrs  T(F): 97.5 (03-15-23 @ 09:20), Max: 98.2 (03-15-23 @ 04:30)  HR: 86 (03-15-23 @ 09:20)  BP: 134/100 (03-15-23 @ 09:20)  RR: 18 (03-15-23 @ 09:20)  SpO2: 95% (03-15-23 @ 09:20) (95% - 100%)    PHYSICAL EXAM:  Constitutional: lethargic  HEAD/EYES: eyes remain closed  ENT:  supple  Cardiovascular:   normal S1, S2  Respiratory:  clear BS bilaterally  GI:  soft, non-tender, normal bowel sounds  :  no francois  Musculoskeletal:  no synovitis  Neurologic: lethargic, not really moving  Skin:  no rash  Psychiatric:  not able to assess                                  11.3   5.35  )-----------( 102      ( 15 Mar 2023 07:30 )             36.9 -15    146  |  109  |  22  ----------------------------<  153  3.5   |  28  |  <0.20  Ca    9.4      15 Mar 2023 07:30Phos  2.4     03-15Mg     2.20     03-15  TPro  5.0  /  Alb  2.4  /  TBili  0.2  /  DBili  x   /  AST  9   /  ALT  18  /  AlkPhos  80  03-15    Urinalysis Basic - ( 11 Mar 2023 13:42 )  Color: Jazmin / Appearance: Slightly Turbid / S.028 / pH: x  Gluc: x / Ketone: Trace  / Bili: Negative / Urobili: 3 mg/dL   Blood: x / Protein: 100 mg/dL / Nitrite: Negative   Leuk Esterase: Negative / RBC: 7 /HPF / WBC 8 /HPF   Sq Epi: x / Non Sq Epi: 7 /HPF / Bacteria: Negative    MICROBIOLOGY:  Culture - Blood (collected 23 @ 06:28)  Source: .Blood Blood-Peripheral  Preliminary Report (03-15-23 @ 11:01):    No growth to date.    Culture - Blood (collected 23 @ 06:20)  Source: .Blood Blood-Peripheral  Preliminary Report (03-15-23 @ 11:01):    No growth to date.    Culture - Urine (collected 23 @ 13:15)  Source: Clean Catch Clean Catch (Midstream)  Final Report (23 @ 16:55):    No growth    Culture - Blood (collected 23 @ 11:32)  Source: .Blood Blood-Peripheral  Gram Stain (23 @ 06:54):    Growth in aerobic bottle: Gram Negative Rods  Preliminary Report (23 @ 20:11):    Growth in aerobic bottle: Pseudomonas aeruginosa    ***Blood Panel PCR results on this specimen are available    approximately 3 hours after the Gram stain result.***    Gram stain, PCR, and/or culture results may not always    correspond due to differencein methodologies.    ************************************************************    This PCR assay was performed by multiplex PCR. This    Assay tests for 66 bacterial and resistance gene targets.    Please refer to the [facility] test directory    at [email or web address] for details.  Organism: Blood Culture PCR (23 @ 08:33)  Organism: Blood Culture PCR (23 @ 08:33)      -  Pseudomonas aeruginosa: Detec      Method Type: PCR    Culture - Blood (collected 23 @ 10:44)  Source: .Blood Blood-Peripheral  Gram Stain (23 @ 07:26):    Growth in aerobic bottle: Gram Negative Rods  Preliminary Report (23 @ 20:10):    Growth in aerobic bottle: Pseudomonas aeruginosa    See previous culture [id number]    Rapid RVP Result: NotDetec ( @ 11:14)    RADIOLOGY:  imaging below personally reviewed and agree with findings    CT Abdomen and Pelvis w/ IV Cont (23 @ 20:40) >  INTERPRETATION:  no CT evidence for infection.  follow up official report in the morning.    CT Head No Cont (23 @ 13:06) >  IMPRESSION: Left parietal craniotomy defect. Left frontal vasogenic edema with mass effect and midline shift to the right which is less when compared with 2022. Calcification versus hemorrhage left corona radiata likely related to prior neoplasm. Hematocrit level left occipital horn.    CT Angio Chest PE Protocol w/ IV Cont (23 @ 13:05) >  IMPRESSION:  No main, right or left main, lobar, or proximal segmental pulmonary embolism. Limited evaluation of the distal segmental and subsegmental arteries secondary to motion artifact.  Interval resolution of the previously seen multifocal opacities since 2022.    Xray Chest 1 View- PORTABLE-Urgent (23 @ 11:30) >  IMPRESSION:: The heart size cannot be adequately assessed on this single view. There is an overlying  shunt. The right costophrenic angle was omitted from this. There is a small left pleural effusion and/or left basilar atelectasis. A left basilar pneumonia cannot be excluded.

## 2023-03-15 NOTE — PROGRESS NOTE ADULT - SUBJECTIVE AND OBJECTIVE BOX
Patient is a 62y old  Male who presents with a chief complaint of AMS (14 Mar 2023 10:36)      SUBJECTIVE / OVERNIGHT EVENTS:    Brief Daily Plan    MEDICATIONS  (STANDING):  dexAMETHasone  Injectable 4 milliGRAM(s) IV Push daily  heparin  Infusion 1600 Unit(s)/Hr (14 mL/Hr) IV Continuous <Continuous>  levETIRAcetam  IVPB 500 milliGRAM(s) IV Intermittent every 12 hours  levothyroxine Injectable 37.5 MICROGram(s) IV Push at bedtime  meropenem  IVPB 2000 milliGRAM(s) IV Intermittent every 8 hours  pantoprazole  Injectable 40 milliGRAM(s) IV Push daily    MEDICATIONS  (PRN):      Vital Signs Last 24 Hrs  T(C): 36.8 (15 Mar 2023 04:30), Max: 36.8 (15 Mar 2023 04:30)  T(F): 98.2 (15 Mar 2023 04:30), Max: 98.2 (15 Mar 2023 04:30)  HR: 80 (15 Mar 2023 05:20) (77 - 100)  BP: 155/99 (15 Mar 2023 05:20) (120/91 - 155/99)  BP(mean): --  RR: 18 (15 Mar 2023 05:20) (17 - 19)  SpO2: 100% (15 Mar 2023 05:20) (100% - 100%)    Parameters below as of 15 Mar 2023 05:20  Patient On (Oxygen Delivery Method): nasal cannula  O2 Flow (L/min): 3    CAPILLARY BLOOD GLUCOSE        I&O's Summary    13 Mar 2023 07:01  -  14 Mar 2023 07:00  --------------------------------------------------------  IN: 0 mL / OUT: 0 mL / NET: 0 mL        PHYSICAL EXAM:  GENERAL: NAD  HEENT:  Wearing head brace   CHEST/LUNG: Chest rise equal bilaterally, lungs clear to ascultation with decreased breath sounds at bases  HEART: Tachycardic, normal S1 S2  ABDOMEN: Soft, Nontender, Nondistended  EXTREMITIES: 2+ Bilateral pitting edema from ankles to hips   PSYCH: alert, limited  SKIN: No obvious rashes or lesions  NEURO: shakes head no when asked to follow commands    LABS:                        11.4   8.37  )-----------( 93       ( 14 Mar 2023 06:50 )             37.2     03-14    144  |  109<H>  |  24<H>  ----------------------------<  154<H>  3.6   |  23  |  0.25<L>    Ca    9.5      14 Mar 2023 06:50  Phos  2.4     03-14  Mg     2.40     03-14    TPro  5.3<L>  /  Alb  2.2<L>  /  TBili  0.3  /  DBili  x   /  AST  12  /  ALT  19  /  AlkPhos  120  03-14    PT/INR - ( 14 Mar 2023 06:50 )   PT: 113.8 sec;   INR: 9.59 ratio         PTT - ( 14 Mar 2023 21:45 )  PTT:45.2 sec          RADIOLOGY & ADDITIONAL TESTS:    Imaging Personally Reviewed:    Consultant(s) Notes Reviewed:      Care Discussed with Consultants/Other Providers:   Patient is a 62y old  Male who presents with a chief complaint of AMS (14 Mar 2023 10:36)      SUBJECTIVE / OVERNIGHT EVENTS: No acute events overnight. Pt seen and examined at bedside this morning. AOx0. Limited ROS.     Brief Daily Plan  - Pending final CT abd/pelvis read  - Palliative f/u; ID recs   - Consider Neurosx cs      MEDICATIONS  (STANDING):  dexAMETHasone  Injectable 4 milliGRAM(s) IV Push daily  heparin  Infusion 1600 Unit(s)/Hr (14 mL/Hr) IV Continuous <Continuous>  levETIRAcetam  IVPB 500 milliGRAM(s) IV Intermittent every 12 hours  levothyroxine Injectable 37.5 MICROGram(s) IV Push at bedtime  meropenem  IVPB 2000 milliGRAM(s) IV Intermittent every 8 hours  pantoprazole  Injectable 40 milliGRAM(s) IV Push daily    MEDICATIONS  (PRN):      Vital Signs Last 24 Hrs  T(C): 36.8 (15 Mar 2023 04:30), Max: 36.8 (15 Mar 2023 04:30)  T(F): 98.2 (15 Mar 2023 04:30), Max: 98.2 (15 Mar 2023 04:30)  HR: 80 (15 Mar 2023 05:20) (77 - 100)  BP: 155/99 (15 Mar 2023 05:20) (120/91 - 155/99)  BP(mean): --  RR: 18 (15 Mar 2023 05:20) (17 - 19)  SpO2: 100% (15 Mar 2023 05:20) (100% - 100%)    Parameters below as of 15 Mar 2023 05:20  Patient On (Oxygen Delivery Method): nasal cannula  O2 Flow (L/min): 3    CAPILLARY BLOOD GLUCOSE        I&O's Summary    13 Mar 2023 07:01  -  14 Mar 2023 07:00  --------------------------------------------------------  IN: 0 mL / OUT: 0 mL / NET: 0 mL        PHYSICAL EXAM:  GENERAL: NAD  HEENT:  Wearing head brace   CHEST/LUNG: Chest rise equal bilaterally, lungs clear to ascultation with decreased breath sounds at bases  HEART: Tachycardic, normal S1 S2  ABDOMEN: Soft, Nontender, Nondistended  EXTREMITIES: 2+ Bilateral pitting edema from ankles to hips   PSYCH: alert, limited  SKIN: No obvious rashes or lesions  NEURO: shakes head no when asked to follow commands    LABS:                        11.4   8.37  )-----------( 93       ( 14 Mar 2023 06:50 )             37.2     03-14    144  |  109<H>  |  24<H>  ----------------------------<  154<H>  3.6   |  23  |  0.25<L>    Ca    9.5      14 Mar 2023 06:50  Phos  2.4     03-14  Mg     2.40     03-14    TPro  5.3<L>  /  Alb  2.2<L>  /  TBili  0.3  /  DBili  x   /  AST  12  /  ALT  19  /  AlkPhos  120  03-14    PT/INR - ( 14 Mar 2023 06:50 )   PT: 113.8 sec;   INR: 9.59 ratio         PTT - ( 14 Mar 2023 21:45 )  PTT:45.2 sec          RADIOLOGY & ADDITIONAL TESTS:    Imaging Personally Reviewed:    Consultant(s) Notes Reviewed:      Care Discussed with Consultants/Other Providers:   Patient is a 62y old  Male who presents with a chief complaint of AMS (14 Mar 2023 10:36)      SUBJECTIVE / OVERNIGHT EVENTS: No acute events overnight. Pt seen and examined at bedside this morning. AOx0. Limited ROS.     Brief Daily Plan  - CT abd/pelvis read  - Palliative recs  - ID recs --> switch meropenem to cefepime; resume bactrim   - Consider Neurosx cs      MEDICATIONS  (STANDING):  dexAMETHasone  Injectable 4 milliGRAM(s) IV Push daily  heparin  Infusion 1600 Unit(s)/Hr (14 mL/Hr) IV Continuous <Continuous>  levETIRAcetam  IVPB 500 milliGRAM(s) IV Intermittent every 12 hours  levothyroxine Injectable 37.5 MICROGram(s) IV Push at bedtime  meropenem  IVPB 2000 milliGRAM(s) IV Intermittent every 8 hours  pantoprazole  Injectable 40 milliGRAM(s) IV Push daily    MEDICATIONS  (PRN):      Vital Signs Last 24 Hrs  T(C): 36.8 (15 Mar 2023 04:30), Max: 36.8 (15 Mar 2023 04:30)  T(F): 98.2 (15 Mar 2023 04:30), Max: 98.2 (15 Mar 2023 04:30)  HR: 80 (15 Mar 2023 05:20) (77 - 100)  BP: 155/99 (15 Mar 2023 05:20) (120/91 - 155/99)  BP(mean): --  RR: 18 (15 Mar 2023 05:20) (17 - 19)  SpO2: 100% (15 Mar 2023 05:20) (100% - 100%)    Parameters below as of 15 Mar 2023 05:20  Patient On (Oxygen Delivery Method): nasal cannula  O2 Flow (L/min): 3    CAPILLARY BLOOD GLUCOSE        I&O's Summary    13 Mar 2023 07:01  -  14 Mar 2023 07:00  --------------------------------------------------------  IN: 0 mL / OUT: 0 mL / NET: 0 mL        PHYSICAL EXAM:  GENERAL: NAD  HEENT:  Wearing head brace   CHEST/LUNG: Chest rise equal bilaterally, lungs clear to ascultation with decreased breath sounds at bases  HEART: Tachycardic, normal S1 S2  ABDOMEN: Soft, Nontender, Nondistended  EXTREMITIES: 2+ Bilateral pitting edema from ankles to hips   PSYCH: alert, limited  SKIN: No obvious rashes or lesions  NEURO: shakes head no when asked to follow commands    LABS:                        11.4   8.37  )-----------( 93       ( 14 Mar 2023 06:50 )             37.2     03-14    144  |  109<H>  |  24<H>  ----------------------------<  154<H>  3.6   |  23  |  0.25<L>    Ca    9.5      14 Mar 2023 06:50  Phos  2.4     03-14  Mg     2.40     03-14    TPro  5.3<L>  /  Alb  2.2<L>  /  TBili  0.3  /  DBili  x   /  AST  12  /  ALT  19  /  AlkPhos  120  03-14    PT/INR - ( 14 Mar 2023 06:50 )   PT: 113.8 sec;   INR: 9.59 ratio         PTT - ( 14 Mar 2023 21:45 )  PTT:45.2 sec          RADIOLOGY & ADDITIONAL TESTS:    Imaging Personally Reviewed:    Consultant(s) Notes Reviewed:      Care Discussed with Consultants/Other Providers:   Patient is a 62y old  Male who presents with a chief complaint of AMS (14 Mar 2023 10:36)      SUBJECTIVE / OVERNIGHT EVENTS: No acute events overnight. Pt seen and examined at bedside this morning. AOx0. Limited ROS.     Brief Daily Plan  - CT abd/pelvis --> no acute intraabdominal pathology  - Palliative recs --> regarding hospice   - ID recs --> switch meropenem to cefepime; resume bactrim   - Consider Neurosx cs  --> recs LP; no  shunt tap    MEDICATIONS  (STANDING):  dexAMETHasone  Injectable 4 milliGRAM(s) IV Push daily  heparin  Infusion 1600 Unit(s)/Hr (14 mL/Hr) IV Continuous <Continuous>  levETIRAcetam  IVPB 500 milliGRAM(s) IV Intermittent every 12 hours  levothyroxine Injectable 37.5 MICROGram(s) IV Push at bedtime  meropenem  IVPB 2000 milliGRAM(s) IV Intermittent every 8 hours  pantoprazole  Injectable 40 milliGRAM(s) IV Push daily    MEDICATIONS  (PRN):      Vital Signs Last 24 Hrs  T(C): 36.8 (15 Mar 2023 04:30), Max: 36.8 (15 Mar 2023 04:30)  T(F): 98.2 (15 Mar 2023 04:30), Max: 98.2 (15 Mar 2023 04:30)  HR: 80 (15 Mar 2023 05:20) (77 - 100)  BP: 155/99 (15 Mar 2023 05:20) (120/91 - 155/99)  BP(mean): --  RR: 18 (15 Mar 2023 05:20) (17 - 19)  SpO2: 100% (15 Mar 2023 05:20) (100% - 100%)    Parameters below as of 15 Mar 2023 05:20  Patient On (Oxygen Delivery Method): nasal cannula  O2 Flow (L/min): 3    CAPILLARY BLOOD GLUCOSE        I&O's Summary    13 Mar 2023 07:01  -  14 Mar 2023 07:00  --------------------------------------------------------  IN: 0 mL / OUT: 0 mL / NET: 0 mL        PHYSICAL EXAM:  GENERAL: NAD  HEENT:  Wearing head brace   CHEST/LUNG: Chest rise equal bilaterally, lungs clear to ascultation with decreased breath sounds at bases  HEART: Tachycardic, normal S1 S2  ABDOMEN: Soft, Nontender, Nondistended  EXTREMITIES: 2+ Bilateral pitting edema from ankles to hips   PSYCH: alert, limited  SKIN: No obvious rashes or lesions  NEURO: shakes head no when asked to follow commands    LABS:                        11.4   8.37  )-----------( 93       ( 14 Mar 2023 06:50 )             37.2     03-14    144  |  109<H>  |  24<H>  ----------------------------<  154<H>  3.6   |  23  |  0.25<L>    Ca    9.5      14 Mar 2023 06:50  Phos  2.4     03-14  Mg     2.40     03-14    TPro  5.3<L>  /  Alb  2.2<L>  /  TBili  0.3  /  DBili  x   /  AST  12  /  ALT  19  /  AlkPhos  120  03-14    PT/INR - ( 14 Mar 2023 06:50 )   PT: 113.8 sec;   INR: 9.59 ratio         PTT - ( 14 Mar 2023 21:45 )  PTT:45.2 sec          RADIOLOGY & ADDITIONAL TESTS:    Imaging Personally Reviewed:    Consultant(s) Notes Reviewed:      Care Discussed with Consultants/Other Providers:

## 2023-03-15 NOTE — PROGRESS NOTE ADULT - PROBLEM SELECTOR PLAN 7
Diet: puree with mildly thick liquids  DVT prophylaxis: heparin gtt  Dispo: likely [facility] Diet: puree with mildly thick liquids  DVT prophylaxis: therapeutic lovenox  Dispo: likely [facility]

## 2023-03-15 NOTE — CONSULT NOTE ADULT - PROBLEM SELECTOR RECOMMENDATION 4
- CT Abd / Pelvis - No acute intra-abdominal or pelvic findings.  - Infectious Disease recommendations appreciated

## 2023-03-15 NOTE — PROGRESS NOTE ADULT - PROBLEM SELECTOR PLAN 1
Patient febrile, tachycardic and hypotensive on admission. Patient answering yes/no questions but altered according to [facility]   - UA negative, CT with resolution of prior lung opacities   - patient with a history of meningitis x2, given meropenem in ED   - blood cx + (2/4) pseudomonas; urine cx pending   - Per ID, c/w meropenem (3/11 -); D/c vanc (3/12) x 1   - Droplet precautions; aspiration precautions   - Repeat Blood Cx (3/14):   - Family would prefer to hold off LP unless indicated; hold home bactrim for now Patient febrile, tachycardic and hypotensive on admission. Patient answering yes/no questions but altered according to [facility]   - UA negative, CT with resolution of prior lung opacities   - patient with a history of meningitis x2, given meropenem in ED   - blood cx + (2/4) pseudomonas; urine cx wnl; aspiration precautions   - Per ID, c/w meropenem (3/11 - 3/15); D/c vanc (3/12) x 1; Cefepime (3/15 - )    - Repeat Blood Cx (3/14): NGTD  - CT abdomen/pelvis = no acute findings   - Family would prefer to hold off LP unless indicated; resume home bactrim for now  - Appreciate neurosx recs regarding  shunt tap Patient febrile, tachycardic and hypotensive on admission. Patient answering yes/no questions but altered according to Chris   - UA negative, CT with resolution of prior lung opacities   - patient with a history of meningitis x2, given meropenem in ED   - blood cx + (2/4) pseudomonas; urine cx wnl; aspiration precautions   - Per ID, c/w meropenem (3/11 - 3/15); D/c vanc (3/12) x 1; Cefepime (3/15 - )    - Repeat Blood Cx (3/14): NGTD  - CT abdomen/pelvis = no acute findings; Per neurosx - rec LP; no  shunt tap  - Resume home bactrim for now    Per conversation with sister, Katrin, would like to hold off LP and tx with empiric antibiotics for pseudomonal bacteremia and d/c heparin gtt with conversation to therapeutic lovenox Patient febrile, tachycardic and hypotensive on admission. Patient answering yes/no questions but altered according to Chris   - UA negative, CT with resolution of prior lung opacities   - patient with a history of meningitis x2, given meropenem in ED   - blood cx + (2/4) pseudomonas; urine cx wnl; aspiration precautions   - Per ID, c/w meropenem (3/11 - 3/15); D/c vanc (3/12) x 1; Cefepime (3/15 - )    - Repeat Blood Cx (3/14): NGTD  - CT abdomen/pelvis = no acute findings; Per neurosx - rec LP; no  shunt tap  - Resume home bactrim for now    Per conversation with sister, Katrin, would like to hold off LP and tx with empiric antibiotics for pseudomonal bacteremia and d/c heparin gtt with conversion to therapeutic lovenox

## 2023-03-15 NOTE — CONSULT NOTE ADULT - PROBLEM SELECTOR RECOMMENDATION 2
- due to bacteremia +underlying GBM  - CT Head- Left parietal craniotomy defect. Left frontal vasogenic edema with mass effect and midline shift to the right which is less when compared with 9/29/2022. Calcification versus hemorrhage left corona radiata likely related to prior neoplasm. Hematocrit level left occipital horn.  - Neurosurgery recommendations appreciated   - please coordinate care with patient's family

## 2023-03-15 NOTE — GOALS OF CARE CONVERSATION - ADVANCED CARE PLANNING - CONVERSATION DETAILS
Patient's mother at bedside this AM. Mother shared events leading to patient being brought to hospital and verbalized understanding of patient's hospitalization course. She shared family was looking into hospice at [facility] where patient resides. She shares family had good experience in [facility] [place] in [place] when her  had passed away and was interested in patient possibly going there; discussed patient not a candidate for inpatient hospice at this time. She asked for provider to reach out to her daughter (patient's sister) Maribel for further discussions regarding his care.    Reached out to Maribel Vazquez. Maribel verbalized understanding of patient's hospitalization course and clinical status.  Maribel shares that family had looked into the hospice unit at [facility] but her mother and family preferred for patient to stay in his current room in [facility] with possibly adding on extra hospice services for his current room there. Maribel inquiring about eligibility for inpatient hospice at [facility]. Reviewed patient not an inpatient hospice candidate at this time. However, discussed likely eligible for hospice services at [facility] and can transition to inpatient hospice when meets eligibility criteria. Maribel verbalized understanding; family likely to add on hospice services for his current room at [facility] upon discharge.

## 2023-03-15 NOTE — PROGRESS NOTE ADULT - ATTENDING COMMENTS
Seen an examined by me this afternoon, eyes are open, following simple commands, denies being in pain, had some breakfast today  Severe sepsis-POA due to Pseudomona's bacteremia of unclear source  CT abd is negative for acute intra-abdominal or pelvic findings  Apprec ID recss, changing IV meropenem to cefepime and resuming oral bactrim DS BID, surveillance BCx's are NTD  Per conversation with ID, very concerned for VPS infection and likelihood of relapse after Abx course (to discuss this further with family)  NSGY concerned about tapping the VSP d/t possible seeding/spreading of the infection  Team discussed with family (MD), opting for holding on LP at this time, will transition from IV heparin to full dose LMWH BID (lifelong) for extensive B/L acute DVT's, pt has an IVC filter in place,  DVT in the setting of underlying malignancy, hypercoagulable state  BMI of 33.5 --> Obesity  DNR/DNI, overall with poor prognosis, seen by Palliative and apprec recs, to pursue hospice at [facility] upon discharge Seen an examined by me this afternoon, eyes are open, following simple commands, denies being in pain, had some breakfast today  Severe sepsis-POA due to Pseudomona's bacteremia of unclear source  CT abd is negative for acute intra-abdominal or pelvic findings  Apprec ID recss, changing IV meropenem to cefepime and resuming oral bactrim DS BID, surveillance BCx's are NTD  Per conversation with ID, very concerned for VPS infection and likelihood of relapse after Abx course (to discuss this further with family)  NSGY concerned about tapping the VPS d/t possible seeding/spreading of the infection  Team discussed with family (MD), opting for holding on LP at this time, will transition from IV heparin to full dose LMWH BID (lifelong) for extensive B/L acute DVT's, pt has an IVC filter in place,  DVT in the setting of underlying malignancy, hypercoagulable state  BMI of 33.5 --> Obesity  DNR/DNI, overall with poor prognosis, seen by Palliative and apprec recs, to pursue hospice at [facility] upon discharge Seen an examined by me this afternoon, eyes are open, following simple commands, denies being in pain, had some breakfast today, mother at bedside at time of visit, Palliative joined at same time  Severe sepsis-POA due to Pseudomona's bacteremia of unclear source  CT abd is negative for acute intra-abdominal or pelvic findings  Apprec ID recs, changing IV meropenem to cefepime and resuming oral bactrim DS BID, surveillance BCx's are NTD  Per conversation with ID, very concerned for VPS infection and likelihood of relapse after Abx course (to discuss this further with family)  NSGY concerned about tapping the VPS d/t possible seeding/spreading of the infection  Team discussed with family (MD), opting for holding on LP at this time, will transition from IV heparin to full dose LMWH BID (lifelong) for extensive B/L acute DVT's, pt has an IVC filter in place  DVT in the setting of underlying malignancy, hypercoagulable state  BMI of 33.5 --> Obesity  DNR/DNI, overall with poor prognosis, seen by Palliative, apprec recs and d/w them, to pursue hospice at [facility] upon discharge  D/w Mother at bedside

## 2023-03-15 NOTE — PROGRESS NOTE ADULT - ASSESSMENT
62M resident of [facility], hx Glioblastoma multiforme s/p craniotomy and resection complicated by ESBL meningitis s/p repeat craniotomy washout 2/2020 and 8 weeks of Meropenem, recurrent infection s/p repeat washout and 10 weeks of Meropenem followed by suppressive PO Bactrim. Also hx  shunt, b/l DVT (2020) s/p IVC filter (supposed to be on Lovenox but ?accidently discontinued per sister), HTN, anxiety presenting for altered mental status, unresponsiveness, tachycardia and desaturation.  Recent COVID Sept 2022.  Now with new bilateral DVTs. Patient was seen by vascular surgery who stated that he needed neurosurgical clearance prior to therapeutic anticoagulation. Neurosurgery was contacted and stated patient needs another MRI prior to starting therapeutic anticoagulation.  BC with pseudomonas aeruginosa    Pseudomonas bacteremia  - UC (-)  - concerned about VPS  - CT abdomen prelim (-)  - if final read is negative. please have VPS tapped  - if CT abd negative, change meropenem to cefepime 2g q8 and resume oral bactrim 1DS bid  - f/u repeat BC    GBM  - per sister there is recurrent disease    I will be away, returning 3/17/2023.  My associates to cover.  Please call ID as needed [phone].

## 2023-03-15 NOTE — CONSULT NOTE ADULT - PROBLEM SELECTOR RECOMMENDATION 3
- [facility] Duplex - 1. Acute non-occlusive deep vein thrombosis noted within the right external iliac vein. Acute, occlusive deep vein thromboses were also noted within the right common femoral, femoral, and popliteal veins. The right calf  veins were not well visualized. 2.  Acute, non-occlusive deep vein thrombosis noted in the left common femoral vein. Acute, occlusive deep vein thromboses were also noted within the left external iliac, femoral, and popliteal veins. The left calf veins were not well visualized.  - management as per primary team

## 2023-03-15 NOTE — PROGRESS NOTE ADULT - PROBLEM SELECTOR PLAN 4
Patient with history of bilateral DVTs s/p IVC filter. On Tuesday patient with new DVTs per daughter, per neurosurgery outpatient needed repeat MRI prior to therapeutic anticoagulation   - Duplex US (3/13): remarkable for b/l DVT's. Ok to start AC per vasc sx   - C/w pt specific heparin gtt (3/13)

## 2023-03-15 NOTE — PROGRESS NOTE ADULT - PROBLEM SELECTOR PLAN 2
Blood Cx (3/11): + 2/4 pseudomonas pending sensitivities    Source: Urine vs CAP vs meningitis   - F/w urine cx, if negative --> CT abd/pelvis   - C/w meropenem (3/11 - ) per ID   - Repeat Blood Cx (3/14) =   - Trend fever curve Blood Cx (3/11): + 2/4 pseudomonas pending sensitivities    Source: Urine vs CAP vs meningitis   - F/w urine cx, if negative --> CT abd/pelvis with no acute findings   - Meropenem (3/11 - 3/15); Cefepime (3/15 - ); Resume home bactrim 1DS BID  - Repeat Blood Cx (3/14) = NGTD   - Trend fever curve

## 2023-03-15 NOTE — CONSULT NOTE ADULT - ASSESSMENT
63 y/o M with PMH GBM s/p craniotomy w/ resection c/b ESBL meningitis with repeat craniotomy 2/2020 c/b repeat ESBL meningitis s/p craniectomy,b/t DVT (2020) s/p IVC filter, HTN, anxiety presenting for altered mental status found to have pseudomonas bacteremia and b/l DVT  Palliative Care consulted for complex decision making  related to goals of care discussions

## 2023-03-15 NOTE — CONSULT NOTE ADULT - PROBLEM SELECTOR RECOMMENDATION 6
Patient is DNR/DNI  Addressed family's questions regarding inpatient hospice eligibility.   Family likely to add on hospice services for his current room at [facility] upon discharge.  20 minutes spent on goals of care discussion

## 2023-03-15 NOTE — PROGRESS NOTE ADULT - ASSESSMENT
63 y/o M with PMH GBM s/p craniotomy w/ resection c/b ESBL meningitis with repeat craniotomy 2/2020 c/b repeat ESBL meningitis s/p craniectomy,b/t DVT (2020) s/p IVC filter, HTN, anxiety presenting for altered mental status found to have pseudomonas bacteremia started on IV antibiotics. Pt found to have b/l DVT's and started on heparin gtt; pending CT abd/pelvis for pseudomonal bacteremia source.

## 2023-03-15 NOTE — PROGRESS NOTE ADULT - PROBLEM SELECTOR PLAN 6
Elevated BNP on admission   - echo in 2017 with EF 60-65% normal LV function   - CXR with small left pleural effusion   - will continue to monitor fluid status  - Repeat TTE: limited study

## 2023-03-15 NOTE — CONSULT NOTE ADULT - SUBJECTIVE AND OBJECTIVE BOX
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     Spoke with patient's sister Maribel who stated that patient's baseline mental status is answering yes/no questions. Patient resides at [facility] and his sister was called by the facility tonight because the patient had decreased oxygen saturation, was tachycardic and was not as responsive as usual.     Per sister patient had COVID a few weeks ago. 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 Patient was supposed to be on prophylactic lovenox but according to sister it was accidentally discontinued. On Tuesday of this past week patient was noted to have worsening bilateral lower extremity edema. Patient had ultrasounds which showed new bilateral DVTs. Patient was seen by vascular surgery who stated that he needed neurosurgical approval to be placed on therapeutic anticoagulation. Neurosurgery was contacted and stated patient needs another MRI prior to starting therapeutic anticoagulation.     Patient currently alert, answering only yes/no questions. Shakes his head no when asked to complete neuro exam. Shakes his head no when asked if he has any complaints.     ED course: febrile to 100.5, , BP 84/63, RR 20, SpO2 100% on 8L NC. Labs with WBC 9.97 but with 84.3% neutrophils. BNP 2000. Trop 55 --> 61. Lactate 4.5 --> 3.8. UA with no bacteria. RVP negative. CXR with small left pleural effusion and/or left basilar atelectasis. A left basilar pneumonia cannot be excluded. CTA with no PE and interval resolution of opacities. CT head with  Left frontal vasogenic edema with mass effect and midline shift to the right which is less when compared with 9/29/2022. Calcification versus hemorrhage left corona radiata likely related to prior neoplasm. Hematocrit level left occipital horn. Given tylenol, fent, meropenem, 1L fluids.  (11 Mar 2023 22:52)      Interval History:   Patient awakes to voice, able to shake head yes and no to questions at times.     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  Anxiety  Deep vein thrombosis (DVT)  Glioblastoma multiforme  Meningitis  Hypothyroidism  Staphylococcus aureus infection  MDD (major depressive disorder)  Status post craniectomy  No significant past surgical history  Disruption of closure of skull or craniotomy  Presence of IVC filter  Status post craniectomy    FAMILY HISTORY:  Cerebrovascular accident (Father)    ITEMS NOT CHECKED ARE NOT PRESENT    SOCIAL HISTORY:   Significant other/partner[ ]  Children[ ]  Oriental orthodox/Spirituality:  Substance hx:  [ ]   Tobacco hx:  [ ]   Alcohol hx: [ ]   Home Opioid hx:  [ ] I-Stop Reference No: [id number]  Living Situation: [ ]Home  [ x]Long term care  [ ]Rehab [ ]Other      ADVANCE DIRECTIVES:    MOLST  [ ]  Living Will  [ ]   DECISION MAKER(s):  [ ] Health Care Proxy(s)  [ ] Surrogate(s)  [ ] Guardian           Name(s): Phone Number(s):  Sister Maribel Sonamirah: [phone]  Sister Katrin Sonamirah: [phone]    BASELINE (I)ADL(s) (prior to admission):  [place]: [ ]Total  [ ] Moderate [ x]Dependent    Allergies    aspirin (Unknown)  shellfish (Hives)  shellfish (Unknown)  Tegretol (Unknown)    Intolerances    ACE inhibitors (Other)  angiotensin II inhibitors (Other)  MEDICATIONS  (STANDING):  cefepime   IVPB      cefepime   IVPB 2000 milliGRAM(s) IV Intermittent every 8 hours  dexAMETHasone  Injectable 4 milliGRAM(s) IV Push daily  enoxaparin Injectable 100 milliGRAM(s) SubCutaneous every 12 hours  levETIRAcetam  IVPB 500 milliGRAM(s) IV Intermittent every 12 hours  levothyroxine Injectable 37.5 MICROGram(s) IV Push at bedtime  pantoprazole  Injectable 40 milliGRAM(s) IV Push daily  trimethoprim  160 mG/sulfamethoxazole 800 mG 1 Tablet(s) Oral every 12 hours    MEDICATIONS  (PRN):        ITEMS UNCHECKED ARE NOT PRESENT     PRESENT SYMPTOMS: [x ]Unable to self-report due to altered mental status  [ ] CPOT [x ] PAINADs [x ] RDOS  Source if other than patient:  [ ]Family   [ ]Team     Pain: [ ]yes [ x]no  QOL impact -   Location -                    Aggravating factors -  Quality -  Radiation -  Timing-  Severity (0-10 scale):  Minimal acceptable level / Pain goal (0-10 scale):     CPOT:    https://www.sccm.org/getattachment/lir62a84-7f9k-9o3p-5a2s-0460z5105k5r/Critical-Care-Pain-Observation-Tool-(CPOT)    Dyspnea:                           [ ]Mild [ ]Moderate [ ]Severe  Anxiety:                             [ ]Mild [ ]Moderate [ ]Severe  Agitation:                          [ ]Mild [ ]Moderate [ ]Severe  Fatigue:                             [ ]Mild [ ]Moderate [ ]Severe  Nausea:                             [ ]Mild [ ]Moderate [ ]Severe  Loss of appetite:              [ ]Mild [ ]Moderate [ ]Severe  Constipation:                   [ ]Mild [ ]Moderate [ ]Severe  Diarrhea:                          [ ]Mild [ ]Moderate [ ]Severe      PCSSQ[Palliative Care Spiritual Screening Question]   Severity (0-10):  Score of 4 or > indicate consideration of Chaplaincy referral.  Chaplaincy Referral: [ ] yes [ ] refused [ ] following [ x] deferred    Caregiver [facility]? : [ ] yes [ ] no [ ] Declined   [x ] Deferred            Social work referral [ ] Patient & Family Centered Care Referral [ ]     Anticipatory Grief present?:  [ ] yes [ ] no  [x ] Deferred                  Social work referral [ ] Chaplaincy Referral[ ]    Other Symptoms:  [x ]All other review of systems negative     PHYSICAL EXAM:  Vital Signs Last 24 Hrs  T(C): 36.3 (15 Mar 2023 14:38), Max: 36.8 (15 Mar 2023 04:30)  T(F): 97.3 (15 Mar 2023 14:38), Max: 98.2 (15 Mar 2023 04:30)  HR: 89 (15 Mar 2023 14:38) (77 - 89)  BP: 133/89 (15 Mar 2023 14:38) (129/93 - 155/99)  BP(mean): --  RR: 17 (15 Mar 2023 14:38) (17 - 18)  SpO2: 99% (15 Mar 2023 14:38) (95% - 100%)    Parameters below as of 15 Mar 2023 14:38  Patient On (Oxygen Delivery Method): nasal cannula         I&O's Summary    15 Mar 2023 07:01  -  15 Mar 2023 17:58  --------------------------------------------------------  IN: 0 mL / OUT: 100 mL / NET: -100 mL        GENERAL:  [ x]Alert  [ ]Oriented x   [ ]Lethargic  [ ]Cachexia  [ ]Unarousable  [ ]Verbal  [ x]Non-Verbal ; able to nod head yes/no to questions/ commands [x ] No Distress  Behavioral:   [ ] Anxiety  [ ] Delirium [ ] Agitation [x ] Calm  [ ] Other  HEENT:  [x ]Normal  [ ] Temporal Wasting  [ ]Dry mouth   [ ]ET Tube/Trach  [ ]Oral lesions  [ ] Mucositis  PULMONARY:   [ ]Clear [ ]Tachypnea  [ ]Audible excessive secretions   [ ]Rhonchi        [ ]Right [ ]Left [ ]Bilateral  [ ]Crackles        [ ]Right [ ]Left [ ]Bilateral  [ ]Wheezing     [ ]Right [ ]Left [ ]Bilateral  [ x]Diminished breath sounds [ ]right [ ]left [x ]bilateral  CARDIOVASCULAR:    [x ]Regular [ ]Irregular [ ]Tachy  [ ]Amor [ ]Murmur [ ]Other  GASTROINTESTINAL:  [x ]Soft  [ ]Distended   [ ]+BS  [ x]Non tender [ ]Tender  [ ]PEG [ ]OGT/ NGT  Last BM: fecal incontinence  GENITOURINARY:  [ ]Normal [x ] Incontinent   [ ]Oliguria/Anuria   [ ]Sandoval  MUSCULOSKELETAL:   [ ]Normal   [ ]Weakness  [x ]Bed/Wheelchair bound [ x]Edema  in b/l lower extremities [  ] amputation  [  ] contraction  NEUROLOGIC: Nods head yes/no to questions.   [ ]No focal deficits  [ ]Cognitive impairment  [ ]Dysphagia [ ]Dysarthria [ ]Paresis [ ]Other   SKIN: Moisture and Incontinence Dermatitis. See Nursing Skin Assessment for further details  [ ]Normal    [ ]Rash  [ ]Pressure ulcer(s)       Present on admission [ ]y [ ]n   [  ]  Wound    [  ] hyperpigmentation    CRITICAL CARE:  [ ] Shock Present  [ ]Septic [ ]Cardiogenic [ ]Neurologic [ ]Hypovolemic  [ ]  Vasopressors [ ]  Inotropes   [ ]Respiratory failure present [ ]Mechanical ventilation [ ]Non-invasive ventilatory support [ ]High flow    [ ]Acute  [ ]Chronic [ ]Hypoxic  [ ]Hypercarbic [ ]Other  [ ]Other organ failure     LABS:                        11.3   5.35  )-----------( 102      ( 15 Mar 2023 07:30 )             36.9   03-15    146<H>  |  109<H>  |  22  ----------------------------<  153<H>  3.5   |  28  |  <0.20<L>    Ca    9.4      15 Mar 2023 07:30  Phos  2.4     03-15  Mg     2.20     03-15    TPro  5.0<L>  /  Alb  2.4<L>  /  TBili  0.2  /  DBili  x   /  AST  9   /  ALT  18  /  AlkPhos  80  03-15  PT/INR - ( 14 Mar 2023 06:50 )   PT: 113.8 sec;   INR: 9.59 ratio         PTT - ( 15 Mar 2023 07:30 )  PTT:83.8 sec    CAPILLARY BLOOD GLUCOSE          RADIOLOGY & ADDITIONAL STUDIES: reviewed     PROTEIN CALORIE MALNUTRITION PRESENT: [ ]mild [ ]moderate [ ]severe [ ]underweight [ ]morbid obesity  https://www.andeal.org/vault/2440/web/files/ONC/Table_Clinical%20Characteristics%20to%20Document%20Malnutrition-White%20JV%20et%20al%017934.pdf    Height (cm): 180.3 (03-15-23 @ 09:32), 177.8 (09-28-22 @ 02:34)  Weight (kg): 109 (03-13-23 @ 16:55), 98.6 (09-30-22 @ 22:34)  BMI (kg/m2): 33.5 (03-15-23 @ 09:32), 34.5 (03-13-23 @ 16:55), 31.2 (09-30-22 @ 22:34)    [x ]PPSV2 < or = to 30% [ ]significant weight loss  [ ]poor nutritional intake  [ ]anasarca [ ]Artificial Nutrition      REFERRALS:   [ ]Chaplaincy  [ ]Hospice  [ ]Child Life  [ ]Social Work  [ x]Case management [ ]Holistic Therapy

## 2023-03-16 LAB
ALBUMIN SERPL ELPH-MCNC: 1.5 G/DL — LOW (ref 3.3–5)
ALP SERPL-CCNC: 89 U/L — SIGNIFICANT CHANGE UP (ref 40–120)
ALT FLD-CCNC: 20 U/L — SIGNIFICANT CHANGE UP (ref 4–41)
ANION GAP SERPL CALC-SCNC: SIGNIFICANT CHANGE UP MMOL/L (ref 7–14)
AST SERPL-CCNC: 18 U/L — SIGNIFICANT CHANGE UP (ref 4–40)
BILIRUB SERPL-MCNC: 0.4 MG/DL — SIGNIFICANT CHANGE UP (ref 0.2–1.2)
BUN SERPL-MCNC: SIGNIFICANT CHANGE UP MG/DL (ref 7–23)
CALCIUM SERPL-MCNC: 9.3 MG/DL — SIGNIFICANT CHANGE UP (ref 8.4–10.5)
CHLORIDE SERPL-SCNC: 110 MMOL/L — HIGH (ref 98–107)
CO2 SERPL-SCNC: SIGNIFICANT CHANGE UP MMOL/L (ref 22–31)
CREAT SERPL-MCNC: 0.21 MG/DL — LOW (ref 0.5–1.3)
EGFR: 149 ML/MIN/1.73M2 — SIGNIFICANT CHANGE UP
GLUCOSE SERPL-MCNC: 121 MG/DL — HIGH (ref 70–99)
HCT VFR BLD CALC: 40.8 % — SIGNIFICANT CHANGE UP (ref 39–50)
HGB BLD-MCNC: 12 G/DL — LOW (ref 13–17)
MAGNESIUM SERPL-MCNC: SIGNIFICANT CHANGE UP MG/DL (ref 1.6–2.6)
MCHC RBC-ENTMCNC: 29.4 GM/DL — LOW (ref 32–36)
MCHC RBC-ENTMCNC: 32 PG — SIGNIFICANT CHANGE UP (ref 27–34)
MCV RBC AUTO: 108.8 FL — HIGH (ref 80–100)
NRBC # BLD: 0 /100 WBCS — SIGNIFICANT CHANGE UP (ref 0–0)
NRBC # FLD: 0 K/UL — SIGNIFICANT CHANGE UP (ref 0–0)
PHOSPHATE SERPL-MCNC: 3.8 MG/DL — SIGNIFICANT CHANGE UP (ref 2.5–4.5)
PLATELET # BLD AUTO: 98 K/UL — LOW (ref 150–400)
POTASSIUM SERPL-MCNC: 5.3 MMOL/L — SIGNIFICANT CHANGE UP (ref 3.5–5.3)
POTASSIUM SERPL-SCNC: 5.3 MMOL/L — SIGNIFICANT CHANGE UP (ref 3.5–5.3)
PROT SERPL-MCNC: 5 G/DL — LOW (ref 6–8.3)
RBC # BLD: 3.75 M/UL — LOW (ref 4.2–5.8)
RBC # FLD: 14.1 % — SIGNIFICANT CHANGE UP (ref 10.3–14.5)
SODIUM SERPL-SCNC: 140 MMOL/L — SIGNIFICANT CHANGE UP (ref 135–145)
WBC # BLD: 4.14 K/UL — SIGNIFICANT CHANGE UP (ref 3.8–10.5)
WBC # FLD AUTO: 4.14 K/UL — SIGNIFICANT CHANGE UP (ref 3.8–10.5)

## 2023-03-16 PROCEDURE — 99233 SBSQ HOSP IP/OBS HIGH 50: CPT | Mod: GC

## 2023-03-16 PROCEDURE — 99232 SBSQ HOSP IP/OBS MODERATE 35: CPT

## 2023-03-16 RX ORDER — LEVETIRACETAM 250 MG/1
500 TABLET, FILM COATED ORAL
Refills: 0 | Status: DISCONTINUED | OUTPATIENT
Start: 2023-03-16 | End: 2023-03-20

## 2023-03-16 RX ORDER — LEVOTHYROXINE SODIUM 125 MCG
50 TABLET ORAL AT BEDTIME
Refills: 0 | Status: DISCONTINUED | OUTPATIENT
Start: 2023-03-16 | End: 2023-03-20

## 2023-03-16 RX ORDER — DEXAMETHASONE 0.5 MG/5ML
4 ELIXIR ORAL DAILY
Refills: 0 | Status: DISCONTINUED | OUTPATIENT
Start: 2023-03-17 | End: 2023-03-20

## 2023-03-16 RX ORDER — PANTOPRAZOLE SODIUM 20 MG/1
40 TABLET, DELAYED RELEASE ORAL
Refills: 0 | Status: DISCONTINUED | OUTPATIENT
Start: 2023-03-17 | End: 2023-03-20

## 2023-03-16 RX ADMIN — Medication 4 MILLIGRAM(S): at 05:36

## 2023-03-16 RX ADMIN — LEVETIRACETAM 400 MILLIGRAM(S): 250 TABLET, FILM COATED ORAL at 06:27

## 2023-03-16 RX ADMIN — LEVETIRACETAM 500 MILLIGRAM(S): 250 TABLET, FILM COATED ORAL at 17:54

## 2023-03-16 RX ADMIN — ENOXAPARIN SODIUM 100 MILLIGRAM(S): 100 INJECTION SUBCUTANEOUS at 05:44

## 2023-03-16 RX ADMIN — Medication 1 TABLET(S): at 17:52

## 2023-03-16 RX ADMIN — PANTOPRAZOLE SODIUM 40 MILLIGRAM(S): 20 TABLET, DELAYED RELEASE ORAL at 14:44

## 2023-03-16 RX ADMIN — Medication 1 TABLET(S): at 05:36

## 2023-03-16 RX ADMIN — CEFEPIME 100 MILLIGRAM(S): 1 INJECTION, POWDER, FOR SOLUTION INTRAMUSCULAR; INTRAVENOUS at 21:58

## 2023-03-16 RX ADMIN — CEFEPIME 100 MILLIGRAM(S): 1 INJECTION, POWDER, FOR SOLUTION INTRAMUSCULAR; INTRAVENOUS at 14:44

## 2023-03-16 RX ADMIN — ENOXAPARIN SODIUM 100 MILLIGRAM(S): 100 INJECTION SUBCUTANEOUS at 17:52

## 2023-03-16 RX ADMIN — Medication 50 MICROGRAM(S): at 22:00

## 2023-03-16 RX ADMIN — CEFEPIME 100 MILLIGRAM(S): 1 INJECTION, POWDER, FOR SOLUTION INTRAMUSCULAR; INTRAVENOUS at 05:43

## 2023-03-16 NOTE — PROGRESS NOTE ADULT - PROBLEM SELECTOR PLAN 4
Patient with history of bilateral DVTs s/p IVC filter. On Tuesday patient with new DVTs per daughter, per neurosurgery outpatient needed repeat MRI prior to therapeutic anticoagulation   - Duplex US (3/13): remarkable for b/l DVT's. Ok to start AC per vasc sx   - C/w pt specific heparin gtt (3/13) Patient with history of bilateral DVTs s/p IVC filter. On Tuesday patient with new DVTs per daughter, per neurosurgery outpatient needed repeat MRI prior to therapeutic anticoagulation   - Duplex US (3/13): remarkable for b/l DVT's. Ok to start AC per vasc sx   -  pt specific heparin gtt (3/13) --> therapeutic lovenox (3/15)

## 2023-03-16 NOTE — PROGRESS NOTE ADULT - PROBLEM SELECTOR PLAN 1
Patient febrile, tachycardic and hypotensive on admission. Patient answering yes/no questions but altered according to Chris   - UA negative, CT with resolution of prior lung opacities   - patient with a history of meningitis x2, given meropenem in ED   - blood cx + (2/4) pseudomonas; urine cx wnl; aspiration precautions   - Per ID, c/w meropenem (3/11 - 3/15); D/c vanc (3/12) x 1; Cefepime (3/15 - )    - Repeat Blood Cx (3/14): NGTD  - CT abdomen/pelvis = no acute findings; Per neurosx - rec LP; no  shunt tap  - Resume home bactrim for now    Per conversation with sister, Katrin, would like to hold off LP and tx with empiric antibiotics for pseudomonal bacteremia and d/c heparin gtt with conversion to therapeutic lovenox

## 2023-03-16 NOTE — PROGRESS NOTE ADULT - SUBJECTIVE AND OBJECTIVE BOX
CC: F/U for Bacteremia    Saw/spoke to patient. Minimally verbal. Generally stable appearing.    Allergies  aspirin (Unknown)  shellfish (Hives)  shellfish (Unknown)  Tegretol (Unknown)    ANTIMICROBIALS:  cefepime   IVPB    cefepime   IVPB 2000 every 8 hours  trimethoprim  160 mG/sulfamethoxazole 800 mG 1 every 12 hours    PE:    Vital Signs Last 24 Hrs  T(C): 36.6 (16 Mar 2023 12:42), Max: 36.9 (16 Mar 2023 01:30)  T(F): 97.8 (16 Mar 2023 12:42), Max: 98.5 (16 Mar 2023 05:00)  HR: 86 (16 Mar 2023 12:42) (77 - 88)  BP: 151/105 (16 Mar 2023 12:42) (139/98 - 160/99)  RR: 17 (16 Mar 2023 12:42) (17 - 18)  SpO2: 95% (16 Mar 2023 12:42) (95% - 100%)    Gen: AOx1, minimally verbal, Wound near VPS  CV: Nontachycardic  Resp: Breathing comfortably, RA  Abd: Soft, nontender  IV/Skin: No thrombophlebitis    LABS:                        12.0   4.14  )-----------( 98       ( 16 Mar 2023 07:00 )             40.8     03-16    140  |  110<H>  |  QNS  ----------------------------<  121<H>  5.3   |  QNS  |  0.21<L>    Ca    9.3      16 Mar 2023 07:00  Phos  3.8     03-16  Mg     QNS     03-16    TPro  5.0<L>  /  Alb  1.5<L>  /  TBili  0.4  /  DBili  x   /  AST  18  /  ALT  20  /  AlkPhos  89  03-16    MICROBIOLOGY:    .Blood Blood-Peripheral  03-14-23   No growth to date.  --  --    .Blood Blood-Peripheral  03-14-23   No growth to date.  --  --    Clean Catch Clean Catch (Midstream)  03-11-23   No growth  --  --    .Blood Blood-Peripheral  03-11-23   Growth in aerobic bottle: Pseudomonas aeruginosa  ***Blood Panel PCR results on this specimen are available  approximately 3 hours after the Gram stain result.***  Gram stain, PCR, and/or culture results may not always  correspond due to differencein methodologies.  ************************************************************  This PCR assay was performed by multiplex PCR. This  Assay tests for 66 bacterial and resistance gene targets.  Please refer to the [facility] test directory  at [email or web address] for details.  --  Blood Culture PCR  Pseudomonas aeruginosa    .Blood Blood-Peripheral  03-11-23   Growth in aerobic bottle: Pseudomonas aeruginosa  See previous culture [id number]  --    Growth in aerobic bottle: Gram Negative Rods    Rapid RVP Result: NotDetec (03-11 @ 11:14)    (otherwise reviewed)    RADIOLOGY:    3/14 CT:    IMPRESSION:  No acute intra-abdominal or pelvic findings.

## 2023-03-16 NOTE — PROGRESS NOTE ADULT - ATTENDING COMMENTS
Seen an examined by me this afternoon, mother at bedside, eyes are open, following simple commands, denies having pain at time of visit, tolerating PO intake well  Severe sepsis-POA due to Pseudomona's bacteremia of unclear source, likely that VPS is infected  C/w IV cefepime and c/w oral bactrim DS BID, surveillance BCx's are NTD, will f/up further ID recs  To d/w patient's sisters (both are MD's) regarding ID's concern of VPS infection and likelihood of relapse after Abx course (especially as the VPS is not being tapped and LP is not being done)  C/w full dose LMWH BID (lifelong) for extensive B/L acute DVT's, pt has an IVC filter in place  DVT in the setting of underlying malignancy, hypercoagulable state  BMI of 33.5 --> Obesity  DNR/DNI, overall with poor prognosis, seen by Palliative, apprec recs and d/w them, to pursue hospice at [facility] upon discharge (if able to be done on same room that he has right now), will d/w further with WINSTON/CASSANDRA tomorrow  D/w Mother at bedside Seen an examined by me this afternoon, mother at bedside, eyes are open, following simple commands, denies having pain at time of visit, tolerating PO intake well  Severe sepsis-POA due to Pseudomona's bacteremia of unclear source, likely that VPS is infected  C/w IV cefepime and c/w oral bactrim DS BID, surveillance BCx's are NTD, will f/up further ID recs  To d/w patient's sisters (both are MD's) regarding ID's concern of VPS infection and likelihood of relapse after Abx course (especially as the VPS is not being tapped and LP is not being done)  C/w full dose LMWH BID (lifelong) for extensive B/L acute DVT's, pt has an IVC filter in place  DVT in the setting of underlying malignancy, hypercoagulable state  Functional quadriplegia  BMI of 33.5 --> Obesity  DNR/DNI, overall with poor prognosis, seen by Palliative, apprec recs and d/w them, to pursue hospice at [facility] upon discharge (if able to be done on same room that he has right now), will d/w further with WINSTON/CASSANDRA tomorrow  D/w Mother at bedside

## 2023-03-16 NOTE — PROGRESS NOTE ADULT - PROBLEM SELECTOR PLAN 2
Blood Cx (3/11): + 2/4 pseudomonas pending sensitivities    Source: Urine vs CAP vs meningitis   - F/w urine cx, if negative --> CT abd/pelvis with no acute findings   - Meropenem (3/11 - 3/15); Cefepime (3/15 - ); Resume home bactrim 1DS BID  - Repeat Blood Cx (3/14) = NGTD   - Trend fever curve

## 2023-03-16 NOTE — PROGRESS NOTE ADULT - SUBJECTIVE AND OBJECTIVE BOX
Patient is a 62y old  Male who presents with a chief complaint of AMS (15 Mar 2023 17:57)      SUBJECTIVE / OVERNIGHT EVENTS:    Brief Daily Plan    MEDICATIONS  (STANDING):  cefepime   IVPB      cefepime   IVPB 2000 milliGRAM(s) IV Intermittent every 8 hours  dexAMETHasone  Injectable 4 milliGRAM(s) IV Push daily  enoxaparin Injectable 100 milliGRAM(s) SubCutaneous every 12 hours  levETIRAcetam  IVPB 500 milliGRAM(s) IV Intermittent every 12 hours  levothyroxine Injectable 37.5 MICROGram(s) IV Push at bedtime  pantoprazole  Injectable 40 milliGRAM(s) IV Push daily  trimethoprim  160 mG/sulfamethoxazole 800 mG 1 Tablet(s) Oral every 12 hours    MEDICATIONS  (PRN):      Vital Signs Last 24 Hrs  T(C): 36.9 (16 Mar 2023 05:00), Max: 36.9 (16 Mar 2023 01:30)  T(F): 98.5 (16 Mar 2023 05:00), Max: 98.5 (16 Mar 2023 05:00)  HR: 85 (16 Mar 2023 05:00) (83 - 89)  BP: 158/98 (16 Mar 2023 05:00) (133/89 - 160/99)  BP(mean): --  RR: 18 (16 Mar 2023 05:00) (17 - 18)  SpO2: 100% (16 Mar 2023 05:00) (95% - 100%)    Parameters below as of 16 Mar 2023 05:00  Patient On (Oxygen Delivery Method): room air  O2 Flow (L/min): 1    CAPILLARY BLOOD GLUCOSE        I&O's Summary    15 Mar 2023 07:01  -  16 Mar 2023 05:28  --------------------------------------------------------  IN: 236 mL / OUT: 100 mL / NET: 136 mL        PHYSICAL EXAM:  GENERAL: NAD, well-developed  HEAD:  Atraumatic, Normocephalic  EYES: EOMI, PERRLA, conjunctiva and sclera clear  NECK: Supple, No JVD  CHEST/LUNG: Clear to auscultation bilaterally; No wheeze  HEART: Regular rate and rhythm; No murmurs, rubs, or gallops  ABDOMEN: Soft, Nontender, Nondistended; Bowel sounds present  EXTREMITIES:  2+ Peripheral Pulses, No clubbing, cyanosis, or edema  PSYCH: AAOx3  NEUROLOGY: non-focal  SKIN: No rashes or lesions    LABS:                        11.3   5.35  )-----------( 102      ( 15 Mar 2023 07:30 )             36.9     03-15    146<H>  |  109<H>  |  22  ----------------------------<  153<H>  3.5   |  28  |  <0.20<L>    Ca    9.4      15 Mar 2023 07:30  Phos  2.4     03-15  Mg     2.20     03-15    TPro  5.0<L>  /  Alb  2.4<L>  /  TBili  0.2  /  DBili  x   /  AST  9   /  ALT  18  /  AlkPhos  80  03-15    PT/INR - ( 14 Mar 2023 06:50 )   PT: 113.8 sec;   INR: 9.59 ratio         PTT - ( 15 Mar 2023 07:30 )  PTT:83.8 sec          RADIOLOGY & ADDITIONAL TESTS:    Imaging Personally Reviewed:    Consultant(s) Notes Reviewed:      Care Discussed with Consultants/Other Providers:   Patient is a 62y old  Male who presents with a chief complaint of AMS (15 Mar 2023 17:57)      SUBJECTIVE / OVERNIGHT EVENTS: No acute events overnight. Pt seen and examined at bedside this morning. AOx0. Limited ROS.     Brief Daily Plan  - C/w cefepime   - Will discuss role of LP --> family deferring    MEDICATIONS  (STANDING):  cefepime   IVPB      cefepime   IVPB 2000 milliGRAM(s) IV Intermittent every 8 hours  dexAMETHasone  Injectable 4 milliGRAM(s) IV Push daily  enoxaparin Injectable 100 milliGRAM(s) SubCutaneous every 12 hours  levETIRAcetam  IVPB 500 milliGRAM(s) IV Intermittent every 12 hours  levothyroxine Injectable 37.5 MICROGram(s) IV Push at bedtime  pantoprazole  Injectable 40 milliGRAM(s) IV Push daily  trimethoprim  160 mG/sulfamethoxazole 800 mG 1 Tablet(s) Oral every 12 hours    MEDICATIONS  (PRN):      Vital Signs Last 24 Hrs  T(C): 36.9 (16 Mar 2023 05:00), Max: 36.9 (16 Mar 2023 01:30)  T(F): 98.5 (16 Mar 2023 05:00), Max: 98.5 (16 Mar 2023 05:00)  HR: 85 (16 Mar 2023 05:00) (83 - 89)  BP: 158/98 (16 Mar 2023 05:00) (133/89 - 160/99)  BP(mean): --  RR: 18 (16 Mar 2023 05:00) (17 - 18)  SpO2: 100% (16 Mar 2023 05:00) (95% - 100%)    Parameters below as of 16 Mar 2023 05:00  Patient On (Oxygen Delivery Method): room air  O2 Flow (L/min): 1    CAPILLARY BLOOD GLUCOSE        I&O's Summary    15 Mar 2023 07:01  -  16 Mar 2023 05:28  --------------------------------------------------------  IN: 236 mL / OUT: 100 mL / NET: 136 mL        PHYSICAL EXAM:  GENERAL: NAD  HEENT:  Wearing head brace   CHEST/LUNG: Chest rise equal bilaterally, lungs clear to ascultation with decreased breath sounds at bases  HEART: Tachycardic, normal S1 S2  ABDOMEN: Soft, Nontender, Nondistended  EXTREMITIES: 2+ Bilateral pitting edema from ankles to hips   PSYCH: alert, limited  SKIN: No obvious rashes or lesions  NEURO: shakes head no when asked to follow commands    LABS:                        11.3   5.35  )-----------( 102      ( 15 Mar 2023 07:30 )             36.9     03-15    146<H>  |  109<H>  |  22  ----------------------------<  153<H>  3.5   |  28  |  <0.20<L>    Ca    9.4      15 Mar 2023 07:30  Phos  2.4     03-15  Mg     2.20     03-15    TPro  5.0<L>  /  Alb  2.4<L>  /  TBili  0.2  /  DBili  x   /  AST  9   /  ALT  18  /  AlkPhos  80  03-15    PT/INR - ( 14 Mar 2023 06:50 )   PT: 113.8 sec;   INR: 9.59 ratio         PTT - ( 15 Mar 2023 07:30 )  PTT:83.8 sec          RADIOLOGY & ADDITIONAL TESTS:    Imaging Personally Reviewed:    Consultant(s) Notes Reviewed:      Care Discussed with Consultants/Other Providers:

## 2023-03-16 NOTE — PROGRESS NOTE ADULT - ASSESSMENT
62M resident of [facility], hx Glioblastoma multiforme s/p craniotomy and resection complicated by ESBL meningitis s/p repeat craniotomy washout 2/2020 and 8 weeks of Meropenem, recurrent infection s/p repeat washout and 10 weeks of Meropenem followed by suppressive PO Bactrim. Also hx  shunt, b/l DVT (2020) s/p IVC filter (supposed to be on Lovenox but ?accidently discontinued per sister), HTN, anxiety presenting for altered mental status, unresponsiveness, tachycardia and desaturation  Recent COVID Sept 2022  Now with new bilateral DVTs  BCX with pseudomonas aeruginosa, clear 3/14  CT no PE, no intra-abd process  Suspected VPS as source of Pseudomonas bacteremia  On suppressive Bactrim  Overall, Pseudomonas bacteremia, VPS infection, GBM  - Cefepime 2g q 8  - Bactrim suppression DS 1 tab bid  - Recommend VPS sampling, suspicion for VPS source pseudomonas  - F/U pending BCXs  - Care for GBM per team    Art Mcpherson MD  Contact on TEAMS messaging from 9am - 5pm  From 5pm-9am, on weekends, or if no response call [phone]

## 2023-03-17 ENCOUNTER — TRANSCRIPTION ENCOUNTER (OUTPATIENT)
Age: 63
End: 2023-03-17

## 2023-03-17 LAB
ALBUMIN SERPL ELPH-MCNC: 2.1 G/DL — LOW (ref 3.3–5)
ALP SERPL-CCNC: 75 U/L — SIGNIFICANT CHANGE UP (ref 40–120)
ALT FLD-CCNC: 17 U/L — SIGNIFICANT CHANGE UP (ref 4–41)
ANION GAP SERPL CALC-SCNC: 6 MMOL/L — LOW (ref 7–14)
AST SERPL-CCNC: 13 U/L — SIGNIFICANT CHANGE UP (ref 4–40)
BILIRUB SERPL-MCNC: 0.3 MG/DL — SIGNIFICANT CHANGE UP (ref 0.2–1.2)
BUN SERPL-MCNC: 19 MG/DL — SIGNIFICANT CHANGE UP (ref 7–23)
CALCIUM SERPL-MCNC: 9.1 MG/DL — SIGNIFICANT CHANGE UP (ref 8.4–10.5)
CHLORIDE SERPL-SCNC: 109 MMOL/L — HIGH (ref 98–107)
CO2 SERPL-SCNC: 24 MMOL/L — SIGNIFICANT CHANGE UP (ref 22–31)
CREAT SERPL-MCNC: 0.21 MG/DL — LOW (ref 0.5–1.3)
EGFR: 149 ML/MIN/1.73M2 — SIGNIFICANT CHANGE UP
GLUCOSE SERPL-MCNC: 237 MG/DL — HIGH (ref 70–99)
HCT VFR BLD CALC: 40.8 % — SIGNIFICANT CHANGE UP (ref 39–50)
HGB BLD-MCNC: 12.6 G/DL — LOW (ref 13–17)
MAGNESIUM SERPL-MCNC: 2.2 MG/DL — SIGNIFICANT CHANGE UP (ref 1.6–2.6)
MCHC RBC-ENTMCNC: 30.9 GM/DL — LOW (ref 32–36)
MCHC RBC-ENTMCNC: 31.7 PG — SIGNIFICANT CHANGE UP (ref 27–34)
MCV RBC AUTO: 102.5 FL — HIGH (ref 80–100)
NRBC # BLD: 0 /100 WBCS — SIGNIFICANT CHANGE UP (ref 0–0)
NRBC # FLD: 0 K/UL — SIGNIFICANT CHANGE UP (ref 0–0)
PHOSPHATE SERPL-MCNC: 1.9 MG/DL — LOW (ref 2.5–4.5)
PLATELET # BLD AUTO: 143 K/UL — LOW (ref 150–400)
POTASSIUM SERPL-MCNC: 4.4 MMOL/L — SIGNIFICANT CHANGE UP (ref 3.5–5.3)
POTASSIUM SERPL-SCNC: 4.4 MMOL/L — SIGNIFICANT CHANGE UP (ref 3.5–5.3)
PROT SERPL-MCNC: 5.1 G/DL — LOW (ref 6–8.3)
RBC # BLD: 3.98 M/UL — LOW (ref 4.2–5.8)
RBC # FLD: 14 % — SIGNIFICANT CHANGE UP (ref 10.3–14.5)
SARS-COV-2 RNA SPEC QL NAA+PROBE: SIGNIFICANT CHANGE UP
SODIUM SERPL-SCNC: 139 MMOL/L — SIGNIFICANT CHANGE UP (ref 135–145)
WBC # BLD: 4.55 K/UL — SIGNIFICANT CHANGE UP (ref 3.8–10.5)
WBC # FLD AUTO: 4.55 K/UL — SIGNIFICANT CHANGE UP (ref 3.8–10.5)

## 2023-03-17 PROCEDURE — 99232 SBSQ HOSP IP/OBS MODERATE 35: CPT

## 2023-03-17 PROCEDURE — 99233 SBSQ HOSP IP/OBS HIGH 50: CPT | Mod: GC

## 2023-03-17 RX ORDER — ENOXAPARIN SODIUM 100 MG/ML
100 INJECTION SUBCUTANEOUS
Qty: 0 | Refills: 0 | DISCHARGE
Start: 2023-03-17

## 2023-03-17 RX ORDER — ACETAMINOPHEN 500 MG
650 TABLET ORAL ONCE
Refills: 0 | Status: COMPLETED | OUTPATIENT
Start: 2023-03-17 | End: 2023-03-17

## 2023-03-17 RX ORDER — CEFEPIME 1 G/1
2 INJECTION, POWDER, FOR SOLUTION INTRAMUSCULAR; INTRAVENOUS
Qty: 0 | Refills: 0 | DISCHARGE
Start: 2023-03-17 | End: 2023-03-24

## 2023-03-17 RX ORDER — SODIUM,POTASSIUM PHOSPHATES 278-250MG
1 POWDER IN PACKET (EA) ORAL
Refills: 0 | Status: DISCONTINUED | OUTPATIENT
Start: 2023-03-17 | End: 2023-03-20

## 2023-03-17 RX ADMIN — CEFEPIME 100 MILLIGRAM(S): 1 INJECTION, POWDER, FOR SOLUTION INTRAMUSCULAR; INTRAVENOUS at 13:48

## 2023-03-17 RX ADMIN — Medication 1 PACKET(S): at 17:54

## 2023-03-17 RX ADMIN — CEFEPIME 100 MILLIGRAM(S): 1 INJECTION, POWDER, FOR SOLUTION INTRAMUSCULAR; INTRAVENOUS at 21:48

## 2023-03-17 RX ADMIN — Medication 4 MILLIGRAM(S): at 05:40

## 2023-03-17 RX ADMIN — Medication 1 TABLET(S): at 18:01

## 2023-03-17 RX ADMIN — Medication 650 MILLIGRAM(S): at 09:19

## 2023-03-17 RX ADMIN — CEFEPIME 100 MILLIGRAM(S): 1 INJECTION, POWDER, FOR SOLUTION INTRAMUSCULAR; INTRAVENOUS at 05:41

## 2023-03-17 RX ADMIN — LEVETIRACETAM 500 MILLIGRAM(S): 250 TABLET, FILM COATED ORAL at 05:40

## 2023-03-17 RX ADMIN — PANTOPRAZOLE SODIUM 40 MILLIGRAM(S): 20 TABLET, DELAYED RELEASE ORAL at 06:14

## 2023-03-17 RX ADMIN — ENOXAPARIN SODIUM 100 MILLIGRAM(S): 100 INJECTION SUBCUTANEOUS at 05:40

## 2023-03-17 RX ADMIN — LEVETIRACETAM 500 MILLIGRAM(S): 250 TABLET, FILM COATED ORAL at 17:55

## 2023-03-17 RX ADMIN — Medication 1 PACKET(S): at 12:59

## 2023-03-17 RX ADMIN — Medication 1 TABLET(S): at 05:40

## 2023-03-17 RX ADMIN — ENOXAPARIN SODIUM 100 MILLIGRAM(S): 100 INJECTION SUBCUTANEOUS at 17:54

## 2023-03-17 RX ADMIN — Medication 50 MICROGRAM(S): at 21:49

## 2023-03-17 NOTE — DISCHARGE NOTE PROVIDER - HOSPITAL COURSE
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 Spoke with patient's sister Maribel who stated that patient's baseline mental status is answering yes/no questions. Patient resides at [place] and his sister was called by the facility tonight because the patient had decreased oxygen saturation, was tachycardic and was not as responsive as usual.     Per sister patient had COVID a few weeks ago. 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 Patient was supposed to be on prophylactic lovenox but according to sister it was accidentally discontinued. On Tuesday of this past week patient was noted to have worsening bilateral lower extremity edema. Patient had ultrasounds which showed new bilateral DVTs. Patient was seen by vascular surgery who stated that he needed neurosurgical approval to be placed on therapeutic anticoagulation. Neurosurgery was contacted and stated patient needs another MRI prior to starting therapeutic anticoagulation.     Patient currently alert, answering only yes/no questions. Shakes his head no when asked to complete neuro exam. Shakes his head no when asked if he has any complaints.     ED course: febrile to 100.5, , BP 84/63, RR 20, SpO2 100% on 8L NC. Labs with WBC 9.97 but with 84.3% neutrophils. BNP 2000. Trop 55 --> 61. Lactate 4.5 --> 3.8. UA with no bacteria. RVP negative. CXR with small left pleural effusion and/or left basilar atelectasis. A left basilar pneumonia cannot be excluded. CTA with no PE and interval resolution of opacities. CT head with  Left frontal vasogenic edema with mass effect and midline shift to the right which is less when compared with 9/29/2022. Calcification versus hemorrhage left corona radiata likely related to prior neoplasm. Hematocrit level left occipital horn. Given tylenol, fent, meropenem, 1L fluids.     Pt admitted to the medicine unit for further management. In the medicine unit, pt seen and examined routinely. Per neurosurgery recs, no plan for LP or  shunt tap 2/2 risk of seeding. ID consulted and recommended  shunt / LP due to risk of re-infection but family declined. Pt treated empirically with meropenem and transitioned to IV Cefepime with repeat blood cultures negative after initial set positive for pseudomonas. 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 Spoke with patient's sister Maribel who stated that patient's baseline mental status is answering yes/no questions. Patient resides at [facility] and his sister was called by the facility tonight because the patient had decreased oxygen saturation, was tachycardic and was not as responsive as usual. Per sister patient had COVID a few weeks ago. 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 Patient was supposed to be on prophylactic lovenox but according to sister it was accidentally discontinued. On Tuesday of this past week patient was noted to have worsening bilateral lower extremity edema. Patient had ultrasounds which showed new bilateral DVTs. Patient was seen by vascular surgery who stated that he needed neurosurgical approval to be placed on therapeutic anticoagulation. Neurosurgery was contacted and stated patient needs another MRI prior to starting therapeutic anticoagulation.     ED course: febrile to 100.5, , BP 84/63, RR 20, SpO2 100% on 8L NC. Labs with WBC 9.97 but with 84.3% neutrophils. BNP 2000. Trop 55 --> 61. Lactate 4.5 --> 3.8. UA with no bacteria. RVP negative. CXR with small left pleural effusion and/or left basilar atelectasis. A left basilar pneumonia cannot be excluded. CTA with no PE and interval resolution of opacities. CT head with  Left frontal vasogenic edema with mass effect and midline shift to the right which is less when compared with 9/29/2022. Calcification versus hemorrhage left corona radiata likely related to prior neoplasm. Hematocrit level left occipital horn. Given tylenol, fent, meropenem, 1L fluids.     Pt admitted to the medicine unit for further management. In the medicine unit, pt seen and examined routinely. Per neurosurgery recs, no plan for LP or  shunt tap 2/2 risk of seeding. ID consulted and recommended  shunt / LP due to risk of re-infection but family declined. Pt treated empirically with meropenem and transitioned to IV Cefepime with repeat blood cultures negative after initial set positive for pseudomonas. Palliative consulted and per discussion with family; family opting for discharge back to [facility] and would like to set up hospice services themselves. During stay, pt restarted on home meds and back at baseline. Pt hemodynamically stable and ready for discharge to [facility] on IV cefepime for 7 days and PCP follow up. 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 Spoke with patient's sister Maribel who stated that patient's baseline mental status is answering yes/no questions. Patient resides at [place] [facility] and his sister was called by the facility tonight because the patient had decreased oxygen saturation, was tachycardic and was not as responsive as usual. Per sister patient had COVID a few weeks ago. 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 Patient was supposed to be on prophylactic lovenox but according to sister it was accidentally discontinued. On Tuesday of this past week patient was noted to have worsening bilateral lower extremity edema. Patient had ultrasounds which showed new bilateral DVTs. Patient was seen by vascular surgery who stated that he needed neurosurgical approval to be placed on therapeutic anticoagulation. Neurosurgery was contacted and stated patient needs another MRI prior to starting therapeutic anticoagulation.     ED course: febrile to 100.5, , BP 84/63, RR 20, SpO2 100% on 8L NC. Labs with WBC 9.97 but with 84.3% neutrophils. BNP 2000. Trop 55 --> 61. Lactate 4.5 --> 3.8. UA with no bacteria. RVP negative. CXR with small left pleural effusion and/or left basilar atelectasis. A left basilar pneumonia cannot be excluded. CTA with no PE and interval resolution of opacities. CT head with  Left frontal vasogenic edema with mass effect and midline shift to the right which is less when compared with 9/29/2022. Calcification versus hemorrhage left corona radiata likely related to prior neoplasm. Hematocrit level left occipital horn. Given tylenol, fent, meropenem, 1L fluids.     Pt admitted to the medicine unit for further management. In the medicine unit, pt seen and examined routinely. Per neurosurgery recs, no plan for LP or  shunt tap 2/2 risk of seeding. ID consulted and recommended  shunt / LP due to risk of re-infection but family declined. Pt treated empirically with meropenem and transitioned to IV Cefepime with repeat blood cultures negative after initial set positive for pseudomonas. Palliative consulted and per discussion with family; family opting for discharge back to [facility] and would like to set up hospice services themselves. During stay, pt restarted on home meds and back at baseline. Pt hemodynamically stable and ready for discharge to [facility] on IV cefepime for 7 days and PCP follow up.     Tangela Roche MD  Medicine Attending  Teams preferred/Pager: [phone]    I have personally seen and examined patient. I discussed the case with Dr. Watson and agree with findings and plan as detailed per note above. 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 Course was complicated by new b/l DVTs (lovenox accidently discontinued at [facility]). Unclear source for the pseudomonas bacteremia-urine culture negative, CT abd/pelv negative as well. Consideration for shunt infection also discussed but NSGY recommended against it, due to concern of seeding, and family declined both shunt tap as well as LP in line of GOC. Palliative consulted, decision made to move towards hospice. Plan will be to empirically treat with antibiotics, Cefepime until 3/24 and discharge to [facility] with hospice. Pt will also be discharged on lovenox, discussed with MARY KATE ok for AC. 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 Spoke with patient's sister Maribel who stated that patient's baseline mental status is answering yes/no questions. Patient resides at [place] and his sister was called by the facility tonight because the patient had decreased oxygen saturation, was tachycardic and was not as responsive as usual. Per sister patient had COVID a few weeks ago. 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 Patient was supposed to be on prophylactic lovenox but according to sister it was accidentally discontinued. On Tuesday of this past week patient was noted to have worsening bilateral lower extremity edema. Patient had ultrasounds which showed new bilateral DVTs. Patient was seen by vascular surgery who stated that he needed neurosurgical approval to be placed on therapeutic anticoagulation. Neurosurgery was contacted and approved of therapeutic anticoagulation after repeat MRI.    ED course: febrile to 100.5, , BP 84/63, RR 20, SpO2 100% on 8L NC. Labs with WBC 9.97 but with 84.3% neutrophils. BNP 2000. Trop 55 --> 61. Lactate 4.5 --> 3.8. UA with no bacteria. RVP negative. CXR with small left pleural effusion and/or left basilar atelectasis. A left basilar pneumonia cannot be excluded. CTA with no PE and interval resolution of opacities. CT head with  Left frontal vasogenic edema with mass effect and midline shift to the right which is less when compared with 9/29/2022. Calcification versus hemorrhage left corona radiata likely related to prior neoplasm. Hematocrit level left occipital horn. Given tylenol, fent, meropenem, 1L fluids.     Pt admitted to the medicine unit for further management. In the medicine unit, pt seen and examined routinely. Per neurosurgery recs, no plan for LP or  shunt tap 2/2 risk of seeding. ID consulted and recommended  shunt / LP due to risk of re-infection but family declined. Pt treated empirically with meropenem and transitioned to IV Cefepime with repeat blood cultures negative after initial set positive for pseudomonas. Palliative consulted and per discussion with family; family opting for discharge back to [facility] and would like to set up hospice services themselves. During stay, pt restarted on home meds and back at baseline. Pt hemodynamically stable and ready for discharge to [facility] on IV cefepime for 7 days and PCP follow up.     Tangela Roche MD  Medicine Attending  Teams preferred/Pager: [phone]    I have personally seen and examined patient. I discussed the case with Dr. Watson and agree with findings and plan as detailed per note above. 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 Course was complicated by new b/l DVTs (lovenox accidently discontinued at [facility]). Unclear source for the pseudomonas bacteremia-urine culture negative, CT abd/pelv negative as well. Consideration for shunt infection also discussed but NSGY recommended against it, due to concern of seeding, and family declined both shunt tap as well as LP in line of GOC. Palliative consulted, decision made to move towards hospice. Plan will be to empirically treat with antibiotics, Cefepime until 3/24 and discharge to [place] with hospice. Pt will also be discharged on lovenox, discussed with MARY KATE ok for AC.

## 2023-03-17 NOTE — DISCHARGE NOTE PROVIDER - NSDCQMCOGNITION_NEU_ALL_CORE
RECORDS RECEIVED FROM: Internal   DATE RECEIVED: 6/22/20    NOTES STATUS DETAILS   OFFICE NOTE from referring provider Internal ED follow up   DISCHARGE SUMMARY from hospital Internal ED note 6/15/20 -6/16/20   MEDICATION LIST Internal         PERTINENT LABS Internal    IMAGING (CT, MRI, EGD) Internal CT ABD 6/16/20  US ABD 6/15/20     REFERRAL INFORMATION    Date referral was placed: 6/22/20    Date all records received:    Date records were scanned into EPIC:    Date records were sent to Provider to review:    Date and recommendation received from provider:  LETTER SENT  SCHEDULE APPOINTMENT   Date patient was contacted to schedule: 6/17/20           No difficulties

## 2023-03-17 NOTE — PROGRESS NOTE ADULT - PROBLEM SELECTOR PLAN 4
Patient with history of bilateral DVTs s/p IVC filter. On Tuesday patient with new DVTs per daughter, per neurosurgery outpatient needed repeat MRI prior to therapeutic anticoagulation   - Duplex US (3/13): remarkable for b/l DVT's. Ok to start AC per vasc sx   -  pt specific heparin gtt (3/13) --> therapeutic lovenox (3/15)

## 2023-03-17 NOTE — DISCHARGE NOTE PROVIDER - NSDCCPTREATMENT_GEN_ALL_CORE_FT
PRINCIPAL PROCEDURE  Procedure: CT head wo con  Findings and Treatment: IMPRESSION: Left parietal craniotomy defect. Left frontal vasogenic edema with mass effect and midline shift to the right which is less when   compared with 9/29/2022. Calcification versus hemorrhage left corona   radiata likely related to prior neoplasm. Hematocrit level left occipital   horn        SECONDARY PROCEDURE  Procedure: CT abdomen pelvis  Findings and Treatment: FINDINGS:  LOWER CHEST: Right lower lobe atelectasis. Trace left pleural effusion and patchy opacity in the left lower lobe may represent atelectasis. Mild dilatation of ascending aorta measuresup to 4.2 cm at the level of the main pulmonary artery. Aortic calcifications. Cardiomegaly.  LIVER: Steatosis.  BILE DUCTS: Normal caliber.  GALLBLADDER: Within normal limits.  SPLEEN: Within normal limits.  PANCREAS: Within normal limits.  ADRENALS: Within normal limits.  KIDNEYS/URETERS: Normal enhancement of the parenchyma. No hydronephrosis.  BLADDER: Decompressed.  REPRODUCTIVE ORGANS: Prostate within normal limits.  BOWEL: No bowel obstruction. Appendix is normal. Small hiatal hernia.   Sliver of free fluid in the extraperitoneal cavity.  PERITONEUM: No ascites.  shunt catheter coursing in the right anterior   abdominal wall with tip in the left mid abdomen. No associated collection.  VESSELS: IVC filter. Atherosclerotic changes.  RETROPERITONEUM/LYMPH NODES: No lymphadenopathy.  ABDOMINAL WALL: Anasarca.  BONES: Degenerative changes. Osteopenia.  IMPRESSION:  No acute intra-abdominal or pelvic findings      Procedure: CT angiogram chest w contrast  Findings and Treatment: FINDINGS:  PULMONARY ANGIOGRAM: No main, right or left main, lobar, or proximal segmental pulmonary arterial filling defect. Limited evaluation of the distal segmental and subsegmental arteries secondary to motion artifact. Dilated main pulmonary artery measuring 4.2 cm, finding which can be seen in pulmonary hypertension.  LYMPH NODES: No lymphadenopathy.  HEART/VASCULATURE: The heart size is normal. No pericardial effusion.   Aortic and coronary artery calcifications. Aortic valve calcification.   Ascending aorta is mildly dilated measuring up to 4.1 cm at the level the main pulmonary artery, unchanged.  AIRWAYS/LUNGS/PLEURA: Stable atelectasis of the left lower lobe with associated left-sided mediastinal shift. Stable mild right lower lobe atelectasis. Interval resolution of the previously seen multifocal   consolidative and groundglass opacities since February 4, 2022.  UPPER ABDOMEN: Within normal limits.  BONES/SOFT TISSUES: Within normal limits.  IMPRESSION:  No main, right or left main, lobar, or proximal segmental pulmonary   embolism. Limited evaluation of the distal segmental and subsegmental   arteries secondary to motion artifact.  Interval resolution of the previously seen multifocal opacities since   February 4, 2022.

## 2023-03-17 NOTE — PROGRESS NOTE ADULT - SUBJECTIVE AND OBJECTIVE BOX
Patient is a 62y old  Male who presents with a chief complaint of AMS (16 Mar 2023 05:28)      SUBJECTIVE / OVERNIGHT EVENTS:    Brief Daily Plan    MEDICATIONS  (STANDING):  cefepime   IVPB      cefepime   IVPB 2000 milliGRAM(s) IV Intermittent every 8 hours  dexAMETHasone     Tablet 4 milliGRAM(s) Oral daily  enoxaparin Injectable 100 milliGRAM(s) SubCutaneous every 12 hours  levETIRAcetam 500 milliGRAM(s) Oral two times a day  levothyroxine 50 MICROGram(s) Oral at bedtime  pantoprazole    Tablet 40 milliGRAM(s) Oral before breakfast  trimethoprim  160 mG/sulfamethoxazole 800 mG 1 Tablet(s) Oral every 12 hours    MEDICATIONS  (PRN):      Vital Signs Last 24 Hrs  T(C): 36.3 (17 Mar 2023 04:57), Max: 36.6 (16 Mar 2023 12:42)  T(F): 97.4 (17 Mar 2023 04:57), Max: 97.9 (17 Mar 2023 01:17)  HR: 90 (17 Mar 2023 04:57) (77 - 110)  BP: 129/93 (17 Mar 2023 04:57) (129/93 - 151/105)  BP(mean): --  RR: 17 (17 Mar 2023 04:57) (17 - 18)  SpO2: 96% (17 Mar 2023 04:57) (95% - 99%)    Parameters below as of 17 Mar 2023 04:57  Patient On (Oxygen Delivery Method): room air      CAPILLARY BLOOD GLUCOSE        I&O's Summary    15 Mar 2023 07:01  -  16 Mar 2023 07:00  --------------------------------------------------------  IN: 236 mL / OUT: 100 mL / NET: 136 mL        PHYSICAL EXAM:  GENERAL: NAD, well-developed  HEAD:  Atraumatic, Normocephalic  EYES: EOMI, PERRLA, conjunctiva and sclera clear  NECK: Supple, No JVD  CHEST/LUNG: Clear to auscultation bilaterally; No wheeze  HEART: Regular rate and rhythm; No murmurs, rubs, or gallops  ABDOMEN: Soft, Nontender, Nondistended; Bowel sounds present  EXTREMITIES:  2+ Peripheral Pulses, No clubbing, cyanosis, or edema  PSYCH: AAOx3  NEUROLOGY: non-focal  SKIN: No rashes or lesions    LABS:                        12.0   4.14  )-----------( 98       ( 16 Mar 2023 07:00 )             40.8     03-16    140  |  110<H>  |  QNS  ----------------------------<  121<H>  5.3   |  QNS  |  0.21<L>    Ca    9.3      16 Mar 2023 07:00  Phos  3.8     03-16  Mg     QNS     03-16    TPro  5.0<L>  /  Alb  1.5<L>  /  TBili  0.4  /  DBili  x   /  AST  18  /  ALT  20  /  AlkPhos  89  03-16    PTT - ( 15 Mar 2023 07:30 )  PTT:83.8 sec          RADIOLOGY & ADDITIONAL TESTS:    Imaging Personally Reviewed:    Consultant(s) Notes Reviewed:      Care Discussed with Consultants/Other Providers:   Patient is a 62y old  Male who presents with a chief complaint of AMS (16 Mar 2023 05:28)      SUBJECTIVE / OVERNIGHT EVENTS: No acute events overnight. Pt was seen and examined at bedside this morning. Pt asleep, arousable with strong shaking. AOx0. Limited ROS.     Brief Daily Plan  - Dispo planning  - ID recs regarding ABX duration     MEDICATIONS  (STANDING):  cefepime   IVPB      cefepime   IVPB 2000 milliGRAM(s) IV Intermittent every 8 hours  dexAMETHasone     Tablet 4 milliGRAM(s) Oral daily  enoxaparin Injectable 100 milliGRAM(s) SubCutaneous every 12 hours  levETIRAcetam 500 milliGRAM(s) Oral two times a day  levothyroxine 50 MICROGram(s) Oral at bedtime  pantoprazole    Tablet 40 milliGRAM(s) Oral before breakfast  trimethoprim  160 mG/sulfamethoxazole 800 mG 1 Tablet(s) Oral every 12 hours    MEDICATIONS  (PRN):      Vital Signs Last 24 Hrs  T(C): 36.3 (17 Mar 2023 04:57), Max: 36.6 (16 Mar 2023 12:42)  T(F): 97.4 (17 Mar 2023 04:57), Max: 97.9 (17 Mar 2023 01:17)  HR: 90 (17 Mar 2023 04:57) (77 - 110)  BP: 129/93 (17 Mar 2023 04:57) (129/93 - 151/105)  BP(mean): --  RR: 17 (17 Mar 2023 04:57) (17 - 18)  SpO2: 96% (17 Mar 2023 04:57) (95% - 99%)    Parameters below as of 17 Mar 2023 04:57  Patient On (Oxygen Delivery Method): room air      CAPILLARY BLOOD GLUCOSE        I&O's Summary    15 Mar 2023 07:01  -  16 Mar 2023 07:00  --------------------------------------------------------  IN: 236 mL / OUT: 100 mL / NET: 136 mL        PHYSICAL EXAM:  GENERAL: NAD  HEENT:  Wearing head brace   CHEST/LUNG: Chest rise equal bilaterally, lungs clear to ascultation with decreased breath sounds at bases  HEART: Tachycardic, normal S1 S2  ABDOMEN: Soft, Nontender, Nondistended  EXTREMITIES: 2+ Bilateral pitting edema from ankles to hips   PSYCH: alert, limited  SKIN: No obvious rashes or lesions  NEURO: shakes head to answer questions     LABS:                        12.0   4.14  )-----------( 98       ( 16 Mar 2023 07:00 )             40.8     03-16    140  |  110<H>  |  QNS  ----------------------------<  121<H>  5.3   |  QNS  |  0.21<L>    Ca    9.3      16 Mar 2023 07:00  Phos  3.8     03-16  Mg     QNS     03-16    TPro  5.0<L>  /  Alb  1.5<L>  /  TBili  0.4  /  DBili  x   /  AST  18  /  ALT  20  /  AlkPhos  89  03-16    PTT - ( 15 Mar 2023 07:30 )  PTT:83.8 sec          RADIOLOGY & ADDITIONAL TESTS:    Imaging Personally Reviewed:    Consultant(s) Notes Reviewed:      Care Discussed with Consultants/Other Providers:   Patient is a 62y old  Male who presents with a chief complaint of AMS (16 Mar 2023 05:28)      SUBJECTIVE / OVERNIGHT EVENTS: No acute events overnight. Pt was seen and examined at bedside this morning. Pt asleep, arousable with strong shaking. AOx0. Endorses a headache. Limited ROS.     Brief Daily Plan  - Dispo planning  - ID recs regarding ABX duration     MEDICATIONS  (STANDING):  cefepime   IVPB      cefepime   IVPB 2000 milliGRAM(s) IV Intermittent every 8 hours  dexAMETHasone     Tablet 4 milliGRAM(s) Oral daily  enoxaparin Injectable 100 milliGRAM(s) SubCutaneous every 12 hours  levETIRAcetam 500 milliGRAM(s) Oral two times a day  levothyroxine 50 MICROGram(s) Oral at bedtime  pantoprazole    Tablet 40 milliGRAM(s) Oral before breakfast  trimethoprim  160 mG/sulfamethoxazole 800 mG 1 Tablet(s) Oral every 12 hours    MEDICATIONS  (PRN):      Vital Signs Last 24 Hrs  T(C): 36.3 (17 Mar 2023 04:57), Max: 36.6 (16 Mar 2023 12:42)  T(F): 97.4 (17 Mar 2023 04:57), Max: 97.9 (17 Mar 2023 01:17)  HR: 90 (17 Mar 2023 04:57) (77 - 110)  BP: 129/93 (17 Mar 2023 04:57) (129/93 - 151/105)  BP(mean): --  RR: 17 (17 Mar 2023 04:57) (17 - 18)  SpO2: 96% (17 Mar 2023 04:57) (95% - 99%)    Parameters below as of 17 Mar 2023 04:57  Patient On (Oxygen Delivery Method): room air      CAPILLARY BLOOD GLUCOSE        I&O's Summary    15 Mar 2023 07:01  -  16 Mar 2023 07:00  --------------------------------------------------------  IN: 236 mL / OUT: 100 mL / NET: 136 mL        PHYSICAL EXAM:  GENERAL: NAD  HEENT:  Wearing head brace   CHEST/LUNG: Chest rise equal bilaterally, lungs clear to ascultation with decreased breath sounds at bases  HEART: Tachycardic, normal S1 S2  ABDOMEN: Soft, Nontender, Nondistended  EXTREMITIES: 2+ Bilateral pitting edema from ankles to hips   PSYCH: alert, limited  SKIN: No obvious rashes or lesions  NEURO: shakes head to answer questions     LABS:                        12.0   4.14  )-----------( 98       ( 16 Mar 2023 07:00 )             40.8     03-16    140  |  110<H>  |  QNS  ----------------------------<  121<H>  5.3   |  QNS  |  0.21<L>    Ca    9.3      16 Mar 2023 07:00  Phos  3.8     03-16  Mg     QNS     03-16    TPro  5.0<L>  /  Alb  1.5<L>  /  TBili  0.4  /  DBili  x   /  AST  18  /  ALT  20  /  AlkPhos  89  03-16    PTT - ( 15 Mar 2023 07:30 )  PTT:83.8 sec          RADIOLOGY & ADDITIONAL TESTS:    Imaging Personally Reviewed:    Consultant(s) Notes Reviewed:      Care Discussed with Consultants/Other Providers:

## 2023-03-17 NOTE — PROGRESS NOTE ADULT - SUBJECTIVE AND OBJECTIVE BOX
f/u pseudomonas bacteremia    Interval History/ROS:  ROS unable.  non-verbal.  no fever.  CT negative.    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  Presence of IVC filter  Status post craniectomy    Allergies  aspirin (Unknown)  shellfish (Hives)  Tegretol (Unknown)    ANTIMICROBIALS:  vancomycin  IVPB 1250 every 12 hours (3/12 x1)  meropenem  IVPB 2000 every 8 hours (3/11-3/15)    active:  cefepime   IVPB 2000 every 8 hours (3/15-)  trimethoprim  160 mG/sulfamethoxazole 800 mG 1 every 12 hours (3/15-)    MEDICATIONS  (STANDING):  dexAMETHasone     Tablet 4 daily  enoxaparin Injectable 100 every 12 hours  levETIRAcetam 500 two times a day  levothyroxine 50 at bedtime  pantoprazole    Tablet 40 before breakfast    Vital Signs Last 24 Hrs  T(F): 97.4 (23 @ 04:57), Max: 97.9 (23 @ 01:17)  HR: 90 (23 @ 04:57)  BP: 129/93 (23 @ 04:57)  RR: 17 (23 @ 04:57)  SpO2: 96% (23 @ 04:57) (95% - 99%)    PHYSICAL EXAM:  Constitutional: lethargic  HEAD/EYES: eyes remain closed, did open briefly  ENT:  supple  Cardiovascular:   normal S1, S2  Respiratory:  clear BS bilaterally  GI:  soft, non-tender, normal bowel sounds  :  no francois  Musculoskeletal:  no synovitis  Neurologic: lethargic, not really moving  Skin:  no rash  Psychiatric:  not able to assess                                           12.6   4.55  )-----------( 143      ( 17 Mar 2023 06:55 )             40.8 -    139  |  109  |  19  ----------------------------<  237  4.4   |  24  |  0.21  Ca    9.1      17 Mar 2023 06:55Phos  1.9     -Mg     2.20       TPro  5.1  /  Alb  2.1  /  TBili  0.3  /  DBili  x   /  AST  13  /  ALT  17  /  AlkPhos  75  -    Urinalysis Basic - ( 11 Mar 2023 13:42 )  Color: Jazmin / Appearance: Slightly Turbid / S.028 / pH: x  Gluc: x / Ketone: Trace  / Bili: Negative / Urobili: 3 mg/dL   Blood: x / Protein: 100 mg/dL / Nitrite: Negative   Leuk Esterase: Negative / RBC: 7 /HPF / WBC 8 /HPF   Sq Epi: x / Non Sq Epi: 7 /HPF / Bacteria: Negative    MICROBIOLOGY:  Culture - Blood (collected 23 @ 06:28)  Source: .Blood Blood-Peripheral  Preliminary Report (03-15-23 @ 11:01):    No growth to date.    Culture - Blood (collected 23 @ 06:20)  Source: .Blood Blood-Peripheral  Preliminary Report (03-15-23 @ 11:01):    No growth to date.    Culture - Urine (collected 23 @ 13:15)  Source: Clean Catch Clean Catch (Midstream)  Final Report (23 @ 16:55):    No growth    Culture - Blood (collected 23 @ 11:32)  Source: .Blood Blood-Peripheral  Gram Stain (23 @ 06:54):    Growth in aerobic bottle: Gram Negative Rods  Final Report (23 @ 15:22):    Growth in aerobic bottle: Pseudomonas aeruginosa    ***Blood Panel PCR results on this specimen are available    approximately 3 hours after the Gram stain result.***    Gram stain, PCR, and/or culture results may not always    correspond due to differencein methodologies.    ************************************************************    This PCR assay was performed by multiplex PCR. This    Assay tests for 66 bacterial and resistance gene targets.    Please refer to the [facility] test directory    at [email or web address] for details.  Organism: Blood Culture PCR  Pseudomonas aeruginosa (23 @ 15:22)  Organism: Pseudomonas aeruginosa (23 @ 15:22)      -  Amikacin: S <=16      -  Aztreonam: S <=4      -  Cefepime: S <=2      -  Ceftazidime: S <=1      -  Ciprofloxacin: S 0.5      -  Gentamicin: S <=2      -  Imipenem: S <=1      -  Levofloxacin: S 1      -  Meropenem: S <=1      -  Piperacillin/Tazobactam: S <=8      -  Tobramycin: S <=2      Method Type: MJ  Organism: Blood Culture PCR (23 @ 15:22)      -  Pseudomonas aeruginosa: Detec      Method Type: PCR    Culture - Blood (collected 23 @ 10:44)  Source: .Blood Blood-Peripheral  Gram Stain (23 @ 07:26):    Growth in aerobic bottle: Gram Negative Rods  Final Report (23 @ 17:56):    Growth in aerobic bottle: Pseudomonas aeruginosa    See previous culture [id number]    Rapid RVP Result: NotDetec ( @ 11:14)    RADIOLOGY:  imaging below personally reviewed and agree with findings    CT Abdomen and Pelvis w/ IV Cont (23 @ 20:40) >  INTERPRETATION:  no CT evidence for infection.  follow up official report in the morning.    CT Head No Cont (23 @ 13:06) >  IMPRESSION: Left parietal craniotomy defect. Left frontal vasogenic edema with mass effect and midline shift to the right which is less when compared with 2022. Calcification versus hemorrhage left corona radiata likely related to prior neoplasm. Hematocrit level left occipital horn.    CT Angio Chest PE Protocol w/ IV Cont (23 @ 13:05) >  IMPRESSION:  No main, right or left main, lobar, or proximal segmental pulmonary embolism. Limited evaluation of the distal segmental and subsegmental arteries secondary to motion artifact.  Interval resolution of the previously seen multifocal opacities since 2022.    Xray Chest 1 View- PORTABLE-Urgent (23 @ 11:30) >  IMPRESSION:: The heart size cannot be adequately assessed on this single view. There is an overlying  shunt. The right costophrenic angle was omitted from this. There is a small left pleural effusion and/or left basilar atelectasis. A left basilar pneumonia cannot be excluded.

## 2023-03-17 NOTE — PROGRESS NOTE ADULT - ASSESSMENT
62M resident of [facility], hx Glioblastoma multiforme s/p craniotomy and resection complicated by ESBL meningitis s/p repeat craniotomy washout 2/2020 and 8 weeks of Meropenem, recurrent infection s/p repeat washout and 10 weeks of Meropenem followed by suppressive PO Bactrim. Also hx  shunt, b/l DVT (2020) s/p IVC filter (supposed to be on Lovenox but ?accidently discontinued per sister), HTN, anxiety presenting for altered mental status, unresponsiveness, tachycardia and desaturation.  Recent COVID Sept 2022.  Now with new bilateral DVTs. Patient was seen by vascular surgery who stated that he needed neurosurgical clearance prior to therapeutic anticoagulation. Neurosurgery was contacted and stated patient needs another MRI prior to starting therapeutic anticoagulation.  BC with pseudomonas aeruginosa    Pseudomonas bacteremia  - UC (-)  - CT abdomen (-)  - concerned about VPS, however, there are no plans to tap VPS (per family and neurosurgery)  - cefepime until 3/24  - at risk for relapse    GBM  - per sister there is recurrent disease    Hx recurrent ESBL meningitis  - on bactrim    Please call the ID service [phone] with questions or concerns over the weekend 62M resident of [facility], hx Glioblastoma multiforme s/p craniotomy and resection complicated by ESBL meningitis s/p repeat craniotomy washout 2/2020 and 8 weeks of Meropenem, recurrent infection s/p repeat washout and 10 weeks of Meropenem followed by suppressive PO Bactrim. Also hx  shunt, b/l DVT (2020) s/p IVC filter (supposed to be on Lovenox but ?accidently discontinued per sister), HTN, anxiety presenting for altered mental status, unresponsiveness, tachycardia and desaturation.  Recent COVID Sept 2022.  Now with new bilateral DVTs. Patient was seen by vascular surgery who stated that he needed neurosurgical clearance prior to therapeutic anticoagulation. Neurosurgery was contacted and stated patient needs another MRI prior to starting therapeutic anticoagulation.  BC with pseudomonas aeruginosa    Pseudomonas bacteremia  - UC (-)  - CT abdomen (-)  - concerned about VPS, however, there are no plans to tap VPS (per family and neurosurgery)  - cefepime until 3/24  - at risk for relapse    GBM  - per sister there is recurrent disease    Hx recurrent ESBL meningitis  - on bactrim    I have discussed plan of care as detailed above with housestaff    Please call the ID service [phone] with questions or concerns over the weekend

## 2023-03-17 NOTE — DISCHARGE NOTE PROVIDER - CARE PROVIDER_API CALL
Enzo Self)  Internal Medicine  [phone] [place] [address]  Phone: [phone]  Fax: [phone]  Established Patient  Follow Up Time: 1 week

## 2023-03-17 NOTE — DISCHARGE NOTE PROVIDER - NSDCCPCAREPLAN_GEN_ALL_CORE_FT
PRINCIPAL DISCHARGE DIAGNOSIS  Diagnosis: Sepsis, unspecified organism  Assessment and Plan of Treatment: You came into the hospital for a fever and shortness of breath. Your lab work was remarkable for an infection in your blood and you were started on IV antibiotics. Repeat blood work was normal and you will need to continue with IV antibiotics (Cefepime) until 3/24. During your stay, you were also seen by neurosurgey who did not want to sample your  shunt due to risk for spreading the infection although the infectious disease team wanted to sample the  shunt.   Please follow up with your PCP in 1 week after completion of IV antibiotics to assess recovery  Please return to the ED if you continue to have fevers (T> 100.4), difficulty breathing, nausea, vomiting, chest pain, blurry vision or loss of consciousness.      SECONDARY DISCHARGE DIAGNOSES  Diagnosis: Suspected DVT (deep vein thrombosis)  Assessment and Plan of Treatment: During your admission, you were found to have blood clots in both of your legs and you were started on a blood thinner known as lovenox. Please continue with 100 units of lovenox twice a day for at least 3 months and discuss need for further treatment with your PCP.     PRINCIPAL DISCHARGE DIAGNOSIS  Diagnosis: Sepsis, unspecified organism  Assessment and Plan of Treatment: You came into the hospital for a fever and shortness of breath. Your lab work was remarkable for an infection in your blood and you were started on IV antibiotics. Repeat blood work was normal and you will need to continue with IV antibiotics (Cefepime) until 3/24. During your stay, you were also seen by neurosurgey who did not want to sample your  shunt due to risk for spreading the infection although the infectious disease team wanted to sample the  shunt.   Please follow up with your PCP in 1 week after completion of IV antibiotics to assess recovery  Please return to the ED if you continue to have fevers (T> 100.4), difficulty breathing, nausea, vomiting, chest pain, blurry vision or loss of consciousness.      SECONDARY DISCHARGE DIAGNOSES  Diagnosis: Suspected DVT (deep vein thrombosis)  Assessment and Plan of Treatment: During your admission, you were found to have blood clots in both of your legs and you were started on a blood thinner known as lovenox. Please continue with 100 units of lovenox twice a day LIFELONG given underlying malignancy and risk for future venous thromboembolism.

## 2023-03-17 NOTE — DISCHARGE NOTE PROVIDER - NSDCMRMEDTOKEN_GEN_ALL_CORE_FT
dexamethasone 4 mg oral tablet: 1  orally once a day  enoxaparin: 100 unit(s) subcutaneous every 12 hours  Keppra 500 mg oral tablet: 1 tab(s) orally 2 times a day  levothyroxine 50 mcg (0.05 mg) oral tablet: 1 tab(s) orally once a day  mirtazapine 7.5 mg oral tablet: 1 tab(s) orally once a day (at bedtime)  pantoprazole 40 mg oral delayed release tablet: 1 tab(s) orally once a day (before a meal)  senna leaf extract oral tablet: 2 tab(s) orally once a day (at bedtime)  sulfamethoxazole-trimethoprim 800 mg-160 mg oral tablet: 1 tab(s) orally every 12 hours  Vitamin D3 25 mcg (1000 intl units) oral tablet: 1 tab(s) orally once a day   cefepime: 2 gram(s) intravenous 2 times a day  dexamethasone 4 mg oral tablet: 1  orally once a day  enoxaparin: 100 unit(s) subcutaneous every 12 hours  Keppra 500 mg oral tablet: 1 tab(s) orally 2 times a day  levothyroxine 50 mcg (0.05 mg) oral tablet: 1 tab(s) orally once a day  mirtazapine 7.5 mg oral tablet: 1 tab(s) orally once a day (at bedtime)  pantoprazole 40 mg oral delayed release tablet: 1 tab(s) orally once a day (before a meal)  senna leaf extract oral tablet: 2 tab(s) orally once a day (at bedtime)  sulfamethoxazole-trimethoprim 800 mg-160 mg oral tablet: 1 tab(s) orally every 12 hours  Vitamin D3 25 mcg (1000 intl units) oral tablet: 1 tab(s) orally once a day   cefepime: 2 gram(s) intravenous 2 times a day, last dose 3/24  dexamethasone 4 mg oral tablet: 1  orally once a day  enoxaparin: 100 unit(s) subcutaneous every 12 hours  Keppra 500 mg oral tablet: 1 tab(s) orally 2 times a day  levothyroxine 50 mcg (0.05 mg) oral tablet: 1 tab(s) orally once a day  mirtazapine 7.5 mg oral tablet: 1 tab(s) orally once a day (at bedtime)  pantoprazole 40 mg oral delayed release tablet: 1 tab(s) orally once a day (before a meal)  senna leaf extract oral tablet: 2 tab(s) orally once a day (at bedtime)  sulfamethoxazole-trimethoprim 800 mg-160 mg oral tablet: 1 tab(s) orally every 12 hours  Vitamin D3 25 mcg (1000 intl units) oral tablet: 1 tab(s) orally once a day   cefepime: 2 gram(s) intravenous every 8 hours until 3/24/2023.   dexamethasone 4 mg oral tablet: 1  orally once a day  enoxaparin: 100 unit(s) subcutaneous every 12 hours  Keppra 500 mg oral tablet: 1 tab(s) orally 2 times a day  levothyroxine 50 mcg (0.05 mg) oral tablet: 1 tab(s) orally once a day  mirtazapine 7.5 mg oral tablet: 1 tab(s) orally once a day (at bedtime)  pantoprazole 40 mg oral delayed release tablet: 1 tab(s) orally once a day (before a meal)  senna leaf extract oral tablet: 2 tab(s) orally once a day (at bedtime)  sulfamethoxazole-trimethoprim 800 mg-160 mg oral tablet: 1 tab(s) orally every 12 hours  Vitamin D3 25 mcg (1000 intl units) oral tablet: 1 tab(s) orally once a day

## 2023-03-17 NOTE — PROVIDER CONTACT NOTE (OTHER) - ASSESSMENT
Patients heparin drip at 13mL/hr. patient aptt resulted 45.2
SW spoke with patient's sister and medical team who confirmed patient will be admitted.  Patient's sister verified admitting information can be relayed to D[facility].   SW called and left message on voicemail confirming patient's admittance to hospital.   No further SW intervention needed at this time.
Multiple attempts to draw next ptt. Unsuccessful.
LM=[id number], FM=[phone]/[id number]  minimally verbal, asymptomatic

## 2023-03-17 NOTE — ADVANCED PRACTICE NURSE CONSULT - ASSESSMENT
Left arm cleansed with CHG. Under ultrasound guidance, placed Arrow Endurance Extended Dwell Peripheral Catheter System 20G / 6cm into Left Cephalic Vein. Brisk  blood return, flushed with 20mls normal saline. Minimal blood loss. Patient tolerated procedure well. CHG dressing placed. All sharps accounted for.

## 2023-03-17 NOTE — PROVIDER CONTACT NOTE (OTHER) - SITUATION
Patient received call ([phone]) from [facility] Sammi Cavazos wanted to verify patient will be admitted.
Unable to draw ptt
LZ=[id number], AI=[phone]
patient aptt 45.2

## 2023-03-17 NOTE — PROGRESS NOTE ADULT - ATTENDING COMMENTS
Seen an examined by me this afternoon, mother at bedside, eyes are open, following simple commands, denies having pain at time of visit, tolerating PO intake well, arrow placed on LUE this morning  Severe sepsis-POA due to Pseudomona's bacteremia of unclear source, likely d/t VPS infection  C/w IV cefepime and c/w oral bactrim DS BID, surveillance BCx's are NTD, plan to c/w IV cefepime thru 3/24 (total of 2 weeks), apprec ID recs  Family understands and is aware that infection can recur  C/w dexamethasone and PPI while on steroids for seizure prophylaxis  C/w full dose LMWH BID (lifelong) for extensive B/L acute DVT's, pt has an IVC filter in place  DVT in the setting of underlying malignancy, hypercoagulable state  Functional quadriplegia  BMI of 33.5 --> Obesity  DNR/DNI, overall with poor prognosis, seen by Palliative, apprec recs and d/w them, to pursue hospice at [facility] upon discharge (if able to be done on same room that he is right now)  D/w Mother at bedside  Plan for patient to return back to [facility] tomorrow, d/w SW, apprec assistance  Rest as above

## 2023-03-17 NOTE — CHART NOTE - NSCHARTNOTEFT_GEN_A_CORE
Spoke to patient's sister, Dr. Maribel Vazquez regarding pt's clinical condition. Discussed patient current management and pending CT abdomen/pelvis. Per pt's sister, family amenable to  shunt tap if clinically indicated and amenable to palliative consult for further GOC discussions. Pt currently and will continue to remain DNR/DNI. Palliative consulted.    Marian Pond MD  Internal Medicine, PGY-1
Held discussion with patient's sister, Dr. Maribel Vazquez, regarding patient's medical care and condition. Relayed Infectious Disease team recommendations for  shunt tap / Lumbar puncture due to possible risk of reinfection. Relayed Neurosurgery team recommendations against  shunt tap/ Lumbar puncture due to risk of seeding. Dr. Maribel Vazquez understands the risks of both recommendations and would like to avoid  shunt tap / Lumbar puncture. "Our family including my sister Dr. Katrin Vazquez and my mother have decided to avoid any and all invasive procedures at this time including the  shunt tap and LP because my brother has suffered enough." "We would like to continue with empiric antibiotic treatment." Family aware and demonstrates clear understanding of possible risk of reinfection. Will update the Infectious Disease team and primary attending Dr. Cadena.     Marian Pond MD   Internal Medicine, PGY-1

## 2023-03-18 PROCEDURE — 99232 SBSQ HOSP IP/OBS MODERATE 35: CPT

## 2023-03-18 RX ORDER — ACETAMINOPHEN 500 MG
650 TABLET ORAL ONCE
Refills: 0 | Status: COMPLETED | OUTPATIENT
Start: 2023-03-18 | End: 2023-03-18

## 2023-03-18 RX ADMIN — CEFEPIME 100 MILLIGRAM(S): 1 INJECTION, POWDER, FOR SOLUTION INTRAMUSCULAR; INTRAVENOUS at 13:02

## 2023-03-18 RX ADMIN — Medication 1 TABLET(S): at 17:15

## 2023-03-18 RX ADMIN — Medication 50 MICROGRAM(S): at 22:27

## 2023-03-18 RX ADMIN — Medication 4 MILLIGRAM(S): at 05:06

## 2023-03-18 RX ADMIN — PANTOPRAZOLE SODIUM 40 MILLIGRAM(S): 20 TABLET, DELAYED RELEASE ORAL at 06:04

## 2023-03-18 RX ADMIN — Medication 650 MILLIGRAM(S): at 10:44

## 2023-03-18 RX ADMIN — LEVETIRACETAM 500 MILLIGRAM(S): 250 TABLET, FILM COATED ORAL at 17:15

## 2023-03-18 RX ADMIN — CEFEPIME 100 MILLIGRAM(S): 1 INJECTION, POWDER, FOR SOLUTION INTRAMUSCULAR; INTRAVENOUS at 05:05

## 2023-03-18 RX ADMIN — Medication 1 TABLET(S): at 05:06

## 2023-03-18 RX ADMIN — LEVETIRACETAM 500 MILLIGRAM(S): 250 TABLET, FILM COATED ORAL at 05:06

## 2023-03-18 RX ADMIN — ENOXAPARIN SODIUM 100 MILLIGRAM(S): 100 INJECTION SUBCUTANEOUS at 05:05

## 2023-03-18 RX ADMIN — CEFEPIME 100 MILLIGRAM(S): 1 INJECTION, POWDER, FOR SOLUTION INTRAMUSCULAR; INTRAVENOUS at 22:26

## 2023-03-18 RX ADMIN — Medication 650 MILLIGRAM(S): at 09:43

## 2023-03-18 RX ADMIN — ENOXAPARIN SODIUM 100 MILLIGRAM(S): 100 INJECTION SUBCUTANEOUS at 17:18

## 2023-03-18 NOTE — PROVIDER CONTACT NOTE (OTHER) - BACKGROUND
Patient diagnosed with sepsis
Patient admitted with sepsis
Patient admitted with sepsis. Hx of MDD, meningitis, gioblastoma multiforme, htn, dm, anxiety,
Patient diagnosed with sepsis

## 2023-03-18 NOTE — PROGRESS NOTE ADULT - ATTENDING COMMENTS
62M with GBM s/p craniotomy/resection c/b ESBL meningitis with repeat craniotomy 2/2020 , b/l DVT s/p IVCF, htn, anxiety, p/w acute encephalopathy in s/o sepsis with pseudomonas bacteremia, now improved on abx.  No acute events. Afebrile.    A/P:  # pseudomonas bacteremia,. sepsis  resolved on abx, was on meropnem, now changed to cefepime, plan for abx through 3/24, has arrow iv line  dc plan to [place] w/ transition to hospice  overall prognosis poor. DNR.   # GBM: c/w keppa/decadron   # b/l LE DVT: c/w therapeutic lovenox, has IVC filter 62M with GBM s/p craniotomy/resection c/b ESBL meningitis with repeat craniotomy 2/2020 , b/l DVT s/p IVCF, htn, anxiety, p/w acute encephalopathy in s/o sepsis with pseudomonas bacteremia, now improved on abx.  No acute events. Afebrile.    A/P:  # pseudomonas bacteremia,. sepsis  resolveing on abx, culture + on 3/11, repeat Bcx 3/14 ngtd, was on meropnem, now changed to cefepime, plan for abx through 3/24, has arrow iv line  dc plan to [place] w/ transition to hospice  overall prognosis poor. DNR.   # GBM: c/w keppa/decadron   # b/l LE DVT: c/w therapeutic lovenox, has IVC filter

## 2023-03-18 NOTE — PROGRESS NOTE ADULT - SUBJECTIVE AND OBJECTIVE BOX
Patient is a 62y old  Male who presents with a chief complaint of AMS (17 Mar 2023 12:02)      SUBJECTIVE / OVERNIGHT EVENTS:    Brief Daily Plan     MEDICATIONS  (STANDING):  cefepime   IVPB      cefepime   IVPB 2000 milliGRAM(s) IV Intermittent every 8 hours  dexAMETHasone     Tablet 4 milliGRAM(s) Oral daily  enoxaparin Injectable 100 milliGRAM(s) SubCutaneous every 12 hours  levETIRAcetam 500 milliGRAM(s) Oral two times a day  levothyroxine 50 MICROGram(s) Oral at bedtime  pantoprazole    Tablet 40 milliGRAM(s) Oral before breakfast  potassium phosphate / sodium phosphate Powder (PHOS-NaK) 1 Packet(s) Oral three times a day with meals  trimethoprim  160 mG/sulfamethoxazole 800 mG 1 Tablet(s) Oral every 12 hours    MEDICATIONS  (PRN):      Vital Signs Last 24 Hrs  T(C): 36.4 (18 Mar 2023 04:58), Max: 36.6 (17 Mar 2023 12:54)  T(F): 97.6 (18 Mar 2023 04:58), Max: 97.9 (17 Mar 2023 12:54)  HR: 95 (18 Mar 2023 04:58) (90 - 97)  BP: 139/95 (18 Mar 2023 04:58) (127/84 - 139/95)  BP(mean): --  RR: 16 (18 Mar 2023 04:58) (16 - 17)  SpO2: 97% (18 Mar 2023 04:58) (95% - 97%)    Parameters below as of 18 Mar 2023 04:58  Patient On (Oxygen Delivery Method): room air      CAPILLARY BLOOD GLUCOSE        I&O's Summary      PHYSICAL EXAM:  GENERAL: NAD, well-developed  HEAD:  Atraumatic, Normocephalic  EYES: EOMI, PERRLA, conjunctiva and sclera clear  NECK: Supple, No JVD  CHEST/LUNG: Clear to auscultation bilaterally; No wheeze  HEART: Regular rate and rhythm; No murmurs, rubs, or gallops  ABDOMEN: Soft, Nontender, Nondistended; Bowel sounds present  EXTREMITIES:  2+ Peripheral Pulses, No clubbing, cyanosis, or edema  PSYCH: AAOx3  NEUROLOGY: non-focal  SKIN: No rashes or lesions    LABS:                        12.6   4.55  )-----------( 143      ( 17 Mar 2023 06:55 )             40.8     03-17    139  |  109<H>  |  19  ----------------------------<  237<H>  4.4   |  24  |  0.21<L>    Ca    9.1      17 Mar 2023 06:55  Phos  1.9     03-17  Mg     2.20     03-17    TPro  5.1<L>  /  Alb  2.1<L>  /  TBili  0.3  /  DBili  x   /  AST  13  /  ALT  17  /  AlkPhos  75  03-17              RADIOLOGY & ADDITIONAL TESTS:    Imaging Personally Reviewed:    Consultant(s) Notes Reviewed:      Care Discussed with Consultants/Other Providers:   Patient is a 62y old  Male who presents with a chief complaint of AMS (17 Mar 2023 12:02)      SUBJECTIVE / OVERNIGHT EVENTS: No acute events overnight. Pt seen and examined at bedside this morning. Endorses an intermittent headache but denies fever, nausea, vomiting, CP, SOB, changes in BM or dysuria.     Brief Daily Plan   - C/w IV cefepime   - Pending dispo to [facility]     MEDICATIONS  (STANDING):  cefepime   IVPB      cefepime   IVPB 2000 milliGRAM(s) IV Intermittent every 8 hours  dexAMETHasone     Tablet 4 milliGRAM(s) Oral daily  enoxaparin Injectable 100 milliGRAM(s) SubCutaneous every 12 hours  levETIRAcetam 500 milliGRAM(s) Oral two times a day  levothyroxine 50 MICROGram(s) Oral at bedtime  pantoprazole    Tablet 40 milliGRAM(s) Oral before breakfast  potassium phosphate / sodium phosphate Powder (PHOS-NaK) 1 Packet(s) Oral three times a day with meals  trimethoprim  160 mG/sulfamethoxazole 800 mG 1 Tablet(s) Oral every 12 hours    MEDICATIONS  (PRN):      Vital Signs Last 24 Hrs  T(C): 36.4 (18 Mar 2023 04:58), Max: 36.6 (17 Mar 2023 12:54)  T(F): 97.6 (18 Mar 2023 04:58), Max: 97.9 (17 Mar 2023 12:54)  HR: 95 (18 Mar 2023 04:58) (90 - 97)  BP: 139/95 (18 Mar 2023 04:58) (127/84 - 139/95)  BP(mean): --  RR: 16 (18 Mar 2023 04:58) (16 - 17)  SpO2: 97% (18 Mar 2023 04:58) (95% - 97%)    Parameters below as of 18 Mar 2023 04:58  Patient On (Oxygen Delivery Method): room air      CAPILLARY BLOOD GLUCOSE        I&O's Summary      PHYSICAL EXAM:  GENERAL: NAD  HEENT:  Wearing head brace   CHEST/LUNG: Chest rise equal bilaterally, lungs clear to ascultation with decreased breath sounds at bases  HEART: Tachycardic, normal S1 S2  ABDOMEN: Soft, Nontender, Nondistended  EXTREMITIES: 2+ Bilateral pitting edema from ankles to hips   PSYCH: alert, limited  SKIN: No obvious rashes or lesions  NEURO: shakes head to answer questions     LABS:                        12.6   4.55  )-----------( 143      ( 17 Mar 2023 06:55 )             40.8     03-17    139  |  109<H>  |  19  ----------------------------<  237<H>  4.4   |  24  |  0.21<L>    Ca    9.1      17 Mar 2023 06:55  Phos  1.9     03-17  Mg     2.20     03-17    TPro  5.1<L>  /  Alb  2.1<L>  /  TBili  0.3  /  DBili  x   /  AST  13  /  ALT  17  /  AlkPhos  75  03-17              RADIOLOGY & ADDITIONAL TESTS:    Imaging Personally Reviewed:    Consultant(s) Notes Reviewed:      Care Discussed with Consultants/Other Providers:

## 2023-03-18 NOTE — PROGRESS NOTE ADULT - PROBLEM SELECTOR PLAN 1
Patient febrile, tachycardic and hypotensive on admission. Patient answering yes/no questions but altered according to Chris   - UA negative, CT with resolution of prior lung opacities   - patient with a history of meningitis x2, given meropenem in ED   - blood cx + (2/4) pseudomonas; urine cx wnl; aspiration precautions   - Per ID, c/w meropenem (3/11 - 3/15); D/c vanc (3/12) x 1; Cefepime (3/15 - )    - Repeat Blood Cx (3/14): NGTD  - CT abdomen/pelvis = no acute findings; Per neurosx - rec LP; no  shunt tap  - Resume home bactrim for now    Per conversation with sister, Katrin, would like to hold off LP and tx with empiric antibiotics for pseudomonal bacteremia and d/c heparin gtt with conversion to therapeutic lovenox Patient febrile, tachycardic and hypotensive on admission. Patient answering yes/no questions but altered according to Chris   - UA negative, CT with resolution of prior lung opacities   - patient with a history of meningitis x2, given meropenem in ED   - blood cx + (2/4) pseudomonas; urine cx wnl; aspiration precautions   - Per ID, c/w meropenem (3/11 - 3/15); D/c vanc (3/12) x 1; Cefepime (3/15 - 3/24)    - Repeat Blood Cx (3/14): NGTD  - CT abdomen/pelvis = no acute findings; Per neurosx - rec LP; no  shunt tap  - Resume home bactrim for now    Per conversation with sister, Katrin, would like to hold off LP and tx with empiric antibiotics for pseudomonal bacteremia and d/c heparin gtt with conversion to therapeutic lovenox

## 2023-03-18 NOTE — PROGRESS NOTE ADULT - ASSESSMENT
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 Pt found to have b/l DVT's and started on heparin gtt; pending CT abd/pelvis for pseudomonal bacteremia source.

## 2023-03-18 NOTE — PROGRESS NOTE ADULT - PROBLEM SELECTOR PLAN 2
Blood Cx (3/11): + 2/4 pseudomonas pending sensitivities    Source: Urine vs CAP vs meningitis   - F/w urine cx, if negative --> CT abd/pelvis with no acute findings   - Meropenem (3/11 - 3/15); Cefepime (3/15 - ); Resume home bactrim 1DS BID  - Repeat Blood Cx (3/14) = NGTD   - Trend fever curve Blood Cx (3/11): + 2/4 pseudomonas pending sensitivities    Source: Urine vs CAP vs meningitis   - F/w urine cx, if negative --> CT abd/pelvis with no acute findings   - Meropenem (3/11 - 3/15); Cefepime (3/15 - 3/24); Resume home bactrim 1DS BID  - Repeat Blood Cx (3/14) = NGTD   - Trend fever curve

## 2023-03-18 NOTE — PROVIDER CONTACT NOTE (OTHER) - ACTION/TREATMENT ORDERED:
MD made aware. Will A stick patient at bedside.
Md notified. no further interventions at this time . continue to monitor.
MD Sweeney made aware. WIll follow nomograms as per [facility] nursing care to continue
No action required at this time. Continue to monitor

## 2023-03-19 LAB
CULTURE RESULTS: SIGNIFICANT CHANGE UP
CULTURE RESULTS: SIGNIFICANT CHANGE UP
GLUCOSE BLDC GLUCOMTR-MCNC: 143 MG/DL — HIGH (ref 70–99)
SPECIMEN SOURCE: SIGNIFICANT CHANGE UP
SPECIMEN SOURCE: SIGNIFICANT CHANGE UP

## 2023-03-19 PROCEDURE — 99232 SBSQ HOSP IP/OBS MODERATE 35: CPT | Mod: GC

## 2023-03-19 RX ADMIN — LEVETIRACETAM 500 MILLIGRAM(S): 250 TABLET, FILM COATED ORAL at 17:24

## 2023-03-19 RX ADMIN — ENOXAPARIN SODIUM 100 MILLIGRAM(S): 100 INJECTION SUBCUTANEOUS at 17:21

## 2023-03-19 RX ADMIN — CEFEPIME 100 MILLIGRAM(S): 1 INJECTION, POWDER, FOR SOLUTION INTRAMUSCULAR; INTRAVENOUS at 12:15

## 2023-03-19 RX ADMIN — CEFEPIME 100 MILLIGRAM(S): 1 INJECTION, POWDER, FOR SOLUTION INTRAMUSCULAR; INTRAVENOUS at 22:18

## 2023-03-19 RX ADMIN — CEFEPIME 100 MILLIGRAM(S): 1 INJECTION, POWDER, FOR SOLUTION INTRAMUSCULAR; INTRAVENOUS at 05:12

## 2023-03-19 RX ADMIN — ENOXAPARIN SODIUM 100 MILLIGRAM(S): 100 INJECTION SUBCUTANEOUS at 05:09

## 2023-03-19 RX ADMIN — Medication 50 MICROGRAM(S): at 22:18

## 2023-03-19 RX ADMIN — Medication 1 TABLET(S): at 17:20

## 2023-03-19 RX ADMIN — LEVETIRACETAM 500 MILLIGRAM(S): 250 TABLET, FILM COATED ORAL at 05:10

## 2023-03-19 RX ADMIN — PANTOPRAZOLE SODIUM 40 MILLIGRAM(S): 20 TABLET, DELAYED RELEASE ORAL at 06:15

## 2023-03-19 RX ADMIN — Medication 4 MILLIGRAM(S): at 05:12

## 2023-03-19 RX ADMIN — Medication 1 TABLET(S): at 05:08

## 2023-03-19 NOTE — PROGRESS NOTE ADULT - ATTENDING COMMENTS
62M with GBM s/p craniotomy/resection c/b ESBL meningitis with repeat craniotomy 2/2020 , b/l DVT s/p IVCF, htn, anxiety, p/w acute encephalopathy in s/o sepsis with pseudomonas bacteremia, now improved on abx.  No acute events. Afebrile.    A/P:  # pseudomonas bacteremia,. sepsis  resolveing on abx, culture + on 3/11, repeat Bcx 3/14 ngtd, was on meropnem, now changed to cefepime, plan for abx through 3/24, has [facility] iv line  dc plan to [facility] w/ transition to hospice, waiting for bed  overall prognosis poor. DNR.   # GBM: c/w keppa/decadron   # b/l LE DVT: c/w therapeutic lovenox, has IVC filter

## 2023-03-19 NOTE — PROGRESS NOTE ADULT - PROBLEM SELECTOR PLAN 1
Patient febrile, tachycardic and hypotensive on admission. Patient answering yes/no questions but altered according to Chris   - UA negative, CT with resolution of prior lung opacities   - patient with a history of meningitis x2, given meropenem in ED   - blood cx + (2/4) pseudomonas; urine cx wnl; aspiration precautions   - Per ID, c/w meropenem (3/11 - 3/15); D/c vanc (3/12) x 1; Cefepime (3/15 - 3/24)    - Repeat Blood Cx (3/14): NGTD  - CT abdomen/pelvis = no acute findings; Per neurosx - rec LP; no  shunt tap  - C/w home Bactrim    Per conversation with sister, Katrin, would like to hold off LP and tx with empiric antibiotics for pseudomonal bacteremia and d/c heparin gtt with conversion to therapeutic lovenox

## 2023-03-19 NOTE — PROGRESS NOTE ADULT - PROBLEM SELECTOR PLAN 2
Blood Cx (3/11): + 2/4 pseudomonas pending sensitivities    Source: Urine vs CAP vs meningitis   - F/w urine cx, if negative --> CT abd/pelvis with no acute findings   - Meropenem (3/11 - 3/15); Cefepime (3/15 - 3/24); Resume home bactrim 1DS BID  - Repeat Blood Cx (3/14) = NGTD   - Trend fever curve

## 2023-03-19 NOTE — PROGRESS NOTE ADULT - SUBJECTIVE AND OBJECTIVE BOX
PROGRESS NOTE:   Authored by Edil Bojorquez MD     Patient is a 62y old  Male who presents with a chief complaint of AMS (18 Mar 2023 07:04)      SUBJECTIVE / OVERNIGHT EVENTS:  No acute events overnight. Patient at baseline this morning.    MEDICATIONS  (STANDING):  cefepime   IVPB      cefepime   IVPB 2000 milliGRAM(s) IV Intermittent every 8 hours  dexAMETHasone     Tablet 4 milliGRAM(s) Oral daily  enoxaparin Injectable 100 milliGRAM(s) SubCutaneous every 12 hours  levETIRAcetam 500 milliGRAM(s) Oral two times a day  levothyroxine 50 MICROGram(s) Oral at bedtime  pantoprazole    Tablet 40 milliGRAM(s) Oral before breakfast  potassium phosphate / sodium phosphate Powder (PHOS-NaK) 1 Packet(s) Oral three times a day with meals  trimethoprim  160 mG/sulfamethoxazole 800 mG 1 Tablet(s) Oral every 12 hours    MEDICATIONS  (PRN):      CAPILLARY BLOOD GLUCOSE        I&O's Summary    18 Mar 2023 07:01  -  19 Mar 2023 07:00  --------------------------------------------------------  IN: 1136 mL / OUT: 0 mL / NET: 1136 mL        PHYSICAL EXAM:  Vital Signs Last 24 Hrs  T(C): 36.3 (19 Mar 2023 05:06), Max: 36.6 (18 Mar 2023 22:37)  T(F): 97.3 (19 Mar 2023 05:06), Max: 97.8 (18 Mar 2023 22:37)  HR: 99 (19 Mar 2023 05:06) (96 - 107)  BP: 131/94 (19 Mar 2023 05:06) (127/89 - 141/95)  BP(mean): --  RR: 18 (19 Mar 2023 05:06) (17 - 18)  SpO2: 96% (19 Mar 2023 05:06) (95% - 98%)    Parameters below as of 19 Mar 2023 05:06  Patient On (Oxygen Delivery Method): room air        GENERAL: NAD  HEENT:  Wearing head brace   CHEST/LUNG: Chest rise equal bilaterally, lungs clear to ascultation with decreased breath sounds at bases  HEART: Tachycardic, normal S1 S2  ABDOMEN: Soft, Nontender, Nondistended  EXTREMITIES: 2+ Bilateral pitting edema from ankles to hips   PSYCH: alert, limited  SKIN: No obvious rashes or lesions  NEURO: shakes head to answer questions     LABS:                      RADIOLOGY & ADDITIONAL TESTS:

## 2023-03-20 ENCOUNTER — TRANSCRIPTION ENCOUNTER (OUTPATIENT)
Age: 63
End: 2023-03-20

## 2023-03-20 VITALS — WEIGHT: 240.3 LBS

## 2023-03-20 LAB
ANION GAP SERPL CALC-SCNC: 13 MMOL/L — SIGNIFICANT CHANGE UP (ref 7–14)
BUN SERPL-MCNC: 13 MG/DL — SIGNIFICANT CHANGE UP (ref 7–23)
CALCIUM SERPL-MCNC: 8.8 MG/DL — SIGNIFICANT CHANGE UP (ref 8.4–10.5)
CHLORIDE SERPL-SCNC: 103 MMOL/L — SIGNIFICANT CHANGE UP (ref 98–107)
CO2 SERPL-SCNC: 21 MMOL/L — LOW (ref 22–31)
CREAT SERPL-MCNC: 0.37 MG/DL — LOW (ref 0.5–1.3)
EGFR: 126 ML/MIN/1.73M2 — SIGNIFICANT CHANGE UP
GLUCOSE SERPL-MCNC: 121 MG/DL — HIGH (ref 70–99)
HCT VFR BLD CALC: 43.4 % — SIGNIFICANT CHANGE UP (ref 39–50)
HGB BLD-MCNC: 12.9 G/DL — LOW (ref 13–17)
MAGNESIUM SERPL-MCNC: 2 MG/DL — SIGNIFICANT CHANGE UP (ref 1.6–2.6)
MCHC RBC-ENTMCNC: 29.7 GM/DL — LOW (ref 32–36)
MCHC RBC-ENTMCNC: 30.6 PG — SIGNIFICANT CHANGE UP (ref 27–34)
MCV RBC AUTO: 103.1 FL — HIGH (ref 80–100)
NRBC # BLD: 0 /100 WBCS — SIGNIFICANT CHANGE UP (ref 0–0)
NRBC # FLD: 0 K/UL — SIGNIFICANT CHANGE UP (ref 0–0)
PHOSPHATE SERPL-MCNC: 2.2 MG/DL — LOW (ref 2.5–4.5)
PLATELET # BLD AUTO: 165 K/UL — SIGNIFICANT CHANGE UP (ref 150–400)
POTASSIUM SERPL-MCNC: 4.3 MMOL/L — SIGNIFICANT CHANGE UP (ref 3.5–5.3)
POTASSIUM SERPL-SCNC: 4.3 MMOL/L — SIGNIFICANT CHANGE UP (ref 3.5–5.3)
RBC # BLD: 4.21 M/UL — SIGNIFICANT CHANGE UP (ref 4.2–5.8)
RBC # FLD: 14.1 % — SIGNIFICANT CHANGE UP (ref 10.3–14.5)
SARS-COV-2 RNA SPEC QL NAA+PROBE: SIGNIFICANT CHANGE UP
SODIUM SERPL-SCNC: 137 MMOL/L — SIGNIFICANT CHANGE UP (ref 135–145)
WBC # BLD: 10.74 K/UL — HIGH (ref 3.8–10.5)
WBC # FLD AUTO: 10.74 K/UL — HIGH (ref 3.8–10.5)

## 2023-03-20 PROCEDURE — 99239 HOSP IP/OBS DSCHRG MGMT >30: CPT

## 2023-03-20 PROCEDURE — 99232 SBSQ HOSP IP/OBS MODERATE 35: CPT

## 2023-03-20 RX ADMIN — CEFEPIME 100 MILLIGRAM(S): 1 INJECTION, POWDER, FOR SOLUTION INTRAMUSCULAR; INTRAVENOUS at 05:39

## 2023-03-20 RX ADMIN — Medication 85 MILLIMOLE(S): at 10:54

## 2023-03-20 RX ADMIN — CEFEPIME 100 MILLIGRAM(S): 1 INJECTION, POWDER, FOR SOLUTION INTRAMUSCULAR; INTRAVENOUS at 13:42

## 2023-03-20 RX ADMIN — PANTOPRAZOLE SODIUM 40 MILLIGRAM(S): 20 TABLET, DELAYED RELEASE ORAL at 06:09

## 2023-03-20 RX ADMIN — Medication 4 MILLIGRAM(S): at 05:41

## 2023-03-20 RX ADMIN — ENOXAPARIN SODIUM 100 MILLIGRAM(S): 100 INJECTION SUBCUTANEOUS at 05:40

## 2023-03-20 RX ADMIN — LEVETIRACETAM 500 MILLIGRAM(S): 250 TABLET, FILM COATED ORAL at 05:41

## 2023-03-20 RX ADMIN — Medication 1 TABLET(S): at 05:41

## 2023-03-20 NOTE — PROGRESS NOTE ADULT - ATTENDING COMMENTS
Tangela Roche MD  Medicine Attending  Teams preferred/Pager: [phone]    I have personally seen and examined patient. I discussed the case with Dr. Watson and agree with findings and plan as detailed per note above. 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 Course was complicated by new b/l DVTs (lovenox accidently discontinued at [facility]). Unclear source for the pseudomonas bacteremia-urine culture negative, CT abd/pelv negative as well. Consideration for shunt infection also discussed but NSGY recommended against it, due to concern of seeding, and family declined both shunt tap as well as LP in line of GOC. Palliative consulted, decision made to move towards hospice. Plan will be to empirically treat with antibiotics, Cefepime until 3/24 and discharge to [facility] with hospice. Pt will also be discharged on lovenox, discussed with NSGY ok for AC.    exam as noted above  alert, follows simple commands but non verbal  3+ pitting edema b/l

## 2023-03-20 NOTE — PROGRESS NOTE ADULT - PROVIDER SPECIALTY LIST ADULT
Infectious Disease
Internal Medicine

## 2023-03-20 NOTE — PROGRESS NOTE ADULT - PROBLEM SELECTOR PROBLEM 7
Need for prophylactic measure
No
Need for prophylactic measure

## 2023-03-20 NOTE — PROGRESS NOTE ADULT - PROBLEM SELECTOR PLAN 7
Diet: puree with mildly thick liquids  DVT prophylaxis: therapeutic lovenox  Dispo: [facility] Diet: puree with mildly thick liquids  Psych/Sleep: None MARIFER  GI: None MARIFER  DVT ppx: Lovenox  Code Status: Hospice  Dispo: [facility] with plan for hospice

## 2023-03-20 NOTE — DIETITIAN INITIAL EVALUATION ADULT - PERTINENT MEDS FT
MEDICATIONS  (STANDING):  cefepime   IVPB      cefepime   IVPB 2000 milliGRAM(s) IV Intermittent every 8 hours  dexAMETHasone     Tablet 4 milliGRAM(s) Oral daily  enoxaparin Injectable 100 milliGRAM(s) SubCutaneous every 12 hours  levETIRAcetam 500 milliGRAM(s) Oral two times a day  levothyroxine 50 MICROGram(s) Oral at bedtime  pantoprazole    Tablet 40 milliGRAM(s) Oral before breakfast  trimethoprim  160 mG/sulfamethoxazole 800 mG 1 Tablet(s) Oral every 12 hours    MEDICATIONS  (PRN):

## 2023-03-20 NOTE — PROGRESS NOTE ADULT - PROBLEM SELECTOR PLAN 3
Patient with history of GBM s/p multiple craniotomies   - CT head with left frontal vasogenic edema with mass effect and midline shift to the right which is less when compared with 9/29/2022. Calcification versus hemorrhage left corona radiata likely related to prior neoplasm. Hematocrit level left occipital horn.   - Seen by neurosurgery, appreciate recommendations   - No neurosurgical intervention and no role for shunt tap per neurosurgery  - will c/w outpatient steroids Patient with history of GBM s/p multiple craniotomies   - CT head with left frontal vasogenic edema with mass effect and midline shift to the right which is less when compared with 9/29/2022. Calcification versus hemorrhage left corona radiata likely related to prior neoplasm. Hematocrit level left occipital horn.   - Seen by neurosurgery, appreciate recommendations   - No neurosurgical intervention and no role for shunt tap per neurosurgery  - will c/w Decadron 4mg daily (home meds)

## 2023-03-20 NOTE — DIETITIAN INITIAL EVALUATION ADULT - NSICDXPASTMEDICALHX_GEN_ALL_CORE_FT
PAST MEDICAL HISTORY:  Anxiety     Deep vein thrombosis (DVT) B/L    Diabetes mellitus     Essential hypertension     Glioblastoma multiforme     Hypertension     Hypothyroidism     MDD (major depressive disorder)     Meningitis     Staphylococcus aureus infection

## 2023-03-20 NOTE — DIETITIAN INITIAL EVALUATION ADULT - ORAL INTAKE PTA/DIET HISTORY
Patient seen for assessment. Unable to obtain information from patient 2/2 cognitive status. Information obtained from chart review and Chris records. Per [facility] records, patient was on a mechanical soft/nectar liquids w/ LPS 1x daily and ensure plus BID.

## 2023-03-20 NOTE — DIETITIAN INITIAL EVALUATION ADULT - PERTINENT LABORATORY DATA
03-20    137  |  103  |  13  ----------------------------<  121<H>  4.3   |  21<L>  |  0.37<L>    Ca    8.8      20 Mar 2023 07:00  Phos  2.2     03-20  Mg     2.00     03-20    POCT Blood Glucose.: 143 mg/dL (03-19-23 @ 22:27)  A1C with Estimated Average Glucose Result: 5.9 % (03-13-23 @ 07:16)  A1C with Estimated Average Glucose Result: 5.3 % (09-28-22 @ 05:36)

## 2023-03-20 NOTE — PROGRESS NOTE ADULT - SUBJECTIVE AND OBJECTIVE BOX
INCOMPLETE NOTE    Dann Watson | PGY1| Pager: [phone]  Interval Events:    REVIEW OF SYSTEMS:  CONSTITUTIONAL: No weakness, fevers or chills  EYES/ENT: No visual changes;  No vertigo or throat pain   NECK: No pain or stiffness  RESPIRATORY: No cough, wheezing, hemoptysis; No shortness of breath  CARDIOVASCULAR: No chest pain or palpitations  GASTROINTESTINAL: No abdominal or epigastric pain. No nausea, vomiting, or hematemesis; No diarrhea or constipation. No melena or hematochezia.  GENITOURINARY: No dysuria, frequency or hematuria  NEUROLOGICAL: No numbness or weakness  SKIN: No itching, burning, rashes, or lesions   All other review of systems is negative unless indicated above.    OBJECTIVE:  ICU Vital Signs Last 24 Hrs  T(C): 36.7 (20 Mar 2023 04:42), Max: 36.7 (20 Mar 2023 04:42)  T(F): 98.1 (20 Mar 2023 04:42), Max: 98.1 (20 Mar 2023 04:42)  HR: 92 (20 Mar 2023 04:42) (92 - 98)  BP: 109/73 (20 Mar 2023 04:42) (109/73 - 147/87)  BP(mean): --  ABP: --  ABP(mean): --  RR: 17 (20 Mar 2023 04:42) (17 - 19)  SpO2: 96% (20 Mar 2023 04:42) (95% - 97%)    O2 Parameters below as of 20 Mar 2023 04:42  Patient On (Oxygen Delivery Method): room air              03-18 @ 07:01 - 03-19 @ 07:00  --------------------------------------------------------  IN: 1136 mL / OUT: 0 mL / NET: 1136 mL    03-19 @ 07:01 - 03-20 @ 06:53  --------------------------------------------------------  IN: 235 mL / OUT: 0 mL / NET: 235 mL      CAPILLARY BLOOD GLUCOSE      POCT Blood Glucose.: 143 mg/dL (19 Mar 2023 22:27)      PHYSICAL EXAM:  General: WN/WD NAD  Neurology: A&Ox3, nonfocal, MILLER x 4  Eyes: PERRLA/ EOMI, Gross vision intact  ENT/Neck: Neck supple, trachea midline, No JVD, Gross hearing intact  Respiratory: CTA B/L, No wheezing, rales, rhonchi  CV: RRR, +S1/S2, -S3/S4, no murmurs, rubs or gallops  Abdominal: Soft, NT, ND +BS, No HSM  MSK: 5/5 strength UE/LE bilaterally  Extremities: No edema, 2+ peripheral pulses  Skin: No Rashes, Hematoma, Ecchymosis  Incisions:   Tubes:    HOSPITAL MEDICATIONS:  MEDICATIONS  (STANDING):  cefepime   IVPB      cefepime   IVPB 2000 milliGRAM(s) IV Intermittent every 8 hours  dexAMETHasone     Tablet 4 milliGRAM(s) Oral daily  enoxaparin Injectable 100 milliGRAM(s) SubCutaneous every 12 hours  levETIRAcetam 500 milliGRAM(s) Oral two times a day  levothyroxine 50 MICROGram(s) Oral at bedtime  pantoprazole    Tablet 40 milliGRAM(s) Oral before breakfast  potassium phosphate / sodium phosphate Powder (PHOS-NaK) 1 Packet(s) Oral three times a day with meals  trimethoprim  160 mG/sulfamethoxazole 800 mG 1 Tablet(s) Oral every 12 hours    MEDICATIONS  (PRN):      LABS:    Hgb Trend: 12.6<--, 12.0<--, 11.3<--, 11.4<--, 12.3<--        Creatinine Trend: 0.21<--, 0.21<--, <0.20<--, 0.25<--, 0.34<--, 0.44<--            MICROBIOLOGY:          Dann Watson | PGY1| Pager: [phone]  Interval Events: No acute events overnight, patient is without any acute complaints; shaking his ask if when asked if he had any pain or discomfort    OBJECTIVE:  ICU Vital Signs Last 24 Hrs  T(C): 36.7 (20 Mar 2023 04:42), Max: 36.7 (20 Mar 2023 04:42)  T(F): 98.1 (20 Mar 2023 04:42), Max: 98.1 (20 Mar 2023 04:42)  HR: 92 (20 Mar 2023 04:42) (92 - 98)  BP: 109/73 (20 Mar 2023 04:42) (109/73 - 147/87)  BP(mean): --  ABP: --  ABP(mean): --  RR: 17 (20 Mar 2023 04:42) (17 - 19)  SpO2: 96% (20 Mar 2023 04:42) (95% - 97%)    O2 Parameters below as of 20 Mar 2023 04:42  Patient On (Oxygen Delivery Method): room air      03-18 @ 07:01 - 03-19 @ 07:00  --------------------------------------------------------  IN: 1136 mL / OUT: 0 mL / NET: 1136 mL    03-19 @ 07:01  -  03-20 @ 06:53  --------------------------------------------------------  IN: 235 mL / OUT: 0 mL / NET: 235 mL      CAPILLARY BLOOD GLUCOSE      POCT Blood Glucose.: 143 mg/dL (19 Mar 2023 22:27)      PHYSICAL EXAM:  General: NAD  Neurology: unable to assess orientation; patient able to follow instructions, unable to verbalize answers  Eyes: PERRLA Gross vision intact  ENT/Neck: Neck supple, trachea midline, No JVD, Gross hearing intact  Respiratory: CTA B/L, No wheezing, rales, rhonchi  CV: RRR, +S1/S2, -S3/S4, no murmurs, rubs or gallops  Abdominal: Soft, NT, ND +BS, No HSM  Extremities: 3+ pitting edema to the thighs bilaterally  Skin: No Rashes, Hematoma, Ecchymosis    HOSPITAL MEDICATIONS:  MEDICATIONS  (STANDING):  cefepime   IVPB      cefepime   IVPB 2000 milliGRAM(s) IV Intermittent every 8 hours  dexAMETHasone     Tablet 4 milliGRAM(s) Oral daily  enoxaparin Injectable 100 milliGRAM(s) SubCutaneous every 12 hours  levETIRAcetam 500 milliGRAM(s) Oral two times a day  levothyroxine 50 MICROGram(s) Oral at bedtime  pantoprazole    Tablet 40 milliGRAM(s) Oral before breakfast  potassium phosphate / sodium phosphate Powder (PHOS-NaK) 1 Packet(s) Oral three times a day with meals  trimethoprim  160 mG/sulfamethoxazole 800 mG 1 Tablet(s) Oral every 12 hours    MEDICATIONS  (PRN):      LABS:    Hgb Trend: 12.6<--, 12.0<--, 11.3<--, 11.4<--, 12.3<--        Creatinine Trend: 0.21<--, 0.21<--, <0.20<--, 0.25<--, 0.34<--, 0.44<--            MICROBIOLOGY:

## 2023-03-20 NOTE — PROGRESS NOTE ADULT - ASSESSMENT
62M resident of [facility], hx Glioblastoma multiforme s/p craniotomy and resection complicated by ESBL meningitis s/p repeat craniotomy washout 2/2020 and 8 weeks of Meropenem, recurrent infection s/p repeat washout and 10 weeks of Meropenem followed by suppressive PO Bactrim. Also hx  shunt, b/l DVT (2020) s/p IVC filter (supposed to be on Lovenox but ?accidently discontinued per sister), HTN, anxiety presenting for altered mental status, unresponsiveness, tachycardia and desaturation.  Recent COVID Sept 2022.  Now with new bilateral DVTs. Patient was seen by vascular surgery who stated that he needed neurosurgical clearance prior to therapeutic anticoagulation. Neurosurgery was contacted and stated patient needs another MRI prior to starting therapeutic anticoagulation.  BC with pseudomonas aeruginosa    Pseudomonas bacteremia  - UC (-)  - CT abdomen (-)  - concerned about VPS, however, there are no plans to tap VPS (per family and neurosurgery)  - cefepime until 3/24  - at risk for relapse    GBM  - recurrent disease  - for placement  - hospice    Hx recurrent ESBL meningitis  - on bactrim    I have discussed plan of care as detailed above with att    Please call Infectious Diseases if there is a change in status.  Thank you.  [phone].

## 2023-03-20 NOTE — PROGRESS NOTE ADULT - PROBLEM SELECTOR PLAN 1
Patient febrile, tachycardic and hypotensive on admission. Patient answering yes/no questions but altered according to Chris   - UA negative, CT with resolution of prior lung opacities   - patient with a history of meningitis x2, given meropenem in ED   - blood cx + (2/4) pseudomonas; urine cx wnl; aspiration precautions   - Per ID, c/w meropenem (3/11 - 3/15); D/c vanc (3/12) x 1; Cefepime (3/15 - 3/24)    - Repeat Blood Cx (3/14): NGTD  - CT abdomen/pelvis = no acute findings; Per neurosx - rec LP; no  shunt tap  - C/w home Bactrim    Per conversation with sister, Katrin, would like to hold off LP and tx with empiric antibiotics for pseudomonal bacteremia and d/c heparin gtt with conversion to therapeutic lovenox Patient febrile, tachycardic and hypotensive on admission. Patient answering yes/no questions but altered according to Chris   - UA negative, CT with resolution of prior lung opacities   - patient with a history of meningitis x2, given meropenem in ED   - blood cx + (2/4) pseudomonas; urine cx wnl; aspiration precautions   - Per ID, c/w meropenem (3/11 - 3/15); D/c vanc (3/12) x 1; Cefepime (3/15 - 3/24)    - Repeat Blood Cx (3/14): NGTD  - CT abdomen/pelvis = no acute findings; Per neurosx - rec LP; no  shunt tap  - C/w home Bactrim    Per conversation with sister, Katrin, would like to hold off LP and tx with empiric antibiotics for pseudomonal bacteremia now on therapeutic Lovenox Patient febrile, tachycardic and hypotensive on admission. Patient answering yes/no questions but altered according to Chris   - UA negative, CT with resolution of prior lung opacities   - patient with a history of meningitis x2, given meropenem in ED   - blood cx + (2/4) pseudomonas; urine cx wnl; aspiration precautions   - Per ID, c/w meropenem (3/11 - 3/15); D/c vanc (3/12) x 1; Cefepime (3/15 - 3/24)    - Repeat Blood Cx (3/14): NGTD  - CT abdomen/pelvis = no acute findings; Per neurosx - rec LP; no  shunt tap  - C/w home Bactrim iso decadron    Per conversation with sister, Katrin, would like to hold off LP and tx with empiric antibiotics for pseudomonal bacteremia now on therapeutic Lovenox

## 2023-03-20 NOTE — PROGRESS NOTE ADULT - PROBLEM SELECTOR PLAN 6
Elevated BNP on admission   - echo in 2017 with EF 60-65% normal LV function   - CXR with small left pleural effusion   - will continue to monitor fluid status  - Repeat TTE: limited study Elevated BNP on admission   - echo in 2017 with EF 60-65% normal LV function   - CXR with small left pleural effusion   - will continue to monitor fluid status  - Repeat TTE: limited study; No further workup indicated

## 2023-03-20 NOTE — PROGRESS NOTE ADULT - PROBLEM/PLAN-6
DISPLAY PLAN FREE TEXT
DISPLAY PLAN FREE TEXT
Resident/Fellow
DISPLAY PLAN FREE TEXT

## 2023-03-20 NOTE — DISCHARGE NOTE NURSING/CASE MANAGEMENT/SOCIAL WORK - NSDCPEFALRISK_GEN_ALL_CORE
For information on Fall & Injury Prevention, visit: [email or web address] OR  [email or web address] OR  https://www.cdc.gov/steadi/patient.html

## 2023-03-20 NOTE — DISCHARGE NOTE NURSING/CASE MANAGEMENT/SOCIAL WORK - PATIENT PORTAL LINK FT
You can access the FollowMyHealth Patient Portal offered by [facility] by registering at the following website: [email or web address]. By joining [employer]’s FollowMyHealth portal, you will also be able to view your health information using other applications (apps) compatible with our system.

## 2023-03-20 NOTE — PROGRESS NOTE ADULT - PROBLEM SELECTOR PLAN 5
Patient with elevated BUN/creatinine ratio   - given 1L fluids in ED, will give an additional 500cc and continue to monitor Patient with elevated BUN/creatinine ratio   - given 1L fluids in ED, will give an additional 500cc and continue to monitor  - HD Stable; no further fluids warranted

## 2023-03-20 NOTE — PROGRESS NOTE ADULT - SUBJECTIVE AND OBJECTIVE BOX
f/u pseudomonas bacteremia    Interval History/ROS:  much more awake.  denies headache or abdominal pain.  no fever.  CT negative.    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  Presence of IVC filter  Status post craniectomy    Allergies  aspirin (Unknown)  shellfish (Hives)  Tegretol (Unknown)    ANTIMICROBIALS:  vancomycin  IVPB 1250 every 12 hours (3/12 x1)  meropenem  IVPB 2000 every 8 hours (3/11-3/15)    active:  cefepime   IVPB 2000 every 8 hours (3/15-)  trimethoprim  160 mG/sulfamethoxazole 800 mG 1 every 12 hours (3/15-)    MEDICATIONS  (STANDING):  dexAMETHasone     Tablet 4 daily  enoxaparin Injectable 100 every 12 hours  levETIRAcetam 500 two times a day  levothyroxine 50 at bedtime  pantoprazole    Tablet 40 before breakfast    Vital Signs Last 24 Hrs  T(F): 98.1 (23 @ 04:42), Max: 98.1 (23 @ 04:42)  HR: 92 (23 @ 04:42)  BP: 109/73 (23 @ 04:42)  RR: 17 (23 @ 04:42)  SpO2: 96% (23 @ 04:42) (95% - 97%)  W  PHYSICAL EXAM:  Constitutional: more awake  HEAD/EYES: eyes open  ENT:  supple  Cardiovascular:   normal S1, S2  Respiratory:  clear BS bilaterally  GI:  soft, non-tender, normal bowel sounds  :  no francois  Musculoskeletal:  no synovitis  Neurologic: awake, moving extremities  Skin:  no rash  Psychiatric:  appropriate affect                                                    12.9   10.74 )-----------( 165      ( 20 Mar 2023 07:00 )             43.4 03-20    137  |  103  |  13  ----------------------------<  121  4.3   |  21  |  0.37  Ca    8.8      20 Mar 2023 07:00Phos  2.2     03-20Mg     2.00     03-20    Urinalysis Basic - ( 11 Mar 2023 13:42 )  Color: Jazmin / Appearance: Slightly Turbid / S.028 / pH: x  Gluc: x / Ketone: Trace  / Bili: Negative / Urobili: 3 mg/dL   Blood: x / Protein: 100 mg/dL / Nitrite: Negative   Leuk Esterase: Negative / RBC: 7 /HPF / WBC 8 /HPF   Sq Epi: x / Non Sq Epi: 7 /HPF / Bacteria: Negative    MICROBIOLOGY:  Culture - Blood (collected 23 @ 06:28)  Source: .Blood Blood-Peripheral  Preliminary Report (03-15-23 @ 11:01):    No growth to date.    Culture - Blood (collected 23 @ 06:20)  Source: .Blood Blood-Peripheral  Preliminary Report (03-15-23 @ 11:01):    No growth to date.    Culture - Urine (collected 23 @ 13:15)  Source: Clean Catch Clean Catch (Midstream)  Final Report (23 @ 16:55):    No growth    Culture - Blood (collected 23 @ 11:32)  Source: .Blood Blood-Peripheral  Gram Stain (23 @ 06:54):    Growth in aerobic bottle: Gram Negative Rods  Final Report (23 @ 15:22):    Growth in aerobic bottle: Pseudomonas aeruginosa    ***Blood Panel PCR results on this specimen are available    approximately 3 hours after the Gram stain result.***    Gram stain, PCR, and/or culture results may not always    correspond due to differencein methodologies.    ************************************************************    This PCR assay was performed by multiplex PCR. This    Assay tests for 66 bacterial and resistance gene targets.    Please refer to the [facility] test directory    at [email or web address] for details.  Organism: Blood Culture PCR  Pseudomonas aeruginosa (23 @ 15:22)  Organism: Pseudomonas aeruginosa (23 @ 15:22)      -  Amikacin: S <=16      -  Aztreonam: S <=4      -  Cefepime: S <=2      -  Ceftazidime: S <=1      -  Ciprofloxacin: S 0.5      -  Gentamicin: S <=2      -  Imipenem: S <=1      -  Levofloxacin: S 1      -  Meropenem: S <=1      -  Piperacillin/Tazobactam: S <=8      -  Tobramycin: S <=2      Method Type: MJ  Organism: Blood Culture PCR (23 @ 15:22)      -  Pseudomonas aeruginosa: Detec      Method Type: PCR    Culture - Blood (collected 23 @ 10:44)  Source: .Blood Blood-Peripheral  Gram Stain (23 @ 07:26):    Growth in aerobic bottle: Gram Negative Rods  Final Report (23 @ 17:56):    Growth in aerobic bottle: Pseudomonas aeruginosa    See previous culture [id number]    Rapid RVP Result: NotDetec ( @ 11:14)    RADIOLOGY:  imaging below personally reviewed and agree with findings    CT Abdomen and Pelvis w/ IV Cont (23 @ 20:40) >  INTERPRETATION:  no CT evidence for infection.  follow up official report in the morning.    CT Head No Cont (23 @ 13:06) >  IMPRESSION: Left parietal craniotomy defect. Left frontal vasogenic edema with mass effect and midline shift to the right which is less when compared with 2022. Calcification versus hemorrhage left corona radiata likely related to prior neoplasm. Hematocrit level left occipital horn.    CT Angio Chest PE Protocol w/ IV Cont (23 @ 13:05) >  IMPRESSION:  No main, right or left main, lobar, or proximal segmental pulmonary embolism. Limited evaluation of the distal segmental and subsegmental arteries secondary to motion artifact.  Interval resolution of the previously seen multifocal opacities since 2022.    Xray Chest 1 View- PORTABLE-Urgent (23 @ 11:30) >  IMPRESSION:: The heart size cannot be adequately assessed on this single view. There is an overlying  shunt. The right costophrenic angle was omitted from this. There is a small left pleural effusion and/or left basilar atelectasis. A left basilar pneumonia cannot be excluded.

## 2023-03-20 NOTE — PROGRESS NOTE ADULT - PROBLEM SELECTOR PLAN 4
Patient with history of bilateral DVTs s/p IVC filter. On Tuesday patient with new DVTs per daughter, per neurosurgery outpatient needed repeat MRI prior to therapeutic anticoagulation   - Duplex US (3/13): remarkable for b/l DVT's. Ok to start AC per vasc sx   -  pt specific heparin gtt (3/13) --> therapeutic lovenox (3/15) Patient with history of bilateral DVTs s/p IVC filter. On Tuesday patient with new DVTs per daughter, per neurosurgery outpatient needed repeat MRI prior to therapeutic anticoagulation   - Duplex US (3/13): remarkable for b/l DVT's. Ok to start AC per vasc sx   -  pt specific heparin gtt (3/13) --> therapeutic lovenox (3/15)  - C/W Lovenox 100mg BID

## 2023-03-20 NOTE — DIETITIAN INITIAL EVALUATION ADULT - OTHER INFO
62 year old male with a PMH of GBM s/p craniotomy, HTN, anxiety presenting w/ AMS likely iso pseudomonas bacteremia started on IV antibiotics with no clear source of infection MARIFER (possible  Shunt) with family deferring further ID workup, pending d/c per chart.    Patient s/p swallow evaluation (3/13) w/ recs for pureed/mildly thick liquids. Per RN flow sheet, patient w/ variable PO intake 0-100%. No GI distress noted. Last bowel movement (3/20) per RN flow sheet. Has food allergy to shellfish. Unable to obtain UBW at this time. Per [facility] records, patient w/ no significant weight loss x 6 months, but had 2 lbs. weight gain x 1 month. Noted w/ +2 generalized, +3 scrotum and L/R foot edema w/ no pressure injuries per RN flow sheet.

## 2023-03-20 NOTE — PROGRESS NOTE ADULT - ASSESSMENT
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 Pt found to have b/l DVT's and started on heparin gtt; pending CT abd/pelvis for pseudomonal bacteremia source. 63 y/o M with PMH GBM s/p craniotomy w/ resection c/b ESBL meningitis with repeat craniotomy 2/2020 c/b repeat ESBL meningitis s/p craniectomy,b/t DVT (2020) s/p IVC filter, HTN, anxiety presenting w/ AMS likely iso pseudomonas bacteremia started on IV antibiotics with no clear source of infection MARIFER (possible  Shunt) with family deferring further ID workup, also found to have b/l DVT's now on Therapeutic Lovenox, pending dc to [place] with chronic abx to 3/24 with plan for hospice care.  63 y/o M with PMH GBM s/p craniotomy w/ resection c/b ESBL meningitis with repeat craniotomy 2/2020 c/b repeat ESBL meningitis s/p craniectomy,b/t DVT (2020) s/p IVC filter, HTN, anxiety presenting w/ AMS likely iso pseudomonas bacteremia started on IV antibiotics with no clear source of infection MARIFER (possible  Shunt) with family deferring further ID workup, also found to have b/l DVT's now on Therapeutic Lovenox, pending dc to [place] with chronic abx to 3/24 with plan for hospice care; stable for DC Today.

## 2023-03-22 NOTE — PROGRESS NOTE ADULT - PROBLEM SELECTOR PLAN 2
Phone number is non-working I was unable to confirm appt.    Concern for breakthrough seizure due to abx vs infection.  CT Head showing postsurgical changes. Neurology consulted, recommendations appreciated.  24hr continuous EEG negative for epileptiform activity  F/u additional Neurology recs

## 2023-10-12 NOTE — PROVIDER CONTACT NOTE (CRITICAL VALUE NOTIFICATION) - PERSON GIVING RESULT:
Arlen Velarde RN
Pt arrives to the ED c/o upper thigh/groin. Pt states waking up to this pain without injury/trauma. No bruising or swelling noted. Pt states having a blood mutation that increases blood clotting time.   
Westley S

## 2023-11-06 NOTE — PROGRESS NOTE ADULT - ASSESSMENT
Hematology referral reviewed for [facility], see below.    Referral reason: Leukocytosis with neutrophilia, intermittent until July of 2023 and has been chronically elevated since    Clinical question entered by referring provider or through order transcription: persistent leukocytosis with elevated ANC, need for further work up?     Referral received via: Internal referral by [facility]    Current abnormal labs: Available in Chart Review    Outreach: MyChart sent to patient    Plan: Triage instructions updated and sent to NPS for completion.     60y M PMHx HTN, Anxiety (on Prozac), b/l DVT while on Coumadin (s/p IVF filter, now off coumadin), GBM s/p craniotomy w/ resection c/b ESBL meningitis for which repeat craniotomy (2/2020) performed and pt was given 8 weeks Meropenem, pt subsequently had repeat ESBL meningitis s/p craniectomy w/ washout w/ intrathecal Jin and 10 week course IV Jin, COVID infection in 3/2020 who was BIBEMS from [facility] for AMS, hypoxia and hypotension. s/p MICU stay for sepsis, w/o clear source identified. Now with hypogylcemia on D5LR.

## 2023-12-19 NOTE — ED ADULT NURSE NOTE - NSFALLRSKASSESSDT_ED_ALL_ED
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    Anesthesia Type:   General     Note:    Oropharynx: oropharynx clear of all foreign objects and spontaneously breathing  Level of Consciousness: drowsy  Oxygen Supplementation: face mask  Level of Supplemental Oxygen (L/min / FiO2): 10  Independent Airway: airway patency satisfactory and stable  Dentition: dentition unchanged  Vital Signs Stable: post-procedure vital signs reviewed and stable  Report to RN Given: handoff report given  Patient transferred to: PACU    Handoff Report: Identifed the Patient, Identified the Reponsible Provider, Reviewed the pertinent medical history, Discussed the surgical course, Reviewed Intra-OP anesthesia mangement and issues during anesthesia, Set expectations for post-procedure period and Allowed opportunity for questions and acknowledgement of understanding      Vitals:  Vitals Value Taken Time   /50 12/19/23 1521   Temp     Pulse 84 12/19/23 1524   Resp 13 12/19/23 1524   SpO2 95 % 12/19/23 1524   Vitals shown include unfiled device data.    Electronically Signed By: CAROLYN Middleton CRNA  December 19, 2023  3:25 PM   16-Dec-2020 15:03

## 2023-12-21 NOTE — DIETITIAN INITIAL EVALUATION ADULT. - PROBLEM SELECTOR PLAN 2
[de-identified] : Cervical w/o contrast 03/10/2023.
-likely related to pneumonia (bacterial and COVID-19)  -manage as stated on separate sections  -check head CT w/ iv contrast give hx of GBM

## 2024-01-20 NOTE — H&P ADULT - ATTENDING COMMENTS
Report called to Angie.   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 Greg Dykes with yearly MRAs    agree with plan as above  MRI w and wo contrast and MRA pending

## 2024-03-11 NOTE — SWALLOW VFSS/MBS ASSESSMENT ADULT - DELAYED INITIATION OF THE PHARYNGEAL SWALLOW (IN SECONDS)
[facility], [address]  [facility] [address]  [phone]      Myrna Crawley :1969 MRN:[id number]    3/11/2024 Time Session Began: 6:30am  Time Session Ended: 7:30am    This visit was performed via live interactive two-way Video visit with patient's verbal consent.   Clinician Location:Home.  Patient Location: Home.  Verified patient identity:  [x] Yes    Session Type: Therapy 53+ minutes (70621)  Others Present:     Suicide/Homicide/Violence Ideation: No  If Yes, explain:     Need for Community Resources Assessed: Yes  Resources Needed: No  If Yes, what resources:     Current Outpatient Medications   Medication Sig    lamoTRIgine (LaMICtal) 100 MG tablet Take 1 tablet by mouth daily.    lisdexamfetamine (Vyvanse) 40 MG capsule Take 1 capsule by mouth every morning.    anastrozole (ARIMIDEX) 1 MG tablet Take 1 tablet by mouth daily. 0    zolpidem (AMBIEN) 5 MG tablet TAKE 1 TABLET BY MOUTH EVERY NIGHT AS NEEDED FOR SLEEP    guaiFENesin-codeine (GUAIFENESIN AC) 100-10 MG/5ML syrup Take 5 mLs by mouth 3 times daily as needed for Cough.    HYDROcodone-acetaminophen (NORCO) 5-325 MG per tablet Take 1 tablet by mouth every 6 hours as needed for Pain.    atorvastatin (LIPITOR) 10 MG tablet TAKE 1 TABLET BY MOUTH DAILY    levothyroxine 50 MCG tablet TAKE 1 TABLET BY MOUTH DAILY    metFORMIN (GLUCOPHAGE) 500 MG tablet TAKE 1 TABLET BY MOUTH DAILY WITH BREAKFAST    furosemide (LASIX) 20 MG tablet Take 1 tablet by mouth daily as needed (Edema).    Semaglutide, 2 MG/DOSE, 8 MG/3ML Solution Pen-injector Inject 2 mg into the skin 1 day a week. Indications: Type 2 Diabetes    diazePAM (Valium) 5 MG tablet Take 1 tablet by mouth 1 time for 1 dose. Take one hour prior to MRI.    diphenhydrAMINE (BENADRYL) 25 MG capsule     meloxicam (MOBIC) 15 MG tablet Take 1 tablet by mouth daily.    clonazePAM (KlonoPIN) 0.5 MG tablet Take 1 tablet by mouth 2 times  daily as needed for Anxiety.    BD Pen Needle Micro U/F 32G X 6 MM Misc     venlafaxine XR (EFFEXOR XR) 75 MG 24 hr capsule Take 1 capsule by mouth daily.    CALCIUM CARBONATE-VITAMIN D PO Take by mouth daily.    fluticasone propionate (Flovent HFA) 110 MCG/ACT inhaler Inhale 1 puff into the lungs 2 times daily. For 1-2 weeks until cough improves.    DISPENSE DO NOT DELETE: Medication contract on file: Vyvanse 40 mg tablet. Take 1 tablet daily. Qty 30. Clonazepam 0.5 mg 1 tablet BID PRN anxiety. Qty 20. Zolpidem 5mg. 1 tablet nightly PRN. Qty 30. Pharmacy [facility]. Provider MARÍA Ramires. Effective 10/19/23    Multiple Vitamin (MULTIVITAMIN ADULT PO)     APPLE CIDER VINEGAR PO Take by mouth daily.    acetaminophen (TYLENOL) 500 MG tablet Take 1,000 mg by mouth every 6 hours as needed for Pain.    FIBER ADULT GUMMIES PO Take 2 tablets by mouth daily.     No current facility-administered medications for this visit.       Change in Medication(s) Reported: No    If Yes, explain:     Patient/Family Education Provided: Yes  Patient/Family Displays Understanding: Yes  If No, explain:     Chief complaint in patient's own words: \"anxiety and depression.\"    Progress Note containing chief complaint and symptoms/problems related to the complaint:    Data: Patient reported anxiety related to ongoing life stressors, recently went to a marital retreat which she found extremely helpful for self development.      Action of the provider: Patient was provided with support. Patient's report of anxiety was processed and reframed to build insight. Cognitions shared by patient were acknowledged and exploration was encouraged. Provider reviewed coping skills, healthy boundaries, setting limits, saying no. Provider recommended patient work on utilizing coping skills. Intervention: Cognitive Behavioral, Solution focused, Supportive     Response of patient: Myrna was alert and oriented x4. Patient presented  motivated. Patient presented as calm and cooperative. Mood appeared stable with congruent affect. Eye contact was appropriate. Speech was normal in rate, tone, and volume. Motor activity was unremarkable. Thought process was future oriented and goal directed. Patient denies any suicidal ideation, homicidal ideation, or self-harm ideation.     Plan: Myrna  will return in 2 weeks or sooner if needed. Patient will practice coping skills discussed in session.    Diagnosis: Generalized Anxiety Disorder : 2 Moderate    Treatment Plan:  See Treatment Plan     Discharge Plan: Strategies Discussed to Maintain Gains     Next Appointment: 2 weeks  Nicki Delacruz LCSW   0

## 2024-03-12 NOTE — ED PROVIDER NOTE - ATTENDING CONTRIBUTION TO CARE
"Preventive Care Visit  [facility]  Gonzalez Akhtar MD, Internal Medicine  Mar 12, 2024    Assessment & Plan     Encounter for Medicare annual wellness exam    - Adult Dermatology  Referral; Future    Diabetes mellitus due to underlying condition with diabetic nephropathy, without long-term current use of insulin (H)    - HEMOGLOBIN A1C; Future  - HEMOGLOBIN A1C    Atrial fibrillation, unspecified type (H)  Rate controlled; on OAC    Primary hypertension  Well controlled     Hyperlipidemia LDL goal <70  On statin therapy; recheck lipids in September     Screen for colon cancer  Discussed screening options, he would like to do cologuard rather than colonoscopy   - COLOGUARD([employer]); Future        No LOS data to display   Time spent by me doing chart review, history and exam, documentation and further activities per the note      BMI  Estimated body mass index is 34.21 kg/m  as calculated from the following:    Height as of this encounter: 1.727 m (5' 8\").    Weight as of this encounter: 102.1 kg (225 lb).   Weight management plan: Discussed healthy diet and exercise guidelines    Counseling  Appropriate preventive services were discussed with this patient, including applicable screening as appropriate for fall prevention, nutrition, physical activity, Tobacco-use cessation, weight loss and cognition.  Checklist reviewing preventive services available has been given to the patient.  Reviewed patient's diet, addressing concerns and/or questions.   He is at risk for lack of exercise and has been provided with information to increase physical activity for the benefit of his well-being.     -vaccines are up to date  -he had recent PSA with urology     FUTURE APPOINTMENTS:       - 6 months or sooner as needed     Subjective   Te is a 72 year old, presenting for the following:  Physical          Health Care Directive  Patient does not have a Health Care Directive or Living Will: " Discussed advance care planning with patient; information given to patient to review.    HPI    Follow up diabetes, hypertension , hyperlipidemia           3/5/2024   General Health   How would you rate your overall physical health? Good   Feel stress (tense, anxious, or unable to sleep) Not at all         3/5/2024   Nutrition   Diet: I don't know         3/5/2024   Exercise   Days per week of moderate/strenous exercise 2 days   Average minutes spent exercising at this level 10 min   (!) EXERCISE CONCERN      3/5/2024   Social Factors   Frequency of gathering with friends or relatives Three times a week   Worry food won't last until get money to buy more No   Food not last or not have enough money for food? No   Do you have housing?  Yes   Are you worried about losing your housing? No   Lack of transportation? No   Unable to get utilities (heat,electricity)? No         3/12/2024   Fall Risk   Gait Speed Test (Document in seconds) 7.15   Gait Speed Test Interpretation Greater than 5.01 seconds - ABNORMAL          3/5/2024   Activities of Daily Living- Home Safety   Needs help with the following daily activites None of the above   Safety concerns in the home None of the above         3/5/2024   Dental   Dentist two times every year? Yes         3/5/2024   Hearing Screening   Hearing concerns? None of the above         3/5/2024   Driving Risk Screening   Patient/family members have concerns about driving No         3/5/2024   General Alertness/Fatigue Screening   Have you been more tired than usual lately? No         3/5/2024   Urinary Incontinence Screening   Bothered by leaking urine in past 6 months No         3/5/2024   TB Screening   Were you born outside of [place]?  No         Today's PHQ-2 Score:       3/12/2024    11:11 AM   PHQ-2 ( 1999 [employer])   Q1: Little interest or pleasure in doing things 0   Q2: Feeling down, depressed or hopeless 0   PHQ-2 Score 0   Q1: Little interest or pleasure in doing things Not at all    Q2: Feeling down, depressed or hopeless Not at all   PHQ-2 Score 0           3/5/2024   Substance Use   Alcohol more than 3/day or more than 7/wk No   Do you have a current opioid prescription? No   How severe/bad is pain from 1 to 10? 0/10 (No Pain)   Do you use any other substances recreationally? No    (!) OTHER    (!) DECLINE     Social History     Tobacco Use    Smoking status: Never    Smokeless tobacco: Never   Substance Use Topics    Alcohol use: Yes     Alcohol/week: 0.0 standard drinks of alcohol     Comment: 4 drinks per week    Drug use: No           3/5/2024   AAA Screening   Family history of Abdominal Aortic Aneurysm (AAA)? No   ASCVD Risk   The ASCVD Risk score (Jasper DK, et al., 2019) failed to calculate for the following reasons:    The valid total cholesterol range is 130 to 320 mg/dL            Reviewed and updated as needed this visit by Provider                    Past Medical History:   Diagnosis Date    Afferent pupillary defect     Anemia     Atrial fibrillation (H) 12/10/14    EKG done at [facility] 12/10/14     B12 deficiency     Calculus of kidney     PSA followed by urology    CKD (chronic kidney disease), stage II     Exotropia     HTN (hypertension)     Hyperlipidaemia     Hyperlipidemia     Microalbuminuria     Morbid obesity (H)     Nonspecific abnormal unspecified cardiovascular function study     Optic atrophy, left eye     LE, mild    Optic disc pallor     LE    ROC (obstructive sleep apnea)     ROC on CPAP     Palpitations     Phrenic neuropathy     Type II or unspecified type diabetes mellitus without mention of complication, not stated as uncontrolled     retinopathy and nephropathy - followed by Dr. Arnold     Past Surgical History:   Procedure Laterality Date    COLONOSCOPY  2003    COLONOSCOPY  11/25/2013    Procedure: COLONOSCOPY;  COLONOSCOPY;  Surgeon: Uday Taylor MD;  Location:  GI     BP Readings from Last 3 Encounters:   03/12/24 118/75   02/27/24  110/72   02/27/24 116/72    Wt Readings from Last 3 Encounters:   03/12/24 102.1 kg (225 lb)   02/27/24 100.5 kg (221 lb 9.6 oz)   02/27/24 99.8 kg (220 lb)                  Patient Active Problem List   Diagnosis    Impotence of organic origin    Hypertension    Class 1 obesity due to excess calories with serious comorbidity and body mass index (BMI) of 32.0 to 32.9 in adult    Hyperlipidemia LDL goal <100    ROC (obstructive sleep apnea)    Low HDL (under 40)    B12 deficiency    Phrenic neuropathy    Partial optic atrophy    Atrial fibrillation (H)    Seasonal allergic rhinitis    Peripheral neuropathy    Cervical radiculopathy    Coronary artery calcification    Diabetes mellitus due to underlying condition with diabetic nephropathy, without long-term current use of insulin (H)     Past Surgical History:   Procedure Laterality Date    COLONOSCOPY  2003    COLONOSCOPY  11/25/2013    Procedure: COLONOSCOPY;  COLONOSCOPY;  Surgeon: Uday Taylor MD;  Location:  GI       Social History     Tobacco Use    Smoking status: Never    Smokeless tobacco: Never   Substance Use Topics    Alcohol use: Yes     Alcohol/week: 0.0 standard drinks of alcohol     Comment: 4 drinks per week     Family History   Problem Relation Age of Onset    Heart Disease Father         disease    Arthritis Mother     Cancer - colorectal No family hx of          Current Outpatient Medications   Medication Sig Dispense Refill    acetaminophen (TYLENOL) 650 MG CR tablet Take 1,300 mg by mouth every 8 hours as needed for mild pain or fever      ASPIRIN NOT PRESCRIBED, INTENTIONAL, Reported on 3/8/2017      Continuous Blood Gluc Sensor (DEXCOM G6 SENSOR) MISC Change every 10 days.      Continuous Blood Gluc Transmit (DEXCOM G6 TRANSMITTER) MISC       Cyanocobalamin (VITAMIN B-12 SL) Take 2 tablets by mouth daily Dose unknown      dapagliflozin (FARXIGA) 10 MG TABS tablet Take 10 mg by mouth daily      glimepiride (AMARYL) 4 MG tablet Take 8 mg  by mouth daily      hydrochlorothiazide (MICROZIDE) 12.5 MG capsule TAKE TWO CAPSULES BY MOUTH DAILY 180 capsule 2    insulin lispro (HUMALOG KWIKPEN) 100 UNIT/ML (1 unit dial) KWIKPEN Inject Subcutaneous 3 times daily (before meals) According to sliding scale      lisinopril (ZESTRIL) 20 MG tablet Take 1 tablet (20 mg) by mouth 2 times daily 180 tablet 0    metFORMIN (GLUCOPHAGE) 1000 MG tablet Take 1 tablet (1,000 mg) by mouth 2 times daily (take with meals) 180 tablet 1    metoprolol succinate ER (TOPROL XL) 50 MG 24 hr tablet Take 1 tablet (50 mg) by mouth daily 90 tablet 3    rivaroxaban ANTICOAGULANT (XARELTO ANTICOAGULANT) 20 MG TABS tablet TAKE ONE TABLET BY MOUTH ONE TIME DAILY WITH DINNER 90 tablet 3    rosuvastatin (CRESTOR) 20 MG tablet Take 1 tablet (20 mg) by mouth daily 90 tablet 3    Semaglutide, 1 MG/DOSE, 4 MG/3ML SOPN Inject 2 mg Subcutaneous once a week      triamcinolone (KENALOG) 0.1 % external cream Apply topically 2 times daily To the leg area 80 g 3    ULTICARE 29G X 12.7MM insulin pen needle USE ONCE DAILY      VITAMIN D (CHOLECALCIFEROL) PO Take 1 tablet by mouth daily Dose unknown       Allergies   Allergen Reactions    Simvastatin Other (See Comments)     Muscle aches, elevated CK levels     Current providers sharing in care for this patient include:  Patient Care Team:  Gonzalez Akhtar MD as PCP - General (Internal Medicine)  Ramakrishna Lombardi (Ophthalmology)  Tosin Gillette, PharmD as Pharmacist (Pharmacist)  Moncho Chairez MD as MD (Endocrinology, Diabetes, and Metabolism)  Osiel Taylor MD as Assigned Surgical Provider  Tosin Gillette, PharmD as Assigned MTM Pharmacist  Gonzalez Akhtar MD as Assigned PCP  Pablo Enrique MD as Assigned Heart and Vascular Provider    The following health maintenance items are reviewed in Epic and correct as of today:  Health Maintenance   Topic Date Due    ANNUAL REVIEW OF HM ORDERS  11/08/2023    COLORECTAL CANCER SCREENING   "11/25/2023    A1C  03/08/2024    MEDICARE ANNUAL WELLNESS VISIT  03/09/2024    EYE EXAM  08/24/2024    BMP  09/08/2024    LIPID  09/08/2024    MICROALBUMIN  09/08/2024    DIABETIC FOOT EXAM  09/11/2024    FALL RISK ASSESSMENT  03/12/2025    ADVANCE CARE PLANNING  03/09/2028    DTAP/TDAP/TD IMMUNIZATION (3 - Td or Tdap) 11/08/2032    HEPATITIS C SCREENING  Completed    PHQ-2 (once per calendar year)  Completed    INFLUENZA VACCINE  Completed    Pneumococcal Vaccine: 65+ Years  Completed    URINALYSIS  Completed    ZOSTER IMMUNIZATION  Completed    RSV VACCINE (Pregnancy & 60+)  Completed    COVID-19 Vaccine  Completed    IPV IMMUNIZATION  Aged Out    HPV IMMUNIZATION  Aged Out    MENINGITIS IMMUNIZATION  Aged Out    RSV MONOCLONAL ANTIBODY  Aged Out            Objective    Exam  /75 (BP Location: Left arm, Patient Position: Sitting, Cuff Size: Adult Large)   Pulse 85   Temp 97.5  F (36.4  C) (Oral)   Resp 20   Ht 1.727 m (5' 8\")   Wt 102.1 kg (225 lb)   SpO2 97%   BMI 34.21 kg/m     Estimated body mass index is 34.21 kg/m  as calculated from the following:    Height as of this encounter: 1.727 m (5' 8\").    Weight as of this encounter: 102.1 kg (225 lb).    Physical Exam  GENERAL: alert and no distress  EYES: Eyes grossly normal to inspection, PERRL and conjunctivae and sclerae normal  HENT: ear canals and TM's normal, nose and mouth without ulcers or lesions  NECK: no adenopathy, no asymmetry, masses, or scars  RESP: lungs clear to auscultation - no rales, rhonchi or wheezes  CV: regular rate and rhythm, normal S1 S2, no S3 or S4, no murmur, click or rub, no peripheral edema  ABDOMEN: soft, nontender, no hepatosplenomegaly, no masses and bowel sounds normal  MS: no gross musculoskeletal defects noted, no edema  SKIN: no suspicious lesions or rashes  NEURO: Normal strength and tone, mentation intact and speech normal  PSYCH: mentation appears normal, affect normal/bright        3/12/2024   Mini Cog "   Clock Draw Score 2 Normal   3 Item Recall 3 objects recalled   Mini Cog Total Score 5            Signed Electronically by: Gonzalez Akhtar MD     pt has hx bipolar do htn not compliant with meds because of occasional dizzyness that has been attributed to ent or vascular issues (has seen a neurologist neurosurgeon and ent speicalist)  now with headache found to hve very high bp he refused to take any meds here in the er including pain meds  no cp no sob nofever no chills no photophobia he endorses some occasional trouble wordfindign all day and when he is nervous. mistaking his weight for example but able to accurately describe this  on eval  wd wn male nad heent cor chest normal  neuro nihss=0 gcs=15 dysphagia =pass  skin normal gait normal  speech normal  plan ct bp management dispo

## 2024-04-19 NOTE — DIETITIAN INITIAL EVALUATION ADULT - ADD RECOMMEND
8
Continue diet as ordered. Will provide Hormel Vital Shake 1x daily (520 kcal, 22 g pro). Document PO intake to monitor trend.

## 2024-06-07 NOTE — ED ADULT NURSE NOTE - NIH STROKE SCALE: 6A. MOTOR LEG, LEFT, QM
(0) No drift; leg holds 30 degree position for full 5 secs Decreased functional mobility/capacity secondary to impairments listed below

## 2024-08-30 NOTE — CONSULT NOTE ADULT - NS_MD_PANP_GEN_ALL_CORE
Accessed site: right femoral artery. Attending and PA/NP shared services statement (NON-critical care):

## 2025-01-25 NOTE — H&P ADULT - PROBLEM SELECTOR PLAN 6
You can access the FollowMyHealth Patient Portal offered by [facility] by registering at the following website: [email or web address]. By joining [facility]’s FollowMyHealth portal, you will also be able to view your health information using other applications (apps) compatible with our system.
DVT ppx: lovenox 40mg qd

## 2025-04-28 NOTE — SWALLOW BEDSIDE ASSESSMENT ADULT - SWALLOW EVAL: RECOMMENDED FEEDING/EATING TECHNIQUES
No FEED ONLY WHEN ALERT/allow for swallow between intakes/alternate food with liquid/check mouth frequently for oral residue/pocketing/crush medication (when feasible)/maintain upright posture during/after eating for 30 mins/oral hygiene/position upright (90 degrees)/small sips/bites

## 2025-05-28 NOTE — DISCHARGE NOTE PROVIDER - NSDCHC_MEDRECSTATUS_GEN_ALL_CORE
Goal Outcome Evaluation:      Plan of Care Reviewed With: patient    Overall Patient Progress: no change    VSS, LVAD parameters WNL, denies SOB, pain to driveline site managed with Tylenol PRN . Driveline dressing CDI , changed in AM . Remains on IV antibiotics, Right arm PICC Heparin locked. No acute issues this shift, will continue POC.     Heartmate 3 LEFT VS  Flow (Lpm): 5.2 Lpm  Pulse Index (PI): 2.8 PI  Speed (rpm): 5900 rpm  Power (orosco): 4.7 orosco  Current Hct settin     Patient's most recent vital signs are:     Vital signs:  MAP: 82, 78 (doppler)   Temp: 97.8  HR: 67  RR: 19  SpO2: 99 %     Patient does not have new respiratory symptoms.  Patient does not have new sore throat.  Patient does not have a fever greater than 99.5.               Admission Reconciliation is Not Complete  Discharge Reconciliation is Not Complete Admission Reconciliation is Completed  Discharge Reconciliation is Not Complete Admission Reconciliation is Completed  Discharge Reconciliation is Completed